# Patient Record
Sex: FEMALE | Race: BLACK OR AFRICAN AMERICAN | NOT HISPANIC OR LATINO | Employment: PART TIME
[De-identification: names, ages, dates, MRNs, and addresses within clinical notes are randomized per-mention and may not be internally consistent; named-entity substitution may affect disease eponyms.]

---

## 2018-12-03 LAB — HBA1C MFR BLD: 10.3 % (ref 4.3–5.7)

## 2018-12-05 LAB — HBA1C MFR BLD: 10.5 % (ref 4.3–5.7)

## 2019-02-26 LAB — HBA1C MFR BLD: 10.5 % (ref 4.3–5.7)

## 2019-04-03 LAB — HBA1C MFR BLD: 8.8 % (ref 4.3–5.7)

## 2019-06-26 LAB — HBA1C MFR BLD: 8 % (ref 4.3–5.7)

## 2019-07-29 ENCOUNTER — RECORDS - HEALTHEAST (OUTPATIENT)
Dept: ADMINISTRATIVE | Facility: OTHER | Age: 64
End: 2019-07-29

## 2019-07-30 ENCOUNTER — AMBULATORY - HEALTHEAST (OUTPATIENT)
Dept: VASCULAR SURGERY | Facility: CLINIC | Age: 64
End: 2019-07-30

## 2019-07-30 ENCOUNTER — RECORDS - HEALTHEAST (OUTPATIENT)
Dept: ADMINISTRATIVE | Facility: OTHER | Age: 64
End: 2019-07-30

## 2019-07-30 DIAGNOSIS — I73.9 PAD (PERIPHERAL ARTERY DISEASE) (H): ICD-10-CM

## 2019-07-30 DIAGNOSIS — I73.9 PVD (PERIPHERAL VASCULAR DISEASE) (H): ICD-10-CM

## 2019-08-01 ENCOUNTER — AMBULATORY - HEALTHEAST (OUTPATIENT)
Dept: PODIATRY | Facility: CLINIC | Age: 64
End: 2019-08-01

## 2019-08-01 DIAGNOSIS — I73.9 PAD (PERIPHERAL ARTERY DISEASE) (H): ICD-10-CM

## 2019-08-01 RX ORDER — METFORMIN HYDROCHLORIDE EXTENDED-RELEASE TABLETS 500 MG/1
500 TABLET, FILM COATED, EXTENDED RELEASE ORAL 2 TIMES DAILY
Status: SHIPPED | COMMUNITY
Start: 2019-08-01 | End: 2023-02-14

## 2019-08-01 RX ORDER — AMLODIPINE BESYLATE 5 MG/1
5 TABLET ORAL DAILY
Status: SHIPPED | COMMUNITY
Start: 2019-08-01

## 2019-08-01 RX ORDER — LISINOPRIL 40 MG/1
40 TABLET ORAL DAILY
Status: ON HOLD | COMMUNITY
Start: 2019-08-01 | End: 2023-09-14

## 2019-08-01 RX ORDER — CLOPIDOGREL BISULFATE 75 MG/1
75 TABLET ORAL DAILY
Status: SHIPPED | COMMUNITY
Start: 2019-08-01 | End: 2022-04-21

## 2019-08-01 RX ORDER — HYDROCHLOROTHIAZIDE 25 MG/1
25 TABLET ORAL DAILY
Status: ON HOLD | COMMUNITY
Start: 2019-08-01 | End: 2023-09-15

## 2019-08-07 ENCOUNTER — RECORDS - HEALTHEAST (OUTPATIENT)
Dept: HEALTH INFORMATION MANAGEMENT | Facility: CLINIC | Age: 64
End: 2019-08-07

## 2019-08-22 ENCOUNTER — RECORDS - HEALTHEAST (OUTPATIENT)
Dept: VASCULAR ULTRASOUND | Facility: CLINIC | Age: 64
End: 2019-08-22

## 2019-08-22 ENCOUNTER — COMMUNICATION - HEALTHEAST (OUTPATIENT)
Dept: TELEHEALTH | Facility: CLINIC | Age: 64
End: 2019-08-22

## 2019-08-22 DIAGNOSIS — I73.9 PERIPHERAL VASCULAR DISEASE, UNSPECIFIED (H): ICD-10-CM

## 2019-08-23 ENCOUNTER — OFFICE VISIT - HEALTHEAST (OUTPATIENT)
Dept: VASCULAR SURGERY | Facility: CLINIC | Age: 64
End: 2019-08-23

## 2019-08-23 DIAGNOSIS — I73.9 PVD (PERIPHERAL VASCULAR DISEASE) (H): ICD-10-CM

## 2019-08-23 RX ORDER — CILOSTAZOL 50 MG/1
50 TABLET ORAL DAILY
Refills: 0 | Status: SHIPPED | OUTPATIENT
Start: 2019-08-23 | End: 2019-09-05

## 2019-08-23 RX ORDER — CILOSTAZOL 50 MG/1
50 TABLET ORAL 2 TIMES DAILY
Refills: 0 | Status: SHIPPED | OUTPATIENT
Start: 2019-09-06 | End: 2019-09-19

## 2019-08-23 RX ORDER — CILOSTAZOL 100 MG/1
100 TABLET ORAL 2 TIMES DAILY
Qty: 120 TABLET | Refills: 0 | Status: SHIPPED | OUTPATIENT
Start: 2019-08-23 | End: 2021-09-10

## 2019-08-23 RX ORDER — CILOSTAZOL 50 MG/1
TABLET ORAL
Qty: 42 TABLET | Status: SHIPPED | OUTPATIENT
Start: 2019-08-23 | End: 2019-09-20

## 2020-08-07 ENCOUNTER — AMBULATORY - HEALTHEAST (OUTPATIENT)
Dept: SURGERY | Facility: AMBULATORY SURGERY CENTER | Age: 65
End: 2020-08-07

## 2020-08-07 DIAGNOSIS — Z11.59 ENCOUNTER FOR SCREENING FOR OTHER VIRAL DISEASES: ICD-10-CM

## 2020-09-08 ENCOUNTER — ANESTHESIA - HEALTHEAST (OUTPATIENT)
Dept: SURGERY | Facility: AMBULATORY SURGERY CENTER | Age: 65
End: 2020-09-08

## 2020-09-08 ENCOUNTER — AMBULATORY - HEALTHEAST (OUTPATIENT)
Dept: LAB | Facility: CLINIC | Age: 65
End: 2020-09-08

## 2020-09-08 DIAGNOSIS — Z11.59 ENCOUNTER FOR SCREENING FOR OTHER VIRAL DISEASES: ICD-10-CM

## 2020-09-08 ASSESSMENT — MIFFLIN-ST. JEOR: SCORE: 1653.12

## 2020-09-10 ENCOUNTER — COMMUNICATION - HEALTHEAST (OUTPATIENT)
Dept: SCHEDULING | Facility: CLINIC | Age: 65
End: 2020-09-10

## 2020-09-11 ENCOUNTER — SURGERY - HEALTHEAST (OUTPATIENT)
Dept: SURGERY | Facility: AMBULATORY SURGERY CENTER | Age: 65
End: 2020-09-11

## 2020-09-11 ENCOUNTER — COMMUNICATION - HEALTHEAST (OUTPATIENT)
Dept: SCHEDULING | Facility: CLINIC | Age: 65
End: 2020-09-11

## 2020-09-11 ASSESSMENT — MIFFLIN-ST. JEOR: SCORE: 1199.53

## 2020-10-08 ENCOUNTER — AMBULATORY - HEALTHEAST (OUTPATIENT)
Dept: SURGERY | Facility: AMBULATORY SURGERY CENTER | Age: 65
End: 2020-10-08

## 2020-10-08 DIAGNOSIS — F12.11 MILD CANNABIS ABUSE IN SUSTAINED REMISSION IN CONTROLLED ENVIRONMENT: ICD-10-CM

## 2020-12-10 ENCOUNTER — AMBULATORY - HEALTHEAST (OUTPATIENT)
Dept: LAB | Facility: CLINIC | Age: 65
End: 2020-12-10

## 2020-12-10 DIAGNOSIS — F12.11 MILD CANNABIS ABUSE IN SUSTAINED REMISSION IN CONTROLLED ENVIRONMENT: ICD-10-CM

## 2020-12-10 ASSESSMENT — MIFFLIN-ST. JEOR: SCORE: 1213.14

## 2020-12-11 ENCOUNTER — COMMUNICATION - HEALTHEAST (OUTPATIENT)
Dept: SCHEDULING | Facility: CLINIC | Age: 65
End: 2020-12-11

## 2020-12-11 ENCOUNTER — ANESTHESIA - HEALTHEAST (OUTPATIENT)
Dept: SURGERY | Facility: AMBULATORY SURGERY CENTER | Age: 65
End: 2020-12-11

## 2020-12-11 LAB
SARS-COV-2 PCR COMMENT: NORMAL
SARS-COV-2 RNA SPEC QL NAA+PROBE: NEGATIVE
SARS-COV-2 VIRUS SPECIMEN SOURCE: NORMAL

## 2020-12-14 ENCOUNTER — SURGERY - HEALTHEAST (OUTPATIENT)
Dept: SURGERY | Facility: AMBULATORY SURGERY CENTER | Age: 65
End: 2020-12-14

## 2020-12-14 ASSESSMENT — MIFFLIN-ST. JEOR: SCORE: 1213.14

## 2021-02-22 ENCOUNTER — TRANSFERRED RECORDS (OUTPATIENT)
Dept: HEALTH INFORMATION MANAGEMENT | Facility: CLINIC | Age: 66
End: 2021-02-22

## 2021-04-15 ENCOUNTER — TRANSFERRED RECORDS (OUTPATIENT)
Dept: HEALTH INFORMATION MANAGEMENT | Facility: CLINIC | Age: 66
End: 2021-04-15

## 2021-04-16 ENCOUNTER — TRANSFERRED RECORDS (OUTPATIENT)
Dept: HEALTH INFORMATION MANAGEMENT | Facility: CLINIC | Age: 66
End: 2021-04-16

## 2021-05-18 ENCOUNTER — AMBULATORY - HEALTHEAST (OUTPATIENT)
Dept: SURGERY | Facility: AMBULATORY SURGERY CENTER | Age: 66
End: 2021-05-18

## 2021-05-18 DIAGNOSIS — Z11.59 ENCOUNTER FOR SCREENING FOR OTHER VIRAL DISEASES: ICD-10-CM

## 2021-05-19 ENCOUNTER — RECORDS - HEALTHEAST (OUTPATIENT)
Dept: ADMINISTRATIVE | Facility: OTHER | Age: 66
End: 2021-05-19

## 2021-05-31 NOTE — PROGRESS NOTES
VASCULAR SURGERY CLINIC CONSULTATION    VASCULAR SURGEON: Russ Gerrad MD    LOCATION:  [facility]    Ashanti Aviles   Medical Record #:  [id number]  YOB: 1955  Age:  63 y.o.     Date of Service: 8/23/2019    PRIMARY CARE PROVIDER: Keren Mccracken CNP      Reason for visit: Left lower extremity claudication.    IMPRESSION: Left lower extremity claudication at one block.    RECOMMENDATION/RISKS:    1.  According to her she is not bothered by her claudication unless she has to walk a long distance.  She is still able to manage her day-to-day living.  I do not think any invasive intervention is advised for that reason.  I have, however, advised that she start taking cilostazol as that may help with the walking distance for day-to-day living.  I have also advised that if she does not notice any improvement in the walking distance with the cilostazol then she can stop it after 3 months.    2.  I have counseled her on tobacco cessation and advised her that her claudication can turn into critical limb ischemia if she does not stop smoking.  3.  From what I can understand the biggest trouble she is having is at least some part of her work which she is able to do the walking short distances but is not able to manage long distance there is at work when she has to walk.  4.  Any work excuses in that regard or disability paper work in that regard is to be handled by the primary care provider..     HPI:  Ashanti Aviles is a 63 y.o. female who was seen today in consultation for left lower extremity claudication.  He tells me that this has been going on for many years.  He also tells me that she had severe injury to the left lower extremity about 30 years ago and had to undergo her left popliteal artery reconstruction.  About 3 years ago she reportedly underwent an angioplasty of the left lower extremity arterial circulation.  This was done at an institution where the records are not  available to us in care everywhere.  She feels claudication at about 1 block.  He does not think that it affects her day-to-day living.  He is able to manage things at work except if she has to walk over a long distance.    REVIEW OF SYSTEMS:    A 12 point ROS was reviewed and is negative except for as noted in history of presenting illness.     PMH:  No past medical history on file.   No past surgical history on file.    ALLERGIES:  Ezetimibe; Penicillins; Simvastatin; and Victoza [liraglutide]    MEDS:    Current Outpatient Medications:      amLODIPine (NORVASC) 10 MG tablet, Take 10 mg by mouth daily., Disp: , Rfl:      clopidogrel (PLAVIX) 75 mg tablet, Take 75 mg by mouth daily., Disp: , Rfl:      empagliflozin 25 mg Tab, Take 25 mg by mouth daily., Disp: , Rfl:      hydroCHLOROthiazide (HYDRODIURIL) 25 MG tablet, Take 25 mg by mouth daily., Disp: , Rfl:      insulin lispro protamin/lispro (HUMALOG MIX 75-25 SUBQ), Inject 30 Units under the skin 2 (two) times a day., Disp: , Rfl:      lisinopril (PRINIVIL,ZESTRIL) 40 MG tablet, Take 40 mg by mouth daily., Disp: , Rfl:      metFORMIN (FORTAMET) 500 MG (OSM) 24 hr tablet, Take 500 mg by mouth 2 (two) times a day., Disp: , Rfl:      rosuvastatin (CRESTOR) 20 MG tablet, Take 20 mg by mouth at bedtime., Disp: , Rfl:      cilostazol (PLETAL) 100 MG tablet, Take 1 tablet (100 mg total) by mouth 2 (two) times a day., Disp: 120 tablet, Rfl: 0     cilostazol (PLETAL) 50 MG tablet, Take 1 tablet (50 mg total) by mouth daily for 14 days, THEN 1 tablet (50 mg total) 2 (two) times a day for 14 days., Disp: 42 tablet, Rfl: 0    SOCIAL HABITS:    Social History     Tobacco Use   Smoking Status Current Every Day Smoker   Smokeless Tobacco Never Used       Social History     Substance and Sexual Activity   Alcohol Use Not on file       Social History     Substance and Sexual Activity   Drug Use Not on file       FAMILY HISTORY:  No family history on file.        PE:  /62    Pulse 84   Temp 97.6  F (36.4  C)   Resp 16   Wt Readings from Last 1 Encounters:   No data found for Wt     There is no height or weight on file to calculate BMI.    EXAM:  GENERAL: This is a well-developed 63 y.o. female who appears her stated age  EYES: Grossly normal.  MOUTH: Buccal mucosa normal   CARDIAC:  NS1 S2, No Murmur  CHEST/LUNG:  Clear lung fields bilaterally   GASTROINTESINAL (ABDOMEN): Soft, non-tender, B/S present, no pulsatile mass  MUSCULOSKELETAL: Grossly normal and both lower extremities are intact.  There is a healed fasciotomy site in the lateral compartment of the left leg.  There is a surgical incision in the popliteal fossa consistent with a popliteal artery reconstruction.  HEME/LYMPH: No lymphedema  NEUROLOGIC: Focally intact, Alert and oriented x 3.   PSYCH: appropriate affect  INTEGUMENT: No open lesions or ulcers  Pulse Exam:   Carotid: No Bruit    Radial: Left +2   Right  +2    Femoral: Left +2   Right  +2    Popliteal: Left 0   Right  0    Right dorsalis pedis and posterior tibial are biphasic.  Left dorsalis pedis and posterior tibial are monophasic.  DIAGNOSTIC STUDIES:     Images:  Us Arterial Legs Bilateral Complete    Result Date: 8/23/2019  Arterial Duplex Ultrasound Lower Extremity Artery Evaluation Indication: PAD, BILATERAL LEG PAIN, MORE ON LEFT  Previous: NONE History: Smoke, Hypertension, Diabetic, Hyperlipidemia, PAD and Claudication Technique: Duplex imaging is performed utilizing gray-scale, two-dimensional images, and color-flow imaging. Doppler waveform analysis and spectral Doppler imaging is also performed. LOWER EXTREMITY ARTERIAL DUPLEX EXAM WITH WAVEFORMS Right Leg:(cm/s) Location: Velocities Waveforms EIA:   128  T CFA:   305  T PFA:   291  B SFA Proximal:   165/77/357  T SFA Mid:   74/156  T SFA Distal:   105/52 T Popliteal Artery:   92/58  T PTA:   40   M RICKY:   31  M DPA:   39  M Waveforms: T=Triphasic, M=Monophasic, B=Biphasic Stenotic Profile Ratio:  357 / 77 = 4.63 Left Leg:(cm/s) Location: Velocities Waveforms EIA:   192  M CFA:   163  M PFA:   359/66  T SFA Proximal:   314/88  B SFA Mid:   79/141  B SFA Distal:   115/45  B Popliteal Artery:   35/28/43  M PTA:   26   M RICKY:   24  M DPA:   52  M Waveforms: T=Triphasic, M=Monophasic, B=Biphasic Stenotic Profile Ratio: 359 / 163 = 2.20 Comments:   Impression: Right Lower Extremity: Biphasic waveforms at the common femoral level suggest no inflow disease.  Significantly elevated velocity in the common femoral artery suggest important stenosis there.  Some area of disease in the SFA but the waveforms remain triphasic to the level of the popliteal artery.  Waveforms become essentially monophasic at the level of the ankle suggesting infrapopliteal disease. Left Lower Extremity: Monophasic waveforms in the external iliac and common femoral suggest possible inflow disease although the waveforms revert to biphasic in the SFA.  Significantly elevated velocities in both the profunda and proximal SFA suggest significant hemodynamically significant disease at this level.  Monophasic waveforms in the popliteal level down suggest distal SFA or popliteal disease and tibial vessel disease. Reference: Category Normal 1-19% 20-49% 50-99% Occluded PSV <160 cm/sec without spectral broadening <160 cm/sec with spectral broadening Increased Increased Absent Flow Ratio N/A N/A < 2.0 >2.0 N/A Post-Stenotic Turbulence No No No Yes N/A     Us Arterial Pressures With Exercise Legs Bilateral    Result Date: 8/23/2019  Ultrasound Ankle Brachial Index with Exercise Indication: PAD, BILATERAL LEG PAIN, MORE ON LEFT. Previous: NONE History: Smoke, Hypertension, Diabetic, Hyperlipidemia, PAD and Claudication Resting TRACIE's          Right: mmHg Index     Brachial: 153  Ankle-(PT): 100 0.65 Ankle-(DP): 118 0.76           Digit: 71 0.46               Left: mmHg Index     Brachial: 155  Ankle-(PT): 71 0.46 Ankle-(DP): 62 0.40           Digit: 28 0.18  Resting ankle-brachial index of 0.76 on the right. Toe Pressures of 71 mmHg and TBI of 0.46.  Resting ankle-brachial index of 0.46 on the left. Toe Pressures of 28 mmHg and TBI of 0.18. WAVEFORMS: The right dorsalis pedis and posterior tibial arteries show blunted waveforms. The left dorsalis pedis and posterior tibial arteries show blunted waveforms. Exercise Testing: HARRIS-GUTIERRES PROTOCOL Time (min) Grade Level % Rate MPH Level of Pain (1-10) Area of Pain 1 0 2.0  0     2 0 2.0  4  LT CALF PAIN AT 1:30 MINS 3 2 2.0  7  BILAT CALF PAIN, > LEFT  4 2 2.0     STOPPED AT 3:30MINS, PAINFUL Post Exercise Pressures: Location: Rest 1 minute 3 minute 5 minute 7 minute 10 minute Right Ankle  52 86 98 99 94 Left Ankle PT 71 35 42 51 62 63 Left Brachial 155 164 159 154 153 146 Right TRACIE: 0.76 0.32 0.54 0.64 0.65 0.64 Left TRACIE: 0.46 0.21 0.26 0.33 0.41 0.43 Comments:  Impression: 1. RIGHT LOWER EXTREMITY: Abnormal ankle-brachial index of 0.76 with normal toe pressures of 71 mmHg 2. LEFT LOWER EXTREMITY: Abnormal ankle-brachial index of 0.46 with abnormal toe pressures of 28 mmHg.  Note that this is likely inadequate for wound healing. 3.  Harris Gutierres testing: Patient had to stop walking at 3:30 minutes due to bilateral calf pains significant drop in ABIs support that this is from vascular claudication       I personally reviewed the images and my interpretation is as noted above.    Total time spent 30 minutes face to face with patient with more than 50% time spent in counseling and coordination of care.    Russ Gerard MD  VASCULAR SURGERY

## 2021-05-31 NOTE — PROGRESS NOTES
CONSULT. Referred by Keren Mccracken/[facility]. PVD. Hx of popliteal surgery 30yrs ago, left angioplasty 2017, [facility] screening-abn ABIs. Pt also has left calf claudication at 1/2 block. Smoker.    Leg pain: bilateral  At rest: no  Walking: yes  How far: 60 feet

## 2021-06-11 ENCOUNTER — RECORDS - HEALTHEAST (OUTPATIENT)
Dept: ADMINISTRATIVE | Facility: OTHER | Age: 66
End: 2021-06-11

## 2021-06-11 ASSESSMENT — MIFFLIN-ST. JEOR: SCORE: 1210.86

## 2021-06-11 NOTE — TELEPHONE ENCOUNTER
Pt contacted Triage. [facility] nurse unable to get into her chart.  I advised her that I would call the patient back.  Call to patient.  Problems regarding medication. Had procedure done today, and medication was sent to [facility]. She would like the script sent into [facility] or another pharmacy.   She stated that the pharmacy is reaching out to the doctor and getting it taken care of. No need for any further assistance.  I advised her to call back if she had any other questions or concerns.  She verbalized understanding.      Additional Information    Negative: [1] Caller is not with the adult (patient) AND [2] reporting urgent symptoms    Negative: Lab result questions    Negative: Medication questions    Negative: Caller can't be reached by phone    Negative: Caller has already spoken to PCP or another triager    Negative: RN needs further essential information from caller in order to complete triage    Negative: Requesting regular office appointment    Negative: [1] Caller requesting NON-URGENT health information AND [2] PCP's office is the best resource    Negative: Health Information question, no triage required and triager able to answer question    [1] Follow-up call to recent contact AND [2] information only call, no triage required    Negative: General information question, no triage required and triager able to answer question    Negative: Question about upcoming scheduled test, no triage required and triager able to answer question    Negative: [1] Caller is not with the adult (patient) AND [2] probable NON-URGENT symptoms    Protocols used: INFORMATION ONLY CALL-A-    Cherri Bryant RN   [facility] Nurse Advisor  09/11/20 at 5:35 PM

## 2021-06-11 NOTE — ANESTHESIA PREPROCEDURE EVALUATION
Anesthesia Evaluation      Patient summary reviewed   No history of anesthetic complications     Airway   Mallampati: II  Neck ROM: full   Pulmonary - negative ROS                          Cardiovascular - normal exam  (+) hypertension, ,      Neuro/Psych - negative ROS     Endo/Other    (+) diabetes mellitus type 2,      GI/Hepatic/Renal - negative ROS           Dental - normal exam                        Anesthesia Plan  Planned anesthetic: MAC    ASA 2     Anesthetic plan and risks discussed with: patient    Post-op plan: routine recovery

## 2021-06-11 NOTE — ANESTHESIA CARE TRANSFER NOTE
Last vitals:   Vitals:    09/11/20 1434   BP: 137/63   Pulse: 85   Resp: 18   Temp: 36.2  C (97.1  F)   SpO2: 100%     Patient's level of consciousness is drowsy and coughing. But awakens easily. Room air. She denies pain and denies nausea.   Spontaneous respirations: yes  Maintains airway independently: yes  Dentition unchanged: yes  Oropharynx: oropharynx clear of all foreign objects    QCDR Measures:  ASA# 20 - Surgical Safety Checklist: WHO surgical safety checklist completed prior to induction    PQRS# 430 - Adult PONV Prevention: 4558F - Pt received => 2 anti-emetic agents (different classes) preop & intraop  ASA# 8 - Peds PONV Prevention: NA - Not pediatric patient, not GA or 2 or more risk factors NOT present  PQRS# 424 - Loan-op Temp Management: 4559F - At least one body temp DOCUMENTED => 35.5C or 95.9F within required timeframe  PQRS# 426 - PACU Transfer Protocol: - Transfer of care checklist used  ASA# 14 - Acute Post-op Pain: ASA14B - Patient did NOT experience pain >= 7 out of 10

## 2021-06-11 NOTE — ANESTHESIA POSTPROCEDURE EVALUATION
Patient: Ashanti Aviles  Procedure(s):  EXAM UNDER ANESTHESIA, HIGH RESOLUTION ANOSCOPY INTRA OP  Anesthesia type: MAC    Patient location: Phase II Recovery  Last vitals:   Vitals Value Taken Time   /60 9/11/2020  2:46 PM   Temp 36.2  C (97.1  F) 9/11/2020  2:34 PM   Pulse 74 9/11/2020  2:47 PM   Resp 18 9/11/2020  2:34 PM   SpO2 100 % 9/11/2020  2:47 PM   Vitals shown include unvalidated device data.  Post vital signs: stable  Level of consciousness: awake and responds to simple questions  Post-anesthesia pain: pain controlled  Post-anesthesia nausea and vomiting: no  Pulmonary: unassisted  Cardiovascular: stable  Hydration: adequate  Anesthetic events: no    QCDR Measures:  ASA# 11 - Loan-op Cardiac Arrest: ASA11B - Patient did NOT experience unanticipated cardiac arrest  ASA# 12 - Loan-op Mortality Rate: ASA12B - Patient did NOT die  ASA# 13 - PACU Re-Intubation Rate: ASA13B - Patient did NOT require a new airway mgmt  ASA# 10 - Composite Anes Safety: ASA10A - No serious adverse event    Additional Notes:

## 2021-06-12 ENCOUNTER — AMBULATORY - HEALTHEAST (OUTPATIENT)
Dept: FAMILY MEDICINE | Facility: CLINIC | Age: 66
End: 2021-06-12

## 2021-06-12 DIAGNOSIS — Z11.59 ENCOUNTER FOR SCREENING FOR OTHER VIRAL DISEASES: ICD-10-CM

## 2021-06-13 NOTE — ANESTHESIA PREPROCEDURE EVALUATION
Anesthesia Evaluation      History of anesthetic complications     Airway   Mallampati: II  Neck ROM: full   Pulmonary - negative ROS and normal exam                          Cardiovascular - normal exam  (+) hypertension, , PVD     Neuro/Psych - negative ROS     Endo/Other    (+) diabetes mellitus, obesity,      GI/Hepatic/Renal - negative ROS           Dental - normal exam                        Anesthesia Plan  Planned anesthetic: MAC    ASA 3   Induction: intravenous   Anesthetic plan and risks discussed with: patient    Post-op plan: routine recovery

## 2021-06-13 NOTE — ANESTHESIA CARE TRANSFER NOTE
Last vitals:   Vitals:    12/14/20 0831   BP: 143/61   Pulse: 75   Resp: 16   Temp: 36.1  C (96.9  F)   SpO2: 100%     Patient's level of consciousness is drowsy, responding to questions  Spontaneous respirations: yes  Maintains airway independently: yes  Dentition unchanged: yes  Oropharynx: oropharynx clear of all foreign objects    QCDR Measures:  ASA# 20 - Surgical Safety Checklist: WHO surgical safety checklist completed prior to induction    PQRS# 430 - Adult PONV Prevention: 4558F - Pt received => 2 anti-emetic agents (different classes) preop & intraop  ASA# 8 - Peds PONV Prevention: NA - Not pediatric patient, not GA or 2 or more risk factors NOT present  PQRS# 424 - Loan-op Temp Management: 4559F - At least one body temp DOCUMENTED => 35.5C or 95.9F within required timeframe  PQRS# 426 - PACU Transfer Protocol: - Transfer of care checklist used  ASA# 14 - Acute Post-op Pain: ASA14B - Patient did NOT experience pain >= 7 out of 10

## 2021-06-13 NOTE — ANESTHESIA POSTPROCEDURE EVALUATION
Patient: Ashanti Aviles  Procedure(s):  EXAM UNDER ANESTHESIA WITH HIGH RESOLUTION ANOSCOPY  Anesthesia type: MAC    Patient location: PACU  Last vitals:   Vitals Value Taken Time   /76 12/14/20 0945   Temp 36.1  C (96.9  F) 12/14/20 0831   Pulse 74 12/14/20 0956   Resp 16 12/14/20 0945   SpO2 98 % 12/14/20 0956   Vitals shown include unvalidated device data.  Post vital signs: stable  Level of consciousness: awake and responds to simple questions  Post-anesthesia pain: pain controlled  Post-anesthesia nausea and vomiting: no  Pulmonary: unassisted, return to baseline  Cardiovascular: stable and blood pressure at baseline  Hydration: adequate  Anesthetic events: no    QCDR Measures:  ASA# 11 - Loan-op Cardiac Arrest: ASA11B - Patient did NOT experience unanticipated cardiac arrest  ASA# 12 - Loan-op Mortality Rate: ASA12B - Patient did NOT die  ASA# 13 - PACU Re-Intubation Rate: ASA13B - Patient did NOT require a new airway mgmt  ASA# 10 - Composite Anes Safety: ASA10A - No serious adverse event    Additional Notes:

## 2021-06-14 ENCOUNTER — ANESTHESIA - HEALTHEAST (OUTPATIENT)
Dept: SURGERY | Facility: AMBULATORY SURGERY CENTER | Age: 66
End: 2021-06-14

## 2021-06-14 ENCOUNTER — COMMUNICATION - HEALTHEAST (OUTPATIENT)
Dept: SCHEDULING | Facility: CLINIC | Age: 66
End: 2021-06-14

## 2021-06-14 ENCOUNTER — RECORDS - HEALTHEAST (OUTPATIENT)
Dept: ADMINISTRATIVE | Facility: OTHER | Age: 66
End: 2021-06-14

## 2021-06-15 ENCOUNTER — TRANSFERRED RECORDS (OUTPATIENT)
Dept: HEALTH INFORMATION MANAGEMENT | Facility: CLINIC | Age: 66
End: 2021-06-15

## 2021-06-15 ENCOUNTER — RECORDS - HEALTHEAST (OUTPATIENT)
Dept: ADMINISTRATIVE | Facility: OTHER | Age: 66
End: 2021-06-15

## 2021-06-15 ENCOUNTER — SURGERY - HEALTHEAST (OUTPATIENT)
Dept: SURGERY | Facility: AMBULATORY SURGERY CENTER | Age: 66
End: 2021-06-15
Payer: COMMERCIAL

## 2021-06-15 ASSESSMENT — MIFFLIN-ST. JEOR: SCORE: 1210.86

## 2021-06-17 NOTE — PATIENT INSTRUCTIONS - HE
Patient Instructions by Warner Cloud RN at 8/23/2019  2:20 PM     Author: Warner Cloud RN Service: -- Author Type: Registered Nurse    Filed: 8/23/2019  2:40 PM Encounter Date: 8/23/2019 Status: Addendum    : Warner Cloud RN (Registered Nurse)    Related Notes: Original Note by Warner Cloud RN (Registered Nurse) filed at 8/23/2019  2:38 PM       We would like to start you on a medication called Pletal or Cilostazol.    We start you on this slowly and go up over the course of 4 weeks.    The first 2 weeks we would like you to take 50 mg once a day for 2 weeks.  Then increase to 50mg twice a day for 2 weeks.  Finally increase to 100mg twice a day thereafter.    Please notify us if you have any problems with nausea, diarrhea or increased blood sugar levels. During this dose titration we would like you to check your blood sugars twice a day if you are diabetic.   Cilostazol Oral tablet  What is this medicine?  CILOSTAZOL (flori [place] sta zol) is used to treat the symptoms of intermittent claudication. This condition causes pain in the legs during walking, and goes away with rest. By improving blood flow, this medicine helps people with this condition walk longer distances without pain.  This medicine may be used for other purposes; ask your health care provider or pharmacist if you have questions.  What should I tell my health care provider before I take this medicine?  They need to know if you have any of the following conditions:    bleeding disorder or hemophilia    history of heart failure, heart attack, or other heart disease    an unusual or allergic reaction to cilostazol, other medicines, foods, dyes, or preservatives    pregnant or trying to get pregnant    breast-feeding  How should I use this medicine?  Take this medicine by mouth with a full glass of water. Follow the directions on the prescription label. Take this medicine on an empty stomach, at least 30 minutes before or 2 hours after food. Do not take with  food. Take your doses at regular intervals. Do not take your medicine more often than directed.  Talk to your pediatrician regarding the use of this medicine in children. Special care may be needed.  Overdosage: If you think you have taken too much of this medicine contact a poison control center or emergency room at once.  NOTE: This medicine is only for you. Do not share this medicine with others.  What if I miss a dose?  If you miss a dose, take it as soon as you can. If it is almost time for your next dose, take only that dose. Do not take double or extra doses.  What may interact with this medicine?  Do not take this medicine with any of the following medications:    grapefruit juice  This medicine may also interact with the following medications:    agents that prevent or treat blood clots like enoxaparin or warfarin    aspirin    diltiazem    erythromycin or clarithromycin    omeprazole    some medications for treating depression like fluoxetine, fluvoxamine, nefazodone    some medications for treating fungal infections like ketoconazole, fluconazole, itraconazole  This list may not describe all possible interactions. Give your health care provider a list of all the medicines, herbs, non-prescription drugs, or dietary supplements you use. Also tell them if you smoke, drink alcohol, or use illegal drugs. Some items may interact with your medicine.  What should I watch for while using this medicine?  Visit your doctor or health care professional for regular checks on your progress. It may take 2 to 4 weeks for your condition to start to get better once you begin taking this medicine. In some people, it can take as long as 3 months for the condition to get better.  You may get drowsy or dizzy. Do not drive, use machinery, or do anything that needs mental alertness until you know how this drug affects you. Do not stand or sit up quickly, especially if you are an older patient. This reduces the risk of dizzy or  fainting spells. Alcohol can make you more drowsy and dizzy. Avoid alcoholic drinks.  Smoking may have effects on the circulation that may limit the benefits you receive from this medicine. You may wish to discuss how to stop smoking with your doctor or health care professional.  If you are going to have surgery, tell your doctor or health care professional that you are taking this medicine.  What side effects may I notice from receiving this medicine?  Side effects that you should report to your doctor or health care professional as soon as possible:    allergic reactions like skin rash, itching or hives, swelling of the face, lips, or tongue    chest pain    fast, slow, or irregular heartbeat    signs and symptoms of bleeding such as bloody or black, tarry stools; red or dark-brown urine; spitting up blood or brown material that looks like coffee grounds; red spots on the skin; unusual bruising or bleeding from the eye, gums, or nose    swelling in the legs or ankles  Side effects that usually do not require medical attention (report to your doctor or health care professional if they continue or are bothersome):    diarrhea    headache    nausea, or upset stomach  This list may not describe all possible side effects. Call your doctor for medical advice about side effects. You may report side effects to FDA at 6-694-FDA-9735.  Where should I keep my medicine?  Keep out of the reach of children.  Store at room temperature between 15 and 30 degrees C (59 and 86 degrees F). Throw away any unused medicine after the expiration date.  NOTE:This sheet is a summary. It may not cover all possible information. If you have questions about this medicine, talk to your doctor, pharmacist, or health care provider. Copyright  2015 Gold Standard        Patient Education     A Walking Program for Peripheral Arterial Disease (PAD)  Peripheral arterial disease (PAD) is a condition where the arteries in the legs are severely damaged.  When you have PAD, walking can be painful. So you may start to walk less. Walking less makes your leg muscles weaker. It then becomes harder and more painful to walk. A walking program can break this cycle. This sheet gives you tips on how to get started.     Walking farther without pain  Exercise strengthens leg muscles that are out of shape. It also trains these muscles to work with less oxygen. This helps your leg muscles work better even with reduced blood flow to your legs. When you have PAD, a walking program can be helpful. Your program can:    Let you walk longer and farther without claudication. This is an ache in your legs during exercise that goes away with rest.    Let you do more and be more active    Add to your overall health and well-being    Help you control your blood sugar and blood pressure    Help you become healthier with no risk and at little or no cost  Getting started  Your local hospital, vascular center, or cardiac rehab center may have a special walking program for people with PAD. If so, this is your best option. But if you cant find a program, or its not covered by insurance, you can still walk on your own. Follow these steps at each session:    Step 1. Start at a pace that lets you walk for 5 to 10 minutes before you start to feel claudication. This feeling is unpleasant, but it doesnt hurt you. Keep going until the pain makes you feel that you need to stop.    Step 2. Stop and rest for 3 to 5 minutes, just long enough for the pain to go away. You can rest standing or sitting. (Some people like to bring along a cane or a lightweight folding chair.)    Step 3. Again walk at a pace that lets you walk for 5 to 10 minutes more before you feel pain. This may be slower than your starting pace in step 1. Then rest again.    Step 4. Repeat this process until youve walked for 45 minutes. This should be about 60 to 80 minutes total, including resting time. You may not be able to do a full 45  minutes at first. Do as much as you can, and increase your time as you improve.  Making the most of your program    Walk at least 3 times a week. Take no more than 2 days off between sessions. If you stop walking, even for a week or two, you can lose the health benefits of your program.    Find a good place to walk. A treadmill or a track may be better at first. That way, you wont run the risk of going too far and finding that you cant walk back. Be sure to have a place to walk indoors in bad weather, such as a gym or a mall.    Wear shoes with sturdy, flexible soles. The heel should fit without slipping. You should have room to wiggle your toes.    Keep track of how long you walk. A pedometer will show your daily progress. It can also show how much farther you can walk as time goes by.    Ask a friend to keep you company. You may enjoy walking with someone else. Or you may want to make your walking sessions a time to relax by yourself.    Make it fun. Listen to music while you walk and rest. Walk in a favorite park. Get to know the people at the gym, or the folks that you pass on your route. While you rest, you can window-shop, read, or feed the birds. Do whatever will help you enjoy your exercise sessions.  Date Last Reviewed: 6/1/2016 2000-2017 [employer]. [address] [facility], [place] [address]. All rights reserved. This information is not intended as a substitute for professional medical care. Always follow your healthcare professional's instructions.

## 2021-06-26 NOTE — ANESTHESIA POSTPROCEDURE EVALUATION
Patient: Ashanti Aviles  Procedure(s):  EXAM UNDER ANESTHESIA WITH HIGH RESOLUTION ANOSCOPY, BIOPSY, FULGURATION  Anesthesia type: MAC    Patient location: Phase II Recovery  Last vitals:   Vitals Value Taken Time   /58 06/15/21 1600   Temp 36.1  C (97  F) 06/15/21 1546   Pulse 82 06/15/21 1611   Resp 16 06/15/21 1600   SpO2 98 % 06/15/21 1611   Vitals shown include unvalidated device data.  Post vital signs: stable  Level of consciousness: awake and responds to simple questions  Post-anesthesia pain: pain controlled  Post-anesthesia nausea and vomiting: no  Pulmonary: unassisted, return to baseline  Cardiovascular: stable and blood pressure at baseline  Hydration: adequate  Anesthetic events: no    QCDR Measures:  ASA# 11 - Loan-op Cardiac Arrest: ASA11B - Patient did NOT experience unanticipated cardiac arrest  ASA# 12 - Loan-op Mortality Rate: ASA12B - Patient did NOT die  ASA# 13 - PACU Re-Intubation Rate: NA - No ETT / LMA used for case  ASA# 10 - Composite Anes Safety: ASA10A - No serious adverse event    Additional Notes:

## 2021-06-26 NOTE — ANESTHESIA CARE TRANSFER NOTE
Last vitals:   Vitals:    06/15/21 1546   BP: 119/74   Pulse: 95   Resp: 18   Temp: 36.1  C (97  F)   SpO2: 97%     Patient's level of consciousness is drowsy  Spontaneous respirations: yes  Maintains airway independently: yes  Dentition unchanged: yes  Oropharynx: oropharynx clear of all foreign objects    QCDR Measures:  ASA# 20 - Surgical Safety Checklist: WHO surgical safety checklist completed prior to induction    PQRS# 430 - Adult PONV Prevention: 4558F - Pt received => 2 anti-emetic agents (different classes) preop & intraop  ASA# 8 - Peds PONV Prevention: NA - Not pediatric patient, not GA or 2 or more risk factors NOT present  PQRS# 424 - Loan-op Temp Management: 4559F - At least one body temp DOCUMENTED => 35.5C or 95.9F within required timeframe  PQRS# 426 - PACU Transfer Protocol: - Transfer of care checklist used  ASA# 14 - Acute Post-op Pain: ASA14B - Patient did NOT experience pain >= 7 out of 10

## 2021-06-26 NOTE — ANESTHESIA PREPROCEDURE EVALUATION
Anesthesia Evaluation      Patient summary reviewed   No history of anesthetic complications     Airway   Mallampati: II  Neck ROM: full   Pulmonary - normal exam   (+) a smoker                         Cardiovascular   Exercise tolerance: > or = 4 METS  (+) hypertension, , hypercholesterolemia, PVD    (-) angina  Rhythm: regular        Neuro/Psych - negative ROS     Endo/Other    (+) diabetes mellitus, arthritis,      GI/Hepatic/Renal    (+) GERD well controlled,        Other findings:     NPO 9 AM     Results for CRISTINA FERMIN (MRN [id number]) as of 6/15/2021    6/12/2021 15:35  SARS-CoV-2 PCR Result: NEGATIVE        Dental    (+) poor dentition                       Anesthesia Plan  Planned anesthetic: MAC      Light MAC  Propofol gtt  Robinul  Lidocaine  Zofran, decadron 4 mg        ASA 3     Anesthetic plan and risks discussed with: patient  Anesthesia plan special considerations: antiemetics,   Post-op plan: routine recovery

## 2021-07-06 VITALS
WEIGHT: 156 LBS | BODY MASS INDEX: 29.48 KG/M2 | HEIGHT: 62 IN | BODY MASS INDEX: 28.53 KG/M2 | HEIGHT: 62 IN | BODY MASS INDEX: 28.53 KG/M2 | WEIGHT: 156 LBS | BODY MASS INDEX: 29.48 KG/M2

## 2021-07-07 ENCOUNTER — TRANSFERRED RECORDS (OUTPATIENT)
Dept: HEALTH INFORMATION MANAGEMENT | Facility: CLINIC | Age: 66
End: 2021-07-07

## 2021-08-29 DIAGNOSIS — Z11.59 ENCOUNTER FOR SCREENING FOR OTHER VIRAL DISEASES: ICD-10-CM

## 2021-09-10 ENCOUNTER — LAB (OUTPATIENT)
Dept: LAB | Facility: CLINIC | Age: 66
End: 2021-09-10
Attending: COLON & RECTAL SURGERY
Payer: COMMERCIAL

## 2021-09-10 DIAGNOSIS — Z11.59 ENCOUNTER FOR SCREENING FOR OTHER VIRAL DISEASES: ICD-10-CM

## 2021-09-10 PROCEDURE — U0005 INFEC AGEN DETEC AMPLI PROBE: HCPCS

## 2021-09-10 PROCEDURE — U0003 INFECTIOUS AGENT DETECTION BY NUCLEIC ACID (DNA OR RNA); SEVERE ACUTE RESPIRATORY SYNDROME CORONAVIRUS 2 (SARS-COV-2) (CORONAVIRUS DISEASE [COVID-19]), AMPLIFIED PROBE TECHNIQUE, MAKING USE OF HIGH THROUGHPUT TECHNOLOGIES AS DESCRIBED BY CMS-2020-01-R: HCPCS

## 2021-09-10 RX ORDER — LINAGLIPTIN 5 MG/1
5 TABLET, FILM COATED ORAL DAILY
COMMUNITY
Start: 2020-06-08

## 2021-09-10 RX ORDER — HYDROCODONE BITARTRATE AND ACETAMINOPHEN 5; 325 MG/1; MG/1
1-2 TABLET ORAL
COMMUNITY
Start: 2021-02-22 | End: 2021-09-10

## 2021-09-10 RX ORDER — INSULIN DEGLUDEC INJECTION 100 U/ML
15 INJECTION, SOLUTION SUBCUTANEOUS 2 TIMES DAILY
COMMUNITY
End: 2024-06-03

## 2021-09-10 RX ORDER — TRAMADOL HYDROCHLORIDE 50 MG/1
50 TABLET ORAL
COMMUNITY
Start: 2020-06-15 | End: 2021-09-10

## 2021-09-10 RX ORDER — IBUPROFEN 200 MG
400-800 TABLET ORAL
COMMUNITY
Start: 2020-06-15 | End: 2021-09-10

## 2021-09-10 RX ORDER — AMOXICILLIN 250 MG
1 CAPSULE ORAL
COMMUNITY
Start: 2020-06-15 | End: 2021-09-10

## 2021-09-10 RX ORDER — ACETAMINOPHEN 325 MG/1
325-650 TABLET ORAL
COMMUNITY
Start: 2020-06-15 | End: 2021-09-10

## 2021-09-11 LAB — SARS-COV-2 RNA RESP QL NAA+PROBE: NEGATIVE

## 2021-09-13 ENCOUNTER — ANESTHESIA EVENT (OUTPATIENT)
Dept: SURGERY | Facility: AMBULATORY SURGERY CENTER | Age: 66
End: 2021-09-13
Payer: COMMERCIAL

## 2021-09-14 ENCOUNTER — HOSPITAL ENCOUNTER (OUTPATIENT)
Facility: AMBULATORY SURGERY CENTER | Age: 66
End: 2021-09-14
Attending: COLON & RECTAL SURGERY
Payer: COMMERCIAL

## 2021-09-14 ENCOUNTER — ANESTHESIA (OUTPATIENT)
Dept: SURGERY | Facility: AMBULATORY SURGERY CENTER | Age: 66
End: 2021-09-14
Payer: COMMERCIAL

## 2021-09-14 ENCOUNTER — TRANSFERRED RECORDS (OUTPATIENT)
Dept: HEALTH INFORMATION MANAGEMENT | Facility: CLINIC | Age: 66
End: 2021-09-14

## 2021-09-14 VITALS
SYSTOLIC BLOOD PRESSURE: 136 MMHG | HEART RATE: 88 BPM | OXYGEN SATURATION: 99 % | WEIGHT: 156 LBS | DIASTOLIC BLOOD PRESSURE: 72 MMHG | BODY MASS INDEX: 28.71 KG/M2 | HEIGHT: 62 IN | TEMPERATURE: 97.1 F | RESPIRATION RATE: 16 BRPM

## 2021-09-14 DIAGNOSIS — A63.0 ANAL CONDYLOMA: ICD-10-CM

## 2021-09-14 LAB
GLUCOSE SERPL-MCNC: 126 MG/DL (ref 70–99)
GLUCOSE SERPL-MCNC: 139 MG/DL (ref 70–99)

## 2021-09-14 RX ORDER — LIDOCAINE 40 MG/G
CREAM TOPICAL
Status: DISCONTINUED | OUTPATIENT
Start: 2021-09-14 | End: 2021-09-17 | Stop reason: HOSPADM

## 2021-09-14 RX ORDER — OXYCODONE HYDROCHLORIDE 5 MG/1
5 TABLET ORAL EVERY 4 HOURS PRN
Status: DISCONTINUED | OUTPATIENT
Start: 2021-09-14 | End: 2021-09-17 | Stop reason: HOSPADM

## 2021-09-14 RX ORDER — EPHEDRINE SULFATE 50 MG/ML
INJECTION, SOLUTION INTRAMUSCULAR; INTRAVENOUS; SUBCUTANEOUS PRN
Status: DISCONTINUED | OUTPATIENT
Start: 2021-09-14 | End: 2021-09-14

## 2021-09-14 RX ORDER — OXYCODONE HYDROCHLORIDE 5 MG/1
5 TABLET ORAL EVERY 6 HOURS PRN
Qty: 5 TABLET | Refills: 0 | Status: SHIPPED | OUTPATIENT
Start: 2021-09-14 | End: 2021-09-17

## 2021-09-14 RX ORDER — DEXAMETHASONE SODIUM PHOSPHATE 4 MG/ML
INJECTION, SOLUTION INTRA-ARTICULAR; INTRALESIONAL; INTRAMUSCULAR; INTRAVENOUS; SOFT TISSUE PRN
Status: DISCONTINUED | OUTPATIENT
Start: 2021-09-14 | End: 2021-09-14

## 2021-09-14 RX ORDER — ONDANSETRON 4 MG/1
4 TABLET, ORALLY DISINTEGRATING ORAL
Status: CANCELLED | OUTPATIENT
Start: 2021-09-14

## 2021-09-14 RX ORDER — SODIUM CHLORIDE, SODIUM LACTATE, POTASSIUM CHLORIDE, CALCIUM CHLORIDE 600; 310; 30; 20 MG/100ML; MG/100ML; MG/100ML; MG/100ML
INJECTION, SOLUTION INTRAVENOUS CONTINUOUS PRN
Status: DISCONTINUED | OUTPATIENT
Start: 2021-09-14 | End: 2021-09-14

## 2021-09-14 RX ORDER — FENTANYL CITRATE 50 UG/ML
50 INJECTION, SOLUTION INTRAMUSCULAR; INTRAVENOUS EVERY 5 MIN PRN
Status: CANCELLED | OUTPATIENT
Start: 2021-09-14

## 2021-09-14 RX ORDER — SODIUM CHLORIDE, SODIUM LACTATE, POTASSIUM CHLORIDE, CALCIUM CHLORIDE 600; 310; 30; 20 MG/100ML; MG/100ML; MG/100ML; MG/100ML
INJECTION, SOLUTION INTRAVENOUS CONTINUOUS
Status: DISCONTINUED | OUTPATIENT
Start: 2021-09-14 | End: 2021-09-17 | Stop reason: HOSPADM

## 2021-09-14 RX ORDER — ACETIC ACID 3 %
LIQUID (ML) MISCELLANEOUS PRN
Status: DISCONTINUED | OUTPATIENT
Start: 2021-09-14 | End: 2021-09-14 | Stop reason: HOSPADM

## 2021-09-14 RX ORDER — MEPERIDINE HYDROCHLORIDE 25 MG/ML
12.5 INJECTION INTRAMUSCULAR; INTRAVENOUS; SUBCUTANEOUS
Status: DISCONTINUED | OUTPATIENT
Start: 2021-09-14 | End: 2021-09-17 | Stop reason: HOSPADM

## 2021-09-14 RX ORDER — ONDANSETRON 4 MG/1
4 TABLET, ORALLY DISINTEGRATING ORAL EVERY 30 MIN PRN
Status: DISCONTINUED | OUTPATIENT
Start: 2021-09-14 | End: 2021-09-17 | Stop reason: HOSPADM

## 2021-09-14 RX ORDER — GLYCOPYRROLATE 0.2 MG/ML
INJECTION, SOLUTION INTRAMUSCULAR; INTRAVENOUS PRN
Status: DISCONTINUED | OUTPATIENT
Start: 2021-09-14 | End: 2021-09-14

## 2021-09-14 RX ORDER — FENTANYL CITRATE 50 UG/ML
INJECTION, SOLUTION INTRAMUSCULAR; INTRAVENOUS PRN
Status: DISCONTINUED | OUTPATIENT
Start: 2021-09-14 | End: 2021-09-14

## 2021-09-14 RX ORDER — PROPOFOL 10 MG/ML
INJECTION, EMULSION INTRAVENOUS CONTINUOUS PRN
Status: DISCONTINUED | OUTPATIENT
Start: 2021-09-14 | End: 2021-09-14

## 2021-09-14 RX ORDER — LIDOCAINE HYDROCHLORIDE 20 MG/ML
INJECTION, SOLUTION INFILTRATION; PERINEURAL PRN
Status: DISCONTINUED | OUTPATIENT
Start: 2021-09-14 | End: 2021-09-14

## 2021-09-14 RX ORDER — FENTANYL CITRATE 50 UG/ML
50 INJECTION, SOLUTION INTRAMUSCULAR; INTRAVENOUS
Status: DISCONTINUED | OUTPATIENT
Start: 2021-09-14 | End: 2021-09-17 | Stop reason: HOSPADM

## 2021-09-14 RX ORDER — ONDANSETRON 2 MG/ML
INJECTION INTRAMUSCULAR; INTRAVENOUS PRN
Status: DISCONTINUED | OUTPATIENT
Start: 2021-09-14 | End: 2021-09-14

## 2021-09-14 RX ORDER — ONDANSETRON 2 MG/ML
4 INJECTION INTRAMUSCULAR; INTRAVENOUS EVERY 30 MIN PRN
Status: DISCONTINUED | OUTPATIENT
Start: 2021-09-14 | End: 2021-09-17 | Stop reason: HOSPADM

## 2021-09-14 RX ORDER — BUPIVACAINE HYDROCHLORIDE AND EPINEPHRINE 2.5; 5 MG/ML; UG/ML
INJECTION, SOLUTION INFILTRATION; PERINEURAL PRN
Status: DISCONTINUED | OUTPATIENT
Start: 2021-09-14 | End: 2021-09-14 | Stop reason: HOSPADM

## 2021-09-14 RX ADMIN — GLYCOPYRROLATE 0.2 MG: 0.2 INJECTION, SOLUTION INTRAMUSCULAR; INTRAVENOUS at 12:09

## 2021-09-14 RX ADMIN — FENTANYL CITRATE 50 MCG: 50 INJECTION, SOLUTION INTRAMUSCULAR; INTRAVENOUS at 12:09

## 2021-09-14 RX ADMIN — PROPOFOL 100 MCG/KG/MIN: 10 INJECTION, EMULSION INTRAVENOUS at 12:09

## 2021-09-14 RX ADMIN — SODIUM CHLORIDE, SODIUM LACTATE, POTASSIUM CHLORIDE, CALCIUM CHLORIDE: 600; 310; 30; 20 INJECTION, SOLUTION INTRAVENOUS at 12:05

## 2021-09-14 RX ADMIN — DEXAMETHASONE SODIUM PHOSPHATE 4 MG: 4 INJECTION, SOLUTION INTRA-ARTICULAR; INTRALESIONAL; INTRAMUSCULAR; INTRAVENOUS; SOFT TISSUE at 12:16

## 2021-09-14 RX ADMIN — FENTANYL CITRATE 50 MCG: 50 INJECTION, SOLUTION INTRAMUSCULAR; INTRAVENOUS at 12:20

## 2021-09-14 RX ADMIN — EPHEDRINE SULFATE 5 MG: 50 INJECTION, SOLUTION INTRAMUSCULAR; INTRAVENOUS; SUBCUTANEOUS at 12:48

## 2021-09-14 RX ADMIN — SODIUM CHLORIDE, SODIUM LACTATE, POTASSIUM CHLORIDE, CALCIUM CHLORIDE: 600; 310; 30; 20 INJECTION, SOLUTION INTRAVENOUS at 11:17

## 2021-09-14 RX ADMIN — LIDOCAINE HYDROCHLORIDE 60 MG: 20 INJECTION, SOLUTION INFILTRATION; PERINEURAL at 12:09

## 2021-09-14 RX ADMIN — ONDANSETRON 4 MG: 2 INJECTION INTRAMUSCULAR; INTRAVENOUS at 12:16

## 2021-09-14 ASSESSMENT — LIFESTYLE VARIABLES: TOBACCO_USE: 1

## 2021-09-14 ASSESSMENT — MIFFLIN-ST. JEOR: SCORE: 1210.86

## 2021-09-14 NOTE — PROCEDURES
COLON AND RECTAL SURGERY OPERATIVE NOTE    DATE OF SERVICE: 9/14/21     PROCEDURE NAME:   1. Rectal exam under anesthesia  2. High resolution anoscopy with biopsy and fulguration     PRE-PROCEDURE DIAGNOSIS: Anal dysplasia     POST-PROCEDURE DIAGNOSIS: same     SURGEON: Preeti Sheehan MD     ASSISTANT: none     FINDINGS: Scarring anterior perineal body stable from before. Small area hypopigmentation/scar in anterior anal verge, area of hyperpigmentation and some scaling in left anterior anal verge and area of hyperpigmentation in multiple areas, most pronounced in right anterior perianal area. Area of AWE with coarse punctations posterior anal canal and area of AWE with mosacism in left posterior anal canal     ESTIMATED BLOOD LOSS: 1mL     ANESTHESIA: mac with local     SPECIMEN:   1. Left anterior anal verge  2. Anterior anal verge  3. Right anterior perianal   4. Posterior anal canal  5. Left posterior anal canal    INDICATIONS FOR PROCEDURE:  Ashanti Aviles is a 66 year old female with a history of anal dsyplasia.  The risks and benefits of surgery were discussed with the patient including bleeding, infection, damage to the sphincter complex with incontinence, need for further procedures. The patient verbalized understanding and consented to proceed.        DESCRIPTION OF PROCEDURE:  The patient was taken to the operating room and placed under mac anesthesia. They were then placed in the lithotomy position. The surgical area was then prepped and draped in the usual sterile fashion. A timeout was performed per protocol. We then started with a perianal block with 0.25% marcaine with epi.  A digital rectal exam was performed which revealed no masses. We inspected the external perianal skin. There was scarring on the perineal body similar to previous and there was some hypopigmented/scar anterior anal verge and left anterior there was some hyperpigmentation with a scaly appearance. There were a few areas of  slightly raised hyperpigmentation as well, most pronounced in the right anterior perianal skin. None of these areas had acetowhitening. I inserted the large clear anoscope and inserted 5% acetic acid soaked gauze in the anal canal and on the perianal skin, left to dwell in place for several minutes. This was liberally re applied throughout the procedure. I then viewed the perianal skin and the anal canal using the high powered colposcope on high powered magnification focusing on the squamocolumnar junction and anal transformation zone. Several passes were made. These areas on the anal verge and perianal skin were all biopsied with tischler forceps and then selectively fulgurated with suction electrocautery. She also had an area posterior at the SCJ with acetowhitenin with coarse punctations about 4mm in size and another area in left posterior at the SCJ extending up into the anal transformation zone with AWE and mosacism about 5mm in size. Both areas were biopsied and the selectively fulgurated with suction electrocautery.    Hemostasis was achieved. We then removed the anoscope and placed some fluffs for dressing. All sponge, needle and instrument counts were correct at the end of the procedure. The patient tolerated the procedure well and was awakened from anesthesia without difficulty, and transferred to the recovery room in stable condition.    Preeti Sheehan MD

## 2021-09-14 NOTE — ANESTHESIA PREPROCEDURE EVALUATION
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]   Procedure : Procedure(s):  EXAM UNDER ANESTHESIA WITH HIGH RESOLUTION ANOSCOPY     Past Medical History:   Diagnosis Date     Diabetes mellitus (H)      Gastroesophageal reflux disease      Hypertension       Past Surgical History:   Procedure Laterality Date     ANUS SURGERY       BIOPSY CERVICAL, LOCAL EXCISION, SINGLE/MULTIPLE       COLONOSCOPY N/A 2020    Procedure: EXAM UNDER ANESTHESIA, HIGH RESOLUTION ANOSCOPY INTRA OP;  Surgeon: Preeti Sheehan MD;  Location: [facility];  Service: Gastroenterology     COLONOSCOPY N/A 2020    Procedure: EXAM UNDER ANESTHESIA WITH HIGH RESOLUTION ANOSCOPY;  Surgeon: Preeti Sheehan MD;  Location: [facility];  Service: General     COLONOSCOPY N/A 6/15/2021    Procedure: EXAM UNDER ANESTHESIA WITH HIGH RESOLUTION ANOSCOPY, BIOPSY, FULGURATION;  Surgeon: Preeti Sheehan MD;  Location: [facility];  Service: Gastroenterology      Allergies   Allergen Reactions     Ezetimibe Unknown     Thumb pain     Oxycodone Nausea and Vomiting     Penicillins Unknown     Simvastatin Unknown     Muscle pain     Victoza [Liraglutide] Unknown      Social History     Tobacco Use     Smoking status: Current Every Day Smoker     Packs/day: 0.25     Smokeless tobacco: Never Used   Substance Use Topics     Alcohol use: Yes     Comment: Alcoholic Drinks/day: 2x      Wt Readings from Last 1 Encounters:   06/15/21 70.8 kg (156 lb)        Anesthesia Evaluation   Pt has had prior anesthetic. Type: MAC.    No history of anesthetic complications       ROS/MED HX  ENT/Pulmonary:     (+) tobacco use,     Neurologic:       Cardiovascular:     (+) hypertension-----    METS/Exercise Tolerance:     Hematologic:       Musculoskeletal:       GI/Hepatic: Comment: Anal condyloma    (+) GERD,     Renal/Genitourinary:        Endo:     (+) type II DM,     Psychiatric/Substance Use:       Infectious Disease: Comment: Anal condyloma      Malignancy: Comment: Andrew grade anal dysplasia      Other:            Physical Exam    Airway        Mallampati: I   TM distance: > 3 FB   Neck ROM: full     Respiratory Devices and Support         Dental  no notable dental history         Cardiovascular   cardiovascular exam normal          Pulmonary   pulmonary exam normal                OUTSIDE LABS:  CBC: No results found for: WBC, HGB, HCT, PLT  BMP:   Lab Results   Component Value Date     (A) 06/15/2021     06/15/2021     COAGS: No results found for: PTT, INR, FIBR  POC: No results found for: BGM, HCG, HCGS  HEPATIC: No results found for: ALBUMIN, PROTTOTAL, ALT, AST, GGT, ALKPHOS, BILITOTAL, BILIDIRECT, MICHELE  OTHER: No results found for: PH, LACT, A1C, SANTINO, PHOS, MAG, LIPASE, AMYLASE, TSH, T4, T3, CRP, SED    Anesthesia Plan    ASA Status:  2   NPO Status:  NPO Appropriate    Anesthesia Type: MAC.     - Reason for MAC: straight local not clinically adequate   Induction: Propofol.           Consents    Anesthesia Plan(s) and associated risks, benefits, and realistic alternatives discussed. Questions answered and patient/representative(s) expressed understanding.     - Discussed with:  Patient         Postoperative Care    Pain management: Multi-modal analgesia, IV analgesics.   PONV prophylaxis: Ondansetron (or other 5HT-3), Dexamethasone or Solumedrol, Background Propofol Infusion     Comments:    Plan for propofol gtt w/ ketamine / fentanyl PRN for pain.  Discussed potential need to convert to GA w/ ETT vs LMA if not tolerating.  Explained that it is possible to remember certain aspects of the OR experience during MAC dependent on the depth of sedation and patient understands this.  Decadron and zofran for PONV ppx.              Rosalie Paige MD

## 2021-09-14 NOTE — DISCHARGE INSTRUCTIONS
I recommend tylenol and ibuprofen as needed for pain. I also recommend sitz baths twice a day and as needed. I recommend a stool softener for the next week. It is expected to see a small amount of bleeding. If there is a large amount of bleeding, fevers, chills or worsening pain, then please call the office at [phone]

## 2021-09-14 NOTE — ANESTHESIA CARE TRANSFER NOTE
Patient: Ashanti VELEZ Quilling    Procedure(s):  EXAM UNDER ANESTHESIA WITH HIGH RESOLUTION ANOSCOPY    Diagnosis: Anal condyloma [A63.0]  Diagnosis Additional Information: No value filed.    Anesthesia Type:   MAC     Note:    Oropharynx: oropharynx clear of all foreign objects  Level of Consciousness: awake and drowsy  Oxygen Supplementation: room air    Independent Airway: airway patency satisfactory and stable  Dentition: dentition unchanged      Patient transferred to: Phase II    Handoff Report: Identifed the Patient, Identified the Reponsible Provider, Reviewed the pertinent medical history, Discussed the surgical course, Reviewed Intra-OP anesthesia mangement and issues during anesthesia, Set expectations for post-procedure period and Allowed opportunity for questions and acknowledgement of understanding      Vitals:  Vitals Value Taken Time   /64 09/14/21 1302   Temp 97.1  F (36.2  C) 09/14/21 1302   Pulse 107 09/14/21 1302   Resp 18 09/14/21 1302   SpO2 100 % 09/14/21 1302       Electronically Signed By: DOROTA Joe CRNA  September 14, 2021  1:05 PM

## 2021-09-14 NOTE — ANESTHESIA POSTPROCEDURE EVALUATION
Patient: Ashanti Aviles    Procedure(s):  EXAM UNDER ANESTHESIA WITH HIGH RESOLUTION ANOSCOPY    Diagnosis:Anal condyloma [A63.0]  Diagnosis Additional Information: No value filed.    Anesthesia Type:  MAC    Note:  Disposition: Outpatient   Postop Pain Control: Uneventful            Sign Out: Well controlled pain   PONV: No   Neuro/Psych: Uneventful            Sign Out: Acceptable/Baseline neuro status   Airway/Respiratory: Uneventful            Sign Out: Acceptable/Baseline resp. status   CV/Hemodynamics: Uneventful            Sign Out: Acceptable CV status; No obvious hypovolemia; No obvious fluid overload   Other NRE: NONE   DID A NON-ROUTINE EVENT OCCUR? No           Last vitals:  Vitals Value Taken Time   /72 09/14/21 1345   Temp 97.1  F (36.2  C) 09/14/21 1302   Pulse 84 09/14/21 1346   Resp 16 09/14/21 1345   SpO2 97 % 09/14/21 1346   Vitals shown include unvalidated device data.    Electronically Signed By: Pauline Guillermo MD  September 14, 2021  1:49 PM

## 2021-09-14 NOTE — LETTER
[facility]  [address]  [place] [address]  Phone: [phone]  Fax: [phone]    September 14, 2021        Ashanti Aviles  [address] [place] [address]          To whom it may concern:    RE: Ashanti VELEZ Trevorwilliams Aviles will require 1 week off of work from her procedure today. She will be able to resume work on September 22nd.    Please contact me for questions or concerns.      Sincerely,      Preeti Sheehan MD

## 2021-09-16 ENCOUNTER — MEDICAL CORRESPONDENCE (OUTPATIENT)
Dept: HEALTH INFORMATION MANAGEMENT | Facility: CLINIC | Age: 66
End: 2021-09-16

## 2021-09-16 ENCOUNTER — TRANSCRIBE ORDERS (OUTPATIENT)
Dept: OTHER | Age: 66
End: 2021-09-16

## 2021-09-16 DIAGNOSIS — K62.82 DYSPLASIA OF ANUS: Primary | ICD-10-CM

## 2021-09-17 ENCOUNTER — TELEPHONE (OUTPATIENT)
Dept: SURGERY | Facility: CLINIC | Age: 66
End: 2021-09-17

## 2021-09-17 NOTE — TELEPHONE ENCOUNTER
[facility] Call Center    Phone Message    May a detailed message be left on voicemail: yes     Reason for Call: Appointment Intake    Referring Provider Name: Aguila  Diagnosis and/or Symptoms: Dysplasia of anus     Per guidelines we are to send a TE to schedule in the Microscopy clinic. Please review and follow up with pt    Action Taken: Message routed to:  [facility] ([facility] Colon Rectal Surg- Microscopy clinic    Travel Screening: Not Applicable

## 2021-09-17 NOTE — TELEPHONE ENCOUNTER
Called patient to schedule HRA with LADONNA Cline. Patient has a recent Dx of anal dysplasia from [facility] done on 9/14/2021 per pt. Patient is scheduled for microscopy clinic on 10/21/2021 at 12:00 PM. Patient states she has had this done before and knows what to expect.       Leanne Hagen, CMA

## 2021-10-15 ENCOUNTER — MYC MEDICAL ADVICE (OUTPATIENT)
Dept: SURGERY | Facility: CLINIC | Age: 66
End: 2021-10-15

## 2021-10-17 ENCOUNTER — HEALTH MAINTENANCE LETTER (OUTPATIENT)
Age: 66
End: 2021-10-17

## 2022-01-12 VITALS — WEIGHT: 156 LBS | BODY MASS INDEX: 28.71 KG/M2 | HEIGHT: 62 IN

## 2022-01-18 VITALS — HEIGHT: 61 IN | BODY MASS INDEX: 29.64 KG/M2 | WEIGHT: 157 LBS

## 2022-01-18 VITALS — HEIGHT: 61 IN | WEIGHT: 160 LBS | BODY MASS INDEX: 30.21 KG/M2

## 2022-01-20 ENCOUNTER — OFFICE VISIT (OUTPATIENT)
Dept: SURGERY | Facility: CLINIC | Age: 67
End: 2022-01-20
Payer: COMMERCIAL

## 2022-01-20 VITALS
TEMPERATURE: 98.8 F | BODY MASS INDEX: 28.78 KG/M2 | HEIGHT: 62 IN | DIASTOLIC BLOOD PRESSURE: 74 MMHG | OXYGEN SATURATION: 98 % | SYSTOLIC BLOOD PRESSURE: 117 MMHG | HEART RATE: 86 BPM | WEIGHT: 156.4 LBS

## 2022-01-20 DIAGNOSIS — K62.82 ANAL DYSPLASIA: Primary | ICD-10-CM

## 2022-01-20 PROCEDURE — 88305 TISSUE EXAM BY PATHOLOGIST: CPT | Mod: 26 | Performed by: PATHOLOGY

## 2022-01-20 PROCEDURE — 87624 HPV HI-RISK TYP POOLED RSLT: CPT | Performed by: NURSE PRACTITIONER

## 2022-01-20 PROCEDURE — 88305 TISSUE EXAM BY PATHOLOGIST: CPT | Mod: TC | Performed by: NURSE PRACTITIONER

## 2022-01-20 PROCEDURE — 46607 DIAGNOSTIC ANOSCOPY & BIOPSY: CPT | Performed by: NURSE PRACTITIONER

## 2022-01-20 PROCEDURE — 46910 DESTRUCTION ANAL LESION(S): CPT | Performed by: NURSE PRACTITIONER

## 2022-01-20 PROCEDURE — 88112 CYTOPATH CELL ENHANCE TECH: CPT | Mod: 26 | Performed by: PATHOLOGY

## 2022-01-20 PROCEDURE — 88112 CYTOPATH CELL ENHANCE TECH: CPT | Mod: TC | Performed by: NURSE PRACTITIONER

## 2022-01-20 ASSESSMENT — MIFFLIN-ST. JEOR: SCORE: 1212.68

## 2022-01-20 ASSESSMENT — PAIN SCALES - GENERAL: PAINLEVEL: NO PAIN (0)

## 2022-01-20 NOTE — PROGRESS NOTES
[facility] High Resolution Anoscopy Note    RE: Ashanti Aviles  : 1955  KT: 2022      Ashanti Aviles is a 66 year old female with a history of high grade vulvar and anal dysplasia who presents today for high resolution anoscopy. She last underwent high resolution anoscopy in the OR with Dr. Preeti Sheehan at [facility] on 21 with multiple areas of high grade dysplasia destroyed.    HPI: Ashanti has been doing well. She denies any anorectal complaints. No immune suppression. She is a current smoker.    ASSESSMENT: Written, informed consent was obtained prior to procedure.  Prior to the start of the procedure and with procedural staff participation, I verbally confirmed the patient s identity using two indicators, relevant allergies, that the procedure was appropriate and matched the consent or emergent situation, and that the correct equipment/implants were available. Immediately prior to starting the procedure I conducted the Time Out with the procedural staff and re-confirmed the patient s name, procedure, and site/side. (The Joint Commission universal protocol was followed.)  Yes    Sedation (Moderate or Deep): None    Anal cytology was obtained with Dacron swab. Digital anal rectal exam was performed with no palpable lesions. Dilute acetic acid soak was completed for 2 minutes. Lubricant was used to insert the anoscope. Performed high resolution microscopy using the colposcope. The dentate line was viewed in its entirety. Abnormal areas noted were some abnormal vasculature with striated vessels versus fine punctation and some very mild acetowhitening in the right lateral position but no bright acetowhitening or punctation. Biopsy was not obtained. Fulguration was not performed.   The perianal area was inspected after acetic acid soak. The findings noted were one distinct area of bright acetowhitening in the right anterior position with coarse  punctation.  After injection with 1% lidocaine with epinephrine, biopsy was obtained at this site using a baby Tischler forceps.  Entire lesion was fulgurated.  The patient tolerated the procedures well.    PLAN: Will follow up with patient with results of biopsy from today. If low grade dysplasia or normal, follow up in 6 months for repeat high resolution anoscopy. If high grade dysplasia, this was destroyed completely today so would recommend repeat high resolution anoscopy in [facility] in 3-4 months.       For details of past medical history, surgical history, family history, medications, allergies, and review of systems, please see details below.    Medical history:  Past Medical History:   Diagnosis Date     Diabetes mellitus (H)      Gastroesophageal reflux disease      Hypertension        Surgical history:  Past Surgical History:   Procedure Laterality Date     ANUS SURGERY       BIOPSY CERVICAL, LOCAL EXCISION, SINGLE/MULTIPLE       COLONOSCOPY N/A 9/11/2020    Procedure: EXAM UNDER ANESTHESIA, HIGH RESOLUTION ANOSCOPY INTRA OP;  Surgeon: Preeti Sheehan MD;  Location: [facility];  Service: Gastroenterology     COLONOSCOPY N/A 12/14/2020    Procedure: EXAM UNDER ANESTHESIA WITH HIGH RESOLUTION ANOSCOPY;  Surgeon: Preeti Sheehan MD;  Location: [facility];  Service: General     COLONOSCOPY N/A 6/15/2021    Procedure: EXAM UNDER ANESTHESIA WITH HIGH RESOLUTION ANOSCOPY, BIOPSY, FULGURATION;  Surgeon: Preeti Sheehan MD;  Location: [facility];  Service: Gastroenterology     EXAM UNDER ANESTHESIA ANUS N/A 9/14/2021    Procedure: EXAM UNDER ANESTHESIA WITH HIGH RESOLUTION ANOSCOPY;  Surgeon: Preeti Sheehan MD;  Location: [facility]       Family history:  No family history on file.    Medications:  Current Outpatient Medications   Medication Sig Dispense Refill     amLODIPine (NORVASC) 10 MG tablet [AMLODIPINE (NORVASC) 10 MG TABLET] Take 10 mg by mouth daily.        "clopidogrel (PLAVIX) 75 mg tablet [CLOPIDOGREL (PLAVIX) 75 MG TABLET] Take 75 mg by mouth daily.       hydroCHLOROthiazide (HYDRODIURIL) 25 MG tablet [HYDROCHLOROTHIAZIDE (HYDRODIURIL) 25 MG TABLET] Take 25 mg by mouth daily.       Insulin Degludec (TRESIBA) 100 UNIT/ML SOLN Inject 30 Units Subcutaneous       linagliptin (TRADJENTA) 5 MG TABS tablet 1 tablet       lisinopril (PRINIVIL,ZESTRIL) 40 MG tablet [LISINOPRIL (PRINIVIL,ZESTRIL) 40 MG TABLET] Take 40 mg by mouth daily.       metFORMIN (FORTAMET) 500 MG (OSM) 24 hr tablet [METFORMIN (FORTAMET) 500 MG (OSM) 24 HR TABLET] Take 500 mg by mouth 2 (two) times a day.       Allergies:  The patientis allergic to ezetimibe, oxycodone, penicillins, simvastatin, and victoza [liraglutide].    Social history:  Social History     Tobacco Use     Smoking status: Current Every Day Smoker     Packs/day: 0.25     Smokeless tobacco: Never Used   Substance Use Topics     Alcohol use: Yes     Comment: Alcoholic Drinks/day: 2x     Marital status: .    Review of Systems:  Nursing Notes:   Ninoska Canseco CMA  1/20/2022 12:55 PM  Signed  Chief Complaint   Patient presents with     New Patient     New high resolution anoscopy.       Vitals:    01/20/22 1251   BP: 117/74   BP Location: Left arm   Patient Position: Sitting   Cuff Size: Adult Regular   Pulse: 86   Temp: 98.8  F (37.1  C)   TempSrc: Oral   SpO2: 98%   Weight: 156 lb 6.4 oz   Height: 5' 2\"       Body mass index is 28.61 kg/m .       Ninoska Wood CMA         This procedure was performed under a collaborative agreement with Dr. Ad Gao MD, Chief of Colon and Rectal Surgery, [facility].    Kirstie Wilks NP-C  Colon and Rectal Surgery  [facility]      This note was created using speech recognition software and may contain unintended word substitutions.    "

## 2022-01-20 NOTE — NURSING NOTE
"Chief Complaint   Patient presents with     New Patient     New high resolution anoscopy.       Vitals:    01/20/22 1251   BP: 117/74   BP Location: Left arm   Patient Position: Sitting   Cuff Size: Adult Regular   Pulse: 86   Temp: 98.8  F (37.1  C)   TempSrc: Oral   SpO2: 98%   Weight: 156 lb 6.4 oz   Height: 5' 2\"       Body mass index is 28.61 kg/m .       Ninoska Wood CMA    "

## 2022-01-20 NOTE — LETTER
2022       RE: Ashanti Aviles  [address] [place] [address]     Dear Colleague,    Thank you for referring your patient, Ashanti Aviles, to the [facility] [place] at [facility]. Please see a copy of my visit note below.      Colon and Rectal Surgery Clinic High Resolution Anoscopy Note    RE: Ashanti Aviles  : 1955  KT: 2022      Ashanti Aviles is a 66 year old female with a history of high grade vulvar and anal dysplasia who presents today for high resolution anoscopy. She last underwent high resolution anoscopy in the OR with Dr. Preeti Sheehan at [facility] on 21 with multiple areas of high grade dysplasia destroyed.    HPI: Ashanti has been doing well. She denies any anorectal complaints. No immune suppression. She is a current smoker.    ASSESSMENT: Written, informed consent was obtained prior to procedure.  Prior to the start of the procedure and with procedural staff participation, I verbally confirmed the patient s identity using two indicators, relevant allergies, that the procedure was appropriate and matched the consent or emergent situation, and that the correct equipment/implants were available. Immediately prior to starting the procedure I conducted the Time Out with the procedural staff and re-confirmed the patient s name, procedure, and site/side. (The Joint Commission universal protocol was followed.)  Yes    Sedation (Moderate or Deep): None    Anal cytology was obtained with Dacron swab. Digital anal rectal exam was performed with no palpable lesions. Dilute acetic acid soak was completed for 2 minutes. Lubricant was used to insert the anoscope. Performed high resolution microscopy using the colposcope. The dentate line was viewed in its entirety. Abnormal areas noted were some abnormal vasculature with striated vessels versus fine punctation  and some very mild acetowhitening in the right lateral position but no bright acetowhitening or punctation. Biopsy was not obtained. Fulguration was not performed.   The perianal area was inspected after acetic acid soak. The findings noted were one distinct area of bright acetowhitening in the right anterior position with coarse punctation.  After injection with 1% lidocaine with epinephrine, biopsy was obtained at this site using a baby Tischler forceps.  Entire lesion was fulgurated.  The patient tolerated the procedures well.    PLAN: Will follow up with patient with results of biopsy from today. If low grade dysplasia or normal, follow up in 6 months for repeat high resolution anoscopy. If high grade dysplasia, this was destroyed completely today so would recommend repeat high resolution anoscopy in [facility] in 3-4 months.       For details of past medical history, surgical history, family history, medications, allergies, and review of systems, please see details below.    Medical history:  Past Medical History:   Diagnosis Date     Diabetes mellitus (H)      Gastroesophageal reflux disease      Hypertension        Surgical history:  Past Surgical History:   Procedure Laterality Date     ANUS SURGERY       BIOPSY CERVICAL, LOCAL EXCISION, SINGLE/MULTIPLE       COLONOSCOPY N/A 9/11/2020    Procedure: EXAM UNDER ANESTHESIA, HIGH RESOLUTION ANOSCOPY INTRA OP;  Surgeon: Preeti Sheehan MD;  Location: [facility];  Service: Gastroenterology     COLONOSCOPY N/A 12/14/2020    Procedure: EXAM UNDER ANESTHESIA WITH HIGH RESOLUTION ANOSCOPY;  Surgeon: Preeti Sheehan MD;  Location: [facility];  Service: General     COLONOSCOPY N/A 6/15/2021    Procedure: EXAM UNDER ANESTHESIA WITH HIGH RESOLUTION ANOSCOPY, BIOPSY, FULGURATION;  Surgeon: Preeti Sheehan MD;  Location: [facility];  Service: Gastroenterology     EXAM UNDER ANESTHESIA ANUS N/A 9/14/2021    Procedure: EXAM UNDER ANESTHESIA  "WITH HIGH RESOLUTION ANOSCOPY;  Surgeon: Preeti Sheehan MD;  Location: [facility]       Family history:  No family history on file.    Medications:  Current Outpatient Medications   Medication Sig Dispense Refill     amLODIPine (NORVASC) 10 MG tablet [AMLODIPINE (NORVASC) 10 MG TABLET] Take 10 mg by mouth daily.       clopidogrel (PLAVIX) 75 mg tablet [CLOPIDOGREL (PLAVIX) 75 MG TABLET] Take 75 mg by mouth daily.       hydroCHLOROthiazide (HYDRODIURIL) 25 MG tablet [HYDROCHLOROTHIAZIDE (HYDRODIURIL) 25 MG TABLET] Take 25 mg by mouth daily.       Insulin Degludec (TRESIBA) 100 UNIT/ML SOLN Inject 30 Units Subcutaneous       linagliptin (TRADJENTA) 5 MG TABS tablet 1 tablet       lisinopril (PRINIVIL,ZESTRIL) 40 MG tablet [LISINOPRIL (PRINIVIL,ZESTRIL) 40 MG TABLET] Take 40 mg by mouth daily.       metFORMIN (FORTAMET) 500 MG (OSM) 24 hr tablet [METFORMIN (FORTAMET) 500 MG (OSM) 24 HR TABLET] Take 500 mg by mouth 2 (two) times a day.       Allergies:  The patientis allergic to ezetimibe, oxycodone, penicillins, simvastatin, and victoza [liraglutide].    Social history:  Social History     Tobacco Use     Smoking status: Current Every Day Smoker     Packs/day: 0.25     Smokeless tobacco: Never Used   Substance Use Topics     Alcohol use: Yes     Comment: Alcoholic Drinks/day: 2x     Marital status: .    Review of Systems:  Nursing Notes:   Ninoska Canseco CMA  1/20/2022 12:55 PM  Signed  Chief Complaint   Patient presents with     New Patient     New high resolution anoscopy.       Vitals:    01/20/22 1251   BP: 117/74   BP Location: Left arm   Patient Position: Sitting   Cuff Size: Adult Regular   Pulse: 86   Temp: 98.8  F (37.1  C)   TempSrc: Oral   SpO2: 98%   Weight: 156 lb 6.4 oz   Height: 5' 2\"       Body mass index is 28.61 kg/m .       Ninoska Wood CMA         This procedure was performed under a collaborative agreement with Dr. Ad Gao MD, Chief of Colon and " Rectal Surgery, [facility].    LADONNA Cline  Colon and Rectal Surgery  [facility]      This note was created using speech recognition software and may contain unintended word substitutions.        Again, thank you for allowing me to participate in the care of your patient.      Sincerely,    DOROTA Cline CNP

## 2022-01-20 NOTE — LETTER
Date:January 26, 2022      Patient was self referred, no letter generated. Do not send.        [facility] Health Information       Action 4: Continue Other Instructions: Moisturize after bathing/ showering \\nPatient states he has a medicated shampoo he uses for his scalp and it is improving Detail Level: Zone

## 2022-01-22 LAB
PATH REPORT.COMMENTS IMP SPEC: NORMAL
PATH REPORT.FINAL DX SPEC: NORMAL
PATH REPORT.GROSS SPEC: NORMAL
PATH REPORT.RELEVANT HX SPEC: NORMAL

## 2022-01-24 LAB
PATH REPORT.COMMENTS IMP SPEC: NORMAL
PATH REPORT.COMMENTS IMP SPEC: NORMAL
PATH REPORT.FINAL DX SPEC: NORMAL
PATH REPORT.GROSS SPEC: NORMAL
PATH REPORT.MICROSCOPIC SPEC OTHER STN: NORMAL
PATH REPORT.RELEVANT HX SPEC: NORMAL
PHOTO IMAGE: NORMAL

## 2022-02-14 LAB
LAB DIRECTOR DISCLAIMER: NORMAL
LAB DIRECTOR METHODOLOGY: NORMAL
LAB DIRECTOR RESULTS: NORMAL
SPECIMEN DESCRIPTION: NORMAL

## 2022-03-14 ENCOUNTER — TELEPHONE (OUTPATIENT)
Dept: VASCULAR SURGERY | Facility: CLINIC | Age: 67
End: 2022-03-14
Payer: COMMERCIAL

## 2022-03-14 NOTE — TELEPHONE ENCOUNTER
Patient states that she had stents placed at [facility] about 5 years ago that she believes is starting to give her issue. The surgeon she previously saw is no longer available. Patient states that she is starting to have pain that radiates from her groin to her hip, and she has been having pain with walking. She has her surgery note/records from [facility] that she will send for review to [email or web address]. Please review and let schedulers know who to schedule with and studies needed.

## 2022-03-15 NOTE — TELEPHONE ENCOUNTER
Patient is calling to follow up on scheduling an apt, please review and determine whom patient should see in [place]

## 2022-03-16 DIAGNOSIS — I73.9 PAD (PERIPHERAL ARTERY DISEASE) (H): Primary | ICD-10-CM

## 2022-03-16 PROBLEM — A63.0 CONDYLOMA ACUMINATUM OF VULVA: Status: ACTIVE | Noted: 2020-06-03

## 2022-03-17 ENCOUNTER — ANCILLARY PROCEDURE (OUTPATIENT)
Dept: VASCULAR ULTRASOUND | Facility: CLINIC | Age: 67
End: 2022-03-17
Attending: SURGERY
Payer: COMMERCIAL

## 2022-03-17 ENCOUNTER — OFFICE VISIT (OUTPATIENT)
Dept: VASCULAR SURGERY | Facility: CLINIC | Age: 67
End: 2022-03-17
Attending: SURGERY
Payer: COMMERCIAL

## 2022-03-17 ENCOUNTER — TELEPHONE (OUTPATIENT)
Dept: VASCULAR SURGERY | Facility: CLINIC | Age: 67
End: 2022-03-17
Payer: COMMERCIAL

## 2022-03-17 VITALS
RESPIRATION RATE: 16 BRPM | HEART RATE: 68 BPM | SYSTOLIC BLOOD PRESSURE: 100 MMHG | DIASTOLIC BLOOD PRESSURE: 60 MMHG | TEMPERATURE: 98.1 F

## 2022-03-17 DIAGNOSIS — R09.89 BRUIT OF LEFT CAROTID ARTERY: ICD-10-CM

## 2022-03-17 DIAGNOSIS — I73.9 PAD (PERIPHERAL ARTERY DISEASE) (H): Primary | ICD-10-CM

## 2022-03-17 DIAGNOSIS — I73.9 PAD (PERIPHERAL ARTERY DISEASE) (H): ICD-10-CM

## 2022-03-17 PROCEDURE — 93922 UPR/L XTREMITY ART 2 LEVELS: CPT

## 2022-03-17 PROCEDURE — 93922 UPR/L XTREMITY ART 2 LEVELS: CPT | Mod: 26 | Performed by: SURGERY

## 2022-03-17 PROCEDURE — 93925 LOWER EXTREMITY STUDY: CPT

## 2022-03-17 PROCEDURE — 93925 LOWER EXTREMITY STUDY: CPT | Mod: 26 | Performed by: SURGERY

## 2022-03-17 PROCEDURE — 99215 OFFICE O/P EST HI 40 MIN: CPT | Performed by: SURGERY

## 2022-03-17 RX ORDER — CILOSTAZOL 50 MG/1
50 TABLET ORAL 2 TIMES DAILY
Qty: 28 TABLET | Refills: 0 | Status: SHIPPED | OUTPATIENT
Start: 2022-03-31 | End: 2022-04-21

## 2022-03-17 RX ORDER — CILOSTAZOL 100 MG/1
100 TABLET ORAL 2 TIMES DAILY
Qty: 90 TABLET | Refills: 4 | Status: SHIPPED | OUTPATIENT
Start: 2022-04-14 | End: 2023-02-14

## 2022-03-17 RX ORDER — EMPAGLIFLOZIN 10 MG/1
25 TABLET, FILM COATED ORAL DAILY
COMMUNITY
Start: 2021-12-27 | End: 2023-05-05

## 2022-03-17 RX ORDER — CILOSTAZOL 50 MG/1
50 TABLET ORAL DAILY
Qty: 14 TABLET | Refills: 0 | Status: SHIPPED | OUTPATIENT
Start: 2022-03-17 | End: 2022-04-21

## 2022-03-17 ASSESSMENT — PAIN SCALES - GENERAL: PAINLEVEL: NO PAIN (0)

## 2022-03-17 NOTE — PATIENT INSTRUCTIONS
We would like to start you on a medication called Pletal or Cilostazol.    We start you on this slowly and go up over the course of 4 weeks.    The first 2 weeks we would like you to take 50 mg once a day for 2 weeks.  Then increase to 50mg twice a day for 2 weeks.  Finally increase to 100mg twice a day thereafter.    Please notify us if you have any problems with nausea, diarrhea or increased blood sugar levels. During this dose titration we would like you to check your blood sugars twice a day if you are diabetic.   Cilostazol Oral tablet  What is this medicine?  CILOSTAZOL (flori [place] sta zol) is used to treat the symptoms of intermittent claudication. This condition causes pain in the legs during walking, and goes away with rest. By improving blood flow, this medicine helps people with this condition walk longer distances without pain.  This medicine may be used for other purposes; ask your health care provider or pharmacist if you have questions.  What should I tell my health care provider before I take this medicine?  They need to know if you have any of the following conditions:    bleeding disorder or hemophilia    history of heart failure, heart attack, or other heart disease    an unusual or allergic reaction to cilostazol, other medicines, foods, dyes, or preservatives    pregnant or trying to get pregnant    breast-feeding  How should I use this medicine?  Take this medicine by mouth with a full glass of water. Follow the directions on the prescription label. Take this medicine on an empty stomach, at least 30 minutes before or 2 hours after food. Do not take with food. Take your doses at regular intervals. Do not take your medicine more often than directed.  Talk to your pediatrician regarding the use of this medicine in children. Special care may be needed.  Overdosage: If you think you have taken too much of this medicine contact a poison control center or emergency room at once.  NOTE: This medicine is  only for you. Do not share this medicine with others.  What if I miss a dose?  If you miss a dose, take it as soon as you can. If it is almost time for your next dose, take only that dose. Do not take double or extra doses.  What may interact with this medicine?  Do not take this medicine with any of the following medications:    grapefruit juice  This medicine may also interact with the following medications:    agents that prevent or treat blood clots like enoxaparin or warfarin    aspirin    diltiazem    erythromycin or clarithromycin    omeprazole    some medications for treating depression like fluoxetine, fluvoxamine, nefazodone    some medications for treating fungal infections like ketoconazole, fluconazole, itraconazole  This list may not describe all possible interactions. Give your health care provider a list of all the medicines, herbs, non-prescription drugs, or dietary supplements you use. Also tell them if you smoke, drink alcohol, or use illegal drugs. Some items may interact with your medicine.  What should I watch for while using this medicine?  Visit your doctor or health care professional for regular checks on your progress. It may take 2 to 4 weeks for your condition to start to get better once you begin taking this medicine. In some people, it can take as long as 3 months for the condition to get better.  You may get drowsy or dizzy. Do not drive, use machinery, or do anything that needs mental alertness until you know how this drug affects you. Do not stand or sit up quickly, especially if you are an older patient. This reduces the risk of dizzy or fainting spells. Alcohol can make you more drowsy and dizzy. Avoid alcoholic drinks.  Smoking may have effects on the circulation that may limit the benefits you receive from this medicine. You may wish to discuss how to stop smoking with your doctor or health care professional.  If you are going to have surgery, tell your doctor or health care  professional that you are taking this medicine.  What side effects may I notice from receiving this medicine?  Side effects that you should report to your doctor or health care professional as soon as possible:    allergic reactions like skin rash, itching or hives, swelling of the face, lips, or tongue    chest pain    fast, slow, or irregular heartbeat    signs and symptoms of bleeding such as bloody or black, tarry stools; red or dark-brown urine; spitting up blood or brown material that looks like coffee grounds; red spots on the skin; unusual bruising or bleeding from the eye, gums, or nose    swelling in the legs or ankles  Side effects that usually do not require medical attention (report to your doctor or health care professional if they continue or are bothersome):    diarrhea    headache    nausea, or upset stomach  This list may not describe all possible side effects. Call your doctor for medical advice about side effects. You may report side effects to FDA at 9-273-FDA-7034.  Where should I keep my medicine?  Keep out of the reach of children.  Store at room temperature between 15 and 30 degrees C (59 and 86 degrees F). Throw away any unused medicine after the expiration date.  NOTE:This sheet is a summary. It may not cover all possible information. If you have questions about this medicine, talk to your doctor, pharmacist, or health care provider. Copyright  2015 [employer] Standard        Carotid Duplex    Steps for the test are:    You will be asked to lie on a stretcher. It will be okay for you to wear your street clothes. In some situations, you may be asked to change into a gown.      Ultrasound gel, usually warmed for your comfort, will be placed on either side of your neck.      Through the gel, the technician will apply to your neck a small hand-held device that emits sound waves.     When the test is completed, the technician will remove excess gel from your neck. The gel is water-soluble and will  not stain your skin or clothes.    How to Prepare:    Eat and take medications as usual.     Wear minimal or no jewelry to ease application and removal of gel.    Risks  There are typically no side effects or complications associated with a carotid duplex ultrasound examination.    What Can I Expect After the Test?  The technician will send the ultrasound images to your vascular surgeon for evaluation. Typically, a report is available in 2-3 days. If anything critical is found, it is standard practice to notify the vascular surgeon immediately.  Reference: [email or web address]        We want to hear from you!  In the next few weeks, you should receive a call or email to complete a survey about your visit at [facility]. Please help us improve your appointment experience by letting us know how we did today. We strive to make your experience good and value any ways in which we could do better.      We value your input and suggestions.    Thank you for choosing the [facility]!

## 2022-03-17 NOTE — PROGRESS NOTES
VASCULAR SURGERY OUTPATIENT CONSULT OR VISIT   VASCULAR SURGEON: Cris Atwood MD    LOCATION:  [facility]    Ashanti Aviles   Medical Record #:  [id number]  YOB: 1955  Age:  66 year old     Date of Service: 3/17/2022    PRIMARY CARE PROVIDER: No Ref-Primary, Physician      Reason for consultation: Patient with PAD and new groin pain    IMPRESSION: Left groin pain is musculoskeletal and not consistent with progressive PAD.  Patient does have stable PAD worse in the left than the right with ABIs of 0.51 on the left and 0.68 on the right.  Claudicates at about 100 feet according to her but tolerates this well.  Really try to quit smoking and down to about 5 cigarettes a day.  Strong left carotid bruit.    RECOMMENDATION: Patient with stable PAD and groin pain that is unrelated.  We will have her her resume her cilostazol and stay on it given its benefits in keeping and interventions patent.  Will not resume Plavix as there is no clear indication for this right now.  Will not start her on baby aspirin as she tells me that she was told that one of her diabetes meds affects platelet function.  We will also not start her on a statin given all her prior intolerance of statins.  Instead we will send her to Dr. Bettencourt in the next month or so to look at appropriate antiplatelet therapy and cholesterol regimen.  When she does see Dr. Bettencourt she will have a carotid duplex looking for left carotid stenosis.  If this is severe I will see her sooner but otherwise she will be seen in our [facility] in 6 months time with Harris Gutierres testing and if appropriate a repeat carotid duplex.  We will start her on cilostazol today at a gradually escalating dose.  She knows that if she does not tolerate it she will call our office and stop taking it.  Strong support ongoing smoking cessation efforts.    HPI:  Ashanti Aviles is a 66 year old female who was seen today for about 2 weeks of left  groin discomfort with movement.  This can even occur while sitting.  Really not consistent with progressive claudication at all.  Does not recall any trauma or other injury.  Does have a longstanding history of claudication in both calves worse on the left than the right this is not changed.  Underwent left leg atherectomy of the common femoral artery and profunda femoris artery with Dr. Marcello Cortez at [facility] in 2017.  She says that after this procedure, for about a year she was better with her claudication but has returned.  Patient was never able to quit smoking.  Had been on Plavix and aspirin.  Was never on a statin.  Tells me that statins, all kinds, do not agree with her given her shoulder pains.    2019 seen by Dr. Gerard at [facility] recognize that she had manageable claudication and started on cilostazol.  She does not recall being started on cilostazol and she certainly does not recall common side effects from it.    Patient has been trying to stop smoking.  Is down about 5 cigarettes a day.  Adjunctive therapies to help with this do not help her and she thinks that she needs to quit cold turkey soon.  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  Seen there by the vascular team and wanted another opinion.  At work is when she is most craving a cigarette.  Actually is quite well at home.    Patient is diabetic.  Father had diabetes and peripheral vascular disease and went on to lose a leg.  Mother had colon cancer.  No family history of strokes.  No family history of cardiac disease.    Patient has never herself had symptoms distant with a TIA or stroke.    No other new health issues.    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/29/2013        Past Surgical History:   Procedure Laterality Date     ANUS SURGERY       BIOPSY CERVICAL, LOCAL  EXCISION, SINGLE/MULTIPLE       COLONOSCOPY N/A 9/11/2020    Procedure: EXAM UNDER ANESTHESIA, HIGH RESOLUTION ANOSCOPY INTRA OP;  Surgeon: Preeti Sheehan MD;  Location: [facility];  Service: Gastroenterology     COLONOSCOPY N/A 12/14/2020    Procedure: EXAM UNDER ANESTHESIA WITH HIGH RESOLUTION ANOSCOPY;  Surgeon: Preeti Sheehan MD;  Location: [facility];  Service: General     COLONOSCOPY N/A 6/15/2021    Procedure: EXAM UNDER ANESTHESIA WITH HIGH RESOLUTION ANOSCOPY, BIOPSY, FULGURATION;  Surgeon: Preeti Sheehan MD;  Location: [facility];  Service: Gastroenterology     EXAM UNDER ANESTHESIA ANUS N/A 9/14/2021    Procedure: EXAM UNDER ANESTHESIA WITH HIGH RESOLUTION ANOSCOPY;  Surgeon: Preeti Sheehan MD;  Location: [facility]       ALLERGIES:  Ezetimibe, Oxycodone, Penicillins, Simvastatin, and Victoza [liraglutide]    MEDS:    Current Outpatient Medications:      amLODIPine (NORVASC) 10 MG tablet, [AMLODIPINE (NORVASC) 10 MG TABLET] Take 10 mg by mouth daily., Disp: , Rfl:      [START ON 4/14/2022] cilostazol (PLETAL) 100 MG tablet, Take 1 tablet (100 mg) by mouth 2 times daily, Disp: 90 tablet, Rfl: 4     cilostazol (PLETAL) 50 MG tablet, Take 1 tablet (50 mg) by mouth daily for 14 days, Disp: 14 tablet, Rfl: 0     [START ON 3/31/2022] cilostazol (PLETAL) 50 MG tablet, Take 1 tablet (50 mg) by mouth 2 times daily for 14 days, Disp: 28 tablet, Rfl: 0     hydroCHLOROthiazide (HYDRODIURIL) 25 MG tablet, [HYDROCHLOROTHIAZIDE (HYDRODIURIL) 25 MG TABLET] Take 25 mg by mouth daily., Disp: , Rfl:      Insulin Degludec (TRESIBA) 100 UNIT/ML SOLN, Inject 30 Units Subcutaneous, Disp: , Rfl:      JARDIANCE 10 MG TABS tablet, , Disp: , Rfl:      linagliptin (TRADJENTA) 5 MG TABS tablet, 1 tablet, Disp: , Rfl:      lisinopril (PRINIVIL,ZESTRIL) 40 MG tablet, [LISINOPRIL (PRINIVIL,ZESTRIL) 40 MG TABLET] Take 40 mg by mouth daily., Disp: , Rfl:      metFORMIN (FORTAMET) 500 MG  (OSM) 24 hr tablet, [METFORMIN (FORTAMET) 500 MG (OSM) 24 HR TABLET] Take 500 mg by mouth 2 (two) times a day., Disp: , Rfl:      omeprazole (PRILOSEC) 20 MG DR capsule, TAKE 1 CAPSULE BY MOUTH 30 MINUTES BEFORE MORNING MEAL ONCE A DAY, Disp: , Rfl:      clopidogrel (PLAVIX) 75 mg tablet, [CLOPIDOGREL (PLAVIX) 75 MG TABLET] Take 75 mg by mouth daily. (Patient not taking: Reported on 3/17/2022), Disp: , Rfl:     SOCIAL HABITS:    History   Smoking Status     Current Every Day Smoker     Packs/day: 0.25   Smokeless Tobacco     Never Used     Social History    Substance and Sexual Activity      Alcohol use: Yes        Comment: Alcoholic Drinks/day: 2x      History   Drug Use Unknown       FAMILY HISTORY:  No family history on file.    REVIEW OF SYSTEMS:    A 12 point ROS was reviewed and except for what is listed in the HPI above, all others are negative    PE:  /60   Pulse 68   Temp 98.1  F (36.7  C) (Oral)   Resp 16   Wt Readings from Last 1 Encounters:   01/20/22 156 lb 6.4 oz (70.9 kg)     There is no height or weight on file to calculate BMI.    EXAM:  GENERAL: This is a well-developed 66 year old female who appears her stated age  EYES: Grossly normal.  MOUTH: Buccal mucosa normal   MUSCULOSKELETAL: Grossly normal and both lower extremities are intact.  HEME/LYMPH: No lymphedema  NEUROLOGIC: Focally intact, Alert and oriented x 3.   PSYCH: appropriate affect  INTEGUMENT: No open lesions or ulcers  Pulse Exam:   Very loud left carotid bruit.      DIAGNOSTIC STUDIES:     Images:  No results found.    I personally reviewed the images and my interpretation is that her ABIs are roughly stable at 0.68 on the right and 0.51 on the left.  Toe pressures are near normal on the right at 60 mmHg.  Significantly diminished on the left at 24 mmHg.  On arterial duplex she appears to have inflow disease with monophasic waveforms at both external iliac arteries..    LABS:      No results found for: NA, BUN, CREATININE,  GLUCOSE, HGB, PLT, BNP, INR    45 minutes spent on the day of encounter doing chart review, history and exam, documentation, and further activities as noted.       Cris Atwood MD, MD  VASCULAR SURGERY

## 2022-03-17 NOTE — TELEPHONE ENCOUNTER
Estefany from [facility] called asking someone to call them back to clarify dosing on the medication orders that were sent over today.      [phone], any pharmacist can take the call.

## 2022-03-17 NOTE — PROGRESS NOTES
[facility]        Patient is here for a evaluation to discuss Peripheral artery disease (PAD). Symptoms include claudicaton: left leg pain after walking about 100 feet. Having discomfort in the left groin area where the stent was previously placed.  She has been off plavix for 2-3 months because she could not get it refilled.   Saw Dr. Gerard in 2019  She is working on quitting smoking.         The provider has been notified that the patient has concerns of see above.     Questions patient would like addressed today are: N/A.    Refills are needed: plavix    Has homecare services and agency name:  Daphne Carroll RN

## 2022-03-17 NOTE — TELEPHONE ENCOUNTER
Called Estefany and clarified prescription on Cilostazol. Explained instructions on tapering up medication.

## 2022-04-02 ENCOUNTER — HEALTH MAINTENANCE LETTER (OUTPATIENT)
Age: 67
End: 2022-04-02

## 2022-04-21 ENCOUNTER — ANCILLARY PROCEDURE (OUTPATIENT)
Dept: VASCULAR ULTRASOUND | Facility: CLINIC | Age: 67
End: 2022-04-21
Attending: SURGERY
Payer: COMMERCIAL

## 2022-04-21 ENCOUNTER — OFFICE VISIT (OUTPATIENT)
Dept: VASCULAR SURGERY | Facility: CLINIC | Age: 67
End: 2022-04-21
Attending: SURGERY
Payer: COMMERCIAL

## 2022-04-21 ENCOUNTER — LAB (OUTPATIENT)
Dept: LAB | Facility: CLINIC | Age: 67
End: 2022-04-21

## 2022-04-21 VITALS
TEMPERATURE: 97.9 F | SYSTOLIC BLOOD PRESSURE: 84 MMHG | DIASTOLIC BLOOD PRESSURE: 54 MMHG | RESPIRATION RATE: 16 BRPM | HEART RATE: 88 BPM

## 2022-04-21 DIAGNOSIS — E78.5 DYSLIPIDEMIA: ICD-10-CM

## 2022-04-21 DIAGNOSIS — I35.0 AORTIC VALVE STENOSIS, ETIOLOGY OF CARDIAC VALVE DISEASE UNSPECIFIED: ICD-10-CM

## 2022-04-21 DIAGNOSIS — I73.9 PAD (PERIPHERAL ARTERY DISEASE) (H): Primary | ICD-10-CM

## 2022-04-21 DIAGNOSIS — R09.89 BRUIT OF LEFT CAROTID ARTERY: ICD-10-CM

## 2022-04-21 LAB
CHOLEST SERPL-MCNC: 237 MG/DL
FASTING STATUS PATIENT QL REPORTED: ABNORMAL
HDLC SERPL-MCNC: 68 MG/DL
LDLC SERPL CALC-MCNC: 145 MG/DL
TRIGL SERPL-MCNC: 120 MG/DL

## 2022-04-21 PROCEDURE — 80061 LIPID PANEL: CPT

## 2022-04-21 PROCEDURE — 93880 EXTRACRANIAL BILAT STUDY: CPT | Mod: 26 | Performed by: INTERNAL MEDICINE

## 2022-04-21 PROCEDURE — 36415 COLL VENOUS BLD VENIPUNCTURE: CPT

## 2022-04-21 PROCEDURE — 99214 OFFICE O/P EST MOD 30 MIN: CPT | Performed by: INTERNAL MEDICINE

## 2022-04-21 PROCEDURE — 93880 EXTRACRANIAL BILAT STUDY: CPT

## 2022-04-21 RX ORDER — EZETIMIBE 10 MG/1
10 TABLET ORAL DAILY
Qty: 30 TABLET | Refills: 3 | Status: SHIPPED | OUTPATIENT
Start: 2022-04-21 | End: 2022-04-21 | Stop reason: SINTOL

## 2022-04-21 ASSESSMENT — PAIN SCALES - GENERAL: PAINLEVEL: NO PAIN (0)

## 2022-04-21 NOTE — PROGRESS NOTES
[facility]        Patient is here for a evaluation to discuss Left carotid bruit and appropriate antiplatelet therapy and cholesterol regimen. Has statin intolerance. Hx PAD, has follow up with Tammy GUERIN in September.    Pt is currently taking Cilostazol.  BP 84/54, patient states she feels tired but not dizzy or lightheaded.    The provider has been notified that the patient see above.     Questions patient would like addressed today are: see above.    Refills are needed: No    Has homecare services and agency name:  Daphne Craroll RN

## 2022-04-21 NOTE — PATIENT INSTRUCTIONS
To schedule echocardiogram, please call   ([facility])     [phone]         Carotid Artery Problems: Stroke    The carotid arteries are large blood vessels that carry blood to the brain. When these arteries are healthy, the brain gets all the oxygen and nutrients it needs to function well. But if the carotid arteries are damaged, this can greatly increase your risk of a stroke. A stroke is a sudden loss of brain function caused by a lack of blood flow and oxygen.    Blood clot blocking carotid artery and emboli breaking off clot. Inset shows damage to brain.      Small pieces of a blood clot called emboli break off and can enter the bloodstream and travel to the brain. Brain tissue is damaged when emboli block arteries in the brain.    How artery damage can lead to a stroke  A healthy carotid artery is smooth on the inside, like a tube. But health problems such as high blood pressure, diabetes, and smoking can damage the inside of the artery wall and make it rough. This lets fatty deposits called plaque build up on the artery wall. Blood clots called emboli may also form on the plaque. If pieces of plaque or emboli break off, they can flow in the blood and get stuck in a small blood vessel in the brain. This blocks the blood flow to a part of the brain, and causes a stroke.      Symptoms of a stroke  Below are common symptoms of a stroke.  Call 911 right away if you have any of these symptoms. Fast medical treatment for a stroke is vital. The longer you wait to get help, the more damage a stroke can do.    Sudden numbness or weakness of the face, arm, or leg, especially on one side of the body  Sudden confusion or trouble speaking or understanding other people  Sudden trouble seeing in 1 or both eyes  Sudden trouble walking, dizziness, or loss of balance or coordination  Sudden, severe headache with no known cause  Sudden new seizures    Transient ischemic attack (TIA)  A transient ischemic attack  (TIA) is a mini-stroke. It s a warning sign that a larger stroke may happen in the near future. A TIA is when an artery to the brain is blocked for a brief time. This will cause symptoms just like those that happen with a stroke. But with a TIA, they last a short period of time, from a few seconds to a few hours. Never ignore any TIA or stroke symptoms.  Call 911 right away .      F.A.S.T.  F.A.S.T. is an easy way to remember the signs of a stroke or TIA. When you see these signs, call 911 fast.    F.A.S.T. stands for:    F is for face drooping. One side of the face is drooping or numb. When the person smiles, the smile is uneven.  A is for arm weakness. One arm is weak or numb. When the person lifts both arms at the same time, one arm may drift downward.  S is for speech difficulty. You may notice slurred speech or trouble speaking. The person can't repeat a simple sentence correctly when asked.  T is for time to call 911. If someone shows any of these symptoms, even if the symptoms go away, call 911 right away. Make note of the time the symptoms first appeared.          0467-2943 [employer]. [address] [place] [address], [place] [address]. All rights reserved. This information is not intended as a substitute for professional medical care. Always follow your healthcare professional's instructions.

## 2022-04-21 NOTE — PROGRESS NOTES
[facility]  Wesly Bettencourt MD - Vascular Medicine Progress Note    LOCATION: [facility]    Ashanti Aviles  Medical Record #:  [id number]  YOB: 1955  Age:  66 year old     Date of Service: 4/21/2022     PRIMARY CARE PROVIDER: No Ref-Primary, Physician    REASON FOR VISIT:   follow up  for Doppler arterial ultrasound    IMPRESSION: Carotid Doppler arterial ultrasound showed less than 50% stenosis bilaterally with antegrade flow and triphasic waveform pattern of both subclavian and vertebral arteries  Patient has stenosis of the left external carotid artery  Velocities along the common carotid and internal carotid artery seems to be low that can be secondary to LV outflow obstruction such as in AS or low blood pressure    RECOMMENDATION:  1-continue with vascular risk factors modifications  Continue with current management  Carotid Doppler arterial ultrasound in 1 year from now    If patients imaging is normal patient should follow up 1 to 2 years    HPI: Ashanti Aviles is a 66 year old female who was seen today for follow-up on carotid Doppler arterial ultrasound results      PAST MEDICAL HISTORY  Past Medical History:   Diagnosis Date     Diabetes mellitus (H)      Gastroesophageal reflux disease      Hypertension      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/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       PAST SURGICAL HISTORY:  Past Surgical History:   Procedure Laterality Date     ANUS SURGERY       BIOPSY CERVICAL, LOCAL EXCISION, SINGLE/MULTIPLE       COLONOSCOPY N/A 9/11/2020    Procedure: EXAM UNDER ANESTHESIA, HIGH RESOLUTION ANOSCOPY INTRA OP;  Surgeon: Preeti Sheehan MD;  Location: [facility];  Service: Gastroenterology     COLONOSCOPY N/A 12/14/2020    Procedure: EXAM UNDER ANESTHESIA WITH HIGH RESOLUTION ANOSCOPY;  Surgeon: Preeti Sheehan MD;  Location: [facility];  Service: General     COLONOSCOPY N/A 6/15/2021    Procedure: EXAM  UNDER ANESTHESIA WITH HIGH RESOLUTION ANOSCOPY, BIOPSY, FULGURATION;  Surgeon: Preeti Sheehan MD;  Location: [facility];  Service: Gastroenterology     EXAM UNDER ANESTHESIA ANUS N/A 9/14/2021    Procedure: EXAM UNDER ANESTHESIA WITH HIGH RESOLUTION ANOSCOPY;  Surgeon: Preeti Sheehan MD;  Location: [facility]         CURRENT MEDICATIONS  amLODIPine (NORVASC) 10 MG tablet, [AMLODIPINE (NORVASC) 10 MG TABLET] Take 10 mg by mouth daily.  cilostazol (PLETAL) 100 MG tablet, Take 1 tablet (100 mg) by mouth 2 times daily  hydroCHLOROthiazide (HYDRODIURIL) 25 MG tablet, [HYDROCHLOROTHIAZIDE (HYDRODIURIL) 25 MG TABLET] Take 25 mg by mouth daily.  Insulin Degludec (TRESIBA) 100 UNIT/ML SOLN, Inject 30 Units Subcutaneous  JARDIANCE 10 MG TABS tablet,   linagliptin (TRADJENTA) 5 MG TABS tablet, 1 tablet  lisinopril (PRINIVIL,ZESTRIL) 40 MG tablet, [LISINOPRIL (PRINIVIL,ZESTRIL) 40 MG TABLET] Take 40 mg by mouth daily.  metFORMIN (FORTAMET) 500 MG (OSM) 24 hr tablet, [METFORMIN (FORTAMET) 500 MG (OSM) 24 HR TABLET] Take 500 mg by mouth 2 (two) times a day.  omeprazole (PRILOSEC) 20 MG DR capsule, TAKE 1 CAPSULE BY MOUTH 30 MINUTES BEFORE MORNING MEAL ONCE A DAY    No current facility-administered medications on file prior to visit.      ALLERGIES     Allergies   Allergen Reactions     Ezetimibe Unknown     Generalized body aches     Oxycodone Nausea and Vomiting     Penicillins Unknown     Simvastatin Unknown     Muscle pain     Victoza [Liraglutide] Unknown       FAMILY HISTORY  No family history on file.    SOCIAL HISTORY  Social History     Socioeconomic History     Marital status:      Spouse name: Not on file     Number of children: Not on file     Years of education: Not on file     Highest education level: Not on file   Occupational History     Not on file   Tobacco Use     Smoking status: Current Every Day Smoker     Packs/day: 0.25     Smokeless tobacco: Never Used   Substance and Sexual  Activity     Alcohol use: Yes     Comment: Alcoholic Drinks/day: 2x     Drug use: Not Currently     Sexual activity: Yes     Partners: Male   Other Topics Concern     Not on file   Social History Narrative     Not on file     Social Determinants of Health     Financial Resource Strain: Not on file   Food Insecurity: Not on file   Transportation Needs: Not on file   Physical Activity: Not on file   Stress: Not on file   Social Connections: Not on file   Intimate Partner Violence: Not on file   Housing Stability: Not on file       ROS:   Complete ROS negative other than what is stated in HPI.     EXAM:  BP (!) 84/54   Pulse 88   Temp 97.9  F (36.6  C) (Oral)   Resp 16   In general, the patient is a pleasant female in no apparent distress.    HEENT: NC/AT.  PERRLA.  EOMI.  Sclerae white, not injected.    Neck: No adenopathy.  Carotids +2/2 bilaterally without bruits.   Heart: RRR. Normal S1, S2 splits physiologically. No murmur, rub, click, or gallop.   Lungs: CTA.  No ronchi, wheezes, rales.    Abdomen: Soft, nontender, nondistended.   Extremities: No clubbing, cyanosis, or edema.  No wounds.     Labs:  LIPID RESULTS:  No results found for: CHOL, HDL, LDL, TRIG, CHOLHDLRATIO    LIVER ENZYME RESULTS:  No results found for: AST, ALT    CBC RESULTS:  No results found for: WBC, RBC, HGB, HCT, MCV, MCH, MCHC, RDW, PLT    BMP RESULTS:  Lab Results   Component Value Date     (A) 09/14/2021        A1C RESULTS:  No results found for: A1C    THYROID RESULTS:  No results found for: TSH      DIAGNOSTIC STUDIES:     Images:  US Carotid Bilateral    Result Date: 4/21/2022  BILATERAL CAROTID ULTRASOUND (Date: 04/21/22) Indication: Left carotid bruit Previous: None Symptoms: Hypertension and Current Smoker TECHNIQUE: Duplex exam performed utilizing 2D gray-scale imaging, Doppler interrogation with color-flow and spectral waveform analysis. Right Velocity  Chart (cm/sec) Location Right DISTAL CCA-PS 88 DISTAL CCA- ED 24 PROX  ICA-PS 72 PROX ICA-ED 22 ECA- ECA-ED 18 Vertebral- Antegrade 105 Subclavian A- Biphasic 101 Ratio ICA/CCA-PS 0.82 Ratio ICA/CCA-ED 0.91 Left Velocity  Chart (cm/sec) Location Left DISTAL CCA-PS 79 DISTAL CCA- ED 24 PROX ICA-PS 71 PROX ICA-ED 25 ECA- ECA- Vertebral- Antegrade 84 Subclavian A- Biphasic 111 Ratio ICA/CCA-PS 0.91 Ratio ICA/CCA-ED 1.04 FINDINGS: RIGHT: There is uniformly echogenic  atheromatous plaque.  LEFT: There is uniformly echogenic atheromatous plaque.  RIGHT: Vertebral artery and subclavian artery waveforms are antegrade with multiphasic waveform pattern. LEFT: Vertebral artery and subclavian artery waveforms are antegrade with multiphasic waveform pattern. Impression:  1. Less than 50% diameter stenosis of the right ICA relative to the proximal ICA diameter. 2. Less than 50% diameter stenosis of the left ICA relative to the proximal ICA diameter.  Hemodynamically significant stenosis of the left external carotid artery Evaluation based on velocities and NASCET criteria Category PSV (cm/s)  Plaque Imaging EDV (cm/s)  ICA/CCA Ratio Normal,  <125  None <40 <2 Mild <50% <125 < than 50% DR stenosis <40 <2 Moderate Disease 50-69% 125-230 > than 50% DR stenosis  2.0-4.0 Severe->70% but less than near occlusion >230 > than 50% DR stenosis >100 >4.0 Near Occlusion May be low or undetectable Visible, extensive Variable Variable Occlusion No Flow Visible with no detectable lumen N/A N/A Plaquetype Description Type 1 Uniformly echolucent Type 2 Predominantly echolucent >50% Type 3 Predominantly echogenic >50% Type 4 Uniformly echogenic Type 5 Unclassified due to poor visualization Ulcer Visible Ulcerative Plaque       I personally reviewed the images and my interpretation is .      Wesly Bettencourt MD        15 minutes spent on the date of the encounter doing chart review, history and exam, documentation, and further activities as noted above.

## 2022-05-19 ENCOUNTER — TELEPHONE (OUTPATIENT)
Dept: VASCULAR SURGERY | Facility: CLINIC | Age: 67
End: 2022-05-19
Payer: COMMERCIAL

## 2022-05-19 NOTE — TELEPHONE ENCOUNTER
Telephone appointment with Dr. Bettencourt cancelled today as she did not complete her echocardiogram.    Writer called [facility], they will reach out to patient to reschedule. Patient aware.

## 2022-05-28 ENCOUNTER — HEALTH MAINTENANCE LETTER (OUTPATIENT)
Age: 67
End: 2022-05-28

## 2022-06-01 ENCOUNTER — TELEPHONE (OUTPATIENT)
Dept: SURGERY | Facility: CLINIC | Age: 67
End: 2022-06-01
Payer: COMMERCIAL

## 2022-06-01 NOTE — TELEPHONE ENCOUNTER
LVM to [facility] Please Schedule This Appointment:     With: Kirstie Reich NP     Referring Provider: self     For / Appt Notes: micro     Appt Type: LUCILLEP [place]     Appt Date/Time: next available     Thank you!     Left L pod number

## 2022-06-09 ENCOUNTER — TELEPHONE (OUTPATIENT)
Dept: SURGERY | Facility: CLINIC | Age: 67
End: 2022-06-09
Payer: COMMERCIAL

## 2022-06-09 NOTE — TELEPHONE ENCOUNTER
No voicemail to [facility] Please Schedule This Appointment:     With: Kirstie Reich NP     Referring Provider: self     For / Appt Notes: micro     Appt Type: JIMBO [place]     Appt Date/Time: next available     Thank you!

## 2022-07-23 ENCOUNTER — HEALTH MAINTENANCE LETTER (OUTPATIENT)
Age: 67
End: 2022-07-23

## 2022-08-17 ENCOUNTER — TELEPHONE (OUTPATIENT)
Dept: SURGERY | Facility: CLINIC | Age: 67
End: 2022-08-17

## 2022-08-17 NOTE — TELEPHONE ENCOUNTER
Called to schedule micro appt. LVM with L pod #. Will also send MyChart.        With: Kirstie Reich NP     Referring Provider: self     For / Appt Notes: micro     Appt Type: Microscopy     Appt Date/Time: next available       Thank you!

## 2022-10-01 ENCOUNTER — HEALTH MAINTENANCE LETTER (OUTPATIENT)
Age: 67
End: 2022-10-01

## 2023-01-27 ENCOUNTER — TRANSCRIBE ORDERS (OUTPATIENT)
Dept: VASCULAR SURGERY | Facility: CLINIC | Age: 68
End: 2023-01-27
Payer: COMMERCIAL

## 2023-01-27 DIAGNOSIS — I73.9 PAD (PERIPHERAL ARTERY DISEASE) (H): Primary | ICD-10-CM

## 2023-01-27 DIAGNOSIS — I70.229 CRITICAL LOWER LIMB ISCHEMIA (H): Primary | ICD-10-CM

## 2023-02-05 ENCOUNTER — HEALTH MAINTENANCE LETTER (OUTPATIENT)
Age: 68
End: 2023-02-05

## 2023-02-10 ENCOUNTER — ANCILLARY PROCEDURE (OUTPATIENT)
Dept: VASCULAR ULTRASOUND | Facility: CLINIC | Age: 68
End: 2023-02-10
Attending: SURGERY
Payer: COMMERCIAL

## 2023-02-10 DIAGNOSIS — I73.9 PAD (PERIPHERAL ARTERY DISEASE) (H): ICD-10-CM

## 2023-02-10 PROCEDURE — 93923 UPR/LXTR ART STDY 3+ LVLS: CPT | Mod: 26 | Performed by: SURGERY

## 2023-02-10 PROCEDURE — 93925 LOWER EXTREMITY STUDY: CPT | Mod: 26 | Performed by: SURGERY

## 2023-02-10 PROCEDURE — 93923 UPR/LXTR ART STDY 3+ LVLS: CPT

## 2023-02-10 PROCEDURE — 93925 LOWER EXTREMITY STUDY: CPT

## 2023-02-14 ENCOUNTER — OFFICE VISIT (OUTPATIENT)
Dept: VASCULAR SURGERY | Facility: CLINIC | Age: 68
End: 2023-02-14
Attending: PODIATRIST
Payer: COMMERCIAL

## 2023-02-14 ENCOUNTER — HOSPITAL ENCOUNTER (OUTPATIENT)
Dept: CT IMAGING | Facility: HOSPITAL | Age: 68
Discharge: HOME | End: 2023-02-14
Attending: SURGERY
Payer: COMMERCIAL

## 2023-02-14 ENCOUNTER — HOSPITAL ENCOUNTER (INPATIENT)
Facility: HOSPITAL | Age: 68
LOS: 5 days | Discharge: HOME | DRG: 271 | End: 2023-02-19
Attending: EMERGENCY MEDICINE | Admitting: INTERNAL MEDICINE
Payer: COMMERCIAL

## 2023-02-14 VITALS — RESPIRATION RATE: 16 BRPM | HEART RATE: 70 BPM | DIASTOLIC BLOOD PRESSURE: 60 MMHG | SYSTOLIC BLOOD PRESSURE: 110 MMHG

## 2023-02-14 DIAGNOSIS — I70.229 CRITICAL LOWER LIMB ISCHEMIA (H): ICD-10-CM

## 2023-02-14 DIAGNOSIS — I73.9 PAD (PERIPHERAL ARTERY DISEASE) (H): Primary | ICD-10-CM

## 2023-02-14 DIAGNOSIS — L08.9 TOE INFECTION: ICD-10-CM

## 2023-02-14 LAB
ANION GAP SERPL CALCULATED.3IONS-SCNC: 13 MMOL/L (ref 7–15)
BUN SERPL-MCNC: 8.9 MG/DL (ref 8–23)
CALCIUM SERPL-MCNC: 10.6 MG/DL (ref 8.8–10.2)
CHLORIDE SERPL-SCNC: 98 MMOL/L (ref 98–107)
CREAT SERPL-MCNC: 0.69 MG/DL (ref 0.51–0.95)
CRP SERPL-MCNC: <3 MG/L
DEPRECATED HCO3 PLAS-SCNC: 26 MMOL/L (ref 22–29)
ERYTHROCYTE [DISTWIDTH] IN BLOOD BY AUTOMATED COUNT: 13.6 % (ref 10–15)
GFR SERPL CREATININE-BSD FRML MDRD: >90 ML/MIN/1.73M2
GLUCOSE BLDC GLUCOMTR-MCNC: 195 MG/DL (ref 70–99)
GLUCOSE BLDC GLUCOMTR-MCNC: 198 MG/DL (ref 70–99)
GLUCOSE SERPL-MCNC: 125 MG/DL (ref 70–99)
HBA1C MFR BLD: 8.5 %
HCT VFR BLD AUTO: 46.6 % (ref 35–47)
HGB BLD-MCNC: 15.9 G/DL (ref 11.7–15.7)
HOLD SPECIMEN: NORMAL
HOLD SPECIMEN: NORMAL
MCH RBC QN AUTO: 30.2 PG (ref 26.5–33)
MCHC RBC AUTO-ENTMCNC: 34.1 G/DL (ref 31.5–36.5)
MCV RBC AUTO: 89 FL (ref 78–100)
PLATELET # BLD AUTO: 313 10E3/UL (ref 150–450)
POTASSIUM SERPL-SCNC: 3.5 MMOL/L (ref 3.4–5.3)
RBC # BLD AUTO: 5.26 10E6/UL (ref 3.8–5.2)
SODIUM SERPL-SCNC: 137 MMOL/L (ref 136–145)
WBC # BLD AUTO: 12.9 10E3/UL (ref 4–11)

## 2023-02-14 PROCEDURE — 258N000003 HC RX IP 258 OP 636: Performed by: EMERGENCY MEDICINE

## 2023-02-14 PROCEDURE — 85027 COMPLETE CBC AUTOMATED: CPT | Performed by: EMERGENCY MEDICINE

## 2023-02-14 PROCEDURE — 99285 EMERGENCY DEPT VISIT HI MDM: CPT | Mod: 25

## 2023-02-14 PROCEDURE — 120N000001 HC R&B MED SURG/OB

## 2023-02-14 PROCEDURE — 250N000011 HC RX IP 250 OP 636: Performed by: INTERNAL MEDICINE

## 2023-02-14 PROCEDURE — 36415 COLL VENOUS BLD VENIPUNCTURE: CPT | Performed by: INTERNAL MEDICINE

## 2023-02-14 PROCEDURE — G0463 HOSPITAL OUTPT CLINIC VISIT: HCPCS | Mod: 25 | Performed by: SURGERY

## 2023-02-14 PROCEDURE — 36415 COLL VENOUS BLD VENIPUNCTURE: CPT | Performed by: EMERGENCY MEDICINE

## 2023-02-14 PROCEDURE — 96374 THER/PROPH/DIAG INJ IV PUSH: CPT

## 2023-02-14 PROCEDURE — 82962 GLUCOSE BLOOD TEST: CPT

## 2023-02-14 PROCEDURE — 83036 HEMOGLOBIN GLYCOSYLATED A1C: CPT | Performed by: INTERNAL MEDICINE

## 2023-02-14 PROCEDURE — 75635 CT ANGIO ABDOMINAL ARTERIES: CPT

## 2023-02-14 PROCEDURE — 250N000011 HC RX IP 250 OP 636: Performed by: EMERGENCY MEDICINE

## 2023-02-14 PROCEDURE — 80048 BASIC METABOLIC PNL TOTAL CA: CPT | Performed by: EMERGENCY MEDICINE

## 2023-02-14 PROCEDURE — 258N000003 HC RX IP 258 OP 636: Performed by: INTERNAL MEDICINE

## 2023-02-14 PROCEDURE — 250N000011 HC RX IP 250 OP 636: Performed by: SURGERY

## 2023-02-14 PROCEDURE — 250N000013 HC RX MED GY IP 250 OP 250 PS 637: Performed by: INTERNAL MEDICINE

## 2023-02-14 PROCEDURE — 99214 OFFICE O/P EST MOD 30 MIN: CPT | Performed by: SURGERY

## 2023-02-14 PROCEDURE — 86140 C-REACTIVE PROTEIN: CPT | Performed by: INTERNAL MEDICINE

## 2023-02-14 PROCEDURE — 87040 BLOOD CULTURE FOR BACTERIA: CPT | Performed by: INTERNAL MEDICINE

## 2023-02-14 PROCEDURE — U0003 INFECTIOUS AGENT DETECTION BY NUCLEIC ACID (DNA OR RNA); SEVERE ACUTE RESPIRATORY SYNDROME CORONAVIRUS 2 (SARS-COV-2) (CORONAVIRUS DISEASE [COVID-19]), AMPLIFIED PROBE TECHNIQUE, MAKING USE OF HIGH THROUGHPUT TECHNOLOGIES AS DESCRIBED BY CMS-2020-01-R: HCPCS | Performed by: INTERNAL MEDICINE

## 2023-02-14 PROCEDURE — 99223 1ST HOSP IP/OBS HIGH 75: CPT | Performed by: INTERNAL MEDICINE

## 2023-02-14 RX ORDER — MORPHINE SULFATE 4 MG/ML
4 INJECTION, SOLUTION INTRAMUSCULAR; INTRAVENOUS ONCE
Status: COMPLETED | OUTPATIENT
Start: 2023-02-14 | End: 2023-02-14

## 2023-02-14 RX ORDER — CLOPIDOGREL BISULFATE 75 MG/1
75 TABLET ORAL DAILY
COMMUNITY
Start: 2022-12-14

## 2023-02-14 RX ORDER — SODIUM CHLORIDE 9 MG/ML
INJECTION, SOLUTION INTRAVENOUS CONTINUOUS
Status: DISCONTINUED | OUTPATIENT
Start: 2023-02-14 | End: 2023-02-17 | Stop reason: ALTCHOICE

## 2023-02-14 RX ORDER — LISINOPRIL 20 MG/1
40 TABLET ORAL DAILY
Status: DISCONTINUED | OUTPATIENT
Start: 2023-02-15 | End: 2023-02-19 | Stop reason: HOSPADM

## 2023-02-14 RX ORDER — ACETAMINOPHEN 650 MG/1
650 SUPPOSITORY RECTAL EVERY 6 HOURS PRN
Status: DISCONTINUED | OUTPATIENT
Start: 2023-02-14 | End: 2023-02-19 | Stop reason: HOSPADM

## 2023-02-14 RX ORDER — DOXYCYCLINE 100 MG/1
100 CAPSULE ORAL 2 TIMES DAILY
Status: ON HOLD | COMMUNITY
Start: 2023-02-08 | End: 2023-02-19

## 2023-02-14 RX ORDER — AMLODIPINE BESYLATE 5 MG/1
10 TABLET ORAL DAILY
Status: DISCONTINUED | OUTPATIENT
Start: 2023-02-15 | End: 2023-02-19 | Stop reason: HOSPADM

## 2023-02-14 RX ORDER — AMOXICILLIN 250 MG
2 CAPSULE ORAL 2 TIMES DAILY
Status: DISCONTINUED | OUTPATIENT
Start: 2023-02-14 | End: 2023-02-19 | Stop reason: HOSPADM

## 2023-02-14 RX ORDER — ONDANSETRON 4 MG/1
4 TABLET, ORALLY DISINTEGRATING ORAL EVERY 6 HOURS PRN
Status: DISCONTINUED | OUTPATIENT
Start: 2023-02-14 | End: 2023-02-19 | Stop reason: HOSPADM

## 2023-02-14 RX ORDER — PIPERACILLIN SODIUM, TAZOBACTAM SODIUM 3; .375 G/15ML; G/15ML
3.38 INJECTION, POWDER, LYOPHILIZED, FOR SOLUTION INTRAVENOUS EVERY 8 HOURS
Status: DISCONTINUED | OUTPATIENT
Start: 2023-02-15 | End: 2023-02-17 | Stop reason: ALTCHOICE

## 2023-02-14 RX ORDER — AMOXICILLIN 250 MG
1 CAPSULE ORAL 2 TIMES DAILY
Status: DISCONTINUED | OUTPATIENT
Start: 2023-02-14 | End: 2023-02-19 | Stop reason: HOSPADM

## 2023-02-14 RX ORDER — PIPERACILLIN SODIUM, TAZOBACTAM SODIUM 3; .375 G/15ML; G/15ML
3.38 INJECTION, POWDER, LYOPHILIZED, FOR SOLUTION INTRAVENOUS ONCE
Status: COMPLETED | OUTPATIENT
Start: 2023-02-14 | End: 2023-02-14

## 2023-02-14 RX ORDER — DEXTROSE MONOHYDRATE 25 G/50ML
25-50 INJECTION, SOLUTION INTRAVENOUS
Status: DISCONTINUED | OUTPATIENT
Start: 2023-02-14 | End: 2023-02-19 | Stop reason: HOSPADM

## 2023-02-14 RX ORDER — NICOTINE POLACRILEX 4 MG
15-30 LOZENGE BUCCAL
Status: DISCONTINUED | OUTPATIENT
Start: 2023-02-14 | End: 2023-02-19 | Stop reason: HOSPADM

## 2023-02-14 RX ORDER — HYDROMORPHONE HYDROCHLORIDE 1 MG/ML
.2-.4 INJECTION, SOLUTION INTRAMUSCULAR; INTRAVENOUS; SUBCUTANEOUS EVERY 4 HOURS PRN
Status: DISCONTINUED | OUTPATIENT
Start: 2023-02-14 | End: 2023-02-16 | Stop reason: ALTCHOICE

## 2023-02-14 RX ORDER — LIDOCAINE 40 MG/G
CREAM TOPICAL
Status: DISCONTINUED | OUTPATIENT
Start: 2023-02-14 | End: 2023-02-19 | Stop reason: HOSPADM

## 2023-02-14 RX ORDER — CLOPIDOGREL BISULFATE 75 MG/1
75 TABLET ORAL DAILY
Status: DISCONTINUED | OUTPATIENT
Start: 2023-02-15 | End: 2023-02-19 | Stop reason: HOSPADM

## 2023-02-14 RX ORDER — ACETAMINOPHEN 325 MG/1
650 TABLET ORAL EVERY 6 HOURS PRN
Status: DISCONTINUED | OUTPATIENT
Start: 2023-02-14 | End: 2023-02-19 | Stop reason: HOSPADM

## 2023-02-14 RX ORDER — POTASSIUM CHLORIDE 1500 MG/1
20 TABLET, EXTENDED RELEASE ORAL ONCE
Status: COMPLETED | OUTPATIENT
Start: 2023-02-15 | End: 2023-02-14

## 2023-02-14 RX ORDER — VANCOMYCIN HYDROCHLORIDE 1 G/200ML
1000 INJECTION, SOLUTION INTRAVENOUS EVERY 12 HOURS
Status: DISCONTINUED | OUTPATIENT
Start: 2023-02-15 | End: 2023-02-16

## 2023-02-14 RX ORDER — ONDANSETRON 2 MG/ML
4 INJECTION INTRAMUSCULAR; INTRAVENOUS EVERY 6 HOURS PRN
Status: DISCONTINUED | OUTPATIENT
Start: 2023-02-14 | End: 2023-02-19 | Stop reason: HOSPADM

## 2023-02-14 RX ORDER — IOPAMIDOL 755 MG/ML
100 INJECTION, SOLUTION INTRAVASCULAR ONCE
Status: COMPLETED | OUTPATIENT
Start: 2023-02-14 | End: 2023-02-14

## 2023-02-14 RX ADMIN — VANCOMYCIN HYDROCHLORIDE 1500 MG: 5 INJECTION, POWDER, LYOPHILIZED, FOR SOLUTION INTRAVENOUS at 18:42

## 2023-02-14 RX ADMIN — MORPHINE SULFATE 4 MG: 4 INJECTION, SOLUTION INTRAMUSCULAR; INTRAVENOUS at 17:57

## 2023-02-14 RX ADMIN — SODIUM CHLORIDE: 9 INJECTION, SOLUTION INTRAVENOUS at 21:31

## 2023-02-14 RX ADMIN — IOPAMIDOL 100 ML: 755 INJECTION, SOLUTION INTRAVENOUS at 15:42

## 2023-02-14 RX ADMIN — SENNOSIDES AND DOCUSATE SODIUM 1 TABLET: 50; 8.6 TABLET ORAL at 21:28

## 2023-02-14 RX ADMIN — POTASSIUM CHLORIDE 20 MEQ: 1500 TABLET, EXTENDED RELEASE ORAL at 23:51

## 2023-02-14 RX ADMIN — HYDROMORPHONE HYDROCHLORIDE 0.4 MG: 1 INJECTION, SOLUTION INTRAMUSCULAR; INTRAVENOUS; SUBCUTANEOUS at 22:02

## 2023-02-14 RX ADMIN — PIPERACILLIN AND TAZOBACTAM 3.38 G: 3; .375 INJECTION, POWDER, LYOPHILIZED, FOR SOLUTION INTRAVENOUS at 23:44

## 2023-02-14 RX ADMIN — PIPERACILLIN AND TAZOBACTAM 3.38 G: 3; .375 INJECTION, POWDER, LYOPHILIZED, FOR SOLUTION INTRAVENOUS at 18:03

## 2023-02-14 ASSESSMENT — ACTIVITIES OF DAILY LIVING (ADL)
ADLS_ACUITY_SCORE: 35

## 2023-02-14 ASSESSMENT — PAIN SCALES - GENERAL: PAINLEVEL: EXTREME PAIN (8)

## 2023-02-14 NOTE — PROGRESS NOTES
VASCULAR SURGERY OUTPATIENT CONSULT OR VISIT    VASCULAR SURGEON: Cris Atwood MD    LOCATION:  [facility]    Ashanti Aviles  Medical Record #: [id number]   YOB: 1955   Age:  67 year old     Date of Service: February 14, 2023     PRIMARY CARE PROVIDER: No Ref-Primary, Physician       Reason for consultation: Evaluation for critical limb ischemia    IMPRESSION: Patient with right fourth toe wound associated with toe pressures of 45 and ABIs of 0.47.  Duplex suggested possible inflow disease although likely multi level.  On doxycycline.  Uncertain if she is on cilostazol or Plavix.  CTA demonstrates a shaggy aorta and iliac irregularity but no occlusion.  Appears to have severe SFA disease on the right at least.    RECOMMENDATION: Given the patient's severe pain and worsening toe we have advised her to go to the ER for admission.  Would likely need involvement of podiatry as well as right leg angio intervention to try to improve flow.          HPI:  Ashanti Aviles  is a 67 year old  female who was seen today in evaluation for a wound on her fourth toe that developed in the last 2 weeks after she had her toenail removed by a podiatrist.  Significant pain.  No systemic signs of infection.  Went to the ER and was started on doxycycline.    This is a patient well-known to us for chronic bilateral lower extremity PAD worse on the right than the left.  Had an angio dimension with common femoral atherectomy by Dr. Marcello Cortez in 2017 at [facility].  This transiently improved with claudication but it went back to baseline.  Since then seen by Dr. Gerard at [facility] who started on cilostazol which improved her symptoms.  I saw her last year and put her back on her cilostazol and stop the Plavix.  I referred her then to Dr. Bettencourt of vascular medicine.     Patient has been a lifelong smoker and has been unable to quit.  Currently taking 4 cigarettes a day.    On review of systems  she has known no fevers or chills.  No new shortness of breath.  No cough or chest pain.  No new urinary or GI complaints.  No new neurologic symptoms.    PHH:    Past Medical History:   Diagnosis Date     Diabetes mellitus (H)      Gastroesophageal reflux disease      Hypertension      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/29/2013         Past Surgical History:   Procedure Laterality Date     ANUS SURGERY       BIOPSY CERVICAL, LOCAL EXCISION, SINGLE/MULTIPLE       COLONOSCOPY N/A 9/11/2020    Procedure: EXAM UNDER ANESTHESIA, HIGH RESOLUTION ANOSCOPY INTRA OP;  Surgeon: Preeti Sheehan MD;  Location: [facility] OR;  Service: Gastroenterology     COLONOSCOPY N/A 12/14/2020    Procedure: EXAM UNDER ANESTHESIA WITH HIGH RESOLUTION ANOSCOPY;  Surgeon: Preeti Sheehan MD;  Location: [facility] OR;  Service: General     COLONOSCOPY N/A 6/15/2021    Procedure: EXAM UNDER ANESTHESIA WITH HIGH RESOLUTION ANOSCOPY, BIOPSY, FULGURATION;  Surgeon: Preeti Sheehan MD;  Location: [facility] OR;  Service: Gastroenterology     EXAM UNDER ANESTHESIA ANUS N/A 9/14/2021    Procedure: EXAM UNDER ANESTHESIA WITH HIGH RESOLUTION ANOSCOPY;  Surgeon: Preeti Sheehan MD;  Location: [facility] OR        ALLERGIES:     Allergies   Allergen Reactions     Ezetimibe Unknown     Generalized body aches     Oxycodone Nausea and Vomiting     Penicillins Unknown     Simvastatin Unknown     Muscle pain     Victoza [Liraglutide] Unknown        MEDS:    Current Outpatient Medications   Medication     amLODIPine (NORVASC) 10 MG tablet     clopidogrel (PLAVIX) 75 MG tablet     hydroCHLOROthiazide (HYDRODIURIL) 25 MG tablet     Insulin Degludec (TRESIBA) 100 UNIT/ML SOLN     JARDIANCE 10 MG TABS tablet     linagliptin (TRADJENTA) 5 MG TABS tablet     lisinopril (PRINIVIL,ZESTRIL) 40 MG tablet     metFORMIN (FORTAMET) 500 MG (OSM) 24 hr tablet     cilostazol (PLETAL) 100 MG tablet     No current  facility-administered medications for this visit.        SOCIAL HABITS:    Tobacco Use      Smoking status: Former        Packs/day: 0.25        Types: Cigarettes        Quit date: 2/12/2023      Smokeless tobacco: Never       Alcohol Use: Not on file       History   Drug Use Unknown           REVIEW OF SYSTEMS:    A 12 point ROS was reviewed and except for what is listed in the HPI above, all others are negative    PE:  /60   Pulse 70   Resp 16    0 lbs 0 oz   There is no height or weight on file to calculate BMI.     EXAM:  GENERAL: This is a well-developed.67 year old female who appears her  stated age  EYES: Grossly normal.  MOUTH: Buccal mucosa normal   CARDIAC:  Normal S1 and S2, no Murmur  CHEST/LUNG:  Clear lung fields bilaterally  GASTROINTESINAL (ABDOMEN):Not Assessed   MUSCULOSKELETAL: Grossly normal and both lower extremities are intact.  HEME/LYMPH: No lymphedema  NEUROLOGIC: Focally intact, Alert and oriented x 3.   PSYCH: appropriate affect  INTEGUMENT: Bluish discoloration of right fourth toe with a small wound on the end.  No active rubor to suggest active infection  Pulse Exam:   Nonpalpable pedal pulses bilaterally.          DIAGNOSTIC STUDIES:     Images:  US Low Ext Arterial Doppler  wo Ex    Result Date: 2/10/2023  Table formatting from the original result was not included. BILATERAL RESTING ANKLE-BRACHIAL INDICES (TRACIE'S) (Date: 02/10/23) Indication: Surveillance TRACIE's: PAD, Bilateral leg pain, Right 4th Toe Discolored/ Ulcer. Hx: Bilateral SFA  Proximal Stenosis. Previous: 3/17/2022 History: Previous Smoker, Hypertension, Diabetic, PAD, Angioplasty, Rest Pain, Claudication, Skin Color Change and Vascular Ulcers  Resting TRACIE's          Right: mmHg Index     Brachial: 140  Ankle-(PT): 66 0.47 Ankle-(DP): 61 0.44          Digit: 45 0.32               Left: mmHg Index     Brachial: 139  Ankle-(PT): 66 0.47 Ankle-(DP): 65 0.46          Digit: 41 0.29 Resting ankle-brachial index of 0.47 on  the right. Toe Pressures of 45 mmHg and TBI of 0.32 Resting ankle-brachial index of 0.47 on the left. Toe Pressures of 41 mmHg and TBI of 0.29  VPR WAVEFORMS: The right volume plethysmography waveforms are moderately abnormal at the lower thigh level, moderately abnormal at the upper calf level and moderately abnormal at the ankle. The left volume plethysmography waveforms are mildly abnormal at the lower thigh level, mildly abnormal at the upper calf level and moderately abnormal at the ankle. Comments: Patient refused Exercise on Treadmill due to leg pain and 4th toe ulcer.  Impression:  1. RIGHT LOWER EXTREMITY: TRACIE is Abnormal with an TRACIE of 0.47 indicating multilevel disease. Toe Pressures are Abnormal and impaired for wound healing with toe pressures of 45 mmHg. 2. LEFT LOWER EXTREMITY: TRACIE is Abnormal with an TRACIE of 0.47 indicating multilevel disease. Toe Pressures are Abnormal and impaired for wound healing with toe pressures of 41 mmHg. Reference: Wound classification Grade TRACIE Ankle Systolic Pressure Toe Pressures 0 > 0.80 > 100 mmHg > 60 mmHg 1 0.6 - 0.79 70 - 100 mmHg 40 - 59 mmHg 2 0.4 - 0.59 50-70 mmHg 30 - 39 mmHg 3 < 0.39 < 50 mmHg < 30 mmHg Digit Pressures DBI Disease Category > 0.70 Normal < 0.70 Abnormal > 30 mmHg Potential wound healing < 30 mmHg Impaired wound healing Ankle Brachial Pressures TRACIE Disease Category > 1.3  Likely vessel calcification with monophasic waveforms, non-diagnostic 0.95-1.30 Normal with multiphasic waveforms 0.50-0.95 Single level disease 0.30-0.50 Multilevel disease < 0.30 Critical limb ischema Volume Plethysmography Recording (VPR) at all levels Normal Sharp systolic peak, fast upstroke, prominent dicrotic notch in wave Mild Sharp systolic peak, fast upstroke, absent dicrotic notch in wave Moderate Flattened systolic peak, slowed upstroke, absent dicrotic notch inwave Severe amplitude wave with = upslope and down slope Occluded Flat Line     US Lower Extremity Arterial  Duplex Bilateral    Result Date: 2/10/2023  Table formatting from the original result was not included. Arterial Duplex Ultrasound (Date: 02/10/23) Lower Extremity Artery Evaluation Indication: Surveillance Bilateral leg Arterial: PAD, Bilateral leg pain, Right 4th Toe Discolored/ Ulcer. Hx: Bilateral SFA  Proximal Stenosis. Previous: 3/17/2022 History: Previous Smoker, Hypertension, Diabetic, PAD, Angioplasty, Rest Pain, Claudication, Skin Color Change and Vascular Ulcers Technique: Duplex imaging is performed utilizing gray-scale, two-dimensional images, and color-flow imaging. Doppler waveform analysis and spectral Doppler imaging is also performed. LOWER EXTREMITY ARTERIAL DUPLEX EXAM WITH WAVEFORMS Right Leg:(cm/s) Location: Velocities Waveforms EIA:   101  M CFA:   106  M PFA:   373 / 46 M SFA Proximal:  33/ 18  M SFA Mid:   33/ 25 /105 M SFA Distal:    45 / 40 M Popliteal Artery:   32 /38  M PTA:   19   M RICKY:   13  M DPA:   31  M Waveforms: T=Triphasic, M=Monophasic, B=Biphasic Stenotic Profile Ratio: 373 / 106 = 3.52 Left Leg:(cm/s) Location: Velocities Waveforms EIA:   188  T CFA:   198  T PFA:   265 /79 T SFA Proximal:   64 / 20 / 418  M SFA Mid:   15 / 32  M SFA Distal:   17 (Retrograde)   M Popliteal Artery:   26 / 30  M PTA:   17   M RICKY:   17  M DPA:   9  M Waveforms: T=Triphasic, M=Monophasic, B=Biphasic Stenotic Profile Ratio: 265 / 198 = 1.34 Impression: Right Lower Extremity: Monophasic flow in common femoral artery suggesting possible inflow disease.  Had a elevated velocities in the proximal and mid SFA with transition to very dampened monophasic flow distally consistent with hemodynamically significant stenosis due to atherosclerotic arterial disease. Left Lower Extremity: Triphasic flow in common femoral artery suggesting no inflow disease.  Significantly elevated velocities in the proximal SFA.  Occlusion of the mid/distal SFA with retrograde flow noted in the distal SFA via collaterals.   Significant dampened monophasic flow distally. Reference: Category Normal 1-19% 20-49% 50-99% Occluded PSV <160 cm/sec without spectral broadening <160 cm/sec with spectral broadening Increased Increased Absent Flow Ratio N/A N/A < 2.0 >2.0 N/A Post-Stenotic Turbulence No No No Yes N/A        I personally reviewed the images and my interpretation is that her duplex suggests inflow disease and ABIs show concerning toe pressures at 45 mmHg for wound healing..    LABS:      No results found for: NA, BUN, CREATININE, GLUCOSE, HGB, PLT, BNP, INR    Cris Atwood MD, MD

## 2023-02-14 NOTE — LETTER
February 19, 2023      Ashanti Aviles  [address] [place] [address]        To Whom It May Concern:    Ashanti Aviles has been recently hospitalized and will need to remain non weight bearing on the right foot for at least 2 weeks.  She should remain out of work for this time and will be reevaluated within two weeks when an updated work status will be made.       Sincerely,        NKIOLAY Diez DPM

## 2023-02-14 NOTE — ED TRIAGE NOTES
Triage Assessment     Row Name 02/14/23 1639       Triage Assessment (Adult)    Airway WDL WDL       Respiratory WDL    Respiratory WDL WDL       Skin Circulation/Temperature WDL    Skin Circulation/Temperature WDL X  impaired circulation on right 4th toe       Cardiac WDL    Cardiac WDL WDL       Peripheral/Neurovascular WDL    Peripheral Neurovascular WDL X       Cognitive/Neuro/Behavioral WDL    Cognitive/Neuro/Behavioral WDL WDL

## 2023-02-14 NOTE — ED NOTES
Expected Patient Referral to ED  3:31 PM    Referring Clinic/Provider:  [facility]    Reason for referral/Clinical facts:  Smoker with history of peripheral artery disease/diabetes.  Left fourth toe pain for a number of weeks.  Likely ischemic.    Recommendations provided:  See and admit      Discussed that if direct admit is sought and any hurdles are encountered, this ED would be happy to see the patient and evaluate.    Informed caller that recommendations provided are recommendations based only on the facts provided and that they responsible to accept or reject the advice, or to seek a formal in person consultation as needed and that this ED will see/treat patient should they arrive.      Benson Hernandez MD  [facility] EMERGENCY DEPARTMENT  [address] [place] [id number] [phone]       Benson Hernandez MD  02/14/23 1935

## 2023-02-14 NOTE — ED TRIAGE NOTES
Patient referred to ED from [facility] for evaluation of impaired circulation to right 4th toe. Patient stated a CT of her abdomen and legs was done and she was referred to ED for possible surgery. Started 2 weeks ago.     Triage Assessment       Row Name 02/14/23 1637       Triage Assessment (Adult)    Airway WDL WDL       Respiratory WDL    Respiratory WDL WDL       Skin Circulation/Temperature WDL    Skin Circulation/Temperature WDL X  impaired circulation on right 4th toe       Cardiac WDL    Cardiac WDL WDL       Peripheral/Neurovascular WDL    Peripheral Neurovascular WDL X       Cognitive/Neuro/Behavioral WDL    Cognitive/Neuro/Behavioral WDL WDL

## 2023-02-14 NOTE — LETTER
[place] [id number] [facility] [address]  Phone: [phone]  Fax: [phone]    February 19, 2023        Ashanti Aviles  [address] [place] [address]          To whom it may concern:    RE: Ashanti Aviles    Patient may return to work march 6 with the following non weight bearing on right foot.     Please contact me for questions or concerns.      Sincerely,        Vivian Cassidy MD

## 2023-02-14 NOTE — PHARMACY-VANCOMYCIN DOSING SERVICE
Pharmacy Vancomycin Initial Note  Date of Service 2023  Patient's  1955  67 year old, female    Indication: Skin and Soft Tissue Infection    Current estimated CrCl = CrCl cannot be calculated (No successful lab value found.).    Creatinine for last 3 days  No results found for requested labs within last 72 hours.    Recent Vancomycin Level(s) for last 3 days  No results found for requested labs within last 72 hours.      Vancomycin IV Administrations (past 72 hours)      No vancomycin orders with administrations in past 72 hours.                Nephrotoxins and other renal medications (From now, onward)    Start     Dose/Rate Route Frequency Ordered Stop    23 1800  piperacillin-tazobactam (ZOSYN) 3.375 g vial to attach to  mL bag         3.375 g  over 30 Minutes Intravenous ONCE 23 1744      23 1800  vancomycin (VANCOCIN) 1,500 mg in sodium chloride 0.9 % 250 mL intermittent infusion         1,500 mg  over 90 Minutes Intravenous ONCE 23 1749            Contrast Orders - past 72 hours (72h ago, onward)    None              Plan:  1. Start vancomycin  1500 mg IV x 1.    Rhianna Barnes, [facility]

## 2023-02-14 NOTE — PATIENT INSTRUCTIONS
Nba Burrell,    Thank you for entrusting your care with us today. After your visit today with MD Cris Atwood this is the plan that was discussed at your appointment.      Get CTA scan done today    Go to ED due to critical lower limb ischemia     I am including additional information on these things and our contact information if you have any questions or concerns.   Please do not hesitate to reach out to us if you felt we did not answer your questions or you are unsure of the treatment plan after your visit today. Our number is [phone].Thank you for trusting us with your care.         Again thank you for your time.

## 2023-02-14 NOTE — ED PROVIDER NOTES
EMERGENCY DEPARTMENT ENCOUnter      NAME: Ashanti Aviles  AGE: 67 year old female  YOB: 1955  MRN: [id number]  EVALUATION DATE & TIME: 2023  5:16 PM    PCP: No Ref-Primary, Physician    ED PROVIDER: Triston Bansal DO      Chief Complaint   Patient presents with     impaired toe circulation         FINAL IMPRESSION:  1. Toe infection          ED COURSE & MEDICAL DECISION MAKIN:16 PM I met with patient for initial interview and encounter.      The patient presented to the emergency department today after being referred here from vascular surgery.  She was seen in their clinic earlier today due to a nonhealing toe wound.  Given that she has failed outpatient antibiotic therapy she was referred here for continued IV antibiotics and possible surgery in the hospital.  She localizes her pain only to the right fourth toe.  I discussed her findings with the vascular surgeon.  He recommends starting her on Zosyn and vancomycin.  No other current recommendations.  Plan will be to admit the patient for further treatment.      Medical Decision Making    History:    Supplemental history from: Documented in chart, if applicable    External Record(s) reviewed: Documented in chart, if applicable.    Work Up:    Chart documentation includes differential considered and any EKGs or imaging independently interpreted by provider, where specified.    In additional to work up documented, I considered the following work up: Documented in chart, if applicable.    External consultation:    Discussion of management with another provider: Documented in chart, if applicable and Vascular Surgery    Complicating factors:    Care impacted by chronic illness: N/A    Care affected by social determinants of health: N/A    Disposition considerations: Admit.        At the conclusion of the encounter I discussed the results of all of the tests and the disposition. The questions were answered. The patient or family  acknowledged understanding and was agreeable with the care plan.         MEDICATIONS GIVEN IN THE EMERGENCY:  Medications   vancomycin (VANCOCIN) 1,500 mg in sodium chloride 0.9 % 250 mL intermittent infusion (has no administration in time range)   piperacillin-tazobactam (ZOSYN) 3.375 g vial to attach to  mL bag (3.375 g Intravenous New Bag 2/14/23 1803)   morphine (PF) injection 4 mg (4 mg Intravenous Given 2/14/23 1000)         =================================================================    HPI        Ashanti Aviles is a 67 year old female with a pertinent history of diabetes and hypertension who presents to this ED for evaluation of an impaired toe circulation    Patient reports  2 weeks of soreness and pain in her second to last toe on her right foot. She went to a pediatrist and they removed the nail and it has gotten worse since. Currently it is dark and has burning and throbbing pain that radiates. She used numbing cream and aspirin. Her blood sugars have been good. She is on doxycycline since last Monday. Patient denies any other complaints.     REVIEW OF SYSTEMS     Constitutional:  Denies fever or chills  HENT:  Denies sore throat   Respiratory:  Denies cough or shortness of breath   Cardiovascular:  Denies chest pain or palpitations  GI:  Denies abdominal pain, nausea, or vomiting  Musculoskeletal:  Positive for right second to last toe pain   Skin:  Denies rash   Neurologic:  Denies headache, focal weakness or sensory changes    All other systems reviewed and are negative      PAST MEDICAL HISTORY:  Past Medical History:   Diagnosis Date     Diabetes mellitus (H)      Gastroesophageal reflux disease      Hypertension      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/29/2013       PAST SURGICAL HISTORY:  Past Surgical History:   Procedure Laterality Date     ANUS SURGERY       BIOPSY CERVICAL, LOCAL EXCISION, SINGLE/MULTIPLE       COLONOSCOPY N/A 9/11/2020    Procedure: EXAM UNDER  ANESTHESIA, HIGH RESOLUTION ANOSCOPY INTRA OP;  Surgeon: Preeti Sheehan MD;  Location: [facility] OR;  Service: Gastroenterology     COLONOSCOPY N/A 12/14/2020    Procedure: EXAM UNDER ANESTHESIA WITH HIGH RESOLUTION ANOSCOPY;  Surgeon: Preeti Sheehan MD;  Location: [facility] OR;  Service: General     COLONOSCOPY N/A 6/15/2021    Procedure: EXAM UNDER ANESTHESIA WITH HIGH RESOLUTION ANOSCOPY, BIOPSY, FULGURATION;  Surgeon: Preeti Sheehan MD;  Location: [facility] OR;  Service: Gastroenterology     EXAM UNDER ANESTHESIA ANUS N/A 9/14/2021    Procedure: EXAM UNDER ANESTHESIA WITH HIGH RESOLUTION ANOSCOPY;  Surgeon: Preeti Sheehan MD;  Location: [facility]           CURRENT MEDICATIONS:    amLODIPine (NORVASC) 10 MG tablet  cilostazol (PLETAL) 100 MG tablet  clopidogrel (PLAVIX) 75 MG tablet  doxycycline monohydrate (MONODOX) 100 MG capsule  hydroCHLOROthiazide (HYDRODIURIL) 25 MG tablet  Insulin Degludec (TRESIBA) 100 UNIT/ML SOLN  JARDIANCE 10 MG TABS tablet  linagliptin (TRADJENTA) 5 MG TABS tablet  lisinopril (PRINIVIL,ZESTRIL) 40 MG tablet  metFORMIN (FORTAMET) 500 MG (OSM) 24 hr tablet        ALLERGIES:  Allergies   Allergen Reactions     Ezetimibe Unknown     Generalized body aches     Oxycodone Nausea and Vomiting     Penicillins Unknown     Simvastatin Unknown     Muscle pain     Victoza [Liraglutide] Unknown       FAMILY HISTORY:  No family history on file.    SOCIAL HISTORY:   Social History     Socioeconomic History     Marital status:      Spouse name: None     Number of children: None     Years of education: None     Highest education level: None   Tobacco Use     Smoking status: Former     Packs/day: 0.25     Types: Cigarettes     Quit date: 2/12/2023     Smokeless tobacco: Never   Substance and Sexual Activity     Alcohol use: Yes     Comment: Alcoholic Drinks/day: 2x     Drug use: Not Currently     Sexual activity: Yes     Partners: Male  "      VITALS:  Patient Vitals for the past 24 hrs:   BP Temp Temp src Pulse Resp SpO2 Height Weight   02/14/23 1628 137/68 98.5  F (36.9  C) Oral 96 18 97 % 1.575 m (5' 2\") 70.8 kg (156 lb)       PHYSICAL EXAM    Constitutional:  Well developed, Well nourished,  HENT:  Normocephalic, Atraumatic, Oropharynx moist, Nose normal.   Eyes:  EOMI, Conjunctiva normal, No discharge.   Respiratory:  Normal breath sounds, No respiratory distress, No wheezing, No chest tenderness.   Cardiovascular:  Normal heart rate, Normal rhythm, No murmurs  GI:  Soft, No tenderness, No guarding, No CVA tenderness.   Musculoskeletal:  No tenderness to palpation or major deformities noted.   Extremities: No lower extremity edema.  Small necrotic lesion at the distal tip of the right fourth toe.  The entirety of the toe is darkened and tender.  Neurologic:  Alert & oriented x 3, No focal deficits noted.   Psychiatric:  Affect normal, Judgment normal, Mood normal.        LAB:  All pertinent labs reviewed and interpreted.  Results for orders placed or performed during the hospital encounter of 02/14/23   CBC with platelets   Result Value Ref Range    WBC Count 12.9 (H) 4.0 - 11.0 10e3/uL    RBC Count 5.26 (H) 3.80 - 5.20 10e6/uL    Hemoglobin 15.9 (H) 11.7 - 15.7 g/dL    Hematocrit 46.6 35.0 - 47.0 %    MCV 89 78 - 100 fL    MCH 30.2 26.5 - 33.0 pg    MCHC 34.1 31.5 - 36.5 g/dL    RDW 13.6 10.0 - 15.0 %    Platelet Count 313 150 - 450 10e3/uL   Basic metabolic panel   Result Value Ref Range    Sodium 137 136 - 145 mmol/L    Potassium 3.5 3.4 - 5.3 mmol/L    Chloride 98 98 - 107 mmol/L    Carbon Dioxide (CO2) 26 22 - 29 mmol/L    Anion Gap 13 7 - 15 mmol/L    Urea Nitrogen 8.9 8.0 - 23.0 mg/dL    Creatinine 0.69 0.51 - 0.95 mg/dL    Calcium 10.6 (H) 8.8 - 10.2 mg/dL    Glucose 125 (H) 70 - 99 mg/dL    GFR Estimate >90 >60 mL/min/1.73m2             Cayla MAGANA, am serving as a scribe to document services personally performed by Dr. Bansal " based on my observation and the provider's statements to me. I, Triston Bansal, DO attest that Kulkarni Freedomadrianna is acting in a scribe capacity, has observed my performance of the services and has documented them in accordance with my direction.    Triston Bansal DO  Emergency Medicine  [facility] EMERGENCY DEPARTMENT  [address] [place] [address]  [phone]  Dept: [phone]     Triston Bansal MD  02/14/23 3738

## 2023-02-15 ENCOUNTER — APPOINTMENT (OUTPATIENT)
Dept: MRI IMAGING | Facility: HOSPITAL | Age: 68
DRG: 271 | End: 2023-02-15
Attending: INTERNAL MEDICINE
Payer: COMMERCIAL

## 2023-02-15 PROBLEM — I99.8 ISCHEMIC TOE: Status: ACTIVE | Noted: 2023-02-15

## 2023-02-15 PROBLEM — E78.5 DYSLIPIDEMIA: Status: ACTIVE | Noted: 2022-03-16

## 2023-02-15 LAB
ALBUMIN SERPL BCG-MCNC: 3.7 G/DL (ref 3.5–5.2)
ALP SERPL-CCNC: 75 U/L (ref 35–104)
ALT SERPL W P-5'-P-CCNC: 25 U/L (ref 10–35)
ANION GAP SERPL CALCULATED.3IONS-SCNC: 8 MMOL/L (ref 7–15)
AST SERPL W P-5'-P-CCNC: 20 U/L (ref 10–35)
BASOPHILS # BLD MANUAL: 0 10E3/UL (ref 0–0.2)
BASOPHILS NFR BLD MANUAL: 0 %
BILIRUB SERPL-MCNC: <0.2 MG/DL
BUN SERPL-MCNC: 13.3 MG/DL (ref 8–23)
CALCIUM SERPL-MCNC: 9.5 MG/DL (ref 8.8–10.2)
CHLORIDE SERPL-SCNC: 100 MMOL/L (ref 98–107)
CREAT SERPL-MCNC: 0.79 MG/DL (ref 0.51–0.95)
DEPRECATED HCO3 PLAS-SCNC: 27 MMOL/L (ref 22–29)
EOSINOPHIL # BLD MANUAL: 0.1 10E3/UL (ref 0–0.7)
EOSINOPHIL NFR BLD MANUAL: 1 %
ERYTHROCYTE [DISTWIDTH] IN BLOOD BY AUTOMATED COUNT: 13.9 % (ref 10–15)
GFR SERPL CREATININE-BSD FRML MDRD: 83 ML/MIN/1.73M2
GLUCOSE BLDC GLUCOMTR-MCNC: 133 MG/DL (ref 70–99)
GLUCOSE BLDC GLUCOMTR-MCNC: 190 MG/DL (ref 70–99)
GLUCOSE BLDC GLUCOMTR-MCNC: 204 MG/DL (ref 70–99)
GLUCOSE BLDC GLUCOMTR-MCNC: 204 MG/DL (ref 70–99)
GLUCOSE SERPL-MCNC: 199 MG/DL (ref 70–99)
HCT VFR BLD AUTO: 40.1 % (ref 35–47)
HGB BLD-MCNC: 13.3 G/DL (ref 11.7–15.7)
LYMPHOCYTES # BLD MANUAL: 3.3 10E3/UL (ref 0.8–5.3)
LYMPHOCYTES NFR BLD MANUAL: 28 %
MCH RBC QN AUTO: 30.2 PG (ref 26.5–33)
MCHC RBC AUTO-ENTMCNC: 33.2 G/DL (ref 31.5–36.5)
MCV RBC AUTO: 91 FL (ref 78–100)
MONOCYTES # BLD MANUAL: 0.7 10E3/UL (ref 0–1.3)
MONOCYTES NFR BLD MANUAL: 6 %
NEUTROPHILS # BLD MANUAL: 7.6 10E3/UL (ref 1.6–8.3)
NEUTROPHILS NFR BLD MANUAL: 65 %
PLAT MORPH BLD: NORMAL
PLATELET # BLD AUTO: 267 10E3/UL (ref 150–450)
POTASSIUM SERPL-SCNC: 3.9 MMOL/L (ref 3.4–5.3)
PROT SERPL-MCNC: 6.5 G/DL (ref 6.4–8.3)
RBC # BLD AUTO: 4.4 10E6/UL (ref 3.8–5.2)
RBC MORPH BLD: NORMAL
SARS-COV-2 RNA RESP QL NAA+PROBE: NEGATIVE
SODIUM SERPL-SCNC: 135 MMOL/L (ref 136–145)
WBC # BLD AUTO: 11.7 10E3/UL (ref 4–11)

## 2023-02-15 PROCEDURE — 36415 COLL VENOUS BLD VENIPUNCTURE: CPT | Performed by: INTERNAL MEDICINE

## 2023-02-15 PROCEDURE — 99223 1ST HOSP IP/OBS HIGH 75: CPT | Performed by: PODIATRIST

## 2023-02-15 PROCEDURE — 258N000003 HC RX IP 258 OP 636: Performed by: INTERNAL MEDICINE

## 2023-02-15 PROCEDURE — 82962 GLUCOSE BLOOD TEST: CPT

## 2023-02-15 PROCEDURE — 250N000012 HC RX MED GY IP 250 OP 636 PS 637: Performed by: INTERNAL MEDICINE

## 2023-02-15 PROCEDURE — 250N000013 HC RX MED GY IP 250 OP 250 PS 637: Performed by: INTERNAL MEDICINE

## 2023-02-15 PROCEDURE — 99231 SBSQ HOSP IP/OBS SF/LOW 25: CPT | Performed by: INTERNAL MEDICINE

## 2023-02-15 PROCEDURE — 250N000013 HC RX MED GY IP 250 OP 250 PS 637

## 2023-02-15 PROCEDURE — 255N000002 HC RX 255 OP 636: Performed by: INTERNAL MEDICINE

## 2023-02-15 PROCEDURE — 99221 1ST HOSP IP/OBS SF/LOW 40: CPT | Mod: GC | Performed by: SURGERY

## 2023-02-15 PROCEDURE — 85027 COMPLETE CBC AUTOMATED: CPT | Performed by: INTERNAL MEDICINE

## 2023-02-15 PROCEDURE — 80053 COMPREHEN METABOLIC PANEL: CPT | Performed by: INTERNAL MEDICINE

## 2023-02-15 PROCEDURE — A9585 GADOBUTROL INJECTION: HCPCS | Performed by: INTERNAL MEDICINE

## 2023-02-15 PROCEDURE — 85007 BL SMEAR W/DIFF WBC COUNT: CPT | Performed by: INTERNAL MEDICINE

## 2023-02-15 PROCEDURE — 120N000001 HC R&B MED SURG/OB

## 2023-02-15 PROCEDURE — 250N000011 HC RX IP 250 OP 636: Performed by: INTERNAL MEDICINE

## 2023-02-15 PROCEDURE — 73720 MRI LWR EXTREMITY W/O&W/DYE: CPT | Mod: RT

## 2023-02-15 RX ORDER — TRAZODONE HYDROCHLORIDE 50 MG/1
50 TABLET, FILM COATED ORAL AT BEDTIME
Status: DISCONTINUED | OUTPATIENT
Start: 2023-02-15 | End: 2023-02-19 | Stop reason: HOSPADM

## 2023-02-15 RX ORDER — GADOBUTROL 604.72 MG/ML
7 INJECTION INTRAVENOUS ONCE
Status: COMPLETED | OUTPATIENT
Start: 2023-02-15 | End: 2023-02-15

## 2023-02-15 RX ORDER — CALCIUM CARBONATE 500 MG/1
500 TABLET, CHEWABLE ORAL DAILY PRN
Status: DISCONTINUED | OUTPATIENT
Start: 2023-02-15 | End: 2023-02-19 | Stop reason: HOSPADM

## 2023-02-15 RX ORDER — GABAPENTIN 100 MG/1
100 CAPSULE ORAL 3 TIMES DAILY
Status: DISCONTINUED | OUTPATIENT
Start: 2023-02-15 | End: 2023-02-18

## 2023-02-15 RX ORDER — OXYCODONE HYDROCHLORIDE 5 MG/1
5-10 TABLET ORAL EVERY 4 HOURS PRN
Status: DISCONTINUED | OUTPATIENT
Start: 2023-02-15 | End: 2023-02-18

## 2023-02-15 RX ADMIN — LISINOPRIL 40 MG: 20 TABLET ORAL at 07:54

## 2023-02-15 RX ADMIN — INSULIN DEGLUDEC INJECTION 30 UNITS: 100 INJECTION, SOLUTION SUBCUTANEOUS at 07:53

## 2023-02-15 RX ADMIN — ACETAMINOPHEN 650 MG: 325 TABLET ORAL at 08:47

## 2023-02-15 RX ADMIN — PIPERACILLIN AND TAZOBACTAM 3.38 G: 3; .375 INJECTION, POWDER, LYOPHILIZED, FOR SOLUTION INTRAVENOUS at 08:15

## 2023-02-15 RX ADMIN — SODIUM CHLORIDE: 9 INJECTION, SOLUTION INTRAVENOUS at 12:31

## 2023-02-15 RX ADMIN — TRAZODONE HYDROCHLORIDE 50 MG: 50 TABLET ORAL at 21:16

## 2023-02-15 RX ADMIN — CALCIUM CARBONATE (ANTACID) CHEW TAB 500 MG 500 MG: 500 CHEW TAB at 02:38

## 2023-02-15 RX ADMIN — ACETAMINOPHEN 650 MG: 325 TABLET ORAL at 20:00

## 2023-02-15 RX ADMIN — INSULIN ASPART 2 UNITS: 100 INJECTION, SOLUTION INTRAVENOUS; SUBCUTANEOUS at 07:51

## 2023-02-15 RX ADMIN — PIPERACILLIN AND TAZOBACTAM 3.38 G: 3; .375 INJECTION, POWDER, LYOPHILIZED, FOR SOLUTION INTRAVENOUS at 15:33

## 2023-02-15 RX ADMIN — GABAPENTIN 100 MG: 100 CAPSULE ORAL at 19:59

## 2023-02-15 RX ADMIN — AMLODIPINE BESYLATE 10 MG: 5 TABLET ORAL at 08:14

## 2023-02-15 RX ADMIN — VANCOMYCIN HYDROCHLORIDE 1000 MG: 1 INJECTION, SOLUTION INTRAVENOUS at 18:22

## 2023-02-15 RX ADMIN — SENNOSIDES AND DOCUSATE SODIUM 2 TABLET: 50; 8.6 TABLET ORAL at 07:53

## 2023-02-15 RX ADMIN — CLOPIDOGREL BISULFATE 75 MG: 75 TABLET ORAL at 07:54

## 2023-02-15 RX ADMIN — SENNOSIDES AND DOCUSATE SODIUM 1 TABLET: 50; 8.6 TABLET ORAL at 20:00

## 2023-02-15 RX ADMIN — INSULIN ASPART 2 UNITS: 100 INJECTION, SOLUTION INTRAVENOUS; SUBCUTANEOUS at 17:41

## 2023-02-15 RX ADMIN — VANCOMYCIN HYDROCHLORIDE 1000 MG: 1 INJECTION, SOLUTION INTRAVENOUS at 06:58

## 2023-02-15 RX ADMIN — GABAPENTIN 100 MG: 100 CAPSULE ORAL at 15:32

## 2023-02-15 RX ADMIN — PIPERACILLIN AND TAZOBACTAM 3.38 G: 3; .375 INJECTION, POWDER, LYOPHILIZED, FOR SOLUTION INTRAVENOUS at 23:47

## 2023-02-15 RX ADMIN — GADOBUTROL 7 ML: 604.72 INJECTION INTRAVENOUS at 01:59

## 2023-02-15 ASSESSMENT — ACTIVITIES OF DAILY LIVING (ADL)
TOILETING_ISSUES: NO
ADLS_ACUITY_SCORE: 35
DOING_ERRANDS_INDEPENDENTLY_DIFFICULTY: NO
ADLS_ACUITY_SCORE: 35
ADLS_ACUITY_SCORE: 35
HEARING_DIFFICULTY_OR_DEAF: NO
ADLS_ACUITY_SCORE: 18
DIFFICULTY_COMMUNICATING: NO
ADLS_ACUITY_SCORE: 35
ADLS_ACUITY_SCORE: 18
DRESSING/BATHING_DIFFICULTY: NO
ADLS_ACUITY_SCORE: 35
ADLS_ACUITY_SCORE: 18
ADLS_ACUITY_SCORE: 18
WALKING_OR_CLIMBING_STAIRS_DIFFICULTY: NO
DIFFICULTY_EATING/SWALLOWING: NO
FALL_HISTORY_WITHIN_LAST_SIX_MONTHS: NO
WEAR_GLASSES_OR_BLIND: NO
CONCENTRATING,_REMEMBERING_OR_MAKING_DECISIONS_DIFFICULTY: NO
CHANGE_IN_FUNCTIONAL_STATUS_SINCE_ONSET_OF_CURRENT_ILLNESS/INJURY: NO

## 2023-02-15 NOTE — H&P
[facility]    History and Physical - Hospitalist Service       Date of Admission:  2/14/2023    Assessment & Plan      67 year old female with a past medical history of  smoking, diabetes and hypertension who presents to this ED for evaluation of an impaired toe circulation and foot pain, she was found to have right fourth toe cellulitis/gangrenous changes, she was admitted with    Diabetic fourth toe cellulitis/gangrenous changes  - Patient failed outpatient oral antibiotics  - Start IV Zosyn and vancomycin  - Check MRI foot to rule out osteomyelitis of fourth toe  - Podiatry consult, appreciate input  - Follow-up on culture, of note that antibiotic was given in ER before drawing culture  - Check CRP    Peripheral vascular disease  - Patient was sent to ER from vascular clinic  - CT abdomen shows Moderate/severe diffuse atherosclerotic disease infrarenal abdominal aorta.Diffuse severe atherosclerotic disease right superficial femoral artery. High-grade stenosis of the left popliteal artery with three-vessel runoff into the right foot.  - Vascular surgery consult, appreciate input  - Continue Plavix    Diabetes mellitus type 2  - Resume home medication but hold home metformin  - Cover with insulin sliding scale  - Hemoglobin A1c is elevated at 8.5  - Continue to monitor blood glucose    Hypertension  - Stable blood pressure  - Resume home lisinopril and Norvasc  - Hold hydrochlorothiazide    Hypokalemia  - Replace per protocol    Smoking dependence  - Patient quit smoking 2 days ago  - Declined nicotine patch.    Right foot pain  - Probably secondary to peripheral vascular disease  - Continue with Dilaudid IV as needed.       Diet: Moderate Consistent Carb (60 g CHO per Meal) Diet  NPO for Medical/Clinical Reasons Except for: Meds, Ice Chips    DVT Prophylaxis: Pneumatic Compression Devices  Cedeno Catheter: Not present  Lines: None     Cardiac Monitoring: None  Code Status: Full  "Code    Clinically Significant Risk Factors Present on Admission           # Hypercalcemia: Highest Ca = 10.6 mg/dL in last 2 days, will monitor as appropriate      # Drug Induced Platelet Defect: home medication list includes an antiplatelet medication   # Hypertension: home medication list includes antihypertensive(s)     # DMII: A1C = 8.5 % (Ref range: <5.7 %) within past 3 months    # Overweight: Estimated body mass index is 28.53 kg/m  as calculated from the following:    Height as of this encounter: 1.575 m (5' 2\").    Weight as of this encounter: 70.8 kg (156 lb).           Disposition Plan      Expected Discharge Date: 02/16/2023                  Daniel Nguyen MD  Hospitalist Service  [facility]  Securely message with [employer] (more info)  Text page via [facility] Paging/Directory     ______________________________________________________________________    Chief Complaint   Right foot pain with fourth toe black changes    History is obtained from the patient    History of Present Illness   Ashanti Aviles is a 67 year old female with a past medical history of  smoking, diabetes and hypertension who presents to this ED for evaluation of an impaired toe circulation and foot pain  Basically the patient reports  2 weeks of soreness and pain in her fourth toe on her right foot. She went to a pediatrist and they removed the nail and it has gotten worse since. Currently it is dark and has burning and throbbing pain that radiates. She used numbing cream and aspirin. Her blood sugars have been good.  [facility] and she is on doxycycline since last Monday. Patient denies any other chest pain or shortness of breath.  Patient was seen today by vascular surgery clinic and was sent to ER for further evaluation.      Past Medical History    Past Medical History:   Diagnosis Date     Diabetes mellitus (H)      Gastroesophageal reflux disease      Hypertension      " 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/29/2013       Past Surgical History   Past Surgical History:   Procedure Laterality Date     ANUS SURGERY       BIOPSY CERVICAL, LOCAL EXCISION, SINGLE/MULTIPLE       COLONOSCOPY N/A 9/11/2020    Procedure: EXAM UNDER ANESTHESIA, HIGH RESOLUTION ANOSCOPY INTRA OP;  Surgeon: Preeti Sheehan MD;  Location: [facility];  Service: Gastroenterology     COLONOSCOPY N/A 12/14/2020    Procedure: EXAM UNDER ANESTHESIA WITH HIGH RESOLUTION ANOSCOPY;  Surgeon: Preeti Sheehan MD;  Location: [facility];  Service: General     COLONOSCOPY N/A 6/15/2021    Procedure: EXAM UNDER ANESTHESIA WITH HIGH RESOLUTION ANOSCOPY, BIOPSY, FULGURATION;  Surgeon: Preeti Sheehan MD;  Location: [facility] OR;  Service: Gastroenterology     EXAM UNDER ANESTHESIA ANUS N/A 9/14/2021    Procedure: EXAM UNDER ANESTHESIA WITH HIGH RESOLUTION ANOSCOPY;  Surgeon: Preeti Sheehan MD;  Location: [facility]       Prior to Admission Medications   Prior to Admission Medications   Prescriptions Last Dose Informant Patient Reported? Taking?   Insulin Degludec (TRESIBA) 100 UNIT/ML SOLN 2/13/2023 at PM  Yes Yes   Sig: Inject 30 Units Subcutaneous daily   JARDIANCE 10 MG TABS tablet 2/14/2023  Yes Yes   Sig: Take 10 mg by mouth daily   amLODIPine (NORVASC) 10 MG tablet 2/14/2023  Yes Yes   Sig: [AMLODIPINE (NORVASC) 10 MG TABLET] Take 10 mg by mouth daily.   clopidogrel (PLAVIX) 75 MG tablet 2/14/2023  Yes Yes   Sig: Take 75 mg by mouth daily   doxycycline monohydrate (MONODOX) 100 MG capsule 2/14/2023  Yes Yes   Sig: Take 100 mg by mouth 2 times daily   hydroCHLOROthiazide (HYDRODIURIL) 25 MG tablet 2/14/2023  Yes Yes   Sig: [HYDROCHLOROTHIAZIDE (HYDRODIURIL) 25 MG TABLET] Take 25 mg by mouth daily.   linagliptin (TRADJENTA) 5 MG TABS tablet 2/14/2023  Yes Yes   Sig: Take 5 mg by mouth daily   lisinopril (PRINIVIL,ZESTRIL) 40 MG tablet 2/14/2023  Yes Yes   Sig: [LISINOPRIL  (PRINIVIL,ZESTRIL) 40 MG TABLET] Take 40 mg by mouth daily.   metFORMIN (GLUCOPHAGE) 500 MG tablet 2/14/2023  Yes Yes   Sig: Take 500 mg by mouth 2 times daily (with meals)      Facility-Administered Medications: None        Review of Systems    The 10 point Review of Systems is negative other than noted in the HPI or here.     Social History   I have reviewed this patient's social history and updated it with pertinent information if needed.  Social History     Tobacco Use     Smoking status: Former     Packs/day: 0.25     Types: Cigarettes     Quit date: 2/12/2023     Smokeless tobacco: Never   Substance Use Topics     Alcohol use: Yes     Comment: Alcoholic Drinks/day: 2x     Drug use: Not Currently       Family History   Positive for diabetes and hypertension.    Allergies   Allergies   Allergen Reactions     Ezetimibe Unknown     Generalized body aches     Oxycodone Nausea and Vomiting     Penicillins Unknown     Simvastatin Unknown     Muscle pain     Victoza [Liraglutide] Unknown        Physical Exam   Vital Signs: Temp: 98.5  F (36.9  C) Temp src: Oral BP: 137/68 Pulse: 96   Resp: 18 SpO2: 97 % O2 Device: None (Room air)    Weight: 156 lbs 0 oz    General Appearance: Sitting comfortable in chair in no acute respiratory distress  Respiratory: Decreased breath sounds on lung bases, no cough.  No wheeze  Cardiovascular: Normal heart sound, no murmur.  GI: Soft, no tenderness, normal bowel sounds.  Skin: Dry, warm, no rash.  Positive black gangrenous changes on right fourth toe.  Neurology: Grossly intact, no focal deficit    Medical Decision Making     75 MINUTES SPENT BY ME on the date of service doing chart review, history, exam, documentation & further activities per the note.      Data     I have personally reviewed the following data over the past 24 hrs:    12.9 (H)  \   15.9 (H)   / 313     137 98 8.9 /  198 (H)   3.5 26 0.69 \       TSH: N/A T4: N/A A1C: 8.5 (H)       Imaging results reviewed over the  past 24 hrs:   Recent Results (from the past 24 hour(s))   CTA Abdomen Pelvis Bilat Leg Runoff w Contr    Narrative    EXAM: CTA ABDOMEN PELVIS BILAT LEG RUNOFF W CONTR  LOCATION: [facility]  DATE/TIME: 2/14/2023 3:47 PM    INDICATION:  Critical lower limb ischemia (H)  COMPARISON: None.  TECHNIQUE: Helical acquisition through the abdomen, pelvis, and bilateral lower extremities was performed during the arterial phase of contrast enhancement using IV Contrast. 2D and 3D reconstructions were performed by the CT technologist. Dose reduction   techniques were used.   CONTRAST: 100ml IV Isovue 370    FINDINGS:  AORTA: Calcified plaque  proximal celiac artery with no focal stenosis. Superior mesenteric and inferior mesenteric arteries are patent. Single renal arteries. Calcified plaque proximal renal arteries with no focal stenosis. Diffuse moderate/severe   atherosclerotic disease infrarenal abdominal aorta. No evidence of abdominal aortic aneurysm. Dense calcified plaque with associated soft atheromatous plaque right common iliac artery. Moderate stenosis distal right common iliac artery. Focal stenosis   proximal right internal iliac artery. Right external iliac artery patent. Diffuse calcified plaque left common iliac artery. Focal stenosis proximal left internal iliac artery. Moderate stenosis proximal left external iliac artery.    RIGHT LEG: Dense calcified plaque posterior wall right common femoral artery. Right profunda femoral artery patent. Diffuse severe atherosclerotic disease right superficial femoral artery. High-grade stenosis mid right popliteal artery. Three-vessel   runoff into the right foot.    LEFT LEG: Dense calcified plaque posterior wall left common femoral artery. Left profundofemoral artery patent. Diffuse severe atherosclerotic disease left superficial femoral artery. Occlusion of the left popliteal artery. Reconstitution of the tibial   peroneal trunk. Two-vessel  runoff via the left peroneal and posterior tibial arteries.     LUNG BASES: Motion artifact.    ABDOMEN: The liver, gallbladder, pancreas, spleen, adrenal glands and kidneys are unremarkable. Small splenule inferior pole the spleen.    PELVIS: Unremarkable.      Impression    IMPRESSION:  1.  Moderate/severe diffuse atherosclerotic disease infrarenal abdominal aorta.  2.  Moderate stenosis distal right common iliac artery. Moderate stenosis proximal left external iliac artery.  3.  Diffuse severe atherosclerotic disease right superficial femoral artery. High-grade stenosis of the left popliteal artery with three-vessel runoff into the right foot.  4.  Diffuse severe atherosclerotic disease left superficial femoral artery. Occlusion of the left popliteal artery. Reconstitution of the tibial peroneal trunk with two-vessel runoff into the left foot via the peroneal and posterior tibial arteries.

## 2023-02-15 NOTE — PROGRESS NOTES
Interventional Radiology  2/15/2023      ASSESSMENT/PLAN:   RLE angiogram with possible intervention and with sedation - anticipating tomorrow.    NPO at midnight.    INR ordered for AM.    Rhianna Isidro PA-C  Interventional Radiology

## 2023-02-15 NOTE — CONSULTS
FOOT AND ANKLE SURGERY/PODIATRY CONSULT NOTE         ASSESSMENT:   Dry gangrene fourth toe right foot  Ischemic pain fourth toe right foot      TREATMENT:  Recommend amputation of the fourth toe right foot.  Procedure to follow scheduled angio intervention by vascular to improve blood flow to the right lower extremity.        HPI: I was asked to see Ashanti Aviles today to evaluate and treat gangrene of the fourth toe right foot.  The patient is a pleasant 67-year-old female who is being treated at her outpatient clinic for nonhealing wound of the fourth toe right foot.  Outpatient antibiotic therapy failed and the patient was referred to the ED for IV antibiotics and possible surgery.  The patient indicated she has severe pain involving the fourth toe of the right foot.  She has been started on Zosyn and vancomycin.  She has been evaluated by vascular.  Angio intervention has been planned to improve blood flow to the right lower extremity.      Past Medical History:   Diagnosis Date     Diabetes mellitus (H)      Gastroesophageal reflux disease      Hypertension      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/29/2013       Social History     Socioeconomic History     Marital status:      Spouse name: Not on file     Number of children: Not on file     Years of education: Not on file     Highest education level: Not on file   Occupational History     Not on file   Tobacco Use     Smoking status: Former     Packs/day: 0.25     Types: Cigarettes     Quit date: 2/12/2023     Smokeless tobacco: Never   Substance and Sexual Activity     Alcohol use: Yes     Comment: Alcoholic Drinks/day: 2x     Drug use: Not Currently     Sexual activity: Yes     Partners: Male   Other Topics Concern     Not on file   Social History Narrative     Not on file     Social Determinants of Health     Financial Resource Strain: Not on file   Food Insecurity: Not on file   Transportation Needs: Not on file   Physical Activity: Not  on file   Stress: Not on file   Social Connections: Not on file   Intimate Partner Violence: Not on file   Housing Stability: Not on file          Allergies   Allergen Reactions     Ezetimibe Unknown     Generalized body aches     Oxycodone Nausea and Vomiting     Penicillins Unknown     Simvastatin Unknown     Muscle pain     Victoza [Liraglutide] Unknown          Current Facility-Administered Medications:      acetaminophen (TYLENOL) tablet 650 mg, 650 mg, Oral, Q6H PRN, 650 mg at 02/15/23 0847 **OR** acetaminophen (TYLENOL) Suppository 650 mg, 650 mg, Rectal, Q6H PRN, Daniel Nguyen MD     amLODIPine (NORVASC) tablet 10 mg, 10 mg, Oral, Daily, Daniel Nguyen MD, 10 mg at 02/15/23 0814     calcium carbonate (TUMS) chewable tablet 500 mg, 500 mg, Oral, Daily PRN, Houtson Madrigal MD, 500 mg at 02/15/23 0238     clopidogrel (PLAVIX) tablet 75 mg, 75 mg, Oral, Daily, Daniel Nguyen MD, 75 mg at 02/15/23 0754     glucose gel 15-30 g, 15-30 g, Oral, Q15 Min PRN **OR** dextrose 50 % injection 25-50 mL, 25-50 mL, Intravenous, Q15 Min PRN **OR** glucagon injection 1 mg, 1 mg, Subcutaneous, Q15 Min PRN, Daniel Nguyen MD     empagliflozin (JARDIANCE) tablet 10 mg, 10 mg, Oral, Daily, Daniel Nguyen MD     HYDROmorphone (PF) (DILAUDID) injection 0.2-0.4 mg, 0.2-0.4 mg, Intravenous, Q4H PRN, Daniel Nguyen MD, 0.4 mg at 02/14/23 2202     insulin aspart (NovoLOG) injection (RAPID ACTING), 1-7 Units, Subcutaneous, TID AC, Daniel Nguyen MD, 2 Units at 02/15/23 0751     insulin degludec (TRESIBA) 100 UNIT/ML injection 30 Units, 30 Units, Subcutaneous, Daily, Daniel Nguyen MD, 30 Units at 02/15/23 0753     lidocaine (LMX4) cream, , Topical, Q1H PRN, Daniel Nguyen MD     lidocaine 1 % 0.1-1 mL, 0.1-1 mL, Other, Q1H PRN, Daniel Nguyen MD     [Held by provider] lisinopril (ZESTRIL) tablet 40 mg, 40 mg, Oral, Daily, Daniel Nguyen MD, 40 mg at 02/15/23 0754     melatonin tablet 1 mg, 1 mg, Oral, At Bedtime PRN,  Daniel Nguyen MD     ondansetron (ZOFRAN ODT) ODT tab 4 mg, 4 mg, Oral, Q6H PRN **OR** ondansetron (ZOFRAN) injection 4 mg, 4 mg, Intravenous, Q6H PRN, Daniel Nguyen MD     piperacillin-tazobactam (ZOSYN) 3.375 g vial to attach to  mL bag, 3.375 g, Intravenous, Q8H, Daniel Nguyen MD, 3.375 g at 02/15/23 0815     senna-docusate (SENOKOT-S/PERICOLACE) 8.6-50 MG per tablet 1 tablet, 1 tablet, Oral, BID, 1 tablet at 02/14/23 2128 **OR** senna-docusate (SENOKOT-S/PERICOLACE) 8.6-50 MG per tablet 2 tablet, 2 tablet, Oral, BID, Daniel Nguyen MD, 2 tablet at 02/15/23 0753     sitagliptin (JANUVIA) tablet 100 mg, 100 mg, Oral, Daily, Daniel Nguyen MD     sodium chloride (PF) 0.9% PF flush 3 mL, 3 mL, Intracatheter, Q8H, Daniel Nguyen MD     sodium chloride (PF) 0.9% PF flush 3 mL, 3 mL, Intracatheter, q1 min prn, Daniel Nguyen MD     sodium chloride 0.9% infusion, , Intravenous, Continuous, Daniel Nguyen MD, Last Rate: 75 mL/hr at 02/15/23 0622, Rate Verify at 02/15/23 0622     vancomycin (VANCOCIN) 1000 mg in dextrose 5% 200 mL PREMIX, 1,000 mg, Intravenous, Q12H, Daniel Nguyen MD, Last Rate: 200 mL/hr at 02/15/23 0658, 1,000 mg at 02/15/23 0658    Current Outpatient Medications:      amLODIPine (NORVASC) 10 MG tablet, [AMLODIPINE (NORVASC) 10 MG TABLET] Take 10 mg by mouth daily., Disp: , Rfl:      clopidogrel (PLAVIX) 75 MG tablet, Take 75 mg by mouth daily, Disp: , Rfl:      doxycycline monohydrate (MONODOX) 100 MG capsule, Take 100 mg by mouth 2 times daily, Disp: , Rfl:      hydroCHLOROthiazide (HYDRODIURIL) 25 MG tablet, [HYDROCHLOROTHIAZIDE (HYDRODIURIL) 25 MG TABLET] Take 25 mg by mouth daily., Disp: , Rfl:      Insulin Degludec (TRESIBA) 100 UNIT/ML SOLN, Inject 30 Units Subcutaneous daily, Disp: , Rfl:      JARDIANCE 10 MG TABS tablet, Take 10 mg by mouth daily, Disp: , Rfl:      linagliptin (TRADJENTA) 5 MG TABS tablet, Take 5 mg by mouth daily, Disp: , Rfl:      lisinopril  "(PRINIVIL,ZESTRIL) 40 MG tablet, [LISINOPRIL (PRINIVIL,ZESTRIL) 40 MG TABLET] Take 40 mg by mouth daily., Disp: , Rfl:      metFORMIN (GLUCOPHAGE) 500 MG tablet, Take 500 mg by mouth 2 times daily (with meals), Disp: , Rfl:      No family history on file.     Social History     Socioeconomic History     Marital status:      Spouse name: Not on file     Number of children: Not on file     Years of education: Not on file     Highest education level: Not on file   Occupational History     Not on file   Tobacco Use     Smoking status: Former     Packs/day: 0.25     Types: Cigarettes     Quit date: 2/12/2023     Smokeless tobacco: Never   Substance and Sexual Activity     Alcohol use: Yes     Comment: Alcoholic Drinks/day: 2x     Drug use: Not Currently     Sexual activity: Yes     Partners: Male   Other Topics Concern     Not on file   Social History Narrative     Not on file     Social Determinants of Health     Financial Resource Strain: Not on file   Food Insecurity: Not on file   Transportation Needs: Not on file   Physical Activity: Not on file   Stress: Not on file   Social Connections: Not on file   Intimate Partner Violence: Not on file   Housing Stability: Not on file        Review of Systems - Patient denies fever, chills, rash, wound, stiffness, limping, numbness, weakness, heart burn, blood in stool, chest pain with activity, calf pain when walking, shortness of breath with activity, chronic cough, easy bleeding/bruising, swelling of ankles, excessive thirst, fatigue, depression, anxiety.  Patient admits to painful infected fourth toe right foot.      OBJECTIVE:  Appearance: alert, well appearing, and in no distress.    /57   Pulse 82   Temp 98.2  F (36.8  C) (Oral)   Resp 18   Ht 1.575 m (5' 2\")   Wt 70.8 kg (156 lb)   SpO2 98%   BMI 28.53 kg/m       Body mass index is 28.53 kg/m .     General appearance: Patient is alert and fully cooperative with history & exam.  No sign of distress is " noted during the visit.  Psychiatric: Affect is pleasant & appropriate.  Patient appears motivated to improve health.  Respiratory: Breathing is regular & unlabored while sitting.  HEENT: Hearing is intact to spoken word.  Speech is clear.  No gross evidence of visual impairment that would impact ambulation.    Vascular: Dorsalis pedis and posterior tibial pulses are non- palpable. There is no pedal hair growth bilaterally.  CFT > 3 sec from anterior tibial surface to distal digits bilaterally. There is no appreciable edema noted.  Dermatologic: Black necrotic nonhealing wound distal aspect fourth toe right foot.  No active drainage or bleeding noted.  Turgor and texture are within normal limits. No coloration or temperature changes. No other primary or secondary lesions noted.  Neurologic: All epicritic and proprioceptive sensations are grossly intact bilaterally.  Musculoskeletal: All active and passive ankle, subtalar, midtarsal, and 1st MPJ range of motion are grossly intact without pain or crepitus, with the exception of none. Manual muscle strength is within normal limits bilaterally. All dorsiflexors, plantarflexors, invertors, evertors are intact bilaterally. Tenderness present to the fourth toe right foot on palpation. Tenderness to the fourth toe right foot with range of motion. Calf is soft/non-tender without warmth/induration    Imaging:       [unfilled]     US Low Ext Arterial Doppler  wo Ex    Result Date: 2/10/2023  Table formatting from the original result was not included. BILATERAL RESTING ANKLE-BRACHIAL INDICES (TRACIE'S) (Date: 02/10/23) Indication: Surveillance TRACIE's: PAD, Bilateral leg pain, Right 4th Toe Discolored/ Ulcer. Hx: Bilateral SFA  Proximal Stenosis. Previous: 3/17/2022 History: Previous Smoker, Hypertension, Diabetic, PAD, Angioplasty, Rest Pain, Claudication, Skin Color Change and Vascular Ulcers  Resting TRACIE's          Right: mmHg Index     Brachial: 140  Ankle-(PT): 66 0.47  Ankle-(DP): 61 0.44          Digit: 45 0.32               Left: mmHg Index     Brachial: 139  Ankle-(PT): 66 0.47 Ankle-(DP): 65 0.46          Digit: 41 0.29 Resting ankle-brachial index of 0.47 on the right. Toe Pressures of 45 mmHg and TBI of 0.32 Resting ankle-brachial index of 0.47 on the left. Toe Pressures of 41 mmHg and TBI of 0.29  VPR WAVEFORMS: The right volume plethysmography waveforms are moderately abnormal at the lower thigh level, moderately abnormal at the upper calf level and moderately abnormal at the ankle. The left volume plethysmography waveforms are mildly abnormal at the lower thigh level, mildly abnormal at the upper calf level and moderately abnormal at the ankle. Comments: Patient refused Exercise on Treadmill due to leg pain and 4th toe ulcer.  Impression:  1. RIGHT LOWER EXTREMITY: TRACIE is Abnormal with an TRACIE of 0.47 indicating multilevel disease. Toe Pressures are Abnormal and impaired for wound healing with toe pressures of 45 mmHg. 2. LEFT LOWER EXTREMITY: TRACIE is Abnormal with an TRACIE of 0.47 indicating multilevel disease. Toe Pressures are Abnormal and impaired for wound healing with toe pressures of 41 mmHg. Reference: Wound classification Grade TRACIE Ankle Systolic Pressure Toe Pressures 0 > 0.80 > 100 mmHg > 60 mmHg 1 0.6 - 0.79 70 - 100 mmHg 40 - 59 mmHg 2 0.4 - 0.59 50-70 mmHg 30 - 39 mmHg 3 < 0.39 < 50 mmHg < 30 mmHg Digit Pressures DBI Disease Category > 0.70 Normal < 0.70 Abnormal > 30 mmHg Potential wound healing < 30 mmHg Impaired wound healing Ankle Brachial Pressures TRACIE Disease Category > 1.3  Likely vessel calcification with monophasic waveforms, non-diagnostic 0.95-1.30 Normal with multiphasic waveforms 0.50-0.95 Single level disease 0.30-0.50 Multilevel disease < 0.30 Critical limb ischema Volume Plethysmography Recording (VPR) at all levels Normal Sharp systolic peak, fast upstroke, prominent dicrotic notch in wave Mild Sharp systolic peak, fast upstroke, absent dicrotic  notch in wave Moderate Flattened systolic peak, slowed upstroke, absent dicrotic notch inwave Severe amplitude wave with = upslope and down slope Occluded Flat Line     MR Foot Right w/o & w Contrast    Result Date: 2/15/2023  EXAM: MR FOOT RIGHT W/O and W CONTRAST LOCATION: [facility] DATE/TIME: 2/15/2023 2:12 AM INDICATION: Right fourth toe gangrenous changes cellulitis, rule out osteo COMPARISON: Right foot radiograph 02/08/2023 TECHNIQUE: Routine. Additional postgadolinium T1 sequences were obtained. IV CONTRAST: 7ml Gadavist FINDINGS: JOINTS AND BONES: -Increased fluid sensitive signal in the distal phalanx of the fourth toe, with normal marrow signal on T1. This most likely represents reactive edema. No marrow signal suspicious for osteomyelitis. No fracture. No joint effusion. Degenerative changes at  the first MTP joint. TENDONS: -No tendon tear, tendinopathy, or tenosynovitis. LIGAMENTS: -Lisfranc ligament: Intact. No subluxation. MUSCLES AND SOFT TISSUES: -Soft tissue edema and enhancement of the fourth toe. No fluid collections.     IMPRESSION: 1.  Reactive marrow edema in the distal phalanx of the fourth toe. No evidence for osteomyelitis. 2.  Fourth toe soft tissue edema which could represent cellulitis.    US Lower Extremity Arterial Duplex Bilateral    Result Date: 2/10/2023  Table formatting from the original result was not included. Arterial Duplex Ultrasound (Date: 02/10/23) Lower Extremity Artery Evaluation Indication: Surveillance Bilateral leg Arterial: PAD, Bilateral leg pain, Right 4th Toe Discolored/ Ulcer. Hx: Bilateral SFA  Proximal Stenosis. Previous: 3/17/2022 History: Previous Smoker, Hypertension, Diabetic, PAD, Angioplasty, Rest Pain, Claudication, Skin Color Change and Vascular Ulcers Technique: Duplex imaging is performed utilizing gray-scale, two-dimensional images, and color-flow imaging. Doppler waveform analysis and spectral Doppler imaging is also  performed. LOWER EXTREMITY ARTERIAL DUPLEX EXAM WITH WAVEFORMS Right Leg:(cm/s) Location: Velocities Waveforms EIA:   101  M CFA:   106  M PFA:   373 / 46 M SFA Proximal:  33/ 18  M SFA Mid:   33/ 25 /105 M SFA Distal:    45 / 40 M Popliteal Artery:   32 /38  M PTA:   19   M RICKY:   13  M DPA:   31  M Waveforms: T=Triphasic, M=Monophasic, B=Biphasic Stenotic Profile Ratio: 373 / 106 = 3.52 Left Leg:(cm/s) Location: Velocities Waveforms EIA:   188  T CFA:   198  T PFA:   265 /79 T SFA Proximal:   64 / 20 / 418  M SFA Mid:   15 / 32  M SFA Distal:   17 (Retrograde)   M Popliteal Artery:   26 / 30  M PTA:   17   M RICKY:   17  M DPA:   9  M Waveforms: T=Triphasic, M=Monophasic, B=Biphasic Stenotic Profile Ratio: 265 / 198 = 1.34 Impression: Right Lower Extremity: Monophasic flow in common femoral artery suggesting possible inflow disease.  Had a elevated velocities in the proximal and mid SFA with transition to very dampened monophasic flow distally consistent with hemodynamically significant stenosis due to atherosclerotic arterial disease. Left Lower Extremity: Triphasic flow in common femoral artery suggesting no inflow disease.  Significantly elevated velocities in the proximal SFA.  Occlusion of the mid/distal SFA with retrograde flow noted in the distal SFA via collaterals.  Significant dampened monophasic flow distally. Reference: Category Normal 1-19% 20-49% 50-99% Occluded PSV <160 cm/sec without spectral broadening <160 cm/sec with spectral broadening Increased Increased Absent Flow Ratio N/A N/A < 2.0 >2.0 N/A Post-Stenotic Turbulence No No No Yes N/A     CTA Abdomen Pelvis Bilat Leg Runoff w Contr    Result Date: 2/14/2023  EXAM: CTA ABDOMEN PELVIS BILAT LEG RUNOFF W CONTR LOCATION: [facility] DATE/TIME: 2/14/2023 3:47 PM INDICATION:  Critical lower limb ischemia (H) COMPARISON: None. TECHNIQUE: Helical acquisition through the abdomen, pelvis, and bilateral lower extremities was  performed during the arterial phase of contrast enhancement using IV Contrast. 2D and 3D reconstructions were performed by the CT technologist. Dose reduction  techniques were used. CONTRAST: 100ml IV Isovue 370 FINDINGS: AORTA: Calcified plaque  proximal celiac artery with no focal stenosis. Superior mesenteric and inferior mesenteric arteries are patent. Single renal arteries. Calcified plaque proximal renal arteries with no focal stenosis. Diffuse moderate/severe atherosclerotic disease infrarenal abdominal aorta. No evidence of abdominal aortic aneurysm. Dense calcified plaque with associated soft atheromatous plaque right common iliac artery. Moderate stenosis distal right common iliac artery. Focal stenosis proximal right internal iliac artery. Right external iliac artery patent. Diffuse calcified plaque left common iliac artery. Focal stenosis proximal left internal iliac artery. Moderate stenosis proximal left external iliac artery. RIGHT LEG: Dense calcified plaque posterior wall right common femoral artery. Right profunda femoral artery patent. Diffuse severe atherosclerotic disease right superficial femoral artery. High-grade stenosis mid right popliteal artery. Three-vessel runoff into the right foot. LEFT LEG: Dense calcified plaque posterior wall left common femoral artery. Left profundofemoral artery patent. Diffuse severe atherosclerotic disease left superficial femoral artery. Occlusion of the left popliteal artery. Reconstitution of the tibial peroneal trunk. Two-vessel runoff via the left peroneal and posterior tibial arteries. LUNG BASES: Motion artifact. ABDOMEN: The liver, gallbladder, pancreas, spleen, adrenal glands and kidneys are unremarkable. Small splenule inferior pole the spleen. PELVIS: Unremarkable.     IMPRESSION: 1.  Moderate/severe diffuse atherosclerotic disease infrarenal abdominal aorta. 2.  Moderate stenosis distal right common iliac artery. Moderate stenosis proximal left  external iliac artery. 3.  Diffuse severe atherosclerotic disease right superficial femoral artery. High-grade stenosis of the left popliteal artery with three-vessel runoff into the right foot. 4.  Diffuse severe atherosclerotic disease left superficial femoral artery. Occlusion of the left popliteal artery. Reconstitution of the tibial peroneal trunk with two-vessel runoff into the left foot via the peroneal and posterior tibial arteries.     US Low Ext Arterial Doppler  wo Ex    Result Date: 2/10/2023  Table formatting from the original result was not included. BILATERAL RESTING ANKLE-BRACHIAL INDICES (TRACIE'S) (Date: 02/10/23) Indication: Surveillance TRACIE's: PAD, Bilateral leg pain, Right 4th Toe Discolored/ Ulcer. Hx: Bilateral SFA  Proximal Stenosis. Previous: 3/17/2022 History: Previous Smoker, Hypertension, Diabetic, PAD, Angioplasty, Rest Pain, Claudication, Skin Color Change and Vascular Ulcers  Resting TRACIE's          Right: mmHg Index     Brachial: 140  Ankle-(PT): 66 0.47 Ankle-(DP): 61 0.44          Digit: 45 0.32               Left: mmHg Index     Brachial: 139  Ankle-(PT): 66 0.47 Ankle-(DP): 65 0.46          Digit: 41 0.29 Resting ankle-brachial index of 0.47 on the right. Toe Pressures of 45 mmHg and TBI of 0.32 Resting ankle-brachial index of 0.47 on the left. Toe Pressures of 41 mmHg and TBI of 0.29  VPR WAVEFORMS: The right volume plethysmography waveforms are moderately abnormal at the lower thigh level, moderately abnormal at the upper calf level and moderately abnormal at the ankle. The left volume plethysmography waveforms are mildly abnormal at the lower thigh level, mildly abnormal at the upper calf level and moderately abnormal at the ankle. Comments: Patient refused Exercise on Treadmill due to leg pain and 4th toe ulcer.  Impression:  1. RIGHT LOWER EXTREMITY: TRACIE is Abnormal with an TRACIE of 0.47 indicating multilevel disease. Toe Pressures are Abnormal and impaired for wound healing with  toe pressures of 45 mmHg. 2. LEFT LOWER EXTREMITY: TRACIE is Abnormal with an TRACIE of 0.47 indicating multilevel disease. Toe Pressures are Abnormal and impaired for wound healing with toe pressures of 41 mmHg. Reference: Wound classification Grade TRACIE Ankle Systolic Pressure Toe Pressures 0 > 0.80 > 100 mmHg > 60 mmHg 1 0.6 - 0.79 70 - 100 mmHg 40 - 59 mmHg 2 0.4 - 0.59 50-70 mmHg 30 - 39 mmHg 3 < 0.39 < 50 mmHg < 30 mmHg Digit Pressures DBI Disease Category > 0.70 Normal < 0.70 Abnormal > 30 mmHg Potential wound healing < 30 mmHg Impaired wound healing Ankle Brachial Pressures TRACIE Disease Category > 1.3  Likely vessel calcification with monophasic waveforms, non-diagnostic 0.95-1.30 Normal with multiphasic waveforms 0.50-0.95 Single level disease 0.30-0.50 Multilevel disease < 0.30 Critical limb ischema Volume Plethysmography Recording (VPR) at all levels Normal Sharp systolic peak, fast upstroke, prominent dicrotic notch in wave Mild Sharp systolic peak, fast upstroke, absent dicrotic notch in wave Moderate Flattened systolic peak, slowed upstroke, absent dicrotic notch inwave Severe amplitude wave with = upslope and down slope Occluded Flat Line     MR Foot Right w/o & w Contrast    Result Date: 2/15/2023  EXAM: MR FOOT RIGHT W/O and W CONTRAST LOCATION: [facility] DATE/TIME: 2/15/2023 2:12 AM INDICATION: Right fourth toe gangrenous changes cellulitis, rule out osteo COMPARISON: Right foot radiograph 02/08/2023 TECHNIQUE: Routine. Additional postgadolinium T1 sequences were obtained. IV CONTRAST: 7ml Gadavist FINDINGS: JOINTS AND BONES: -Increased fluid sensitive signal in the distal phalanx of the fourth toe, with normal marrow signal on T1. This most likely represents reactive edema. No marrow signal suspicious for osteomyelitis. No fracture. No joint effusion. Degenerative changes at  the first MTP joint. TENDONS: -No tendon tear, tendinopathy, or tenosynovitis. LIGAMENTS: -Lisfranc  ligament: Intact. No subluxation. MUSCLES AND SOFT TISSUES: -Soft tissue edema and enhancement of the fourth toe. No fluid collections.     IMPRESSION: 1.  Reactive marrow edema in the distal phalanx of the fourth toe. No evidence for osteomyelitis. 2.  Fourth toe soft tissue edema which could represent cellulitis.    US Lower Extremity Arterial Duplex Bilateral    Result Date: 2/10/2023  Table formatting from the original result was not included. Arterial Duplex Ultrasound (Date: 02/10/23) Lower Extremity Artery Evaluation Indication: Surveillance Bilateral leg Arterial: PAD, Bilateral leg pain, Right 4th Toe Discolored/ Ulcer. Hx: Bilateral SFA  Proximal Stenosis. Previous: 3/17/2022 History: Previous Smoker, Hypertension, Diabetic, PAD, Angioplasty, Rest Pain, Claudication, Skin Color Change and Vascular Ulcers Technique: Duplex imaging is performed utilizing gray-scale, two-dimensional images, and color-flow imaging. Doppler waveform analysis and spectral Doppler imaging is also performed. LOWER EXTREMITY ARTERIAL DUPLEX EXAM WITH WAVEFORMS Right Leg:(cm/s) Location: Velocities Waveforms EIA:   101  M CFA:   106  M PFA:   373 / 46 M SFA Proximal:  33/ 18  M SFA Mid:   33/ 25 /105 M SFA Distal:    45 / 40 M Popliteal Artery:   32 /38  M PTA:   19   M RICKY:   13  M DPA:   31  M Waveforms: T=Triphasic, M=Monophasic, B=Biphasic Stenotic Profile Ratio: 373 / 106 = 3.52 Left Leg:(cm/s) Location: Velocities Waveforms EIA:   188  T CFA:   198  T PFA:   265 /79 T SFA Proximal:   64 / 20 / 418  M SFA Mid:   15 / 32  M SFA Distal:   17 (Retrograde)   M Popliteal Artery:   26 / 30  M PTA:   17   M RICKY:   17  M DPA:   9  M Waveforms: T=Triphasic, M=Monophasic, B=Biphasic Stenotic Profile Ratio: 265 / 198 = 1.34 Impression: Right Lower Extremity: Monophasic flow in common femoral artery suggesting possible inflow disease.  Had a elevated velocities in the proximal and mid SFA with transition to very dampened monophasic flow  distally consistent with hemodynamically significant stenosis due to atherosclerotic arterial disease. Left Lower Extremity: Triphasic flow in common femoral artery suggesting no inflow disease.  Significantly elevated velocities in the proximal SFA.  Occlusion of the mid/distal SFA with retrograde flow noted in the distal SFA via collaterals.  Significant dampened monophasic flow distally. Reference: Category Normal 1-19% 20-49% 50-99% Occluded PSV <160 cm/sec without spectral broadening <160 cm/sec with spectral broadening Increased Increased Absent Flow Ratio N/A N/A < 2.0 >2.0 N/A Post-Stenotic Turbulence No No No Yes N/A     CTA Abdomen Pelvis Bilat Leg Runoff w Contr    Result Date: 2/14/2023  EXAM: CTA ABDOMEN PELVIS BILAT LEG RUNOFF W CONTR LOCATION: [facility] DATE/TIME: 2/14/2023 3:47 PM INDICATION:  Critical lower limb ischemia (H) COMPARISON: None. TECHNIQUE: Helical acquisition through the abdomen, pelvis, and bilateral lower extremities was performed during the arterial phase of contrast enhancement using IV Contrast. 2D and 3D reconstructions were performed by the CT technologist. Dose reduction  techniques were used. CONTRAST: 100ml IV Isovue 370 FINDINGS: AORTA: Calcified plaque  proximal celiac artery with no focal stenosis. Superior mesenteric and inferior mesenteric arteries are patent. Single renal arteries. Calcified plaque proximal renal arteries with no focal stenosis. Diffuse moderate/severe atherosclerotic disease infrarenal abdominal aorta. No evidence of abdominal aortic aneurysm. Dense calcified plaque with associated soft atheromatous plaque right common iliac artery. Moderate stenosis distal right common iliac artery. Focal stenosis proximal right internal iliac artery. Right external iliac artery patent. Diffuse calcified plaque left common iliac artery. Focal stenosis proximal left internal iliac artery. Moderate stenosis proximal left external iliac artery.  RIGHT LEG: Dense calcified plaque posterior wall right common femoral artery. Right profunda femoral artery patent. Diffuse severe atherosclerotic disease right superficial femoral artery. High-grade stenosis mid right popliteal artery. Three-vessel runoff into the right foot. LEFT LEG: Dense calcified plaque posterior wall left common femoral artery. Left profundofemoral artery patent. Diffuse severe atherosclerotic disease left superficial femoral artery. Occlusion of the left popliteal artery. Reconstitution of the tibial peroneal trunk. Two-vessel runoff via the left peroneal and posterior tibial arteries. LUNG BASES: Motion artifact. ABDOMEN: The liver, gallbladder, pancreas, spleen, adrenal glands and kidneys are unremarkable. Small splenule inferior pole the spleen. PELVIS: Unremarkable.     IMPRESSION: 1.  Moderate/severe diffuse atherosclerotic disease infrarenal abdominal aorta. 2.  Moderate stenosis distal right common iliac artery. Moderate stenosis proximal left external iliac artery. 3.  Diffuse severe atherosclerotic disease right superficial femoral artery. High-grade stenosis of the left popliteal artery with three-vessel runoff into the right foot. 4.  Diffuse severe atherosclerotic disease left superficial femoral artery. Occlusion of the left popliteal artery. Reconstitution of the tibial peroneal trunk with two-vessel runoff into the left foot via the peroneal and posterior tibial arteries.     Anurag Zayas; SILVINA  [facility]

## 2023-02-15 NOTE — ED NOTES
"[facility] ED Handoff Report    ED Chief Complaint:     ED Diagnosis:  (L08.9) Toe infection         PMH:    Past Medical History:   Diagnosis Date    Diabetes mellitus (H)     Gastroesophageal reflux disease     Hypertension     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/29/2013        Code Status:  Full Code     Falls Risk: Yes Band: Applied    Current Living Situation/Residence: lives with their son or daughter     Elimination Status: Continent: Yes     Activity Level: SBA    Patients Preferred Language:  English     Needed: No    Vital Signs:  /57   Pulse 82   Temp 98.2  F (36.8  C) (Oral)   Resp 18   Ht 1.575 m (5' 2\")   Wt 70.8 kg (156 lb)   SpO2 98%   BMI 28.53 kg/m       Cardiac Rhythm: none    Pain Score: prn tylenol given for pain control    Is the Patient Confused:  No    Last Food or Drink: pt had diet order, ordered meal.    Focused Assessment:  pt come for evaluation of R 4th toe wound. Pt alert and pleasant , communicates needs. IR angiogram scheduled for tomorrow around 10am.    Tests Performed: Done: Labs and Imaging    Treatments Provided:  IV ABX    Family Dynamics/Concerns: No    Family Updated On Visitor Policy: Yes    Plan of Care Communicated to Family: Yes    Who Was Updated about Plan of Care: daughter at bedside.    Belongings Checklist Done and Signed by Patient: Yes    Medications sent with patient: pt has insulin pen that needs to come with the pt.    Covid: asymptomatic , negative    Additional Information: none       "

## 2023-02-15 NOTE — PROGRESS NOTES
"[facility] [place]    PROGRESS NOTE - Hospitalist Service    Assessment and Plan    Principal Problem:    Toe infection  Active Problems:    Type 2 diabetes mellitus (H)    PAD (peripheral artery disease) (H)    Essential hypertension    Dyslipidemia    Ischemic toe    Ashanti Aviles is a 67 year old female with h/o smoking, diabetes and hypertension who presents to this ED for evaluation of an impaired toe circulation and foot pain, she was found to have right fourth toe cellulitis/gangrenous changes, she was admitted with     PAD.ischemic toe  toe pressures of 45, and TRACIE of 0.47.   - continue IV Zosyn and vancomycin  - podiatry and vascular seen and plan for amputation tomorrow and angiogram   - CT abdomen shows Moderate/severe diffuse atherosclerotic disease infrarenal abdominal aorta.Diffuse severe atherosclerotic disease right superficial femoral artery. High-grade stenosis of the left popliteal artery with three-vessel runoff into the right foot.  - Continue Plavix  - start gabapentin 100mg TID   - oxycodone for pain      Diabetes mellitus type 2  - Resume home medication but hold home metformin  - Cover with insulin sliding scale  - Hemoglobin A1c is elevated at 8.5  - Continue to monitor blood glucose     Hypertension  - Stable blood pressure  - Resume home lisinopril and Norvasc  - Hold hydrochlorothiazide     Hypokalemia  - Replace per protocol     Smoking dependence  - Patient quit smoking 2 days ago  - Declined nicotine patch.      Clinically Significant Risk Factors                        # Overweight: Estimated body mass index is 28.31 kg/m  as calculated from the following:    Height as of this encounter: 1.626 m (5' 4\").    Weight as of this encounter: 74.8 kg (164 lb 14.4 oz)., PRESENT ON ADMISSION         COVID-19 PCR Results    COVID-19 PCR Results 6/12/21 9/10/21 2/14/23   SARS-CoV-2 Virus Specimen Source Nasopharyngeal     SARS-CoV-2 PCR Result NEGATIVE     SARS CoV2 PCR  " Negative Negative      Comments are available for some flowsheets but are not being displayed.         COVID-19 Antibody Results, Testing for Immunity    COVID-19 Antibody Results, Testing for Immunity   No data to display.            Code Status: Full Code  VTE prophylaxis:  No meds since surgery tomorrow and ambulating   DIET: Orders Placed This Encounter      Consistent Carbohydrate Diet Moderate Consistent Carb (60 g CHO per Meal) Diet      NPO per Anesthesia Guidelines for Procedure/Surgery Except for: Meds    Drains/Lines: none  Weight bearing status: WBAT  Disposition/Barriers to discharge: pending post-op course    Expected Discharge Date:       February 17, 2023      Interval History   {Pertinent overnight events  ;none    Subjective:  Complains of pain in toe and unable to sleep at night     PHYSICAL EXAM  Temp:  [98.2  F (36.8  C)-98.5  F (36.9  C)] 98.2  F (36.8  C)  Pulse:  [82-96] 82  Resp:  [18] 18  BP: (119-137)/(57-68) 119/57  SpO2:  [97 %-98 %] 98 %  Wt Readings from Last 1 Encounters:   02/14/23 70.8 kg (156 lb)       Intake/Output Summary (Last 24 hours) at 2/15/2023 0832  Last data filed at 2/14/2023 1842  Gross per 24 hour   Intake 100 ml   Output --   Net 100 ml      Body mass index is 28.53 kg/m .    Physical Exam  Constitutional:       Appearance: Normal appearance.   HENT:      Head: Normocephalic and atraumatic.   Cardiovascular:      Rate and Rhythm: Normal rate and regular rhythm.      Pulses: Normal pulses.      Heart sounds: Normal heart sounds.   Pulmonary:      Effort: Pulmonary effort is normal.      Breath sounds: Normal breath sounds.   Abdominal:      General: Bowel sounds are normal.      Palpations: Abdomen is soft.   Musculoskeletal:      Comments: Right 4th toe black and cold, foot is tender to touch.      Neurological:      General: No focal deficit present.      Mental Status: She is alert and oriented to person, place, and time. Mental status is at baseline.        PERTINENT LABS/IMAGING:  Results for orders placed or performed during the hospital encounter of 02/14/23   MR Foot Right w/o & w Contrast    Impression    IMPRESSION:  1.  Reactive marrow edema in the distal phalanx of the fourth toe. No evidence for osteomyelitis.  2.  Fourth toe soft tissue edema which could represent cellulitis.       Imaging results reviewed over the past 24 hrs:   Recent Results (from the past 24 hour(s))   MR Foot Right w/o & w Contrast    Narrative    EXAM: MR FOOT RIGHT W/O and W CONTRAST  LOCATION: [facility]  DATE/TIME: 2/15/2023 2:12 AM    INDICATION: Right fourth toe gangrenous changes cellulitis, rule out osteo  COMPARISON: Right foot radiograph 02/08/2023  TECHNIQUE: Routine. Additional postgadolinium T1 sequences were obtained.  IV CONTRAST: 7ml Gadavist    FINDINGS:     JOINTS AND BONES:   -Increased fluid sensitive signal in the distal phalanx of the fourth toe, with normal marrow signal on T1. This most likely represents reactive edema. No marrow signal suspicious for osteomyelitis. No fracture. No joint effusion. Degenerative changes at   the first MTP joint.    TENDONS:   -No tendon tear, tendinopathy, or tenosynovitis.     LIGAMENTS:   -Lisfranc ligament: Intact. No subluxation.    MUSCLES AND SOFT TISSUES:   -Soft tissue edema and enhancement of the fourth toe. No fluid collections.      Impression    IMPRESSION:  1.  Reactive marrow edema in the distal phalanx of the fourth toe. No evidence for osteomyelitis.  2.  Fourth toe soft tissue edema which could represent cellulitis.     Recent Labs   Lab 02/15/23  1117 02/15/23  0814 02/15/23  0751 02/14/23  1908 02/14/23  1744   WBC  --  11.7*  --   --  12.9*   HGB  --  13.3  --   --  15.9*   MCV  --  91  --   --  89   PLT  --  267  --   --  313   NA  --  135*  --   --  137   POTASSIUM  --  3.9  --   --  3.5   CHLORIDE  --  100  --   --  98   CO2  --  27  --   --  26   BUN  --  13.3  --   --  8.9   CR   --  0.79  --   --  0.69   ANIONGAP  --  8  --   --  13   SANTINO  --  9.5  --   --  10.6*   * 199* 190*   < > 125*   ALBUMIN  --  3.7  --   --   --    PROTTOTAL  --  6.5  --   --   --    BILITOTAL  --  <0.2  --   --   --    ALKPHOS  --  75  --   --   --    ALT  --  25  --   --   --    AST  --  20  --   --   --     < > = values in this interval not displayed.     Recent Labs   Lab Test 02/15/23  0751 02/14/23 1908 02/14/23 1744   NA  --   --  137   POTASSIUM  --   --  3.5   CHLORIDE  --   --  98   CO2  --   --  26   *   < > 125*   BUN  --   --  8.9   CR  --   --  0.69   GFRESTIMATED  --   --  >90   SANTINO  --   --  10.6*    < > = values in this interval not displayed.     Recent Labs   Lab Test 02/14/23 1744   A1C 8.5*     Recent Labs   Lab Test 02/14/23 1744   HGB 15.9*     30 MINUTES SPENT BY ME on the date of service doing chart review, history, exam, documentation & further activities per the note.  Vivian Cassidy MD  [facility] Medicine [facility]  [phone]

## 2023-02-15 NOTE — CONSULTS
VASCULAR SURGERY NOTE    Ms. Aviles is a 66yo woman with right lower extremity chronic limb threatening ischemia with tissue loss who was admitted to the hospital from clinic yesterday. She has a right fourth toe wound, toe pressures of 45, and TRACIE of 0.47.     She reports no changes in her right lower extremity pain since yesterday. Pain medicine improves her symptoms partially and only for short periods.     Vitals reviewed.    On exam, no palpable pedal pulses. Right fourth toe wound as below.        Labs reviewed. WBC 11.7 from 12.9. Glucose 133-195. Hgb A1c 8.5%.    Ms. Aviles is a 66yo woman with right lower extremity chronic limb threatening ischemia with tissue loss  - will arrange right lower extremity angiogram at first availability, likely tomorrow 2/16  - appreciate Medicine, Podiatry  - agree with broad spectrum antibiotics    Pauly Pacheco MD  02/15/23  3:49 PM

## 2023-02-15 NOTE — H&P
Interventional Radiology - Pre-Procedure Note:  2/16/2023    Procedure Requested: RLE angiogram  Requested by: Cris Atwood MD    History and Physical Reviewed: H&P documented within 30 days (by MEAGAN Nguyen on 2/14/23).  I have personally reviewed the patient's medical history and have updated the medical record as necessary.    Brief HPI: Ashanti Aviles is a 67 year old female with hx of tobacco use, DM-II, HTN; admitted with right fourth toe cellulitis/gangrenous changes. CTA demonstrating moderate - severe diffuse atherosclerotic disease infrarenal abdominal aorta, right common iliac artery, left external iliac artery, right SFA, left popliteal. Foot/Ankle surgery recommending 4th right toe amputation pending angiogram.     IMAGING:  CTA abd/pelvis 2/14/23:  FINDINGS:  AORTA: Calcified plaque  proximal celiac artery with no focal stenosis. Superior mesenteric and inferior mesenteric arteries are patent. Single renal arteries. Calcified plaque proximal renal arteries with no focal stenosis. Diffuse moderate/severe   atherosclerotic disease infrarenal abdominal aorta. No evidence of abdominal aortic aneurysm. Dense calcified plaque with associated soft atheromatous plaque right common iliac artery. Moderate stenosis distal right common iliac artery. Focal stenosis   proximal right internal iliac artery. Right external iliac artery patent. Diffuse calcified plaque left common iliac artery. Focal stenosis proximal left internal iliac artery. Moderate stenosis proximal left external iliac artery.     RIGHT LEG: Dense calcified plaque posterior wall right common femoral artery. Right profunda femoral artery patent. Diffuse severe atherosclerotic disease right superficial femoral artery. High-grade stenosis mid right popliteal artery. Three-vessel   runoff into the right foot.     LEFT LEG: Dense calcified plaque posterior wall left common femoral artery. Left profundofemoral artery patent. Diffuse severe  "atherosclerotic disease left superficial femoral artery. Occlusion of the left popliteal artery. Reconstitution of the tibial   peroneal trunk. Two-vessel runoff via the left peroneal and posterior tibial arteries.      LUNG BASES: Motion artifact.     ABDOMEN: The liver, gallbladder, pancreas, spleen, adrenal glands and kidneys are unremarkable. Small splenule inferior pole the spleen.     PELVIS: Unremarkable.                                                                      IMPRESSION:  1.  Moderate/severe diffuse atherosclerotic disease infrarenal abdominal aorta.  2.  Moderate stenosis distal right common iliac artery. Moderate stenosis proximal left external iliac artery.  3.  Diffuse severe atherosclerotic disease right superficial femoral artery. High-grade stenosis of the left popliteal artery with three-vessel runoff into the right foot.  4.  Diffuse severe atherosclerotic disease left superficial femoral artery. Occlusion of the left popliteal artery. Reconstitution of the tibial peroneal trunk with two-vessel runoff into the left foot via the peroneal and posterior tibial arteries.    NPO: midnight.  ANTICOAGULANTS: on plavix 75mg PO daily (started 2/15)  ANTIBIOTICS: on zosyn and vanco IV.    ALLERGIES  Allergies   Allergen Reactions     Ezetimibe Unknown     Generalized body aches     Oxycodone Nausea and Vomiting     Penicillins Unknown     Simvastatin Unknown     Muscle pain     Victoza [Liraglutide] Unknown         LABS:    Hemoglobin   Date Value Ref Range Status   02/15/2023 13.3 11.7 - 15.7 g/dL Final     Platelet Count   Date Value Ref Range Status   02/15/2023 267 150 - 450 10e3/uL Final     Creatinine   Date Value Ref Range Status   02/15/2023 0.79 0.51 - 0.95 mg/dL Final     Potassium   Date Value Ref Range Status   02/15/2023 3.9 3.4 - 5.3 mmol/L Final         EXAM:  /57   Pulse 82   Temp 98.2  F (36.8  C) (Oral)   Resp 18   Ht 1.575 m (5' 2\")   Wt 70.8 kg (156 lb)   SpO2 98%  "  BMI 28.53 kg/m    General:  Stable.  In no acute distress.    Neuro:  A&O x 3. Moves all extremities equally.  Resp:  Lungs clear to auscultation bilaterally.  Cardio:  S1S2 and reg, without murmur, clicks or rubs    Pre-Sedation Assessment:  Mallampati Airway Classification:  II - Faucial pillars and soft palate may be seen, but uvula is masked by the base of the tongue  Previous reaction to anesthesia/sedation:  No  Sedation plan based on assessment: Moderate (conscious) sedation  ASA Classification: Class 3 - SEVERE SYSTEMIC DISEASE, DEFINITE FUNCTIONAL LIMITATIONS.   Code Status: Full Code intra procedure      ASSESSMENT/PLAN:   RLE angiogram with possible intervention and with sedation.    Procedure, risks/benefits, details, alternatives, and sedation reviewed with patient and patient verbalized understanding. All questions answered. OK to proceed with above radiology procedure.       Rhianna Isidro PA-C  Interventional Radiology

## 2023-02-15 NOTE — ED NOTES
Pt and family asking if they can go outside to get fresh air. Advise the pt they are not allowed to leave the premises and that ihey may miss the doctors and other members of care team while outside. Pt and family decided to walk around the unit.

## 2023-02-15 NOTE — PHARMACY-VANCOMYCIN DOSING SERVICE
"Pharmacy Vancomycin Initial Note  Date of Service 2023  Patient's  1955  67 year old, female    Indication: Bone and Joint Infection    Current estimated CrCl = Estimated Creatinine Clearance: 74.9 mL/min (based on SCr of 0.69 mg/dL).    Creatinine for last 3 days  2023:  5:44 PM Creatinine 0.69 mg/dL    Recent Vancomycin Level(s) for last 3 days  No results found for requested labs within last 72 hours.      Vancomycin IV Administrations (past 72 hours)                   vancomycin (VANCOCIN) 1,500 mg in sodium chloride 0.9 % 250 mL intermittent infusion (mg) 1,500 mg New Bag 23 1842                Nephrotoxins and other renal medications (From now, onward)    Start     Dose/Rate Route Frequency Ordered Stop    02/15/23 0800  empagliflozin (JARDIANCE) tablet 10 mg         10 mg Oral DAILY 02/14/23 2030      02/15/23 0800  lisinopril (ZESTRIL) tablet 40 mg        Note to Pharmacy: PTA Sig:[LISINOPRIL (PRINIVIL,ZESTRIL) 40 MG TABLET] Take 40 mg by mouth daily.      40 mg Oral DAILY 02/14/23 2030      02/15/23 0700  vancomycin (VANCOCIN) 1000 mg in dextrose 5% 200 mL PREMIX         1,000 mg  200 mL/hr over 1 Hours Intravenous EVERY 12 HOURS 23 2100      02/15/23 0000  piperacillin-tazobactam (ZOSYN) 3.375 g vial to attach to  mL bag        Note to Pharmacy: For [facility], [facility] and [facility]: For Zosyn-naive patients, use the \"Zosyn initial dose + extended infusion\" order panel.    3.375 g  over 240 Minutes Intravenous EVERY 8 HOURS 23            Contrast Orders - past 72 hours (72h ago, onward)    None          [employer] Prediction of Planned Initial Vancomycin Regimen  Loading dose: N/A  Regimen: 1000 mg IV every 12 hours.  Start time: 06:42 on 02/15/2023  Exposure target: AUC24 (range)400-600 mg/L.hr   AUC24,ss: 548 mg/L.hr  Probability of AUC24 > 400: 81 %  Ctrough,ss: 17.2 mg/L  Probability of Ctrough,ss > 20: 37 %  Probability of nephrotoxicity (Lodise GEORGE ): 13 %     "      Plan:  1. Start vancomycin  1000 mg IV q12h.   2. Vancomycin monitoring method: AUC  3. Vancomycin therapeutic monitoring goal: 400-600 mg*h/L  4. Pharmacy will check vancomycin levels as appropriate in 1-3 Days.    5. Serum creatinine levels will be ordered daily for the first week of therapy and at least twice weekly for subsequent weeks.      Herlinda Zarco RP

## 2023-02-15 NOTE — PHARMACY-ADMISSION MEDICATION HISTORY
Pharmacy Note - Admission Medication History    Pertinent Provider Information:      ______________________________________________________________________    Prior To Admission (PTA) med list completed and updated in EMR.       PTA Med List   Medication Sig Last Dose     amLODIPine (NORVASC) 10 MG tablet [AMLODIPINE (NORVASC) 10 MG TABLET] Take 10 mg by mouth daily. 2/14/2023     clopidogrel (PLAVIX) 75 MG tablet Take 75 mg by mouth daily 2/14/2023     doxycycline monohydrate (MONODOX) 100 MG capsule Take 100 mg by mouth 2 times daily 2/14/2023     hydroCHLOROthiazide (HYDRODIURIL) 25 MG tablet [HYDROCHLOROTHIAZIDE (HYDRODIURIL) 25 MG TABLET] Take 25 mg by mouth daily. 2/14/2023     Insulin Degludec (TRESIBA) 100 UNIT/ML SOLN Inject 30 Units Subcutaneous daily 2/13/2023 at PM     JARDIANCE 10 MG TABS tablet Take 10 mg by mouth daily 2/14/2023     linagliptin (TRADJENTA) 5 MG TABS tablet Take 5 mg by mouth daily 2/14/2023     lisinopril (PRINIVIL,ZESTRIL) 40 MG tablet [LISINOPRIL (PRINIVIL,ZESTRIL) 40 MG TABLET] Take 40 mg by mouth daily. 2/14/2023     metFORMIN (GLUCOPHAGE) 500 MG tablet Take 500 mg by mouth 2 times daily (with meals) 2/14/2023       Information source(s): Patient and [facility]  Method of interview communication: in-person    Summary of Changes to PTA Med List  New: none  Discontinued: cilostazol  Changed: none    Patient was asked about OTC/herbal products specifically.  PTA med list reflects this.    In the past week, patient estimated taking medication this percent of the time:  greater than 90%.    Medication Affordability:  Not including over the counter (OTC) medications, was there a time in the past 12 months when you did not take your medications as prescribed because of cost?: No    Allergies were reviewed, assessed, and updated with the patient.      Patient does not use any multi-dose medications prior to admission.    The information provided in this note is only as  accurate as the sources available at the time of the update(s).    Thank you for the opportunity to participate in the care of this patient.    Herlinda Zarco [facility]  2/14/2023 7:04 PM

## 2023-02-16 ENCOUNTER — APPOINTMENT (OUTPATIENT)
Dept: INTERVENTIONAL RADIOLOGY/VASCULAR | Facility: HOSPITAL | Age: 68
DRG: 271 | End: 2023-02-16
Attending: SURGERY
Payer: COMMERCIAL

## 2023-02-16 DIAGNOSIS — I73.9 PAD (PERIPHERAL ARTERY DISEASE) (H): Primary | ICD-10-CM

## 2023-02-16 DIAGNOSIS — I70.229 CRITICAL LOWER LIMB ISCHEMIA (H): ICD-10-CM

## 2023-02-16 LAB
ACT BLD: 148 SECONDS (ref 74–150)
ACT BLD: 441 SECONDS (ref 74–150)
ANION GAP SERPL CALCULATED.3IONS-SCNC: 7 MMOL/L (ref 7–15)
BUN SERPL-MCNC: 11 MG/DL (ref 8–23)
CALCIUM SERPL-MCNC: 9.1 MG/DL (ref 8.8–10.2)
CHLORIDE SERPL-SCNC: 106 MMOL/L (ref 98–107)
CREAT SERPL-MCNC: 0.73 MG/DL (ref 0.51–0.95)
DEPRECATED HCO3 PLAS-SCNC: 25 MMOL/L (ref 22–29)
ERYTHROCYTE [DISTWIDTH] IN BLOOD BY AUTOMATED COUNT: 13.8 % (ref 10–15)
GFR SERPL CREATININE-BSD FRML MDRD: >90 ML/MIN/1.73M2
GLUCOSE BLDC GLUCOMTR-MCNC: 108 MG/DL (ref 70–99)
GLUCOSE BLDC GLUCOMTR-MCNC: 157 MG/DL (ref 70–99)
GLUCOSE BLDC GLUCOMTR-MCNC: 164 MG/DL (ref 70–99)
GLUCOSE BLDC GLUCOMTR-MCNC: 219 MG/DL (ref 70–99)
GLUCOSE SERPL-MCNC: 153 MG/DL (ref 70–99)
HCT VFR BLD AUTO: 39.9 % (ref 35–47)
HGB BLD-MCNC: 13.2 G/DL (ref 11.7–15.7)
INR PPP: 0.97 (ref 0.85–1.15)
MCH RBC QN AUTO: 30.1 PG (ref 26.5–33)
MCHC RBC AUTO-ENTMCNC: 33.1 G/DL (ref 31.5–36.5)
MCV RBC AUTO: 91 FL (ref 78–100)
PLATELET # BLD AUTO: 275 10E3/UL (ref 150–450)
POTASSIUM SERPL-SCNC: 4.3 MMOL/L (ref 3.4–5.3)
RBC # BLD AUTO: 4.39 10E6/UL (ref 3.8–5.2)
SODIUM SERPL-SCNC: 138 MMOL/L (ref 136–145)
VANCOMYCIN SERPL-MCNC: 7.5 UG/ML
WBC # BLD AUTO: 8.3 10E3/UL (ref 4–11)

## 2023-02-16 PROCEDURE — 258N000003 HC RX IP 258 OP 636: Performed by: SURGERY

## 2023-02-16 PROCEDURE — 36415 COLL VENOUS BLD VENIPUNCTURE: CPT | Performed by: INTERNAL MEDICINE

## 2023-02-16 PROCEDURE — C1769 GUIDE WIRE: HCPCS

## 2023-02-16 PROCEDURE — C1725 CATH, TRANSLUMIN NON-LASER: HCPCS

## 2023-02-16 PROCEDURE — C1887 CATHETER, GUIDING: HCPCS

## 2023-02-16 PROCEDURE — 272N000566 HC SHEATH CR3

## 2023-02-16 PROCEDURE — 250N000011 HC RX IP 250 OP 636: Performed by: RADIOLOGY

## 2023-02-16 PROCEDURE — 272N000302 HC DEVICE INFLATION CR5

## 2023-02-16 PROCEDURE — 255N000002 HC RX 255 OP 636: Performed by: RADIOLOGY

## 2023-02-16 PROCEDURE — 047K3Z1 DILATION OF RIGHT FEMORAL ARTERY USING DRUG-COATED BALLOON, PERCUTANEOUS APPROACH: ICD-10-PCS | Performed by: RADIOLOGY

## 2023-02-16 PROCEDURE — 258N000003 HC RX IP 258 OP 636: Performed by: INTERNAL MEDICINE

## 2023-02-16 PROCEDURE — 37225 HC REVASC FEM-POPL ART ANGIO-ATHERECTOMY: CPT

## 2023-02-16 PROCEDURE — 250N000011 HC RX IP 250 OP 636: Performed by: INTERNAL MEDICINE

## 2023-02-16 PROCEDURE — 85610 PROTHROMBIN TIME: CPT | Performed by: PHYSICIAN ASSISTANT

## 2023-02-16 PROCEDURE — C1724 CATH, TRANS ATHEREC,ROTATION: HCPCS

## 2023-02-16 PROCEDURE — 80048 BASIC METABOLIC PNL TOTAL CA: CPT | Performed by: INTERNAL MEDICINE

## 2023-02-16 PROCEDURE — 85347 COAGULATION TIME ACTIVATED: CPT

## 2023-02-16 PROCEDURE — 272N000500 HC NEEDLE CR2

## 2023-02-16 PROCEDURE — 04CK3ZZ EXTIRPATION OF MATTER FROM RIGHT FEMORAL ARTERY, PERCUTANEOUS APPROACH: ICD-10-PCS | Performed by: RADIOLOGY

## 2023-02-16 PROCEDURE — 99232 SBSQ HOSP IP/OBS MODERATE 35: CPT | Performed by: INTERNAL MEDICINE

## 2023-02-16 PROCEDURE — C2623 CATH, TRANSLUMIN, DRUG-COAT: HCPCS

## 2023-02-16 PROCEDURE — 120N000001 HC R&B MED SURG/OB

## 2023-02-16 PROCEDURE — 85027 COMPLETE CBC AUTOMATED: CPT | Performed by: INTERNAL MEDICINE

## 2023-02-16 PROCEDURE — 80202 ASSAY OF VANCOMYCIN: CPT | Performed by: INTERNAL MEDICINE

## 2023-02-16 PROCEDURE — 272N000570 HC SHEATH CR7

## 2023-02-16 PROCEDURE — 99152 MOD SED SAME PHYS/QHP 5/>YRS: CPT

## 2023-02-16 PROCEDURE — 250N000013 HC RX MED GY IP 250 OP 250 PS 637: Performed by: INTERNAL MEDICINE

## 2023-02-16 PROCEDURE — 76937 US GUIDE VASCULAR ACCESS: CPT

## 2023-02-16 RX ORDER — HEPARIN SODIUM 1000 [USP'U]/ML
14000 INJECTION, SOLUTION INTRAVENOUS; SUBCUTANEOUS ONCE
Status: COMPLETED | OUTPATIENT
Start: 2023-02-16 | End: 2023-02-16

## 2023-02-16 RX ORDER — NALOXONE HYDROCHLORIDE 0.4 MG/ML
0.4 INJECTION, SOLUTION INTRAMUSCULAR; INTRAVENOUS; SUBCUTANEOUS
Status: DISCONTINUED | OUTPATIENT
Start: 2023-02-16 | End: 2023-02-16 | Stop reason: HOSPADM

## 2023-02-16 RX ORDER — NALOXONE HYDROCHLORIDE 0.4 MG/ML
0.2 INJECTION, SOLUTION INTRAMUSCULAR; INTRAVENOUS; SUBCUTANEOUS
Status: DISCONTINUED | OUTPATIENT
Start: 2023-02-16 | End: 2023-02-16 | Stop reason: HOSPADM

## 2023-02-16 RX ORDER — HYDRALAZINE HYDROCHLORIDE 20 MG/ML
10 INJECTION INTRAMUSCULAR; INTRAVENOUS EVERY 4 HOURS PRN
Status: DISCONTINUED | OUTPATIENT
Start: 2023-02-16 | End: 2023-02-19 | Stop reason: HOSPADM

## 2023-02-16 RX ORDER — SODIUM CHLORIDE 9 MG/ML
INJECTION, SOLUTION INTRAVENOUS CONTINUOUS
Status: DISCONTINUED | OUTPATIENT
Start: 2023-02-16 | End: 2023-02-16 | Stop reason: HOSPADM

## 2023-02-16 RX ORDER — FENTANYL CITRATE 50 UG/ML
25-50 INJECTION, SOLUTION INTRAMUSCULAR; INTRAVENOUS EVERY 5 MIN PRN
Status: DISCONTINUED | OUTPATIENT
Start: 2023-02-16 | End: 2023-02-16 | Stop reason: HOSPADM

## 2023-02-16 RX ORDER — FLUMAZENIL 0.1 MG/ML
0.2 INJECTION, SOLUTION INTRAVENOUS
Status: DISCONTINUED | OUTPATIENT
Start: 2023-02-16 | End: 2023-02-16 | Stop reason: HOSPADM

## 2023-02-16 RX ORDER — LIDOCAINE 40 MG/G
CREAM TOPICAL
Status: DISCONTINUED | OUTPATIENT
Start: 2023-02-16 | End: 2023-02-16 | Stop reason: HOSPADM

## 2023-02-16 RX ORDER — IODIXANOL 320 MG/ML
200 INJECTION, SOLUTION INTRAVASCULAR ONCE
Status: COMPLETED | OUTPATIENT
Start: 2023-02-16 | End: 2023-02-16

## 2023-02-16 RX ORDER — NICOTINE POLACRILEX 4 MG
15-30 LOZENGE BUCCAL
Status: DISCONTINUED | OUTPATIENT
Start: 2023-02-16 | End: 2023-02-17

## 2023-02-16 RX ORDER — DEXTROSE MONOHYDRATE 25 G/50ML
25-50 INJECTION, SOLUTION INTRAVENOUS
Status: DISCONTINUED | OUTPATIENT
Start: 2023-02-16 | End: 2023-02-17

## 2023-02-16 RX ORDER — CEFAZOLIN SODIUM 1 G/50ML
1250 SOLUTION INTRAVENOUS EVERY 12 HOURS
Status: DISCONTINUED | OUTPATIENT
Start: 2023-02-16 | End: 2023-02-17 | Stop reason: ALTCHOICE

## 2023-02-16 RX ORDER — PROTAMINE SULFATE 10 MG/ML
50 INJECTION, SOLUTION INTRAVENOUS ONCE
Status: COMPLETED | OUTPATIENT
Start: 2023-02-16 | End: 2023-02-16

## 2023-02-16 RX ADMIN — MIDAZOLAM HYDROCHLORIDE 1 MG: 1 INJECTION, SOLUTION INTRAMUSCULAR; INTRAVENOUS at 11:37

## 2023-02-16 RX ADMIN — CLOPIDOGREL BISULFATE 75 MG: 75 TABLET ORAL at 08:15

## 2023-02-16 RX ADMIN — MIDAZOLAM HYDROCHLORIDE 0.5 MG: 1 INJECTION, SOLUTION INTRAMUSCULAR; INTRAVENOUS at 11:33

## 2023-02-16 RX ADMIN — PIPERACILLIN AND TAZOBACTAM 3.38 G: 3; .375 INJECTION, POWDER, LYOPHILIZED, FOR SOLUTION INTRAVENOUS at 17:49

## 2023-02-16 RX ADMIN — VANCOMYCIN HYDROCHLORIDE 1000 MG: 1 INJECTION, SOLUTION INTRAVENOUS at 09:07

## 2023-02-16 RX ADMIN — AMLODIPINE BESYLATE 10 MG: 5 TABLET ORAL at 08:15

## 2023-02-16 RX ADMIN — SENNOSIDES AND DOCUSATE SODIUM 1 TABLET: 50; 8.6 TABLET ORAL at 08:16

## 2023-02-16 RX ADMIN — HYDRALAZINE HYDROCHLORIDE 10 MG: 20 INJECTION INTRAMUSCULAR; INTRAVENOUS at 16:29

## 2023-02-16 RX ADMIN — MIDAZOLAM HYDROCHLORIDE 1 MG: 1 INJECTION, SOLUTION INTRAMUSCULAR; INTRAVENOUS at 11:50

## 2023-02-16 RX ADMIN — TRAZODONE HYDROCHLORIDE 50 MG: 50 TABLET ORAL at 21:45

## 2023-02-16 RX ADMIN — SENNOSIDES AND DOCUSATE SODIUM 2 TABLET: 50; 8.6 TABLET ORAL at 20:38

## 2023-02-16 RX ADMIN — MIDAZOLAM HYDROCHLORIDE 0.5 MG: 1 INJECTION, SOLUTION INTRAMUSCULAR; INTRAVENOUS at 11:00

## 2023-02-16 RX ADMIN — FENTANYL CITRATE 50 MCG: 50 INJECTION, SOLUTION INTRAMUSCULAR; INTRAVENOUS at 11:47

## 2023-02-16 RX ADMIN — FENTANYL CITRATE 25 MCG: 50 INJECTION, SOLUTION INTRAMUSCULAR; INTRAVENOUS at 11:02

## 2023-02-16 RX ADMIN — FENTANYL CITRATE 25 MCG: 50 INJECTION, SOLUTION INTRAMUSCULAR; INTRAVENOUS at 11:08

## 2023-02-16 RX ADMIN — MIDAZOLAM HYDROCHLORIDE 0.5 MG: 1 INJECTION, SOLUTION INTRAMUSCULAR; INTRAVENOUS at 12:03

## 2023-02-16 RX ADMIN — PROTAMINE SULFATE 50 MG: 10 INJECTION, SOLUTION INTRAVENOUS at 12:27

## 2023-02-16 RX ADMIN — MIDAZOLAM HYDROCHLORIDE 0.5 MG: 1 INJECTION, SOLUTION INTRAMUSCULAR; INTRAVENOUS at 11:06

## 2023-02-16 RX ADMIN — SODIUM CHLORIDE: 9 INJECTION, SOLUTION INTRAVENOUS at 13:43

## 2023-02-16 RX ADMIN — FENTANYL CITRATE 50 MCG: 50 INJECTION, SOLUTION INTRAMUSCULAR; INTRAVENOUS at 12:18

## 2023-02-16 RX ADMIN — GABAPENTIN 100 MG: 100 CAPSULE ORAL at 20:38

## 2023-02-16 RX ADMIN — MIDAZOLAM HYDROCHLORIDE 0.5 MG: 1 INJECTION, SOLUTION INTRAMUSCULAR; INTRAVENOUS at 12:20

## 2023-02-16 RX ADMIN — FENTANYL CITRATE 50 MCG: 50 INJECTION, SOLUTION INTRAMUSCULAR; INTRAVENOUS at 11:39

## 2023-02-16 RX ADMIN — GABAPENTIN 100 MG: 100 CAPSULE ORAL at 08:15

## 2023-02-16 RX ADMIN — FENTANYL CITRATE 25 MCG: 50 INJECTION, SOLUTION INTRAMUSCULAR; INTRAVENOUS at 11:24

## 2023-02-16 RX ADMIN — FENTANYL CITRATE 25 MCG: 50 INJECTION, SOLUTION INTRAMUSCULAR; INTRAVENOUS at 12:05

## 2023-02-16 RX ADMIN — MIDAZOLAM HYDROCHLORIDE 0.5 MG: 1 INJECTION, SOLUTION INTRAMUSCULAR; INTRAVENOUS at 11:17

## 2023-02-16 RX ADMIN — SODIUM CHLORIDE: 9 INJECTION, SOLUTION INTRAVENOUS at 02:45

## 2023-02-16 RX ADMIN — FENTANYL CITRATE 25 MCG: 50 INJECTION, SOLUTION INTRAMUSCULAR; INTRAVENOUS at 11:35

## 2023-02-16 RX ADMIN — HEPARIN SODIUM 14000 UNITS: 1000 INJECTION INTRAVENOUS; SUBCUTANEOUS at 11:23

## 2023-02-16 RX ADMIN — ACETAMINOPHEN 650 MG: 325 TABLET ORAL at 17:33

## 2023-02-16 RX ADMIN — GABAPENTIN 100 MG: 100 CAPSULE ORAL at 14:38

## 2023-02-16 RX ADMIN — PIPERACILLIN AND TAZOBACTAM 3.38 G: 3; .375 INJECTION, POWDER, LYOPHILIZED, FOR SOLUTION INTRAVENOUS at 08:15

## 2023-02-16 RX ADMIN — IODIXANOL 100 ML: 320 INJECTION, SOLUTION INTRAVASCULAR at 12:44

## 2023-02-16 RX ADMIN — VANCOMYCIN HYDROCHLORIDE 1250 MG: 5 INJECTION, POWDER, LYOPHILIZED, FOR SOLUTION INTRAVENOUS at 18:46

## 2023-02-16 RX ADMIN — SODIUM CHLORIDE: 9 INJECTION, SOLUTION INTRAVENOUS at 20:39

## 2023-02-16 ASSESSMENT — ACTIVITIES OF DAILY LIVING (ADL)
ADLS_ACUITY_SCORE: 18

## 2023-02-16 NOTE — PROGRESS NOTES
"[facility]    PROGRESS NOTE - Hospitalist Service    Assessment and Plan    Principal Problem:    Toe infection  Active Problems:    Type 2 diabetes mellitus (H)    PAD (peripheral artery disease) (H)    Essential hypertension    Dyslipidemia    Ischemic toe    Ashanti Aviles is a 67 year old female with h/o smoking, diabetes and hypertension who presents to this ED for evaluation of an impaired toe circulation and foot pain, she was found to have right fourth toe cellulitis/gangrenous changes, she was admitted with     PAD/Ischemic toe  toe pressures of 45, and TRACIE of 0.47.   - continue IV Zosyn and vancomycin  - podiatry and vascular seen and plan for amputation tomorrow and angiogram   - CT abdomen shows Moderate/severe diffuse atherosclerotic disease infrarenal abdominal aorta.Diffuse severe atherosclerotic disease right superficial femoral artery. High-grade stenosis of the left popliteal artery with three-vessel runoff into the right foot.  - Continue Plavix  - gabapentin 100mg TID started yesterday and per patient very helpful, narcotics not really helpful   - oxycodone prn for pain but patient would prefer to not use narcotics if able too.      Diabetes mellitus type 2  - Resume home medication but hold home metformin  - Cover with insulin sliding scale medium, TID with meals   - Hemoglobin A1c is elevated at 8.5     Hypertension  - elevated BP post-op continue lisinopril and Norvasc  - Hold hydrochlorothiazide  - prn IV hydralazine      Hypokalemia  - Replace per protocol     Smoking dependence  - Patient quit smoking 2 days ago  - Declined nicotine patch.  - patient is adamant about quitting       Clinically Significant Risk Factors                        # Overweight: Estimated body mass index is 28.31 kg/m  as calculated from the following:    Height as of this encounter: 1.626 m (5' 4\").    Weight as of this encounter: 74.8 kg (164 lb 14.4 oz)., PRESENT ON ADMISSION     "     COVID-19 PCR Results    COVID-19 PCR Results 6/12/21 9/10/21 2/14/23   SARS-CoV-2 Virus Specimen Source Nasopharyngeal     SARS-CoV-2 PCR Result NEGATIVE     SARS CoV2 PCR  Negative Negative      Comments are available for some flowsheets but are not being displayed.         COVID-19 Antibody Results, Testing for Immunity    COVID-19 Antibody Results, Testing for Immunity   No data to display.            Code Status: Full Code  VTE prophylaxis:  No meds since surgery tomorrow and ambulating   DIET: Orders Placed This Encounter      NPO per Anesthesia Guidelines for Procedure/Surgery Except for: Meds    Drains/Lines: none  Weight bearing status: WBAT  Disposition/Barriers to discharge: pending podiatry further recs and vascular     Expected Discharge Date:       February 17, 2023      Interval History   {Pertinent overnight events  ;none    Subjective:  Her pain is significantly decreased.     PHYSICAL EXAM  Temp:  [97.6  F (36.4  C)-98.1  F (36.7  C)] 97.6  F (36.4  C)  Pulse:  [65-91] 65  Resp:  [18-20] 20  BP: (134-151)/(66-80) 134/66  SpO2:  [99 %-100 %] 100 %  Wt Readings from Last 1 Encounters:   02/15/23 66.4 kg (146 lb 6.2 oz)       Intake/Output Summary (Last 24 hours) at 2/15/2023 0832  Last data filed at 2/14/2023 1842  Gross per 24 hour   Intake 100 ml   Output --   Net 100 ml      Body mass index is 26.77 kg/m .    Physical Exam  Constitutional:       Appearance: Normal appearance.   HENT:      Head: Normocephalic and atraumatic.   Cardiovascular:      Rate and Rhythm: Normal rate and regular rhythm.      Pulses: Normal pulses.      Heart sounds: Normal heart sounds.   Pulmonary:      Effort: Pulmonary effort is normal.      Breath sounds: Normal breath sounds.   Abdominal:      General: Bowel sounds are normal.      Palpations: Abdomen is soft.   Musculoskeletal:      Comments: Right 4th toe black     Neurological:      General: No focal deficit present.      Mental Status: She is alert and oriented  to person, place, and time. Mental status is at baseline.       PERTINENT LABS/IMAGING:  Results for orders placed or performed during the hospital encounter of 02/14/23   MR Foot Right w/o & w Contrast    Impression    IMPRESSION:  1.  Reactive marrow edema in the distal phalanx of the fourth toe. No evidence for osteomyelitis.  2.  Fourth toe soft tissue edema which could represent cellulitis.       Imaging results reviewed over the past 24 hrs:   No results found for this or any previous visit (from the past 24 hour(s)).  Recent Labs   Lab 02/16/23  0807 02/16/23  0759 02/16/23  0205 02/15/23  1117 02/15/23  0814 02/14/23  1908 02/14/23  1744   WBC  --  8.3  --   --  11.7*  --  12.9*   HGB  --  13.2  --   --  13.3  --  15.9*   MCV  --  91  --   --  91  --  89   PLT  --  275  --   --  267  --  313   INR  --  0.97  --   --   --   --   --    NA  --  138  --   --  135*  --  137   POTASSIUM  --  4.3  --   --  3.9  --  3.5   CHLORIDE  --  106  --   --  100  --  98   CO2  --  25  --   --  27  --  26   BUN  --  11.0  --   --  13.3  --  8.9   CR  --  0.73  --   --  0.79  --  0.69   ANIONGAP  --  7  --   --  8  --  13   SANTINO  --  9.1  --   --  9.5  --  10.6*   * 153* 157*   < > 199*   < > 125*   ALBUMIN  --   --   --   --  3.7  --   --    PROTTOTAL  --   --   --   --  6.5  --   --    BILITOTAL  --   --   --   --  <0.2  --   --    ALKPHOS  --   --   --   --  75  --   --    ALT  --   --   --   --  25  --   --    AST  --   --   --   --  20  --   --     < > = values in this interval not displayed.       Recent Labs   Lab Test 02/14/23  1744   A1C 8.5*     Recent Labs   Lab Test 02/14/23  1744   HGB 15.9*     30 MINUTES SPENT BY ME on the date of service doing chart review, history, exam, documentation & further activities per the note.  Vivian Cassidy MD  [facility] Medicine Service  [phone]

## 2023-02-16 NOTE — PLAN OF CARE
Goal Outcome Evaluation: Plan for Lower extremity angiogram in the A.M at 1000. NPO midnight.     Problem: Pain Chronic (Persistent) (Comorbidity Management)  Goal: Acceptable Pain Control and Functional Ability  Outcome: Progressing  Intervention: Develop Pain Management Plan  Recent Flowsheet Documentation  Taken 2/15/2023 1558 by Joy Christie RN  Pain Management Interventions:    declines    emotional support   Patient c/o Rt 4th toe pain rated 5/10 upon arrival on unit. Patient received PRN Dilaudid prior to leaving the ED.     Problem: Diabetes Comorbidity  Goal: Blood Glucose Level Within Targeted Range  Outcome: Progressing   . Insulin given per order.    Problem: Hypertension Comorbidity  Goal: Blood Pressure in Desired Range  Outcome: Progressing

## 2023-02-16 NOTE — PLAN OF CARE
"  Problem: Pain Chronic (Persistent) (Comorbidity Management)  Goal: Acceptable Pain Control and Functional Ability  Outcome: Not Progressing  Intervention: Develop Pain Management Plan  Recent Flowsheet Documentation  Taken 2/15/2023 2100 by Sina Wang RN  Pain Management Interventions:   declines   emotional support  Taken 2/15/2023 2000 by Sina Wang RN  Pain Management Interventions: medication (see MAR)  Intervention: Manage Persistent Pain  Recent Flowsheet Documentation  Taken 2/16/2023 0255 by Sina Wang RN  Medication Review/Management: medications reviewed   Goal Outcome Evaluation:    Patient's vitals remain stable over night. Her pain she states is still there but doesn't want to try any opioids at this time. She had some tylenol and gabapentin before bed last night. Patient still able to ambulate independently to the bathroom. NPO since midnight for procedure.     BP (!) 144/72 (BP Location: Right arm)   Pulse 71   Temp 97.8  F (36.6  C) (Oral)   Resp 18   Ht 1.575 m (5' 2\")   Wt 66.4 kg (146 lb 6.2 oz)   SpO2 99%   BMI 26.77 kg/m      SINA WANG RN        "

## 2023-02-16 NOTE — PHARMACY-VANCOMYCIN DOSING SERVICE
Pharmacy Vancomycin Note  Date of Service 2023  Patient's  1955   67 year old, female    Indication: Bone and Joint Infection  Day of Therapy: 2  Current vancomycin regimen:  1000 mg IV q12h  Current vancomycin monitoring method: AUC  Current vancomycin therapeutic monitoring goal: 400-600 mg*h/L    InsightRX Prediction of Current Vancomycin Regimen  Loading dose: N/A  Regimen: 1000 mg IV every 12 hours.  Start time: 21:07 on 2023  Exposure target: AUC24 (range)400-600 mg/L.hr   AUC24,ss: 443 mg/L.hr  Probability of AUC24 > 400: 69 %  Ctrough,ss: 12 mg/L  Probability of Ctrough,ss > 20: 4 %  Probability of nephrotoxicity (Lodise GEORGE ): 7 %      Current estimated CrCl = Estimated Creatinine Clearance: 68.7 mL/min (based on SCr of 0.73 mg/dL).    Creatinine for last 3 days  2023:  5:44 PM Creatinine 0.69 mg/dL  2/15/2023:  8:14 AM Creatinine 0.79 mg/dL  2023:  7:59 AM Creatinine 0.73 mg/dL    Recent Vancomycin Levels (past 3 days)  2023:  7:59 AM Vancomycin 7.5 ug/mL    Vancomycin IV Administrations (past 72 hours)                   vancomycin (VANCOCIN) 1000 mg in dextrose 5% 200 mL PREMIX (mg) 1,000 mg New Bag 23 0907     1,000 mg New Bag 02/15/23 1822     1,000 mg New Bag  0658    vancomycin (VANCOCIN) 1,500 mg in sodium chloride 0.9 % 250 mL intermittent infusion (mg) 1,500 mg New Bag 23 1842                Nephrotoxins and other renal medications (From now, onward)    Start     Dose/Rate Route Frequency Ordered Stop    23 1900  vancomycin (VANCOCIN) 1,250 mg in sodium chloride 0.9 % 250 mL intermittent infusion         1,250 mg  over 90 Minutes Intravenous EVERY 12 HOURS 23 1001      02/15/23 0800  empagliflozin (JARDIANCE) tablet 10 mg         10 mg Oral DAILY 02/14/23 2030      02/15/23 0800  [Held by provider]  lisinopril (ZESTRIL) tablet 40 mg        (Held by provider since Wed 2/15/2023 at 0831 by Vivian Cassidy MD.Hold Reason: NPO)  "  Note to Pharmacy: PTA Sig:[LISINOPRIL (PRINIVIL,ZESTRIL) 40 MG TABLET] Take 40 mg by mouth daily.      40 mg Oral DAILY 02/14/23 2030      02/15/23 0000  piperacillin-tazobactam (ZOSYN) 3.375 g vial to attach to  mL bag        Note to Pharmacy: For [facility], [facility] and [facility]: For Zosyn-naive patients, use the \"Zosyn initial dose + extended infusion\" order panel.    3.375 g  over 240 Minutes Intravenous EVERY 8 HOURS 02/14/23 2052               Contrast Orders - past 72 hours (72h ago, onward)    Start     Dose/Rate Route Frequency Stop    02/15/23 0200  gadobutrol (GADAVIST) injection 7 mL         7 mL Intravenous ONCE 02/15/23 0159          Interpretation of levels and current regimen:  Vancomycin level is reflective of -600    Has serum creatinine changed greater than 50% in last 72 hours: No    Urine output:  unable to determine    Renal Function: Stable    InsightRX Prediction of Planned New Vancomycin Regimen  Loading dose: N/A  Regimen: 1250 mg IV every 12 hours.  Start time: 21:07 on 02/16/2023  Exposure target: AUC24 (range)400-600 mg/L.hr   AUC24,ss: 552 mg/L.hr  Probability of AUC24 > 400: 95 %  Ctrough,ss: 15.1 mg/L  Probability of Ctrough,ss > 20: 18 %  Probability of nephrotoxicity (Lodise GEORGE 2009): 10 %      Plan:  1. Increase Dose to 1250mg Q12H for better probability htat AUC is over 400  2. Vancomycin monitoring method: AUC  3. Vancomycin therapeutic monitoring goal: 400-600 mg*h/L  4. Pharmacy will check vancomycin levels as appropriate in 1-3 Days.  5. Serum creatinine levels will be ordered daily for the first week of therapy and at least twice weekly for subsequent weeks.    Anais Noble, PharmD    "

## 2023-02-17 ENCOUNTER — ANESTHESIA EVENT (OUTPATIENT)
Dept: SURGERY | Facility: HOSPITAL | Age: 68
DRG: 271 | End: 2023-02-17
Payer: COMMERCIAL

## 2023-02-17 ENCOUNTER — PREP FOR PROCEDURE (OUTPATIENT)
Dept: SURGERY | Facility: CLINIC | Age: 68
End: 2023-02-17
Payer: COMMERCIAL

## 2023-02-17 DIAGNOSIS — I96 DRY GANGRENE (H): Primary | ICD-10-CM

## 2023-02-17 LAB
ANION GAP SERPL CALCULATED.3IONS-SCNC: 7 MMOL/L (ref 7–15)
BUN SERPL-MCNC: 8 MG/DL (ref 8–23)
CALCIUM SERPL-MCNC: 8.4 MG/DL (ref 8.8–10.2)
CHLORIDE SERPL-SCNC: 106 MMOL/L (ref 98–107)
CREAT SERPL-MCNC: 0.64 MG/DL (ref 0.51–0.95)
DEPRECATED HCO3 PLAS-SCNC: 23 MMOL/L (ref 22–29)
ERYTHROCYTE [DISTWIDTH] IN BLOOD BY AUTOMATED COUNT: 13.7 % (ref 10–15)
GFR SERPL CREATININE-BSD FRML MDRD: >90 ML/MIN/1.73M2
GLUCOSE BLDC GLUCOMTR-MCNC: 138 MG/DL (ref 70–99)
GLUCOSE BLDC GLUCOMTR-MCNC: 159 MG/DL (ref 70–99)
GLUCOSE BLDC GLUCOMTR-MCNC: 166 MG/DL (ref 70–99)
GLUCOSE BLDC GLUCOMTR-MCNC: 181 MG/DL (ref 70–99)
GLUCOSE BLDC GLUCOMTR-MCNC: 206 MG/DL (ref 70–99)
GLUCOSE BLDC GLUCOMTR-MCNC: 217 MG/DL (ref 70–99)
GLUCOSE SERPL-MCNC: 142 MG/DL (ref 70–99)
HCT VFR BLD AUTO: 38 % (ref 35–47)
HGB BLD-MCNC: 12.5 G/DL (ref 11.7–15.7)
MCH RBC QN AUTO: 30 PG (ref 26.5–33)
MCHC RBC AUTO-ENTMCNC: 32.9 G/DL (ref 31.5–36.5)
MCV RBC AUTO: 91 FL (ref 78–100)
PLATELET # BLD AUTO: 229 10E3/UL (ref 150–450)
POTASSIUM SERPL-SCNC: 3.5 MMOL/L (ref 3.4–5.3)
RBC # BLD AUTO: 4.16 10E6/UL (ref 3.8–5.2)
SODIUM SERPL-SCNC: 136 MMOL/L (ref 136–145)
WBC # BLD AUTO: 9.7 10E3/UL (ref 4–11)

## 2023-02-17 PROCEDURE — 258N000003 HC RX IP 258 OP 636: Performed by: INTERNAL MEDICINE

## 2023-02-17 PROCEDURE — 120N000001 HC R&B MED SURG/OB

## 2023-02-17 PROCEDURE — 99233 SBSQ HOSP IP/OBS HIGH 50: CPT | Performed by: PODIATRIST

## 2023-02-17 PROCEDURE — 250N000013 HC RX MED GY IP 250 OP 250 PS 637: Performed by: INTERNAL MEDICINE

## 2023-02-17 PROCEDURE — 85027 COMPLETE CBC AUTOMATED: CPT | Performed by: INTERNAL MEDICINE

## 2023-02-17 PROCEDURE — 250N000011 HC RX IP 250 OP 636: Performed by: INTERNAL MEDICINE

## 2023-02-17 PROCEDURE — 82310 ASSAY OF CALCIUM: CPT | Performed by: INTERNAL MEDICINE

## 2023-02-17 PROCEDURE — 99232 SBSQ HOSP IP/OBS MODERATE 35: CPT | Performed by: INTERNAL MEDICINE

## 2023-02-17 PROCEDURE — 36415 COLL VENOUS BLD VENIPUNCTURE: CPT | Performed by: INTERNAL MEDICINE

## 2023-02-17 RX ORDER — POTASSIUM CHLORIDE 1500 MG/1
20 TABLET, EXTENDED RELEASE ORAL ONCE
Status: COMPLETED | OUTPATIENT
Start: 2023-02-17 | End: 2023-02-17

## 2023-02-17 RX ADMIN — POTASSIUM CHLORIDE 20 MEQ: 1500 TABLET, EXTENDED RELEASE ORAL at 13:16

## 2023-02-17 RX ADMIN — TRAZODONE HYDROCHLORIDE 50 MG: 50 TABLET ORAL at 20:36

## 2023-02-17 RX ADMIN — PIPERACILLIN AND TAZOBACTAM 3.38 G: 3; .375 INJECTION, POWDER, LYOPHILIZED, FOR SOLUTION INTRAVENOUS at 00:25

## 2023-02-17 RX ADMIN — EMPAGLIFLOZIN 10 MG: 10 TABLET, FILM COATED ORAL at 08:39

## 2023-02-17 RX ADMIN — VANCOMYCIN HYDROCHLORIDE 1250 MG: 5 INJECTION, POWDER, LYOPHILIZED, FOR SOLUTION INTRAVENOUS at 06:11

## 2023-02-17 RX ADMIN — INSULIN DEGLUDEC INJECTION 30 UNITS: 100 INJECTION, SOLUTION SUBCUTANEOUS at 08:40

## 2023-02-17 RX ADMIN — GABAPENTIN 100 MG: 100 CAPSULE ORAL at 08:39

## 2023-02-17 RX ADMIN — CLOPIDOGREL BISULFATE 75 MG: 75 TABLET ORAL at 08:39

## 2023-02-17 RX ADMIN — AMLODIPINE BESYLATE 10 MG: 5 TABLET ORAL at 08:39

## 2023-02-17 RX ADMIN — SENNOSIDES AND DOCUSATE SODIUM 1 TABLET: 50; 8.6 TABLET ORAL at 20:34

## 2023-02-17 RX ADMIN — GABAPENTIN 100 MG: 100 CAPSULE ORAL at 13:16

## 2023-02-17 RX ADMIN — SITAGLIPTIN 100 MG: 25 TABLET, FILM COATED ORAL at 08:39

## 2023-02-17 RX ADMIN — LISINOPRIL 40 MG: 20 TABLET ORAL at 08:39

## 2023-02-17 RX ADMIN — GABAPENTIN 100 MG: 100 CAPSULE ORAL at 20:34

## 2023-02-17 RX ADMIN — PIPERACILLIN AND TAZOBACTAM 3.38 G: 3; .375 INJECTION, POWDER, LYOPHILIZED, FOR SOLUTION INTRAVENOUS at 08:52

## 2023-02-17 ASSESSMENT — ACTIVITIES OF DAILY LIVING (ADL)
ADLS_ACUITY_SCORE: 18
ADLS_ACUITY_SCORE: 20
ADLS_ACUITY_SCORE: 20
ADLS_ACUITY_SCORE: 18
ADLS_ACUITY_SCORE: 20
ADLS_ACUITY_SCORE: 20
ADLS_ACUITY_SCORE: 18
ADLS_ACUITY_SCORE: 20
ADLS_ACUITY_SCORE: 18
ADLS_ACUITY_SCORE: 20

## 2023-02-17 NOTE — PROGRESS NOTES
[facility]    PROGRESS NOTE - Hospitalist Service    Assessment and Plan    Principal Problem:    Toe infection  Active Problems:    Type 2 diabetes mellitus (H)    PAD (peripheral artery disease) (H)    Essential hypertension    Dyslipidemia    Ischemic toe    Ashanti Aviles is a 67 year old female with h/o smoking, diabetes and hypertension who presents to this ED for evaluation of an impaired toe circulation and foot pain, she was found to have right fourth toe cellulitis/gangrenous changes, she was admitted with - CT abdomen shows Moderate/severe diffuse atherosclerotic disease infrarenal abdominal aorta.Diffuse severe atherosclerotic disease right superficial femoral artery. High-grade stenosis of the left popliteal artery with three-vessel runoff into the right foot.     PAD/Ischemic toe  toe pressures of 45, and TRACIE of 0.47.   - S/p angiogram Right lower extremity angiography demonstrates occlusion of the ostial superficial femoral artery with moderate to severe stenosis throughout the vessel. This was successfully treated with directional atherectomy, traditional and drug coated balloon  angioplasty. Post intervention imaging demonstrates excellent result with brisk in line flow through the revascularized superficial femoral artery with three-vessel runoff to the foot.  - vascular assistance appreciated   - podiatry seen today and plan for amputation tomorrow, NPO at midnight  - ok to stop IV abx    - Continue Plavix  - gabapentin 100mg TID started very helpful, narcotics not really helpful   - oxycodone prn for pain but patient would prefer to not use narcotics if able too.      Diabetes mellitus type 2  - holding metformin while here  - hold januvia, jardiance and tresiba tomorrow since having surgery and NPO   - Cover with insulin sliding scale medium,   - Hemoglobin A1c is elevated at 8.5     Hypertension  - elevated BP post-op continue lisinopril and Norvasc  - Hold  "hydrochlorothiazide  - prn IV hydralazine      Hypokalemia  - Replace per protocol     Smoking dependence  - Patient quit smoking 2 days ago  - Declined nicotine patch.  - patient is adamant about quitting       Clinically Significant Risk Factors                        # Overweight: Estimated body mass index is 28.31 kg/m  as calculated from the following:    Height as of this encounter: 1.626 m (5' 4\").    Weight as of this encounter: 74.8 kg (164 lb 14.4 oz)., PRESENT ON ADMISSION         COVID-19 PCR Results    COVID-19 PCR Results 6/12/21 9/10/21 2/14/23   SARS-CoV-2 Virus Specimen Source Nasopharyngeal     SARS-CoV-2 PCR Result NEGATIVE     SARS CoV2 PCR  Negative Negative      Comments are available for some flowsheets but are not being displayed.         COVID-19 Antibody Results, Testing for Immunity    COVID-19 Antibody Results, Testing for Immunity   No data to display.            Code Status: Full Code  VTE prophylaxis:  No meds since surgery tomorrow and ambulating   DIET: Orders Placed This Encounter      Moderate Consistent Carb (60 g CHO per Meal) Diet      NPO per Anesthesia Guidelines for Procedure/Surgery Except for: Meds    Drains/Lines: none  Weight bearing status: WBAT  Disposition/Barriers to discharge: pending podiatry further recs and vascular     Expected Discharge Date:       February 18, 2023      Interval History   {Pertinent overnight events  ;none    Subjective:  Minimal pain     PHYSICAL EXAM  Temp:  [97.8  F (36.6  C)-98.3  F (36.8  C)] 98.3  F (36.8  C)  Pulse:  [57-81] 73  Resp:  [10-20] 18  BP: (122-177)/(55-83) 129/65  SpO2:  [90 %-100 %] 100 %  Wt Readings from Last 1 Encounters:   02/15/23 66.4 kg (146 lb 6.2 oz)       Intake/Output Summary (Last 24 hours) at 2/15/2023 0832  Last data filed at 2/14/2023 1842  Gross per 24 hour   Intake 100 ml   Output --   Net 100 ml      Body mass index is 26.77 kg/m .    Physical Exam  Constitutional:       Appearance: Normal appearance. "   HENT:      Head: Normocephalic and atraumatic.   Cardiovascular:      Rate and Rhythm: Normal rate and regular rhythm.      Pulses: Normal pulses.      Heart sounds: Normal heart sounds.   Pulmonary:      Effort: Pulmonary effort is normal.      Breath sounds: Normal breath sounds.   Abdominal:      General: Bowel sounds are normal.      Palpations: Abdomen is soft.   Musculoskeletal:      Comments: Right 4th toe black  DP palpable and PT on right foot, warm   Skin:     General: Skin is warm.   Neurological:      General: No focal deficit present.      Mental Status: She is alert and oriented to person, place, and time. Mental status is at baseline.       PERTINENT LABS/IMAGING:  Results for orders placed or performed during the hospital encounter of 02/14/23   MR Foot Right w/o & w Contrast    Impression    IMPRESSION:  1.  Reactive marrow edema in the distal phalanx of the fourth toe. No evidence for osteomyelitis.  2.  Fourth toe soft tissue edema which could represent cellulitis.       Imaging results reviewed over the past 24 hrs:   Recent Results (from the past 24 hour(s))   IR Lower Extremity Angiogram Right    Narrative    LOCATION: [facility]  DATE: 2/16/2023    PROCEDURE: PELVIC AORTOGRAM, RIGHT LOWER EXTREMITY DIAGNOSTIC ARTERIOGRAPHY, ANGIOPLASTY/ATHERECTOMY/DRUG COATED BALLOON INFLATION OF THE RIGHT SUPERFICIAL FEMORAL ARTERY, ULTRASOUND GUIDANCE FOR VASCULAR ACCESS, MODERATE SEDATION    INTERVENTIONAL RADIOLOGIST: Elizabeth Sheehan MD    INDICATION: 67-year-old female with nonhealing right digit wound in the setting of peripheral arterial disease, plan for right lower extremity angiography with possible intervention.    INDICATION FOR DIAGNOSTIC ANGIOGRAPHY: Diagnostic angiography was required to precisely identify plaque morphology to guide management    CONSENT: The risks, benefits and alternatives of the procedure were discussed with the patient  in detail. All questions were  answered. Informed consent was given to proceed with the procedure.    MODERATE SEDATION: Versed 5 mg IV; Fentanyl 275 mcg IV. During the time out, immediately prior to the administration of medications, the patient was reassessed for adequacy to receive conscious sedation.  Under physician supervision, Versed and fentanyl   were administered for moderate sedation. Pulse oximetry, heart rate and blood pressure were continuously monitored by an independent trained observer. The physician spent 90 minutes of face-to-face sedation time with the patient.    CONTRAST: 100 cc Visipaque  ANTIBIOTICS: None.  ADDITIONAL MEDICATIONS: Heparin 14,000 units, protamine 50 mg    FLUOROSCOPIC TIME: 29.9 minutes.  RADIATION DOSE: Air Kerma: 650 mGy.    COMPLICATIONS: No immediate complications.    UNIVERSAL PROTOCOL: The operative site was marked and any prior imaging was reviewed. Required items including blood products, implants, devices and special equipment was made available. Patient identity was confirmed either verbally, with demographic   information, hospital assigned identification or other identification markers. A timeout was performed immediately prior to the procedure.    STERILE BARRIER TECHNIQUE: Maximum sterile barrier technique was used. Cutaneous antisepsis was performed at the operative site with application of 2% chlorhexidine and large sterile drape. Prior to the procedure, the  and assistant performed   hand hygiene and wore hat, mask, sterile gown, and sterile gloves during the entire procedure.    PROCEDURE:    Access was achieved into the left common femoral artery utilizing real-time ultrasound guidance and a micropuncture access kit. Imaging demonstrates a patent and compressible artery. Permanent images were stored to the patient's medical record.   Conversion was made for a 5 Icelandic vascular sheath, which was attached to a continuous heparinized saline infusion.     A flush catheter was  manipulated over a wire into the lower abdomen at the level of the inferior mesenteric artery and digital subtraction pelvic arteriography was obtained. Imaging demonstrates patent caudal abdominal aorta and bilateral iliac   vasculature.    Selective catheterization of the right common iliac and external iliac artery was performed. A right lower extremity angiogram was obtained with catheter positioned in the caudal right external iliac artery. Imaging demonstrates patent right femoral   artery with moderate stenosis. There is high-grade stenosis involving the right profunda femoral artery ostium. Focal occlusion of the right superficial femoral artery ostium. There is diffuse, moderate stenosis involving the entire right superficial   femoral artery. Patent above and below knee popliteal artery. There is a three-vessel runoff to the foot.    Systemic heparinization was performed.    Exchange is made for a 7 Bulgarian vascular sheath which was advanced to the external iliac arteries. An angled .035 crossing catheter and guidewire were used to cross the occluded superficial femoral artery. The guidewire and catheter were advanced to the   below-knee popliteal artery. Over the wire exchange is made for a spider FX distal embolic protection filter. The filter was deployed in the below knee popliteal artery. Over the wire exchange is made for a 7F [place] LX atherectomy device. Atherectomy   of the entire superficial femoral artery was performed with removal of significant volume of plaque. Over-the-wire exchange is made for a 5 mm x 20 cm angioplasty balloon. Angioplasty of the entire superficial femoral artery was performed. Over-the-wire   exchange is made for a 5 mm x 25 cm InPact drug eluting balloon. Drug coated balloon angioplasty of the entire superficial femoral artery was performed to burst pressure. The distal embolic protection filter was removed and a repeat right lower extremity   angiogram was obtained.  Imaging demonstrates widely patent superficial femoral artery. There is brisk three-vessel runoff to the foot. No significant residual stenosis is identified.    Protamine sulfate was administered and hemostasis was achieved with manual pressure.      Impression    IMPRESSION:    Right lower extremity angiography demonstrates occlusion of the ostial superficial femoral artery with moderate to severe stenosis throughout the vessel. This was successfully treated with directional atherectomy, traditional and drug coated balloon   angioplasty. Post intervention imaging demonstrates excellent result with brisk in line flow through the revascularized superficial femoral artery with three-vessel runoff to the foot.     Recent Labs   Lab 02/17/23  0817 02/17/23  0754 02/17/23  0750 02/16/23  0807 02/16/23  0759 02/15/23  1117 02/15/23  0814   WBC  --   --  9.7  --  8.3  --  11.7*   HGB  --   --  12.5  --  13.2  --  13.3   MCV  --   --  91  --  91  --  91   PLT  --   --  229  --  275  --  267   INR  --   --   --   --  0.97  --   --    NA  --   --  136  --  138  --  135*   POTASSIUM  --   --  3.5  --  4.3  --  3.9   CHLORIDE  --   --  106  --  106  --  100   CO2  --   --  23  --  25  --  27   BUN  --   --  8.0  --  11.0  --  13.3   CR  --   --  0.64  --  0.73  --  0.79   ANIONGAP  --   --  7  --  7  --  8   SANTINO  --   --  8.4*  --  9.1  --  9.5   * 138* 142*   < > 153*   < > 199*   ALBUMIN  --   --   --   --   --   --  3.7   PROTTOTAL  --   --   --   --   --   --  6.5   BILITOTAL  --   --   --   --   --   --  <0.2   ALKPHOS  --   --   --   --   --   --  75   ALT  --   --   --   --   --   --  25   AST  --   --   --   --   --   --  20    < > = values in this interval not displayed.       Recent Labs   Lab Test 02/14/23  1744   A1C 8.5*     Recent Labs   Lab Test 02/14/23  1744   HGB 15.9*     25 MINUTES SPENT BY ME on the date of service doing chart review, history, exam, documentation & further activities per the  note.  Vivian Cassdiy MD  [facility] Medicine Service  [phone]

## 2023-02-17 NOTE — PLAN OF CARE
Goal Outcome Evaluation:  Slight bleeding to left groin from puncture site. Distal pulses and CMS intact; doppler used for pedal pulses. BP elevated. PRN Hydralazine given and effective. IV Vanco and Zosyn running. C/o bilateral groin camps, more to right groin area. PRN Tylenol given and effective. SBA to BR. Voiding appropriately. Tolerating regular diet.     Problem: Pain Chronic (Persistent) (Comorbidity Management)  Goal: Acceptable Pain Control and Functional Ability  Outcome: Progressing  Intervention: Develop Pain Management Plan  Recent Flowsheet Documentation  Taken 2/16/2023 1732 by Joy Christie, RN  Pain Management Interventions: medication (see MAR)  Taken 2/16/2023 1615 by Joy Christie, RN  Pain Management Interventions: emotional support  Taken 2/16/2023 1445 by Joy Christie, RN  Pain Management Interventions: medication (see MAR)  Taken 2/16/2023 1406 by Joy Christie, RN  Pain Management Interventions: emotional support  Taken 2/16/2023 0815 by Joy Christie, RN  Pain Management Interventions: medication (see MAR)    Problem: Hypertension Comorbidity  Goal: Blood Pressure in Desired Range  Outcome: Progressing    Problem: Diabetes Comorbidity  Goal: Blood Glucose Level Within Targeted Range  Outcome: Progressing

## 2023-02-17 NOTE — PLAN OF CARE
Problem: Diabetes Comorbidity  Goal: Blood Glucose Level Within Targeted Range  Outcome: Progressing     Problem: Pain Chronic (Persistent) (Comorbidity Management)  Goal: Acceptable Pain Control and Functional Ability  Outcome: Progressing  Intervention: Manage Persistent Pain  Recent Flowsheet Documentation  Taken 2/17/2023 1100 by Daron Wyman RN  Medication Review/Management: medications reviewed     Problem: Hypertension Comorbidity  Goal: Blood Pressure in Desired Range  Outcome: Adequate for Care Transition  Intervention: Maintain Blood Pressure Management  Recent Flowsheet Documentation  Taken 2/17/2023 1100 by Daron Wyman RN  Medication Review/Management: medications reviewed     Problem: Infection  Goal: Absence of Infection Signs and Symptoms  Outcome: Adequate for Care Transition   Goal Outcome Evaluation:       Patient is A/O x4. VSS. BG in mid 100's this shift. One unit of sliding scale insulin aspart given at lunchtime. Patient aware that she will be NPO at midnight for toe amputation in the morning. She is accepting of the plan of care. She makes no requests for pain medications this shift, although she did report some tenderness to her right foot related to toe necrosis. IV Abx discontinued by medical provider due to absence of infection.

## 2023-02-17 NOTE — DISCHARGE INSTRUCTIONS
Angiogram Discharge Instructions:  You had an angiogram procedure.  An angiogram is a procedure that uses x-rays to take pictures of your blood vessels. A long, flexible tube or catheter is inserted through the blood stream (through the procedure site) to help deliver contrast (dye) into the arteries so they can be visible on the x-ray. Angiograms are used to evaluate possible blockages in the arterial system. Please follow the below instructions after your angiogram, including monitoring of your procedure site.    Care instructions after angiogram procedure:  -  If you received sedation for your procedure, do not drive or operate heavy machinery for the rest of the day.  -  Do not lift objects greater than 10 pounds for 2 days following angiogram procedure.  -  Avoid excessive exercise and straining for 2 days.   -  Avoid tub baths, pools, hot tubs and Jacuzzis for 3 days or until procedure site is well healed.   -  You may shower beginning tomorrow. Do not scrub procedure site until well healed; pat dry.  -  Return to your normal activities as you tolerate after the 2 day restriction.  -  You can expect to return to work 1-2 days after your procedure - depending on the nature of your profession.  -  It is normal to have some tenderness and minimal swelling at procedure puncture site. A small area of discoloration may be present. Tenderness typically subsides in 1-2 days. A small knot may also be present at puncture site for 6-8 weeks. This can be a normal part of the healing process.     Follow up:  - Follow up with your vascular surgeon or the ordering provider.    Please seek medical evaluation for:  - If you develop fevers (greater than 101 F (38.3C)).  - If you develop increasing pain, redness, purulent drainage, tenderness, or swelling at procedure site.   - If you experience any bleeding from procedure/puncture site: lie down, firmly apply pressure to puncture site and call 911.  - Seek emergent evaluation  if you experience any new leg/arm pain, discoloration or numbness.

## 2023-02-17 NOTE — PROGRESS NOTES
FOOT AND ANKLE SURGERY/PODIATRY Progress Note        ASSESSMENT/PLAN:   Dry gangrene fourth toe right foot  Ischemic pain fourth toe right foot    The patient is S/P successful angio intervention on the right lower extremity.  The patient will be scheduled for amputation of the fourth toe right foot.  The procedure to be done under local anesthesia with IV sedation.  The procedure will be scheduled for 2/18/2023 by Dr. Dionte Diez.    HPI: Ashanti Aviles was seen again today resting comfortably..  She has no complaints.  She is anxious to have the amputation of the fourth toe right foot performed.    Past Medical History:   Diagnosis Date     Diabetes mellitus (H)      Gastroesophageal reflux disease      Hypertension      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/29/2013        Past Surgical History:   Procedure Laterality Date     ANUS SURGERY       BIOPSY CERVICAL, LOCAL EXCISION, SINGLE/MULTIPLE       COLONOSCOPY N/A 9/11/2020    Procedure: EXAM UNDER ANESTHESIA, HIGH RESOLUTION ANOSCOPY INTRA OP;  Surgeon: Preeti Sheehan MD;  Location: [facility];  Service: Gastroenterology     COLONOSCOPY N/A 12/14/2020    Procedure: EXAM UNDER ANESTHESIA WITH HIGH RESOLUTION ANOSCOPY;  Surgeon: Preeti Sheehan MD;  Location: [facility];  Service: General     COLONOSCOPY N/A 6/15/2021    Procedure: EXAM UNDER ANESTHESIA WITH HIGH RESOLUTION ANOSCOPY, BIOPSY, FULGURATION;  Surgeon: Preeti Sheehan MD;  Location: [facility];  Service: Gastroenterology     EXAM UNDER ANESTHESIA ANUS N/A 9/14/2021    Procedure: EXAM UNDER ANESTHESIA WITH HIGH RESOLUTION ANOSCOPY;  Surgeon: Preeti Sheehan MD;  Location: [facility]     IR LOWER EXTREMITY ANGIOGRAM RIGHT  2/16/2023       Allergies   Allergen Reactions     Ezetimibe Unknown     Generalized body aches     Oxycodone Nausea and Vomiting     Penicillins Unknown     Simvastatin Unknown     Muscle pain     Victoza [Liraglutide] Unknown          Current Facility-Administered Medications:      acetaminophen (TYLENOL) tablet 650 mg, 650 mg, Oral, Q6H PRN, 650 mg at 02/16/23 1733 **OR** acetaminophen (TYLENOL) Suppository 650 mg, 650 mg, Rectal, Q6H PRN, Daniel Nguyen MD     amLODIPine (NORVASC) tablet 10 mg, 10 mg, Oral, Daily, Daniel Nguyen MD, 10 mg at 02/17/23 0839     calcium carbonate (TUMS) chewable tablet 500 mg, 500 mg, Oral, Daily PRN, Houston Madrigal MD, 500 mg at 02/15/23 0238     clopidogrel (PLAVIX) tablet 75 mg, 75 mg, Oral, Daily, Daniel Nguyen MD, 75 mg at 02/17/23 0839     glucose gel 15-30 g, 15-30 g, Oral, Q15 Min PRN **OR** dextrose 50 % injection 25-50 mL, 25-50 mL, Intravenous, Q15 Min PRN **OR** glucagon injection 1 mg, 1 mg, Subcutaneous, Q15 Min PRN, Elizabeth Sheehan MD     glucose gel 15-30 g, 15-30 g, Oral, Q15 Min PRN **OR** dextrose 50 % injection 25-50 mL, 25-50 mL, Intravenous, Q15 Min PRN **OR** glucagon injection 1 mg, 1 mg, Subcutaneous, Q15 Min PRN, Daniel Nguyen MD     empagliflozin (JARDIANCE) tablet 10 mg, 10 mg, Oral, Daily, Daniel Nguyen MD, 10 mg at 02/17/23 0839     gabapentin (NEURONTIN) capsule 100 mg, 100 mg, Oral, TID, Vivian Cassidy MD, 100 mg at 02/17/23 0839     Hold: Metformin and metformin containing medications on day of the procedure and for 48 hours after IV contrast given- Patients with acute kidney injury or severe chronic kidney disease (stage IV or stage V; i.e., eGFR less than 30), , Does not apply, HOLD, Elizabeth Sheehan MD     hydrALAZINE (APRESOLINE) injection 10 mg, 10 mg, Intravenous, Q4H PRN, Vivian Cassidy MD, 10 mg at 02/16/23 1629     insulin aspart (NovoLOG) injection (RAPID ACTING), 1-7 Units, Subcutaneous, TID w/meals, Vivian Cassidy MD     insulin degludec (TRESIBA) 100 UNIT/ML injection 30 Units, 30 Units, Subcutaneous, Daily, PatrickDaniel rodas MD, 30 Units at 02/17/23 0840     lidocaine (LMX4) cream, , Topical, Q1H PRN, Daniel Nguyen MD      lidocaine 1 % 0.1-1 mL, 0.1-1 mL, Other, Q1H PRN, Daniel Nguyen MD     lisinopril (ZESTRIL) tablet 40 mg, 40 mg, Oral, Daily, Daniel Nguyen MD, 40 mg at 02/17/23 0839     melatonin tablet 1 mg, 1 mg, Oral, At Bedtime PRN, Daniel Nguyen MD     ondansetron (ZOFRAN ODT) ODT tab 4 mg, 4 mg, Oral, Q6H PRN **OR** ondansetron (ZOFRAN) injection 4 mg, 4 mg, Intravenous, Q6H PRN, Daniel Nguyen MD     oxyCODONE (ROXICODONE) tablet 5-10 mg, 5-10 mg, Oral, Q4H PRN, Vivian Cassidy MD     senna-docusate (SENOKOT-S/PERICOLACE) 8.6-50 MG per tablet 1 tablet, 1 tablet, Oral, BID, 1 tablet at 02/16/23 0816 **OR** senna-docusate (SENOKOT-S/PERICOLACE) 8.6-50 MG per tablet 2 tablet, 2 tablet, Oral, BID, Daniel Nguyen MD, 2 tablet at 02/16/23 2038     sitagliptin (JANUVIA) tablet 100 mg, 100 mg, Oral, Daily, Daniel Nguyen MD, 100 mg at 02/17/23 0839     sodium chloride (PF) 0.9% PF flush 3 mL, 3 mL, Intracatheter, Q8H, Daniel Nguyen MD, 3 mL at 02/15/23 2001     sodium chloride (PF) 0.9% PF flush 3 mL, 3 mL, Intracatheter, q1 min prn, Daniel Nguyen MD     traZODone (DESYREL) half-tab 25 mg, 25 mg, Oral, At Bedtime **OR** traZODone (DESYREL) tablet 50 mg, 50 mg, Oral, At Bedtime, Vivian Cassidy MD, 50 mg at 02/16/23 2145    Facility-Administered Medications Ordered in Other Encounters:      fentaNYL (PF) (SUBLIMAZE) injection 25-50 mcg, 25-50 mcg, Intravenous, Q5 Min PRN, Pauly Pacheco MD     flumazenil (ROMAZICON) injection 0.2 mg, 0.2 mg, Intravenous, q1 min prn, Pauly Pacheco MD     heparin (PRESSURE BAG) 2 Units/mL in 0.9% NaCl (500 mL), 1 Bag, TABLE SOLN, Continuous PRN, Pauly Pacheco MD     lidocaine 1 % 1-30 mL, 1-30 mL, Intradermal, Once PRN, Pauly Pacheco MD     midazolam (VERSED) injection 0.5-2 mg, 0.5-2 mg, Intravenous, Q4 Min PRN, Pauly Pacheco MD     naloxone (NARCAN) injection 0.2 mg, 0.2 mg, Intravenous, Q2 Min PRN **OR** naloxone (NARCAN) injection 0.4 mg, 0.4 mg, Intravenous, Q2  "Min PRN **OR** naloxone (NARCAN) injection 0.2 mg, 0.2 mg, Intramuscular, Q2 Min PRN **OR** naloxone (NARCAN) injection 0.4 mg, 0.4 mg, Intramuscular, Q2 Min PRN, Pauly Pacheco MD    No family history on file.    Social History     Socioeconomic History     Marital status:      Spouse name: Not on file     Number of children: Not on file     Years of education: Not on file     Highest education level: Not on file   Occupational History     Not on file   Tobacco Use     Smoking status: Former     Packs/day: 0.25     Types: Cigarettes     Quit date: 2023     Years since quittin.0     Smokeless tobacco: Never   Substance and Sexual Activity     Alcohol use: Yes     Comment: Alcoholic Drinks/day: 2x     Drug use: Not Currently     Sexual activity: Yes     Partners: Male   Other Topics Concern     Not on file   Social History Narrative     Not on file     Social Determinants of Health     Financial Resource Strain: Not on file   Food Insecurity: Not on file   Transportation Needs: Not on file   Physical Activity: Not on file   Stress: Not on file   Social Connections: Not on file   Intimate Partner Violence: Not on file   Housing Stability: Not on file        /65 (BP Location: Right arm)   Pulse 73   Temp 98.3  F (36.8  C) (Oral)   Resp 18   Ht 1.575 m (5' 2\")   Wt 66.4 kg (146 lb 6.2 oz)   SpO2 100%   BMI 26.77 kg/m      BMI= Body mass index is 26.77 kg/m .    OBJECTIVE:  General appearance: Patient is alert and fully cooperative with history & exam.  No sign of distress is noted during the visit.      Imaging:         US Low Ext Arterial Doppler  wo Ex    Result Date: 2/10/2023  Table formatting from the original result was not included. BILATERAL RESTING ANKLE-BRACHIAL INDICES (TRACIE'S) (Date: 02/10/23) Indication: Surveillance TRACIE's: PAD, Bilateral leg pain, Right 4th Toe Discolored/ Ulcer. Hx: Bilateral SFA  Proximal Stenosis. Previous: 3/17/2022 History: Previous Smoker, Hypertension, " Diabetic, PAD, Angioplasty, Rest Pain, Claudication, Skin Color Change and Vascular Ulcers  Resting TRACIE's          Right: mmHg Index     Brachial: 140  Ankle-(PT): 66 0.47 Ankle-(DP): 61 0.44          Digit: 45 0.32               Left: mmHg Index     Brachial: 139  Ankle-(PT): 66 0.47 Ankle-(DP): 65 0.46          Digit: 41 0.29 Resting ankle-brachial index of 0.47 on the right. Toe Pressures of 45 mmHg and TBI of 0.32 Resting ankle-brachial index of 0.47 on the left. Toe Pressures of 41 mmHg and TBI of 0.29  VPR WAVEFORMS: The right volume plethysmography waveforms are moderately abnormal at the lower thigh level, moderately abnormal at the upper calf level and moderately abnormal at the ankle. The left volume plethysmography waveforms are mildly abnormal at the lower thigh level, mildly abnormal at the upper calf level and moderately abnormal at the ankle. Comments: Patient refused Exercise on Treadmill due to leg pain and 4th toe ulcer.  Impression:  1. RIGHT LOWER EXTREMITY: TRACIE is Abnormal with an TRACIE of 0.47 indicating multilevel disease. Toe Pressures are Abnormal and impaired for wound healing with toe pressures of 45 mmHg. 2. LEFT LOWER EXTREMITY: TRACIE is Abnormal with an TRACIE of 0.47 indicating multilevel disease. Toe Pressures are Abnormal and impaired for wound healing with toe pressures of 41 mmHg. Reference: Wound classification Grade TRACIE Ankle Systolic Pressure Toe Pressures 0 > 0.80 > 100 mmHg > 60 mmHg 1 0.6 - 0.79 70 - 100 mmHg 40 - 59 mmHg 2 0.4 - 0.59 50-70 mmHg 30 - 39 mmHg 3 < 0.39 < 50 mmHg < 30 mmHg Digit Pressures DBI Disease Category > 0.70 Normal < 0.70 Abnormal > 30 mmHg Potential wound healing < 30 mmHg Impaired wound healing Ankle Brachial Pressures TRACIE Disease Category > 1.3  Likely vessel calcification with monophasic waveforms, non-diagnostic 0.95-1.30 Normal with multiphasic waveforms 0.50-0.95 Single level disease 0.30-0.50 Multilevel disease < 0.30 Critical limb ischema Volume  Plethysmography Recording (VPR) at all levels Normal Sharp systolic peak, fast upstroke, prominent dicrotic notch in wave Mild Sharp systolic peak, fast upstroke, absent dicrotic notch in wave Moderate Flattened systolic peak, slowed upstroke, absent dicrotic notch inwave Severe amplitude wave with = upslope and down slope Occluded Flat Line     MR Foot Right w/o & w Contrast    Result Date: 2/15/2023  EXAM: MR FOOT RIGHT W/O and W CONTRAST LOCATION: [facility] DATE/TIME: 2/15/2023 2:12 AM INDICATION: Right fourth toe gangrenous changes cellulitis, rule out osteo COMPARISON: Right foot radiograph 02/08/2023 TECHNIQUE: Routine. Additional postgadolinium T1 sequences were obtained. IV CONTRAST: 7ml Gadavist FINDINGS: JOINTS AND BONES: -Increased fluid sensitive signal in the distal phalanx of the fourth toe, with normal marrow signal on T1. This most likely represents reactive edema. No marrow signal suspicious for osteomyelitis. No fracture. No joint effusion. Degenerative changes at  the first MTP joint. TENDONS: -No tendon tear, tendinopathy, or tenosynovitis. LIGAMENTS: -Lisfranc ligament: Intact. No subluxation. MUSCLES AND SOFT TISSUES: -Soft tissue edema and enhancement of the fourth toe. No fluid collections.     IMPRESSION: 1.  Reactive marrow edema in the distal phalanx of the fourth toe. No evidence for osteomyelitis. 2.  Fourth toe soft tissue edema which could represent cellulitis.    IR Lower Extremity Angiogram Right    Result Date: 2/16/2023  LOCATION: [facility] DATE: 2/16/2023 PROCEDURE: PELVIC AORTOGRAM, RIGHT LOWER EXTREMITY DIAGNOSTIC ARTERIOGRAPHY, ANGIOPLASTY/ATHERECTOMY/DRUG COATED BALLOON INFLATION OF THE RIGHT SUPERFICIAL FEMORAL ARTERY, ULTRASOUND GUIDANCE FOR VASCULAR ACCESS, MODERATE SEDATION INTERVENTIONAL RADIOLOGIST: Elizabeth Sheehan MD INDICATION: 67-year-old female with nonhealing right digit wound in the setting of peripheral arterial  disease, plan for right lower extremity angiography with possible intervention. INDICATION FOR DIAGNOSTIC ANGIOGRAPHY: Diagnostic angiography was required to precisely identify plaque morphology to guide management CONSENT: The risks, benefits and alternatives of the procedure were discussed with the patient  in detail. All questions were answered. Informed consent was given to proceed with the procedure. MODERATE SEDATION: Versed 5 mg IV; Fentanyl 275 mcg IV. During the time out, immediately prior to the administration of medications, the patient was reassessed for adequacy to receive conscious sedation.  Under physician supervision, Versed and fentanyl were administered for moderate sedation. Pulse oximetry, heart rate and blood pressure were continuously monitored by an independent trained observer. The physician spent 90 minutes of face-to-face sedation time with the patient. CONTRAST: 100 cc Visipaque ANTIBIOTICS: None. ADDITIONAL MEDICATIONS: Heparin 14,000 units, protamine 50 mg FLUOROSCOPIC TIME: 29.9 minutes. RADIATION DOSE: Air Kerma: 650 mGy. COMPLICATIONS: No immediate complications. UNIVERSAL PROTOCOL: The operative site was marked and any prior imaging was reviewed. Required items including blood products, implants, devices and special equipment was made available. Patient identity was confirmed either verbally, with demographic information, hospital assigned identification or other identification markers. A timeout was performed immediately prior to the procedure. STERILE BARRIER TECHNIQUE: Maximum sterile barrier technique was used. Cutaneous antisepsis was performed at the operative site with application of 2% chlorhexidine and large sterile drape. Prior to the procedure, the  and assistant performed hand hygiene and wore hat, mask, sterile gown, and sterile gloves during the entire procedure. PROCEDURE:  Access was achieved into the left common femoral artery utilizing real-time ultrasound  guidance and a micropuncture access kit. Imaging demonstrates a patent and compressible artery. Permanent images were stored to the patient's medical record. Conversion was made for a 5 Nepali vascular sheath, which was attached to a continuous heparinized saline infusion. A flush catheter was manipulated over a wire into the lower abdomen at the level of the inferior mesenteric artery and digital subtraction pelvic arteriography was obtained. Imaging demonstrates patent caudal abdominal aorta and bilateral iliac vasculature. Selective catheterization of the right common iliac and external iliac artery was performed. A right lower extremity angiogram was obtained with catheter positioned in the caudal right external iliac artery. Imaging demonstrates patent right femoral artery with moderate stenosis. There is high-grade stenosis involving the right profunda femoral artery ostium. Focal occlusion of the right superficial femoral artery ostium. There is diffuse, moderate stenosis involving the entire right superficial femoral artery. Patent above and below knee popliteal artery. There is a three-vessel runoff to the foot. Systemic heparinization was performed. Exchange is made for a 7 Nepali vascular sheath which was advanced to the external iliac arteries. An angled .035 crossing catheter and guidewire were used to cross the occluded superficial femoral artery. The guidewire and catheter were advanced to the below-knee popliteal artery. Over the wire exchange is made for a spider FX distal embolic protection filter. The filter was deployed in the below knee popliteal artery. Over the wire exchange is made for a 7F Aircom LX atherectomy device. Atherectomy of the entire superficial femoral artery was performed with removal of significant volume of plaque. Over-the-wire exchange is made for a 5 mm x 20 cm angioplasty balloon. Angioplasty of the entire superficial femoral artery was performed. Over-the-wire exchange  is made for a 5 mm x 25 cm InPact drug eluting balloon. Drug coated balloon angioplasty of the entire superficial femoral artery was performed to burst pressure. The distal embolic protection filter was removed and a repeat right lower extremity  angiogram was obtained. Imaging demonstrates widely patent superficial femoral artery. There is brisk three-vessel runoff to the foot. No significant residual stenosis is identified. Protamine sulfate was administered and hemostasis was achieved with manual pressure.     IMPRESSION:  Right lower extremity angiography demonstrates occlusion of the ostial superficial femoral artery with moderate to severe stenosis throughout the vessel. This was successfully treated with directional atherectomy, traditional and drug coated balloon angioplasty. Post intervention imaging demonstrates excellent result with brisk in line flow through the revascularized superficial femoral artery with three-vessel runoff to the foot.     Lower Extremity Arterial Duplex Bilateral    Result Date: 2/10/2023  Table formatting from the original result was not included. Arterial Duplex Ultrasound (Date: 02/10/23) Lower Extremity Artery Evaluation Indication: Surveillance Bilateral leg Arterial: PAD, Bilateral leg pain, Right 4th Toe Discolored/ Ulcer. Hx: Bilateral SFA  Proximal Stenosis. Previous: 3/17/2022 History: Previous Smoker, Hypertension, Diabetic, PAD, Angioplasty, Rest Pain, Claudication, Skin Color Change and Vascular Ulcers Technique: Duplex imaging is performed utilizing gray-scale, two-dimensional images, and color-flow imaging. Doppler waveform analysis and spectral Doppler imaging is also performed. LOWER EXTREMITY ARTERIAL DUPLEX EXAM WITH WAVEFORMS Right Leg:(cm/s) Location: Velocities Waveforms EIA:   101  M CFA:   106  M PFA:   373 / 46 M SFA Proximal:  33/ 18  M SFA Mid:   33/ 25 /105 M SFA Distal:    45 / 40 M Popliteal Artery:   32 /38  M PTA:   19   M RICKY:   13  M DPA:   31  M  Waveforms: T=Triphasic, M=Monophasic, B=Biphasic Stenotic Profile Ratio: 373 / 106 = 3.52 Left Leg:(cm/s) Location: Velocities Waveforms EIA:   188  T CFA:   198  T PFA:   265 /79 T SFA Proximal:   64 / 20 / 418  M SFA Mid:   15 / 32  M SFA Distal:   17 (Retrograde)   M Popliteal Artery:   26 / 30  M PTA:   17   M RICKY:   17  M DPA:   9  M Waveforms: T=Triphasic, M=Monophasic, B=Biphasic Stenotic Profile Ratio: 265 / 198 = 1.34 Impression: Right Lower Extremity: Monophasic flow in common femoral artery suggesting possible inflow disease.  Had a elevated velocities in the proximal and mid SFA with transition to very dampened monophasic flow distally consistent with hemodynamically significant stenosis due to atherosclerotic arterial disease. Left Lower Extremity: Triphasic flow in common femoral artery suggesting no inflow disease.  Significantly elevated velocities in the proximal SFA.  Occlusion of the mid/distal SFA with retrograde flow noted in the distal SFA via collaterals.  Significant dampened monophasic flow distally. Reference: Category Normal 1-19% 20-49% 50-99% Occluded PSV <160 cm/sec without spectral broadening <160 cm/sec with spectral broadening Increased Increased Absent Flow Ratio N/A N/A < 2.0 >2.0 N/A Post-Stenotic Turbulence No No No Yes N/A     CTA Abdomen Pelvis Bilat Leg Runoff w Contr    Result Date: 2/14/2023  EXAM: CTA ABDOMEN PELVIS BILAT LEG RUNOFF W CONTR LOCATION: [facility] DATE/TIME: 2/14/2023 3:47 PM INDICATION:  Critical lower limb ischemia (H) COMPARISON: None. TECHNIQUE: Helical acquisition through the abdomen, pelvis, and bilateral lower extremities was performed during the arterial phase of contrast enhancement using IV Contrast. 2D and 3D reconstructions were performed by the CT technologist. Dose reduction  techniques were used. CONTRAST: 100ml IV Isovue 370 FINDINGS: AORTA: Calcified plaque  proximal celiac artery with no focal stenosis. Superior  mesenteric and inferior mesenteric arteries are patent. Single renal arteries. Calcified plaque proximal renal arteries with no focal stenosis. Diffuse moderate/severe atherosclerotic disease infrarenal abdominal aorta. No evidence of abdominal aortic aneurysm. Dense calcified plaque with associated soft atheromatous plaque right common iliac artery. Moderate stenosis distal right common iliac artery. Focal stenosis proximal right internal iliac artery. Right external iliac artery patent. Diffuse calcified plaque left common iliac artery. Focal stenosis proximal left internal iliac artery. Moderate stenosis proximal left external iliac artery. RIGHT LEG: Dense calcified plaque posterior wall right common femoral artery. Right profunda femoral artery patent. Diffuse severe atherosclerotic disease right superficial femoral artery. High-grade stenosis mid right popliteal artery. Three-vessel runoff into the right foot. LEFT LEG: Dense calcified plaque posterior wall left common femoral artery. Left profundofemoral artery patent. Diffuse severe atherosclerotic disease left superficial femoral artery. Occlusion of the left popliteal artery. Reconstitution of the tibial peroneal trunk. Two-vessel runoff via the left peroneal and posterior tibial arteries. LUNG BASES: Motion artifact. ABDOMEN: The liver, gallbladder, pancreas, spleen, adrenal glands and kidneys are unremarkable. Small splenule inferior pole the spleen. PELVIS: Unremarkable.     IMPRESSION: 1.  Moderate/severe diffuse atherosclerotic disease infrarenal abdominal aorta. 2.  Moderate stenosis distal right common iliac artery. Moderate stenosis proximal left external iliac artery. 3.  Diffuse severe atherosclerotic disease right superficial femoral artery. High-grade stenosis of the left popliteal artery with three-vessel runoff into the right foot. 4.  Diffuse severe atherosclerotic disease left superficial femoral artery. Occlusion of the left popliteal  artery. Reconstitution of the tibial peroneal trunk with two-vessel runoff into the left foot via the peroneal and posterior tibial arteries.             Anurag Zayas; SILVINA  [facility] Foot & Ankle Surgery/Podiatry

## 2023-02-17 NOTE — PLAN OF CARE
Goal Outcome Evaluation:    Alert and oriented. Admitted with toe infection. Schedule zosyn and Vanco.  Gabapentin scheduled TID, tylenol and oxycodone available as needed. Patient had a angiogram done yesterday- left femoral site CDI and soft, distal pulses intact with doppler. Continent of bowel and bladder. Stand by assist.  Potassium protocol, recheck this morning, lab value was 4.3.  Denies pain. Tolerated clear liquid diet, advanced to full liquid diet.

## 2023-02-18 ENCOUNTER — ANESTHESIA (OUTPATIENT)
Dept: SURGERY | Facility: HOSPITAL | Age: 68
DRG: 271 | End: 2023-02-18
Payer: COMMERCIAL

## 2023-02-18 LAB
ANION GAP SERPL CALCULATED.3IONS-SCNC: 10 MMOL/L (ref 7–15)
BUN SERPL-MCNC: 10.5 MG/DL (ref 8–23)
CALCIUM SERPL-MCNC: 9.2 MG/DL (ref 8.8–10.2)
CHLORIDE SERPL-SCNC: 104 MMOL/L (ref 98–107)
CREAT SERPL-MCNC: 0.68 MG/DL (ref 0.51–0.95)
DEPRECATED HCO3 PLAS-SCNC: 25 MMOL/L (ref 22–29)
ERYTHROCYTE [DISTWIDTH] IN BLOOD BY AUTOMATED COUNT: 13.8 % (ref 10–15)
GFR SERPL CREATININE-BSD FRML MDRD: >90 ML/MIN/1.73M2
GLUCOSE BLDC GLUCOMTR-MCNC: 127 MG/DL (ref 70–99)
GLUCOSE BLDC GLUCOMTR-MCNC: 136 MG/DL (ref 70–99)
GLUCOSE BLDC GLUCOMTR-MCNC: 136 MG/DL (ref 70–99)
GLUCOSE BLDC GLUCOMTR-MCNC: 170 MG/DL (ref 70–99)
GLUCOSE SERPL-MCNC: 127 MG/DL (ref 70–99)
GRAM STAIN RESULT: NORMAL
GRAM STAIN RESULT: NORMAL
HCT VFR BLD AUTO: 39.9 % (ref 35–47)
HGB BLD-MCNC: 13.1 G/DL (ref 11.7–15.7)
MCH RBC QN AUTO: 29.8 PG (ref 26.5–33)
MCHC RBC AUTO-ENTMCNC: 32.8 G/DL (ref 31.5–36.5)
MCV RBC AUTO: 91 FL (ref 78–100)
PLATELET # BLD AUTO: 259 10E3/UL (ref 150–450)
POTASSIUM SERPL-SCNC: 3.6 MMOL/L (ref 3.4–5.3)
RBC # BLD AUTO: 4.39 10E6/UL (ref 3.8–5.2)
SODIUM SERPL-SCNC: 139 MMOL/L (ref 136–145)
WBC # BLD AUTO: 10.2 10E3/UL (ref 4–11)

## 2023-02-18 PROCEDURE — 250N000013 HC RX MED GY IP 250 OP 250 PS 637: Performed by: PODIATRIST

## 2023-02-18 PROCEDURE — 28820 AMPUTATION OF TOE: CPT | Mod: T8 | Performed by: PODIATRIST

## 2023-02-18 PROCEDURE — 250N000013 HC RX MED GY IP 250 OP 250 PS 637: Performed by: INTERNAL MEDICINE

## 2023-02-18 PROCEDURE — 0Y6V0Z0 DETACHMENT AT RIGHT 4TH TOE, COMPLETE, OPEN APPROACH: ICD-10-PCS | Performed by: PODIATRIST

## 2023-02-18 PROCEDURE — 250N000011 HC RX IP 250 OP 636: Performed by: PODIATRIST

## 2023-02-18 PROCEDURE — 88305 TISSUE EXAM BY PATHOLOGIST: CPT | Mod: TC | Performed by: PODIATRIST

## 2023-02-18 PROCEDURE — 87075 CULTR BACTERIA EXCEPT BLOOD: CPT | Performed by: PODIATRIST

## 2023-02-18 PROCEDURE — 250N000009 HC RX 250: Performed by: NURSE ANESTHETIST, CERTIFIED REGISTERED

## 2023-02-18 PROCEDURE — 999N000141 HC STATISTIC PRE-PROCEDURE NURSING ASSESSMENT: Performed by: PODIATRIST

## 2023-02-18 PROCEDURE — 258N000003 HC RX IP 258 OP 636: Performed by: ANESTHESIOLOGY

## 2023-02-18 PROCEDURE — 36415 COLL VENOUS BLD VENIPUNCTURE: CPT | Performed by: INTERNAL MEDICINE

## 2023-02-18 PROCEDURE — 88311 DECALCIFY TISSUE: CPT | Mod: 26 | Performed by: PATHOLOGY

## 2023-02-18 PROCEDURE — 88311 DECALCIFY TISSUE: CPT | Mod: TC | Performed by: PODIATRIST

## 2023-02-18 PROCEDURE — 360N000075 HC SURGERY LEVEL 2, PER MIN: Performed by: PODIATRIST

## 2023-02-18 PROCEDURE — 87070 CULTURE OTHR SPECIMN AEROBIC: CPT | Performed by: PODIATRIST

## 2023-02-18 PROCEDURE — 82310 ASSAY OF CALCIUM: CPT | Performed by: INTERNAL MEDICINE

## 2023-02-18 PROCEDURE — 120N000001 HC R&B MED SURG/OB

## 2023-02-18 PROCEDURE — 99232 SBSQ HOSP IP/OBS MODERATE 35: CPT | Performed by: INTERNAL MEDICINE

## 2023-02-18 PROCEDURE — 88305 TISSUE EXAM BY PATHOLOGIST: CPT | Mod: 26 | Performed by: PATHOLOGY

## 2023-02-18 PROCEDURE — 272N000001 HC OR GENERAL SUPPLY STERILE: Performed by: PODIATRIST

## 2023-02-18 PROCEDURE — 250N000011 HC RX IP 250 OP 636: Performed by: NURSE ANESTHETIST, CERTIFIED REGISTERED

## 2023-02-18 PROCEDURE — 85027 COMPLETE CBC AUTOMATED: CPT | Performed by: INTERNAL MEDICINE

## 2023-02-18 PROCEDURE — 370N000017 HC ANESTHESIA TECHNICAL FEE, PER MIN: Performed by: PODIATRIST

## 2023-02-18 PROCEDURE — 250N000009 HC RX 250: Performed by: PODIATRIST

## 2023-02-18 PROCEDURE — 87205 SMEAR GRAM STAIN: CPT | Performed by: PODIATRIST

## 2023-02-18 RX ORDER — POTASSIUM CHLORIDE 1500 MG/1
20 TABLET, EXTENDED RELEASE ORAL ONCE
Status: COMPLETED | OUTPATIENT
Start: 2023-02-18 | End: 2023-02-18

## 2023-02-18 RX ORDER — LIDOCAINE HYDROCHLORIDE 10 MG/ML
INJECTION, SOLUTION EPIDURAL; INFILTRATION; INTRACAUDAL; PERINEURAL PRN
Status: DISCONTINUED | OUTPATIENT
Start: 2023-02-18 | End: 2023-02-19 | Stop reason: HOSPADM

## 2023-02-18 RX ORDER — GABAPENTIN 100 MG/1
200 CAPSULE ORAL 3 TIMES DAILY
Status: DISCONTINUED | OUTPATIENT
Start: 2023-02-18 | End: 2023-02-19 | Stop reason: HOSPADM

## 2023-02-18 RX ORDER — HYDROCHLOROTHIAZIDE 25 MG/1
25 TABLET ORAL DAILY
Status: DISCONTINUED | OUTPATIENT
Start: 2023-02-18 | End: 2023-02-19 | Stop reason: HOSPADM

## 2023-02-18 RX ORDER — NALOXONE HYDROCHLORIDE 0.4 MG/ML
0.2 INJECTION, SOLUTION INTRAMUSCULAR; INTRAVENOUS; SUBCUTANEOUS
Status: DISCONTINUED | OUTPATIENT
Start: 2023-02-18 | End: 2023-02-19 | Stop reason: HOSPADM

## 2023-02-18 RX ORDER — NALOXONE HYDROCHLORIDE 0.4 MG/ML
0.4 INJECTION, SOLUTION INTRAMUSCULAR; INTRAVENOUS; SUBCUTANEOUS
Status: DISCONTINUED | OUTPATIENT
Start: 2023-02-18 | End: 2023-02-19 | Stop reason: HOSPADM

## 2023-02-18 RX ORDER — OXYCODONE HYDROCHLORIDE 5 MG/1
5-10 TABLET ORAL EVERY 4 HOURS PRN
Status: DISCONTINUED | OUTPATIENT
Start: 2023-02-18 | End: 2023-02-19 | Stop reason: HOSPADM

## 2023-02-18 RX ORDER — SODIUM CHLORIDE, SODIUM LACTATE, POTASSIUM CHLORIDE, CALCIUM CHLORIDE 600; 310; 30; 20 MG/100ML; MG/100ML; MG/100ML; MG/100ML
INJECTION, SOLUTION INTRAVENOUS CONTINUOUS
Status: DISCONTINUED | OUTPATIENT
Start: 2023-02-18 | End: 2023-02-18 | Stop reason: HOSPADM

## 2023-02-18 RX ORDER — LIDOCAINE HYDROCHLORIDE 10 MG/ML
INJECTION, SOLUTION INFILTRATION; PERINEURAL PRN
Status: DISCONTINUED | OUTPATIENT
Start: 2023-02-18 | End: 2023-02-18

## 2023-02-18 RX ORDER — MAGNESIUM HYDROXIDE 1200 MG/15ML
LIQUID ORAL PRN
Status: DISCONTINUED | OUTPATIENT
Start: 2023-02-18 | End: 2023-02-19 | Stop reason: HOSPADM

## 2023-02-18 RX ORDER — ONDANSETRON 2 MG/ML
INJECTION INTRAMUSCULAR; INTRAVENOUS PRN
Status: DISCONTINUED | OUTPATIENT
Start: 2023-02-18 | End: 2023-02-18

## 2023-02-18 RX ORDER — PROPOFOL 10 MG/ML
INJECTION, EMULSION INTRAVENOUS PRN
Status: DISCONTINUED | OUTPATIENT
Start: 2023-02-18 | End: 2023-02-18

## 2023-02-18 RX ORDER — OXYCODONE HYDROCHLORIDE 5 MG/1
5 TABLET ORAL EVERY 4 HOURS PRN
Status: DISCONTINUED | OUTPATIENT
Start: 2023-02-18 | End: 2023-02-18

## 2023-02-18 RX ORDER — PROPOFOL 10 MG/ML
INJECTION, EMULSION INTRAVENOUS CONTINUOUS PRN
Status: DISCONTINUED | OUTPATIENT
Start: 2023-02-18 | End: 2023-02-18

## 2023-02-18 RX ORDER — BUPIVACAINE HYDROCHLORIDE 5 MG/ML
INJECTION, SOLUTION PERINEURAL PRN
Status: DISCONTINUED | OUTPATIENT
Start: 2023-02-18 | End: 2023-02-19 | Stop reason: HOSPADM

## 2023-02-18 RX ORDER — LIDOCAINE 40 MG/G
CREAM TOPICAL
Status: DISCONTINUED | OUTPATIENT
Start: 2023-02-18 | End: 2023-02-18 | Stop reason: HOSPADM

## 2023-02-18 RX ADMIN — PROPOFOL 150 MCG/KG/MIN: 10 INJECTION, EMULSION INTRAVENOUS at 08:05

## 2023-02-18 RX ADMIN — GABAPENTIN 200 MG: 100 CAPSULE ORAL at 20:48

## 2023-02-18 RX ADMIN — AMLODIPINE BESYLATE 10 MG: 5 TABLET ORAL at 10:11

## 2023-02-18 RX ADMIN — INSULIN DEGLUDEC INJECTION 30 UNITS: 100 INJECTION, SOLUTION SUBCUTANEOUS at 10:23

## 2023-02-18 RX ADMIN — LIDOCAINE HYDROCHLORIDE 2 ML: 10 INJECTION, SOLUTION INFILTRATION; PERINEURAL at 08:02

## 2023-02-18 RX ADMIN — GABAPENTIN 100 MG: 100 CAPSULE ORAL at 10:10

## 2023-02-18 RX ADMIN — GABAPENTIN 200 MG: 100 CAPSULE ORAL at 16:02

## 2023-02-18 RX ADMIN — OXYCODONE HYDROCHLORIDE 5 MG: 5 TABLET ORAL at 20:57

## 2023-02-18 RX ADMIN — OXYCODONE HYDROCHLORIDE 5 MG: 5 TABLET ORAL at 11:56

## 2023-02-18 RX ADMIN — OXYCODONE HYDROCHLORIDE 10 MG: 5 TABLET ORAL at 16:02

## 2023-02-18 RX ADMIN — CLOPIDOGREL BISULFATE 75 MG: 75 TABLET ORAL at 10:11

## 2023-02-18 RX ADMIN — ACETAMINOPHEN 650 MG: 325 TABLET ORAL at 20:57

## 2023-02-18 RX ADMIN — POTASSIUM CHLORIDE 20 MEQ: 1500 TABLET, EXTENDED RELEASE ORAL at 14:29

## 2023-02-18 RX ADMIN — PROPOFOL 50 MG: 10 INJECTION, EMULSION INTRAVENOUS at 08:02

## 2023-02-18 RX ADMIN — ONDANSETRON 4 MG: 2 INJECTION INTRAMUSCULAR; INTRAVENOUS at 11:55

## 2023-02-18 RX ADMIN — LISINOPRIL 40 MG: 20 TABLET ORAL at 10:10

## 2023-02-18 RX ADMIN — TRAZODONE HYDROCHLORIDE 50 MG: 50 TABLET ORAL at 20:48

## 2023-02-18 RX ADMIN — HYDROCHLOROTHIAZIDE 25 MG: 25 TABLET ORAL at 11:06

## 2023-02-18 RX ADMIN — GABAPENTIN 100 MG: 100 CAPSULE ORAL at 14:29

## 2023-02-18 RX ADMIN — PROPOFOL 50 MG: 10 INJECTION, EMULSION INTRAVENOUS at 08:04

## 2023-02-18 RX ADMIN — ONDANSETRON 4 MG: 2 INJECTION INTRAMUSCULAR; INTRAVENOUS at 08:10

## 2023-02-18 RX ADMIN — SENNOSIDES AND DOCUSATE SODIUM 1 TABLET: 50; 8.6 TABLET ORAL at 20:47

## 2023-02-18 RX ADMIN — EMPAGLIFLOZIN 10 MG: 10 TABLET, FILM COATED ORAL at 11:06

## 2023-02-18 RX ADMIN — SODIUM CHLORIDE, POTASSIUM CHLORIDE, SODIUM LACTATE AND CALCIUM CHLORIDE: 600; 310; 30; 20 INJECTION, SOLUTION INTRAVENOUS at 07:44

## 2023-02-18 RX ADMIN — ACETAMINOPHEN 650 MG: 325 TABLET ORAL at 14:29

## 2023-02-18 RX ADMIN — SITAGLIPTIN 100 MG: 25 TABLET, FILM COATED ORAL at 10:21

## 2023-02-18 ASSESSMENT — ACTIVITIES OF DAILY LIVING (ADL)
ADLS_ACUITY_SCORE: 20

## 2023-02-18 ASSESSMENT — LIFESTYLE VARIABLES: TOBACCO_USE: 1

## 2023-02-18 NOTE — PLAN OF CARE
Patient tolerating diet. Denies pain. Right 4th toe black, trace amount swelling right foot.   and 217. Left sheath site intact with gauze and transparent dressing. Right toe amputation scheduled for tomorrow 8am. NPO at midnight.    Jenifer Cabrera RN

## 2023-02-18 NOTE — OP NOTE
PREOPERATIVE DIAGNOSIS:   1.  Gangrene, fourth toe, right foot.     POSTOPERATIVE DIAGNOSIS:   1. Gangrene, fourth toe, right foot    PROCEDURE:   1. Amputation, fourth toe, right foot.     PATHOLOGY:   1.  Fourth toe, right foot.     TISSUE CULTURES: Aerobic/Anaerobic/Gram stain  1.  Soft tissue, fourth toe right     ANESTHESIA: Local MAC     ESTIMATED BLOOD LOSS: 10mL     INDICATIONS FOR PROCEDURE: Ashanti Aviles is a 67 year old-year-old female with an ischemic fourth toe of the right foot. The patient was consented for amputation of the fourth toe, right foot.     PROCEDURE IN DETAIL: Under mild sedation, the patient in the operating room and placed on the operating table in the supine position.  After adequate sedation, approximately 10cc of a 50/50 mixture of 1%  lidocaine plain and 0.5% marcaine plain was injected around the base of the fourth metatarsophalangeal joint of the right foot. The foot was then scrubbed, prepped and draped in the usual aseptic manner.      The procedure began with a full thickness racquet shaped incision over the dorsal aspect of the fourth metatarsophalangeal joint and around the base of the fourth toe on the right foot.  The fourth toe was disarticulated from the head of the fourth metatarsal.  Cultures were taken of the base of the fourth toe for aerobic/anaerobic/Gram stain.  The fourth toe was sent off to pathology for further evaluation.  There was noted health perfusion of the tissues around the fourth metatarsal head.  The cartilage was removed from the head of the fourth metatarsal.      The wound was irrigated with copious amounts of normal sterile saline.  All active bleeding was cauterized and liggated as necessary.       The skin was reapproximated with 4-0 nylon suture in an interrupted technique.   The incision was dressed with a Bacitracin/xeroform dressing, 4x4, cast padding and ace wrap.  The patient tolerated the procedure well and was transferred from the  operative table to the transport cart and taken from the OR to the PACU with vital signs stable and vascular status intact to the right lower extremity.  The patient will be transferred back to the floor for continued postoperative care and monitoring.  The patient will be reevaluated tomorrow morning and should be able to be discharged back to home.  The patient will follow up in vascular clinic.    ADRIÁN MCKEON DPM, FACFAS

## 2023-02-18 NOTE — BRIEF OP NOTE
[facility] Brief Operative Note    Pre-operative diagnosis: Dry gangrene (H) [I96]   Post-operative diagnosis Same as above   Procedure: Procedure(s):  Amputation fourth toe right foot   Surgeon(s): Surgeon(s) and Role:     * Benjamin Diez DPM - Primary   Estimated blood loss: 10 mL    Specimens: Fourth toe, right foot   Findings: Gangrenous right fourth digits with health perfusion at the level of the fourth metatarsophalangeal joint on the right.

## 2023-02-18 NOTE — ANESTHESIA CARE TRANSFER NOTE
Patient: Ashanti Aviles    Procedure: Procedure(s):  Amputation fourth toe right foot       Diagnosis: Dry gangrene (H) [I96]  Diagnosis Additional Information: No value filed.    Anesthesia Type:   MAC     Note:    Oropharynx: oropharynx clear of all foreign objects  Level of Consciousness: awake  Oxygen Supplementation: room air    Independent Airway: airway patency satisfactory and stable  Dentition: dentition unchanged  Vital Signs Stable: post-procedure vital signs reviewed and stable  Report to RN Given: handoff report given  Patient transferred to: Medical/Surgical Unit  Comments: Pt stable, awake, and conversational. Report given to floor RN Jose Angel. All questions answered. Transportedvia bed per RN. Will continue to monitor closelyl per primary team.   Handoff Report: Identifed the Patient, Identified the Reponsible Provider, Reviewed the pertinent medical history, Discussed the surgical course, Reviewed Intra-OP anesthesia mangement and issues during anesthesia, Set expectations for post-procedure period and Allowed opportunity for questions and acknowledgement of understanding      Vitals: see anesthesia flowsheet for latest see of vitals prior to transport.  Vitals Value Taken Time   BP     Temp     Pulse     Resp     SpO2         Electronically Signed By: DOROTA Cantu CRNA  February 18, 2023  8:33 AM

## 2023-02-18 NOTE — ANESTHESIA PREPROCEDURE EVALUATION
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]   Procedure : Procedure(s):  EXAM UNDER ANESTHESIA WITH HIGH RESOLUTION ANOSCOPY     Past Medical History:   Diagnosis Date     Diabetes mellitus (H)      Gastroesophageal reflux disease      Hypertension      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/29/2013      Past Surgical History:   Procedure Laterality Date     ANUS SURGERY       BIOPSY CERVICAL, LOCAL EXCISION, SINGLE/MULTIPLE       COLONOSCOPY N/A 2020    Procedure: EXAM UNDER ANESTHESIA, HIGH RESOLUTION ANOSCOPY INTRA OP;  Surgeon: Preeti Sheehan MD;  Location: [facility] OR;  Service: Gastroenterology     COLONOSCOPY N/A 2020    Procedure: EXAM UNDER ANESTHESIA WITH HIGH RESOLUTION ANOSCOPY;  Surgeon: Preeti Sheehan MD;  Location: [facility] OR;  Service: General     COLONOSCOPY N/A 6/15/2021    Procedure: EXAM UNDER ANESTHESIA WITH HIGH RESOLUTION ANOSCOPY, BIOPSY, FULGURATION;  Surgeon: Preeti Sheehan MD;  Location: [facility] OR;  Service: Gastroenterology     EXAM UNDER ANESTHESIA ANUS N/A 2021    Procedure: EXAM UNDER ANESTHESIA WITH HIGH RESOLUTION ANOSCOPY;  Surgeon: Preeti Sheehan MD;  Location: [facility] OR     IR LOWER EXTREMITY ANGIOGRAM RIGHT  2023      Allergies   Allergen Reactions     Ezetimibe Unknown     Generalized body aches     Oxycodone Nausea and Vomiting     Penicillins Unknown     Simvastatin Unknown     Muscle pain     Victoza [Liraglutide] Unknown      Social History     Tobacco Use     Smoking status: Former     Packs/day: 0.25     Types: Cigarettes     Quit date: 2023     Years since quittin.0     Smokeless tobacco: Never   Substance Use Topics     Alcohol use: Yes     Comment: Alcoholic Drinks/day: 2x      Wt Readings from Last 1 Encounters:   02/15/23 66.4 kg (146 lb 6.2 oz)        Anesthesia  Evaluation   Pt has had prior anesthetic. Type: MAC.    No history of anesthetic complications       ROS/MED HX  ENT/Pulmonary:     (+) tobacco use,     Neurologic:       Cardiovascular:     (+) Dyslipidemia hypertension-----    METS/Exercise Tolerance:     Hematologic:  - neg hematologic  ROS     Musculoskeletal:  - neg musculoskeletal ROS     GI/Hepatic: Comment: Anal condyloma    (+) GERD,     Renal/Genitourinary:     (+) renal disease,     Endo:     (+) type II DM,     Psychiatric/Substance Use:  - neg psychiatric ROS     Infectious Disease: Comment: Anal condyloma - neg infectious disease ROS     Malignancy: Comment: Andrew grade anal dysplasia - neg malignancy ROS     Other:  - neg other ROS          Physical Exam    Airway        Mallampati: II   TM distance: > 3 FB   Neck ROM: full     Respiratory Devices and Support         Dental  no notable dental history     (+) Minor Abnormalities - some fillings, tiny chips      Cardiovascular   cardiovascular exam normal          Pulmonary   pulmonary exam normal                OUTSIDE LABS:  CBC:   Lab Results   Component Value Date    WBC 9.7 02/17/2023    WBC 8.3 02/16/2023    HGB 12.5 02/17/2023    HGB 13.2 02/16/2023    HCT 38.0 02/17/2023    HCT 39.9 02/16/2023     02/17/2023     02/16/2023     BMP:   Lab Results   Component Value Date     02/17/2023     02/16/2023    POTASSIUM 3.5 02/17/2023    POTASSIUM 4.3 02/16/2023    CHLORIDE 106 02/17/2023    CHLORIDE 106 02/16/2023    CO2 23 02/17/2023    CO2 25 02/16/2023    BUN 8.0 02/17/2023    BUN 11.0 02/16/2023    CR 0.64 02/17/2023    CR 0.73 02/16/2023     (H) 02/17/2023     (H) 02/17/2023     COAGS:   Lab Results   Component Value Date    INR 0.97 02/16/2023     POC: No results found for: BGM, HCG, HCGS  HEPATIC:   Lab Results   Component Value Date    ALBUMIN 3.7 02/15/2023    PROTTOTAL 6.5 02/15/2023    ALT 25 02/15/2023    AST 20 02/15/2023    ALKPHOS 75 02/15/2023     BILITOTAL <0.2 02/15/2023     OTHER:   Lab Results   Component Value Date    A1C 8.5 (H) 02/14/2023    SANTINO 8.4 (L) 02/17/2023       Anesthesia Plan    ASA Status:  3   NPO Status:  NPO Appropriate    Anesthesia Type: MAC.     - Reason for MAC: straight local not clinically adequate      Maintenance: TIVA.        Consents    Anesthesia Plan(s) and associated risks, benefits, and realistic alternatives discussed. Questions answered and patient/representative(s) expressed understanding.    - Discussed:     - Discussed with:  Patient         Postoperative Care    Pain management: Multi-modal analgesia, IV analgesics, Oral pain medications.   PONV prophylaxis: Ondansetron (or other 5HT-3), Dexamethasone or Solumedrol, Background Propofol Infusion     Comments:    Other Comments: Decadron, Zofran.  Diprivan infusion.  PE, Ephedrine PRN.  Versed, Fentanyl.                Rolf Recinos MD

## 2023-02-18 NOTE — PROGRESS NOTES
[facility]    PROGRESS NOTE - Hospitalist Service    Assessment and Plan    Active Problems:    Type 2 diabetes mellitus (H)    Tobacco use disorder    PAD (peripheral artery disease) (H)    Essential hypertension    Dyslipidemia    Ischemic toe    Ashanti Aviles is a 67 year old female with h/o smoking, diabetes and hypertension who presents to this ED for evaluation of an impaired toe circulation and foot pain, she was found to have right fourth toe cellulitis/gangrenous changes, she was admitted with - CT abdomen shows Moderate/severe diffuse atherosclerotic disease infrarenal abdominal aorta.Diffuse severe atherosclerotic disease right superficial femoral artery. High-grade stenosis of the left popliteal artery with three-vessel runoff into the right foot.     PAD/Ischemic toe  toe pressures of 45, and TRACIE of 0.47.   - 2/16/2023 S/p angiogram Right lower extremity angiography demonstrates occlusion of the ostial superficial femoral artery with moderate to severe stenosis throughout the vessel. This was successfully treated with directional atherectomy, traditional and drug coated balloon  angioplasty. Post intervention imaging demonstrates excellent result with brisk in line flow through the revascularized superficial femoral artery with three-vessel runoff to the foot.  - vascular assistance appreciated   - 2/18/2023 s/p amputation 4th toe.   - ok to stop IV abx per podiatry   - Continue Plavix  - gabapentin 100mg TID started very helpful, narcotics not really helpful   - oxycodone prn for pain but patient would prefer to not use narcotics if able too.   - will check FLP in am      Diabetes mellitus type 2  - holding metformin while here  - resume januvia, jardiance and tresiba   - Cover with insulin sliding scale medium  - Hemoglobin A1c is elevated at 8.5     Hypertension  - continue lisinopril and Norvasc  - resume  hydrochlorothiazide  - prn IV hydralazine      Hypokalemia  -  "Replace per protocol     Smoking dependence  - Patient quit smoking 2 days ago  - Declined nicotine patch.  - patient is adamant about quitting       Clinically Significant Risk Factors                        # Overweight: Estimated body mass index is 28.31 kg/m  as calculated from the following:    Height as of this encounter: 1.626 m (5' 4\").    Weight as of this encounter: 74.8 kg (164 lb 14.4 oz)., PRESENT ON ADMISSION         COVID-19 PCR Results    COVID-19 PCR Results 6/12/21 9/10/21 2/14/23   SARS-CoV-2 Virus Specimen Source Nasopharyngeal     SARS-CoV-2 PCR Result NEGATIVE     SARS CoV2 PCR  Negative Negative      Comments are available for some flowsheets but are not being displayed.         COVID-19 Antibody Results, Testing for Immunity    COVID-19 Antibody Results, Testing for Immunity   No data to display.            Code Status: Full Code  VTE prophylaxis: VTE meds per podiatry   DIET: Orders Placed This Encounter      Regular Diet Adult    Drains/Lines: none  Weight bearing status: NWB on foot only heal weight bearing   Disposition/Barriers to discharge: anticipate discharge tomorrow     Expected Discharge Date:       February 19, 2023      Interval History   {Pertinent overnight events  ;none    Subjective:  Pain controlled and eager to go home    PHYSICAL EXAM  Temp:  [97.8  F (36.6  C)-98.6  F (37  C)] 98.6  F (37  C)  Pulse:  [67-76] 67  Resp:  [18-20] 18  BP: (107-152)/(56-72) 147/70  SpO2:  [98 %-100 %] 99 %  Wt Readings from Last 1 Encounters:   02/15/23 66.4 kg (146 lb 6.2 oz)       Intake/Output Summary (Last 24 hours) at 2/15/2023 0832  Last data filed at 2/14/2023 1842  Gross per 24 hour   Intake 100 ml   Output --   Net 100 ml      Body mass index is 26.77 kg/m .    Physical Exam  Constitutional:       Appearance: Normal appearance.   HENT:      Head: Normocephalic and atraumatic.   Cardiovascular:      Rate and Rhythm: Normal rate and regular rhythm.      Pulses: Normal pulses.      " Heart sounds: Normal heart sounds.   Pulmonary:      Effort: Pulmonary effort is normal.      Breath sounds: Normal breath sounds.   Abdominal:      General: Bowel sounds are normal.      Palpations: Abdomen is soft.   Musculoskeletal:      Comments: Right foot wrapped   Skin:     General: Skin is warm.   Neurological:      General: No focal deficit present.      Mental Status: She is alert and oriented to person, place, and time. Mental status is at baseline.       PERTINENT LABS/IMAGING:  Results for orders placed or performed during the hospital encounter of 02/14/23   MR Foot Right w/o & w Contrast    Impression    IMPRESSION:  1.  Reactive marrow edema in the distal phalanx of the fourth toe. No evidence for osteomyelitis.  2.  Fourth toe soft tissue edema which could represent cellulitis.       Imaging results reviewed over the past 24 hrs:   No results found for this or any previous visit (from the past 24 hour(s)).  Recent Labs   Lab 02/18/23  0723 02/18/23  0714 02/17/23 2022 02/17/23  0754 02/17/23  0750 02/16/23  0807 02/16/23  0759 02/15/23  1117 02/15/23  0814   WBC 10.2  --   --   --  9.7  --  8.3  --  11.7*   HGB 13.1  --   --   --  12.5  --  13.2  --  13.3   MCV 91  --   --   --  91  --  91  --  91     --   --   --  229  --  275  --  267   INR  --   --   --   --   --   --  0.97  --   --      --   --   --  136  --  138  --  135*   POTASSIUM 3.6  --   --   --  3.5  --  4.3  --  3.9   CHLORIDE 104  --   --   --  106  --  106  --  100   CO2 25  --   --   --  23  --  25  --  27   BUN 10.5  --   --   --  8.0  --  11.0  --  13.3   CR 0.68  --   --   --  0.64  --  0.73  --  0.79   ANIONGAP 10  --   --   --  7  --  7  --  8   SANTINO 9.2  --   --   --  8.4*  --  9.1  --  9.5   * 127* 217*   < > 142*   < > 153*   < > 199*   ALBUMIN  --   --   --   --   --   --   --   --  3.7   PROTTOTAL  --   --   --   --   --   --   --   --  6.5   BILITOTAL  --   --   --   --   --   --   --   --  <0.2    ALKPHOS  --   --   --   --   --   --   --   --  75   ALT  --   --   --   --   --   --   --   --  25   AST  --   --   --   --   --   --   --   --  20    < > = values in this interval not displayed.       Recent Labs   Lab Test 02/14/23 1744   A1C 8.5*     Recent Labs   Lab Test 02/14/23 1744   HGB 15.9*     25 MINUTES SPENT BY ME on the date of service doing chart review, history, exam, documentation & further activities per the note.  Vivian Cassidy MD  [facility] Medicine Service  [phone]

## 2023-02-18 NOTE — PROGRESS NOTES
The patient was seen at bedside in no apparent distress.  The patient relates a good appetite with no nausea or vomiting.  The patient denies any fever or chills.  The patient relates the pain is under control.  The patient relates being able to go the the bathroom.   The patient underwent a successful right lower extremity angioplasty to restore adequate circulation to the right foot.    The patient's vitals are stable and is affebrile      Labs:  Glucose: 127   Component Ref Range & Units 1 d ago 2 d ago 3 d ago 4 d ago    WBC Count 4.0 - 11.0 10e3/uL 9.7  8.3  11.7 High   12.9 High      RBC Count 3.80 - 5.20 10e6/uL 4.16  4.39  4.40  5.26 High      Hemoglobin 11.7 - 15.7 g/dL 12.5  13.2  13.3  15.9 High      Hematocrit 35.0 - 47.0 % 38.0  39.9  40.1  46.6     MCV 78 - 100 fL 91  91  91  89     MCH 26.5 - 33.0 pg 30.0  30.1  30.2  30.2     MCHC 31.5 - 36.5 g/dL 32.9  33.1  33.2  34.1     RDW 10.0 - 15.0 % 13.7  13.8  13.9  13.6     Platelet Count 150 - 450 10e3/uL 229  275  267  313        Exam:  Neurovascular status remains intact with positive epicritic sensation and capillary filling to the digits (wich exception of the fourth toe, right foot) on the right.  Motor is intact to the right.       The fourth toe appears cyanotic and ischemic.  No ascending cellulitis noted extending into the forefoot on the right.    Assessment: Ischemic fourth toe right foot.    Plan:   The nature of the patient s condition was discussed at length.  I discussed with patient details of procedure(s), possible risks and complications, alternative treatment options and post-operative course detailed below.   Likely surgical procedures include fourth toe amputation on the right foot.  The patient was educated today about risks associated with surgery.  Risks of surgery include, but are not limited to: infection, painful scar, nerve injury, numbness, stiffness,  need for repeat surgery, recurrence of condition, ongoing pain, delayed  wound healing, blood clot in the legs or lungs, amputation or other unforeseen side effects from undergoing surgery.       The patient was instructed to not smoke or use nicotine patches before and after the surgery as this could result in poor outcomes due to slower healing potentially.  Risks of DVT/PE were discussed in relation to anticipated level of immobilization, inactivity, injury, surgery, medications and personal risk factors.  Perioperative education was provided regarding signs and symptoms of a blood clot.  The patient was encouraged to be vigilant regarding symptoms and pursue risk reduction measures.    Lower extremity range of motion exercises are encouraged.   Postoperative pain regimens were discussed in great detail the patient.  The risks and benefits of non-narcotic and narcotic pain medications, as well as synergistic agents was also discussed.  The patient will be prescribed Oxycodone for more severe breakthrough pain not relieved by scheduled Tylenol.  The patient was also encouraged to rest, elevate and ice postoperatively to help with swelling and pain control.  The patient was in agreement with this plan.  The patient understands that they would need to remain non weightbearing with use of crutches post-operatively.The recovery process was discussed including impact to work, walking, shoes and daily activities.  We discussed that the patient should anticipate up to 12 months for maximum recovery after surgery.  There is moderate risk involved.        After detailed discussed patient wishes to continue with the proposed surgical intervention.  The procedure will be performed under monitored anesthesia care with a local block for anesthesia.   Consents were reviewed and signed.    I appreciate the assistance in the care of this patient from the Hospitalist Service.      NIKOLAY Diez DPM, FACFAS

## 2023-02-18 NOTE — ANESTHESIA POSTPROCEDURE EVALUATION
Patient: Ashanti Aviles    Procedure: Procedure(s):  Amputation fourth toe right foot       Anesthesia Type:  MAC    Note:  Disposition: Inpatient   Postop Pain Control: Uneventful            Sign Out: Well controlled pain   PONV: No   Neuro/Psych: Uneventful            Sign Out: Acceptable/Baseline neuro status   Airway/Respiratory: Uneventful            Sign Out: Acceptable/Baseline resp. status   CV/Hemodynamics: Uneventful            Sign Out: Acceptable CV status; No obvious hypovolemia; No obvious fluid overload   Other NRE: NONE   DID A NON-ROUTINE EVENT OCCUR? No           Last vitals:  Vitals:    02/17/23 1609 02/17/23 2346 02/18/23 0710   BP: (!) 152/72 122/60 107/59   Pulse: 73 68 76   Resp: 20 18 18   Temp: 36.8  C (98.2  F) 36.6  C (97.8  F) 36.7  C (98  F)   SpO2: 100% 98% 100%       Electronically Signed By: Rolf Recinos MD  February 18, 2023  9:21 AM

## 2023-02-18 NOTE — PLAN OF CARE
Problem: Pain Chronic (Persistent) (Comorbidity Management)  Goal: Acceptable Pain Control and Functional Ability  Intervention: Manage Persistent Pain   medications reviewed   Problem: Lower Extremity Amputation  Goal: Optimal Adjustment to Amputation  Outcome: Progressing   Patient is alert and oriented. Vitally stable. Patient had amputation of 4th toe on her right foot. Patient on Heel weight bearing on right foot. Patient reports right foot pain- gave oxycodone and tylenol. PT evaluation is pending.

## 2023-02-18 NOTE — PLAN OF CARE
Problem: Diabetes Comorbidity  Goal: Blood Glucose Level Within Targeted Range  Outcome: Progressing     Problem: Pain Chronic (Persistent) (Comorbidity Management)  Goal: Acceptable Pain Control and Functional Ability  Outcome: Progressing   Goal Outcome Evaluation:  Pt asleep between cares. VSS on RA. Denied pain. NPO for toe amputation today. Pt left the unit for procedure at shift change. Report given.

## 2023-02-19 ENCOUNTER — APPOINTMENT (OUTPATIENT)
Dept: PHYSICAL THERAPY | Facility: HOSPITAL | Age: 68
DRG: 271 | End: 2023-02-19
Attending: INTERNAL MEDICINE
Payer: COMMERCIAL

## 2023-02-19 VITALS
TEMPERATURE: 98.1 F | OXYGEN SATURATION: 97 % | DIASTOLIC BLOOD PRESSURE: 55 MMHG | RESPIRATION RATE: 16 BRPM | HEIGHT: 62 IN | HEART RATE: 67 BPM | WEIGHT: 146.39 LBS | BODY MASS INDEX: 26.94 KG/M2 | SYSTOLIC BLOOD PRESSURE: 108 MMHG

## 2023-02-19 LAB
GLUCOSE BLDC GLUCOMTR-MCNC: 112 MG/DL (ref 70–99)
GLUCOSE BLDC GLUCOMTR-MCNC: 161 MG/DL (ref 70–99)
HOLD SPECIMEN: NORMAL
POTASSIUM SERPL-SCNC: 4 MMOL/L (ref 3.4–5.3)

## 2023-02-19 PROCEDURE — 36415 COLL VENOUS BLD VENIPUNCTURE: CPT | Performed by: INTERNAL MEDICINE

## 2023-02-19 PROCEDURE — 99239 HOSP IP/OBS DSCHRG MGMT >30: CPT | Performed by: INTERNAL MEDICINE

## 2023-02-19 PROCEDURE — 250N000013 HC RX MED GY IP 250 OP 250 PS 637: Performed by: PODIATRIST

## 2023-02-19 PROCEDURE — 84132 ASSAY OF SERUM POTASSIUM: CPT | Performed by: INTERNAL MEDICINE

## 2023-02-19 PROCEDURE — 97161 PT EVAL LOW COMPLEX 20 MIN: CPT | Mod: GP

## 2023-02-19 PROCEDURE — 97116 GAIT TRAINING THERAPY: CPT | Mod: GP

## 2023-02-19 PROCEDURE — 250N000013 HC RX MED GY IP 250 OP 250 PS 637: Performed by: INTERNAL MEDICINE

## 2023-02-19 RX ORDER — TRAZODONE HYDROCHLORIDE 50 MG/1
25 TABLET, FILM COATED ORAL AT BEDTIME
Qty: 4 TABLET | Refills: 0 | Status: ON HOLD | OUTPATIENT
Start: 2023-02-19 | End: 2023-05-17

## 2023-02-19 RX ORDER — OXYCODONE HYDROCHLORIDE 5 MG/1
5 TABLET ORAL EVERY 4 HOURS PRN
Qty: 20 TABLET | Refills: 0 | Status: SHIPPED | OUTPATIENT
Start: 2023-02-19 | End: 2023-05-05

## 2023-02-19 RX ORDER — ACETAMINOPHEN 325 MG/1
650 TABLET ORAL EVERY 4 HOURS PRN
Qty: 30 TABLET | Refills: 0 | Status: ON HOLD | OUTPATIENT
Start: 2023-02-19 | End: 2023-05-17

## 2023-02-19 RX ORDER — GABAPENTIN 100 MG/1
200 CAPSULE ORAL 3 TIMES DAILY
Qty: 84 CAPSULE | Refills: 0 | Status: ON HOLD | OUTPATIENT
Start: 2023-02-19 | End: 2023-05-17

## 2023-02-19 RX ADMIN — LISINOPRIL 40 MG: 20 TABLET ORAL at 08:23

## 2023-02-19 RX ADMIN — OXYCODONE HYDROCHLORIDE 10 MG: 5 TABLET ORAL at 10:14

## 2023-02-19 RX ADMIN — ACETAMINOPHEN 650 MG: 325 TABLET ORAL at 10:15

## 2023-02-19 RX ADMIN — HYDROCHLOROTHIAZIDE 25 MG: 25 TABLET ORAL at 08:24

## 2023-02-19 RX ADMIN — AMLODIPINE BESYLATE 10 MG: 5 TABLET ORAL at 08:22

## 2023-02-19 RX ADMIN — GABAPENTIN 200 MG: 100 CAPSULE ORAL at 08:24

## 2023-02-19 RX ADMIN — ACETAMINOPHEN 650 MG: 325 TABLET ORAL at 04:32

## 2023-02-19 RX ADMIN — OXYCODONE HYDROCHLORIDE 5 MG: 5 TABLET ORAL at 04:32

## 2023-02-19 RX ADMIN — INSULIN DEGLUDEC INJECTION 30 UNITS: 100 INJECTION, SOLUTION SUBCUTANEOUS at 08:20

## 2023-02-19 RX ADMIN — CLOPIDOGREL BISULFATE 75 MG: 75 TABLET ORAL at 08:22

## 2023-02-19 RX ADMIN — EMPAGLIFLOZIN 10 MG: 10 TABLET, FILM COATED ORAL at 08:24

## 2023-02-19 RX ADMIN — SENNOSIDES AND DOCUSATE SODIUM 1 TABLET: 50; 8.6 TABLET ORAL at 08:24

## 2023-02-19 RX ADMIN — SITAGLIPTIN 100 MG: 25 TABLET, FILM COATED ORAL at 08:22

## 2023-02-19 ASSESSMENT — ACTIVITIES OF DAILY LIVING (ADL)
ADLS_ACUITY_SCORE: 20

## 2023-02-19 NOTE — PROGRESS NOTES
02/19/23 1300   Appointment Info   Signing Clinician's Name / Credentials (PT) Missy Hinkle,PT   Rehab Comments (PT) pt's wtb status now NWB RLE, return to pt's room to trial crutches and FWW and educate on RLE NWB   Gait/Stairs (Locomotion)   Distance in Feet (Gait) 4', 10'   Physical Therapy Goals   PT Predicted Duration/Target Date for Goal Attainment 02/19/23   PT Goals Transfers;Gait;Stairs   PT: Transfers Supervision/stand-by assist;Within precautions;Sit to/from stand;Assistive device   PT: Gait Rolling walker;10 feet  (CGA)   PT: Stairs Moderate assist;4 stairs  (1 rail and NWB RLE  and Ax1)   Gait Training   Gait Training Minutes (18120) 14   Treatment Detail/Skilled Intervention sit<>stand with crutches CGA, short gait w/ crutches pt unable to maintain NWB RLE and minAx1, unsteady, sit<>stand with FWW SBA, gait with FWW 10 able to maintain NWB cues for hand placement SBA/CGA   [place] Level (Gait Training) other (see comments)  (CGA/minAx1 with crutches, SBA/CGA with FWW)   Physical Assistance Level (Gait Training)   (minAx2 with)   Weight Bearing (Gait Training) nonweight-bearing  (RLE)   Assistive Device (Gait Training) rolling walker;axillary crutches   Pattern Analysis (Gait Training) 3-point gait   PT Discharge Planning   PT Plan returned small based quad cane and issued FWW   PT Discharge Recommendation (DC Rec) home with assist   PT Rationale for DC Rec pt able to maintain NWB RLE with FWW   PT Brief overview of current status SBA/CGA with FWW   Total Session Time   Timed Code Treatment Minutes 14   Total Session Time (sum of timed and untimed services) 14

## 2023-02-19 NOTE — PLAN OF CARE
Physical Therapy Discharge Summary    Reason for therapy discharge:    Discharged to home.    Progress towards therapy goal(s). See goals on Care Plan in [facility] electronic health record for goal details.  Goals partially met.  Barriers to achieving goals:   discharge from facility.    Therapy recommendation(s):    Pt met trasnfer and gait goal, verbal instruction stairs NWB RLE , pt to have family help with 4 stairs into house

## 2023-02-19 NOTE — PLAN OF CARE
Patient tolerating diet. Pain increased tonight, MD pageliyah and oxycodone and gabapentin orders increased, prn oxycodone given x2 tonight with good relief. Heel WB to RLE, Independent in room. Left sheath site CDI with gauze and transparent dressing.    Jenifer Cabrera RN

## 2023-02-19 NOTE — PROGRESS NOTES
02/19/23 1000   Appointment Info   Signing Clinician's Name / Credentials (PT) Missy Hinkle, PT       Present no   Living Environment   People in Home spouse;child(karla), adult   Current Living Arrangements house  (could stay on 1` level)   Home Accessibility stairs to enter home;stairs within home   Number of Stairs, Main Entrance 4   Stair Railings, Main Entrance railings on both sides of stairs   Self-Care   Usual Activity Tolerance good   Equipment Currently Used at Home none   Fall history within last six months no   Activity/Exercise/Self-Care Comment I ADLs, drives,  doing most household tasks   General Information   Onset of Illness/Injury or Date of Surgery 02/14/23   Referring Physician Dr. Vivian Cassidy   Patient/Family Therapy Goals Statement (PT) return home   Pertinent History of Current Problem (include personal factors and/or comorbidities that impact the POC) admit: PAD, R 4th toe cellulitis 2/16 angio RLE, 2/18 R 4th toe amputation   Existing Precautions/Restrictions weight bearing   Weight-Bearing Status - RLE other (see comments)  (R heel wtb only)   General Observations pt sitting EOB, agreeable to PT   Cognition   Affect/Mental Status (Cognition) WFL   Pain Assessment   Patient Currently in Pain Yes, see Vital Sign flowsheet  (6/10 R foot, RN gave pain meds)   Range of Motion (ROM)   ROM Comment LLE WFL, RLE WFL0 did not test foot ROM   Strength (Manual Muscle Testing)   Strength Comments BLE WFL for transfers   Bed Mobility   Comment, (Bed Mobility) pt I with lifting BLE in/out of bed   Transfers   Maintains Weight-bearing Status (Transfers) able to maintain   Transfer Safety Concerns Noted other (see comments)  (unsteady with out AD,)   Impairments Contributing to Impaired Transfers impaired balance   Comment, (Transfers) sit<>stand no AD CGA. sit<>stand with SEcane SBA, sit<>stand with small based quad cane mod I   Gait/Stairs (Locomotion)   [place]  Level (Gait) contact guard   Assistive Device (Gait) other (see comments)  (none)   Distance in Feet 4'   Distance in Feet (Gait) 5'x4, 10'   Pattern (Gait) step-to   Deviations/Abnormal Patterns (Gait) antalgic;weight shifting decreased   Maintains Weight-bearing Status (Gait) able to maintain   Negotiation (Stairs) stairs independence;handrail location;number of steps;ascending technique;descending technique;maintains weight-bearing status;stairs assistive device   [place] Level (Stairs) supervision   Assistive Device (Stairs) cane, quad   Handrail Location (Stairs) right side (ascending);left side (descending)   Number of Steps (Stairs) 4   Ascending Technique (Stairs) step-to-step   Descending Technique (Stairs) step-to-step   Maintains Weight-bearing Status (Stairs) able to maintain;verbal cues to maintain   Comment, (Gait/Stairs) unsteady reaching for furniture   Balance   Balance Comments unsteady without AD   Clinical Impression   Criteria for Skilled Therapeutic Intervention Other (see comments)  (eval and treat only)   PT Diagnosis (PT) decreased balance for mobilty   Influenced by the following impairments pain, R heel wtb only   Functional limitations due to impairments trasnfers, gait and stairs   PT Total Evaluation Time   PT Eval, Low Complexity Minutes (51015) 13   Physical Therapy Goals   PT Frequency Daily   PT Predicted Duration/Target Date for Goal Attainment 02/19/23   PT Goals Transfers;Gait;Stairs   PT: Transfers Modified independent;Sit to/from stand;Bed to/from chair;Assistive device   PT: Gait Supervision/stand-by assist;25 feet;Quad cane   PT: Stairs Minimal assist;4 stairs  (1 rail and small based quad cane)   Interventions   Interventions Quick Adds Gait Training   Gait Training   Gait Training Minutes (90497) 23   Symptoms Noted During/After Treatment (Gait Training) other (see comments)  (pt reports no increased pain with PT activity)   Treatment Detail/Skilled Intervention  sit<>stand x4 with quadcane mod I-cues for hand placement,   [place] Level (Gait Training) other (see comments)  (mod I)   Physical Assistance Level (Gait Training) set-up required   Weight Bearing (Gait Training) other (see comments)  (R heel wtb only)   Assistive Device (Gait Training) quad cane   Pattern Analysis (Gait Training) 3-point gait   Gait Analysis Deviations decreased weight-shifting ability   Impairments (Gait Analysis/Training) pain   Stair Railings other (see comments)  (1 railing)   Physical Assist/Nonphysical Assist (Stairs) supervision   Level of [place] (Stairs) stand-by assist   Assistive Device (Stairs) quad cane  (additional stairs up/down 4)   PT Discharge Planning   PT Plan pt issued small based quadcane for home   PT Discharge Recommendation (DC Rec) home with assist   PT Rationale for DC Rec pt demostrate safe transfers, ambulation and stairs using quad cane, pt has a good understanding on R heel wtbing   PT Brief overview of current status pt mod I /sba with quad cane for mobility   Total Session Time   Timed Code Treatment Minutes 23   Total Session Time (sum of timed and untimed services) 36

## 2023-02-19 NOTE — PLAN OF CARE
Patient is discharge to home.  Patient received discharge instructions including RX script for oxycodone. All her questions answered to her satisfaction.Patient has all her belongings. Patient is being wheeled out an her daughter will give her a ride home.

## 2023-02-19 NOTE — DISCHARGE SUMMARY
[facility]  Hospitalist Discharge Summary      Date of Admission:  2/14/2023  Date of Discharge:  2/19/2023  Discharging Provider: Vivian Cassidy MD  Discharge Service: Hospitalist Service    Discharge Diagnoses   Active Problems:    Type 2 diabetes mellitus (H)    Tobacco use disorder    PAD (peripheral artery disease) (H)    Essential hypertension    Dyslipidemia    Ischemic toe        Follow-ups Needed After Discharge   Follow-up Appointments     Follow-up and recommended labs and tests       Follow up with primary care provider, Physician No Ref-Primary, within   3-5days, to evaluate medication change and for hospital follow- up. The   following labs/tests are recommended: CBC and BMP    Podiatry to arrange outpatient follow-up  Vascular Surgery to arrange outpatient follow up .             Unresulted Labs Ordered in the Past 30 Days of this Admission     Date and Time Order Name Status Description    2/18/2023  8:35 AM Wound Aerobic Bacterial Culture Routine Preliminary     2/18/2023  8:35 AM Anaerobic Bacterial Culture Routine In process     2/14/2023  9:07 PM Blood Culture Peripheral Blood Preliminary     2/14/2023  9:07 PM Blood Culture Peripheral Blood Preliminary       These results will be followed up by Podiatry    Discharge Disposition   Discharged to home  Condition at discharge: Stable      Hospital Course   Ashanti Aviles is a 67 year old female with h/o smoking, diabetes and hypertension who presents to this ED for evaluation of an impaired toe circulation and foot pain, she was found to have right fourth toe cellulitis/gangrenous changes, she was admitted with - CT abdomen shows Moderate/severe diffuse atherosclerotic disease infrarenal abdominal aorta.Diffuse severe atherosclerotic disease right superficial femoral artery. High-grade stenosis of the left popliteal artery with three-vessel runoff into the right foot.     PAD/Ischemic toe  toe pressures of 45, and TRACIE  of 0.47.   - 2/16/2023 S/p angiogram Right lower extremity angiography demonstrates occlusion of the ostial superficial femoral artery with moderate to severe stenosis throughout the vessel. This was successfully treated with directional atherectomy, traditional and drug coated balloon  angioplasty. Post intervention imaging demonstrates excellent result with brisk in line flow through the revascularized superficial femoral artery with three-vessel runoff to the foot.  - vascular assistance appreciated   - 2/18/2023 s/p amputation 4th toe.   - ok to stop IV abx per podiatry   - Continue Plavix  - gabapentin 200mg TID   - oxycodone prn  - tylenol .   -flp pending      Diabetes mellitus type 2  - resume home metformin   - resume januvia, jardiance and tresiba   - Hemoglobin A1c is elevated at 8.5  - outpatient follow up with PCP      Hypertension  - continue lisinopril and Norvasc  - resume  hydrochlorothiazide       Hypokalemia  - Replaced per protocol     Smoking dependence  - Patient quit smoking 2 days ago  - Declined nicotine patch.  - patient is adamant about quitting     Consultations This Hospital Stay   PHARMACY TO DOSE VANCO  PODIATRY IP CONSULT  VASCULAR SURGERY IP CONSULT  PHARMACY TO DOSE VANCO  PHYSICAL THERAPY ADULT IP CONSULT    Code Status   Full Code    Time Spent on this Encounter   I, Vivian Cassidy MD, personally saw the patient today and spent greater than 30 minutes discharging this patient.       Vivian Cassidy MD  [address] [address]  Phone: [phone]  Fax: [phone]  ______________________________________________________________________    Physical Exam   Vital Signs: Temp: 98.1  F (36.7  C) Temp src: Oral BP: 108/55 Pulse: 67   Resp: 16 SpO2: 97 % O2 Device: None (Room air)    Weight: 146 lbs 6.17 oz  Physical Exam  Constitutional:       Appearance: Normal appearance.   Cardiovascular:      Rate and Rhythm: Normal  rate and regular rhythm.      Pulses: Normal pulses.      Heart sounds: Normal heart sounds.   Pulmonary:      Effort: Pulmonary effort is normal.      Breath sounds: Normal breath sounds.   Abdominal:      General: Bowel sounds are normal.      Palpations: Abdomen is soft.   Musculoskeletal:      Comments: Right foot bandaged, remaining toes warm   Skin:     General: Skin is warm and dry.      Comments: PT 2+   Neurological:      General: No focal deficit present.      Mental Status: She is alert and oriented to person, place, and time. Mental status is at baseline.              Primary Care Physician   Physician No Ref-Primary    Discharge Orders      Reason for your hospital stay    Active Problems:    Type 2 diabetes mellitus (H)    Tobacco use disorder    PAD (peripheral artery disease) (H)    Essential hypertension    Dyslipidemia    Ischemic toe     Follow-up and recommended labs and tests     Follow up with primary care provider, Physician No Ref-Primary, within 3-5days, to evaluate medication change and for hospital follow- up. The following labs/tests are recommended: CBC and BMP    Podiatry to arrange outpatient follow-up  Vascular Surgery to arrange outpatient follow up .     Activity    Your activity upon discharge: activity as tolerated and no driving until cleared by podiatry  Non-weightbearing on right foot and can use Heal weightbearing only for transfers  until otherwise by Podiatry     Crutches Order    I, the undersigned, certify that the above prescribed supplies are medically necessary for this patient and is both reasonable and necessary in reference to accepted standards of medical and necessary in reference to accepted standards of medical practice in the treatment of this patient's condition and is not prescribed as a convenience.      Diet    Follow this diet upon discharge: Orders Placed This Encounter      Combination Diet Moderate Consistent Carb (60 g CHO per Meal) Diet        Significant Results and Procedures   Most Recent 3 CBC's:  Recent Labs   Lab Test 02/18/23  0723 02/17/23  0750 02/16/23  0759   WBC 10.2 9.7 8.3   HGB 13.1 12.5 13.2   MCV 91 91 91    229 275     Most Recent 3 BMP's:  Recent Labs   Lab Test 02/19/23  0818 02/19/23  0809 02/18/23  2055 02/18/23  1616 02/18/23  1221 02/18/23  0723 02/17/23  0754 02/17/23  0750 02/16/23  0807 02/16/23  0759   NA  --   --   --   --   --  139  --  136  --  138   POTASSIUM  --  4.0  --   --   --  3.6  --  3.5  --  4.3   CHLORIDE  --   --   --   --   --  104  --  106  --  106   CO2  --   --   --   --   --  25  --  23  --  25   BUN  --   --   --   --   --  10.5  --  8.0  --  11.0   CR  --   --   --   --   --  0.68  --  0.64  --  0.73   ANIONGAP  --   --   --   --   --  10  --  7  --  7   SANTINO  --   --   --   --   --  9.2  --  8.4*  --  9.1   *  --  170* 136*   < > 127*   < > 142*   < > 153*    < > = values in this interval not displayed.   ,   Results for orders placed or performed during the hospital encounter of 02/14/23   MR Foot Right w/o & w Contrast    Narrative    EXAM: MR FOOT RIGHT W/O and W CONTRAST  LOCATION: [facility]  DATE/TIME: 2/15/2023 2:12 AM    INDICATION: Right fourth toe gangrenous changes cellulitis, rule out osteo  COMPARISON: Right foot radiograph 02/08/2023  TECHNIQUE: Routine. Additional postgadolinium T1 sequences were obtained.  IV CONTRAST: 7ml Gadavist    FINDINGS:     JOINTS AND BONES:   -Increased fluid sensitive signal in the distal phalanx of the fourth toe, with normal marrow signal on T1. This most likely represents reactive edema. No marrow signal suspicious for osteomyelitis. No fracture. No joint effusion. Degenerative changes at   the first MTP joint.    TENDONS:   -No tendon tear, tendinopathy, or tenosynovitis.     LIGAMENTS:   -Lisfranc ligament: Intact. No subluxation.    MUSCLES AND SOFT TISSUES:   -Soft tissue edema and enhancement of the fourth toe.  No fluid collections.      Impression    IMPRESSION:  1.  Reactive marrow edema in the distal phalanx of the fourth toe. No evidence for osteomyelitis.  2.  Fourth toe soft tissue edema which could represent cellulitis.   IR Lower Extremity Angiogram Right    Narrative    LOCATION: [facility]  DATE: 2/16/2023    PROCEDURE: PELVIC AORTOGRAM, RIGHT LOWER EXTREMITY DIAGNOSTIC ARTERIOGRAPHY, ANGIOPLASTY/ATHERECTOMY/DRUG COATED BALLOON INFLATION OF THE RIGHT SUPERFICIAL FEMORAL ARTERY, ULTRASOUND GUIDANCE FOR VASCULAR ACCESS, MODERATE SEDATION    INTERVENTIONAL RADIOLOGIST: Elizabeth Sheehan MD    INDICATION: 67-year-old female with nonhealing right digit wound in the setting of peripheral arterial disease, plan for right lower extremity angiography with possible intervention.    INDICATION FOR DIAGNOSTIC ANGIOGRAPHY: Diagnostic angiography was required to precisely identify plaque morphology to guide management    CONSENT: The risks, benefits and alternatives of the procedure were discussed with the patient  in detail. All questions were answered. Informed consent was given to proceed with the procedure.    MODERATE SEDATION: Versed 5 mg IV; Fentanyl 275 mcg IV. During the time out, immediately prior to the administration of medications, the patient was reassessed for adequacy to receive conscious sedation.  Under physician supervision, Versed and fentanyl   were administered for moderate sedation. Pulse oximetry, heart rate and blood pressure were continuously monitored by an independent trained observer. The physician spent 90 minutes of face-to-face sedation time with the patient.    CONTRAST: 100 cc Visipaque  ANTIBIOTICS: None.  ADDITIONAL MEDICATIONS: Heparin 14,000 units, protamine 50 mg    FLUOROSCOPIC TIME: 29.9 minutes.  RADIATION DOSE: Air Kerma: 650 mGy.    COMPLICATIONS: No immediate complications.    UNIVERSAL PROTOCOL: The operative site was marked and any prior imaging was reviewed.  Required items including blood products, implants, devices and special equipment was made available. Patient identity was confirmed either verbally, with demographic   information, hospital assigned identification or other identification markers. A timeout was performed immediately prior to the procedure.    STERILE BARRIER TECHNIQUE: Maximum sterile barrier technique was used. Cutaneous antisepsis was performed at the operative site with application of 2% chlorhexidine and large sterile drape. Prior to the procedure, the  and assistant performed   hand hygiene and wore hat, mask, sterile gown, and sterile gloves during the entire procedure.    PROCEDURE:    Access was achieved into the left common femoral artery utilizing real-time ultrasound guidance and a micropuncture access kit. Imaging demonstrates a patent and compressible artery. Permanent images were stored to the patient's medical record.   Conversion was made for a 5 [place] vascular sheath, which was attached to a continuous heparinized saline infusion.     A flush catheter was manipulated over a wire into the lower abdomen at the level of the inferior mesenteric artery and digital subtraction pelvic arteriography was obtained. Imaging demonstrates patent caudal abdominal aorta and bilateral iliac   vasculature.    Selective catheterization of the right common iliac and external iliac artery was performed. A right lower extremity angiogram was obtained with catheter positioned in the caudal right external iliac artery. Imaging demonstrates patent right femoral   artery with moderate stenosis. There is high-grade stenosis involving the right profunda femoral artery ostium. Focal occlusion of the right superficial femoral artery ostium. There is diffuse, moderate stenosis involving the entire right superficial   femoral artery. Patent above and below knee popliteal artery. There is a three-vessel runoff to the foot.    Systemic heparinization was  performed.    Exchange is made for a 7 [place] vascular sheath which was advanced to the external iliac arteries. An angled .035 crossing catheter and guidewire were used to cross the occluded superficial femoral artery. The guidewire and catheter were advanced to the   below-knee popliteal artery. Over the wire exchange is made for a spider FX distal embolic protection filter. The filter was deployed in the below knee popliteal artery. Over the wire exchange is made for a 7F [employer] LX atherectomy device. Atherectomy   of the entire superficial femoral artery was performed with removal of significant volume of plaque. Over-the-wire exchange is made for a 5 mm x 20 cm angioplasty balloon. Angioplasty of the entire superficial femoral artery was performed. Over-the-wire   exchange is made for a 5 mm x 25 cm InPact drug eluting balloon. Drug coated balloon angioplasty of the entire superficial femoral artery was performed to burst pressure. The distal embolic protection filter was removed and a repeat right lower extremity   angiogram was obtained. Imaging demonstrates widely patent superficial femoral artery. There is brisk three-vessel runoff to the foot. No significant residual stenosis is identified.    Protamine sulfate was administered and hemostasis was achieved with manual pressure.      Impression    IMPRESSION:    Right lower extremity angiography demonstrates occlusion of the ostial superficial femoral artery with moderate to severe stenosis throughout the vessel. This was successfully treated with directional atherectomy, traditional and drug coated balloon   angioplasty. Post intervention imaging demonstrates excellent result with brisk in line flow through the revascularized superficial femoral artery with three-vessel runoff to the foot.       Discharge Medications   Current Discharge Medication List      START taking these medications    Details   acetaminophen (TYLENOL) 325 MG tablet Take 2 tablets  (650 mg) by mouth every 4 hours as needed for mild pain (and adjunct with moderate or severe pain or per patient request)  Qty: 30 tablet, Refills: 0    Associated Diagnoses: PAD (peripheral artery disease) (H)      gabapentin (NEURONTIN) 100 MG capsule Take 2 capsules (200 mg) by mouth 3 times daily for 14 days  Qty: 84 capsule, Refills: 0    Associated Diagnoses: PAD (peripheral artery disease) (H)      oxyCODONE (ROXICODONE) 5 MG tablet Take 1 tablet (5 mg) by mouth every 4 hours as needed for severe pain (7-10)  Qty: 20 tablet, Refills: 0    Associated Diagnoses: PAD (peripheral artery disease) (H)      traZODone (DESYREL) 50 MG tablet Take 0.5 tablets (25 mg) by mouth At Bedtime for 7 days  Qty: 4 tablet, Refills: 0    Associated Diagnoses: PAD (peripheral artery disease) (H)         CONTINUE these medications which have NOT CHANGED    Details   amLODIPine (NORVASC) 10 MG tablet [AMLODIPINE (NORVASC) 10 MG TABLET] Take 10 mg by mouth daily.      clopidogrel (PLAVIX) 75 MG tablet Take 75 mg by mouth daily      hydroCHLOROthiazide (HYDRODIURIL) 25 MG tablet [HYDROCHLOROTHIAZIDE (HYDRODIURIL) 25 MG TABLET] Take 25 mg by mouth daily.      Insulin Degludec (TRESIBA) 100 UNIT/ML SOLN Inject 30 Units Subcutaneous daily      JARDIANCE 10 MG TABS tablet Take 10 mg by mouth daily      linagliptin (TRADJENTA) 5 MG TABS tablet Take 5 mg by mouth daily      lisinopril (PRINIVIL,ZESTRIL) 40 MG tablet [LISINOPRIL (PRINIVIL,ZESTRIL) 40 MG TABLET] Take 40 mg by mouth daily.      metFORMIN (GLUCOPHAGE) 500 MG tablet Take 500 mg by mouth 2 times daily (with meals)         STOP taking these medications       doxycycline monohydrate (MONODOX) 100 MG capsule Comments:   Reason for Stopping:             Allergies   Allergies   Allergen Reactions     Ezetimibe Unknown     Generalized body aches     Oxycodone Nausea and Vomiting     Penicillins Unknown     Simvastatin Unknown     Muscle pain     Victoza [Liraglutide] Unknown

## 2023-02-19 NOTE — PLAN OF CARE
Problem: Surgery Nonspecified  Goal: Absence of Bleeding  Outcome: Progressing     Problem: Surgery Nonspecified  Goal: Absence of Infection Signs and Symptoms  Outcome: Progressing     Problem: Surgery Nonspecified  Goal: Optimal Pain Control and Function  Outcome: Progressing   Goal Outcome Evaluation:    VSS on RA. R foot pain managed with prn oxycodone and tylenol. Heel weight bearing on R leg.

## 2023-02-19 NOTE — PLAN OF CARE
PT Discharge Summary    Reason for therapy discharge:    Discharged to home.    Progress towards therapy goal(s). See goals on Care Plan in [facility] electronic health record for goal details.  Goals met    Therapy recommendation(s):    No further therapy is recommended.

## 2023-02-19 NOTE — PROGRESS NOTES
POD# 1 Amputation, fourth toe, right foot    The patient was seen at bedside in no apparent distress.  The patient relates a good appetite with no nausea or vomiting.  The patient denies any fever or chills.  The patient relates the pain is under control.  The patient relates being able to go the the bathroom.     The patient's vitals are stable and is affebrile    Operative Cultures:     Gram stain: no organisms seen  Aerobic: No growth  Anaerobic: No growth     Exam:  Neurovascular status remains intact with positive epicritic sensation and capillary filling to the digits on the right.  Motor is intact to the right.       The dressing appears clean, dry and intact with no strikethrough noted.    Assessment: Ischemic fourth toe, right foot status post amputation of the fourth toe, right foot.    Plan:     1. Wound: Keep dressing clean, dry and intact  2. Disposition: Doing well.  The patient may weightbear on the right heel only for transfer and balance.  Otherwise, recommend to remain non weight bearing until incision heals.  The patient may be discharged to home and follow up with podiatry clinic/vascular clinic next week.    I appreciate the assistance in the care of this patient from the Hospitalist Service.      NIKOLAY Diez DPM, FACFAS

## 2023-02-20 ENCOUNTER — PATIENT OUTREACH (OUTPATIENT)
Dept: CARE COORDINATION | Facility: CLINIC | Age: 68
End: 2023-02-20
Payer: COMMERCIAL

## 2023-02-20 LAB
BACTERIA BLD CULT: NO GROWTH
BACTERIA BLD CULT: NO GROWTH
BACTERIA WND CULT: NO GROWTH

## 2023-02-20 NOTE — PROGRESS NOTES
Clinic Care Coordination Contact  [facility]: Post-Discharge Note  SITUATION                                                      Admission:    Admission Date: 02/14/23   Reason for Admission: PAD/Ischemic toe  Discharge:   Discharge Date: 02/19/23  Discharge Diagnosis: Type 2 diabetes mellitus (H)    Tobacco use disorder    PAD (peripheral artery disease) (H)    Essential hypertension    Dyslipidemia    Ischemic toe    BACKGROUND                                                      Per hospital discharge summary and inpatient provider notes:    Hospital Course     Ashanti Aviles is a 67 year old female with h/o smoking, diabetes and hypertension who presents to this ED for evaluation of an impaired toe circulation and foot pain, she was found to have right fourth toe cellulitis/gangrenous changes, she was admitted with - CT abdomen shows Moderate/severe diffuse atherosclerotic disease infrarenal abdominal aorta.Diffuse severe atherosclerotic disease right superficial femoral artery. High-grade stenosis of the left popliteal artery with three-vessel runoff into the right foot.     PAD/Ischemic toe  toe pressures of 45, and TRACIE of 0.47.   - 2/16/2023 S/p angiogram Right lower extremity angiography demonstrates occlusion of the ostial superficial femoral artery with moderate to severe stenosis throughout the vessel. This was successfully treated with directional atherectomy, traditional and drug coated balloon  angioplasty. Post intervention imaging demonstrates excellent result with brisk in line flow through the revascularized superficial femoral artery with three-vessel runoff to the foot.  - vascular assistance appreciated   - 2/18/2023 s/p amputation 4th toe.   - ok to stop IV abx per podiatry   - Continue Plavix  - gabapentin 200mg TID   - oxycodone prn  - tylenol .   -flp pending      Diabetes mellitus type 2  - resume home metformin   - resume januvia, jardiance and tresiba   - Hemoglobin A1c is  "elevated at 8.5  - outpatient follow up with PCP      Hypertension  - continue lisinopril and Norvasc  - resume  hydrochlorothiazide        Hypokalemia  - Replaced per protocol     Smoking dependence  - Patient quit smoking 2 days ago  - Declined nicotine patch.  - patient is adamant about quitting     ASSESSMENT           Discharge Assessment  How are you doing now that you are home?: \"I'm doing okay; pain comes and goes, it's somewhat tolerable with the pain medications.\"  How are your symptoms? (Red Flag symptoms escalate to triage hotline per guidelines): Improved  Do you feel your condition is stable enough to be safe at home until your provider visit?: Yes  Does the patient have their discharge instructions? : Yes  Does the patient have questions regarding their discharge instructions? : No  Were you started on any new medications or were there changes to any of your previous medications? : Yes  Does the patient have all of their medications?: Yes  Do you have questions regarding any of your medications? : No  Do you have all of your needed medical supplies or equipment (DME)?  (i.e. oxygen tank, CPAP, cane, etc.): Yes  Discharge follow-up appointment scheduled within 14 calendar days? : Yes  Discharge Follow Up Appointment Date: 02/28/23  Discharge Follow Up Appointment Scheduled with?: Specialty Care Provider (Vascular)         Post-op (Clinicians Only)  Did the patient have surgery or a procedure: Yes  Incision: closed  Drainage: No  Bleeding: none  Fever: No  Chills: No  Redness: No  Warmth: No  Swelling: No  Incision site pain: No  Eating & Drinking: eating and drinking without complaints/concerns  PO Intake: regular diet  Additional Symptoms:  (Denies)  Bowel Function: normal (Passing gas.  Taking stool softeners)  Date of last BM: 02/18/23  Urinary Status: voiding without complaint/concerns      PLAN                                                      Outpatient Plan:      Follow up with primary care " provider, Physician No Ref-Primary, within 3-5days, to evaluate medication change and for hospital follow- up. The following labs/tests are recommended: CBC and BMP     Podiatry to arrange outpatient follow-up  Vascular Surgery to arrange outpatient follow up .    Future Appointments   Date Time Provider Department Center   2/27/2023  9:30 AM RUSTW  US 3 [facility]   2/27/2023 10:30 AM MPLW VC US 3 [facility]   2/28/2023  9:20 AM Elizabeth Sheehan MD [facility]   5/5/2023 10:00 AM Kirstie Reich APRN CNP [facility]         For any urgent concerns, please contact our 24 hour nurse triage line: [phone] ([phone])         Keren Saldana RN

## 2023-02-23 LAB
PATH REPORT.COMMENTS IMP SPEC: NORMAL
PATH REPORT.COMMENTS IMP SPEC: NORMAL
PATH REPORT.FINAL DX SPEC: NORMAL
PATH REPORT.GROSS SPEC: NORMAL
PATH REPORT.RELEVANT HX SPEC: NORMAL
PHOTO IMAGE: NORMAL

## 2023-02-25 LAB — BACTERIA WND CULT: NORMAL

## 2023-02-27 ENCOUNTER — ANCILLARY PROCEDURE (OUTPATIENT)
Dept: VASCULAR ULTRASOUND | Facility: CLINIC | Age: 68
End: 2023-02-27
Attending: RADIOLOGY
Payer: COMMERCIAL

## 2023-02-27 DIAGNOSIS — I70.229 CRITICAL LOWER LIMB ISCHEMIA (H): ICD-10-CM

## 2023-02-27 DIAGNOSIS — I73.9 PAD (PERIPHERAL ARTERY DISEASE) (H): ICD-10-CM

## 2023-02-27 PROCEDURE — 93926 LOWER EXTREMITY STUDY: CPT | Mod: RT

## 2023-02-27 PROCEDURE — 93923 UPR/LXTR ART STDY 3+ LVLS: CPT

## 2023-02-27 PROCEDURE — 93926 LOWER EXTREMITY STUDY: CPT | Mod: 26 | Performed by: SURGERY

## 2023-02-27 PROCEDURE — 93923 UPR/LXTR ART STDY 3+ LVLS: CPT | Mod: 26 | Performed by: SURGERY

## 2023-02-28 ENCOUNTER — OFFICE VISIT (OUTPATIENT)
Dept: VASCULAR SURGERY | Facility: CLINIC | Age: 68
End: 2023-02-28
Attending: RADIOLOGY
Payer: COMMERCIAL

## 2023-02-28 VITALS
RESPIRATION RATE: 16 BRPM | DIASTOLIC BLOOD PRESSURE: 66 MMHG | TEMPERATURE: 98 F | SYSTOLIC BLOOD PRESSURE: 124 MMHG | HEART RATE: 68 BPM

## 2023-02-28 DIAGNOSIS — I73.9 PAD (PERIPHERAL ARTERY DISEASE) (H): Primary | ICD-10-CM

## 2023-02-28 DIAGNOSIS — I99.8 ISCHEMIC TOE: ICD-10-CM

## 2023-02-28 RX ORDER — PRAVASTATIN SODIUM 20 MG
20 TABLET ORAL DAILY
Qty: 90 TABLET | Refills: 3 | Status: SHIPPED | OUTPATIENT
Start: 2023-02-28 | End: 2023-05-05

## 2023-02-28 ASSESSMENT — PAIN SCALES - GENERAL: PAINLEVEL: MODERATE PAIN (4)

## 2023-02-28 NOTE — PROGRESS NOTES
VASCULAR AND INTERVENTIONAL OUTPATIENT CONSULT OR VISIT  PHYSICIAN: Elizabeth Sheehan MD  ESTABLISHED PATIENT    LOCATION: [facility]    Ashanti Aviles   Medical Record #:  [id number]  YOB: 1955  Age:  67 year old     Date of Service: 2/28/2023    PRIMARY CARE PROVIDER: No Ref-Primary, Physician    Reason for visit: Follow-up right lower extremity angiogram, status post right fourth digit amputation    IMPRESSION: 67-year-old female with recent history of a right fourth digit wound in the setting of peripheral arterial disease, reflecting critical limb ischemia.  The patient underwent preoperative noninvasive imaging which demonstrated an ankle-brachial index of 0.47 on the right with a digit pressure of 45 mmHg.  The patient subsequently went a right lower extremity angiogram on 2/16/2023 with successful atherectomy and drug-coated balloon treatment of disease in her right superficial femoral artery.  Postoperative imaging performed yesterday demonstrates excellent results with an ankle-brachial index of 0.98 on the right and a digit pressure of 96 mmHg.  Overall, excellent short-term response to revascularization.    RECOMMENDATION:    1.  Guideline recommended medical therapy for peripheral arterial disease  -Continue antiplatelet therapy with Plavix 75 mg once daily  -The patient reports a history of myalgias after previously taking simvastatin.  We will attempt treatment with pravastatin 20 mg once daily given low incidence of muscle myalgias among statins.  -Encourage smoking cessation.  The outcome of any surgical or endovascular intervention is severely limited with continued smoking.  This was reviewed in great detail with both the patient and her daughter.  The patient has been previously prescribed Wellbutrin.  I have instructed the patient on appropriate dosing of her Wellbutrin.  -The patient has been smoking for approximately 40 years and certainly qualifies for lung cancer  screening CT as per USPSTF guidelines.  A low-dose lung screening CT was ordered.  -No evidence of significant carotid artery disease on carotid ultrasound dated 4/21/2022  -No evidence of abdominal aortic aneurysm on CT angiogram abdomen and pelvis dated 2/14/2023    We have arranged for the patient to follow-up with Dr. Ordaz regarding her right fourth digit amputation site.  Overall, the site appears intact and the sutures remain in place.  The patient reports slow but gradual healing of her left great toe nailbed.  She was instructed to contact the clinic if this worsens given underlying left lower extremity peripheral arterial disease.  Otherwise, we will plan for a clinic follow-up in mid April    HPI:  Ashanti Aviles is a 67 year old female who was seen today in follow-up after undergoing revascularization of her right lower extremity while admitted to [facility].  The patient's recent past medical history significant for a right fourth digit wound in the setting of underlying peripheral arterial disease.  Noninvasive imaging performed during her admission demonstrated marginal right digit pressure as well as significant disease involving her common femoral artery.  She underwent successful revascularization of her right superficial femoral artery utilizing atherectomy and drug-coated balloon angioplasty followed by amputation of her fourth digit.  The patient reports her perioperative course was unremarkable.  She denies any significant pain or discharge at the amputation site.  She does report slow healing of her left great toe nailbed.  Unfortunately, she continues to smoke approximate 1 to 2 cigarettes a day.    Lung Cancer Screening Shared Decision Making Visit     Ashanti Aviles is eligible for lung cancer screening on the basis of:   has not not experienced symptoms suggestive of lung cancer.   born on 1955, 67 year old years old.   smoked 1 packs of cigarettes for 40 years for a  total of 40 pack-years   has not quit smoking 0 years ago/is currently smoking.      I have discussed with patient the risks and benefits of screening for lung cancer with low-dose CT.     The risks include:   radiation exposure    false positives     over-diagnosis    The benefit of early detection of lung cancer is contingent upon adherence to annual screening or more frequent follow up if indicated.     Furthermore, reaping the benefits of screening requires Ashanti J Quilling to be willing and able to undergo diagnostic procedures, if indicated.     We did discuss that the only way to prevent lung cancer is to not smoke. Smoking cessation assistance was offered.      PHH:    Past Medical History:   Diagnosis Date     Diabetes mellitus (H)      Gastroesophageal reflux disease      Hypertension      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/29/2013        Past Surgical History:   Procedure Laterality Date     AMPUTATE TOE(S) Right 2/18/2023    Procedure: Amputation fourth toe right foot;  Surgeon: Benjamin Diez DPM;  [place] [facility] [place]     ANUS SURGERY       BIOPSY CERVICAL, LOCAL EXCISION, SINGLE/MULTIPLE       COLONOSCOPY N/A 9/11/2020    Procedure: EXAM UNDER ANESTHESIA, HIGH RESOLUTION ANOSCOPY INTRA OP;  Surgeon: Preeti Sheehan MD;  Location: [facility] [place];  Service: Gastroenterology     COLONOSCOPY N/A 12/14/2020    Procedure: EXAM UNDER ANESTHESIA WITH HIGH RESOLUTION ANOSCOPY;  Surgeon: Preeti Sheehan MD;  Location: [facility] [place] [facility] OR;  Service: General     COLONOSCOPY N/A 6/15/2021    Procedure: EXAM UNDER ANESTHESIA WITH HIGH RESOLUTION ANOSCOPY, BIOPSY, FULGURATION;  Surgeon: Preeti Sheehan MD;  Location: [facility] [place] [facility] OR;  Service: Gastroenterology     EXAM UNDER ANESTHESIA ANUS N/A 9/14/2021    Procedure: EXAM UNDER ANESTHESIA WITH HIGH RESOLUTION ANOSCOPY;  Surgeon: Preeti Sheehan MD;  Location: [facility] [place]     IR LOWER EXTREMITY ANGIOGRAM RIGHT   2/16/2023       ALLERGIES:  Ezetimibe, Oxycodone, Penicillins, Simvastatin, and Victoza [liraglutide]    MEDS:    Current Outpatient Medications:      acetaminophen (TYLENOL) 325 MG tablet, Take 2 tablets (650 mg) by mouth every 4 hours as needed for mild pain (and adjunct with moderate or severe pain or per patient request), Disp: 30 tablet, Rfl: 0     amLODIPine (NORVASC) 10 MG tablet, [AMLODIPINE (NORVASC) 10 MG TABLET] Take 10 mg by mouth daily., Disp: , Rfl:      clopidogrel (PLAVIX) 75 MG tablet, Take 75 mg by mouth daily, Disp: , Rfl:      gabapentin (NEURONTIN) 100 MG capsule, Take 2 capsules (200 mg) by mouth 3 times daily for 14 days, Disp: 84 capsule, Rfl: 0     hydroCHLOROthiazide (HYDRODIURIL) 25 MG tablet, [HYDROCHLOROTHIAZIDE (HYDRODIURIL) 25 MG TABLET] Take 25 mg by mouth daily., Disp: , Rfl:      Insulin Degludec (TRESIBA) 100 UNIT/ML SOLN, Inject 30 Units Subcutaneous daily, Disp: , Rfl:      JARDIANCE 10 MG TABS tablet, Take 10 mg by mouth daily, Disp: , Rfl:      linagliptin (TRADJENTA) 5 MG TABS tablet, Take 5 mg by mouth daily, Disp: , Rfl:      lisinopril (PRINIVIL,ZESTRIL) 40 MG tablet, [LISINOPRIL (PRINIVIL,ZESTRIL) 40 MG TABLET] Take 40 mg by mouth daily., Disp: , Rfl:      metFORMIN (GLUCOPHAGE) 500 MG tablet, Take 500 mg by mouth 2 times daily (with meals), Disp: , Rfl:      oxyCODONE (ROXICODONE) 5 MG tablet, Take 1 tablet (5 mg) by mouth every 4 hours as needed for severe pain (7-10), Disp: 20 tablet, Rfl: 0     pravastatin (PRAVACHOL) 20 MG tablet, Take 1 tablet (20 mg) by mouth daily, Disp: 90 tablet, Rfl: 3     traZODone (DESYREL) 50 MG tablet, Take 0.5 tablets (25 mg) by mouth At Bedtime for 7 days, Disp: 4 tablet, Rfl: 0    SOCIAL HABITS:    History   Smoking Status     Former     Packs/day: 0.25     Types: Cigarettes     Quit date: 2/12/2023   Smokeless Tobacco     Never     Social History    Substance and Sexual Activity      Alcohol use: Yes        Comment: Alcoholic Drinks/day:  2x      History   Drug Use Unknown       FAMILY HISTORY:  No family history on file.    ADVANCE CARE DIRECTIVES:    Advance care directives reviewed in the chart and no changes made.     PE:  /66   Pulse 68   Temp 98  F (36.7  C)   Resp 16   Wt Readings from Last 1 Encounters:   02/15/23 66.4 kg (146 lb 6.2 oz)     There is no height or weight on file to calculate BMI.    EXAM:  GENERAL: This is a well-developed 67 year old female who appears her stated age  EYES: Grossly normal.  MOUTH: Buccal mucosa normal   MUSCULOSKELETAL: Grossly normal and both lower extremities are intact.  HEME/LYMPH: No lymphedema  NEUROLOGIC: Focally intact, Alert and oriented x 3.   PSYCH: appropriate affect  INTEGUMENT: Right fourth digit amputation site appears intact with no discharge/sutures remain in place.  Small defect involving the medial surface of the left great toe nailbed with no discharge or erythema.    DIAGNOSTIC STUDIES:     Images:  US Low Ext Arterial Doppler  wo Ex    Result Date: 2/10/2023  Table formatting from the original result was not included. BILATERAL RESTING ANKLE-BRACHIAL INDICES (TRACIE'S) (Date: 02/10/23) Indication: Surveillance TRACIE's: PAD, Bilateral leg pain, Right 4th Toe Discolored/ Ulcer. Hx: Bilateral SFA  Proximal Stenosis. Previous: 3/17/2022 History: Previous Smoker, Hypertension, Diabetic, PAD, Angioplasty, Rest Pain, Claudication, Skin Color Change and Vascular Ulcers  Resting TRACIE's          Right: mmHg Index     Brachial: 140  Ankle-(PT): 66 0.47 Ankle-(DP): 61 0.44          Digit: 45 0.32               Left: mmHg Index     Brachial: 139  Ankle-(PT): 66 0.47 Ankle-(DP): 65 0.46          Digit: 41 0.29 Resting ankle-brachial index of 0.47 on the right. Toe Pressures of 45 mmHg and TBI of 0.32 Resting ankle-brachial index of 0.47 on the left. Toe Pressures of 41 mmHg and TBI of 0.29  VPR WAVEFORMS: The right volume plethysmography waveforms are moderately abnormal at the lower thigh level,  moderately abnormal at the upper calf level and moderately abnormal at the ankle. The left volume plethysmography waveforms are mildly abnormal at the lower thigh level, mildly abnormal at the upper calf level and moderately abnormal at the ankle. Comments: Patient refused Exercise on Treadmill due to leg pain and 4th toe ulcer.  Impression:  1. RIGHT LOWER EXTREMITY: TRACIE is Abnormal with an TRACIE of 0.47 indicating multilevel disease. Toe Pressures are Abnormal and impaired for wound healing with toe pressures of 45 mmHg. 2. LEFT LOWER EXTREMITY: TRACIE is Abnormal with an TRACIE of 0.47 indicating multilevel disease. Toe Pressures are Abnormal and impaired for wound healing with toe pressures of 41 mmHg. Reference: Wound classification Grade TRACIE Ankle Systolic Pressure Toe Pressures 0 > 0.80 > 100 mmHg > 60 mmHg 1 0.6 - 0.79 70 - 100 mmHg 40 - 59 mmHg 2 0.4 - 0.59 50-70 mmHg 30 - 39 mmHg 3 < 0.39 < 50 mmHg < 30 mmHg Digit Pressures DBI Disease Category > 0.70 Normal < 0.70 Abnormal > 30 mmHg Potential wound healing < 30 mmHg Impaired wound healing Ankle Brachial Pressures TRACIE Disease Category > 1.3  Likely vessel calcification with monophasic waveforms, non-diagnostic 0.95-1.30 Normal with multiphasic waveforms 0.50-0.95 Single level disease 0.30-0.50 Multilevel disease < 0.30 Critical limb ischema Volume Plethysmography Recording (VPR) at all levels Normal Sharp systolic peak, fast upstroke, prominent dicrotic notch in wave Mild Sharp systolic peak, fast upstroke, absent dicrotic notch in wave Moderate Flattened systolic peak, slowed upstroke, absent dicrotic notch inwave Severe amplitude wave with = upslope and down slope Occluded Flat Line     MR Foot Right w/o & w Contrast    Result Date: 2/15/2023  EXAM: MR FOOT RIGHT W/O and W CONTRAST LOCATION: [facility] DATE/TIME: 2/15/2023 2:12 AM INDICATION: Right fourth toe gangrenous changes cellulitis, rule out osteo COMPARISON: Right foot radiograph  02/08/2023 TECHNIQUE: Routine. Additional postgadolinium T1 sequences were obtained. IV CONTRAST: 7ml Gadavist FINDINGS: JOINTS AND BONES: -Increased fluid sensitive signal in the distal phalanx of the fourth toe, with normal marrow signal on T1. This most likely represents reactive edema. No marrow signal suspicious for osteomyelitis. No fracture. No joint effusion. Degenerative changes at  the first MTP joint. TENDONS: -No tendon tear, tendinopathy, or tenosynovitis. LIGAMENTS: -Lisfranc ligament: Intact. No subluxation. MUSCLES AND SOFT TISSUES: -Soft tissue edema and enhancement of the fourth toe. No fluid collections.     IMPRESSION: 1.  Reactive marrow edema in the distal phalanx of the fourth toe. No evidence for osteomyelitis. 2.  Fourth toe soft tissue edema which could represent cellulitis.    IR Lower Extremity Angiogram Right    Result Date: 2/16/2023  LOCATION: [facility] DATE: 2/16/2023 PROCEDURE: PELVIC AORTOGRAM, RIGHT LOWER EXTREMITY DIAGNOSTIC ARTERIOGRAPHY, ANGIOPLASTY/ATHERECTOMY/DRUG COATED BALLOON INFLATION OF THE RIGHT SUPERFICIAL FEMORAL ARTERY, ULTRASOUND GUIDANCE FOR VASCULAR ACCESS, MODERATE SEDATION INTERVENTIONAL RADIOLOGIST: Elizabeth Sheehan MD INDICATION: 67-year-old female with nonhealing right digit wound in the setting of peripheral arterial disease, plan for right lower extremity angiography with possible intervention. INDICATION FOR DIAGNOSTIC ANGIOGRAPHY: Diagnostic angiography was required to precisely identify plaque morphology to guide management CONSENT: The risks, benefits and alternatives of the procedure were discussed with the patient  in detail. All questions were answered. Informed consent was given to proceed with the procedure. MODERATE SEDATION: Versed 5 mg IV; Fentanyl 275 mcg IV. During the time out, immediately prior to the administration of medications, the patient was reassessed for adequacy to receive conscious sedation.  Under physician  supervision, Versed and fentanyl were administered for moderate sedation. Pulse oximetry, heart rate and blood pressure were continuously monitored by an independent trained observer. The physician spent 90 minutes of face-to-face sedation time with the patient. CONTRAST: 100 cc Visipaque ANTIBIOTICS: None. ADDITIONAL MEDICATIONS: Heparin 14,000 units, protamine 50 mg FLUOROSCOPIC TIME: 29.9 minutes. RADIATION DOSE: Air Kerma: 650 mGy. COMPLICATIONS: No immediate complications. UNIVERSAL PROTOCOL: The operative site was marked and any prior imaging was reviewed. Required items including blood products, implants, devices and special equipment was made available. Patient identity was confirmed either verbally, with demographic information, hospital assigned identification or other identification markers. A timeout was performed immediately prior to the procedure. STERILE BARRIER TECHNIQUE: Maximum sterile barrier technique was used. Cutaneous antisepsis was performed at the operative site with application of 2% chlorhexidine and large sterile drape. Prior to the procedure, the  and assistant performed hand hygiene and wore hat, mask, sterile gown, and sterile gloves during the entire procedure. PROCEDURE:  Access was achieved into the left common femoral artery utilizing real-time ultrasound guidance and a micropuncture access kit. Imaging demonstrates a patent and compressible artery. Permanent images were stored to the patient's medical record. Conversion was made for a 5 Italian vascular sheath, which was attached to a continuous heparinized saline infusion. A flush catheter was manipulated over a wire into the lower abdomen at the level of the inferior mesenteric artery and digital subtraction pelvic arteriography was obtained. Imaging demonstrates patent caudal abdominal aorta and bilateral iliac vasculature. Selective catheterization of the right common iliac and external iliac artery was performed. A  right lower extremity angiogram was obtained with catheter positioned in the caudal right external iliac artery. Imaging demonstrates patent right femoral artery with moderate stenosis. There is high-grade stenosis involving the right profunda femoral artery ostium. Focal occlusion of the right superficial femoral artery ostium. There is diffuse, moderate stenosis involving the entire right superficial femoral artery. Patent above and below knee popliteal artery. There is a three-vessel runoff to the foot. Systemic heparinization was performed. Exchange is made for a 7 Mongolian vascular sheath which was advanced to the external iliac arteries. An angled .035 crossing catheter and guidewire were used to cross the occluded superficial femoral artery. The guidewire and catheter were advanced to the below-knee popliteal artery. Over the wire exchange is made for a spider FX distal embolic protection filter. The filter was deployed in the below knee popliteal artery. Over the wire exchange is made for a 7F [employer] LX atherectomy device. Atherectomy of the entire superficial femoral artery was performed with removal of significant volume of plaque. Over-the-wire exchange is made for a 5 mm x 20 cm angioplasty balloon. Angioplasty of the entire superficial femoral artery was performed. Over-the-wire exchange is made for a 5 mm x 25 cm InPact drug eluting balloon. Drug coated balloon angioplasty of the entire superficial femoral artery was performed to burst pressure. The distal embolic protection filter was removed and a repeat right lower extremity  angiogram was obtained. Imaging demonstrates widely patent superficial femoral artery. There is brisk three-vessel runoff to the foot. No significant residual stenosis is identified. Protamine sulfate was administered and hemostasis was achieved with manual pressure.     IMPRESSION:  Right lower extremity angiography demonstrates occlusion of the ostial superficial femoral  artery with moderate to severe stenosis throughout the vessel. This was successfully treated with directional atherectomy, traditional and drug coated balloon angioplasty. Post intervention imaging demonstrates excellent result with brisk in line flow through the revascularized superficial femoral artery with three-vessel runoff to the foot.    US Lower Extremity Arterial Duplex Bilateral    Result Date: 2/10/2023  Table formatting from the original result was not included. Arterial Duplex Ultrasound (Date: 02/10/23) Lower Extremity Artery Evaluation Indication: Surveillance Bilateral leg Arterial: PAD, Bilateral leg pain, Right 4th Toe Discolored/ Ulcer. Hx: Bilateral SFA  Proximal Stenosis. Previous: 3/17/2022 History: Previous Smoker, Hypertension, Diabetic, PAD, Angioplasty, Rest Pain, Claudication, Skin Color Change and Vascular Ulcers Technique: Duplex imaging is performed utilizing gray-scale, two-dimensional images, and color-flow imaging. Doppler waveform analysis and spectral Doppler imaging is also performed. LOWER EXTREMITY ARTERIAL DUPLEX EXAM WITH WAVEFORMS Right Leg:(cm/s) Location: Velocities Waveforms EIA:   101  M CFA:   106  M PFA:   373 / 46 M SFA Proximal:  33/ 18  M SFA Mid:   33/ 25 /105 M SFA Distal:    45 / 40 M Popliteal Artery:   32 /38  M PTA:   19   M RICKY:   13  M DPA:   31  M Waveforms: T=Triphasic, M=Monophasic, B=Biphasic Stenotic Profile Ratio: 373 / 106 = 3.52 Left Leg:(cm/s) Location: Velocities Waveforms EIA:   188  T CFA:   198  T PFA:   265 /79 T SFA Proximal:   64 / 20 / 418  M SFA Mid:   15 / 32  M SFA Distal:   17 (Retrograde)   M Popliteal Artery:   26 / 30  M PTA:   17   M RICKY:   17  M DPA:   9  M Waveforms: T=Triphasic, M=Monophasic, B=Biphasic Stenotic Profile Ratio: 265 / 198 = 1.34 Impression: Right Lower Extremity: Monophasic flow in common femoral artery suggesting possible inflow disease.  Had a elevated velocities in the proximal and mid SFA with transition to very  dampened monophasic flow distally consistent with hemodynamically significant stenosis due to atherosclerotic arterial disease. Left Lower Extremity: Triphasic flow in common femoral artery suggesting no inflow disease.  Significantly elevated velocities in the proximal SFA.  Occlusion of the mid/distal SFA with retrograde flow noted in the distal SFA via collaterals.  Significant dampened monophasic flow distally. Reference: Category Normal 1-19% 20-49% 50-99% Occluded PSV <160 cm/sec without spectral broadening <160 cm/sec with spectral broadening Increased Increased Absent Flow Ratio N/A N/A < 2.0 >2.0 N/A Post-Stenotic Turbulence No No No Yes N/A     CTA Abdomen Pelvis Bilat Leg Runoff w Contr    Result Date: 2/14/2023  EXAM: CTA ABDOMEN PELVIS BILAT LEG RUNOFF W CONTR LOCATION: [facility] DATE/TIME: 2/14/2023 3:47 PM INDICATION:  Critical lower limb ischemia (H) COMPARISON: None. TECHNIQUE: Helical acquisition through the abdomen, pelvis, and bilateral lower extremities was performed during the arterial phase of contrast enhancement using IV Contrast. 2D and 3D reconstructions were performed by the CT technologist. Dose reduction  techniques were used. CONTRAST: 100ml IV Isovue 370 FINDINGS: AORTA: Calcified plaque  proximal celiac artery with no focal stenosis. Superior mesenteric and inferior mesenteric arteries are patent. Single renal arteries. Calcified plaque proximal renal arteries with no focal stenosis. Diffuse moderate/severe atherosclerotic disease infrarenal abdominal aorta. No evidence of abdominal aortic aneurysm. Dense calcified plaque with associated soft atheromatous plaque right common iliac artery. Moderate stenosis distal right common iliac artery. Focal stenosis proximal right internal iliac artery. Right external iliac artery patent. Diffuse calcified plaque left common iliac artery. Focal stenosis proximal left internal iliac artery. Moderate stenosis proximal  left external iliac artery. RIGHT LEG: Dense calcified plaque posterior wall right common femoral artery. Right profunda femoral artery patent. Diffuse severe atherosclerotic disease right superficial femoral artery. High-grade stenosis mid right popliteal artery. Three-vessel runoff into the right foot. LEFT LEG: Dense calcified plaque posterior wall left common femoral artery. Left profundofemoral artery patent. Diffuse severe atherosclerotic disease left superficial femoral artery. Occlusion of the left popliteal artery. Reconstitution of the tibial peroneal trunk. Two-vessel runoff via the left peroneal and posterior tibial arteries. LUNG BASES: Motion artifact. ABDOMEN: The liver, gallbladder, pancreas, spleen, adrenal glands and kidneys are unremarkable. Small splenule inferior pole the spleen. PELVIS: Unremarkable.     IMPRESSION: 1.  Moderate/severe diffuse atherosclerotic disease infrarenal abdominal aorta. 2.  Moderate stenosis distal right common iliac artery. Moderate stenosis proximal left external iliac artery. 3.  Diffuse severe atherosclerotic disease right superficial femoral artery. High-grade stenosis of the left popliteal artery with three-vessel runoff into the right foot. 4.  Diffuse severe atherosclerotic disease left superficial femoral artery. Occlusion of the left popliteal artery. Reconstitution of the tibial peroneal trunk with two-vessel runoff into the left foot via the peroneal and posterior tibial arteries.      LABS:      Sodium   Date Value Ref Range Status   02/18/2023 139 136 - 145 mmol/L Final   02/17/2023 136 136 - 145 mmol/L Final   02/16/2023 138 136 - 145 mmol/L Final     Urea Nitrogen   Date Value Ref Range Status   02/18/2023 10.5 8.0 - 23.0 mg/dL Final   02/17/2023 8.0 8.0 - 23.0 mg/dL Final   02/16/2023 11.0 8.0 - 23.0 mg/dL Final     Hemoglobin   Date Value Ref Range Status   02/18/2023 13.1 11.7 - 15.7 g/dL Final   02/17/2023 12.5 11.7 - 15.7 g/dL Final   02/16/2023  13.2 11.7 - 15.7 g/dL Final     Platelet Count   Date Value Ref Range Status   02/18/2023 259 150 - 450 10e3/uL Final   02/17/2023 229 150 - 450 10e3/uL Final   02/16/2023 275 150 - 450 10e3/uL Final     INR   Date Value Ref Range Status   02/16/2023 0.97 0.85 - 1.15 Final       This was a in person visit in which 45 minutes of  total time was spent (either in face-to-face or non-face-to-face time). A total of 5 minutes of smoking cessation counseling was provided for the patient including risk, benefits and alternative of pharmacological therapy ([address]37).    Elizabeth Sheehan MD, [facility]  VASCULAR AND INTERVENTIONAL PHYSICIAN  VASCULAR MEDICINE  INTERNAL MEDICINE  PAGER: [phone]  CALL SERVICE: [phone]

## 2023-02-28 NOTE — PATIENT INSTRUCTIONS
Nba Burrell,    Thank you for entrusting your care with us today. After your visit today with MD Elizabeth Sheehan this is the plan that was discussed at your appointment.    Follow up with Dr. Ordaz to evaluate surgical site and suture removal.     Remain non-weightbearing on your right foot with surgical shoe on when out of bed.  Gauze dressing applied today, leave in place until your follow up with Dr. Ordaz.  Keep surgical site dry.    Follow up with Dr. Sheehan as scheduled with ultrasound prior.    Restart your wellbutrin.    Start taking pravastatin.    Get lung cancer screening.  Someone will call you to schedule.     I am including additional information on these things and our contact information if you have any questions or concerns.   Please do not hesitate to reach out to us if you felt we did not answer your questions or you are unsure of the treatment plan after your visit today. Our number is [phone].Thank you for trusting us with your care.         Again thank you for your time.       Ankle-Brachial Index (TRACIE) or Physiologic Test    Description  An ankle-brachial index test is relatively pain free. Blood pressure cuffs of various sizes are placed on your thigh, calf, foot and toes.  Similar to having your blood pressure checked with an arm cuff, as the technician inflates the cuffs, they progressively tighten and are then quickly released.  You may feel some discomfort, but generally for less than 60 seconds for each measurement. You will be asked to remove your socks and shoes and possibly your pants or shorts. Gowns will be provided. It usually takes about 30-60 minutes.   Depending on the initial readings and patient symptoms, you may be asked to perform a light walk on a treadmill.  The technician will apply ultrasound gel, usually warmed for your comfort, to your ankles and wrists. Through the gel, the technician will use a small hand-held device that emits sound waves.  Risks  There are  typically no side effects or complications associated with a physiologic study.  How to Prepare  Eat and take medications as usual.  There is no preparation required for an ankle-brachial index (TRACIE) or physiologic exam.  What Can I Expect After the Test?  The technician will send the ultrasound images to your vascular surgeon for evaluation. Typically, a report is available in 2-3 days. If anything critical is found, it is standard practice to notify the vascular surgeon immediately.  Reference: [email or web address].org/patient-resources/vascular-tests

## 2023-02-28 NOTE — PROGRESS NOTES
[facility]        Patient is here for a 2 week follow up  to discuss Peripheral artery disease (PAD). S/p angio 2/16/23.  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      Pt is currently taking Plavix.    /66   Pulse 68   Temp 98  F (36.7  C)   Resp 16     The provider has been notified that the patient has no concerns.     Questions patient would like addressed today are: N/A.    Refills are needed: No    Has homecare services and agency name:  Daphne Shipley RN

## 2023-03-03 ENCOUNTER — TELEPHONE (OUTPATIENT)
Dept: VASCULAR SURGERY | Facility: CLINIC | Age: 68
End: 2023-03-03
Payer: COMMERCIAL

## 2023-03-03 NOTE — TELEPHONE ENCOUNTER
Caller: Patient    Provider: MD Elizabeth Sheehan    Detailed reason for call: Patient states that she gave Dr. Sheehan [facility] paperwork on 2/28 and [facility] has yet to receive anything.     Best phone number to contact: [phone]    Best time to contact: Any    Ok to leave a detailed message: Yes    Ok to speak to authorized person if needed: No      (Noted to patient if reason is related to wound or incision, to please send a photo via email or [employer].)

## 2023-03-03 NOTE — TELEPHONE ENCOUNTER
Paperwork was completed by Dr. Ordaz, faxed to [facility] today by this writer. Updated patient and provided with some of the details from the form. She will  a copy today from [place], this was placed at the desk. Our copy to be scanned to chart.

## 2023-03-10 NOTE — TELEPHONE ENCOUNTER
Faxed [facility] paperwork to [employer]. At [phone].    REF #: [id number]

## 2023-03-16 ENCOUNTER — OFFICE VISIT (OUTPATIENT)
Dept: VASCULAR SURGERY | Facility: CLINIC | Age: 68
End: 2023-03-16
Attending: PODIATRIST
Payer: COMMERCIAL

## 2023-03-16 VITALS
OXYGEN SATURATION: 100 % | RESPIRATION RATE: 18 BRPM | DIASTOLIC BLOOD PRESSURE: 72 MMHG | SYSTOLIC BLOOD PRESSURE: 126 MMHG | HEART RATE: 100 BPM

## 2023-03-16 DIAGNOSIS — L97.521 ULCER OF LEFT FOOT, LIMITED TO BREAKDOWN OF SKIN (H): Primary | ICD-10-CM

## 2023-03-16 DIAGNOSIS — E11.21 TYPE 2 DIABETES MELLITUS WITH DIABETIC NEPHROPATHY, UNSPECIFIED WHETHER LONG TERM INSULIN USE (H): ICD-10-CM

## 2023-03-16 DIAGNOSIS — M25.371 ANKLE INSTABILITY, RIGHT: ICD-10-CM

## 2023-03-16 PROCEDURE — G0463 HOSPITAL OUTPT CLINIC VISIT: HCPCS | Performed by: PODIATRIST

## 2023-03-16 PROCEDURE — 99204 OFFICE O/P NEW MOD 45 MIN: CPT | Performed by: PODIATRIST

## 2023-03-16 ASSESSMENT — PAIN SCALES - GENERAL: PAINLEVEL: MODERATE PAIN (5)

## 2023-03-16 NOTE — PATIENT INSTRUCTIONS
Important lnstructions    STOP UPSTAIRS TO  A CAM BOOT AND ROLLING KNEE WALKER    WEIGHT BEARING STATUS: You are to remain NON WEIGHT BEARING on your right foot. NON WEIGHT BEARING MEANS NO PRESSURE ON YOUR FOOT OR HEEL AT ANY TIME FOR ANY REASON!    2. OFFLOADING DEVICE: Must use a A ROLLING KNEE WALKER at all times! (do not use affected foot to push wheelchair)    3. STABILIZATION DEVICE: Use a CAM BOOT . You will need to WEAR THIS ANYTIME YOU ARE UP AND OUT OF BED, IT IS OKAY TO REMOVE WHEN YOU ARE SLEEPING..     4. ELEVATE: Elevating your leg means laying with your head on a pillow and your foot ABOVE YOUR WAIST.     5. DO NOT MOVE YOUR FOOT.  There is a risk of worsening the wound or incision. To give yourself a higher chance of healing, please DO NOT swing foot back and forth and wiggle foot/toes especially when inside a stabilization device.        Dressing Change lnstructions          RIGHT FOOT:  LEAVE DRESSINGS INTACT UNTIL YOUR NEXT APPOINTMENT    Left Big Toe:   Apply dry gauze to toe and change every other day or as needed.     It IS NOT ok to get your wound wet in the bath or shower    SEEK MEDICAL CARE IF:  You have an increase in swelling, pain, or redness around the wound.  You have an increase in the amount of pus coming from the wound.  There is a bad smell coming from the wound.  The wound appears to be worsening/enlarging  You have a fever greater than 101.5 F      It is ok to continue current wound care treatment/products for the next 2-3 days until new wound care supplies are ordered and arrive. If longer than this please contact our office at [phone].        We want to hear from you!   In the next few weeks, you should receive a call or email to complete a survey about your visit at [facility]. Please help us improve your appointment experience by letting us know how we did today. We strive to make your experience good and value any ways in which we could do  better.      We value your input and suggestions.    Thank you for choosing the [facility]!

## 2023-03-16 NOTE — PROGRESS NOTES
FOOT AND ANKLE SURGERY/PODIATRY CONSULT NOTE        ASSESSMENT:   S/p Amputation 4th digit right foot  Ulceration left hallux  DM2  PAD        TREATMENT:  -I discussed with the patient the surgical site on the right foot is stable, however I do recommend nonweightbearing on the right foot at all times.  Discussed risks of continued weightbearing including dehiscence of the surgical site and need for additional surgical intervention.    -I discussed the principles of wound healing today including the importance of limited walking on the involved limb, good vascular perfusion, good glycemic control and the absence of infection.     -Referred for a knee walker to remain nonweightbearing on the right foot.  Cam boot also dispensed today to be used when mobile.    -Also reviewed that the ulceration along the dorsal left hallux from previous ingrown nail avulsion procedure likely due to poor perfusion to the left great toe.  Patient did undergo angiogram with Dr. Sheehan on 2/14 on the right lower extremity.  Most recent ABIs from February 27 indicate decreased toe pressures on the left foot.  Specifically, left hallux TBI of 28.  Due to the decreased pressure at this location I am unable to provide sharp debridement today.  I have messaged Dr. Sheehan to determine if he would consider additional angiogram to the left lower extremity.    -Referred for MRI left foot to evaluate for osteomyelitis in the left hallux.I will contact the patient with the MRI report when available and we will be guided by the results.     -Hemoglobin A1c from February 14 of this year was 8.5.  Discussed importance of good glycemic control.  Referred to endocrinology.    -Discussed smoking sensation.    -She will follow-up with me in 2 weeks    Rene Ordaz DPM  [facility]      HPI: Ashanti Aviles was seen today status post amputation fourth digit right foot with Dr. Diez on February 18.  Patient reports she has  been walking in [facility] at home.  She also reports having a ingrown nail removed approximately 3 months ago on her left great toe and has been dealing with an open sore at this location.  Patient states she quit smoking 1 week ago but prior to that smoked 1 to 2 packs a day for over 20 years.  Past medical history significant for poorly maintained type 2 diabetes.    Past Medical History:   Diagnosis Date     Diabetes mellitus (H)      Gastroesophageal reflux disease      Hypertension      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/29/2013       Past Surgical History:   Procedure Laterality Date     AMPUTATE TOE(S) Right 2/18/2023    Procedure: Amputation fourth toe right foot;  Surgeon: Benjamin Diez DPM;  Location: [facility] OR     ANUS SURGERY       BIOPSY CERVICAL, LOCAL EXCISION, SINGLE/MULTIPLE       COLONOSCOPY N/A 9/11/2020    Procedure: EXAM UNDER ANESTHESIA, HIGH RESOLUTION ANOSCOPY INTRA OP;  Surgeon: Preeti Sheehan MD;  Location: [facility];  Service: Gastroenterology     COLONOSCOPY N/A 12/14/2020    Procedure: EXAM UNDER ANESTHESIA WITH HIGH RESOLUTION ANOSCOPY;  Surgeon: Preeti Sheehan MD;  Location: [facility];  Service: General     COLONOSCOPY N/A 6/15/2021    Procedure: EXAM UNDER ANESTHESIA WITH HIGH RESOLUTION ANOSCOPY, BIOPSY, FULGURATION;  Surgeon: Preeti Sheehan MD;  Location: [facility];  Service: Gastroenterology     EXAM UNDER ANESTHESIA ANUS N/A 9/14/2021    Procedure: EXAM UNDER ANESTHESIA WITH HIGH RESOLUTION ANOSCOPY;  Surgeon: Preeti Sheehan MD;  Location: [facility]     IR LOWER EXTREMITY ANGIOGRAM RIGHT  2/16/2023        Allergies   Allergen Reactions     Ezetimibe Unknown     Generalized body aches     Oxycodone Nausea and Vomiting     Penicillins Unknown     Simvastatin Unknown     Muscle pain     Victoza [Liraglutide] Unknown         Current Outpatient Medications:      acetaminophen (TYLENOL) 325 MG tablet, Take 2  tablets (650 mg) by mouth every 4 hours as needed for mild pain (and adjunct with moderate or severe pain or per patient request), Disp: 30 tablet, Rfl: 0     amLODIPine (NORVASC) 10 MG tablet, [AMLODIPINE (NORVASC) 10 MG TABLET] Take 10 mg by mouth daily., Disp: , Rfl:      clopidogrel (PLAVIX) 75 MG tablet, Take 75 mg by mouth daily, Disp: , Rfl:      hydroCHLOROthiazide (HYDRODIURIL) 25 MG tablet, [HYDROCHLOROTHIAZIDE (HYDRODIURIL) 25 MG TABLET] Take 25 mg by mouth daily., Disp: , Rfl:      Insulin Degludec (TRESIBA) 100 UNIT/ML SOLN, Inject 30 Units Subcutaneous daily, Disp: , Rfl:      JARDIANCE 10 MG TABS tablet, Take 10 mg by mouth daily, Disp: , Rfl:      linagliptin (TRADJENTA) 5 MG TABS tablet, Take 5 mg by mouth daily, Disp: , Rfl:      lisinopril (PRINIVIL,ZESTRIL) 40 MG tablet, [LISINOPRIL (PRINIVIL,ZESTRIL) 40 MG TABLET] Take 40 mg by mouth daily., Disp: , Rfl:      metFORMIN (GLUCOPHAGE) 500 MG tablet, Take 500 mg by mouth 2 times daily (with meals), Disp: , Rfl:      oxyCODONE (ROXICODONE) 5 MG tablet, Take 1 tablet (5 mg) by mouth every 4 hours as needed for severe pain (7-10), Disp: 20 tablet, Rfl: 0     pravastatin (PRAVACHOL) 20 MG tablet, Take 1 tablet (20 mg) by mouth daily, Disp: 90 tablet, Rfl: 3     gabapentin (NEURONTIN) 100 MG capsule, Take 2 capsules (200 mg) by mouth 3 times daily for 14 days, Disp: 84 capsule, Rfl: 0     traZODone (DESYREL) 50 MG tablet, Take 0.5 tablets (25 mg) by mouth At Bedtime for 7 days, Disp: 4 tablet, Rfl: 0    Social History     Social History Narrative     Not on file       No family history on file.    Review of Systems - 10 point Review of Systems is negative except for ulceration left hallux which is noted in HPI.      OBJECTIVE:  Appearance: alert, well appearing, and in no distress.    /72   Pulse 100   Resp 18   SpO2 100%     BMI= There is no height or weight on file to calculate BMI.    General appearance: Patient is alert and fully cooperative  with history & exam.  No sign of distress is noted during the visit.     Psychiatric: Affect is pleasant & appropriate.  Patient appears motivated to improve health.     Respiratory: Breathing is regular & unlabored while sitting.     HEENT: Hearing is intact to spoken word.  Speech is clear.  No gross evidence of visual impairment that would impact ambulation.    Vascular: Dorsalis pedis palpableRight.  Dermatologic:   VASC Wound Left Hallux (Active)   Post Size Length 0.6 03/16/23 1500   Post Size Width 0.5 03/16/23 1500   Post Size Depth 0.3 03/16/23 1500   Post Total Sq cm 0.3 03/16/23 1500       Incision/Surgical Site 09/14/21 Rectum (Active)       Incision/Surgical Site 02/18/23 Right Toe (Comment  which one) (Active)   Incision Assessment UTV 02/19/23 0900   Closure TEJ 02/19/23 0900   Incision Drainage Amount None 02/19/23 0900   Dressing Intervention Clean, dry, intact 02/19/23 0900   Sutures intact and amputated fourth digit right foot with skin edges well approximated, superficial gapping at proximal incision.  Ulceration dorsal medial left hallux at the nailbed.  No erythema bilateral feet.  Neurologic: Diminished to light touch Bilateral.  Musculoskeletal: Contracted digits noted Bilateral.    @[facility](10,16,17)@    Imaging:     US Low Ext Arterial Dop Seg Pres w/o Exercise    Result Date: 3/2/2023  Table formatting from the original result was not included. BILATERAL RESTING ANKLE-BRACHIAL INDICES (TRACIE'S) (Date: 02/27/23)  Indication: Surveillance TRACIE's: PAD. 2/18/2023 Right 4th Toe Amputation.       Hx: 2/16/2023 Right Superficial Femoral Artery Proximal Balloon Angioplasty/ Atherectomy.             Bilateral Superficial Femoral Artery Proximal Stenosis. Previous: 2/10/2023 History: Previous Smoker, Hypertension, Diabetic, PAD, Angioplasty, Vascular Surgery and Surgical Follow-up  Resting TRACIE's         Right:  mmHg  Index    Brachial: 126   Ankle-(PT): 110 0.87 Ankle-(DP): 123 0.98     1st Digit: 96  0.76          2nd Digit: 105 0.83    3rd Digit: 98 0.78    4th Digit: AMP N/A    5th Digit:  (Too Small)  N/A              Left: mmHg Index    Brachial: 124  Ankle (PT): 62 0.49 Ankle (DP): 66 0.52     1st Digit: 28 0.22    2nd Digit: 47 0.37    3rd Digit: 61 0.48    4th Digit: 26 0.21    5th Digit: (Too Small)  N/A Resting ankle-brachial index of 0.98 on the right. Toe Pressures of 96 mmHg and TBI of 0.76 Resting ankle-brachial index of 0.52 on the left. Toe Pressures of 28 mmHg and TBI of 0.22  Comments: Missed VPR's. 5th Digits too small for cuffs.  Impression:  1. RIGHT LOWER EXTREMITY: TRACIE is Normal with multiphasic waveforms with an TRACIE of 0.98. Toe Pressures are Normal and adequate for wound healing with toe pressures of  mmHg. 2. LEFT LOWER EXTREMITY: TRACIE is Abnormal with an TRACIE of 0.52 indicating multilevel disease. Toe Pressures are Abnormal and impaired for wound healing with toe pressures of 26-61 mmHg. Reference: Wound classification Grade TRACIE Ankle Systolic Pressure Toe Pressures 0 > 0.80 > 100 mmHg > 60 mmHg 1 0.6 - 0.79 70 - 100 mmHg 40 - 59 mmHg 2 0.4 - 0.59 50-70 mmHg 30 - 39 mmHg 3 < 0.39 < 50 mmHg < 30 mmHg Digit Pressures DBI Disease Category > 0.70 Normal < 0.70 Abnormal > 30 mmHg Potential wound healing < 30 mmHg Impaired wound healing Ankle Brachial Pressures TRACIE Disease Category > 1.3  Likely vessel calcification with monophasic waveforms, non-diagnostic 0.95-1.30 Normal with multiphasic waveforms 0.50-0.95 Single level disease 0.30-0.50 Multilevel disease < 0.30 Critical limb ischema Volume Plethysmography Recording (VPR) at all levels Normal Sharp systolic peak, fast upstroke, prominent dicrotic notch in wave Mild Sharp systolic peak, fast upstroke, absent dicrotic notch in wave Moderate Flattened systolic peak, slowed upstroke, absent dicrotic notch inwave Severe amplitude wave with = upslope and down slope Occluded Flat Line     MR Foot Right w/o & w Contrast    Result Date:  2/15/2023  EXAM: MR FOOT RIGHT W/O and W CONTRAST LOCATION: [facility] DATE/TIME: 2/15/2023 2:12 AM INDICATION: Right fourth toe gangrenous changes cellulitis, rule out osteo COMPARISON: Right foot radiograph 02/08/2023 TECHNIQUE: Routine. Additional postgadolinium T1 sequences were obtained. IV CONTRAST: 7ml Gadavist FINDINGS: JOINTS AND BONES: -Increased fluid sensitive signal in the distal phalanx of the fourth toe, with normal marrow signal on T1. This most likely represents reactive edema. No marrow signal suspicious for osteomyelitis. No fracture. No joint effusion. Degenerative changes at  the first MTP joint. TENDONS: -No tendon tear, tendinopathy, or tenosynovitis. LIGAMENTS: -Lisfranc ligament: Intact. No subluxation. MUSCLES AND SOFT TISSUES: -Soft tissue edema and enhancement of the fourth toe. No fluid collections.     IMPRESSION: 1.  Reactive marrow edema in the distal phalanx of the fourth toe. No evidence for osteomyelitis. 2.  Fourth toe soft tissue edema which could represent cellulitis.    IR Lower Extremity Angiogram Right    Result Date: 2/16/2023  LOCATION: [facility] DATE: 2/16/2023 PROCEDURE: PELVIC AORTOGRAM, RIGHT LOWER EXTREMITY DIAGNOSTIC ARTERIOGRAPHY, ANGIOPLASTY/ATHERECTOMY/DRUG COATED BALLOON INFLATION OF THE RIGHT SUPERFICIAL FEMORAL ARTERY, ULTRASOUND GUIDANCE FOR VASCULAR ACCESS, MODERATE SEDATION INTERVENTIONAL RADIOLOGIST: Elizabeth Sheehan MD INDICATION: 67-year-old female with nonhealing right digit wound in the setting of peripheral arterial disease, plan for right lower extremity angiography with possible intervention. INDICATION FOR DIAGNOSTIC ANGIOGRAPHY: Diagnostic angiography was required to precisely identify plaque morphology to guide management CONSENT: The risks, benefits and alternatives of the procedure were discussed with the patient  in detail. All questions were answered. Informed consent was given to proceed with  the procedure. MODERATE SEDATION: Versed 5 mg IV; Fentanyl 275 mcg IV. During the time out, immediately prior to the administration of medications, the patient was reassessed for adequacy to receive conscious sedation.  Under physician supervision, Versed and fentanyl were administered for moderate sedation. Pulse oximetry, heart rate and blood pressure were continuously monitored by an independent trained observer. The physician spent 90 minutes of face-to-face sedation time with the patient. CONTRAST: 100 cc Visipaque ANTIBIOTICS: None. ADDITIONAL MEDICATIONS: Heparin 14,000 units, protamine 50 mg FLUOROSCOPIC TIME: 29.9 minutes. RADIATION DOSE: Air Kerma: 650 mGy. COMPLICATIONS: No immediate complications. UNIVERSAL PROTOCOL: The operative site was marked and any prior imaging was reviewed. Required items including blood products, implants, devices and special equipment was made available. Patient identity was confirmed either verbally, with demographic information, hospital assigned identification or other identification markers. A timeout was performed immediately prior to the procedure. STERILE BARRIER TECHNIQUE: Maximum sterile barrier technique was used. Cutaneous antisepsis was performed at the operative site with application of 2% chlorhexidine and large sterile drape. Prior to the procedure, the  and assistant performed hand hygiene and wore hat, mask, sterile gown, and sterile gloves during the entire procedure. PROCEDURE:  Access was achieved into the left common femoral artery utilizing real-time ultrasound guidance and a micropuncture access kit. Imaging demonstrates a patent and compressible artery. Permanent images were stored to the patient's medical record. Conversion was made for a 5 Croatian vascular sheath, which was attached to a continuous heparinized saline infusion. A flush catheter was manipulated over a wire into the lower abdomen at the level of the inferior mesenteric artery and  digital subtraction pelvic arteriography was obtained. Imaging demonstrates patent caudal abdominal aorta and bilateral iliac vasculature. Selective catheterization of the right common iliac and external iliac artery was performed. A right lower extremity angiogram was obtained with catheter positioned in the caudal right external iliac artery. Imaging demonstrates patent right femoral artery with moderate stenosis. There is high-grade stenosis involving the right profunda femoral artery ostium. Focal occlusion of the right superficial femoral artery ostium. There is diffuse, moderate stenosis involving the entire right superficial femoral artery. Patent above and below knee popliteal artery. There is a three-vessel runoff to the foot. Systemic heparinization was performed. Exchange is made for a 7 Romansh vascular sheath which was advanced to the external iliac arteries. An angled .035 crossing catheter and guidewire were used to cross the occluded superficial femoral artery. The guidewire and catheter were advanced to the below-knee popliteal artery. Over the wire exchange is made for a spider FX distal embolic protection filter. The filter was deployed in the below knee popliteal artery. Over the wire exchange is made for a 7F ActiveCloud LX atherectomy device. Atherectomy of the entire superficial femoral artery was performed with removal of significant volume of plaque. Over-the-wire exchange is made for a 5 mm x 20 cm angioplasty balloon. Angioplasty of the entire superficial femoral artery was performed. Over-the-wire exchange is made for a 5 mm x 25 cm InPact drug eluting balloon. Drug coated balloon angioplasty of the entire superficial femoral artery was performed to burst pressure. The distal embolic protection filter was removed and a repeat right lower extremity  angiogram was obtained. Imaging demonstrates widely patent superficial femoral artery. There is brisk three-vessel runoff to the foot. No  significant residual stenosis is identified. Protamine sulfate was administered and hemostasis was achieved with manual pressure.     IMPRESSION:  Right lower extremity angiography demonstrates occlusion of the ostial superficial femoral artery with moderate to severe stenosis throughout the vessel. This was successfully treated with directional atherectomy, traditional and drug coated balloon angioplasty. Post intervention imaging demonstrates excellent result with brisk in line flow through the revascularized superficial femoral artery with three-vessel runoff to the foot.    CTA Abdomen Pelvis Bilat Leg Runoff w Contr    Result Date: 2/14/2023  EXAM: CTA ABDOMEN PELVIS BILAT LEG RUNOFF W CONTR LOCATION: [facility] DATE/TIME: 2/14/2023 3:47 PM INDICATION:  Critical lower limb ischemia (H) COMPARISON: None. TECHNIQUE: Helical acquisition through the abdomen, pelvis, and bilateral lower extremities was performed during the arterial phase of contrast enhancement using IV Contrast. 2D and 3D reconstructions were performed by the CT technologist. Dose reduction  techniques were used. CONTRAST: 100ml IV Isovue 370 FINDINGS: AORTA: Calcified plaque  proximal celiac artery with no focal stenosis. Superior mesenteric and inferior mesenteric arteries are patent. Single renal arteries. Calcified plaque proximal renal arteries with no focal stenosis. Diffuse moderate/severe atherosclerotic disease infrarenal abdominal aorta. No evidence of abdominal aortic aneurysm. Dense calcified plaque with associated soft atheromatous plaque right common iliac artery. Moderate stenosis distal right common iliac artery. Focal stenosis proximal right internal iliac artery. Right external iliac artery patent. Diffuse calcified plaque left common iliac artery. Focal stenosis proximal left internal iliac artery. Moderate stenosis proximal left external iliac artery. RIGHT LEG: Dense calcified plaque posterior wall  right common femoral artery. Right profunda femoral artery patent. Diffuse severe atherosclerotic disease right superficial femoral artery. High-grade stenosis mid right popliteal artery. Three-vessel runoff into the right foot. LEFT LEG: Dense calcified plaque posterior wall left common femoral artery. Left profundofemoral artery patent. Diffuse severe atherosclerotic disease left superficial femoral artery. Occlusion of the left popliteal artery. Reconstitution of the tibial peroneal trunk. Two-vessel runoff via the left peroneal and posterior tibial arteries. LUNG BASES: Motion artifact. ABDOMEN: The liver, gallbladder, pancreas, spleen, adrenal glands and kidneys are unremarkable. Small splenule inferior pole the spleen. PELVIS: Unremarkable.     IMPRESSION: 1.  Moderate/severe diffuse atherosclerotic disease infrarenal abdominal aorta. 2.  Moderate stenosis distal right common iliac artery. Moderate stenosis proximal left external iliac artery. 3.  Diffuse severe atherosclerotic disease right superficial femoral artery. High-grade stenosis of the left popliteal artery with three-vessel runoff into the right foot. 4.  Diffuse severe atherosclerotic disease left superficial femoral artery. Occlusion of the left popliteal artery. Reconstitution of the tibial peroneal trunk with two-vessel runoff into the left foot via the peroneal and posterior tibial arteries.    US Lower Extremity Arterial Duplex Right    Result Date: 3/2/2023  Table formatting from the original result was not included. Arterial Duplex Ultrasound (Date: 02/27/23) Lower Extremity Artery Evaluation Indication: Surveillance Right Leg Arterial: PAD. 2/18/2023 Right 4th Toe Amputation.       Hx: 2/16/2023 Right Superficial Femoral Artery Proximal Balloon Angioplasty/ Atherectomy.             Bilateral Superficial Femoral Artery Proximal Stenosis. Previous: 2/10/2023 History: Previous Smoker, Hypertension, Diabetic, PAD, Angioplasty, Vascular Surgery  and Surgical Follow-up  Technique: Duplex imaging is performed utilizing gray-scale, two-dimensional images, and color-flow imaging. Doppler waveform analysis and spectral Doppler imaging is also performed. LOWER EXTREMITY ARTERIAL DUPLEX EXAM WITH WAVEFORMS Right Leg:(cm/s) Location: Velocities Waveforms EIA:   182  B CFA:   164  B PFA:   345 /  66 T SFA Proximal:   170 / 82 / 111 B SFA Mid:   77  B SFA Distal:   63  T Popliteal Artery: (P/M/D)    189 / 113 /  88 T PTA:   69   M RICKY:   49  M DPA:   80  M Waveforms: T=Triphasic, M=Monophasic, B=Biphasic Stenotic Profile Ratio: 345 / 164 = 2.97 Left Leg:(cm/s) Location: Velocities Waveforms PTA:   16   M RICKY:  (Retrograde flow)    34  M DPA:   11  M Waveforms: T=Triphasic, M=Monophasic, B=Biphasic Comments:   Impression: Right Lower Extremity: Biphasic external iliac and common femoral waveform suggest no inflow disease.  There appears to be severe stenosis in the proximal profunda femoris artery based on velocities.  The SFA appears to be patent.  No stenoses identified in the vessel has near normal waveforms suggesting significant improvement from recent angio intervention.  While waveforms are technically monophasic at the ankle they have a very sharp upstroke and morphology suggesting inline flow to the foot. Left Lower Extremity: Not formally evaluated but blunted monophasic waveforms suggest advanced peripheral arterial occlusive disease of the limb. Reference: Category Normal 1-19% 20-49% 50-99% Occluded PSV <160 cm/sec without spectral broadening <160 cm/sec with spectral broadening Increased Increased Absent Flow Ratio N/A N/A < 2.0 >2.0 N/A Post-Stenotic Turbulence No No No Yes N/A

## 2023-03-17 ENCOUNTER — TELEPHONE (OUTPATIENT)
Dept: VASCULAR SURGERY | Facility: CLINIC | Age: 68
End: 2023-03-17
Payer: COMMERCIAL

## 2023-03-17 ENCOUNTER — HOSPITAL ENCOUNTER (OUTPATIENT)
Dept: MRI IMAGING | Facility: HOSPITAL | Age: 68
Discharge: HOME | End: 2023-03-17
Attending: PODIATRIST | Admitting: PODIATRIST
Payer: COMMERCIAL

## 2023-03-17 DIAGNOSIS — L97.521 ULCER OF LEFT FOOT, LIMITED TO BREAKDOWN OF SKIN (H): ICD-10-CM

## 2023-03-17 PROCEDURE — 255N000002 HC RX 255 OP 636: Performed by: PODIATRIST

## 2023-03-17 PROCEDURE — A9585 GADOBUTROL INJECTION: HCPCS | Performed by: PODIATRIST

## 2023-03-17 PROCEDURE — 73720 MRI LWR EXTREMITY W/O&W/DYE: CPT | Mod: LT

## 2023-03-17 RX ORDER — GADOBUTROL 604.72 MG/ML
0.1 INJECTION INTRAVENOUS ONCE
Status: COMPLETED | OUTPATIENT
Start: 2023-03-17 | End: 2023-03-17

## 2023-03-17 RX ADMIN — GADOBUTROL 7 ML: 604.72 INJECTION INTRAVENOUS at 10:20

## 2023-03-17 NOTE — TELEPHONE ENCOUNTER
I reviewed the patient's left foot MRI with her today.:1.  There is some reactive T2 marrow signal abnormality within the distal aspect of the great toe but no T1 marrow signal change and no significant enhancement. Findings are not suggestive of osteomyelitis.  2.  No additional areas worrisome for osteomyelitis are identified and there is no evidence for septic arthropathy or abscess.  3.  Degenerative change first MTP joint with mild hallux valgus.  4.  No evidence for fracture.  5.  No significant tendinous or ligamentous pathology.  6.  Exam otherwise negative.      Based on the above we we will continue with local wound care.  Awaiting input from Dr. Sheehan to determine if additional intervention is recommended.    She will follow-up with me in 2 weeks.

## 2023-03-20 ENCOUNTER — TELEPHONE (OUTPATIENT)
Dept: VASCULAR SURGERY | Facility: CLINIC | Age: 68
End: 2023-03-20
Payer: COMMERCIAL

## 2023-03-20 DIAGNOSIS — I73.9 PAD (PERIPHERAL ARTERY DISEASE) (H): Primary | ICD-10-CM

## 2023-03-20 NOTE — TELEPHONE ENCOUNTER
Per Dr. Sheehan pt needs LLE angiogram. Writer discussed with patient and she agreed with the plan. Offered follow-up visit tomorrow to discuss, she was OK with scheduling. Angiogram scheduled 3/23/23 with Hector in IR for 8am at [place]. Instructions given to pt verbally and sent via my chart. She had no further questions.

## 2023-03-20 NOTE — TELEPHONE ENCOUNTER
Ashanti Aviles,    Your visit to [facility] for your procedure is coming soon and we look forward to seeing you! This friendly reminder and pre-procedure checklist will help to ensure your procedure goes smoothly and meets your expectations. At [facility], our goal is to provide you with a great patient experience and to deliver genuine, professional care to every patient.     Please complete all the steps in advance of your visit. If you have any questions about the items listed below, please give our office a call. We can be reached at [phone] or visit our website at [email or web address] for more information.     Procedure: Left  Leg  Angiogram     Procedure Date :  3/23/23    Procedure Time :  0800    Arrival Time: 0700    4 weeks appt with Dr. Sheehan and repeat ultrasound: 4/18    Admission Type: Outpatient    Surgeon: Dr. Sheehan    Procedure Location: [facility]:  [address] [place] [address] (phone: [phone], Fax: [phone])      If you take blood thinners: SEE SPECIFIC INSTRUCTIONS BELOW    PLEASE DO NOT STOP YOUR ASPIRIN OR PLAVIX UNLESS SPECIFICALLY DIRECTED BY THE VASCULAR SURGEON TO STOP!  - In most cases Vascular providers want you to continue these. This is different from most NON vascular surgeries and may not be well known by your Primary Care Provider      Prepare for the peripheral angiography as follows:    Do not eat 8 hours before your procedure. You may have clear liquids up to 1 hour before surgery.    Tell your healthcare provider about all medicines you take and any allergies you may have.    Arrange for a family member or friend to drive you home.    If you are on Metformin, please HOLD for 48 hours after the procedure.    Please be sure to address your diabetic medications with your Primary Care Physician prior to your angiogram.    Peripheral Angiography    Peripheral  angiography is an outpatient procedure that makes a  map  of the vessels (arteries) in your lower body, legs, and arms, using X-ray and dye.This map can show where blood flow may be blocked.    An angiogram is commonly performed under sedation with the use of local anesthesia.      The procedure usually starts with a needle put into the femoral (groin) artery. From one treatment site, areas all over the body can be treated.  After access is established, catheters (thin tubes) and wires are threaded through the arterial system to a specific area of interest or throughout the entire body.  As a contrast agent (iodine dye) is injected, X-ray images are taken to let your provider view the flow of the dye and identify blockages. The surgeon can then choose the best mode of therapy for you - whether during or following the angiogram. This decision depends on your symptoms and the severity and characteristics of the blockages.  Two common therapies that can be provided during the angiogram are balloon angioplasty and stent placement.                     Angioplasty can be used to open arterial blockages. Guided by X-ray, your provider navigates through the blockage with a wire and introduces a special device equipped with an inflatable balloon. After positioning the balloon device across the blocked portion of the artery, the provider inflates the balloon to expand the artery and compress the blockage. The balloon is then deflated and removed while keeping the wire in place across the area that has been treated. Next, contrast dye is injected to assess the result. Treatment is considered a success if blood flow is improved and less than 30% of the blockage remains. If the vessel is still considerably narrowed, placing a stent may be the next step.      Stents are used to prop open an artery at the site of a narrowing. Stents are generally placed after balloon angioplasty when there is residual narrowing or insufficient  blood flow in a treated vessel. Stents are considered a permanent implant and cannot be used if you have a metal allergy. Stents that are used in the leg are constructed of a nickel-titanium alloy (Nitinol), a memory-shaped metal. This alloy has a predetermined size and shape at body temperature and expands to this size and shape after being introduced through a catheter. These stents resist kinking and are flexible so that damage from activities that involve your legs is minimized.    Your procedure may require other techniques to address the problem or plaque.     If surgery is felt to be a better option, your vascular provider will obtain any additional X-ray images needed to plan a surgical bypass of the blocked vessel/s and will then conclude the angiogram.      During the procedure  Here is what to expect:  You may get medicine through an IV (intravenous) line to relax you. You re given an injection to numb the insertion site. Then, a tiny skin cut (incision) is made near an artery in your groin.    Your provider inserts a thin tube (catheter) through the incision. He or she then threads the catheter into an artery while looking at a video monitor.    Contrast  dye  is injected into the catheter to confirm position. You may feel warmth or pressure in your legs and back. You lie still as X-rays are taken. The catheter is then taken out.  After the procedure  You ll be taken to a recovery area. A healthcare provider will apply pressure to the site for about 10 minutes. Your healthcare provider will tell you how long to lie down and keep the insertion site still. Your healthcare provider will discuss the results with you soon after the procedure.      Angiogram Procedure Discharge Instructions:     1. If you received sedation for your procedure: Do not drive or operate heavy machinery for the rest of the day.  2. Avoid strenuous activity for 72 hours (3 days):                        - Do not lift greater than 10  pounds.                        - Excessive exercise                        - Straining                        - Return to your normal activities as you tolerate after the 3 days restriction  3. Avoid tub baths, Jacuzzis, hot tubs and pools for 72 hours (3 days) or until puncture site is will healed.  4. You may shower beginning tomorrow. Do not scrub puncture site(s) until well healed, pat dry.  5. You can expect to return to work 1-2 days after your procedure - depending on the nature of your profession.  6. It is normal to have some tenderness and minimal swelling at puncture site. A small area of discoloration may be present. Tenderness typically subsides in 24-48 hours. A small knot may also be present at puncture site for 6-8 weeks, this can be a normal part of the healing process.       After the angiogram If you:      1. Experience any bleeding or active swelling from puncture site: Lie down, firmly apply pressure to puncture site and CALL 9-1-1  2. Fever greater than 101 degrees Fahrenheit.  3. Redness, swelling, warmth to touch, or purulent (yellow/green/foul smelling) drainage from the puncture site.  4. Increasing pain, tenderness or swelling at puncture site OR of arm/leg near puncture site.  5. Feeling weak or faint.  6. Change in color, temperature, or sensation of arm/leg where puncture was made.  7. You can t feel your thrill (pulse at your dialysis fistula site) or it feels weak (If you had fistulogram done).     Call us with any other questions or concerns after your procedure: [phone]      All invasive procedures can have complications. While the risk of an angiogram is low it is not zero. The most common complications are related to the arterial access site.       Risks/ Complications     Bruising is Common  You will likely have bruising (ecchymosis) where the artery was entered.      Pain and Bleeding  Less commonly, patients experience pain and bleeding that may include blood collecting  "under the skin (hematoma).      Blockage and Leakage   In rare cases, the access artery can become blocked. Infrequently, patients experience persistent leakage of blood where the artery was entered, which can result in the formation of a pseudoaneurysm--a blood-filled sac--that may require further treatment.    Other complications related to an angiogram include:   Allergic reaction to the iodine contrast dye, which can lead to the development of kidney failure.  Very rarely during balloon angioplasty and/or stent placement, part of the arterial blockage can break off (embolism) and travel to more distant arteries. This can worsen blood flow.    Pre-Procedure checklist:    [x] A Pre-op physical within 30 days of the procedure is required. You will need to set up an appointment with your primary care provider. DR. MCNEILL WILL LISTEN TO YOU THE AM OF THE PROCEDURE  [] Contact your insurance regarding coverage    If you would like a Good Heather Estimate for your upcoming procedure at [facility], contact Cost of Care Estimates     Advocates are available Monday through Friday 8am - 5pm   [phone]    You may also submit a request online at [email or web address]  - Complete the secure online form found under \"Services and Procedure Pricing\"     If your procedure is at [facility], please contact the numbers below for Cost of Care Estimates.   - Facility Charge: [phone]    Anesthesiology charge:  [phone]   [] DO NOT BRING FMLA WITH TO SURGERY.  These should be sent to the provider's office by fax to [phone].     [] Day of Surgery    Medications - Take as indicated with sips of water.     Wear comfortable loose-fitting clothes. Wear your glasses-Not contacts. Do not wear jewelry and remove body piercings. Surgery may be cancelled if they are not removed.     You may have 1 family member wait in the lobby during your surgery. Visitor restrictions are subject to " change. Please verify with the surgery nurse when they call.     If same day surgery-Have a someone come with you to surgery that can help you understand the surgeon's instructions, drive you home and stay with you overnight the first night.    [] You will receive a call from a surgery nurse 1-3 days prior to surgery. They will go over more details with you.    Notify our office right away, if you have any changes in your health status, or if you develop a cold, flu, diarrhea, infection, fever or sore throat before your scheduled surgery date. We can be reached at  [phone] Monday-Friday 8 am-4:30 pm if you have any questions.   Thank you for choosing [facility]

## 2023-03-23 ENCOUNTER — TELEPHONE (OUTPATIENT)
Dept: VASCULAR SURGERY | Facility: CLINIC | Age: 68
End: 2023-03-23

## 2023-03-23 ENCOUNTER — HOSPITAL ENCOUNTER (OUTPATIENT)
Dept: INTERVENTIONAL RADIOLOGY/VASCULAR | Facility: HOSPITAL | Age: 68
Discharge: HOME | End: 2023-03-23
Attending: RADIOLOGY
Payer: COMMERCIAL

## 2023-03-23 VITALS
SYSTOLIC BLOOD PRESSURE: 110 MMHG | TEMPERATURE: 97.6 F | HEART RATE: 84 BPM | RESPIRATION RATE: 16 BRPM | OXYGEN SATURATION: 100 % | DIASTOLIC BLOOD PRESSURE: 67 MMHG

## 2023-03-23 DIAGNOSIS — Z79.4 TYPE 2 DIABETES MELLITUS WITH DIABETIC PERIPHERAL ANGIOPATHY AND GANGRENE, WITH LONG-TERM CURRENT USE OF INSULIN (H): ICD-10-CM

## 2023-03-23 DIAGNOSIS — I73.9 PAD (PERIPHERAL ARTERY DISEASE) (H): ICD-10-CM

## 2023-03-23 DIAGNOSIS — E78.5 HYPERLIPIDEMIA WITH TARGET LDL LESS THAN 70: ICD-10-CM

## 2023-03-23 DIAGNOSIS — I10 ESSENTIAL HYPERTENSION: ICD-10-CM

## 2023-03-23 DIAGNOSIS — E11.52 TYPE 2 DIABETES MELLITUS WITH DIABETIC PERIPHERAL ANGIOPATHY AND GANGRENE, WITH LONG-TERM CURRENT USE OF INSULIN (H): ICD-10-CM

## 2023-03-23 DIAGNOSIS — I73.9 PAD (PERIPHERAL ARTERY DISEASE) (H): Primary | ICD-10-CM

## 2023-03-23 DIAGNOSIS — I70.229 CRITICAL LOWER LIMB ISCHEMIA (H): ICD-10-CM

## 2023-03-23 LAB
ACT BLD: 148 SECONDS (ref 74–150)
ACT BLD: 250 SECONDS (ref 74–150)
CREAT SERPL-MCNC: 0.88 MG/DL (ref 0.51–0.95)
GFR SERPL CREATININE-BSD FRML MDRD: 73 ML/MIN/1.73M2
HGB BLD-MCNC: 13.9 G/DL (ref 11.7–15.7)
INR PPP: 0.83 (ref 0.85–1.15)
PLATELET # BLD AUTO: 310 10E3/UL (ref 150–450)

## 2023-03-23 PROCEDURE — 75774 ARTERY X-RAY EACH VESSEL: CPT

## 2023-03-23 PROCEDURE — 85018 HEMOGLOBIN: CPT

## 2023-03-23 PROCEDURE — 250N000011 HC RX IP 250 OP 636

## 2023-03-23 PROCEDURE — 75710 ARTERY X-RAYS ARM/LEG: CPT | Mod: LT

## 2023-03-23 PROCEDURE — 85610 PROTHROMBIN TIME: CPT

## 2023-03-23 PROCEDURE — 85049 AUTOMATED PLATELET COUNT: CPT

## 2023-03-23 PROCEDURE — 99152 MOD SED SAME PHYS/QHP 5/>YRS: CPT

## 2023-03-23 PROCEDURE — C1887 CATHETER, GUIDING: HCPCS

## 2023-03-23 PROCEDURE — 36415 COLL VENOUS BLD VENIPUNCTURE: CPT

## 2023-03-23 PROCEDURE — 85347 COAGULATION TIME ACTIVATED: CPT

## 2023-03-23 PROCEDURE — C1769 GUIDE WIRE: HCPCS

## 2023-03-23 PROCEDURE — 272N000500 HC NEEDLE CR2

## 2023-03-23 PROCEDURE — 255N000002 HC RX 255 OP 636: Performed by: RADIOLOGY

## 2023-03-23 PROCEDURE — 250N000013 HC RX MED GY IP 250 OP 250 PS 637: Performed by: RADIOLOGY

## 2023-03-23 PROCEDURE — 272N000566 HC SHEATH CR3

## 2023-03-23 PROCEDURE — 250N000009 HC RX 250

## 2023-03-23 PROCEDURE — 250N000011 HC RX IP 250 OP 636: Performed by: RADIOLOGY

## 2023-03-23 PROCEDURE — 36246 INS CATH ABD/L-EXT ART 2ND: CPT

## 2023-03-23 PROCEDURE — 272N000569 HC SHEATH CR6

## 2023-03-23 PROCEDURE — 82565 ASSAY OF CREATININE: CPT

## 2023-03-23 RX ORDER — HEPARIN SODIUM 1000 [USP'U]/ML
5000 INJECTION, SOLUTION INTRAVENOUS; SUBCUTANEOUS ONCE
Status: COMPLETED | OUTPATIENT
Start: 2023-03-23 | End: 2023-03-23

## 2023-03-23 RX ORDER — NALOXONE HYDROCHLORIDE 0.4 MG/ML
0.2 INJECTION, SOLUTION INTRAMUSCULAR; INTRAVENOUS; SUBCUTANEOUS
Status: DISCONTINUED | OUTPATIENT
Start: 2023-03-23 | End: 2023-03-24 | Stop reason: HOSPADM

## 2023-03-23 RX ORDER — IODIXANOL 320 MG/ML
100 INJECTION, SOLUTION INTRAVASCULAR ONCE
Status: COMPLETED | OUTPATIENT
Start: 2023-03-23 | End: 2023-03-23

## 2023-03-23 RX ORDER — DEXTROSE MONOHYDRATE 25 G/50ML
25-50 INJECTION, SOLUTION INTRAVENOUS
Status: DISCONTINUED | OUTPATIENT
Start: 2023-03-23 | End: 2023-03-24 | Stop reason: HOSPADM

## 2023-03-23 RX ORDER — ACETAMINOPHEN 325 MG/1
650 TABLET ORAL EVERY 4 HOURS PRN
Status: DISCONTINUED | OUTPATIENT
Start: 2023-03-23 | End: 2023-03-24 | Stop reason: HOSPADM

## 2023-03-23 RX ORDER — NICOTINE POLACRILEX 4 MG
15-30 LOZENGE BUCCAL
Status: DISCONTINUED | OUTPATIENT
Start: 2023-03-23 | End: 2023-03-24 | Stop reason: HOSPADM

## 2023-03-23 RX ORDER — SODIUM CHLORIDE 9 MG/ML
INJECTION, SOLUTION INTRAVENOUS CONTINUOUS
Status: DISCONTINUED | OUTPATIENT
Start: 2023-03-23 | End: 2023-03-24 | Stop reason: HOSPADM

## 2023-03-23 RX ORDER — PROTAMINE SULFATE 10 MG/ML
40 INJECTION, SOLUTION INTRAVENOUS ONCE
Status: COMPLETED | OUTPATIENT
Start: 2023-03-23 | End: 2023-03-23

## 2023-03-23 RX ORDER — NALOXONE HYDROCHLORIDE 0.4 MG/ML
0.4 INJECTION, SOLUTION INTRAMUSCULAR; INTRAVENOUS; SUBCUTANEOUS
Status: DISCONTINUED | OUTPATIENT
Start: 2023-03-23 | End: 2023-03-24 | Stop reason: HOSPADM

## 2023-03-23 RX ORDER — FLUMAZENIL 0.1 MG/ML
0.2 INJECTION, SOLUTION INTRAVENOUS
Status: DISCONTINUED | OUTPATIENT
Start: 2023-03-23 | End: 2023-03-24 | Stop reason: HOSPADM

## 2023-03-23 RX ORDER — HEPARIN SODIUM 200 [USP'U]/100ML
1 INJECTION, SOLUTION INTRAVENOUS CONTINUOUS PRN
Status: DISCONTINUED | OUTPATIENT
Start: 2023-03-23 | End: 2023-03-24 | Stop reason: HOSPADM

## 2023-03-23 RX ORDER — FENTANYL CITRATE 50 UG/ML
25-50 INJECTION, SOLUTION INTRAMUSCULAR; INTRAVENOUS EVERY 5 MIN PRN
Status: DISCONTINUED | OUTPATIENT
Start: 2023-03-23 | End: 2023-03-24 | Stop reason: HOSPADM

## 2023-03-23 RX ORDER — LIDOCAINE 40 MG/G
CREAM TOPICAL
Status: DISCONTINUED | OUTPATIENT
Start: 2023-03-23 | End: 2023-03-24 | Stop reason: HOSPADM

## 2023-03-23 RX ADMIN — HEPARIN SODIUM 5000 UNITS: 1000 INJECTION INTRAVENOUS; SUBCUTANEOUS at 08:36

## 2023-03-23 RX ADMIN — FENTANYL CITRATE 25 MCG: 50 INJECTION, SOLUTION INTRAMUSCULAR; INTRAVENOUS at 09:06

## 2023-03-23 RX ADMIN — FENTANYL CITRATE 50 MCG: 50 INJECTION, SOLUTION INTRAMUSCULAR; INTRAVENOUS at 08:18

## 2023-03-23 RX ADMIN — MIDAZOLAM HYDROCHLORIDE 0.5 MG: 1 INJECTION, SOLUTION INTRAMUSCULAR; INTRAVENOUS at 08:39

## 2023-03-23 RX ADMIN — ACETAMINOPHEN 650 MG: 325 TABLET ORAL at 09:43

## 2023-03-23 RX ADMIN — FENTANYL CITRATE 25 MCG: 50 INJECTION, SOLUTION INTRAMUSCULAR; INTRAVENOUS at 08:31

## 2023-03-23 RX ADMIN — IODIXANOL 60 ML: 320 INJECTION, SOLUTION INTRAVASCULAR at 09:19

## 2023-03-23 RX ADMIN — MIDAZOLAM HYDROCHLORIDE 0.5 MG: 1 INJECTION, SOLUTION INTRAMUSCULAR; INTRAVENOUS at 08:55

## 2023-03-23 RX ADMIN — MIDAZOLAM HYDROCHLORIDE 1 MG: 1 INJECTION, SOLUTION INTRAMUSCULAR; INTRAVENOUS at 08:16

## 2023-03-23 RX ADMIN — MIDAZOLAM HYDROCHLORIDE 0.5 MG: 1 INJECTION, SOLUTION INTRAMUSCULAR; INTRAVENOUS at 08:22

## 2023-03-23 RX ADMIN — LIDOCAINE HYDROCHLORIDE 15 ML: 10 INJECTION, SOLUTION INFILTRATION; PERINEURAL at 08:32

## 2023-03-23 RX ADMIN — PROTAMINE SULFATE 40 MG: 10 INJECTION, SOLUTION INTRAVENOUS at 09:13

## 2023-03-23 RX ADMIN — FENTANYL CITRATE 25 MCG: 50 INJECTION, SOLUTION INTRAMUSCULAR; INTRAVENOUS at 08:45

## 2023-03-23 NOTE — Clinical Note
Can you set up stress test and to see Tarbunou please? Ok to double book Tarbunou if we can get stress test in.

## 2023-03-23 NOTE — PROGRESS NOTES
Interventional Radiology - Pre-Procedure Note:  Outpatient - [facility]  03/23/2023     Procedure Requested: LEFT lower extremity angiogram  Requested by: Elizabeth Sheehan MD    History and Physical Reviewed: H&P documented within 30 days (by Rene Ordaz DPM on 03/16/2023). I have personally reviewed the patient's medical history and have updated the medical record as necessary.    Brief HPI: Ashanti Aviles is a 67 year old female with a PMH of DM II, HTN, GERD, diabetic nephropathy, and PAD who presents for a LEFT lower extremity angiogram. Patient has a history of a right fourth digit wound in the setting of PAD, and underwent RLE angiogram with arthrectomy and angioplasty on 02/16/2023 with Dr. Sheehan. Patient also follows with podiatry, and saw Dr. Ordaz in clinic on 03/16/2023, who noted ulceration along her dorsal left hallux from a previous ingrown nail avulsion procedure. Thinks this is also likely d/t poor perfusion of her left great toe. She did have bilateral ABIs done 02/27, demonstrated LLE multilevel disease, see imaging below. MRI from 03/17 also demonstrated no evidence of osteomyelitis. Requesting LEFT lower extremity angiogram.    IMAGING:  US Low Ext Arterial Dop Seg Pres w/o Exercise 02/27/2023  BILATERAL RESTING ANKLE-BRACHIAL INDICES (TRACIE'S) (Date: 02/27/23)        Indication: Surveillance TRACIE's: PAD. 2/18/2023 Right 4th Toe Amputation.        Hx: 2/16/2023 Right Superficial Femoral Artery Proximal Balloon Angioplasty/ Atherectomy.              Bilateral Superficial Femoral Artery Proximal Stenosis.      Previous: 2/10/2023     History: Previous Smoker, Hypertension, Diabetic, PAD, Angioplasty, Vascular Surgery and Surgical Follow-up     Resting TRACIE's             Right:  mmHg  Index      Brachial: 126     Ankle-(PT): 110 0.87   Ankle-(DP): 123 0.98       1st Digit: 96 0.76            2nd Digit: 105 0.83      3rd Digit: 98 0.78      4th Digit: AMP N/A       5th Digit:  (Too Small)  N/A                     Left: mmHg Index      Brachial: 124     Ankle (PT): 62 0.49   Ankle (DP): 66 0.52       1st Digit: 28 0.22      2nd Digit: 47 0.37      3rd Digit: 61 0.48      4th Digit: 26 0.21      5th Digit: (Too Small)  N/A      Resting ankle-brachial index of 0.98 on the right. Toe Pressures of 96 mmHg and TBI of 0.76     Resting ankle-brachial index of 0.52 on the left. Toe Pressures of 28 mmHg and TBI of 0.22     Comments: Missed VPR's. 5th Digits too small for cuffs.      Impression:       1. RIGHT LOWER EXTREMITY: TRACIE is Normal with multiphasic waveforms with an TRACIE of 0.98. Toe Pressures are Normal and adequate for wound healing with toe pressures of  mmHg.     2. LEFT LOWER EXTREMITY: TRACIE is Abnormal with an TRACIE of 0.52 indicating multilevel disease. Toe Pressures are Abnormal and impaired for wound healing with toe pressures of 26-61 mmHg.    NPO: Midnight  ANTICOAGULANTS: Plavix 75 mg daily, does not need to hold for procedure  ANTIBIOTICS: Not needed    ALLERGIES  Allergies   Allergen Reactions     Ezetimibe Unknown     Generalized body aches     Oxycodone Nausea and Vomiting     Penicillins Unknown     Simvastatin Unknown     Muscle pain     Victoza [Liraglutide] Unknown         LABS:  INR   Date Value Ref Range Status   03/23/2023 0.83 (L) 0.85 - 1.15 Final      Hemoglobin   Date Value Ref Range Status   03/23/2023 13.9 11.7 - 15.7 g/dL Final     Platelet Count   Date Value Ref Range Status   03/23/2023 310 150 - 450 10e3/uL Final     Creatinine   Date Value Ref Range Status   03/23/2023 0.88 0.51 - 0.95 mg/dL Final     Potassium   Date Value Ref Range Status   02/19/2023 4.0 3.4 - 5.3 mmol/L Final         EXAM:  /71 (BP Location: Right arm)   Pulse 90   Temp 98  F (36.7  C) (Oral)   Resp 16   SpO2 99%   General:  Stable.  In no acute distress.    Neuro:  A&O x 3. Moves all extremities equally.  Resp:  Lungs clear to auscultation  bilaterally.  Cardio:  S1S2 and reg, without murmur, clicks or rubs  Vascular:  RLE DP 2+, PT 1+, LLE DP/PT dopplerable  Skin:  Without excoriations, ecchymosis, erythema, lesions or open sores.    Pre-Sedation Assessment:  Mallampati Airway Classification:  I - Faucial pillars, soft palate, and uvula are visible  Previous reaction to anesthesia/sedation:  No  Sedation plan based on assessment: Moderate (conscious) sedation  ASA Classification: Class 3 - SEVERE SYSTEMIC DISEASE, DEFINITE FUNCTIONAL LIMITATIONS.   Code Status: FULL CODE      ASSESSMENT/PLAN:   LEFT lower extremity angiogram with possible intervention with sedation.    Procedural education reviewed with patient in detail including, but not limited to risks, benefits and alternatives with understanding verbalized by patient.    Total time spent on the date of the encounter: 30 minutes.      DOROTA FIELDS CNP  Interventional Radiology

## 2023-03-23 NOTE — PROGRESS NOTES
Dr. Geller spoke with Dr. Sheehan today. Patient will be needing a leg bypass and vein mapping will be done. Patient will need a stress test and to see Dr. Geller as soon as possible.

## 2023-03-23 NOTE — TELEPHONE ENCOUNTER
Warner Wright, RN  [facility]; [place]  Can you set up stress test and to see Tarbunou please? Ok to double book Tarbunou if we can get stress test in.

## 2023-03-23 NOTE — PRE-PROCEDURE
GENERAL PRE-PROCEDURE:   Procedure:  LLE angiogram  Date/Time:  3/23/2023 7:51 AM    Written consent obtained?: Yes    Risks and benefits: Risks, benefits and alternatives were discussed    Consent given by:  Patient  Patient states understanding of procedure being performed: Yes    Patient's understanding of procedure matches consent: Yes    Procedure consent matches procedure scheduled: Yes    Expected level of sedation:  Moderate  Appropriately NPO:  Yes  ASA Class:  3  Mallampati  :  Grade 1- soft palate, uvula, tonsillar pillars, and posterior pharyngeal wall visible  Lungs:  Lungs clear with good breath sounds bilaterally  Heart:  Normal heart sounds and rate  History & Physical reviewed:  History and physical reviewed and no updates needed  Statement of review:  I have reviewed the lab findings, diagnostic data, medications, and the plan for sedation

## 2023-03-23 NOTE — DISCHARGE INSTRUCTIONS
Angiogram Discharge Instructions:  You had an angiogram procedure.  An angiogram is a procedure that uses x-rays to take pictures of your blood vessels. A long, flexible tube or catheter is inserted through the blood stream (through the procedure site) to help deliver contrast (dye) into the arteries so they can be visible on the x-ray. Angiograms are used to evaluate possible blockages in the arterial system. Please follow the below instructions after your angiogram, including monitoring of your procedure site.    Care instructions after angiogram procedure:  -  If you received sedation for your procedure, do not drive or operate heavy machinery for the rest of the day.  -  Do not lift objects greater than 10 pounds for 2 days following angiogram procedure.  -  Avoid excessive exercise and straining for 2 days.   -  Avoid tub baths, pools, hot tubs and Jacuzzis for 3 days or until procedure site is well healed.   -  You may shower beginning tomorrow. Do not scrub procedure site until well healed; pat dry.  -  Return to your normal activities as you tolerate after the 2 day restriction.  -  You can expect to return to work 1-2 days after your procedure - depending on the nature of your profession.  -  It is normal to have some tenderness and minimal swelling at procedure puncture site. A small area of discoloration may be present. Tenderness typically subsides in 1-2 days. A small knot may also be present at puncture site for 6-8 weeks. This can be a normal part of the healing process.     Follow up:  - Follow up with your vascular surgeon or the ordering provider. [facility] may contact you to help arrange for additional follow up.    Please seek medical evaluation for:  - If you develop fevers (greater than 101 F (38.3C)).  - If you develop increasing pain, redness, purulent drainage, tenderness, or swelling at procedure site.   - If you experience any bleeding from procedure/puncture site: lie down, firmly  apply pressure to puncture site and call 911.  - Seek emergent evaluation if you experience any new leg/arm pain, discoloration or numbness.    Call [facility] at [phone]*** with questions/concerns or if you have any of the above symptoms.

## 2023-03-23 NOTE — IR NOTE
Pt transferred to chair with assist x 1 at 1545. VSS. R groin hematoma stable, no new bleeding with transfer.  Pt seated in chair eating meal tray x 30 minutes. VSS. Pt denies any c/o. R groin site c/d/i, no new bleeding, previous hematoma unchanged and stable.  Pt ambulatory to BR with SBA.  No weakness or dizziness. R groin site remains unchanged after ambulation and BR use. Pt verbalizes understand of aftercare instructions. Given phone number to schedule Ultrasound vein mapping.   waiting.

## 2023-03-23 NOTE — TELEPHONE ENCOUNTER
LMTCB to schedule stress test ([phone]) vein mapping, and follow up with Dr. Duyen MCINTYRE. OK to double book with Duyen. [phone]

## 2023-03-23 NOTE — IP AVS SNAPSHOT
[facility] Interventional Radiology  [address] [place] [address]  Phone: [phone]  Fax: [phone]                              After Visit Summary   3/23/2023    Ashanti Aviles   MRN: [id number]           After Visit Summary Signature Page    I have received my discharge instructions, and my questions have been answered. I have discussed any challenges I see with this plan with the nurse or doctor.    ..........................................................................................................................................  Patient/Patient Representative Signature      ..........................................................................................................................................  Patient Representative Print Name and Relationship to Patient    ..................................................               ................................................  Date                                   Time    ..........................................................................................................................................  Reviewed by Signature/Title    ...................................................              ..............................................  Date                                               Time    22EPIC Rev 08/18

## 2023-03-27 ENCOUNTER — HOSPITAL ENCOUNTER (OUTPATIENT)
Dept: ULTRASOUND IMAGING | Facility: HOSPITAL | Age: 68
Discharge: HOME | End: 2023-03-27
Attending: RADIOLOGY | Admitting: RADIOLOGY
Payer: COMMERCIAL

## 2023-03-27 PROCEDURE — 93970 EXTREMITY STUDY: CPT

## 2023-03-27 NOTE — TELEPHONE ENCOUNTER
Patient is scheduled for ultrasound on 3/27, stress test on 4/7.  [facility] to schedule follow up with Dr. Geller as well.  I made notes on the appointment with Dr. Ordaz this week to schedule with ET as well. [phone]

## 2023-03-31 ENCOUNTER — OFFICE VISIT (OUTPATIENT)
Dept: VASCULAR SURGERY | Facility: CLINIC | Age: 68
End: 2023-03-31
Payer: COMMERCIAL

## 2023-03-31 VITALS — HEIGHT: 62 IN | HEART RATE: 75 BPM | BODY MASS INDEX: 26.87 KG/M2 | WEIGHT: 146 LBS | OXYGEN SATURATION: 99 %

## 2023-03-31 DIAGNOSIS — I99.8 ISCHEMIC TOE: ICD-10-CM

## 2023-03-31 DIAGNOSIS — I73.9 PAD (PERIPHERAL ARTERY DISEASE) (H): Primary | ICD-10-CM

## 2023-03-31 PROCEDURE — G0463 HOSPITAL OUTPT CLINIC VISIT: HCPCS | Performed by: PODIATRIST

## 2023-03-31 PROCEDURE — 99213 OFFICE O/P EST LOW 20 MIN: CPT | Performed by: PODIATRIST

## 2023-03-31 ASSESSMENT — PAIN SCALES - GENERAL: PAINLEVEL: MILD PAIN (3)

## 2023-03-31 NOTE — LETTER
March 31, 2023      RE: Ashanti Aviles  [address] [place] [address]       To whom it may concern:    Ashanti Aviles was seen in our clinic today. Restriction includes no weight bearing on right foot. She should not return back to work and will be re-evaluated on 4/21/2023.       Sincerely,      Rene Ordaz DPM

## 2023-03-31 NOTE — PROGRESS NOTES
FOOT AND ANKLE SURGERY/PODIATRY CONSULT NOTE        ASSESSMENT:   S/p Amputation 4th digit right foot  Ulceration left hallux  DM2  PAD        TREATMENT:  -I discussed with the patient the surgical site on the right foot is stable, however there is a small area of superficial gapping along the proximal incision.  Steri-Strips applied today.  I recommend she continue to monitor any open lesions.  Discussed that possible development of a ulceration may develop along that area of eschar distal incision.    -I do recommend nonweightbearing on the right foot at all times.  Discussed risks of continued weightbearing including dehiscence of the surgical site and need for additional surgical intervention.    -Left hallux ulceration has a stable eschar.  We will continue to monitor.    -Patient scheduled with vascular surgery for vein mapping for likely bypass procedure with Dr. Geller.    -Discussed smoking sensation.    -She will follow-up with me in 3 weeks    Rene Ordaz DPM  [facility]      HPI: Ashanti Aviles was seen today status post amputation fourth digit right foot and left hallux ulceration.  Patient has remained mostly nonweightbearing on her right foot.    Past Medical History:   Diagnosis Date     Diabetes mellitus (H)      Gastroesophageal reflux disease      Hypertension      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/29/2013       Past Surgical History:   Procedure Laterality Date     AMPUTATE TOE(S) Right 2/18/2023    Procedure: Amputation fourth toe right foot;  Surgeon: Benjamin Diez DPM;  Location: [facility]     ANUS SURGERY       BIOPSY CERVICAL, LOCAL EXCISION, SINGLE/MULTIPLE       COLONOSCOPY N/A 9/11/2020    Procedure: EXAM UNDER ANESTHESIA, HIGH RESOLUTION ANOSCOPY INTRA OP;  Surgeon: Preeti Sheehan MD;  Location: [facility];  Service: Gastroenterology     COLONOSCOPY N/A 12/14/2020    Procedure: EXAM UNDER ANESTHESIA WITH HIGH  RESOLUTION ANOSCOPY;  Surgeon: Preeti Sheehan MD;  Location: [facility] OR;  Service: General     COLONOSCOPY N/A 6/15/2021    Procedure: EXAM UNDER ANESTHESIA WITH HIGH RESOLUTION ANOSCOPY, BIOPSY, FULGURATION;  Surgeon: Preeti Sheehan MD;  Location: [facility] OR;  Service: Gastroenterology     EXAM UNDER ANESTHESIA ANUS N/A 9/14/2021    Procedure: EXAM UNDER ANESTHESIA WITH HIGH RESOLUTION ANOSCOPY;  Surgeon: Preeti Sheehan MD;  Location: [facility] OR     IR LOWER EXTREMITY ANGIOGRAM LEFT  3/23/2023     IR LOWER EXTREMITY ANGIOGRAM RIGHT  2/16/2023        Allergies   Allergen Reactions     Ezetimibe Unknown     Generalized body aches     Oxycodone Nausea and Vomiting     Penicillins Unknown     Simvastatin Unknown     Muscle pain     Victoza [Liraglutide] Unknown         Current Outpatient Medications:      acetaminophen (TYLENOL) 325 MG tablet, Take 2 tablets (650 mg) by mouth every 4 hours as needed for mild pain (and adjunct with moderate or severe pain or per patient request), Disp: 30 tablet, Rfl: 0     amLODIPine (NORVASC) 10 MG tablet, [AMLODIPINE (NORVASC) 10 MG TABLET] Take 10 mg by mouth daily., Disp: , Rfl:      clopidogrel (PLAVIX) 75 MG tablet, Take 75 mg by mouth daily, Disp: , Rfl:      hydroCHLOROthiazide (HYDRODIURIL) 25 MG tablet, [HYDROCHLOROTHIAZIDE (HYDRODIURIL) 25 MG TABLET] Take 25 mg by mouth daily., Disp: , Rfl:      Insulin Degludec (TRESIBA) 100 UNIT/ML SOLN, Inject 30 Units Subcutaneous daily, Disp: , Rfl:      JARDIANCE 10 MG TABS tablet, Take 10 mg by mouth daily, Disp: , Rfl:      linagliptin (TRADJENTA) 5 MG TABS tablet, Take 5 mg by mouth daily, Disp: , Rfl:      lisinopril (PRINIVIL,ZESTRIL) 40 MG tablet, [LISINOPRIL (PRINIVIL,ZESTRIL) 40 MG TABLET] Take 40 mg by mouth daily., Disp: , Rfl:      metFORMIN (GLUCOPHAGE) 500 MG tablet, Take 500 mg by mouth 2 times daily (with meals), Disp: , Rfl:      oxyCODONE (ROXICODONE) 5 MG tablet, Take 1 tablet (5  "mg) by mouth every 4 hours as needed for severe pain (7-10), Disp: 20 tablet, Rfl: 0     pravastatin (PRAVACHOL) 20 MG tablet, Take 1 tablet (20 mg) by mouth daily, Disp: 90 tablet, Rfl: 3     gabapentin (NEURONTIN) 100 MG capsule, Take 2 capsules (200 mg) by mouth 3 times daily for 14 days, Disp: 84 capsule, Rfl: 0     traZODone (DESYREL) 50 MG tablet, Take 0.5 tablets (25 mg) by mouth At Bedtime for 7 days, Disp: 4 tablet, Rfl: 0    Social History     Social History Narrative     Not on file       No family history on file.    Review of Systems - 10 point Review of Systems is negative except for ulceration left hallux which is noted in HPI.      OBJECTIVE:  Appearance: alert, well appearing, and in no distress.    Pulse 75   Ht 5' 2\" (1.575 m)   Wt 146 lb (66.2 kg)   SpO2 99%   BMI 26.70 kg/m      BMI= Body mass index is 26.7 kg/m .    General appearance: Patient is alert and fully cooperative with history & exam.  No sign of distress is noted during the visit.     Psychiatric: Affect is pleasant & appropriate.  Patient appears motivated to improve health.     Respiratory: Breathing is regular & unlabored while sitting.     HEENT: Hearing is intact to spoken word.  Speech is clear.  No gross evidence of visual impairment that would impact ambulation.    Vascular: Dorsalis pedis palpableRight.  Dermatologic:   VASC Wound Left Hallux (Active)   Post Size Length 0.6 03/16/23 1500   Post Size Width 0.5 03/16/23 1500   Post Size Depth 0.3 03/16/23 1500   Post Total Sq cm 0.3 03/16/23 1500       Incision/Surgical Site 09/14/21 Rectum (Active)       Incision/Surgical Site 02/18/23 Right Toe (Comment  which one) (Active)   Incision Assessment UTV 02/19/23 0900   Closure TEJ 02/19/23 0900   Incision Drainage Amount None 02/19/23 0900   Dressing Intervention Clean, dry, intact 02/19/23 0900   Stable eschar along distal amputated fourth digit right foot incision with small area of superficial gapping at proximal " incision.  No erythema bilateral feet.  Small stable eschar dorsal medial left hallux.  Neurologic: Diminished to light touch Bilateral.  Musculoskeletal: Contracted digits noted Bilateral.      Imaging:     US Low Ext Arterial Dop Seg Pres w/o Exercise    Result Date: 3/2/2023  Table formatting from the original result was not included. BILATERAL RESTING ANKLE-BRACHIAL INDICES (TRACIE'S) (Date: 02/27/23)  Indication: Surveillance TRACIE's: PAD. 2/18/2023 Right 4th Toe Amputation.       Hx: 2/16/2023 Right Superficial Femoral Artery Proximal Balloon Angioplasty/ Atherectomy.             Bilateral Superficial Femoral Artery Proximal Stenosis. Previous: 2/10/2023 History: Previous Smoker, Hypertension, Diabetic, PAD, Angioplasty, Vascular Surgery and Surgical Follow-up  Resting TRACIE's         Right:  mmHg  Index    Brachial: 126   Ankle-(PT): 110 0.87 Ankle-(DP): 123 0.98     1st Digit: 96 0.76          2nd Digit: 105 0.83    3rd Digit: 98 0.78    4th Digit: AMP N/A    5th Digit:  (Too Small)  N/A              Left: mmHg Index    Brachial: 124  Ankle (PT): 62 0.49 Ankle (DP): 66 0.52     1st Digit: 28 0.22    2nd Digit: 47 0.37    3rd Digit: 61 0.48    4th Digit: 26 0.21    5th Digit: (Too Small)  N/A Resting ankle-brachial index of 0.98 on the right. Toe Pressures of 96 mmHg and TBI of 0.76 Resting ankle-brachial index of 0.52 on the left. Toe Pressures of 28 mmHg and TBI of 0.22  Comments: Missed VPR's. 5th Digits too small for cuffs.  Impression:  1. RIGHT LOWER EXTREMITY: TRACIE is Normal with multiphasic waveforms with an TRACIE of 0.98. Toe Pressures are Normal and adequate for wound healing with toe pressures of  mmHg. 2. LEFT LOWER EXTREMITY: TRACIE is Abnormal with an TRACIE of 0.52 indicating multilevel disease. Toe Pressures are Abnormal and impaired for wound healing with toe pressures of 26-61 mmHg. Reference: Wound classification Grade TRACIE Ankle Systolic Pressure Toe Pressures 0 > 0.80 > 100 mmHg > 60 mmHg 1 0.6 -  0.79 70 - 100 mmHg 40 - 59 mmHg 2 0.4 - 0.59 50-70 mmHg 30 - 39 mmHg 3 < 0.39 < 50 mmHg < 30 mmHg Digit Pressures DBI Disease Category > 0.70 Normal < 0.70 Abnormal > 30 mmHg Potential wound healing < 30 mmHg Impaired wound healing Ankle Brachial Pressures TRACIE Disease Category > 1.3  Likely vessel calcification with monophasic waveforms, non-diagnostic 0.95-1.30 Normal with multiphasic waveforms 0.50-0.95 Single level disease 0.30-0.50 Multilevel disease < 0.30 Critical limb ischema Volume Plethysmography Recording (VPR) at all levels Normal Sharp systolic peak, fast upstroke, prominent dicrotic notch in wave Mild Sharp systolic peak, fast upstroke, absent dicrotic notch in wave Moderate Flattened systolic peak, slowed upstroke, absent dicrotic notch inwave Severe amplitude wave with = upslope and down slope Occluded Flat Line     MR Foot Right w/o & w Contrast    Result Date: 2/15/2023  EXAM: MR FOOT RIGHT W/O and W CONTRAST LOCATION: [facility] DATE/TIME: 2/15/2023 2:12 AM INDICATION: Right fourth toe gangrenous changes cellulitis, rule out osteo COMPARISON: Right foot radiograph 02/08/2023 TECHNIQUE: Routine. Additional postgadolinium T1 sequences were obtained. IV CONTRAST: 7ml Gadavist FINDINGS: JOINTS AND BONES: -Increased fluid sensitive signal in the distal phalanx of the fourth toe, with normal marrow signal on T1. This most likely represents reactive edema. No marrow signal suspicious for osteomyelitis. No fracture. No joint effusion. Degenerative changes at  the first MTP joint. TENDONS: -No tendon tear, tendinopathy, or tenosynovitis. LIGAMENTS: -Lisfranc ligament: Intact. No subluxation. MUSCLES AND SOFT TISSUES: -Soft tissue edema and enhancement of the fourth toe. No fluid collections.     IMPRESSION: 1.  Reactive marrow edema in the distal phalanx of the fourth toe. No evidence for osteomyelitis. 2.  Fourth toe soft tissue edema which could represent cellulitis.    IR Lower  Extremity Angiogram Right    Result Date: 2/16/2023  LOCATION: [facility] DATE: 2/16/2023 PROCEDURE: PELVIC AORTOGRAM, RIGHT LOWER EXTREMITY DIAGNOSTIC ARTERIOGRAPHY, ANGIOPLASTY/ATHERECTOMY/DRUG COATED BALLOON INFLATION OF THE RIGHT SUPERFICIAL FEMORAL ARTERY, ULTRASOUND GUIDANCE FOR VASCULAR ACCESS, MODERATE SEDATION INTERVENTIONAL RADIOLOGIST: Elizabeth Sheehan MD INDICATION: 67-year-old female with nonhealing right digit wound in the setting of peripheral arterial disease, plan for right lower extremity angiography with possible intervention. INDICATION FOR DIAGNOSTIC ANGIOGRAPHY: Diagnostic angiography was required to precisely identify plaque morphology to guide management CONSENT: The risks, benefits and alternatives of the procedure were discussed with the patient  in detail. All questions were answered. Informed consent was given to proceed with the procedure. MODERATE SEDATION: Versed 5 mg IV; Fentanyl 275 mcg IV. During the time out, immediately prior to the administration of medications, the patient was reassessed for adequacy to receive conscious sedation.  Under physician supervision, Versed and fentanyl were administered for moderate sedation. Pulse oximetry, heart rate and blood pressure were continuously monitored by an independent trained observer. The physician spent 90 minutes of face-to-face sedation time with the patient. CONTRAST: 100 cc Visipaque ANTIBIOTICS: None. ADDITIONAL MEDICATIONS: Heparin 14,000 units, protamine 50 mg FLUOROSCOPIC TIME: 29.9 minutes. RADIATION DOSE: Air Kerma: 650 mGy. COMPLICATIONS: No immediate complications. UNIVERSAL PROTOCOL: The operative site was marked and any prior imaging was reviewed. Required items including blood products, implants, devices and special equipment was made available. Patient identity was confirmed either verbally, with demographic information, hospital assigned identification or other identification markers. A timeout  was performed immediately prior to the procedure. STERILE BARRIER TECHNIQUE: Maximum sterile barrier technique was used. Cutaneous antisepsis was performed at the operative site with application of 2% chlorhexidine and large sterile drape. Prior to the procedure, the  and assistant performed hand hygiene and wore hat, mask, sterile gown, and sterile gloves during the entire procedure. PROCEDURE:  Access was achieved into the left common femoral artery utilizing real-time ultrasound guidance and a micropuncture access kit. Imaging demonstrates a patent and compressible artery. Permanent images were stored to the patient's medical record. Conversion was made for a 5 Lithuanian vascular sheath, which was attached to a continuous heparinized saline infusion. A flush catheter was manipulated over a wire into the lower abdomen at the level of the inferior mesenteric artery and digital subtraction pelvic arteriography was obtained. Imaging demonstrates patent caudal abdominal aorta and bilateral iliac vasculature. Selective catheterization of the right common iliac and external iliac artery was performed. A right lower extremity angiogram was obtained with catheter positioned in the caudal right external iliac artery. Imaging demonstrates patent right femoral artery with moderate stenosis. There is high-grade stenosis involving the right profunda femoral artery ostium. Focal occlusion of the right superficial femoral artery ostium. There is diffuse, moderate stenosis involving the entire right superficial femoral artery. Patent above and below knee popliteal artery. There is a three-vessel runoff to the foot. Systemic heparinization was performed. Exchange is made for a 7 Lithuanian vascular sheath which was advanced to the external iliac arteries. An angled .035 crossing catheter and guidewire were used to cross the occluded superficial femoral artery. The guidewire and catheter were advanced to the below-knee popliteal  artery. Over the wire exchange is made for a spider FX distal embolic protection filter. The filter was deployed in the below knee popliteal artery. Over the wire exchange is made for a 7F [place] LX atherectomy device. Atherectomy of the entire superficial femoral artery was performed with removal of significant volume of plaque. Over-the-wire exchange is made for a 5 mm x 20 cm angioplasty balloon. Angioplasty of the entire superficial femoral artery was performed. Over-the-wire exchange is made for a 5 mm x 25 cm InPact drug eluting balloon. Drug coated balloon angioplasty of the entire superficial femoral artery was performed to burst pressure. The distal embolic protection filter was removed and a repeat right lower extremity  angiogram was obtained. Imaging demonstrates widely patent superficial femoral artery. There is brisk three-vessel runoff to the foot. No significant residual stenosis is identified. Protamine sulfate was administered and hemostasis was achieved with manual pressure.     IMPRESSION:  Right lower extremity angiography demonstrates occlusion of the ostial superficial femoral artery with moderate to severe stenosis throughout the vessel. This was successfully treated with directional atherectomy, traditional and drug coated balloon angioplasty. Post intervention imaging demonstrates excellent result with brisk in line flow through the revascularized superficial femoral artery with three-vessel runoff to the foot.    CTA Abdomen Pelvis Bilat Leg Runoff w Contr    Result Date: 2/14/2023  EXAM: CTA ABDOMEN PELVIS BILAT LEG RUNOFF W CONTR LOCATION: [facility] DATE/TIME: 2/14/2023 3:47 PM INDICATION:  Critical lower limb ischemia (H) COMPARISON: None. TECHNIQUE: Helical acquisition through the abdomen, pelvis, and bilateral lower extremities was performed during the arterial phase of contrast enhancement using IV Contrast. 2D and 3D reconstructions were performed by the  CT technologist. Dose reduction  techniques were used. CONTRAST: 100ml IV Isovue 370 FINDINGS: AORTA: Calcified plaque  proximal celiac artery with no focal stenosis. Superior mesenteric and inferior mesenteric arteries are patent. Single renal arteries. Calcified plaque proximal renal arteries with no focal stenosis. Diffuse moderate/severe atherosclerotic disease infrarenal abdominal aorta. No evidence of abdominal aortic aneurysm. Dense calcified plaque with associated soft atheromatous plaque right common iliac artery. Moderate stenosis distal right common iliac artery. Focal stenosis proximal right internal iliac artery. Right external iliac artery patent. Diffuse calcified plaque left common iliac artery. Focal stenosis proximal left internal iliac artery. Moderate stenosis proximal left external iliac artery. RIGHT LEG: Dense calcified plaque posterior wall right common femoral artery. Right profunda femoral artery patent. Diffuse severe atherosclerotic disease right superficial femoral artery. High-grade stenosis mid right popliteal artery. Three-vessel runoff into the right foot. LEFT LEG: Dense calcified plaque posterior wall left common femoral artery. Left profundofemoral artery patent. Diffuse severe atherosclerotic disease left superficial femoral artery. Occlusion of the left popliteal artery. Reconstitution of the tibial peroneal trunk. Two-vessel runoff via the left peroneal and posterior tibial arteries. LUNG BASES: Motion artifact. ABDOMEN: The liver, gallbladder, pancreas, spleen, adrenal glands and kidneys are unremarkable. Small splenule inferior pole the spleen. PELVIS: Unremarkable.     IMPRESSION: 1.  Moderate/severe diffuse atherosclerotic disease infrarenal abdominal aorta. 2.  Moderate stenosis distal right common iliac artery. Moderate stenosis proximal left external iliac artery. 3.  Diffuse severe atherosclerotic disease right superficial femoral artery. High-grade stenosis of the left  popliteal artery with three-vessel runoff into the right foot. 4.  Diffuse severe atherosclerotic disease left superficial femoral artery. Occlusion of the left popliteal artery. Reconstitution of the tibial peroneal trunk with two-vessel runoff into the left foot via the peroneal and posterior tibial arteries.    US Lower Extremity Arterial Duplex Right    Result Date: 3/2/2023  Table formatting from the original result was not included. Arterial Duplex Ultrasound (Date: 02/27/23) Lower Extremity Artery Evaluation Indication: Surveillance Right Leg Arterial: PAD. 2/18/2023 Right 4th Toe Amputation.       Hx: 2/16/2023 Right Superficial Femoral Artery Proximal Balloon Angioplasty/ Atherectomy.             Bilateral Superficial Femoral Artery Proximal Stenosis. Previous: 2/10/2023 History: Previous Smoker, Hypertension, Diabetic, PAD, Angioplasty, Vascular Surgery and Surgical Follow-up  Technique: Duplex imaging is performed utilizing gray-scale, two-dimensional images, and color-flow imaging. Doppler waveform analysis and spectral Doppler imaging is also performed. LOWER EXTREMITY ARTERIAL DUPLEX EXAM WITH WAVEFORMS Right Leg:(cm/s) Location: Velocities Waveforms EIA:   182  B CFA:   164  B PFA:   345 /  66 T SFA Proximal:   170 / 82 / 111 B SFA Mid:   77  B SFA Distal:   63  T Popliteal Artery: (P/M/D)    189 / 113 /  88 T PTA:   69   M RICKY:   49  M DPA:   80  M Waveforms: T=Triphasic, M=Monophasic, B=Biphasic Stenotic Profile Ratio: 345 / 164 = 2.97 Left Leg:(cm/s) Location: Velocities Waveforms PTA:   16   M RICKY:  (Retrograde flow)    34  M DPA:   11  M Waveforms: T=Triphasic, M=Monophasic, B=Biphasic Comments:   Impression: Right Lower Extremity: Biphasic external iliac and common femoral waveform suggest no inflow disease.  There appears to be severe stenosis in the proximal profunda femoris artery based on velocities.  The SFA appears to be patent.  No stenoses identified in the vessel has near normal  waveforms suggesting significant improvement from recent angio intervention.  While waveforms are technically monophasic at the ankle they have a very sharp upstroke and morphology suggesting inline flow to the foot. Left Lower Extremity: Not formally evaluated but blunted monophasic waveforms suggest advanced peripheral arterial occlusive disease of the limb. Reference: Category Normal 1-19% 20-49% 50-99% Occluded PSV <160 cm/sec without spectral broadening <160 cm/sec with spectral broadening Increased Increased Absent Flow Ratio N/A N/A < 2.0 >2.0 N/A Post-Stenotic Turbulence No No No Yes N/A

## 2023-03-31 NOTE — PATIENT INSTRUCTIONS
NO WEIGHT BEARING ON RIGHT FOOT STILL.     NO DRESSING NEEDED.     Keep steri strips on until it falls off.

## 2023-04-07 ENCOUNTER — HOSPITAL ENCOUNTER (OUTPATIENT)
Dept: NUCLEAR MEDICINE | Facility: HOSPITAL | Age: 68
Discharge: HOME | End: 2023-04-07
Attending: SURGERY
Payer: COMMERCIAL

## 2023-04-07 ENCOUNTER — HOSPITAL ENCOUNTER (OUTPATIENT)
Dept: CARDIOLOGY | Facility: HOSPITAL | Age: 68
Discharge: HOME | End: 2023-04-07
Attending: SURGERY
Payer: COMMERCIAL

## 2023-04-07 DIAGNOSIS — I73.9 PAD (PERIPHERAL ARTERY DISEASE) (H): Primary | ICD-10-CM

## 2023-04-07 DIAGNOSIS — E78.5 HYPERLIPIDEMIA WITH TARGET LDL LESS THAN 70: ICD-10-CM

## 2023-04-07 DIAGNOSIS — I70.229 CRITICAL LOWER LIMB ISCHEMIA (H): ICD-10-CM

## 2023-04-07 DIAGNOSIS — Z79.4 TYPE 2 DIABETES MELLITUS WITH DIABETIC PERIPHERAL ANGIOPATHY AND GANGRENE, WITH LONG-TERM CURRENT USE OF INSULIN (H): ICD-10-CM

## 2023-04-07 DIAGNOSIS — E11.52 TYPE 2 DIABETES MELLITUS WITH DIABETIC PERIPHERAL ANGIOPATHY AND GANGRENE, WITH LONG-TERM CURRENT USE OF INSULIN (H): ICD-10-CM

## 2023-04-07 DIAGNOSIS — I10 ESSENTIAL HYPERTENSION: ICD-10-CM

## 2023-04-07 DIAGNOSIS — I73.9 PAD (PERIPHERAL ARTERY DISEASE) (H): ICD-10-CM

## 2023-04-07 LAB
CV STRESS CURRENT BP HE: NORMAL
CV STRESS CURRENT HR HE: 103
CV STRESS CURRENT HR HE: 103
CV STRESS CURRENT HR HE: 111
CV STRESS CURRENT HR HE: 115
CV STRESS CURRENT HR HE: 74
CV STRESS CURRENT HR HE: 81
CV STRESS CURRENT HR HE: 82
CV STRESS CURRENT HR HE: 87
CV STRESS CURRENT HR HE: 87
CV STRESS CURRENT HR HE: 96
CV STRESS CURRENT HR HE: 97
CV STRESS CURRENT HR HE: 97
CV STRESS CURRENT HR HE: 98
CV STRESS CURRENT HR HE: 98
CV STRESS CURRENT HR HE: 99
CV STRESS CURRENT HR HE: 99
CV STRESS DEVIATION TIME HE: NORMAL
CV STRESS ECHO PERCENT HR HE: NORMAL
CV STRESS EXERCISE STAGE HE: NORMAL
CV STRESS FINAL RESTING BP HE: NORMAL
CV STRESS FINAL RESTING HR HE: 97
CV STRESS MAX HR HE: 116
CV STRESS MAX TREADMILL GRADE HE: 0
CV STRESS MAX TREADMILL SPEED HE: 0
CV STRESS PEAK DIA BP HE: NORMAL
CV STRESS PEAK SYS BP HE: NORMAL
CV STRESS PHASE HE: NORMAL
CV STRESS PROTOCOL HE: NORMAL
CV STRESS RESTING PT POSITION HE: NORMAL
CV STRESS ST DEVIATION AMOUNT HE: NORMAL
CV STRESS ST DEVIATION ELEVATION HE: NORMAL
CV STRESS ST EVELATION AMOUNT HE: NORMAL
CV STRESS TEST TYPE HE: NORMAL
CV STRESS TOTAL STAGE TIME MIN 1 HE: NORMAL
NUC STRESS EJECTION FRACTION: 76 %
RATE PRESSURE PRODUCT: NORMAL
STRESS ECHO BASELINE DIASTOLIC HE: 66
STRESS ECHO BASELINE HR: 75
STRESS ECHO BASELINE SYSTOLIC BP: 114
STRESS ECHO CALCULATED PERCENT HR: 75 %
STRESS ECHO LAST STRESS DIASTOLIC BP: 66
STRESS ECHO LAST STRESS HR: 103
STRESS ECHO LAST STRESS SYSTOLIC BP: 130
STRESS ECHO TARGET HR: 155

## 2023-04-07 PROCEDURE — 93018 CV STRESS TEST I&R ONLY: CPT | Performed by: INTERNAL MEDICINE

## 2023-04-07 PROCEDURE — A9500 TC99M SESTAMIBI: HCPCS | Performed by: SURGERY

## 2023-04-07 PROCEDURE — 343N000001 HC RX 343: Performed by: SURGERY

## 2023-04-07 PROCEDURE — 250N000011 HC RX IP 250 OP 636: Performed by: SURGERY

## 2023-04-07 PROCEDURE — 78452 HT MUSCLE IMAGE SPECT MULT: CPT

## 2023-04-07 PROCEDURE — 78452 HT MUSCLE IMAGE SPECT MULT: CPT | Mod: 26 | Performed by: INTERNAL MEDICINE

## 2023-04-07 PROCEDURE — 93017 CV STRESS TEST TRACING ONLY: CPT

## 2023-04-07 PROCEDURE — 93016 CV STRESS TEST SUPVJ ONLY: CPT | Performed by: INTERNAL MEDICINE

## 2023-04-07 RX ORDER — REGADENOSON 0.08 MG/ML
0.4 INJECTION, SOLUTION INTRAVENOUS ONCE
Status: COMPLETED | OUTPATIENT
Start: 2023-04-07 | End: 2023-04-07

## 2023-04-07 RX ORDER — AMINOPHYLLINE 25 MG/ML
50 INJECTION, SOLUTION INTRAVENOUS
Status: DISCONTINUED | OUTPATIENT
Start: 2023-04-07 | End: 2023-04-07 | Stop reason: HOSPADM

## 2023-04-07 RX ADMIN — REGADENOSON 0.4 MG: 0.08 INJECTION, SOLUTION INTRAVENOUS at 08:37

## 2023-04-07 RX ADMIN — Medication 8 MILLICURIE: at 07:19

## 2023-04-07 RX ADMIN — Medication 31.3 MILLICURIE: at 08:40

## 2023-04-07 NOTE — PROGRESS NOTES
Patient did lower extremity vein mapping. Per Dr. Geller, pt will need to do upper arm vein mapping also. Need more veins to look at for potential bypass.

## 2023-04-07 NOTE — PROGRESS NOTES
[facility] to schedule vein mapping ultrasound before 4/17/23.  [phone]

## 2023-04-13 ENCOUNTER — ANCILLARY PROCEDURE (OUTPATIENT)
Dept: VASCULAR ULTRASOUND | Facility: CLINIC | Age: 68
End: 2023-04-13
Attending: SURGERY
Payer: COMMERCIAL

## 2023-04-13 DIAGNOSIS — I70.229 CRITICAL LOWER LIMB ISCHEMIA (H): ICD-10-CM

## 2023-04-13 DIAGNOSIS — I73.9 PAD (PERIPHERAL ARTERY DISEASE) (H): ICD-10-CM

## 2023-04-13 PROCEDURE — 93970 EXTREMITY STUDY: CPT

## 2023-04-13 PROCEDURE — 93970 EXTREMITY STUDY: CPT | Mod: 26 | Performed by: SURGERY

## 2023-04-17 ENCOUNTER — OFFICE VISIT (OUTPATIENT)
Dept: VASCULAR SURGERY | Facility: CLINIC | Age: 68
End: 2023-04-17
Attending: SURGERY
Payer: COMMERCIAL

## 2023-04-17 ENCOUNTER — DOCUMENTATION ONLY (OUTPATIENT)
Dept: VASCULAR SURGERY | Facility: CLINIC | Age: 68
End: 2023-04-17
Payer: COMMERCIAL

## 2023-04-17 VITALS
DIASTOLIC BLOOD PRESSURE: 66 MMHG | HEART RATE: 104 BPM | SYSTOLIC BLOOD PRESSURE: 108 MMHG | TEMPERATURE: 98.4 F | RESPIRATION RATE: 16 BRPM

## 2023-04-17 DIAGNOSIS — I73.9 PAD (PERIPHERAL ARTERY DISEASE) (H): Primary | ICD-10-CM

## 2023-04-17 PROCEDURE — G0463 HOSPITAL OUTPT CLINIC VISIT: HCPCS | Performed by: SURGERY

## 2023-04-17 PROCEDURE — 99214 OFFICE O/P EST MOD 30 MIN: CPT | Performed by: SURGERY

## 2023-04-17 RX ORDER — CLINDAMYCIN PHOSPHATE 900 MG/50ML
900 INJECTION, SOLUTION INTRAVENOUS SEE ADMIN INSTRUCTIONS
Status: CANCELLED | OUTPATIENT
Start: 2023-05-17

## 2023-04-17 RX ORDER — CLINDAMYCIN PHOSPHATE 900 MG/50ML
900 INJECTION, SOLUTION INTRAVENOUS
Status: CANCELLED | OUTPATIENT
Start: 2023-05-17

## 2023-04-17 ASSESSMENT — PAIN SCALES - GENERAL: PAINLEVEL: NO PAIN (0)

## 2023-04-17 NOTE — PROGRESS NOTES
VASCULAR SURGERY PROGRESS NOTE   VASCULAR SURGEON: Dyan Geller MD, RPVI     LOCATION:  [facility]     Ashanti Aviles   Medical Record #:  [id number]  YOB: 1955  Age:  67 year old     Date of Service: 4/17/2023    PRIMARY CARE PROVIDER: No Ref-Primary, Physician      Reason for visit: Follow-up    IMPRESSION: 67-year-old female with chronic limb threatening ischemia involving left lower extremity.  Patient's main symptom is nonhealing wounds involving first toe.  Patient also has significant lifestyle in claudication involving left lower extremity.  Patient underwent angiogram recently showing significant disease involving common femoral artery, occlusion of SFA with reconstitution at the level of TP trunk.  CT scan did show significant disease involving the left common iliac artery.  ABIs are consistent with moderate to severe PAD with toe pressures not significant enough for wound healing potential.    RECOMMENDATION/RISKS: Would recommend left iliofemoral endarterectomy with retrograde iliac intervention and femoral to below-knee popliteal artery/TP trunk bypass.  Vein mapping was performed and did show small veins, however, we will perform an ultrasound at bedside today of the surgery.    HPI:  Ashanti Aviles is a 67 year old female who was seen today for follow-up.  Patient is complaining of severe pain in the left lower extremity while walking.  Denies any pain at rest.  Admits to nonhealing wound involving first toe.    REVIEW OF SYSTEMS:    A 12 point ROS was reviewed and is negative except for left leg pain.    PHH:    Past Medical History:   Diagnosis Date     Diabetes mellitus (H)      Gastroesophageal reflux disease      Hypertension      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/29/2013          Past Surgical History:   Procedure Laterality Date     AMPUTATE TOE(S) Right 2/18/2023    Procedure: Amputation fourth toe right foot;  Surgeon: Benjamin Diez  SILVINA Saldana;  Location: [facility] OR     ANUS SURGERY       BIOPSY CERVICAL, LOCAL EXCISION, SINGLE/MULTIPLE       COLONOSCOPY N/A 9/11/2020    Procedure: EXAM UNDER ANESTHESIA, HIGH RESOLUTION ANOSCOPY INTRA OP;  Surgeon: Preeti Sheehan MD;  Location: [facility] OR;  Service: Gastroenterology     COLONOSCOPY N/A 12/14/2020    Procedure: EXAM UNDER ANESTHESIA WITH HIGH RESOLUTION ANOSCOPY;  Surgeon: Preeti Sheehan MD;  Location: [facility] OR;  Service: General     COLONOSCOPY N/A 6/15/2021    Procedure: EXAM UNDER ANESTHESIA WITH HIGH RESOLUTION ANOSCOPY, BIOPSY, FULGURATION;  Surgeon: Preeti Sheehan MD;  Location: [facility] OR;  Service: Gastroenterology     EXAM UNDER ANESTHESIA ANUS N/A 9/14/2021    Procedure: EXAM UNDER ANESTHESIA WITH HIGH RESOLUTION ANOSCOPY;  Surgeon: Preeti Sheehan MD;  Location: [facility] OR     IR LOWER EXTREMITY ANGIOGRAM LEFT  3/23/2023     IR LOWER EXTREMITY ANGIOGRAM RIGHT  2/16/2023       ALLERGIES:  Ezetimibe, Oxycodone, Penicillins, Simvastatin, and Victoza [liraglutide]    MEDS:    Current Outpatient Medications:      acetaminophen (TYLENOL) 325 MG tablet, Take 2 tablets (650 mg) by mouth every 4 hours as needed for mild pain (and adjunct with moderate or severe pain or per patient request), Disp: 30 tablet, Rfl: 0     amLODIPine (NORVASC) 10 MG tablet, [AMLODIPINE (NORVASC) 10 MG TABLET] Take 10 mg by mouth daily., Disp: , Rfl:      clopidogrel (PLAVIX) 75 MG tablet, Take 75 mg by mouth daily, Disp: , Rfl:      hydroCHLOROthiazide (HYDRODIURIL) 25 MG tablet, [HYDROCHLOROTHIAZIDE (HYDRODIURIL) 25 MG TABLET] Take 25 mg by mouth daily., Disp: , Rfl:      Insulin Degludec (TRESIBA) 100 UNIT/ML SOLN, Inject 30 Units Subcutaneous daily, Disp: , Rfl:      JARDIANCE 10 MG TABS tablet, Take 10 mg by mouth daily, Disp: , Rfl:      linagliptin (TRADJENTA) 5 MG TABS tablet, Take 5 mg by mouth daily, Disp: , Rfl:      lisinopril  (PRINIVIL,ZESTRIL) 40 MG tablet, [LISINOPRIL (PRINIVIL,ZESTRIL) 40 MG TABLET] Take 40 mg by mouth daily., Disp: , Rfl:      metFORMIN (GLUCOPHAGE) 500 MG tablet, Take 500 mg by mouth 2 times daily (with meals), Disp: , Rfl:      oxyCODONE (ROXICODONE) 5 MG tablet, Take 1 tablet (5 mg) by mouth every 4 hours as needed for severe pain (7-10), Disp: 20 tablet, Rfl: 0     pravastatin (PRAVACHOL) 20 MG tablet, Take 1 tablet (20 mg) by mouth daily, Disp: 90 tablet, Rfl: 3     gabapentin (NEURONTIN) 100 MG capsule, Take 2 capsules (200 mg) by mouth 3 times daily for 14 days, Disp: 84 capsule, Rfl: 0     traZODone (DESYREL) 50 MG tablet, Take 0.5 tablets (25 mg) by mouth At Bedtime for 7 days, Disp: 4 tablet, Rfl: 0    SOCIAL HABITS:    History   Smoking Status     Former     Packs/day: 0.25     Types: Cigarettes     Quit date: 2/12/2023   Smokeless Tobacco     Never     Social History    Substance and Sexual Activity      Alcohol use: Yes        Comment: Alcoholic Drinks/day: 2x      History   Drug Use Unknown       FAMILY HISTORY:  No family history on file.    PE:  /66   Pulse 104   Temp 98.4  F (36.9  C)   Resp 16   Wt Readings from Last 1 Encounters:   03/31/23 66.2 kg (146 lb)     There is no height or weight on file to calculate BMI.    EXAM:  GENERAL: This is a well-developed 67 year old female who appears her stated age  EYES: Grossly normal.  MOUTH: Buccal mucosa normal   CARDIAC: Normal   CHEST/LUNG: Clear to auscultation bilaterally  GASTROINTESINAL soft nontender nondistended  MUSCULOSKELETAL: Grossly normal and both lower extremities are intact.  HEME/LYMPH: No lymphedema  NEUROLOGIC: Focally intact, Alert and oriented x 3.   PSYCH: appropriate affect  INTEGUMENT: No open lesions or ulcers            DIAGNOSTIC STUDIES:     Images:  US Upper Extremity Venous Mapping Bilateral    Result Date: 4/13/2023  Table formatting from the original result was not included. BILATERAL Vein Mapping Ultrasound Upper  Extremity (Date: 04/13/23) Indication: Mapping Bilateral Upper Arm Veins for Lower Extremity Bypass Graft. Technique: Ultrasound of the Superficial Veins with Compression Maneuvers. Duplex Imaging is performed on the arteries utilizing gray-scale, Two-dimensional images, color-flow imaging, Doppler waveform analysis, and Spectral doppler imaging done. Location Shoulder (mm) Prox Upper Arm (mm) Mid Upper Arm (mm) Distal Upper Arm (mm) Ante- cubital (mm) Prox Forearm (mm) Mid Forearm (mm) Distal Forearm (mm) Right Basilic V.  3.1 3.2 4.2 2.0 2.3 1.6 1.5 Right Cephalic V. 2.7 3.2 2.4 2.6 2.8 2.6 2.5 3.4 Left Basilic V.  3.2 2.4 2.1 4.2 0.86 1.2 1.0 Left Cephalic V. 2.0 2.8 1.9 2.5 2.9 2.0 1.7 1.6  Location Right (cm/sec)  Left (cm/sec) Subclavian Artery 67 91 Brachial Artery 136 116 Ulnar Artery 76 42 Radial Artery 78 70 Impression: Right arm: Adequate caliber basilic vein in the right upper arm for creation of fistula.  Borderline caliber cephalic vein in both the forearm and upper arm.  Repeat evaluation at time of surgery may be beneficial Left arm: Borderline caliber basilic vein in the upper arm for fistula creation.  In adequate caliber cephalic vein of both the forearm and upper arm for fistula creation.    NM MPI with Lexiscan    Result Date: 4/7/2023     1.Nondiagnostic regadenoson ECG for ischemia given baseline ST-T wave changes.    2.The nuclear stress test is negative for inducible myocardial ischemia or infarction.    3.The left ventricular ejection fraction at stress is 76%.    There is no prior study for comparison.     US Lower Extremity Venous Mapping Bilateral    Result Date: 3/27/2023  EXAM: US LOWER EXTREMITY VENOUS MAPPING BILATERAL LOCATION: [facility] DATE/TIME: 3/27/2023 12:53 PM INDICATION: Preoperative evaluation prior to lower extremity bypass, decreased lower extremity pulses, lower extremity pain. COMPARISON: None. TECHNIQUE: Ultrasound examination of the lower  extremity veins was performed, including gray-scale and compression imaging. LOWER  EXTREMITY FINDINGS:   VENOUS DIAMETERS: RIGHT GREAT SAPHENOUS VEIN - Upper Thigh: 2.5 mm Mid Thigh: 1.7 mm Lower Thigh: 2.0 mm Knee: 1.4 mm Upper Calf: .8 mm Mid Calf: .9 mm Lower Calf: .6 mm Ankle: .5 mm LEFT GREAT SAPHENOUS VEIN - Upper Thigh: 3.2 mm Mid Thigh: 1.1 mm Lower Thigh: 1.5 mm Knee: 1.8 mm Upper Calf: 1.1 mm Mid Calf: .9 mm Lower Calf: 1.0 mm Ankle: .8 mm RIGHT SMALL SAPHENOUS VEIN - Upper Calf: 1.9 mm Mid Calf: 1.3 mm Lower Calf: 1.1 mm Ankle: 1.1 mm LEFT SMALL SAPHENOUS VEIN - Upper Calf: 1.6 mm Mid Calf: 1.6 mm Lower Calf: 1.4 mm Ankle: - (NV)     IMPRESSION: Patent bilateral greater and small saphenous veins with measurements as noted above.     IR Lower Extremity Angiogram Left    Result Date: 3/23/2023  LOCATION: [facility] DATE: 3/23/2023 PROCEDURE: LEFT LOWER EXTREMITY DIAGNOSTIC ARTERIOGRAPHY, SELECTIVE CATHETERIZATION AND ANGIOGRAPHY OF THE LEFT SUPERFICIAL FEMORAL ARTERY, ULTRASOUND GUIDANCE FOR VASCULAR ACCESS, MODERATE SEDATION INTERVENTIONAL RADIOLOGIST: Elizabeth Sheehan MD INDICATION: 67-year-old female with nonhealing left great toe nail wound requiring debridement. Plan for left lower extremity angiography for further evaluation. INDICATION FOR DIAGNOSTIC ANGIOGRAPHY: Diagnostic angiography was required to precisely identify plaque morphology to aid in treatment and management CONSENT: The risks, benefits and alternatives of the procedure were discussed with the patient  in detail. All questions were answered. Informed consent was given to proceed with the procedure. MODERATE SEDATION: Versed 2.5 mg IV; Fentanyl 125 mcg IV. During the time out, immediately prior to the administration of medications, the patient was reassessed for adequacy to receive conscious sedation.  Under physician supervision, Versed and fentanyl were administered for moderate sedation. Pulse oximetry, heart  rate and blood pressure were continuously monitored by an independent trained observer. The physician spent 45 minutes of face-to-face sedation time with the patient. CONTRAST: 60 mL Visipaque ADDITIONAL MEDICATIONS: Heparin 5000 units, protamine 40 mg FLUOROSCOPIC TIME: 15.0 minutes. RADIATION DOSE: Air Kerma: 520 mGy. COMPLICATIONS: No immediate complications. UNIVERSAL PROTOCOL: The operative site was marked and any prior imaging was reviewed. Required items including blood products, implants, devices and special equipment was made available. Patient identity was confirmed either verbally, with demographic information, hospital assigned identification or other identification markers. A timeout was performed immediately prior to the procedure. STERILE BARRIER TECHNIQUE: Maximum sterile barrier technique was used. Cutaneous antisepsis was performed at the operative site with application of 2% chlorhexidine and large sterile drape. Prior to the procedure, the  and assistant performed hand hygiene and wore hat, mask, sterile gown, and sterile gloves during the entire procedure. PROCEDURE:  Access was achieved into the right common femoral artery utilizing real-time ultrasound guidance and a micropuncture access kit. Imaging demonstrates a patent and compressible artery. Permanent images were stored to the patient's medical record. Conversion was made for a 5 Mauritian vascular sheath, which was attached to a continuous heparinized saline infusion. A flush catheter was manipulated over a wire into the lower abdomen and oblique digital subtraction pelvic arteriography was performed. Imaging demonstrates patent bilateral common iliac, internal iliac and external carotid arteries. There is mild to moderate stenosis involving the left common iliac and proximal external iliac arteries. The catheter was repositioned over a wire into the left external iliac artery. From this location, digital subtraction left lower  extremity arteriography was obtained. Imaging demonstrates patent left common femoral and profunda femoral arteries. 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 There is some reconstituted flow within the P1/P2 segments of the popliteal artery. Occluded popliteal artery below the knee joint. The tibial peroneal trunk is patent. Occluded proximal anterior tibial artery. The peroneal and posterior tibial arteries are patent to the foot. The peroneal artery supplies the dorsalis pedis artery. Over the wire exchange is made for a 4 French crossing catheter and selective catheterization of the left superficial femoral artery stump was performed. There is inability to cross the proximal vessel secondary to occlusive plaque. At this point, the procedure was considered complete. The sheath was removed and manual pressure applied until hemostasis.     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 Some reconstituted flow in the P1/P2 segment of the popliteal artery. Occluded popliteal artery at and below the knee joint. Patent tibioperoneal trunk. Two-vessel runoff to the foot via the posterior tibial and peroneal arteries. PLAN: Given extensive disease (TASC-D) in the setting of a wound and inadequate digit pressure, we will refer to Dr. Geller for surgical bypass. The patient may require stenting of the left common/external iliac arteries to optimize bypass inflow which  can be performed during surgery as needed.        LABS:      Sodium   Date Value Ref Range Status   02/18/2023 139 136 - 145 mmol/L Final   02/17/2023 136 136 - 145 mmol/L Final   02/16/2023 138 136 - 145 mmol/L Final     Urea Nitrogen   Date Value Ref Range Status   02/18/2023 10.5 8.0 - 23.0 mg/dL Final   02/17/2023 8.0 8.0 - 23.0 mg/dL Final   02/16/2023 11.0 8.0 - 23.0 mg/dL Final     Hemoglobin   Date Value Ref Range Status   03/23/2023 13.9 11.7 -  15.7 g/dL Final   02/18/2023 13.1 11.7 - 15.7 g/dL Final   02/17/2023 12.5 11.7 - 15.7 g/dL Final     Platelet Count   Date Value Ref Range Status   03/23/2023 310 150 - 450 10e3/uL Final   02/18/2023 259 150 - 450 10e3/uL Final   02/17/2023 229 150 - 450 10e3/uL Final     INR   Date Value Ref Range Status   03/23/2023 0.83 (L) 0.85 - 1.15 Final   02/16/2023 0.97 0.85 - 1.15 Final       30 minutes spent on the day of encounter doing chart review, history and exam, documentation, and further activities as noted.         Dyan Geller MD, [facility]  VASCULAR SURGERY

## 2023-04-17 NOTE — PROGRESS NOTES
Surgery Scheduled    Pt will need to schedule preop exam - writer will send instructions to pt via YPlanhart and follow up via telephone call.    Surgery/Procedure: CREATION, BYPASS, ARTERIAL, FEMORAL TO POPLITEAL, USING GRAFT possible vein retrograde iliac intervention    Special Equipment: c-arm, endo table, valveulotome    Location: [facility]:  [address] [place] [address] (phone: [phone], Fax: [phone])    Date: 5/17/23    Time: 7:20AM    Admission Type: Inpatient    Surgeon: Dr. Geller    OR Confirmed & :  Yes with Rosibel on 4/17/2023    Entered on provider calendar:  Yes    Post Op:   6/5/2023 8:00 AM (Arrive by 7:45 AM) [facility] NURSE [facility]   6/26/2023 8:30 AM (Arrive by 8:15 AM) [facility] [address] [facility]   6/26/2023 9:00 AM (Arrive by 8:45 AM) [facility] [address]   6/26/2023 11:00 AM (Arrive by 10:45 AM) Dyan Geller MD [facility]     Wound Vac Needed: No    Home Care Needed: No     Blood Thinners Addressed: N/A    Stress Test Clearance: NO

## 2023-04-17 NOTE — PATIENT INSTRUCTIONS
Ashanti    Your visit to [facility] for your surgery is coming soon and we look forward to seeing you! This friendly reminder and pre-procedure checklist will help to ensure your surgery goes smoothly and meets your expectations. At [facility], our goal is to provide you with a great patient experience and to deliver genuine, professional care to every patient.     Please complete all the steps in advance of your visit. If you have any questions about the items listed below, please give our office a call. We can be reached at [phone] or visit our website at [email or web address]  for more information.     Procedure: Peripheral Bypass Surgery - Left Leg  BYPASS, ARTERIAL, FEMORAL TO POPLITEAL, USING GRAFT possible vein retrograde iliac intervention,     Procedure Date :  TBD    Arrival Time:  Surgery nurse will call you 1-2 days before surgery to tell you time of arrival     Procedure Location: [facility]:  [address] [place] [address] (phone: [phone], Fax: [phone])    Surgeon: MD Dyan Geller    Admission Type: Inpatient    COVID-19 Test: is no longer required. If you are experiencing COVID symptoms or have tested positive for COVID-19 within 14 days of procedure date, reach out to the care team to reschedule please. See below.     Post Operative Appointment with Dr Yovani Geller: TBD      If you take blood thinners:   PLEASE DO NOT STOP YOUR ASPIRIN OR PLAVIX UNLESS SPECIFICALLY DIRECTED BY THE VASCULAR SURGEON TO STOP!  In most cases Vascular surgeons want you to continue these. This is different from most NON vascular surgeries and may not be well known by your Primary Care Provider    Plavix: PLEASE DO NOT STOP THIS MEDICATION PRIOR TO SURGERY/ PROCEDURE.     Notify our office right away, if you have any changes in your health status, or if you develop a cold, flu, diarrhea, infection, fever  or sore throat before your scheduled surgery date.   We can be reached at [phone] Monday-Friday 8 am-4:30 pm if you have any questions.       Complete the following checklist before your surgery    []  You will need a Pre-op Physical within 30 days before surgery with your primary care provider. This exam is required by the anesthesiologist to ensure a safe surgical experience.    Failure  to obtain your pre-op physical will lead to cancellation of your surgery  Call them right away to schedule this. Please ensure your Preoperative Physical is faxed to the Hospital if done outside of [facility] system.     [] Preoperative Medication Instructions  Contact your prescribing provider and/or vascular surgeon for instructions before discontinuing any medications especially anticoagulants. (Examples: Coumadin, Plavix, Xarelto, Eliquis, Pradaxa, Effient, Brilinta)   Hold Ibuprofen, Herbal Supplements and Vitamin E 7 days before  Stop Cialis, Levitra and Viagra 2-3 days before surgery    [] Fasting Requirements  Nothing to eat for 8 hours before surgery unless instructed differently by the surgery nurse.   You may have clear liquids up to two hours before your arrival time (coffee, tea, water, or Gatorade. No cream or milk)  If your primary care provider has instructed you to continue oral medications, you may take them on the morning of surgery with a small sip of water.    No alcohol or smoking after 12:00am the day of surgery    [] COVID-19 test is no longer required for the hospital.  If your surgery is at [facility], you will still need to get a home rapid COVID test 1-2 days before surgery. Take a photo of result on your phone and show to staff. If your test is positive or you fail to get tested, your surgery will be postponed.   If you have COVID symptoms or was tested positive within 14 days of surgery, please contact clinic to reschedule your surgery.     [] Contact your insurance regarding  coverage  If you would like a Good [place] Estimate for your upcoming procedure at [facility] Location, contact Cost of Care Estimates   Advocates are available Monday through Friday 8am - 5pm   [phone]  You may also submit a request online through your [employer] account.   If your procedure is at [facility] please contact the numbers below for Cost of Care Estimates.   - Facility Charge: [phone]    Anesthesiology charge:  [phone]      [] DO NOT BRING FMLA WITH TO SURGERY.  These should be sent to the provider's office by fax to [phone].     [] Day of Surgery  Medications - Take as indicated with sips of water.   Wear comfortable loose fitting clothes. Wear your glasses-Not contacts. Do not wear jewelry and remove body piercing's. Surgery may be cancelled if they are not removed.   You may have 1 family member wait in the lobby during your surgery. Visitor restrictions are subject to change. Please verify with the surgery nurse when they call.   If same day surgery-Have a someone come with you to surgery that can help you understand the surgeon's instructions, drive you home and stay with you overnight the first night.    [] You will receive a call from a surgery nurse 1-3 days prior to surgery. They will go over more details with you.     [] If the hospital is at max capacity and does not have available inpatient beds, your procedure may need to be postponed. We will contact you if this happens.          Peripheral Artery Bypass Surgery  Surgery to bypass a blocked leg artery can ease your symptoms. The bypass is done with a special tube (graft) that directs blood around the blockage.  Attaching the graft  Peripheral bypass grafts carry blood from the femoral artery in your thigh to an artery further down your leg. During the surgery, a graft is stitched into the artery above and below your blockage. This creates a new passage for blood flow. The blocked section of your artery  is not removed. After the graft is in place, your doctor closes the cuts (incisions) in your skin with stitches or staples.  Types of grafts  A vein from your own legs or arms can be used as a blood vessel graft. Blood vessel grafts often come from your own leg. Vein grafts work better in long leg blockages that start from your groin and extend below your knee.  Manmade (synthetic) grafts are materials that your body easily accepts. These grafts work best on arteries at or above the knee.   Types of peripheral bypasses  The type of bypass depends on where your artery is blocked.    Risks and complications  Bleeding or blood clots  Urgent need for surgery again if graft is blocked by clot or debris  Graft blockage  Heart attack or stroke  Breathing problems  Infection  Need for second bypass or surgery to remove dead tissue (amputation)  Nerve damage and numbness  Complications from anesthesia   Date Last Reviewed: 6/1/2016 2000-2017 [employer]. [address] [place] [address]. All rights reserved. This information is not intended as a substitute for professional medical care. Always follow your healthcare professional's instructions.

## 2023-04-17 NOTE — PROGRESS NOTES
"[facility]        Patient is here for a  follow up  to discuss Peripheral artery disease (PAD). Symptoms include hx of non-healing wound left 4th toe that required amputation.  Anticipating right leg bypass, stress test and vein mapping completed.  S/p left leg bypass \"a few weeks ago\" with no issues.    Pt is currently taking Statin and Plavix.    /66   Pulse 104   Temp 98.4  F (36.9  C)   Resp 16     The provider has been notified that the patient has no concerns.     Questions patient would like addressed today are: discuss bypass.    Refills are needed: No    Has homecare services and agency name:  Daphne Shipley RN      "

## 2023-04-19 NOTE — PROGRESS NOTES
Writer confirmd surgery instructions and schedule with pt via telephone.  Pt will schedule her preop exam. Pt had questions regarding recovery time - forwarding message to nursing to address.

## 2023-04-21 ENCOUNTER — OFFICE VISIT (OUTPATIENT)
Dept: VASCULAR SURGERY | Facility: CLINIC | Age: 68
End: 2023-04-21
Attending: PODIATRIST
Payer: COMMERCIAL

## 2023-04-21 VITALS
HEIGHT: 62 IN | WEIGHT: 146 LBS | RESPIRATION RATE: 16 BRPM | BODY MASS INDEX: 26.87 KG/M2 | HEART RATE: 89 BPM | SYSTOLIC BLOOD PRESSURE: 120 MMHG | DIASTOLIC BLOOD PRESSURE: 80 MMHG | OXYGEN SATURATION: 98 %

## 2023-04-21 DIAGNOSIS — I73.9 PAD (PERIPHERAL ARTERY DISEASE) (H): ICD-10-CM

## 2023-04-21 DIAGNOSIS — L97.521 ULCER OF LEFT FOOT, LIMITED TO BREAKDOWN OF SKIN (H): Primary | ICD-10-CM

## 2023-04-21 PROCEDURE — 99212 OFFICE O/P EST SF 10 MIN: CPT | Performed by: PODIATRIST

## 2023-04-21 PROCEDURE — G0463 HOSPITAL OUTPT CLINIC VISIT: HCPCS | Performed by: PODIATRIST

## 2023-04-21 ASSESSMENT — PAIN SCALES - GENERAL: PAINLEVEL: NO PAIN (0)

## 2023-04-21 NOTE — PATIENT INSTRUCTIONS
Cleanse your left great toe with normal saline, paint with iodine, and cover with gauze. Change daily.

## 2023-04-21 NOTE — LETTER
[facility]  [address] [place] [address]  Phone: [phone]  Fax: [phone]      April 21, 2023    Ashanti Aviles  [address] [place] [address]    To whom it may concern:      Ashanti Aviles may return to work, without restrictions on Monday, April 24, 2023      Sincerely,    Rene Ordaz DPM

## 2023-04-21 NOTE — PROGRESS NOTES
FOOT AND ANKLE SURGERY/PODIATRY Progress Note      ASSESSMENT:   S/p Amputation 4th digit right foot  Ulceration left hallux  DM2  PAD        TREATMENT:  -I discussed with the patient that the surgical site on the amputated fourth digit right foot is progressing well.  Incision appears to be well coapted.  Okay to begin full weightbearing at this time.    -There is a stable eschar along the dorsal medial border of the left hallux, no signs of infection.  The patient does have some tenderness at this location I discussed with her to closely monitor for any signs of infection which are not present today.    -Iodine applied along the left hallux today which she will reapply daily.    -She is scheduled for bypass surgery to the left lower extremity with Dr. Geller next month.    -She will follow-up in 3 weeks    Rene Ordaz DPM  Formerly [facility]      HPI: Ashanti Aviles was seen again today status post amputation fourth digit right foot and follow-up ulceration left hallux.  Patient reports that she denies right foot pain and would like to return to work early next week.    Past Medical History:   Diagnosis Date     Diabetes mellitus (H)      Gastroesophageal reflux disease      Hypertension      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/29/2013       Past Surgical History:   Procedure Laterality Date     AMPUTATE TOE(S) Right 2/18/2023    Procedure: Amputation fourth toe right foot;  Surgeon: Benjamin Diez DPM;  Location: [facility]     ANUS SURGERY       BIOPSY CERVICAL, LOCAL EXCISION, SINGLE/MULTIPLE       COLONOSCOPY N/A 9/11/2020    Procedure: EXAM UNDER ANESTHESIA, HIGH RESOLUTION ANOSCOPY INTRA OP;  Surgeon: Preeti Sheehan MD;  Location: [facility];  Service: Gastroenterology     COLONOSCOPY N/A 12/14/2020    Procedure: EXAM UNDER ANESTHESIA WITH HIGH RESOLUTION ANOSCOPY;  Surgeon: Preeti Sheehan MD;  Location: [facility];  Service: General      COLONOSCOPY N/A 6/15/2021    Procedure: EXAM UNDER ANESTHESIA WITH HIGH RESOLUTION ANOSCOPY, BIOPSY, FULGURATION;  Surgeon: Preeti Sheehan MD;  Location: [facility];  Service: Gastroenterology     EXAM UNDER ANESTHESIA ANUS N/A 9/14/2021    Procedure: EXAM UNDER ANESTHESIA WITH HIGH RESOLUTION ANOSCOPY;  Surgeon: Preeti Sheehan MD;  Location: [facility]     IR LOWER EXTREMITY ANGIOGRAM LEFT  3/23/2023     IR LOWER EXTREMITY ANGIOGRAM RIGHT  2/16/2023       Allergies   Allergen Reactions     Ezetimibe Unknown     Generalized body aches     Oxycodone Nausea and Vomiting     Penicillins Unknown     Simvastatin Unknown     Muscle pain     Victoza [Liraglutide] Unknown         Current Outpatient Medications:      acetaminophen (TYLENOL) 325 MG tablet, Take 2 tablets (650 mg) by mouth every 4 hours as needed for mild pain (and adjunct with moderate or severe pain or per patient request), Disp: 30 tablet, Rfl: 0     amLODIPine (NORVASC) 10 MG tablet, [AMLODIPINE (NORVASC) 10 MG TABLET] Take 10 mg by mouth daily., Disp: , Rfl:      aspirin (ASA) 81 MG EC tablet, Take 1 tablet (81 mg) by mouth daily, Disp: 90 tablet, Rfl: 3     clopidogrel (PLAVIX) 75 MG tablet, Take 75 mg by mouth daily, Disp: , Rfl:      gabapentin (NEURONTIN) 100 MG capsule, Take 2 capsules (200 mg) by mouth 3 times daily for 14 days, Disp: 84 capsule, Rfl: 0     hydroCHLOROthiazide (HYDRODIURIL) 25 MG tablet, [HYDROCHLOROTHIAZIDE (HYDRODIURIL) 25 MG TABLET] Take 25 mg by mouth daily., Disp: , Rfl:      Insulin Degludec (TRESIBA) 100 UNIT/ML SOLN, Inject 30 Units Subcutaneous daily, Disp: , Rfl:      JARDIANCE 10 MG TABS tablet, Take 10 mg by mouth daily, Disp: , Rfl:      linagliptin (TRADJENTA) 5 MG TABS tablet, Take 5 mg by mouth daily, Disp: , Rfl:      lisinopril (PRINIVIL,ZESTRIL) 40 MG tablet, [LISINOPRIL (PRINIVIL,ZESTRIL) 40 MG TABLET] Take 40 mg by mouth daily., Disp: , Rfl:      metFORMIN (GLUCOPHAGE) 500 MG tablet,  "Take 500 mg by mouth 2 times daily (with meals), Disp: , Rfl:      oxyCODONE (ROXICODONE) 5 MG tablet, Take 1 tablet (5 mg) by mouth every 4 hours as needed for severe pain (7-10), Disp: 20 tablet, Rfl: 0     pravastatin (PRAVACHOL) 20 MG tablet, Take 1 tablet (20 mg) by mouth daily, Disp: 90 tablet, Rfl: 3     traZODone (DESYREL) 50 MG tablet, Take 0.5 tablets (25 mg) by mouth At Bedtime for 7 days, Disp: 4 tablet, Rfl: 0    Review of Systems - 10 point Review of Systems is negative except for left hallux ulcer which is noted in HPI.      OBJECTIVE:  /80   Pulse 89   Resp 16   Ht 5' 2\" (1.575 m)   Wt 146 lb (66.2 kg)   SpO2 98%   BMI 26.70 kg/m    General appearance: Patient is alert and fully cooperative with history & exam.  No sign of distress is noted during the visit.    Vascular: Dorsalis pedis non-palpableBilateral.  Dermatologic:    VASC Wound Left Hallux (Active)   Pre Size Length 0 04/21/23 0800   Pre Size Width 0 04/21/23 0800   Pre Size Depth 0 04/21/23 0800   Pre Total Sq cm 0 04/21/23 0800   Post Size Length 0.6 03/16/23 1500   Post Size Width 0.5 03/16/23 1500   Post Size Depth 0.3 03/16/23 1500   Post Total Sq cm 0.3 03/16/23 1500       Incision/Surgical Site 09/14/21 Rectum (Active)       Incision/Surgical Site 02/18/23 Right Toe (Comment  which one) (Active)   Stable eschar medial border left hallux, no active drainage erythema noted.  Neurologic: Diminished to light touch Bilateral.  Musculoskeletal: Tenderness medial border left hallux.    Imaging:     US Upper Extremity Venous Mapping Bilateral    Result Date: 4/13/2023  Table formatting from the original result was not included. BILATERAL Vein Mapping Ultrasound Upper Extremity (Date: 04/13/23) Indication: Mapping Bilateral Upper Arm Veins for Lower Extremity Bypass Graft. Technique: Ultrasound of the Superficial Veins with Compression Maneuvers. Duplex Imaging is performed on the arteries utilizing gray-scale, Two-dimensional " images, color-flow imaging, Doppler waveform analysis, and Spectral doppler imaging done. Location Shoulder (mm) Prox Upper Arm (mm) Mid Upper Arm (mm) Distal Upper Arm (mm) Ante- cubital (mm) Prox Forearm (mm) Mid Forearm (mm) Distal Forearm (mm) Right Basilic V.  3.1 3.2 4.2 2.0 2.3 1.6 1.5 Right Cephalic V. 2.7 3.2 2.4 2.6 2.8 2.6 2.5 3.4 Left Basilic V.  3.2 2.4 2.1 4.2 0.86 1.2 1.0 Left Cephalic V. 2.0 2.8 1.9 2.5 2.9 2.0 1.7 1.6  Location Right (cm/sec)  Left (cm/sec) Subclavian Artery 67 91 Brachial Artery 136 116 Ulnar Artery 76 42 Radial Artery 78 70 Impression: Right arm: Adequate caliber basilic vein in the right upper arm for creation of fistula.  Borderline caliber cephalic vein in both the forearm and upper arm.  Repeat evaluation at time of surgery may be beneficial Left arm: Borderline caliber basilic vein in the upper arm for fistula creation.  In adequate caliber cephalic vein of both the forearm and upper arm for fistula creation.    NM MPI with Lexiscan    Result Date: 4/7/2023     1.Nondiagnostic regadenoson ECG for ischemia given baseline ST-T wave changes.    2.The nuclear stress test is negative for inducible myocardial ischemia or infarction.    3.The left ventricular ejection fraction at stress is 76%.    There is no prior study for comparison.     US Lower Extremity Venous Mapping Bilateral    Result Date: 3/27/2023  EXAM: US LOWER EXTREMITY VENOUS MAPPING BILATERAL LOCATION: [facility] DATE/TIME: 3/27/2023 12:53 PM INDICATION: Preoperative evaluation prior to lower extremity bypass, decreased lower extremity pulses, lower extremity pain. COMPARISON: None. TECHNIQUE: Ultrasound examination of the lower extremity veins was performed, including gray-scale and compression imaging. LOWER  EXTREMITY FINDINGS:   VENOUS DIAMETERS: RIGHT GREAT SAPHENOUS VEIN - Upper Thigh: 2.5 mm Mid Thigh: 1.7 mm Lower Thigh: 2.0 mm Knee: 1.4 mm Upper Calf: .8 mm Mid Calf: .9 mm Lower  Calf: .6 mm Ankle: .5 mm LEFT GREAT SAPHENOUS VEIN - Upper Thigh: 3.2 mm Mid Thigh: 1.1 mm Lower Thigh: 1.5 mm Knee: 1.8 mm Upper Calf: 1.1 mm Mid Calf: .9 mm Lower Calf: 1.0 mm Ankle: .8 mm RIGHT SMALL SAPHENOUS VEIN - Upper Calf: 1.9 mm Mid Calf: 1.3 mm Lower Calf: 1.1 mm Ankle: 1.1 mm LEFT SMALL SAPHENOUS VEIN - Upper Calf: 1.6 mm Mid Calf: 1.6 mm Lower Calf: 1.4 mm Ankle: - (NV)     IMPRESSION: Patent bilateral greater and small saphenous veins with measurements as noted above.     IR Lower Extremity Angiogram Left    Result Date: 3/23/2023  LOCATION: [facility] DATE: 3/23/2023 PROCEDURE: LEFT LOWER EXTREMITY DIAGNOSTIC ARTERIOGRAPHY, SELECTIVE CATHETERIZATION AND ANGIOGRAPHY OF THE LEFT SUPERFICIAL FEMORAL ARTERY, ULTRASOUND GUIDANCE FOR VASCULAR ACCESS, MODERATE SEDATION INTERVENTIONAL RADIOLOGIST: Elizabeth Sheehan MD INDICATION: 67-year-old female with nonhealing left great toe nail wound requiring debridement. Plan for left lower extremity angiography for further evaluation. INDICATION FOR DIAGNOSTIC ANGIOGRAPHY: Diagnostic angiography was required to precisely identify plaque morphology to aid in treatment and management CONSENT: The risks, benefits and alternatives of the procedure were discussed with the patient  in detail. All questions were answered. Informed consent was given to proceed with the procedure. MODERATE SEDATION: Versed 2.5 mg IV; Fentanyl 125 mcg IV. During the time out, immediately prior to the administration of medications, the patient was reassessed for adequacy to receive conscious sedation.  Under physician supervision, Versed and fentanyl were administered for moderate sedation. Pulse oximetry, heart rate and blood pressure were continuously monitored by an independent trained observer. The physician spent 45 minutes of face-to-face sedation time with the patient. CONTRAST: 60 mL Visipaque ADDITIONAL MEDICATIONS: Heparin 5000 units, protamine 40 mg FLUOROSCOPIC  TIME: 15.0 minutes. RADIATION DOSE: Air Kerma: 520 mGy. COMPLICATIONS: No immediate complications. UNIVERSAL PROTOCOL: The operative site was marked and any prior imaging was reviewed. Required items including blood products, implants, devices and special equipment was made available. Patient identity was confirmed either verbally, with demographic information, hospital assigned identification or other identification markers. A timeout was performed immediately prior to the procedure. STERILE BARRIER TECHNIQUE: Maximum sterile barrier technique was used. Cutaneous antisepsis was performed at the operative site with application of 2% chlorhexidine and large sterile drape. Prior to the procedure, the  and assistant performed hand hygiene and wore hat, mask, sterile gown, and sterile gloves during the entire procedure. PROCEDURE:  Access was achieved into the right common femoral artery utilizing real-time ultrasound guidance and a micropuncture access kit. Imaging demonstrates a patent and compressible artery. Permanent images were stored to the patient's medical record. Conversion was made for a 5 Portuguese vascular sheath, which was attached to a continuous heparinized saline infusion. A flush catheter was manipulated over a wire into the lower abdomen and oblique digital subtraction pelvic arteriography was performed. Imaging demonstrates patent bilateral common iliac, internal iliac and external carotid arteries. There is mild to moderate stenosis involving the left common iliac and proximal external iliac arteries. The catheter was repositioned over a wire into the left external iliac artery. From this location, digital subtraction left lower extremity arteriography was obtained. Imaging demonstrates patent left common femoral and profunda femoral arteries. 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 There is some reconstituted flow within the P1/P2 segments of the popliteal artery.  Occluded popliteal artery below the knee joint. The tibial peroneal trunk is patent. Occluded proximal anterior tibial artery. The peroneal and posterior tibial arteries are patent to the foot. The peroneal artery supplies the dorsalis pedis artery. Over the wire exchange is made for a 4 French crossing catheter and selective catheterization of the left superficial femoral artery stump was performed. There is inability to cross the proximal vessel secondary to occlusive plaque. At this point, the procedure was considered complete. The sheath was removed and manual pressure applied until hemostasis.     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 Some reconstituted flow in the P1/P2 segment of the popliteal artery. Occluded popliteal artery at and below the knee joint. Patent tibioperoneal trunk. Two-vessel runoff to the foot via the posterior tibial and peroneal arteries. PLAN: Given extensive disease (TASC-D) in the setting of a wound and inadequate digit pressure, we will refer to Dr. Geller for surgical bypass. The patient may require stenting of the left common/external iliac arteries to optimize bypass inflow which  can be performed during surgery as needed.         Picture:

## 2023-05-05 ENCOUNTER — VIRTUAL VISIT (OUTPATIENT)
Dept: ENDOCRINOLOGY | Facility: CLINIC | Age: 68
End: 2023-05-05
Attending: PODIATRIST
Payer: COMMERCIAL

## 2023-05-05 DIAGNOSIS — E11.21 TYPE 2 DIABETES MELLITUS WITH DIABETIC NEPHROPATHY, UNSPECIFIED WHETHER LONG TERM INSULIN USE (H): ICD-10-CM

## 2023-05-05 PROCEDURE — 99204 OFFICE O/P NEW MOD 45 MIN: CPT | Mod: VID | Performed by: NURSE PRACTITIONER

## 2023-05-05 NOTE — Clinical Note
5/5/2023         RE: Ashanti Aviles  [address] [place] [address]        Dear Colleague,    Thank you for referring your patient, Ashanti Aviles, to the [facility]. Please see a copy of my visit note below.        [facility] ENDOCRINOLOGY    Diabetes Note 5/5/2023    Ashanti Aviles, 1955, [id number]          Reason for visit      1. Type 2 diabetes mellitus with diabetic nephropathy, unspecified whether long term insulin use (H)        HPI     Ashanti Aviles is a very pleasant 67 year old old female who presents for follow up.  SUMMARY:          Blood glucose data:      Past Medical History     Patient Active Problem List   Diagnosis     Type 2 diabetes mellitus (H)     Tobacco use disorder     PAD (peripheral artery disease) (H)     Microalbuminuric diabetic nephropathy (H)     Hyperlipidemia with target LDL less than 70     Grade 3 vulvar intraepithelial neoplasia     GERD (gastroesophageal reflux disease)     Essential hypertension     Dyslipidemia     AIN III (anal intraepithelial neoplasia III)     Bilateral incipient cataracts     Condyloma acuminatum of vulva     Ischemic toe        Family History       family history is not on file.    Social History      reports that she quit smoking about 2 months ago. Her smoking use included cigarettes. She smoked an average of .25 packs per day. She has never used smokeless tobacco. She reports current alcohol use. She reports that she does not currently use drugs.      Review of Systems     Patient has no polyuria or polydipsia, no chest pain, dyspnea or TIA's, no numbness, tingling or pain in extremities  Remainder negative except as noted in HPI.      Vital Signs     There were no vitals taken for this visit.  Wt Readings from Last 3 Encounters:   04/21/23 66.2 kg (146 lb)   03/31/23 66.2 kg (146 lb)   02/15/23 66.4 kg (146 lb 6.2 oz)       Physical Exam     Constitutional:  Well developed, Well  nourished  HENT:  Normocephalic,   Neck: normal in appearance  Eyes:  PERRL, Conjunctiva pink  Respiratory:  No respiratory distress  Skin: No acanthosis nigricans, lipoatrophy or lipodystrophy  Neurologic:  Alert & oriented x 3, nonfocal  Psychiatric:  Affect, Mood, Insight appropriate          Assessment     1. Type 2 diabetes mellitus with diabetic nephropathy, unspecified whether long term insulin use (H)        Plan       Neha Hicks NP   Endocrinology  5/5/2023  10:57 AM        Lab Results     Hemoglobin A1c_EXT   Date Value Ref Range Status   06/26/2019 8.0 (A) 4.3 - 5.7 % Final   04/03/2019 8.8 (A) 4.3 - 5.7 % Final   02/26/2019 10.5 (A) 4.3 - 5.7 % Final   12/05/2018 10.5 (A) 4.3 - 5.7 % Final   12/03/2018 10.3 (A) 4.3 - 5.7 % Final       Cholesterol   Date Value Ref Range Status   04/21/2022 237 (H) <=199 mg/dL Final     Direct Measure HDL   Date Value Ref Range Status   04/21/2022 68 >=50 mg/dL Final     Comment:     HDL Cholesterol Reference Range:     0-2 years:   No reference ranges established for patients under 2 years old  at [facility] for lipid analytes.    2-8 years:  Greater than 45 mg/dL     18 years and older:   Female: Greater than or equal to 50 mg/dL   Male:   Greater than or equal to 40 mg/dL     Triglycerides   Date Value Ref Range Status   04/21/2022 120 <=149 mg/dL Final       [unfilled]      Current Medications     Outpatient Medications Prior to Visit   Medication Sig Dispense Refill     acetaminophen (TYLENOL) 325 MG tablet Take 2 tablets (650 mg) by mouth every 4 hours as needed for mild pain (and adjunct with moderate or severe pain or per patient request) 30 tablet 0     amLODIPine (NORVASC) 10 MG tablet [AMLODIPINE (NORVASC) 10 MG TABLET] Take 10 mg by mouth daily.       clopidogrel (PLAVIX) 75 MG tablet Take 75 mg by mouth daily       hydroCHLOROthiazide (HYDRODIURIL) 25 MG tablet [HYDROCHLOROTHIAZIDE (HYDRODIURIL) 25 MG TABLET] Take 25 mg by mouth daily.        "Insulin Degludec (TRESIBA) 100 UNIT/ML SOLN Inject 30 Units Subcutaneous daily       JARDIANCE 10 MG TABS tablet Take 10 mg by mouth daily       linagliptin (TRADJENTA) 5 MG TABS tablet Take 5 mg by mouth daily       lisinopril (PRINIVIL,ZESTRIL) 40 MG tablet [LISINOPRIL (PRINIVIL,ZESTRIL) 40 MG TABLET] Take 40 mg by mouth daily.       metFORMIN (GLUCOPHAGE) 500 MG tablet Take 500 mg by mouth 2 times daily (with meals)       gabapentin (NEURONTIN) 100 MG capsule Take 2 capsules (200 mg) by mouth 3 times daily for 14 days 84 capsule 0     pravastatin (PRAVACHOL) 20 MG tablet Take 1 tablet (20 mg) by mouth daily (Patient not taking: Reported on 5/5/2023) 90 tablet 3     traZODone (DESYREL) 50 MG tablet Take 0.5 tablets (25 mg) by mouth At Bedtime for 7 days 4 tablet 0     aspirin (ASA) 81 MG EC tablet Take 1 tablet (81 mg) by mouth daily (Patient not taking: Reported on 5/5/2023) 90 tablet 3     oxyCODONE (ROXICODONE) 5 MG tablet Take 1 tablet (5 mg) by mouth every 4 hours as needed for severe pain (7-10) (Patient not taking: Reported on 5/5/2023) 20 tablet 0     No facility-administered medications prior to visit.           Virtual Visit Details    Type of service:  Video Visit     Originating Location (pt. Location): {video visit patient location:027662::\"Home\"}  {PROVIDER LOCATION On-site should be selected for visits conducted from your clinic location or adjoining [facility] hospital, academic office, or other nearby [facility] building. Off-site should be selected for all other provider locations, including home:[id number]}  Distant Location (provider location):  {virtual location provider:503868}  Platform used for Video Visit: {Virtual Visit Platforms:867593::\"[employer]\"}            BG from Leif     5/4   81 10:25 pm  91 6:30 pm    5/3  152 6 am     5/2  158 9 am    5/1  193 9 pm   81 6:12 am     4/30  93 9:14 pm  250 2:59 pm    4/29  147 9 am  178 11 pm   253 10 pm   141 1:40 pm   133 12:30 pm   249 12:04 am    4/28  178 3:40 " "pm  92 8:08 pm     4/27   205 8:51 pm   176 6:24 am    4/26  246 12:52 pm   200 10:38 am   160 7:05 am   124 6:05 am           [facility] ENDOCRINOLOGY    Diabetes Note 5/5/2023    Ashanti Aviles, 1955, [id number]          Reason for visit      1. Type 2 diabetes mellitus with diabetic nephropathy, unspecified whether long term insulin use (H)        HPI     Ashanti Aviles is a very pleasant 67 year old old female who presents for follow up.  SUMMARY:    Ashanti is seen today in referral from Dr Ordaz for DM 2. Dr Ordaz has been working with Ashanti, who unfortunately has fairly significant PAD, that left her with an ischemic L foot, that developed an ulcer that ultimately required amputation of some toes. She has a hx of Smoking and Hypertension, which were also contributory. Dx per CT revealed \"Moderate/severe diffuse atherosclerotic disease infrarenal abdominal aorta.Diffuse severe atherosclerotic disease right superficial femoral artery. High-grade stenosis of the left popliteal artery with three-vessel runoff into the right foot.\" She will be undergoing the creation of an arterial bypass from Femoral to Popliteal next week.     She reports a 25 yr hx of DM 2. There is a family hx in her Father and an Aunt. Pt supplies BG today and feels that her BG have been \"running better\" than her last A1c of 8.5 would indicate. She is currently taking Metformin, 500 mg BID, Tradjenta, 5 mg daily, Jardiance, 25 mg daily, an Tresiba 30 units daily. Projected A1c would be 8 or lower, judging by her BG.     She reports that since her amputation, she has been exercising more (she is not currently working) and trying to eat better. She doesn't eat veggies as a rule, and is supplementing with Gummy Vitamins for the nutrients.       Blood glucose data:    BG from Leif     5/4   81 10:25 pm  91 6:30 pm    5/3  152 6 am     5/2  158 9 am    5/1  193 9 pm   81 6:12 am     4/30  93 9:14 pm  250 2:59 pm    4/29  147 9 " am  178 11 pm   253 10 pm   141 1:40 pm   133 12:30 pm   249 12:04 am    4/28  178 3:40 pm  92 8:08 pm     4/27   205 8:51 pm   176 6:24 am    4/26  246 12:52 pm   200 10:38 am   160 7:05 am   124 6:05 am       Past Medical History     Patient Active Problem List   Diagnosis     Type 2 diabetes mellitus (H)     Tobacco use disorder     PAD (peripheral artery disease) (H)     Microalbuminuric diabetic nephropathy (H)     Hyperlipidemia with target LDL less than 70     Grade 3 vulvar intraepithelial neoplasia     GERD (gastroesophageal reflux disease)     Essential hypertension     Dyslipidemia     AIN III (anal intraepithelial neoplasia III)     Bilateral incipient cataracts     Condyloma acuminatum of vulva     Ischemic toe        Family History       family history is not on file.    Social History      reports that she quit smoking about 2 months ago. Her smoking use included cigarettes. She smoked an average of .25 packs per day. She has never used smokeless tobacco. She reports current alcohol use. She reports that she does not currently use drugs.      Review of Systems     Patient has no polyuria or polydipsia, no chest pain, dyspnea or TIA's, no numbness, tingling or pain in extremities  Remainder negative except as noted in HPI.      Vital Signs     There were no vitals taken for this visit.  Wt Readings from Last 3 Encounters:   04/21/23 66.2 kg (146 lb)   03/31/23 66.2 kg (146 lb)   02/15/23 66.4 kg (146 lb 6.2 oz)       Physical Exam     Constitutional:  Well developed, Well nourished  HENT:  Normocephalic,   Neck: normal in appearance  Eyes:  PERRL, Conjunctiva pink  Respiratory:  No respiratory distress  Skin: No acanthosis nigricans, lipoatrophy or lipodystrophy  Neurologic:  Alert & oriented x 3, nonfocal  Psychiatric:  Affect, Mood, Insight appropriate          Assessment     1. Type 2 diabetes mellitus with diabetic nephropathy, unspecified whether long term insulin use (H)        Plan     Pt is  motivated because of her upcoming surgery, and a desire not to have to lose any more body parts, to keep her BG in a better place. She has been on the higher dose of Jardiance for only 2 weeks, and it is likely helping more than the previous dose. Pt does not recall being on a GLP1, but has been on DPP4 for at least 5 years. This is something to consider.  We have decided to re-convene after her surgery and once she has returned to work, as her movement and diet will have changed by then.  F/u with me in September.         Neha Hicks NP  [facility] Endocrinology  5/5/2023  10:57 AM        Lab Results     Hemoglobin A1c_EXT   Date Value Ref Range Status   06/26/2019 8.0 (A) 4.3 - 5.7 % Final   04/03/2019 8.8 (A) 4.3 - 5.7 % Final   02/26/2019 10.5 (A) 4.3 - 5.7 % Final   12/05/2018 10.5 (A) 4.3 - 5.7 % Final   12/03/2018 10.3 (A) 4.3 - 5.7 % Final       Cholesterol   Date Value Ref Range Status   04/21/2022 237 (H) <=199 mg/dL Final     Direct Measure HDL   Date Value Ref Range Status   04/21/2022 68 >=50 mg/dL Final     Comment:     HDL Cholesterol Reference Range:     0-2 years:   No reference ranges established for patients under 2 years old  at [facility] for lipid analytes.    2-8 years:  Greater than 45 mg/dL     18 years and older:   Female: Greater than or equal to 50 mg/dL   Male:   Greater than or equal to 40 mg/dL     Triglycerides   Date Value Ref Range Status   04/21/2022 120 <=149 mg/dL Final             Current Medications     Outpatient Medications Prior to Visit   Medication Sig Dispense Refill     acetaminophen (TYLENOL) 325 MG tablet Take 2 tablets (650 mg) by mouth every 4 hours as needed for mild pain (and adjunct with moderate or severe pain or per patient request) 30 tablet 0     amLODIPine (NORVASC) 10 MG tablet [AMLODIPINE (NORVASC) 10 MG TABLET] Take 10 mg by mouth daily.       clopidogrel (PLAVIX) 75 MG tablet Take 75 mg by mouth daily       hydroCHLOROthiazide (HYDRODIURIL) 25 MG  tablet [HYDROCHLOROTHIAZIDE (HYDRODIURIL) 25 MG TABLET] Take 25 mg by mouth daily.       Insulin Degludec (TRESIBA) 100 UNIT/ML SOLN Inject 30 Units Subcutaneous daily       JARDIANCE 10 MG TABS tablet Take 10 mg by mouth daily       linagliptin (TRADJENTA) 5 MG TABS tablet Take 5 mg by mouth daily       lisinopril (PRINIVIL,ZESTRIL) 40 MG tablet [LISINOPRIL (PRINIVIL,ZESTRIL) 40 MG TABLET] Take 40 mg by mouth daily.       metFORMIN (GLUCOPHAGE) 500 MG tablet Take 500 mg by mouth 2 times daily (with meals)       gabapentin (NEURONTIN) 100 MG capsule Take 2 capsules (200 mg) by mouth 3 times daily for 14 days 84 capsule 0     pravastatin (PRAVACHOL) 20 MG tablet Take 1 tablet (20 mg) by mouth daily (Patient not taking: Reported on 5/5/2023) 90 tablet 3     traZODone (DESYREL) 50 MG tablet Take 0.5 tablets (25 mg) by mouth At Bedtime for 7 days 4 tablet 0     aspirin (ASA) 81 MG EC tablet Take 1 tablet (81 mg) by mouth daily (Patient not taking: Reported on 5/5/2023) 90 tablet 3     oxyCODONE (ROXICODONE) 5 MG tablet Take 1 tablet (5 mg) by mouth every 4 hours as needed for severe pain (7-10) (Patient not taking: Reported on 5/5/2023) 20 tablet 0     No facility-administered medications prior to visit.           Virtual Visit Details    Type of service:  Video Visit     Originating Location (pt. Location): Home  {PROVIDER LOCATION On-site should be selected for visits conducted from your clinic location or adjoining [facility] hospital, academic office, or other nearby [facility] building. Off-site should be selected for all other provider locations, including home:[id number]}  Distant Location (provider location):  On-site  Platform used for Video Visit: Delfino                  Again, thank you for allowing me to participate in the care of your patient.        Sincerely,        Neha Hicks NP

## 2023-05-05 NOTE — PROGRESS NOTES
"    [facility] ENDOCRINOLOGY    Diabetes Note 5/5/2023    Ashanti Aviles, 1955, [id number]          Reason for visit      1. Type 2 diabetes mellitus with diabetic nephropathy, unspecified whether long term insulin use (H)        HPI     Ashanti Aviles is a very pleasant 67 year old old female who presents for follow up.  SUMMARY:    Ashanti is seen today in referral from Dr Ordaz for DM 2. Dr Ordaz has been working with Ashanti, who unfortunately has fairly significant PAD, that left her with an ischemic L foot, that developed an ulcer that ultimately required amputation of some toes. She has a hx of Smoking and Hypertension, which were also contributory. Dx per CT revealed \"Moderate/severe diffuse atherosclerotic disease infrarenal abdominal aorta.Diffuse severe atherosclerotic disease right superficial femoral artery. High-grade stenosis of the left popliteal artery with three-vessel runoff into the right foot.\" She will be undergoing the creation of an arterial bypass from Femoral to Popliteal next week.     She reports a 25 yr hx of DM 2. There is a family hx in her Father and an Aunt. Pt supplies BG today and feels that her BG have been \"running better\" than her last A1c of 8.5 would indicate. She is currently taking Metformin, 500 mg BID, Tradjenta, 5 mg daily, Jardiance, 25 mg daily, an Tresiba 30 units daily. Projected A1c would be 8 or lower, judging by her BG.     She reports that since her amputation, she has been exercising more (she is not currently working) and trying to eat better. She doesn't eat veggies as a rule, and is supplementing with Gummy Vitamins for the nutrients.       Blood glucose data:    BG from Leif     5/4   81 10:25 pm  91 6:30 pm    5/3  152 6 am     5/2  158 9 am    5/1  193 9 pm   81 6:12 am     4/30  93 9:14 pm  250 2:59 pm    4/29  147 9 am  178 11 pm   253 10 pm   141 1:40 pm   133 12:30 pm   249 12:04 am    4/28  178 3:40 pm  92 8:08 pm     4/27   205 8:51 " pm   176 6:24 am    4/26  246 12:52 pm   200 10:38 am   160 7:05 am   124 6:05 am       Past Medical History     Patient Active Problem List   Diagnosis     Type 2 diabetes mellitus (H)     Tobacco use disorder     PAD (peripheral artery disease) (H)     Microalbuminuric diabetic nephropathy (H)     Hyperlipidemia with target LDL less than 70     Grade 3 vulvar intraepithelial neoplasia     GERD (gastroesophageal reflux disease)     Essential hypertension     Dyslipidemia     AIN III (anal intraepithelial neoplasia III)     Bilateral incipient cataracts     Condyloma acuminatum of vulva     Ischemic toe        Family History       family history is not on file.    Social History      reports that she quit smoking about 2 months ago. Her smoking use included cigarettes. She smoked an average of .25 packs per day. She has never used smokeless tobacco. She reports current alcohol use. She reports that she does not currently use drugs.      Review of Systems     Patient has no polyuria or polydipsia, no chest pain, dyspnea or TIA's, no numbness, tingling or pain in extremities  Remainder negative except as noted in HPI.      Vital Signs     There were no vitals taken for this visit.  Wt Readings from Last 3 Encounters:   04/21/23 66.2 kg (146 lb)   03/31/23 66.2 kg (146 lb)   02/15/23 66.4 kg (146 lb 6.2 oz)       Physical Exam     Constitutional:  Well developed, Well nourished  HENT:  Normocephalic,   Neck: normal in appearance  Eyes:  PERRL, Conjunctiva pink  Respiratory:  No respiratory distress  Skin: No acanthosis nigricans, lipoatrophy or lipodystrophy  Neurologic:  Alert & oriented x 3, nonfocal  Psychiatric:  Affect, Mood, Insight appropriate          Assessment     1. Type 2 diabetes mellitus with diabetic nephropathy, unspecified whether long term insulin use (H)        Plan     Pt is motivated because of her upcoming surgery, and a desire not to have to lose any more body parts, to keep her BG in a better  place. She has been on the higher dose of Jardiance for only 2 weeks, and it is likely helping more than the previous dose. Pt does not recall being on a GLP1, but has been on DPP4 for at least 5 years. This is something to consider.  We have decided to re-convene after her surgery and once she has returned to work, as her movement and diet will have changed by then.  F/u with me in September.     40 minutes spent in chart and lab review, hx, and face to face.     Neha Hicks NP   Endocrinology  5/5/2023  10:57 AM        Lab Results     Hemoglobin A1c_EXT   Date Value Ref Range Status   06/26/2019 8.0 (A) 4.3 - 5.7 % Final   04/03/2019 8.8 (A) 4.3 - 5.7 % Final   02/26/2019 10.5 (A) 4.3 - 5.7 % Final   12/05/2018 10.5 (A) 4.3 - 5.7 % Final   12/03/2018 10.3 (A) 4.3 - 5.7 % Final       Cholesterol   Date Value Ref Range Status   04/21/2022 237 (H) <=199 mg/dL Final     Direct Measure HDL   Date Value Ref Range Status   04/21/2022 68 >=50 mg/dL Final     Comment:     HDL Cholesterol Reference Range:     0-2 years:   No reference ranges established for patients under 2 years old  at [facility] for lipid analytes.    2-8 years:  Greater than 45 mg/dL     18 years and older:   Female: Greater than or equal to 50 mg/dL   Male:   Greater than or equal to 40 mg/dL     Triglycerides   Date Value Ref Range Status   04/21/2022 120 <=149 mg/dL Final             Current Medications     Outpatient Medications Prior to Visit   Medication Sig Dispense Refill     acetaminophen (TYLENOL) 325 MG tablet Take 2 tablets (650 mg) by mouth every 4 hours as needed for mild pain (and adjunct with moderate or severe pain or per patient request) 30 tablet 0     amLODIPine (NORVASC) 10 MG tablet [AMLODIPINE (NORVASC) 10 MG TABLET] Take 10 mg by mouth daily.       clopidogrel (PLAVIX) 75 MG tablet Take 75 mg by mouth daily       hydroCHLOROthiazide (HYDRODIURIL) 25 MG tablet [HYDROCHLOROTHIAZIDE (HYDRODIURIL) 25 MG TABLET] Take  25 mg by mouth daily.       Insulin Degludec (TRESIBA) 100 UNIT/ML SOLN Inject 30 Units Subcutaneous daily       JARDIANCE 10 MG TABS tablet Take 10 mg by mouth daily       linagliptin (TRADJENTA) 5 MG TABS tablet Take 5 mg by mouth daily       lisinopril (PRINIVIL,ZESTRIL) 40 MG tablet [LISINOPRIL (PRINIVIL,ZESTRIL) 40 MG TABLET] Take 40 mg by mouth daily.       metFORMIN (GLUCOPHAGE) 500 MG tablet Take 500 mg by mouth 2 times daily (with meals)       gabapentin (NEURONTIN) 100 MG capsule Take 2 capsules (200 mg) by mouth 3 times daily for 14 days 84 capsule 0     pravastatin (PRAVACHOL) 20 MG tablet Take 1 tablet (20 mg) by mouth daily (Patient not taking: Reported on 5/5/2023) 90 tablet 3     traZODone (DESYREL) 50 MG tablet Take 0.5 tablets (25 mg) by mouth At Bedtime for 7 days 4 tablet 0     aspirin (ASA) 81 MG EC tablet Take 1 tablet (81 mg) by mouth daily (Patient not taking: Reported on 5/5/2023) 90 tablet 3     oxyCODONE (ROXICODONE) 5 MG tablet Take 1 tablet (5 mg) by mouth every 4 hours as needed for severe pain (7-10) (Patient not taking: Reported on 5/5/2023) 20 tablet 0     No facility-administered medications prior to visit.           Virtual Visit Details    Type of service:  Video Visit     Originating Location (pt. Location): Home    Distant Location (provider location):  On-site  Platform used for Video Visit: Delfino

## 2023-05-05 NOTE — LETTER
"    5/5/2023         RE: Ashanti Aviles  [address] [place] [address]        Dear Colleague,    Thank you for referring your patient, Ashanti Aviles, to the [facility]. Please see a copy of my visit note below.        [facility] ENDOCRINOLOGY    Diabetes Note 5/5/2023    Ashanti Aviles, 1955, [id number]          Reason for visit      1. Type 2 diabetes mellitus with diabetic nephropathy, unspecified whether long term insulin use (H)        HPI     Ashanti Aviles is a very pleasant 67 year old old female who presents for follow up.  SUMMARY:    Ashanti is seen today in referral from Dr Ordaz for DM 2. Dr Ordaz has been working with Ashanti, who unfortunately has fairly significant PAD, that left her with an ischemic L foot, that developed an ulcer that ultimately required amputation of some toes. She has a hx of Smoking and Hypertension, which were also contributory. Dx per CT revealed \"Moderate/severe diffuse atherosclerotic disease infrarenal abdominal aorta.Diffuse severe atherosclerotic disease right superficial femoral artery. High-grade stenosis of the left popliteal artery with three-vessel runoff into the right foot.\" She will be undergoing the creation of an arterial bypass from Femoral to Popliteal next week.     She reports a 25 yr hx of DM 2. There is a family hx in her Father and an Aunt. Pt supplies BG today and feels that her BG have been \"running better\" than her last A1c of 8.5 would indicate. She is currently taking Metformin, 500 mg BID, Tradjenta, 5 mg daily, Jardiance, 25 mg daily, an Tresiba 30 units daily. Projected A1c would be 8 or lower, judging by her BG.     She reports that since her amputation, she has been exercising more (she is not currently working) and trying to eat better. She doesn't eat veggies as a rule, and is supplementing with Gummy Vitamins for the nutrients.       Blood glucose data:    BG from Leif     5/4 "   81 10:25 pm  91 6:30 pm    5/3  152 6 am     5/2  158 9 am    5/1  193 9 pm   81 6:12 am     4/30  93 9:14 pm  250 2:59 pm    4/29  147 9 am  178 11 pm   253 10 pm   141 1:40 pm   133 12:30 pm   249 12:04 am    4/28  178 3:40 pm  92 8:08 pm     4/27   205 8:51 pm   176 6:24 am    4/26  246 12:52 pm   200 10:38 am   160 7:05 am   124 6:05 am       Past Medical History     Patient Active Problem List   Diagnosis     Type 2 diabetes mellitus (H)     Tobacco use disorder     PAD (peripheral artery disease) (H)     Microalbuminuric diabetic nephropathy (H)     Hyperlipidemia with target LDL less than 70     Grade 3 vulvar intraepithelial neoplasia     GERD (gastroesophageal reflux disease)     Essential hypertension     Dyslipidemia     AIN III (anal intraepithelial neoplasia III)     Bilateral incipient cataracts     Condyloma acuminatum of vulva     Ischemic toe        Family History       family history is not on file.    Social History      reports that she quit smoking about 2 months ago. Her smoking use included cigarettes. She smoked an average of .25 packs per day. She has never used smokeless tobacco. She reports current alcohol use. She reports that she does not currently use drugs.      Review of Systems     Patient has no polyuria or polydipsia, no chest pain, dyspnea or TIA's, no numbness, tingling or pain in extremities  Remainder negative except as noted in HPI.      Vital Signs     There were no vitals taken for this visit.  Wt Readings from Last 3 Encounters:   04/21/23 66.2 kg (146 lb)   03/31/23 66.2 kg (146 lb)   02/15/23 66.4 kg (146 lb 6.2 oz)       Physical Exam     Constitutional:  Well developed, Well nourished  HENT:  Normocephalic,   Neck: normal in appearance  Eyes:  PERRL, Conjunctiva pink  Respiratory:  No respiratory distress  Skin: No acanthosis nigricans, lipoatrophy or lipodystrophy  Neurologic:  Alert & oriented x 3, nonfocal  Psychiatric:  Affect, Mood, Insight  appropriate          Assessment     1. Type 2 diabetes mellitus with diabetic nephropathy, unspecified whether long term insulin use (H)        Plan     Pt is motivated because of her upcoming surgery, and a desire not to have to lose any more body parts, to keep her BG in a better place. She has been on the higher dose of Jardiance for only 2 weeks, and it is likely helping more than the previous dose. Pt does not recall being on a GLP1, but has been on DPP4 for at least 5 years. This is something to consider.  We have decided to re-convene after her surgery and once she has returned to work, as her movement and diet will have changed by then.  F/u with me in September.     40 minutes spent in chart and lab review, hx, and face to face.     Neha Hicks NP   Endocrinology  5/5/2023  10:57 AM        Lab Results     Hemoglobin A1c_EXT   Date Value Ref Range Status   06/26/2019 8.0 (A) 4.3 - 5.7 % Final   04/03/2019 8.8 (A) 4.3 - 5.7 % Final   02/26/2019 10.5 (A) 4.3 - 5.7 % Final   12/05/2018 10.5 (A) 4.3 - 5.7 % Final   12/03/2018 10.3 (A) 4.3 - 5.7 % Final       Cholesterol   Date Value Ref Range Status   04/21/2022 237 (H) <=199 mg/dL Final     Direct Measure HDL   Date Value Ref Range Status   04/21/2022 68 >=50 mg/dL Final     Comment:     HDL Cholesterol Reference Range:     0-2 years:   No reference ranges established for patients under 2 years old  [facility] analytes.    2-8 years:  Greater than 45 mg/dL     18 years and older:   Female: Greater than or equal to 50 mg/dL   Male:   Greater than or equal to 40 mg/dL     Triglycerides   Date Value Ref Range Status   04/21/2022 120 <=149 mg/dL Final             Current Medications     Outpatient Medications Prior to Visit   Medication Sig Dispense Refill     acetaminophen (TYLENOL) 325 MG tablet Take 2 tablets (650 mg) by mouth every 4 hours as needed for mild pain (and adjunct with moderate or severe pain or per patient request) 30  tablet 0     amLODIPine (NORVASC) 10 MG tablet [AMLODIPINE (NORVASC) 10 MG TABLET] Take 10 mg by mouth daily.       clopidogrel (PLAVIX) 75 MG tablet Take 75 mg by mouth daily       hydroCHLOROthiazide (HYDRODIURIL) 25 MG tablet [HYDROCHLOROTHIAZIDE (HYDRODIURIL) 25 MG TABLET] Take 25 mg by mouth daily.       Insulin Degludec (TRESIBA) 100 UNIT/ML SOLN Inject 30 Units Subcutaneous daily       JARDIANCE 10 MG TABS tablet Take 10 mg by mouth daily       linagliptin (TRADJENTA) 5 MG TABS tablet Take 5 mg by mouth daily       lisinopril (PRINIVIL,ZESTRIL) 40 MG tablet [LISINOPRIL (PRINIVIL,ZESTRIL) 40 MG TABLET] Take 40 mg by mouth daily.       metFORMIN (GLUCOPHAGE) 500 MG tablet Take 500 mg by mouth 2 times daily (with meals)       gabapentin (NEURONTIN) 100 MG capsule Take 2 capsules (200 mg) by mouth 3 times daily for 14 days 84 capsule 0     pravastatin (PRAVACHOL) 20 MG tablet Take 1 tablet (20 mg) by mouth daily (Patient not taking: Reported on 5/5/2023) 90 tablet 3     traZODone (DESYREL) 50 MG tablet Take 0.5 tablets (25 mg) by mouth At Bedtime for 7 days 4 tablet 0     aspirin (ASA) 81 MG EC tablet Take 1 tablet (81 mg) by mouth daily (Patient not taking: Reported on 5/5/2023) 90 tablet 3     oxyCODONE (ROXICODONE) 5 MG tablet Take 1 tablet (5 mg) by mouth every 4 hours as needed for severe pain (7-10) (Patient not taking: Reported on 5/5/2023) 20 tablet 0     No facility-administered medications prior to visit.           Virtual Visit Details    Type of service:  Video Visit     Originating Location (pt. Location): Home    Distant Location (provider location):  On-site  Platform used for Video Visit: AmWell                  Again, thank you for allowing me to participate in the care of your patient.        Sincerely,        Neha Hicks NP

## 2023-05-10 ENCOUNTER — TRANSFERRED RECORDS (OUTPATIENT)
Dept: HEALTH INFORMATION MANAGEMENT | Facility: CLINIC | Age: 68
End: 2023-05-10
Payer: COMMERCIAL

## 2023-05-10 ENCOUNTER — TRANSFERRED RECORDS (OUTPATIENT)
Dept: MULTI SPECIALTY CLINIC | Facility: CLINIC | Age: 68
End: 2023-05-10

## 2023-05-10 LAB — HBA1C MFR BLD: 8.2 % (ref 4.3–5.7)

## 2023-05-12 ENCOUNTER — OFFICE VISIT (OUTPATIENT)
Dept: VASCULAR SURGERY | Facility: CLINIC | Age: 68
End: 2023-05-12
Attending: PODIATRIST
Payer: COMMERCIAL

## 2023-05-12 VITALS
OXYGEN SATURATION: 100 % | WEIGHT: 146 LBS | HEIGHT: 62 IN | DIASTOLIC BLOOD PRESSURE: 84 MMHG | HEART RATE: 69 BPM | SYSTOLIC BLOOD PRESSURE: 138 MMHG | BODY MASS INDEX: 26.87 KG/M2

## 2023-05-12 DIAGNOSIS — L97.521 ULCER OF LEFT FOOT, LIMITED TO BREAKDOWN OF SKIN (H): Primary | ICD-10-CM

## 2023-05-12 PROCEDURE — 99212 OFFICE O/P EST SF 10 MIN: CPT | Performed by: PODIATRIST

## 2023-05-12 PROCEDURE — G0463 HOSPITAL OUTPT CLINIC VISIT: HCPCS | Performed by: PODIATRIST

## 2023-05-12 ASSESSMENT — PAIN SCALES - GENERAL: PAINLEVEL: NO PAIN (0)

## 2023-05-12 NOTE — PROGRESS NOTES
FOOT AND ANKLE SURGERY/PODIATRY Progress Note      ASSESSMENT:   S/p Amputation 4th digit right foot  Ulceration left hallux  DM2  PAD        TREATMENT:  -Discussed with the patient that the eschar on the left hallux was no longer visible today.  I there are no open lesions along the bilateral feet.    -Recommend she continue to monitor for any skin irritation.    -She is scheduled for bypass surgery to the left lower extremity with Dr. Geller next month.    -All questions invited and answered.  Patient is discharged from my care at this time but encouraged questions or concerns.    Rene Ordaz DPM  [facility]      HPI: Ashanti Aviles was seen again today status post amputation fourth digit right foot and follow-up ulceration left hallux.  Patient reports that she denies right foot pain and would like to return to work early next week.    Past Medical History:   Diagnosis Date     Diabetes mellitus (H)      Gastroesophageal reflux disease      Hypertension      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/29/2013       Past Surgical History:   Procedure Laterality Date     AMPUTATE TOE(S) Right 2/18/2023    Procedure: Amputation fourth toe right foot;  Surgeon: Benjamin Diez DPM;  Location: [facility]     ANUS SURGERY       BIOPSY CERVICAL, LOCAL EXCISION, SINGLE/MULTIPLE       COLONOSCOPY N/A 9/11/2020    Procedure: EXAM UNDER ANESTHESIA, HIGH RESOLUTION ANOSCOPY INTRA OP;  Surgeon: Preeti Sheehan MD;  Location: [facility];  Service: Gastroenterology     COLONOSCOPY N/A 12/14/2020    Procedure: EXAM UNDER ANESTHESIA WITH HIGH RESOLUTION ANOSCOPY;  Surgeon: Preeti hSeehan MD;  Location: [facility];  Service: General     COLONOSCOPY N/A 6/15/2021    Procedure: EXAM UNDER ANESTHESIA WITH HIGH RESOLUTION ANOSCOPY, BIOPSY, FULGURATION;  Surgeon: Preeti Sheehan MD;  Location: [facility];  Service: Gastroenterology     EXAM UNDER  ANESTHESIA ANUS N/A 9/14/2021    Procedure: EXAM UNDER ANESTHESIA WITH HIGH RESOLUTION ANOSCOPY;  Surgeon: Preeti Sheehan MD;  Location: [facility]     IR LOWER EXTREMITY ANGIOGRAM LEFT  3/23/2023     IR LOWER EXTREMITY ANGIOGRAM RIGHT  2/16/2023       Allergies   Allergen Reactions     Ezetimibe Unknown     Generalized body aches     Oxycodone Nausea and Vomiting     Penicillins Unknown     Simvastatin Unknown     Muscle pain     Victoza [Liraglutide] Unknown         Current Outpatient Medications:      acetaminophen (TYLENOL) 325 MG tablet, Take 2 tablets (650 mg) by mouth every 4 hours as needed for mild pain (and adjunct with moderate or severe pain or per patient request), Disp: 30 tablet, Rfl: 0     amLODIPine (NORVASC) 10 MG tablet, [AMLODIPINE (NORVASC) 10 MG TABLET] Take 10 mg by mouth daily., Disp: , Rfl:      clopidogrel (PLAVIX) 75 MG tablet, Take 75 mg by mouth daily, Disp: , Rfl:      empagliflozin (JARDIANCE) 25 MG TABS tablet, Take 1 tablet (25 mg) by mouth daily, Disp: 90 tablet, Rfl: 1     hydroCHLOROthiazide (HYDRODIURIL) 25 MG tablet, [HYDROCHLOROTHIAZIDE (HYDRODIURIL) 25 MG TABLET] Take 25 mg by mouth daily., Disp: , Rfl:      Insulin Degludec (TRESIBA) 100 UNIT/ML SOLN, Inject 30 Units Subcutaneous daily, Disp: , Rfl:      linagliptin (TRADJENTA) 5 MG TABS tablet, Take 5 mg by mouth daily, Disp: , Rfl:      lisinopril (PRINIVIL,ZESTRIL) 40 MG tablet, [LISINOPRIL (PRINIVIL,ZESTRIL) 40 MG TABLET] Take 40 mg by mouth daily., Disp: , Rfl:      metFORMIN (GLUCOPHAGE) 500 MG tablet, Take 500 mg by mouth 2 times daily (with meals), Disp: , Rfl:      gabapentin (NEURONTIN) 100 MG capsule, Take 2 capsules (200 mg) by mouth 3 times daily for 14 days, Disp: 84 capsule, Rfl: 0     traZODone (DESYREL) 50 MG tablet, Take 0.5 tablets (25 mg) by mouth At Bedtime for 7 days, Disp: 4 tablet, Rfl: 0    Review of Systems - 10 point Review of Systems is negative except for left hallux ulcer which is noted  "in HPI.      OBJECTIVE:  /84   Pulse 69   Ht 5' 2\" (1.575 m)   Wt 146 lb (66.2 kg)   SpO2 100%   BMI 26.70 kg/m    General appearance: Patient is alert and fully cooperative with history & exam.  No sign of distress is noted during the visit.    Vascular: Dorsalis pedis non-palpableBilateral.  Dermatologic:    VASC Wound Left Hallux (Active)   Pre Size Length 0 04/21/23 0800   Pre Size Width 0 04/21/23 0800   Pre Size Depth 0 04/21/23 0800   Pre Total Sq cm 0 04/21/23 0800   Post Size Length 0.6 03/16/23 1500   Post Size Width 0.5 03/16/23 1500   Post Size Depth 0.3 03/16/23 1500   Post Total Sq cm 0.3 03/16/23 1500       Incision/Surgical Site 09/14/21 Rectum (Active)       Incision/Surgical Site 02/18/23 Right Toe (Comment  which one) (Active)     Neurologic: Diminished to light touch Bilateral.  Musculoskeletal: Tenderness medial border left hallux.    Imaging:     US Upper Extremity Venous Mapping Bilateral    Result Date: 4/13/2023  Table formatting from the original result was not included. BILATERAL Vein Mapping Ultrasound Upper Extremity (Date: 04/13/23) Indication: Mapping Bilateral Upper Arm Veins for Lower Extremity Bypass Graft. Technique: Ultrasound of the Superficial Veins with Compression Maneuvers. Duplex Imaging is performed on the arteries utilizing gray-scale, Two-dimensional images, color-flow imaging, Doppler waveform analysis, and Spectral doppler imaging done. Location Shoulder (mm) Prox Upper Arm (mm) Mid Upper Arm (mm) Distal Upper Arm (mm) Ante- cubital (mm) Prox Forearm (mm) Mid Forearm (mm) Distal Forearm (mm) Right Basilic V.  3.1 3.2 4.2 2.0 2.3 1.6 1.5 Right Cephalic V. 2.7 3.2 2.4 2.6 2.8 2.6 2.5 3.4 Left Basilic V.  3.2 2.4 2.1 4.2 0.86 1.2 1.0 Left Cephalic V. 2.0 2.8 1.9 2.5 2.9 2.0 1.7 1.6  Location Right (cm/sec)  Left (cm/sec) Subclavian Artery 67 91 Brachial Artery 136 116 Ulnar Artery 76 42 Radial Artery 78 70 Impression: Right arm: Adequate caliber basilic vein in " the right upper arm for creation of fistula.  Borderline caliber cephalic vein in both the forearm and upper arm.  Repeat evaluation at time of surgery may be beneficial Left arm: Borderline caliber basilic vein in the upper arm for fistula creation.  In adequate caliber cephalic vein of both the forearm and upper arm for fistula creation.    NM MPI with Lexiscan    Result Date: 4/7/2023     1.Nondiagnostic regadenoson ECG for ischemia given baseline ST-T wave changes.    2.The nuclear stress test is negative for inducible myocardial ischemia or infarction.    3.The left ventricular ejection fraction at stress is 76%.    There is no prior study for comparison.     US Lower Extremity Venous Mapping Bilateral    Result Date: 3/27/2023  EXAM: US LOWER EXTREMITY VENOUS MAPPING BILATERAL LOCATION: [facility] DATE/TIME: 3/27/2023 12:53 PM INDICATION: Preoperative evaluation prior to lower extremity bypass, decreased lower extremity pulses, lower extremity pain. COMPARISON: None. TECHNIQUE: Ultrasound examination of the lower extremity veins was performed, including gray-scale and compression imaging. LOWER  EXTREMITY FINDINGS:   VENOUS DIAMETERS: RIGHT GREAT SAPHENOUS VEIN - Upper Thigh: 2.5 mm Mid Thigh: 1.7 mm Lower Thigh: 2.0 mm Knee: 1.4 mm Upper Calf: .8 mm Mid Calf: .9 mm Lower Calf: .6 mm Ankle: .5 mm LEFT GREAT SAPHENOUS VEIN - Upper Thigh: 3.2 mm Mid Thigh: 1.1 mm Lower Thigh: 1.5 mm Knee: 1.8 mm Upper Calf: 1.1 mm Mid Calf: .9 mm Lower Calf: 1.0 mm Ankle: .8 mm RIGHT SMALL SAPHENOUS VEIN - Upper Calf: 1.9 mm Mid Calf: 1.3 mm Lower Calf: 1.1 mm Ankle: 1.1 mm LEFT SMALL SAPHENOUS VEIN - Upper Calf: 1.6 mm Mid Calf: 1.6 mm Lower Calf: 1.4 mm Ankle: - (NV)     IMPRESSION: Patent bilateral greater and small saphenous veins with measurements as noted above.     IR Lower Extremity Angiogram Left    Result Date: 3/23/2023  LOCATION: [facility] DATE: 3/23/2023 PROCEDURE:  LEFT LOWER EXTREMITY DIAGNOSTIC ARTERIOGRAPHY, SELECTIVE CATHETERIZATION AND ANGIOGRAPHY OF THE LEFT SUPERFICIAL FEMORAL ARTERY, ULTRASOUND GUIDANCE FOR VASCULAR ACCESS, MODERATE SEDATION INTERVENTIONAL RADIOLOGIST: Elizabeth Sheehan MD INDICATION: 67-year-old female with nonhealing left great toe nail wound requiring debridement. Plan for left lower extremity angiography for further evaluation. INDICATION FOR DIAGNOSTIC ANGIOGRAPHY: Diagnostic angiography was required to precisely identify plaque morphology to aid in treatment and management CONSENT: The risks, benefits and alternatives of the procedure were discussed with the patient  in detail. All questions were answered. Informed consent was given to proceed with the procedure. MODERATE SEDATION: Versed 2.5 mg IV; Fentanyl 125 mcg IV. During the time out, immediately prior to the administration of medications, the patient was reassessed for adequacy to receive conscious sedation.  Under physician supervision, Versed and fentanyl were administered for moderate sedation. Pulse oximetry, heart rate and blood pressure were continuously monitored by an independent trained observer. The physician spent 45 minutes of face-to-face sedation time with the patient. CONTRAST: 60 mL Visipaque ADDITIONAL MEDICATIONS: Heparin 5000 units, protamine 40 mg FLUOROSCOPIC TIME: 15.0 minutes. RADIATION DOSE: Air Kerma: 520 mGy. COMPLICATIONS: No immediate complications. UNIVERSAL PROTOCOL: The operative site was marked and any prior imaging was reviewed. Required items including blood products, implants, devices and special equipment was made available. Patient identity was confirmed either verbally, with demographic information, hospital assigned identification or other identification markers. A timeout was performed immediately prior to the procedure. STERILE BARRIER TECHNIQUE: Maximum sterile barrier technique was used. Cutaneous antisepsis was performed at the operative site with  application of 2% chlorhexidine and large sterile drape. Prior to the procedure, the  and assistant performed hand hygiene and wore hat, mask, sterile gown, and sterile gloves during the entire procedure. PROCEDURE:  Access was achieved into the right common femoral artery utilizing real-time ultrasound guidance and a micropuncture access kit. Imaging demonstrates a patent and compressible artery. Permanent images were stored to the patient's medical record. Conversion was made for a 5 Icelandic vascular sheath, which was attached to a continuous heparinized saline infusion. A flush catheter was manipulated over a wire into the lower abdomen and oblique digital subtraction pelvic arteriography was performed. Imaging demonstrates patent bilateral common iliac, internal iliac and external carotid arteries. There is mild to moderate stenosis involving the left common iliac and proximal external iliac arteries. The catheter was repositioned over a wire into the left external iliac artery. From this location, digital subtraction left lower extremity arteriography was obtained. Imaging demonstrates patent left common femoral and profunda femoral arteries. 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 There is some reconstituted flow within the P1/P2 segments of the popliteal artery. Occluded popliteal artery below the knee joint. The tibial peroneal trunk is patent. Occluded proximal anterior tibial artery. The peroneal and posterior tibial arteries are patent to the foot. The peroneal artery supplies the dorsalis pedis artery. Over the wire exchange is made for a 4 Icelandic crossing catheter and selective catheterization of the left superficial femoral artery stump was performed. There is inability to cross the proximal vessel secondary to occlusive plaque. At this point, the procedure was considered complete. The sheath was removed and manual pressure applied until hemostasis.     IMPRESSION:  Left  lower extremity diagnostic angiogram demonstrates chronic total occlusion of the superficial femoral artery. Some reconstituted flow in the P1/P2 segment of the popliteal artery. Occluded popliteal artery at and below the knee joint. Patent tibioperoneal trunk. Two-vessel runoff to the foot via the posterior tibial and peroneal arteries. PLAN: Given extensive disease (TASC-D) in the setting of a wound and inadequate digit pressure, we will refer to Dr. Geller for surgical bypass. The patient may require stenting of the left common/external iliac arteries to optimize bypass inflow which  can be performed during surgery as needed.

## 2023-05-14 ENCOUNTER — HEALTH MAINTENANCE LETTER (OUTPATIENT)
Age: 68
End: 2023-05-14

## 2023-05-16 ENCOUNTER — ANESTHESIA EVENT (OUTPATIENT)
Dept: SURGERY | Facility: HOSPITAL | Age: 68
DRG: 254 | End: 2023-05-16
Payer: COMMERCIAL

## 2023-05-16 RX ORDER — METFORMIN HCL 500 MG
1000 TABLET, EXTENDED RELEASE 24 HR ORAL DAILY
COMMUNITY

## 2023-05-16 RX ORDER — EZETIMIBE 10 MG/1
10 TABLET ORAL DAILY
Status: ON HOLD | COMMUNITY
End: 2023-05-17

## 2023-05-17 ENCOUNTER — ANESTHESIA (OUTPATIENT)
Dept: SURGERY | Facility: HOSPITAL | Age: 68
DRG: 254 | End: 2023-05-17
Payer: COMMERCIAL

## 2023-05-17 ENCOUNTER — APPOINTMENT (OUTPATIENT)
Dept: RADIOLOGY | Facility: HOSPITAL | Age: 68
DRG: 254 | End: 2023-05-17
Attending: SURGERY
Payer: COMMERCIAL

## 2023-05-17 ENCOUNTER — HOSPITAL ENCOUNTER (INPATIENT)
Facility: HOSPITAL | Age: 68
LOS: 2 days | Discharge: HOME | DRG: 254 | End: 2023-05-19
Attending: SURGERY | Admitting: SURGERY
Payer: COMMERCIAL

## 2023-05-17 DIAGNOSIS — I70.222 CRITICAL LIMB ISCHEMIA OF LEFT LOWER EXTREMITY (H): Primary | ICD-10-CM

## 2023-05-17 LAB
ABO/RH(D): NORMAL
ANTIBODY SCREEN: NEGATIVE
BASOPHILS # BLD AUTO: 0.1 10E3/UL (ref 0–0.2)
BASOPHILS NFR BLD AUTO: 1 %
CREAT SERPL-MCNC: 0.82 MG/DL (ref 0.51–0.95)
EOSINOPHIL # BLD AUTO: 0.2 10E3/UL (ref 0–0.7)
EOSINOPHIL NFR BLD AUTO: 2 %
ERYTHROCYTE [DISTWIDTH] IN BLOOD BY AUTOMATED COUNT: 13.6 % (ref 10–15)
GFR SERPL CREATININE-BSD FRML MDRD: 79 ML/MIN/1.73M2
GLUCOSE BLDC GLUCOMTR-MCNC: 177 MG/DL (ref 70–99)
GLUCOSE BLDC GLUCOMTR-MCNC: 178 MG/DL (ref 70–99)
GLUCOSE BLDC GLUCOMTR-MCNC: 260 MG/DL (ref 70–99)
GLUCOSE BLDC GLUCOMTR-MCNC: 289 MG/DL (ref 70–99)
GLUCOSE BLDC GLUCOMTR-MCNC: 298 MG/DL (ref 70–99)
GLUCOSE BLDC GLUCOMTR-MCNC: 383 MG/DL (ref 70–99)
GLUCOSE SERPL-MCNC: 254 MG/DL (ref 70–99)
HCT VFR BLD AUTO: 40.8 % (ref 35–47)
HGB BLD-MCNC: 13.4 G/DL (ref 11.7–15.7)
IMM GRANULOCYTES # BLD: 0 10E3/UL
IMM GRANULOCYTES NFR BLD: 0 %
LYMPHOCYTES # BLD AUTO: 4 10E3/UL (ref 0.8–5.3)
LYMPHOCYTES NFR BLD AUTO: 37 %
MCH RBC QN AUTO: 29.7 PG (ref 26.5–33)
MCHC RBC AUTO-ENTMCNC: 32.8 G/DL (ref 31.5–36.5)
MCV RBC AUTO: 91 FL (ref 78–100)
MONOCYTES # BLD AUTO: 1.1 10E3/UL (ref 0–1.3)
MONOCYTES NFR BLD AUTO: 10 %
NEUTROPHILS # BLD AUTO: 5.4 10E3/UL (ref 1.6–8.3)
NEUTROPHILS NFR BLD AUTO: 50 %
NRBC # BLD AUTO: 0 10E3/UL
NRBC BLD AUTO-RTO: 0 /100
PLATELET # BLD AUTO: 305 10E3/UL (ref 150–450)
POTASSIUM SERPL-SCNC: 4 MMOL/L (ref 3.4–5.3)
RBC # BLD AUTO: 4.51 10E6/UL (ref 3.8–5.2)
SPECIMEN EXPIRATION DATE: NORMAL
WBC # BLD AUTO: 10.8 10E3/UL (ref 4–11)

## 2023-05-17 PROCEDURE — 250N000011 HC RX IP 250 OP 636: Performed by: STUDENT IN AN ORGANIZED HEALTH CARE EDUCATION/TRAINING PROGRAM

## 2023-05-17 PROCEDURE — C1887 CATHETER, GUIDING: HCPCS | Performed by: SURGERY

## 2023-05-17 PROCEDURE — 84132 ASSAY OF SERUM POTASSIUM: CPT | Performed by: SURGERY

## 2023-05-17 PROCEDURE — 258N000003 HC RX IP 258 OP 636

## 2023-05-17 PROCEDURE — 258N000003 HC RX IP 258 OP 636: Performed by: STUDENT IN AN ORGANIZED HEALTH CARE EDUCATION/TRAINING PROGRAM

## 2023-05-17 PROCEDURE — 710N000010 HC RECOVERY PHASE 1, LEVEL 2, PER MIN: Performed by: SURGERY

## 2023-05-17 PROCEDURE — 36415 COLL VENOUS BLD VENIPUNCTURE: CPT | Performed by: SURGERY

## 2023-05-17 PROCEDURE — 370N000017 HC ANESTHESIA TECHNICAL FEE, PER MIN: Performed by: SURGERY

## 2023-05-17 PROCEDURE — 04CL0ZZ EXTIRPATION OF MATTER FROM LEFT FEMORAL ARTERY, OPEN APPROACH: ICD-10-PCS | Performed by: SURGERY

## 2023-05-17 PROCEDURE — C1874 STENT, COATED/COV W/DEL SYS: HCPCS | Performed by: SURGERY

## 2023-05-17 PROCEDURE — 04HD3DZ INSERTION OF INTRALUMINAL DEVICE INTO LEFT COMMON ILIAC ARTERY, PERCUTANEOUS APPROACH: ICD-10-PCS | Performed by: SURGERY

## 2023-05-17 PROCEDURE — 250N000009 HC RX 250

## 2023-05-17 PROCEDURE — 250N000009 HC RX 250: Performed by: NURSE ANESTHETIST, CERTIFIED REGISTERED

## 2023-05-17 PROCEDURE — 360N000084 HC SURGERY LEVEL 4 W/ FLUORO, PER MIN: Performed by: SURGERY

## 2023-05-17 PROCEDURE — 999N000180 XR SURGERY CARM FLUORO LESS THAN 5 MIN

## 2023-05-17 PROCEDURE — 250N000011 HC RX IP 250 OP 636: Performed by: ANESTHESIOLOGY

## 2023-05-17 PROCEDURE — 99222 1ST HOSP IP/OBS MODERATE 55: CPT | Mod: AI | Performed by: INTERNAL MEDICINE

## 2023-05-17 PROCEDURE — B4101ZZ FLUOROSCOPY OF ABDOMINAL AORTA USING LOW OSMOLAR CONTRAST: ICD-10-PCS | Performed by: SURGERY

## 2023-05-17 PROCEDURE — 37221 PR REVASC ILIAC ART, ANGIO/STENT, INIT VESSEL: CPT | Mod: LT | Performed by: SURGERY

## 2023-05-17 PROCEDURE — 255N000002 HC RX 255 OP 636: Performed by: SURGERY

## 2023-05-17 PROCEDURE — 999N000141 HC STATISTIC PRE-PROCEDURE NURSING ASSESSMENT: Performed by: SURGERY

## 2023-05-17 PROCEDURE — C1760 CLOSURE DEV, VASC: HCPCS | Performed by: SURGERY

## 2023-05-17 PROCEDURE — 120N000001 HC R&B MED SURG/OB

## 2023-05-17 PROCEDURE — 250N000025 HC SEVOFLURANE, PER MIN: Performed by: SURGERY

## 2023-05-17 PROCEDURE — 88304 TISSUE EXAM BY PATHOLOGIST: CPT | Mod: TC | Performed by: SURGERY

## 2023-05-17 PROCEDURE — 82947 ASSAY GLUCOSE BLOOD QUANT: CPT | Performed by: SURGERY

## 2023-05-17 PROCEDURE — 86850 RBC ANTIBODY SCREEN: CPT | Performed by: SURGERY

## 2023-05-17 PROCEDURE — 35372 RECHANNELING OF ARTERY: CPT | Mod: LT | Performed by: SURGERY

## 2023-05-17 PROCEDURE — 88304 TISSUE EXAM BY PATHOLOGIST: CPT | Mod: 26 | Performed by: PATHOLOGY

## 2023-05-17 PROCEDURE — 250N000011 HC RX IP 250 OP 636: Performed by: SURGERY

## 2023-05-17 PROCEDURE — 04UL0KZ SUPPLEMENT LEFT FEMORAL ARTERY WITH NONAUTOLOGOUS TISSUE SUBSTITUTE, OPEN APPROACH: ICD-10-PCS | Performed by: SURGERY

## 2023-05-17 PROCEDURE — 82565 ASSAY OF CREATININE: CPT | Performed by: SURGERY

## 2023-05-17 PROCEDURE — 258N000003 HC RX IP 258 OP 636: Performed by: ANESTHESIOLOGY

## 2023-05-17 PROCEDURE — 88311 DECALCIFY TISSUE: CPT | Mod: 26 | Performed by: PATHOLOGY

## 2023-05-17 PROCEDURE — 250N000012 HC RX MED GY IP 250 OP 636 PS 637: Performed by: INTERNAL MEDICINE

## 2023-05-17 PROCEDURE — 250N000011 HC RX IP 250 OP 636

## 2023-05-17 PROCEDURE — B41G1ZZ FLUOROSCOPY OF LEFT LOWER EXTREMITY ARTERIES USING LOW OSMOLAR CONTRAST: ICD-10-PCS | Performed by: SURGERY

## 2023-05-17 PROCEDURE — 272N000004 HC RX 272: Performed by: SURGERY

## 2023-05-17 PROCEDURE — C1763 CONN TISS, NON-HUMAN: HCPCS | Performed by: SURGERY

## 2023-05-17 PROCEDURE — 250N000013 HC RX MED GY IP 250 OP 250 PS 637: Performed by: STUDENT IN AN ORGANIZED HEALTH CARE EDUCATION/TRAINING PROGRAM

## 2023-05-17 PROCEDURE — 250N000009 HC RX 250: Performed by: SURGERY

## 2023-05-17 PROCEDURE — 272N000001 HC OR GENERAL SUPPLY STERILE: Performed by: SURGERY

## 2023-05-17 PROCEDURE — 250N000013 HC RX MED GY IP 250 OP 250 PS 637: Performed by: ANESTHESIOLOGY

## 2023-05-17 PROCEDURE — 86901 BLOOD TYPING SEROLOGIC RH(D): CPT | Performed by: SURGERY

## 2023-05-17 PROCEDURE — 04WY3DZ REVISION OF INTRALUMINAL DEVICE IN LOWER ARTERY, PERCUTANEOUS APPROACH: ICD-10-PCS | Performed by: SURGERY

## 2023-05-17 PROCEDURE — 250N000013 HC RX MED GY IP 250 OP 250 PS 637: Performed by: INTERNAL MEDICINE

## 2023-05-17 PROCEDURE — 85025 COMPLETE CBC W/AUTO DIFF WBC: CPT | Performed by: SURGERY

## 2023-05-17 PROCEDURE — 250N000011 HC RX IP 250 OP 636: Performed by: NURSE ANESTHETIST, CERTIFIED REGISTERED

## 2023-05-17 DEVICE — VIABAHN BX BALLOON EXP ENDO 7MMX19MM 7FR 135CMCATH HEPARIN
Type: IMPLANTABLE DEVICE | Site: GROIN | Status: FUNCTIONAL
Brand: GORE VIABAHN VBX BALLOON EXPANDABLE ENDO

## 2023-05-17 DEVICE — IMPLANTABLE DEVICE: Type: IMPLANTABLE DEVICE | Site: GROIN | Status: FUNCTIONAL

## 2023-05-17 DEVICE — IMP PATCH PERICARDIUM PHOTOFIX BOVINE 0.8X8CM PFP0.8X8: Type: IMPLANTABLE DEVICE | Site: GROIN | Status: FUNCTIONAL

## 2023-05-17 RX ORDER — MEPERIDINE HYDROCHLORIDE 25 MG/ML
12.5 INJECTION INTRAMUSCULAR; INTRAVENOUS; SUBCUTANEOUS EVERY 5 MIN PRN
Status: DISCONTINUED | OUTPATIENT
Start: 2023-05-17 | End: 2023-05-17 | Stop reason: HOSPADM

## 2023-05-17 RX ORDER — OXYCODONE HYDROCHLORIDE 5 MG/1
5 TABLET ORAL EVERY 4 HOURS PRN
Status: DISCONTINUED | OUTPATIENT
Start: 2023-05-17 | End: 2023-05-19 | Stop reason: HOSPADM

## 2023-05-17 RX ORDER — CLOPIDOGREL BISULFATE 75 MG/1
75 TABLET ORAL DAILY
Status: DISCONTINUED | OUTPATIENT
Start: 2023-05-18 | End: 2023-05-19 | Stop reason: HOSPADM

## 2023-05-17 RX ORDER — PROPOFOL 10 MG/ML
INJECTION, EMULSION INTRAVENOUS CONTINUOUS PRN
Status: DISCONTINUED | OUTPATIENT
Start: 2023-05-17 | End: 2023-05-17

## 2023-05-17 RX ORDER — ONDANSETRON 4 MG/1
4 TABLET, ORALLY DISINTEGRATING ORAL EVERY 30 MIN PRN
Status: DISCONTINUED | OUTPATIENT
Start: 2023-05-17 | End: 2023-05-17 | Stop reason: HOSPADM

## 2023-05-17 RX ORDER — ONDANSETRON 2 MG/ML
INJECTION INTRAMUSCULAR; INTRAVENOUS PRN
Status: DISCONTINUED | OUTPATIENT
Start: 2023-05-17 | End: 2023-05-17

## 2023-05-17 RX ORDER — DEXTROSE MONOHYDRATE 25 G/50ML
25-50 INJECTION, SOLUTION INTRAVENOUS
Status: DISCONTINUED | OUTPATIENT
Start: 2023-05-17 | End: 2023-05-19 | Stop reason: HOSPADM

## 2023-05-17 RX ORDER — ACETAMINOPHEN 325 MG/1
975 TABLET ORAL EVERY 8 HOURS
Status: DISCONTINUED | OUTPATIENT
Start: 2023-05-17 | End: 2023-05-19 | Stop reason: HOSPADM

## 2023-05-17 RX ORDER — PANTOPRAZOLE SODIUM 40 MG/1
40 TABLET, DELAYED RELEASE ORAL DAILY PRN
Status: DISCONTINUED | OUTPATIENT
Start: 2023-05-17 | End: 2023-05-19 | Stop reason: HOSPADM

## 2023-05-17 RX ORDER — NICOTINE POLACRILEX 4 MG
15-30 LOZENGE BUCCAL
Status: DISCONTINUED | OUTPATIENT
Start: 2023-05-17 | End: 2023-05-19 | Stop reason: HOSPADM

## 2023-05-17 RX ORDER — PROTAMINE SULFATE 10 MG/ML
INJECTION, SOLUTION INTRAVENOUS PRN
Status: DISCONTINUED | OUTPATIENT
Start: 2023-05-17 | End: 2023-05-17

## 2023-05-17 RX ORDER — ASPIRIN 81 MG/1
81 TABLET ORAL DAILY
Status: DISCONTINUED | OUTPATIENT
Start: 2023-05-17 | End: 2023-05-19 | Stop reason: HOSPADM

## 2023-05-17 RX ORDER — ACETAMINOPHEN 325 MG/1
650 TABLET ORAL EVERY 4 HOURS PRN
Status: DISCONTINUED | OUTPATIENT
Start: 2023-05-20 | End: 2023-05-19 | Stop reason: HOSPADM

## 2023-05-17 RX ORDER — HYDROMORPHONE HCL IN WATER/PF 6 MG/30 ML
0.4 PATIENT CONTROLLED ANALGESIA SYRINGE INTRAVENOUS
Status: DISCONTINUED | OUTPATIENT
Start: 2023-05-17 | End: 2023-05-19 | Stop reason: HOSPADM

## 2023-05-17 RX ORDER — MAGNESIUM HYDROXIDE 1200 MG/15ML
LIQUID ORAL PRN
Status: DISCONTINUED | OUTPATIENT
Start: 2023-05-17 | End: 2023-05-17 | Stop reason: HOSPADM

## 2023-05-17 RX ORDER — AMOXICILLIN 250 MG
1 CAPSULE ORAL 2 TIMES DAILY
Status: DISCONTINUED | OUTPATIENT
Start: 2023-05-17 | End: 2023-05-19 | Stop reason: HOSPADM

## 2023-05-17 RX ORDER — ACETAMINOPHEN 325 MG/1
975 TABLET ORAL ONCE
Status: COMPLETED | OUTPATIENT
Start: 2023-05-17 | End: 2023-05-17

## 2023-05-17 RX ORDER — AMLODIPINE BESYLATE 5 MG/1
10 TABLET ORAL DAILY
Status: DISCONTINUED | OUTPATIENT
Start: 2023-05-17 | End: 2023-05-19 | Stop reason: HOSPADM

## 2023-05-17 RX ORDER — HYDROMORPHONE HCL IN WATER/PF 6 MG/30 ML
0.2 PATIENT CONTROLLED ANALGESIA SYRINGE INTRAVENOUS
Status: DISCONTINUED | OUTPATIENT
Start: 2023-05-17 | End: 2023-05-19 | Stop reason: HOSPADM

## 2023-05-17 RX ORDER — FENTANYL CITRATE 50 UG/ML
INJECTION, SOLUTION INTRAMUSCULAR; INTRAVENOUS PRN
Status: DISCONTINUED | OUTPATIENT
Start: 2023-05-17 | End: 2023-05-17

## 2023-05-17 RX ORDER — LABETALOL HYDROCHLORIDE 5 MG/ML
INJECTION, SOLUTION INTRAVENOUS PRN
Status: DISCONTINUED | OUTPATIENT
Start: 2023-05-17 | End: 2023-05-17

## 2023-05-17 RX ORDER — FENTANYL CITRATE 50 UG/ML
25 INJECTION, SOLUTION INTRAMUSCULAR; INTRAVENOUS EVERY 5 MIN PRN
Status: DISCONTINUED | OUTPATIENT
Start: 2023-05-17 | End: 2023-05-17 | Stop reason: HOSPADM

## 2023-05-17 RX ORDER — LISINOPRIL 20 MG/1
40 TABLET ORAL DAILY
Status: DISCONTINUED | OUTPATIENT
Start: 2023-05-18 | End: 2023-05-19 | Stop reason: HOSPADM

## 2023-05-17 RX ORDER — KETAMINE HYDROCHLORIDE 10 MG/ML
INJECTION INTRAMUSCULAR; INTRAVENOUS PRN
Status: DISCONTINUED | OUTPATIENT
Start: 2023-05-17 | End: 2023-05-17

## 2023-05-17 RX ORDER — LIDOCAINE HYDROCHLORIDE 10 MG/ML
INJECTION, SOLUTION INFILTRATION; PERINEURAL PRN
Status: DISCONTINUED | OUTPATIENT
Start: 2023-05-17 | End: 2023-05-17

## 2023-05-17 RX ORDER — EZETIMIBE 10 MG/1
10 TABLET ORAL DAILY
COMMUNITY

## 2023-05-17 RX ORDER — SODIUM CHLORIDE, SODIUM LACTATE, POTASSIUM CHLORIDE, CALCIUM CHLORIDE 600; 310; 30; 20 MG/100ML; MG/100ML; MG/100ML; MG/100ML
INJECTION, SOLUTION INTRAVENOUS CONTINUOUS
Status: DISCONTINUED | OUTPATIENT
Start: 2023-05-17 | End: 2023-05-17 | Stop reason: HOSPADM

## 2023-05-17 RX ORDER — NALOXONE HYDROCHLORIDE 0.4 MG/ML
0.2 INJECTION, SOLUTION INTRAMUSCULAR; INTRAVENOUS; SUBCUTANEOUS
Status: DISCONTINUED | OUTPATIENT
Start: 2023-05-17 | End: 2023-05-19 | Stop reason: HOSPADM

## 2023-05-17 RX ORDER — SODIUM CHLORIDE, SODIUM LACTATE, POTASSIUM CHLORIDE, CALCIUM CHLORIDE 600; 310; 30; 20 MG/100ML; MG/100ML; MG/100ML; MG/100ML
INJECTION, SOLUTION INTRAVENOUS CONTINUOUS
Status: DISCONTINUED | OUTPATIENT
Start: 2023-05-17 | End: 2023-05-18

## 2023-05-17 RX ORDER — CLINDAMYCIN PHOSPHATE 900 MG/50ML
900 INJECTION, SOLUTION INTRAVENOUS SEE ADMIN INSTRUCTIONS
Status: DISCONTINUED | OUTPATIENT
Start: 2023-05-17 | End: 2023-05-19 | Stop reason: HOSPADM

## 2023-05-17 RX ORDER — BISACODYL 10 MG
10 SUPPOSITORY, RECTAL RECTAL DAILY PRN
Status: DISCONTINUED | OUTPATIENT
Start: 2023-05-17 | End: 2023-05-19 | Stop reason: HOSPADM

## 2023-05-17 RX ORDER — ONDANSETRON 2 MG/ML
4 INJECTION INTRAMUSCULAR; INTRAVENOUS EVERY 6 HOURS PRN
Status: DISCONTINUED | OUTPATIENT
Start: 2023-05-17 | End: 2023-05-19 | Stop reason: HOSPADM

## 2023-05-17 RX ORDER — OXYCODONE HYDROCHLORIDE 5 MG/1
10 TABLET ORAL EVERY 4 HOURS PRN
Status: DISCONTINUED | OUTPATIENT
Start: 2023-05-17 | End: 2023-05-19 | Stop reason: HOSPADM

## 2023-05-17 RX ORDER — HYDROCHLOROTHIAZIDE 25 MG/1
25 TABLET ORAL DAILY
Status: DISCONTINUED | OUTPATIENT
Start: 2023-05-18 | End: 2023-05-19 | Stop reason: HOSPADM

## 2023-05-17 RX ORDER — CLINDAMYCIN PHOSPHATE 900 MG/50ML
900 INJECTION, SOLUTION INTRAVENOUS
Status: COMPLETED | OUTPATIENT
Start: 2023-05-17 | End: 2023-05-17

## 2023-05-17 RX ORDER — NALOXONE HYDROCHLORIDE 0.4 MG/ML
0.4 INJECTION, SOLUTION INTRAMUSCULAR; INTRAVENOUS; SUBCUTANEOUS
Status: DISCONTINUED | OUTPATIENT
Start: 2023-05-17 | End: 2023-05-19 | Stop reason: HOSPADM

## 2023-05-17 RX ORDER — HEPARIN SODIUM 1000 [USP'U]/ML
INJECTION, SOLUTION INTRAVENOUS; SUBCUTANEOUS PRN
Status: DISCONTINUED | OUTPATIENT
Start: 2023-05-17 | End: 2023-05-17

## 2023-05-17 RX ORDER — PROPOFOL 10 MG/ML
INJECTION, EMULSION INTRAVENOUS PRN
Status: DISCONTINUED | OUTPATIENT
Start: 2023-05-17 | End: 2023-05-17

## 2023-05-17 RX ORDER — HYDROMORPHONE HYDROCHLORIDE 1 MG/ML
0.2 INJECTION, SOLUTION INTRAMUSCULAR; INTRAVENOUS; SUBCUTANEOUS EVERY 5 MIN PRN
Status: DISCONTINUED | OUTPATIENT
Start: 2023-05-17 | End: 2023-05-17 | Stop reason: HOSPADM

## 2023-05-17 RX ORDER — ONDANSETRON 2 MG/ML
4 INJECTION INTRAMUSCULAR; INTRAVENOUS EVERY 30 MIN PRN
Status: DISCONTINUED | OUTPATIENT
Start: 2023-05-17 | End: 2023-05-17 | Stop reason: HOSPADM

## 2023-05-17 RX ORDER — DEXAMETHASONE SODIUM PHOSPHATE 10 MG/ML
INJECTION, SOLUTION INTRAMUSCULAR; INTRAVENOUS PRN
Status: DISCONTINUED | OUTPATIENT
Start: 2023-05-17 | End: 2023-05-17

## 2023-05-17 RX ORDER — FENTANYL CITRATE 50 UG/ML
50 INJECTION, SOLUTION INTRAMUSCULAR; INTRAVENOUS EVERY 5 MIN PRN
Status: DISCONTINUED | OUTPATIENT
Start: 2023-05-17 | End: 2023-05-17 | Stop reason: HOSPADM

## 2023-05-17 RX ORDER — POLYETHYLENE GLYCOL 3350 17 G/17G
17 POWDER, FOR SOLUTION ORAL DAILY
Status: DISCONTINUED | OUTPATIENT
Start: 2023-05-18 | End: 2023-05-19 | Stop reason: HOSPADM

## 2023-05-17 RX ORDER — ENOXAPARIN SODIUM 100 MG/ML
40 INJECTION SUBCUTANEOUS EVERY 24 HOURS
Status: DISCONTINUED | OUTPATIENT
Start: 2023-05-18 | End: 2023-05-19 | Stop reason: HOSPADM

## 2023-05-17 RX ORDER — SODIUM CHLORIDE 9 MG/ML
INJECTION, SOLUTION INTRAVENOUS CONTINUOUS PRN
Status: DISCONTINUED | OUTPATIENT
Start: 2023-05-17 | End: 2023-05-17

## 2023-05-17 RX ORDER — LIDOCAINE 40 MG/G
CREAM TOPICAL
Status: DISCONTINUED | OUTPATIENT
Start: 2023-05-17 | End: 2023-05-18

## 2023-05-17 RX ORDER — SODIUM CHLORIDE 9 MG/ML
INJECTION, SOLUTION INTRAVENOUS CONTINUOUS
Status: DISCONTINUED | OUTPATIENT
Start: 2023-05-17 | End: 2023-05-18

## 2023-05-17 RX ORDER — LIDOCAINE 40 MG/G
CREAM TOPICAL
Status: DISCONTINUED | OUTPATIENT
Start: 2023-05-17 | End: 2023-05-19 | Stop reason: HOSPADM

## 2023-05-17 RX ORDER — ONDANSETRON 4 MG/1
4 TABLET, ORALLY DISINTEGRATING ORAL EVERY 6 HOURS PRN
Status: DISCONTINUED | OUTPATIENT
Start: 2023-05-17 | End: 2023-05-19 | Stop reason: HOSPADM

## 2023-05-17 RX ORDER — HYDROMORPHONE HYDROCHLORIDE 1 MG/ML
0.4 INJECTION, SOLUTION INTRAMUSCULAR; INTRAVENOUS; SUBCUTANEOUS EVERY 5 MIN PRN
Status: DISCONTINUED | OUTPATIENT
Start: 2023-05-17 | End: 2023-05-17 | Stop reason: HOSPADM

## 2023-05-17 RX ORDER — EZETIMIBE 10 MG/1
10 TABLET ORAL DAILY
Status: DISCONTINUED | OUTPATIENT
Start: 2023-05-17 | End: 2023-05-19 | Stop reason: HOSPADM

## 2023-05-17 RX ADMIN — MIDAZOLAM 1 MG: 1 INJECTION INTRAMUSCULAR; INTRAVENOUS at 07:44

## 2023-05-17 RX ADMIN — ACETAMINOPHEN 975 MG: 325 TABLET ORAL at 06:42

## 2023-05-17 RX ADMIN — ACETAMINOPHEN 975 MG: 325 TABLET ORAL at 13:27

## 2023-05-17 RX ADMIN — MIDAZOLAM 1 MG: 1 INJECTION INTRAMUSCULAR; INTRAVENOUS at 07:46

## 2023-05-17 RX ADMIN — DEXAMETHASONE SODIUM PHOSPHATE 4 MG: 10 INJECTION, SOLUTION INTRAMUSCULAR; INTRAVENOUS at 07:49

## 2023-05-17 RX ADMIN — AMLODIPINE BESYLATE 10 MG: 5 TABLET ORAL at 16:43

## 2023-05-17 RX ADMIN — LIDOCAINE HYDROCHLORIDE 5 ML: 10 INJECTION, SOLUTION INFILTRATION; PERINEURAL at 07:48

## 2023-05-17 RX ADMIN — LABETALOL HYDROCHLORIDE 10 MG: 5 INJECTION, SOLUTION INTRAVENOUS at 11:28

## 2023-05-17 RX ADMIN — PROPOFOL 30 MCG/KG/MIN: 10 INJECTION, EMULSION INTRAVENOUS at 08:14

## 2023-05-17 RX ADMIN — FENTANYL CITRATE 50 MCG: 50 INJECTION, SOLUTION INTRAMUSCULAR; INTRAVENOUS at 12:16

## 2023-05-17 RX ADMIN — SENNOSIDES AND DOCUSATE SODIUM 1 TABLET: 50; 8.6 TABLET ORAL at 21:37

## 2023-05-17 RX ADMIN — PHENYLEPHRINE HYDROCHLORIDE 0.3 MCG/KG/MIN: 10 INJECTION INTRAVENOUS at 07:57

## 2023-05-17 RX ADMIN — PHENYLEPHRINE HYDROCHLORIDE 100 MCG: 10 INJECTION INTRAVENOUS at 08:06

## 2023-05-17 RX ADMIN — OXYCODONE HYDROCHLORIDE 5 MG: 5 TABLET ORAL at 21:37

## 2023-05-17 RX ADMIN — HYDROMORPHONE HYDROCHLORIDE 0.5 MG: 1 INJECTION, SOLUTION INTRAMUSCULAR; INTRAVENOUS; SUBCUTANEOUS at 09:22

## 2023-05-17 RX ADMIN — EZETIMIBE 10 MG: 10 TABLET ORAL at 16:43

## 2023-05-17 RX ADMIN — ROCURONIUM BROMIDE 50 MG: 50 INJECTION, SOLUTION INTRAVENOUS at 07:50

## 2023-05-17 RX ADMIN — HEPARIN SODIUM 2000 UNITS: 1000 INJECTION INTRAVENOUS; SUBCUTANEOUS at 09:54

## 2023-05-17 RX ADMIN — KETAMINE HYDROCHLORIDE 50 MG: 10 INJECTION, SOLUTION INTRAMUSCULAR; INTRAVENOUS at 07:48

## 2023-05-17 RX ADMIN — OXYCODONE HYDROCHLORIDE 5 MG: 5 TABLET ORAL at 13:28

## 2023-05-17 RX ADMIN — HYDROMORPHONE HYDROCHLORIDE 0.5 MG: 1 INJECTION, SOLUTION INTRAMUSCULAR; INTRAVENOUS; SUBCUTANEOUS at 08:40

## 2023-05-17 RX ADMIN — PHENYLEPHRINE HYDROCHLORIDE 100 MCG: 10 INJECTION INTRAVENOUS at 10:53

## 2023-05-17 RX ADMIN — HEPARIN SODIUM 7000 UNITS: 1000 INJECTION INTRAVENOUS; SUBCUTANEOUS at 09:08

## 2023-05-17 RX ADMIN — ACETAMINOPHEN 975 MG: 325 TABLET ORAL at 21:37

## 2023-05-17 RX ADMIN — PHENYLEPHRINE HYDROCHLORIDE 100 MCG: 10 INJECTION INTRAVENOUS at 08:45

## 2023-05-17 RX ADMIN — PROPOFOL 120 MG: 10 INJECTION, EMULSION INTRAVENOUS at 07:49

## 2023-05-17 RX ADMIN — CLINDAMYCIN PHOSPHATE 900 MG: 900 INJECTION, SOLUTION INTRAVENOUS at 07:47

## 2023-05-17 RX ADMIN — FENTANYL CITRATE 100 MCG: 50 INJECTION, SOLUTION INTRAMUSCULAR; INTRAVENOUS at 07:48

## 2023-05-17 RX ADMIN — INSULIN ASPART 4 UNITS: 100 INJECTION, SOLUTION INTRAVENOUS; SUBCUTANEOUS at 17:30

## 2023-05-17 RX ADMIN — SODIUM CHLORIDE, POTASSIUM CHLORIDE, SODIUM LACTATE AND CALCIUM CHLORIDE: 600; 310; 30; 20 INJECTION, SOLUTION INTRAVENOUS at 06:57

## 2023-05-17 RX ADMIN — FENTANYL CITRATE 50 MCG: 50 INJECTION, SOLUTION INTRAMUSCULAR; INTRAVENOUS at 11:59

## 2023-05-17 RX ADMIN — SUGAMMADEX 200 MG: 100 INJECTION, SOLUTION INTRAVENOUS at 11:22

## 2023-05-17 RX ADMIN — Medication 81 MG: at 13:28

## 2023-05-17 RX ADMIN — SODIUM CHLORIDE: 0.9 INJECTION, SOLUTION INTRAVENOUS at 08:05

## 2023-05-17 RX ADMIN — FENTANYL CITRATE 50 MCG: 50 INJECTION, SOLUTION INTRAMUSCULAR; INTRAVENOUS at 11:16

## 2023-05-17 RX ADMIN — PHENYLEPHRINE HYDROCHLORIDE 100 MCG: 10 INJECTION INTRAVENOUS at 10:55

## 2023-05-17 RX ADMIN — SODIUM CHLORIDE, POTASSIUM CHLORIDE, SODIUM LACTATE AND CALCIUM CHLORIDE: 600; 310; 30; 20 INJECTION, SOLUTION INTRAVENOUS at 10:11

## 2023-05-17 RX ADMIN — ONDANSETRON 4 MG: 2 INJECTION INTRAMUSCULAR; INTRAVENOUS at 10:51

## 2023-05-17 RX ADMIN — PROTAMINE SULFATE 30 MG: 10 INJECTION, SOLUTION INTRAVENOUS at 10:50

## 2023-05-17 RX ADMIN — SODIUM CHLORIDE: 9 INJECTION, SOLUTION INTRAVENOUS at 13:33

## 2023-05-17 ASSESSMENT — ACTIVITIES OF DAILY LIVING (ADL)
ADLS_ACUITY_SCORE: 20

## 2023-05-17 ASSESSMENT — LIFESTYLE VARIABLES: TOBACCO_USE: 1

## 2023-05-17 NOTE — ANESTHESIA PROCEDURE NOTES
Airway       Patient location during procedure: OR       Procedure Start/Stop Times: 5/17/2023 7:55 AM  Staff -        Anesthesiologist:  Matilde Wyman MD       CRNA: Jenna Vargas APRN CRNA       Other Anesthesia Staff: Tammy Berrios       Performed By: CRNA  Consent for Airway        Urgency: elective  Indications and Patient Condition       Indications for airway management: kathi-procedural       Induction type:intravenous       Mask difficulty assessment: 1 - vent by mask    Final Airway Details       Final airway type: endotracheal airway       Successful airway: ETT - single  Endotracheal Airway Details        ETT size (mm): 7.0       Cuffed: yes       Cuff volume (mL): 8       Successful intubation technique: direct laryngoscopy       DL Blade Type: Luevano 2       Grade View of Cords: 1       Adjucts: stylet       Position: Right       Measured from: gums/teeth       Secured at (cm): 21       Bite block used: None    Post intubation assessment        Placement verified by: capnometry, equal breath sounds and chest rise        Number of attempts at approach: 1       Secured with: silk tape       Ease of procedure: easy       Dentition: Intact and Unchanged       Dental guard used and removed. Dental Guard Type: Proguard Red.    Medication(s) Administered   Medication Administration Time: 5/17/2023 7:55 AM

## 2023-05-17 NOTE — PHARMACY-ADMISSION MEDICATION HISTORY
Pharmacist Admission Medication History    Admission medication history is complete. The information provided in this note is only as accurate as the sources available at the time of the update.    Medication reconciliation/reorder completed by provider prior to medication history? No    Information Source(s): Patient, Clinic records and CareEverywhere/SureScripts via in-person    Pertinent Information: none    Changes made to PTA medication list:    Added: None    Deleted: gabapentin, trazodone    Changed: None    Medication Affordability:  Not including over the counter (OTC) medications, was there a time in the past 3 months when you did not take your medications as prescribed because of cost?: No    Allergies reviewed with patient and updates made in EHR: yes    Medication History Completed By: Noble Zepeda [facility] 5/17/2023 6:39 AM    Prior to Admission medications    Medication Sig Last Dose Taking? Auth Provider Long Term End Date   amLODIPine (NORVASC) 10 MG tablet [AMLODIPINE (NORVASC) 10 MG TABLET] Take 10 mg by mouth daily. 5/15/2023 Yes Provider, Historical Yes    clopidogrel (PLAVIX) 75 MG tablet Take 75 mg by mouth daily 5/17/2023 Yes Reported, Patient No    empagliflozin (JARDIANCE) 25 MG TABS tablet Take 1 tablet (25 mg) by mouth daily 5/15/2023 Yes Neha Hicks NP No    ezetimibe (ZETIA) 10 MG tablet Take 10 mg by mouth daily Past Week Yes Unknown, Entered By History No    hydroCHLOROthiazide (HYDRODIURIL) 25 MG tablet [HYDROCHLOROTHIAZIDE (HYDRODIURIL) 25 MG TABLET] Take 25 mg by mouth daily. Past Week Yes Provider, Historical Yes    Insulin Degludec (TRESIBA) 100 UNIT/ML SOLN Inject 30 Units Subcutaneous daily 5/17/2023 25 units Yes Reported, Patient     linagliptin (TRADJENTA) 5 MG TABS tablet Take 5 mg by mouth daily Past Week Yes Reported, Patient Yes    lisinopril (PRINIVIL,ZESTRIL) 40 MG tablet [LISINOPRIL (PRINIVIL,ZESTRIL) 40 MG TABLET] Take 40 mg by mouth daily. 5/15/2023 Yes  Provider, Historical Yes    metFORMIN (GLUCOPHAGE XR) 500 MG 24 hr tablet Take 1,000 mg by mouth daily Past Week Yes Unknown, Entered By History No    omeprazole (PRILOSEC) 20 MG DR capsule Take 20 mg by mouth daily as needed 5/17/2023 Yes Unknown, Entered By History

## 2023-05-17 NOTE — ANESTHESIA PREPROCEDURE EVALUATION
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]   Procedure : Procedure(s):  EXAM UNDER ANESTHESIA WITH HIGH RESOLUTION ANOSCOPY     Past Medical History:   Diagnosis Date     Diabetes mellitus (H)      Gastroesophageal reflux disease      HLD (hyperlipidemia)      Hypertension      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/29/2013     PVD (peripheral vascular disease) (H)       Past Surgical History:   Procedure Laterality Date     AMPUTATE TOE(S) Right 2023    Procedure: Amputation fourth toe right foot;  Surgeon: Benjamin Diez DPM;  Location: [facility] [place]     ANUS SURGERY       BIOPSY CERVICAL, LOCAL EXCISION, SINGLE/MULTIPLE       COLONOSCOPY N/A 2020    Procedure: EXAM UNDER ANESTHESIA, HIGH RESOLUTION ANOSCOPY INTRA OP;  Surgeon: Preeti Sehehan MD;  Location: [facility];  Service: Gastroenterology     COLONOSCOPY N/A 2020    Procedure: EXAM UNDER ANESTHESIA WITH HIGH RESOLUTION ANOSCOPY;  Surgeon: Preeti Sheehan MD;  Location: [facility];  Service: General     COLONOSCOPY N/A 6/15/2021    Procedure: EXAM UNDER ANESTHESIA WITH HIGH RESOLUTION ANOSCOPY, BIOPSY, FULGURATION;  Surgeon: Preeti Sheehan MD;  Location: [facility];  Service: Gastroenterology     EXAM UNDER ANESTHESIA ANUS N/A 2021    Procedure: EXAM UNDER ANESTHESIA WITH HIGH RESOLUTION ANOSCOPY;  Surgeon: Preeti Sheehan MD;  Location: [facility]     IR LOWER EXTREMITY ANGIOGRAM LEFT  3/23/2023     IR LOWER EXTREMITY ANGIOGRAM RIGHT  2023      Allergies   Allergen Reactions     Ezetimibe Unknown     Generalized body aches.   Tolerating now (restarted 2023)     Oxycodone Nausea and Vomiting     Penicillins Unknown     Childhood rxn     Simvastatin Unknown     Muscle pain     Victoza [Liraglutide] Other (See Comments)     Binds bowels      Social History      Tobacco Use     Smoking status: Former     Packs/day: 0.25     Types: Cigarettes     Quit date: 2023     Years since quittin.2     Smokeless tobacco: Never   Vaping Use     Vaping status: Not on file   Substance Use Topics     Alcohol use: Not Currently     Comment: Alcoholic Drinks/day: 2x      Wt Readings from Last 1 Encounters:   23 67.1 kg (148 lb)        Anesthesia Evaluation   Pt has had prior anesthetic. Type: MAC.    No history of anesthetic complications       ROS/MED HX  ENT/Pulmonary:     (+) tobacco use,     Neurologic:       Cardiovascular:     (+) Dyslipidemia hypertension-----    METS/Exercise Tolerance:     Hematologic:  - neg hematologic  ROS     Musculoskeletal:  - neg musculoskeletal ROS     GI/Hepatic: Comment: Anal condyloma    (+) GERD,     Renal/Genitourinary:     (+) renal disease,     Endo:     (+) type II DM,     Psychiatric/Substance Use:  - neg psychiatric ROS     Infectious Disease: Comment: Anal condyloma - neg infectious disease ROS     Malignancy: Comment: Andrew grade anal dysplasia - neg malignancy ROS     Other:  - neg other ROS          Physical Exam    Airway        Mallampati: II   TM distance: > 3 FB   Neck ROM: full     Respiratory Devices and Support         Dental  no notable dental history     (+) Minor Abnormalities - some fillings, tiny chips      Cardiovascular   cardiovascular exam normal          Pulmonary   pulmonary exam normal                OUTSIDE LABS:  CBC:   Lab Results   Component Value Date    WBC 10.2 2023    WBC 9.7 2023    HGB 13.9 2023    HGB 13.1 2023    HCT 39.9 2023    HCT 38.0 2023     2023     2023     BMP:   Lab Results   Component Value Date     2023     2023    POTASSIUM 4.0 2023    POTASSIUM 3.6 2023    CHLORIDE 104 2023    CHLORIDE 106 2023    CO2 25 2023    CO2 23 2023    BUN 10.5 2023    BUN 8.0  02/17/2023    CR 0.88 03/23/2023    CR 0.68 02/18/2023     (H) 05/17/2023     (H) 02/19/2023     COAGS:   Lab Results   Component Value Date    INR 0.83 (L) 03/23/2023     POC: No results found for: BGM, HCG, HCGS  HEPATIC:   Lab Results   Component Value Date    ALBUMIN 3.7 02/15/2023    PROTTOTAL 6.5 02/15/2023    ALT 25 02/15/2023    AST 20 02/15/2023    ALKPHOS 75 02/15/2023    BILITOTAL <0.2 02/15/2023     OTHER:   Lab Results   Component Value Date    A1C 8.5 (H) 02/14/2023    SANTINO 9.2 02/18/2023       Anesthesia Plan    ASA Status:  3   NPO Status:  NPO Appropriate    Anesthesia Type: General.     - Airway: ETT   Induction: Intravenous.   Maintenance: TIVA.   Techniques and Equipment:     - Lines/Monitors: Arterial Line, 2nd IV     Consents    Anesthesia Plan(s) and associated risks, benefits, and realistic alternatives discussed. Questions answered and patient/representative(s) expressed understanding.     - Discussed: Risks, Benefits and Alternatives for BOTH SEDATION and the PROCEDURE were discussed     - Discussed with:  Patient         Postoperative Care    Pain management: Multi-modal analgesia, IV analgesics, Oral pain medications.   PONV prophylaxis: Ondansetron (or other 5HT-3), Dexamethasone or Solumedrol, Background Propofol Infusion     Comments:                    Matilde Wyman MD

## 2023-05-17 NOTE — ANESTHESIA PROCEDURE NOTES
Arterial Line Procedure Note    Pre-Procedure   Staff -        Anesthesiologist:  Matilde Wyman MD       Performed By: anesthesiologist       Pre-Anesthestic Checklist: patient identified, IV checked, risks and benefits discussed, informed consent, monitors and equipment checked, pre-op evaluation and at physician/surgeon's request  Timeout:       Correct Patient: Yes        Correct Procedure: Yes        Correct Site: Yes        Correct Position: Yes   Line Placement:   This line was placed Post Induction  Procedure   Procedure: arterial line       Laterality: left       Insertion Site: radial.  Sterile Prep        Standard elements of sterile barrier followed       Skin prep: Chloraprep  Insertion/Injection        Technique: ultrasound guided        1. Ultrasound was used to evaluate the access site.       2. Artery evaluated via ultrasound for patency/adequacy.       3. Using real-time ultrasound the needle/catheter was observed entering the artery/vein.       4. Permanent image was captured and entered into the patient's record.       5. The visualized structures were anatomically normal.       6. There were no apparent abnormal pathologic findings.       Catheter Type/Size: 20 G, 12 cm  Narrative         Secured by: suture       Biopatch and Tegaderm dressing used.       Complications: None apparent,        Arterial waveform: Yes        IBP within 10% of NIBP: Yes

## 2023-05-17 NOTE — ANESTHESIA POSTPROCEDURE EVALUATION
Patient: Ashanti Aviles    Procedure: Procedure(s):  LEFT FEMORAL ENDARTERECTOMY WITH RETROGRADE ILIAC STENT       Anesthesia Type:  General    Note:  Disposition: Outpatient   Postop Pain Control: Uneventful            Sign Out: Well controlled pain   PONV: No   Neuro/Psych: Uneventful            Sign Out: Acceptable/Baseline neuro status   Airway/Respiratory: Uneventful            Sign Out: Acceptable/Baseline resp. status   CV/Hemodynamics: Uneventful            Sign Out: Acceptable CV status; No obvious hypovolemia; No obvious fluid overload   Other NRE:    DID A NON-ROUTINE EVENT OCCUR?            Last vitals:  Vitals Value Taken Time   /61 05/17/23 1230   Temp 36.8  C (98.3  F) 05/17/23 1132   Pulse 77 05/17/23 1240   Resp 13 05/17/23 1240   SpO2 99 % 05/17/23 1240   Vitals shown include unvalidated device data.    Electronically Signed By: Matilde Wyman MD  May 17, 2023  12:42 PM

## 2023-05-17 NOTE — OR NURSING
Blood sugar 289 upon arrival. Took Tresiba 25 units at 0500 today. Dr. Carranza's notified, no new orders.

## 2023-05-17 NOTE — PLAN OF CARE
Problem: Plan of Care - These are the overarching goals to be used throughout the patient stay.    Goal: Optimal Comfort and Wellbeing  Intervention: Monitor Pain and Promote Comfort  Recent Flowsheet Documentation  Taken 5/17/2023 1328 by Tej Rankin RN  Pain Management Interventions:   medication (see MAR)   cold applied     Problem: Lower Extremity Revascularization  Goal: Effective Tissue Perfusion  Outcome: Progressing     Problem: Hypertension Comorbidity  Goal: Blood Pressure in Desired Range  Intervention: Maintain Blood Pressure Management  Recent Flowsheet Documentation  Taken 5/17/2023 1300 by Tej Rankin RN  Medication Review/Management: medications reviewed     Patient arrived to unit around 1300. Settled into room, orientated to unit and room. BP slightly elevated. Incision site is clean, dry and intact. No bruising noted, site is soft with no drainage. Able wean to room air, with saturation in the upper 90's. Left pedal pulse is absent (vascular surgeon aware), left post tibial audible with doppler, right pedal audible with doppler and post tibial audible with doppler. Cedeno is patent and draining, per order to remain in until post op day 1. Reports a 5/10 incisional pain, administered PRN oxycodone with desired effect.     Tej Rankin, ADRIAN

## 2023-05-17 NOTE — ANESTHESIA CARE TRANSFER NOTE
Patient: Ashanti Aviles    Procedure: Procedure(s):  LEFT FEMORAL ENDARTERECTOMY WITH RETROGRADE ILIAC STENT       Diagnosis: PAD (peripheral artery disease) (H) [I73.9]  Diagnosis Additional Information: No value filed.    Anesthesia Type:   General     Note:    Oropharynx: oropharynx clear of all foreign objects  Level of Consciousness: awake  Oxygen Supplementation: face mask  Level of Supplemental Oxygen (L/min / FiO2): 8  Independent Airway: airway patency satisfactory and stable  Dentition: dentition unchanged  Vital Signs Stable: post-procedure vital signs reviewed and stable  Report to RN Given: handoff report given  Patient transferred to: PACU    Handoff Report: Identifed the Patient, Identified the Reponsible Provider, Reviewed the pertinent medical history, Discussed the surgical course, Reviewed Intra-OP anesthesia mangement and issues during anesthesia, Set expectations for post-procedure period and Allowed opportunity for questions and acknowledgement of understanding      Vitals:  Vitals Value Taken Time   /55    Temp 98.6f    Pulse 82    Resp 16    SpO2 100        Electronically Signed By: DOROTA Justice CRNA  May 17, 2023  11:32 AM

## 2023-05-17 NOTE — BRIEF OP NOTE
[facility]    Brief Operative Note    Pre-operative diagnosis: PAD (peripheral artery disease) (H) [I73.9]  Post-operative diagnosis Same as pre-operative diagnosis    Procedure: Procedure(s):  LEFT FEMORAL ENDARTERECTOMY WITH RETROGRADE ILIAC STENT  Surgeon: Surgeon(s) and Role:     * Dyan Geller MD - Primary     * Tammy Ramirez PA-C - Assisting     * Saúl Molina MD - Resident - Assisting  Anesthesia: * No anesthesia type entered *   Estimated Blood Loss: 300 mL from 5/17/2023  7:43 AM to 5/17/2023 11:29 AM      Drains: None  Specimens:   ID Type Source Tests Collected by Time Destination   1 : Left femoral artery plaque Tissue Artery, Femoral, Left SURGICAL PATHOLOGY EXAM Dyan Geller MD 5/17/2023  9:35 AM      Findings:  Left iliac stenosis treated with 7 mm x 19 mm VBX stent, post dilated to 8 mm, and distal extension in the exernal iliac artery wit 8 mm VisiPro. Palpable right CFA and profunda pulse. Faint monophasic left PT doppler signal at end of case.    Complications: None.  Implants:   Implant Name Type Inv. Item Serial No.  Lot No. LRB No. Used Action   IMP PATCH PERICARDIUM PHOTOFIX BOVINE 0.8X8CM [id number] - [id number] Bone/Tissue/Biologic IMP PATCH PERICARDIUM PHOTOFIX BOVINE 0.8X8CM [id number] [facility] [id number] Left 1 Implanted   STENT VIABAHNBX [id number] Stent STENT VIABAHNBX [id number] [employer]  Left 1 Implanted   VISI-PRO BALLOON EXPANDABLE PERIPHERAL STENT SYSTEM    EV3 [id number] Left 1 Implanted

## 2023-05-17 NOTE — INTERVAL H&P NOTE
"I have reviewed the surgical (or preoperative) H&P that is linked to this encounter, and examined the patient. There are no significant changes    Clinical Conditions Present on Arrival:  Clinically Significant Risk Factors Present on Admission                 # Drug Induced Platelet Defect: home medication list includes an antiplatelet medication  # DMII: A1C = N/A within past 6 months  # Overweight: Estimated body mass index is 27.07 kg/m  as calculated from the following:    Height as of 5/12/23: 1.575 m (5' 2\").    Weight as of this encounter: 67.1 kg (148 lb).     Palpable femoral pulse on right, weak palpable distal EIA/CFA on left. Monophasic right DP and PT. Faint monophasic left PT, absent left DP doppler signal.  "

## 2023-05-17 NOTE — CONSULTS
[facility]      No Ref-Primary, Physician, None  REASON FOR CONSULT:  Hypertension, DM    REQUESTING PHYSICIAN:  Dr Geller    ASSESSMENT AND PLAN:  Patient is a 67 year old female presenting with:    PAD:  -- S/P left femoral endarterectomy with retrograde iliac stent placement today with Dr Geller  -- Post op care per vascular team  -- Cont aspirin and plavix  -- Patient reports she quit smoking completely about a wk ago    DM-II  -- On Tresiba 30 units daily, PO Jardiance, Trajenta and Metformin.   -- She took 25 units of Tresiba today at around 5 am. Resume Tresiba from tomorrow. Resume Jardiance and Trajenta. Hold metformin  -- Added sliding scale    Hypertension:  -- Cont home meds with hold parameter     HPI:  Patient is a 67 year old female with history significant for hypertension, DM-II, dyslipidemia, smoking, PAD who was admitted under Dr. Geller's service for elective surgery. [facility] [place] post [place] medical management. Pre-op H&P dated 5/10/23 reviewed. Operative note and kathi-operative anesthetic report reviewed.    I am seeing the patient in room 432 after the surgery. Patient's daughter stayed in the room during my entire visit with patient's permission. Patient reports she is doing fairly well. States she used to have claudication/cramps in her left calf with walking. Has not tried walking after coming back from the surgery. Reports that her blood sugar was reasonable at home with latest A1c of 7.9.     PMH:  Patient Active Problem List   Diagnosis     Type 2 diabetes mellitus (H)     Tobacco use disorder     PAD (peripheral artery disease) (H)     Microalbuminuric diabetic nephropathy (H)     Hyperlipidemia with target LDL less than 70     Grade 3 vulvar intraepithelial neoplasia     GERD (gastroesophageal reflux disease)     Essential hypertension     Dyslipidemia     AIN III (anal intraepithelial neoplasia III)     Bilateral incipient cataracts     Condyloma acuminatum of  "vulva     Ischemic toe     Critical limb ischemia of left lower extremity (H)       ALLERGIES:  Allergies   Allergen Reactions     Ezetimibe Unknown     Generalized body aches.   Tolerating now (restarted 2023)     Oxycodone Nausea and Vomiting     Penicillins Unknown     Childhood rxn     Simvastatin Unknown     Muscle pain     Victoza [Liraglutide] Other (See Comments)     Binds bowels       MEDICATIONS:  Reviewed.    SOCIAL HISTORY:  Reviewed  Social History     Socioeconomic History     Marital status:      Spouse name: Not on file     Number of children: Not on file     Years of education: Not on file     Highest education level: Not on file   Occupational History     Not on file   Tobacco Use     Smoking status: Former     Packs/day: 0.25     Types: Cigarettes     Quit date: 2023     Years since quittin.2     Smokeless tobacco: Never   Vaping Use     Vaping status: Not on file   Substance and Sexual Activity     Alcohol use: Not Currently     Comment: Alcoholic Drinks/day: 2x     Drug use: Not Currently     Sexual activity: Yes     Partners: Male   Other Topics Concern     Not on file   Social History Narrative     Not on file     Social Determinants of Health     Financial Resource Strain: Not on file   Food Insecurity: Not on file   Transportation Needs: Not on file   Physical Activity: Not on file   Stress: Not on file   Social Connections: Not on file   Intimate Partner Violence: Not on file   Housing Stability: Not on file       FAMILY HISTORY:  History reviewed. No pertinent family history.    ROS:  12 point review of systems reviewed and is negative except for what has already been reported in HPI.      PHYSICAL EXAM:  /60 (BP Location: Right arm)   Pulse 69   Temp 97.7  F (36.5  C) (Oral)   Resp 18   Ht 1.575 m (5' 2\")   Wt 69.7 kg (153 lb 10.6 oz)   SpO2 98%   BMI 28.10 kg/m    I/O last 3 completed shifts:  In: 1650 [I.V.:1650]  Out: 775 [Urine:475; Blood:300]  No " intake/output data recorded.  GENRL: Alert and oriented X 3. Not in acute distress  HEENT: NC/AT      Pupils- round and reactive to light bilaterally      Neck- supple, no JVP elevation, no lymphadenopathy or thyromegaly      Sclera- anicteric      Mucous membrane- moist and pink  CHEST: Clear to auscultation bilaterally  HEART: S1S2 regular. No murmurs, rubs or gallops  ABDMN: Soft. Non-tender, non-distended. No organomegaly. No guarding or rigidity. Bowel sounds-   EXTRM: No pedal oedema.   NEURO: Cranial nerves II-XII grossly intact. No focal neurological deficit. No involuntary movements      DIAGNOSTIC DATA:    No results found for: CKTOTAL, CKMB, TROPONINI  Lab Results   Component Value Date    WBC 10.8 05/17/2023    HGB 13.4 05/17/2023    HCT 40.8 05/17/2023    MCV 91 05/17/2023     05/17/2023     Lab Results   Component Value Date    CHOL 237 04/21/2022    TRIG 120 04/21/2022    HDL 68 04/21/2022     All lab studies reviewed personally  Radiology report reviewed.   Tele strips from PACU personally reviewed; and it revealed       Thank you Dr. Geller for allowing us to participate in Ashanti Aviles's care. [facility] will follow her along with you.

## 2023-05-18 ENCOUNTER — APPOINTMENT (OUTPATIENT)
Dept: OCCUPATIONAL THERAPY | Facility: HOSPITAL | Age: 68
DRG: 254 | End: 2023-05-18
Attending: STUDENT IN AN ORGANIZED HEALTH CARE EDUCATION/TRAINING PROGRAM
Payer: COMMERCIAL

## 2023-05-18 LAB
ANION GAP SERPL CALCULATED.3IONS-SCNC: 9 MMOL/L (ref 7–15)
BUN SERPL-MCNC: 14.9 MG/DL (ref 8–23)
CALCIUM SERPL-MCNC: 8.8 MG/DL (ref 8.8–10.2)
CHLORIDE SERPL-SCNC: 105 MMOL/L (ref 98–107)
CREAT SERPL-MCNC: 0.72 MG/DL (ref 0.51–0.95)
DEPRECATED HCO3 PLAS-SCNC: 24 MMOL/L (ref 22–29)
ERYTHROCYTE [DISTWIDTH] IN BLOOD BY AUTOMATED COUNT: 13.8 % (ref 10–15)
GFR SERPL CREATININE-BSD FRML MDRD: >90 ML/MIN/1.73M2
GLUCOSE BLDC GLUCOMTR-MCNC: 175 MG/DL (ref 70–99)
GLUCOSE BLDC GLUCOMTR-MCNC: 216 MG/DL (ref 70–99)
GLUCOSE BLDC GLUCOMTR-MCNC: 223 MG/DL (ref 70–99)
GLUCOSE BLDC GLUCOMTR-MCNC: 239 MG/DL (ref 70–99)
GLUCOSE BLDC GLUCOMTR-MCNC: 248 MG/DL (ref 70–99)
GLUCOSE BLDC GLUCOMTR-MCNC: 276 MG/DL (ref 70–99)
GLUCOSE SERPL-MCNC: 182 MG/DL (ref 70–99)
HCT VFR BLD AUTO: 34.1 % (ref 35–47)
HGB BLD-MCNC: 10.8 G/DL (ref 11.7–15.7)
MCH RBC QN AUTO: 29.1 PG (ref 26.5–33)
MCHC RBC AUTO-ENTMCNC: 31.7 G/DL (ref 31.5–36.5)
MCV RBC AUTO: 92 FL (ref 78–100)
PATH REPORT.COMMENTS IMP SPEC: NORMAL
PATH REPORT.COMMENTS IMP SPEC: NORMAL
PATH REPORT.FINAL DX SPEC: NORMAL
PATH REPORT.GROSS SPEC: NORMAL
PATH REPORT.MICROSCOPIC SPEC OTHER STN: NORMAL
PATH REPORT.RELEVANT HX SPEC: NORMAL
PHOTO IMAGE: NORMAL
PLATELET # BLD AUTO: 245 10E3/UL (ref 150–450)
POTASSIUM SERPL-SCNC: 4.3 MMOL/L (ref 3.4–5.3)
RBC # BLD AUTO: 3.71 10E6/UL (ref 3.8–5.2)
SODIUM SERPL-SCNC: 138 MMOL/L (ref 136–145)
WBC # BLD AUTO: 17.6 10E3/UL (ref 4–11)

## 2023-05-18 PROCEDURE — 82310 ASSAY OF CALCIUM: CPT | Performed by: STUDENT IN AN ORGANIZED HEALTH CARE EDUCATION/TRAINING PROGRAM

## 2023-05-18 PROCEDURE — 99207 PR CDG-CUT & PASTE-POTENTIAL IMPACT ON LEVEL: CPT | Performed by: INTERNAL MEDICINE

## 2023-05-18 PROCEDURE — 250N000013 HC RX MED GY IP 250 OP 250 PS 637: Performed by: SURGERY

## 2023-05-18 PROCEDURE — 99232 SBSQ HOSP IP/OBS MODERATE 35: CPT | Performed by: INTERNAL MEDICINE

## 2023-05-18 PROCEDURE — 120N000001 HC R&B MED SURG/OB

## 2023-05-18 PROCEDURE — 250N000013 HC RX MED GY IP 250 OP 250 PS 637: Performed by: INTERNAL MEDICINE

## 2023-05-18 PROCEDURE — 85018 HEMOGLOBIN: CPT | Performed by: STUDENT IN AN ORGANIZED HEALTH CARE EDUCATION/TRAINING PROGRAM

## 2023-05-18 PROCEDURE — 250N000011 HC RX IP 250 OP 636: Performed by: STUDENT IN AN ORGANIZED HEALTH CARE EDUCATION/TRAINING PROGRAM

## 2023-05-18 PROCEDURE — 258N000003 HC RX IP 258 OP 636: Performed by: STUDENT IN AN ORGANIZED HEALTH CARE EDUCATION/TRAINING PROGRAM

## 2023-05-18 PROCEDURE — 97165 OT EVAL LOW COMPLEX 30 MIN: CPT | Mod: GO

## 2023-05-18 PROCEDURE — 250N000013 HC RX MED GY IP 250 OP 250 PS 637: Performed by: STUDENT IN AN ORGANIZED HEALTH CARE EDUCATION/TRAINING PROGRAM

## 2023-05-18 PROCEDURE — 36415 COLL VENOUS BLD VENIPUNCTURE: CPT | Performed by: STUDENT IN AN ORGANIZED HEALTH CARE EDUCATION/TRAINING PROGRAM

## 2023-05-18 RX ORDER — FLUCONAZOLE 150 MG/1
150 TABLET ORAL ONCE
Status: COMPLETED | OUTPATIENT
Start: 2023-05-18 | End: 2023-05-18

## 2023-05-18 RX ORDER — ACETAMINOPHEN 500 MG
1000 TABLET ORAL EVERY 6 HOURS
COMMUNITY
Start: 2023-05-18 | End: 2023-05-25

## 2023-05-18 RX ORDER — ASPIRIN 81 MG/1
81 TABLET ORAL DAILY
Status: ON HOLD | COMMUNITY
Start: 2023-05-18 | End: 2023-09-08

## 2023-05-18 RX ORDER — OXYCODONE HYDROCHLORIDE 5 MG/1
5 TABLET ORAL EVERY 4 HOURS PRN
Qty: 15 TABLET | Refills: 0 | Status: ON HOLD | OUTPATIENT
Start: 2023-05-18 | End: 2023-09-08

## 2023-05-18 RX ADMIN — LISINOPRIL 40 MG: 20 TABLET ORAL at 08:52

## 2023-05-18 RX ADMIN — ACETAMINOPHEN 975 MG: 325 TABLET ORAL at 14:00

## 2023-05-18 RX ADMIN — SITAGLIPTIN 100 MG: 25 TABLET, FILM COATED ORAL at 08:53

## 2023-05-18 RX ADMIN — SENNOSIDES AND DOCUSATE SODIUM 1 TABLET: 50; 8.6 TABLET ORAL at 22:14

## 2023-05-18 RX ADMIN — EMPAGLIFLOZIN 25 MG: 25 TABLET, FILM COATED ORAL at 08:52

## 2023-05-18 RX ADMIN — INSULIN DEGLUDEC INJECTION 30 UNITS: 100 INJECTION, SOLUTION SUBCUTANEOUS at 12:43

## 2023-05-18 RX ADMIN — ACETAMINOPHEN 975 MG: 325 TABLET ORAL at 06:21

## 2023-05-18 RX ADMIN — ACETAMINOPHEN 975 MG: 325 TABLET ORAL at 22:09

## 2023-05-18 RX ADMIN — SODIUM CHLORIDE: 9 INJECTION, SOLUTION INTRAVENOUS at 02:43

## 2023-05-18 RX ADMIN — FLUCONAZOLE 150 MG: 150 TABLET ORAL at 10:19

## 2023-05-18 RX ADMIN — EZETIMIBE 10 MG: 10 TABLET ORAL at 08:52

## 2023-05-18 RX ADMIN — OXYCODONE HYDROCHLORIDE 5 MG: 5 TABLET ORAL at 19:47

## 2023-05-18 RX ADMIN — SENNOSIDES AND DOCUSATE SODIUM 1 TABLET: 50; 8.6 TABLET ORAL at 08:52

## 2023-05-18 RX ADMIN — HYDROMORPHONE HYDROCHLORIDE 0.4 MG: 0.2 INJECTION, SOLUTION INTRAMUSCULAR; INTRAVENOUS; SUBCUTANEOUS at 21:16

## 2023-05-18 RX ADMIN — Medication 81 MG: at 08:53

## 2023-05-18 RX ADMIN — CLOPIDOGREL BISULFATE 75 MG: 75 TABLET ORAL at 08:52

## 2023-05-18 RX ADMIN — POLYETHYLENE GLYCOL 3350 17 G: 17 POWDER, FOR SOLUTION ORAL at 08:51

## 2023-05-18 RX ADMIN — AMLODIPINE BESYLATE 10 MG: 5 TABLET ORAL at 08:53

## 2023-05-18 RX ADMIN — ENOXAPARIN SODIUM 40 MG: 40 INJECTION SUBCUTANEOUS at 06:22

## 2023-05-18 RX ADMIN — PANTOPRAZOLE SODIUM 40 MG: 40 TABLET, DELAYED RELEASE ORAL at 02:48

## 2023-05-18 RX ADMIN — HYDROCHLOROTHIAZIDE 25 MG: 25 TABLET ORAL at 08:53

## 2023-05-18 ASSESSMENT — ACTIVITIES OF DAILY LIVING (ADL)
ADLS_ACUITY_SCORE: 20
IADL_COMMENTS: INDEPENDENT
ADLS_ACUITY_SCORE: 20
PREVIOUS_RESPONSIBILITIES: MEAL PREP;HOUSEKEEPING;SHOPPING;MEDICATION MANAGEMENT;FINANCES;DRIVING
ADLS_ACUITY_SCORE: 20
ADLS_ACUITY_SCORE: 20

## 2023-05-18 NOTE — PROGRESS NOTES
05/18/23 0930   Appointment Info   Signing Clinician's Name / Credentials (OT) Liberty Berman OTR/L   Living Environment   People in Home child(karla), adult;spouse   Current Living Arrangements house   Home Accessibility stairs to enter home;stairs within home   Number of Stairs, Main Entrance 4   Stair Railings, Main Entrance railings safe and in good condition   Number of Stairs, Within Home, Primary greater than 10 stairs   Stair Railings, Within Home, Primary railings safe and in good condition   Transportation Anticipated family or friend will provide;car, drives self   Living Environment Comments pt lives on main level with her .  There is a basement but she doesn't go down there.  patients daughter and her  live on the upstairs level   Self-Care   Usual Activity Tolerance excellent   Current Activity Tolerance good   Activity/Exercise/Self-Care Comment independent at baseline.   Instrumental Activities of Daily Living (IADL)   Previous Responsibilities meal prep;housekeeping;shopping;medication management;finances;driving   IADL Comments independent   General Information   Onset of Illness/Injury or Date of Surgery 05/17/23   Referring Physician Saúl Molina MD   Patient/Family Therapy Goal Statement (OT) pt would like to return home with her family at [place]   Additional Occupational Profile Info/Pertinent History of Current Problem Patient is a 67 year old female presenting with:     PAD:  -- S/P left femoral endarterectomy with retrograde iliac stent placement today with Dr Geller  -- Post op care per vascular team  -- Cont aspirin and plavix  -- Patient reports she quit smoking completely about a wk ago   Existing Precautions/Restrictions no known precautions/restrictions   General Observations and Info moves well without restriction   Cognitive Status Examination   Follows Commands WNL   Posture   Posture Comments WNL   Strength Comprehensive (MMT)   Comment, General Manual  Muscle Testing (MMT) Assessment WNL   Bed Mobility   Bed Mobility No deficits identified   Transfers   Transfers No deficits identified   Balance   Balance Comments WNL   Lower Body Dressing Assessment/Training   [place] Level (Lower Body Dressing) independent   Comment, (Lower Body Dressing) socks   Toileting   [place] Level (Toileting) independent   Clinical Impression   Criteria for Skilled Therapeutic Interventions Met (OT) Evaluation only   OT Diagnosis reduced funtional moblity   Influenced by the following impairments ililac stent   Clinical Decision Making Complexity (OT) low complexity   Clinical Impression Comments moves very well.  reports having some discomfort due to surgery but is functioning independently   OT Total Evaluation Time   OT Eval, Low Complexity Minutes (19154) 10   OT Discharge Planning   OT Plan dc OT.  no further needs   OT Discharge Recommendation (DC Rec) home with assist   OT Rationale for DC Rec family can provide any assistance needed at dc.  no home care needs   OT Brief overview of current status independent with moblity   Total Session Time   Total Session Time (sum of timed and untimed services) 10

## 2023-05-18 NOTE — PLAN OF CARE
Physical Therapy: Orders received. Chart reviewed and discussed with care team.? Physical Therapy not indicated due to pt demonstrates independence with functional mobility per OT.? Defer discharge recommendations to care team.? Will complete orders.     Matilde Mccracken, PT, DPT  5/18/2023

## 2023-05-18 NOTE — PLAN OF CARE
"  Problem: Plan of Care - These are the overarching goals to be used throughout the patient stay.    Goal: Plan of Care Review  Description: The Plan of Care Review/Shift note should be completed every shift.  The Outcome Evaluation is a brief statement about your assessment that the patient is improving, declining, or no change.  This information will be displayed automatically on your shift note.  Outcome: Progressing  Flowsheets (Taken 5/18/2023 1328)  Plan of Care Reviewed With: patient  Goal: Patient-Specific Goal (Individualized)  Description: You can add care plan individualizations to a care plan. Examples of Individualization might be:  \"Parent requests to be called daily at 9am for status\", \"I have a hard time hearing out of my right ear\", or \"Do not touch me to wake me up as it startles me\".  Outcome: Progressing  Goal: Absence of Hospital-Acquired Illness or Injury  Outcome: Progressing  Intervention: Identify and Manage Fall Risk  Recent Flowsheet Documentation  Taken 5/18/2023 0900 by Rainer Reveles RN  Safety Promotion/Fall Prevention: activity supervised  Intervention: Prevent Skin Injury  Recent Flowsheet Documentation  Taken 5/18/2023 0900 by Rainer Reveles RN  Body Position: position changed independently  Goal: Readiness for Transition of Care  Outcome: Progressing     Problem: Pain Acute  Goal: Optimal Pain Control and Function  Outcome: Progressing  Intervention: Prevent or Manage Pain  Recent Flowsheet Documentation  Taken 5/18/2023 0900 by Rainer Reveles RN  Medication Review/Management: medications reviewed     Problem: Lower Extremity Revascularization  Goal: Absence of Bleeding  Outcome: Progressing  Goal: Effective Bowel Elimination  Outcome: Progressing  Goal: Fluid and Electrolyte Balance  Outcome: Progressing  Goal: Absence of Infection Signs and Symptoms  Outcome: Progressing  Goal: Anesthesia/Sedation Recovery  Outcome: Progressing  Intervention: Optimize Anesthesia Recovery  Recent " Flowsheet Documentation  Taken 5/18/2023 0900 by Rainer Reveles RN  Safety Promotion/Fall Prevention: activity supervised  Goal: Optimal Pain Control and Function  Outcome: Progressing  Goal: Nausea and Vomiting Relief  Outcome: Progressing  Goal: Effective Urinary Elimination  Outcome: Progressing  Goal: Effective Oxygenation and Ventilation  Outcome: Progressing  Intervention: Optimize Oxygenation and Ventilation  Recent Flowsheet Documentation  Taken 5/18/2023 0900 by Rainer Reveles RN  Head of Bed (HOB) Positioning: HOB at 20-30 degrees  Goal: Effective Tissue Perfusion  Outcome: Progressing  Intervention: Optimize Tissue Perfusion  Recent Flowsheet Documentation  Taken 5/18/2023 0900 by Rainer Reveles RN  Body Position: position changed independently     Problem: Diabetes Comorbidity  Goal: Blood Glucose Level Within Targeted Range  Outcome: Progressing     Problem: Hypertension Comorbidity  Goal: Blood Pressure in Desired Range  Outcome: Progressing  Intervention: Maintain Blood Pressure Management  Recent Flowsheet Documentation  Taken 5/18/2023 0900 by Rainer Reveles RN  Medication Review/Management: medications reviewed   Goal Outcome Evaluation:      Plan of Care Reviewed With: patient

## 2023-05-18 NOTE — PLAN OF CARE
Problem: Pain Acute  Goal: Optimal Pain Control and Function  Outcome: Progressing  Intervention: Prevent or Manage Pain  Recent Flowsheet Documentation  Taken 5/18/2023 0419 by Maggie Smiley RN  Medication Review/Management: medications reviewed  Taken 5/18/2023 0019 by Maggie Smiley, RN  Medication Review/Management: medications reviewed     Problem: Lower Extremity Revascularization  Goal: Absence of Bleeding  Outcome: Progressing   Goal Outcome Evaluation:  Pt had a good shift.  Recovering well.  Surgical site is clean dry and intact.  No evidence of bleeding.  Site is soft but tender.  Pulses present using doppler.  Pt pain managed this shift with scheduled tylenol.  Pao removed this am.

## 2023-05-18 NOTE — CONSULTS
Care Management Follow Up    Length of Stay (days): 1    Expected Discharge Date: 05/19/2023     Concerns to be Addressed:    Discharge planning   Patient plan of care discussed at interdisciplinary rounds: Yes    Anticipated Discharge Disposition:  Home with no needs pending therapy recommendations     Anticipated Discharge Services:  TBD  Anticipated Discharge DME:  n/a    Patient/family educated on Medicare website which has current facility and service quality ratings:yes    Education Provided on the Discharge Plan:  yes  Patient/Family in Agreement with the Plan:  yes    Referrals Placed by CM/SW:  n/a  Private pay costs discussed: Not applicable    Additional Information:  SW met with pt to introduce role of CM, complete  initial assessment, and to discuss needs at time of d/c. Pt comes from home; lives with spouse and adult child, and noted to be independent at baseline. Pt feels that there will be no needs from CM at discharge; care management will follow for therapy recommendations.    CM to continue to follow through hospitalization.  9:44 AM    Therapy recommending home with assist; pt agreeable. Anticipate discharge home with no need.s  1:28 PM    KEVIN Fonseca  5/18/2023

## 2023-05-18 NOTE — SIGNIFICANT EVENT
Paged Dr. Archer regarding pt was eating food brought in from home prior to having blood sugar checked.  Blood sugar was checked and came back at 260.  Per Dr. Archer, ok to still give sliding scale insulin for blood sugar of 260.  Had to page Dr. Archer again since pt's Novolog pen was not sent from pharmacy.  Per policy, blood sugar should be rechecked and insulin given with in 30 mins.  Since Novolog pen was not available during that 30 min window, pt's blood sugar was rechecked.  Dr. Archer was aware that pt had already eaten when second blood sugar was checked.  Per Dr. Archer, give sliding scale insulin for blood sugar of 298.

## 2023-05-18 NOTE — PROGRESS NOTES
Daily Progress Note        CODE STATUS:  Full Code    05/18/23  Assessment/Plan:  Patient is a 67 year old female with history significant for hypertension, DM-II, dyslipidemia, smoking, PAD who was admitted under Dr. Geller's service for elective surgery    PAD:  -- S/P left femoral endarterectomy with retrograde iliac stent placement on 5/17/23 with Dr Geller  -- Post op care per vascular team  -- Cont aspirin and plavix  -- Patient reports she quit smoking completely about a wk ago     DM-II  -- On Tresiba 30 units daily, PO Jardiance, Trajenta and Metformin at home  -- Resumed home meds except for metformin  -- Added sliding scale     Hypertension:  -- Cont home meds with hold parameter    Possible vaginal candidiasis  -- Patient reports vaginal itching and symptoms just like in the past she had vaginal candidiasis  -- Fluconazole 150mg x 1    New leucocytosis  -- Likely reactive  -- Monitor    Disposition; Per vascular team       LOS: 1 day     Subjective:  Interval History: Patient seen and examined. Notes, labs, imaging reports personally reviewed. Doing fairly well. Patient reports symptoms of vaginal candidiasis. PO fluconazole helped in the past per patient.    Review of Systems:   As mentioned in subjective.    Patient Active Problem List   Diagnosis     Type 2 diabetes mellitus (H)     Tobacco use disorder     PAD (peripheral artery disease) (H)     Microalbuminuric diabetic nephropathy (H)     Hyperlipidemia with target LDL less than 70     Grade 3 vulvar intraepithelial neoplasia     GERD (gastroesophageal reflux disease)     Essential hypertension     Dyslipidemia     AIN III (anal intraepithelial neoplasia III)     Bilateral incipient cataracts     Condyloma acuminatum of vulva     Ischemic toe     Critical limb ischemia of left lower extremity (H)       Scheduled Meds:    acetaminophen  975 mg Oral Q8H     amLODIPine  10 mg Oral Daily     aspirin  81 mg Oral Daily     clindamycin  900 mg  Intravenous See Admin Instructions     clopidogrel  75 mg Oral Daily     empagliflozin  25 mg Oral Daily     enoxaparin ANTICOAGULANT  40 mg Subcutaneous Q24H     ezetimibe  10 mg Oral Daily     hydrochlorothiazide  25 mg Oral Daily     insulin aspart  1-7 Units Subcutaneous TID AC     insulin aspart  1-5 Units Subcutaneous At Bedtime     insulin degludec  30 Units Subcutaneous Daily     lisinopril  40 mg Oral Daily     polyethylene glycol  17 g Oral Daily     senna-docusate  1 tablet Oral BID     sitagliptin  100 mg Oral Daily     sodium chloride (PF)  3 mL Intracatheter Q8H     sodium chloride (PF)  3 mL Intracatheter Q8H     Continuous Infusions:  PRN Meds:.[START ON 5/20/2023] acetaminophen, bisacodyl, glucose **OR** dextrose **OR** glucagon, HYDROmorphone **OR** HYDROmorphone, lidocaine 4%, lidocaine (buffered or not buffered), magnesium hydroxide, naloxone **OR** naloxone **OR** naloxone **OR** naloxone, ondansetron **OR** ondansetron, oxyCODONE **OR** oxyCODONE, pantoprazole, sodium chloride (PF)    Objective:  Vital signs in last 24 hours:  Temp:  [97.7  F (36.5  C)-98  F (36.7  C)] 97.9  F (36.6  C)  Pulse:  [69-94] 78  Resp:  [17-18] 18  BP: (107-154)/(58-72) 131/63  SpO2:  [96 %-99 %] 99 %        Intake/Output Summary (Last 24 hours) at 5/18/2023 1524  Last data filed at 5/18/2023 0630  Gross per 24 hour   Intake 1497 ml   Output 3350 ml   Net -1853 ml       Physical Exam:    General: Not in obvious distress.  HEENT: NC, AT   Chest: Clear to auscultation bilaterally  Heart: S1S2 normal, regular. No M/R/G  Abdomen: Soft. NT, ND. Bowel sounds- active.  Extremities: No legs swelling  Neuro: Alert and awake, grossly non-focal      Lab Results:(I have personally reviewed the results)    Recent Results (from the past 24 hour(s))   Glucose by meter    Collection Time: 05/17/23  4:35 PM   Result Value Ref Range    GLUCOSE BY METER POCT 260 (H) 70 - 99 mg/dL   Glucose by meter    Collection Time: 05/17/23  5:29 PM    Result Value Ref Range    GLUCOSE BY METER POCT 298 (H) 70 - 99 mg/dL   Glucose by meter    Collection Time: 05/17/23  9:15 PM   Result Value Ref Range    GLUCOSE BY METER POCT 383 (H) 70 - 99 mg/dL   Glucose by meter    Collection Time: 05/18/23  2:11 AM   Result Value Ref Range    GLUCOSE BY METER POCT 248 (H) 70 - 99 mg/dL   Basic metabolic panel    Collection Time: 05/18/23  5:44 AM   Result Value Ref Range    Sodium 138 136 - 145 mmol/L    Potassium 4.3 3.4 - 5.3 mmol/L    Chloride 105 98 - 107 mmol/L    Carbon Dioxide (CO2) 24 22 - 29 mmol/L    Anion Gap 9 7 - 15 mmol/L    Urea Nitrogen 14.9 8.0 - 23.0 mg/dL    Creatinine 0.72 0.51 - 0.95 mg/dL    Calcium 8.8 8.8 - 10.2 mg/dL    Glucose 182 (H) 70 - 99 mg/dL    GFR Estimate >90 >60 mL/min/1.73m2   CBC with platelets    Collection Time: 05/18/23  5:44 AM   Result Value Ref Range    WBC Count 17.6 (H) 4.0 - 11.0 10e3/uL    RBC Count 3.71 (L) 3.80 - 5.20 10e6/uL    Hemoglobin 10.8 (L) 11.7 - 15.7 g/dL    Hematocrit 34.1 (L) 35.0 - 47.0 %    MCV 92 78 - 100 fL    MCH 29.1 26.5 - 33.0 pg    MCHC 31.7 31.5 - 36.5 g/dL    RDW 13.8 10.0 - 15.0 %    Platelet Count 245 150 - 450 10e3/uL   Glucose by meter    Collection Time: 05/18/23  7:48 AM   Result Value Ref Range    GLUCOSE BY METER POCT 175 (H) 70 - 99 mg/dL   Glucose by meter    Collection Time: 05/18/23 12:09 PM   Result Value Ref Range    GLUCOSE BY METER POCT 216 (H) 70 - 99 mg/dL       All laboratory and imaging data in the past 24 hours reviewed  Serum Glucose range:   Recent Labs   Lab 05/18/23  1209 05/18/23  0748 05/18/23  0544 05/18/23  0211   * 175* 182* 248*     ABG: No lab results found in last 7 days.  CBC:   Recent Labs   Lab 05/18/23  0544 05/17/23  0635   WBC 17.6* 10.8   HGB 10.8* 13.4   HCT 34.1* 40.8   MCV 92 91    305   NEUTROPHIL  --  50   LYMPH  --  37   MONOCYTE  --  10   EOSINOPHIL  --  2     Chemistry:   Recent Labs   Lab 05/18/23  0544 05/17/23  0635     --   "  POTASSIUM 4.3 4.0   CHLORIDE 105  --    CO2 24  --    BUN 14.9  --    CR 0.72 0.82   GFRESTIMATED >90 79   SANTINO 8.8  --      Coags:  No results for input(s): INR, PROTIME, PTT in the last 168 hours.    Invalid input(s): APTT  Cardiac Markers:  No results for input(s): CKTOTAL, TROPONINI in the last 168 hours.       XR Surgery HARRIET Fluoro L/T 5 Min    Result Date: 5/17/2023  This exam was marked as non-reportable because it will not be read by a radiologist or a [place] non-radiologist provider.     POC US Guidance Needle Placement    Result Date: 5/17/2023  Ultrasound was performed as guidance to an anesthesia procedure.  Click \"PACS images\" hyperlink below to view any stored images.  For specific procedure details, view procedure note authored by anesthesia.      Latest radiology report personally reviewed.    Note created using dragon voice recognition software so sounds alike errors may have escaped editing.      05/18/2023   Dino Archer MD  Hospitalist, [facility]  Pager: [phone]                "

## 2023-05-18 NOTE — PLAN OF CARE
"  Problem: Plan of Care - These are the overarching goals to be used throughout the patient stay.    Goal: Plan of Care Review  Description: The Plan of Care Review/Shift note should be completed every shift.  The Outcome Evaluation is a brief statement about your assessment that the patient is improving, declining, or no change.  This information will be displayed automatically on your shift note.  Outcome: Progressing  Goal: Patient-Specific Goal (Individualized)  Description: You can add care plan individualizations to a care plan. Examples of Individualization might be:  \"Parent requests to be called daily at 9am for status\", \"I have a hard time hearing out of my right ear\", or \"Do not touch me to wake me up as it startles me\".  Outcome: Progressing  Goal: Absence of Hospital-Acquired Illness or Injury  Outcome: Progressing  Intervention: Identify and Manage Fall Risk  Recent Flowsheet Documentation  Taken 5/17/2023 1600 by Shara Valdes RN  Safety Promotion/Fall Prevention: activity supervised  Intervention: Prevent Skin Injury  Recent Flowsheet Documentation  Taken 5/17/2023 1600 by Shara Valdes RN  Body Position: position changed independently  Goal: Optimal Comfort and Wellbeing  Outcome: Progressing  Goal: Readiness for Transition of Care  Outcome: Progressing     Problem: Pain Acute  Goal: Optimal Pain Control and Function  Outcome: Progressing  Intervention: Prevent or Manage Pain  Recent Flowsheet Documentation  Taken 5/17/2023 1600 by Shara Valdes RN  Medication Review/Management: medications reviewed     Problem: Lower Extremity Revascularization  Goal: Absence of Bleeding  Outcome: Progressing  Goal: Effective Bowel Elimination  Outcome: Progressing  Goal: Fluid and Electrolyte Balance  Outcome: Progressing  Goal: Absence of Infection Signs and Symptoms  Outcome: Progressing  Goal: Anesthesia/Sedation Recovery  Outcome: Progressing  Intervention: Optimize Anesthesia Recovery  Recent " "Flowsheet Documentation  Taken 5/17/2023 1600 by Shara Valdes RN  Safety Promotion/Fall Prevention: activity supervised  Goal: Optimal Pain Control and Function  Outcome: Progressing  Goal: Nausea and Vomiting Relief  Outcome: Progressing  Goal: Effective Urinary Elimination  Outcome: Progressing  Goal: Effective Oxygenation and Ventilation  Outcome: Progressing  Intervention: Optimize Oxygenation and Ventilation  Recent Flowsheet Documentation  Taken 5/17/2023 1600 by Shara Valdes RN  Head of Bed (HOB) Positioning: HOB at 20-30 degrees  Goal: Effective Tissue Perfusion  Outcome: Progressing  Intervention: Optimize Tissue Perfusion  Recent Flowsheet Documentation  Taken 5/17/2023 1600 by Shara Valdes RN  Body Position: position changed independently     Problem: Diabetes Comorbidity  Goal: Blood Glucose Level Within Targeted Range  Outcome: Progressing     Problem: Hypertension Comorbidity  Goal: Blood Pressure in Desired Range  Outcome: Progressing  Intervention: Maintain Blood Pressure Management  Recent Flowsheet Documentation  Taken 5/17/2023 1600 by Shara Valdes RN  Medication Review/Management: medications reviewed   Goal Outcome Evaluation:  Pt is alert and oriented, uses call light appropriately.  Pt was tolerating clear liquid diet, so diet was advanced.  Pt eats, drinks, and takes pills without incident.  Pt's incision site is clean, dry, and intact, so s/s of bleeding or bruising at site.  Pt was given one dose of Oxycodone this evening for pain.  Pt's blood sugars have been elevated, pt states that Novolog doesn't \"work on\" her.  Pt usually takes Jardiance and Januvia which help keep her blood sugars under control.  Instructed pt that those medications will be started tomorrow.  Pt also states that she feels like she might be getting a yeast infection.  Sticky note was left for provider to address.  Pt has pulses with doppler on Right Dorsalis Pedis and Posterior Tibial, also Left " Posterior Tibial.  Pt has absent pulse on Left Dorsalis Pedis which vascular is aware of.

## 2023-05-18 NOTE — PROGRESS NOTES
"Vascular surgery progress note    Ashanti is in good spirits.  Pain is well controlled.  She is ambulating independently.  Tolerating regular diet.    /63 (BP Location: Right arm)   Pulse 78   Temp 97.9  F (36.6  C) (Oral)   Resp 18   Ht 1.575 m (5' 2\")   Wt 69.7 kg (153 lb 10.6 oz)   SpO2 99%   BMI 28.10 kg/m      Resting in bed, comfortable  Nonlabored breathing on room air  Left groin incision is clean dry intact, soft, no hematoma, Tegaderm dressing intact  Monophasic left DP and PT Doppler signals, biphasic right PT and monophasic right DP Doppler signals  Both feet are warm, normal color    WBC 17.6 (10.8 pre-op), hgb 10.8, plts 245  BMP unremarkable    A/P: 67 y.o. female with lifestyle limiting claudication now POD#1 s/p left femoral endarterectomy and iliac stents. Recovering well. Suspect leukocytosis is reactive.  Continue aspirin, plavix. Multimodal pain control. Regular diet. Cedeno removed. DVT chemoprophylaxis.  Anticipate discharge home tomorrow.    Saúl Molina MD      "

## 2023-05-19 VITALS
DIASTOLIC BLOOD PRESSURE: 56 MMHG | WEIGHT: 153.66 LBS | RESPIRATION RATE: 18 BRPM | TEMPERATURE: 99.9 F | SYSTOLIC BLOOD PRESSURE: 99 MMHG | OXYGEN SATURATION: 100 % | BODY MASS INDEX: 28.28 KG/M2 | HEIGHT: 62 IN | HEART RATE: 86 BPM

## 2023-05-19 LAB
ERYTHROCYTE [DISTWIDTH] IN BLOOD BY AUTOMATED COUNT: 14.1 % (ref 10–15)
GLUCOSE BLDC GLUCOMTR-MCNC: 107 MG/DL (ref 70–99)
GLUCOSE BLDC GLUCOMTR-MCNC: 119 MG/DL (ref 70–99)
HCT VFR BLD AUTO: 34.2 % (ref 35–47)
HGB BLD-MCNC: 11.1 G/DL (ref 11.7–15.7)
MCH RBC QN AUTO: 29.4 PG (ref 26.5–33)
MCHC RBC AUTO-ENTMCNC: 32.5 G/DL (ref 31.5–36.5)
MCV RBC AUTO: 91 FL (ref 78–100)
PLATELET # BLD AUTO: 248 10E3/UL (ref 150–450)
RBC # BLD AUTO: 3.77 10E6/UL (ref 3.8–5.2)
WBC # BLD AUTO: 14 10E3/UL (ref 4–11)

## 2023-05-19 PROCEDURE — 85027 COMPLETE CBC AUTOMATED: CPT | Performed by: STUDENT IN AN ORGANIZED HEALTH CARE EDUCATION/TRAINING PROGRAM

## 2023-05-19 PROCEDURE — 250N000011 HC RX IP 250 OP 636: Performed by: STUDENT IN AN ORGANIZED HEALTH CARE EDUCATION/TRAINING PROGRAM

## 2023-05-19 PROCEDURE — 250N000013 HC RX MED GY IP 250 OP 250 PS 637: Performed by: INTERNAL MEDICINE

## 2023-05-19 PROCEDURE — 99207 PR CDG-CUT & PASTE-POTENTIAL IMPACT ON LEVEL: CPT | Performed by: INTERNAL MEDICINE

## 2023-05-19 PROCEDURE — 36415 COLL VENOUS BLD VENIPUNCTURE: CPT | Performed by: STUDENT IN AN ORGANIZED HEALTH CARE EDUCATION/TRAINING PROGRAM

## 2023-05-19 PROCEDURE — 250N000013 HC RX MED GY IP 250 OP 250 PS 637: Performed by: STUDENT IN AN ORGANIZED HEALTH CARE EDUCATION/TRAINING PROGRAM

## 2023-05-19 PROCEDURE — 250N000013 HC RX MED GY IP 250 OP 250 PS 637: Performed by: SURGERY

## 2023-05-19 PROCEDURE — 99231 SBSQ HOSP IP/OBS SF/LOW 25: CPT | Performed by: INTERNAL MEDICINE

## 2023-05-19 RX ADMIN — EMPAGLIFLOZIN 25 MG: 25 TABLET, FILM COATED ORAL at 08:03

## 2023-05-19 RX ADMIN — LISINOPRIL 40 MG: 20 TABLET ORAL at 08:04

## 2023-05-19 RX ADMIN — CLOPIDOGREL BISULFATE 75 MG: 75 TABLET ORAL at 08:04

## 2023-05-19 RX ADMIN — ACETAMINOPHEN 975 MG: 325 TABLET ORAL at 06:09

## 2023-05-19 RX ADMIN — AMLODIPINE BESYLATE 10 MG: 5 TABLET ORAL at 08:04

## 2023-05-19 RX ADMIN — EZETIMIBE 10 MG: 10 TABLET ORAL at 08:04

## 2023-05-19 RX ADMIN — Medication 81 MG: at 08:04

## 2023-05-19 RX ADMIN — SITAGLIPTIN 100 MG: 25 TABLET, FILM COATED ORAL at 08:03

## 2023-05-19 RX ADMIN — HYDROCHLOROTHIAZIDE 25 MG: 25 TABLET ORAL at 08:04

## 2023-05-19 RX ADMIN — ENOXAPARIN SODIUM 40 MG: 40 INJECTION SUBCUTANEOUS at 06:10

## 2023-05-19 RX ADMIN — INSULIN DEGLUDEC INJECTION 30 UNITS: 100 INJECTION, SOLUTION SUBCUTANEOUS at 08:05

## 2023-05-19 ASSESSMENT — ACTIVITIES OF DAILY LIVING (ADL)
ADLS_ACUITY_SCORE: 20

## 2023-05-19 NOTE — PROGRESS NOTES
Care Management Discharge Note    Discharge Date: 05/19/2023       Discharge Disposition: Home    Discharge Services: None    Discharge DME: None    Discharge Transportation: family or friend will provide, car, drives self    Private pay costs discussed: Not applicable    Patient/family educated on Medicare website which has current facility and service quality ratings: no    Education Provided on the Discharge Plan:  yes  Persons Notified of Discharge Plans: yes  Patient/Family in Agreement with the Plan: yes    Handoff Referral Completed: No    Additional Information:  No needs anticipated from CM at discharge per pt; independent at baseline. Pt to follow up with PCP post discharge if needs arise. Family to transport home.  8:47 AM    KEVIN Fonseca  5/19/2023

## 2023-05-19 NOTE — PROGRESS NOTES
Vascular surgery progress note    No issues overnight.  Pain controlled.  Continues to tolerate diet.  Ambulating independently    Vitals reviewed  On exam resting company in bed, nonlabored breathing on room air  Left groin incision clean dry intact  Left foot is warm with monophasic DP and PT Doppler signals    Leukocytosis down to 14 from 17 yesterday, no signs of an infection, expect this is all reactive  Hemoglobin stable    Assessment/plan: 67-year-old female postop day 2 status post left femoral endarterectomy and iliac stents.  Recovering well.  Plan for discharge home today.  Continue aspirin and Plavix.  She will follow-up in vascular surgery clinic as scheduled    Saúl Molina MD

## 2023-05-19 NOTE — PLAN OF CARE
Problem: Pain Acute  Goal: Optimal Pain Control and Function  Outcome: Progressing  Intervention: Prevent or Manage Pain  Recent Flowsheet Documentation  Taken 5/19/2023 0100 by Maggie Smiley RN  Medication Review/Management: medications reviewed   Goal Outcome Evaluation:  Pt had a restful night.  Pain managed this shift with scheduled tylenol.  Rates pain 3/10 and mostly manifest as a headache.  Pt up independently in room.  Left groin soft though slightly swollen.

## 2023-05-19 NOTE — PLAN OF CARE
"  Problem: Plan of Care - These are the overarching goals to be used throughout the patient stay.    Goal: Plan of Care Review  Description: The Plan of Care Review/Shift note should be completed every shift.  The Outcome Evaluation is a brief statement about your assessment that the patient is improving, declining, or no change.  This information will be displayed automatically on your shift note.  Outcome: Met  Flowsheets (Taken 5/19/2023 1059)  Plan of Care Reviewed With: patient  Goal: Patient-Specific Goal (Individualized)  Description: You can add care plan individualizations to a care plan. Examples of Individualization might be:  \"Parent requests to be called daily at 9am for status\", \"I have a hard time hearing out of my right ear\", or \"Do not touch me to wake me up as it startles me\".  Outcome: Met  Goal: Absence of Hospital-Acquired Illness or Injury  Outcome: Met  Intervention: Identify and Manage Fall Risk  Recent Flowsheet Documentation  Taken 5/19/2023 0900 by Rainer Reveles RN  Safety Promotion/Fall Prevention: activity supervised  Intervention: Prevent Skin Injury  Recent Flowsheet Documentation  Taken 5/19/2023 0900 by Rainer Reveles RN  Body Position: position changed independently  Goal: Readiness for Transition of Care  Outcome: Met     Problem: Pain Acute  Goal: Optimal Pain Control and Function  Outcome: Met     Problem: Lower Extremity Revascularization  Goal: Absence of Bleeding  Outcome: Met  Goal: Effective Bowel Elimination  Outcome: Met  Goal: Fluid and Electrolyte Balance  Outcome: Met  Goal: Absence of Infection Signs and Symptoms  Outcome: Met  Goal: Anesthesia/Sedation Recovery  Outcome: Met  Intervention: Optimize Anesthesia Recovery  Recent Flowsheet Documentation  Taken 5/19/2023 0900 by Rainer Reveles RN  Safety Promotion/Fall Prevention: activity supervised  Goal: Optimal Pain Control and Function  Outcome: Met  Goal: Nausea and Vomiting Relief  Outcome: Met  Goal: Effective " Urinary Elimination  Outcome: Met  Goal: Effective Oxygenation and Ventilation  Outcome: Met  Intervention: Optimize Oxygenation and Ventilation  Recent Flowsheet Documentation  Taken 5/19/2023 0900 by Rainer Reveles RN  Head of Bed (HOB) Positioning: HOB at 20-30 degrees  Goal: Effective Tissue Perfusion  Outcome: Met  Intervention: Optimize Tissue Perfusion  Recent Flowsheet Documentation  Taken 5/19/2023 0900 by Rainer Reveles RN  Body Position: position changed independently     Problem: Diabetes Comorbidity  Goal: Blood Glucose Level Within Targeted Range  Outcome: Met     Problem: Hypertension Comorbidity  Goal: Blood Pressure in Desired Range  Outcome: Met   Goal Outcome Evaluation:      Plan of Care Reviewed With: patient

## 2023-05-19 NOTE — PLAN OF CARE
Problem: Lower Extremity Revascularization  Goal: Absence of Bleeding  Outcome: Progressing     Problem: Pain Acute  Goal: Optimal Pain Control and Function  Intervention: Prevent or Manage Pain  Recent Flowsheet Documentation  Taken 5/18/2023 9744 by RONALDO LEIJA  Medication Review/Management: medications reviewed   Goal Outcome Evaluation:       Pt a&ox4; able to make needs known; ambulatory; VSS; no drainage at incision site, liquid bandage/adhesive CDI; able to ambulate to toilet, continent of urine and bowels; pain well managed

## 2023-05-19 NOTE — PROGRESS NOTES
Daily Progress Note        CODE STATUS:  Prior    05/18/23  Assessment/Plan:  Patient is a 67 year old female with history significant for hypertension, DM-II, dyslipidemia, smoking, PAD who was admitted under Dr. Geller's service for elective surgery    PAD:  -- S/P left femoral endarterectomy with retrograde iliac stent placement on 5/17/23 with Dr Geller  -- Post op care per vascular team  -- Cont aspirin and plavix  -- Patient reports she quit smoking completely about a wk ago     DM-II  -- On Tresiba 30 units daily, PO Jardiance, Trajenta and Metformin at home  -- Resumed home meds except for metformin while here. Metformin resumed upon discharge  -- Covered with sliding scale     Hypertension:  -- Cont home meds with hold parameter    Possible vaginal candidiasis  -- Patient reports vaginal itching and symptoms just like in the past she had vaginal candidiasis  -- Fluconazole 150mg x 1    New leucocytosis  -- Likely reactive  -- Monitor    Disposition; Per vascular team       LOS: 1 day     Subjective:  Interval History: Patient was excited to go home. No new issues overnight.     Review of Systems:   As mentioned in subjective.    Patient Active Problem List   Diagnosis     Type 2 diabetes mellitus (H)     Tobacco use disorder     PAD (peripheral artery disease) (H)     Microalbuminuric diabetic nephropathy (H)     Hyperlipidemia with target LDL less than 70     Grade 3 vulvar intraepithelial neoplasia     GERD (gastroesophageal reflux disease)     Essential hypertension     Dyslipidemia     AIN III (anal intraepithelial neoplasia III)     Bilateral incipient cataracts     Condyloma acuminatum of vulva     Ischemic toe     Critical limb ischemia of left lower extremity (H)       Scheduled Meds:    Continuous Infusions:  PRN Meds:.    Objective:  Vital signs in last 24 hours:  Temp:  [98.2  F (36.8  C)-99.9  F (37.7  C)] 99.9  F (37.7  C)  Pulse:  [85-86] 86  Resp:  [18] 18  BP: ()/(56-63)  99/56  SpO2:  [95 %-100 %] 100 %        Intake/Output Summary (Last 24 hours) at 5/18/2023 1524  Last data filed at 5/18/2023 0630  Gross per 24 hour   Intake 1497 ml   Output 3350 ml   Net -1853 ml       Physical Exam:    General: Not in obvious distress.  HEENT: NC, AT   Chest: Clear to auscultation bilaterally  Heart: S1S2 normal, regular. No M/R/G  Abdomen: Soft. NT, ND. Bowel sounds- active.  Extremities: No legs swelling  Neuro: Alert and awake, grossly non-focal      Lab Results:(I have personally reviewed the results)    Recent Results (from the past 24 hour(s))   Glucose by meter    Collection Time: 05/18/23  4:48 PM   Result Value Ref Range    GLUCOSE BY METER POCT 276 (H) 70 - 99 mg/dL   Glucose by meter    Collection Time: 05/18/23  7:19 PM   Result Value Ref Range    GLUCOSE BY METER POCT 223 (H) 70 - 99 mg/dL   Glucose by meter    Collection Time: 05/18/23  8:59 PM   Result Value Ref Range    GLUCOSE BY METER POCT 239 (H) 70 - 99 mg/dL   Glucose by meter    Collection Time: 05/19/23  1:24 AM   Result Value Ref Range    GLUCOSE BY METER POCT 119 (H) 70 - 99 mg/dL   CBC with platelets    Collection Time: 05/19/23  5:42 AM   Result Value Ref Range    WBC Count 14.0 (H) 4.0 - 11.0 10e3/uL    RBC Count 3.77 (L) 3.80 - 5.20 10e6/uL    Hemoglobin 11.1 (L) 11.7 - 15.7 g/dL    Hematocrit 34.2 (L) 35.0 - 47.0 %    MCV 91 78 - 100 fL    MCH 29.4 26.5 - 33.0 pg    MCHC 32.5 31.5 - 36.5 g/dL    RDW 14.1 10.0 - 15.0 %    Platelet Count 248 150 - 450 10e3/uL   Glucose by meter    Collection Time: 05/19/23  7:27 AM   Result Value Ref Range    GLUCOSE BY METER POCT 107 (H) 70 - 99 mg/dL       All laboratory and imaging data in the past 24 hours reviewed  Serum Glucose range:   Recent Labs   Lab 05/19/23  0727 05/19/23  0124 05/18/23 2059 05/18/23  1919   * 119* 239* 223*     ABG: No lab results found in last 7 days.  CBC:   Recent Labs   Lab 05/19/23  0542 05/18/23  0544 05/17/23  0635   WBC 14.0* 17.6* 10.8  "  HGB 11.1* 10.8* 13.4   HCT 34.2* 34.1* 40.8   MCV 91 92 91    245 305   NEUTROPHIL  --   --  50   LYMPH  --   --  37   MONOCYTE  --   --  10   EOSINOPHIL  --   --  2     Chemistry:   Recent Labs   Lab 05/18/23  0544 05/17/23  0635     --    POTASSIUM 4.3 4.0   CHLORIDE 105  --    CO2 24  --    BUN 14.9  --    CR 0.72 0.82   GFRESTIMATED >90 79   SANTINO 8.8  --      Coags:  No results for input(s): INR, PROTIME, PTT in the last 168 hours.    Invalid input(s): APTT  Cardiac Markers:  No results for input(s): CKTOTAL, TROPONINI in the last 168 hours.       XR Surgery HARRIET Fluoro L/T 5 Min    Result Date: 5/17/2023  This exam was marked as non-reportable because it will not be read by a radiologist or a [place] non-radiologist provider.     POC US Guidance Needle Placement    Result Date: 5/17/2023  Ultrasound was performed as guidance to an anesthesia procedure.  Click \"PACS images\" hyperlink below to view any stored images.  For specific procedure details, view procedure note authored by anesthesia.      Latest radiology report personally reviewed.    Note created using dragon voice recognition software so sounds alike errors may have escaped editing.      05/19/2023   Dino Archer MD  Hospitalist, [facility]  Pager: [phone]                "

## 2023-05-22 ENCOUNTER — PATIENT OUTREACH (OUTPATIENT)
Dept: CARE COORDINATION | Facility: CLINIC | Age: 68
End: 2023-05-22
Payer: COMMERCIAL

## 2023-05-22 NOTE — PROGRESS NOTES
[facility] Care Resource Center Contact  [facility]/Voicemail     Clinical Data: Transitional Care Management Outreach     Outreach attempted x 2.  Left message on patient's voicemail, providing [facility]'s 24/7 scheduling and nurse triage phone number [phone] ([phone]) for questions/concerns and/or to schedule an appt with an [facility] provider, if they do not have a PCP.      Plan:  [facility] will do no further outreaches at this time.       Keren Saldana RN  Connected Care Resource Center, [facility]    *Connected Care Resource Team does NOT follow patient ongoing. Referrals are identified based on internal discharge reports and the outreach is to ensure patient has an understanding of their discharge instructions.

## 2023-05-24 ENCOUNTER — TELEPHONE (OUTPATIENT)
Dept: VASCULAR SURGERY | Facility: CLINIC | Age: 68
End: 2023-05-24
Payer: COMMERCIAL

## 2023-05-24 NOTE — TELEPHONE ENCOUNTER
Caller: Ashanti    Provider: MD Dyan Geller    Detailed reason for call: Ashanti is calling following up on the [place] paperwork she brought the day of surgery with Dr. Geller. She states she called HR and as of today they said they don't have it.    Best phone number to contact: [phone]    Best time to contact: any    Ok to leave a detailed message: Yes    Ok to speak to authorized person if needed: No      (Noted to patient if reason is related to wound or incision, to please send a photo via email or [employer].)

## 2023-05-24 NOTE — TELEPHONE ENCOUNTER
Updated Ashanti we will ask Tammy Ramirez to sign the forms tomorrow. Patient is requesting 6-8 weeks off of work

## 2023-05-25 NOTE — TELEPHONE ENCOUNTER
Forms signed by Dr. Saúl Molina and faxed to [employer]. Updated patient. She did not complete the patient portion.

## 2023-05-31 NOTE — OP NOTE
VASCULAR SURGERY OPERATIVE REPORT        LOCATION:    [facility]    Ashanti Aviles   Medical Record #:  [id number]  YOB: 1955  Age:  67 year old     Date of Service: May 17, 2023    PRIMARY CARE PROVIDER: No Ref-Primary, Physician    Preoperative diagnosis    Peripheral arterial disease  Left lower extremity lifestyle in claudication  Healed left toe wound    Postoperative diagnosis    Same      Surgeon: Dyan Geller MD, RPVI     Assistant: Saúl Molina MD, vascular surgery resident                    Tammy Ramirez PA-C                         Name of the procedure    Exposure of the left common femoral artery  left iliofemoral endarterectomy with bovine patch angioplasty  Placement of the catheter in the aorta  Aortogram  Placement of the balloon expandable noncovered stent 8 mm x 29 mm VISI pro stent nto left common iliac artery  Interpretation of radiologic findings  left iliofemoral angiogram    Anesthesia:    General    Indication for procedure:    67-year-old female with known history of peripheral chill disease.  Initially, patient was seen in vascular surgery clinic for concern of left lower extremity chronic limb threatening ischemia with nonhealing wounds involving the left first toe.  Initially we decided to go ahead with left iliofemoral endarterectomy with possible distal bypass operation.  Unfortunately, patient does not have a good venous conduit.  Fortunately, the left toe wound has healed so we decided to proceed with a staged approach.  Preoperatively CT scan did show significant left iliofemoral disease requiring iliofemoral endarterectomy with possible retrograde intervention.  We decided to do that instead.  Risk and benefits of this operation were explained, patient agreed to proceed.    Description of procedure:    Operative details:    Patient was brought to the operating room and transferred to the operating room table in stable condition.  Patient  was induced with general endotracheal anesthesia.  Cedeno catheter was then placed without any incident.  Patient was then positioned in the supine position with both arms out.  Patient was then prepped and draped from the level of the umbilicus down to the level of the left knee in the usual sterile fashion.    After completing the appropriate timeout procedure, an oblique incision was made just inferior to the left groin crease.  The incision was carried down through the subcutaneous tissue and Meli's fascia.  The inguinal ligament was then identified superiorly and mobilized medially and laterally.  The femoral sheath was then opened longitudinally to expose the common femoral artery.  Proximal control of the common femoral artery was then obtained using a vessel loop.  Attention was then turned to obtain distal control.  The superficial femoral artery was noted to be significantly calcified and it was encircled with a vessel loop.  The profunda femoral artery was similarly noted to be calcified.  Dissection was carried down to the secondary branches of the profundofemoral artery which were both encircled with a vessel loop.  Small side branches were then clipped and divided.  Patient was then systemically heparinized with 6000units of heparin with plans to redose every 45 minutes as needed.  After approximately 3 minutes of circulation time, the superficial femoral artery was clamped using an angled DeBakey clamp and the profundofemoral arteries or controlled with a [place] C-clamp.  The proximal common femoral artery was clamped using an angled DeBakey clamp.  Eleven blade was then used to make a small nick in the soft spot in the common femoral artery.  This was extended proximally using Guerrero scissors and distally onto the profundofemoral artery.  Extensive atherosclerotic calcific disease was noted.  This was freed from the arterial wall using a plaque elevator.  Attention was first turned towards removing  as much plaque proximally as possible.  After completing endarterectomy of the proximal common femoral artery, attention was then turned to removing the plaque distally.  Endarterectomy was performed of the superficial femoral artery and carried down onto the profundofemoral artery to an appropriate distal endpoint.  There was noted to be appropriate backbleeding from the superficial femoral and profundofemoral arteries.  There was also noted to be as expected weak pulsatile inflow from the proximal common femoral artery.  A combination of peanut, heparinized saline, and Mills forceps were used to remove any loose intimal flaps.  After ensuring no further intimal flaps and a satisfactory endarterectomy, a 1 cm x 10 cm bovine pericardial patch was trimmed to size and used to close the arteriotomy using a running 5-0 Prolene suture on an RB 2 needle.  Standard flushing maneuvers were performed prior to completion of the suture line.  The profunda femoral artery clamps were first removed followed by the common femoral artery clamp, and the superficial femoral artery with the last to be removed.  Thrombin-soaked Gelfoam was used initially to obtain some hemostasis along the suture line.  There was noted to be some bleeding from various points of the suture line which were controlled with a figure-of-eight 6-0 Prolene suture.  There was also noted to be a small hole on the medial side of the superficial femoral artery which was closed with a interrupted 6-0 Prolene suture.  After appropriate hemostasis, attention was then turned to retrograde iliac intervention.  A 5-0 Prolene U stitch was placed in the middle of the bovine pericardial patch and secured with a rubber-shod snap.  A 19-gauge needle was then inserted through this U stitch and a 0.035 inch glide advantage wire was then advanced into the aorta under fluoroscopic guidance.  There was noted to be no resistance in advancing the glide advantage wire.  Over this  wire, a 8 Solomon Islander sheath was then placed into the common femoral artery.  An Omni Flush catheter was then advanced into the aorta.  Using an CHRISTINE  obliquity and magnification, and iliac angiogram was then performed.  There was noted to be significant stenosis of the distal left common iliac artery with extension to proximal external iliac artery.  Over the Omni Flush catheter, a 035 wire was then advanced into the aorta.  The Omni Flush catheter was then removed.  We proceeded with the placement of the left common iliac artery VBX stent first.  Then a Visi-Pro stent was placed distally with good overlap.  Using 8 mm balloon were able to perform a post stent balloon angioplasty of the VBX stent.  Repeat angiogram did show excellent patency of the left iliofemoral region with excellent flow into the profunda femoris.  The sheath was removed and 5-0 Prolene suture was tied down with good hemostasis.  A total of 50 mg of protamine were administered.  Thrombin-soaked Gelfoam was placed along the suture line of the patch and pressure was held.  There were noted to be a few areas along the suture line with persistent bleeding requiring figure-of-eight 6-0 Prolene sutures.  After ensuring appropriate hemostasis from the suture line, Surgi-Andrade hemostatic agent was placed along the suture line.  The femoral sheath was then closed with a running 2-0 Vicryl suture.  The Meli's fascia was closed with a running 2-0 Vicryl suture.  The subcutaneous tissue and deep dermal layers were closed with a running 3-0 Vicryl suture.  The skin was closed with a running 4-0 Monocryl subcuticular suture.  The incision was dressed with Dermabond and a Tegaderm.  All needle, instrument, and sponge counts were correct at the end of the case.    Patient was extubated without incident.  Patient tolerated the operation well without any complication.  Patient was then transferred to the postanesthesia care unit in stable condition.    The assistance  of Tammy Ramirez PA-C, was required in this case due to extreme complexity of this operation.  Tammy Ramirez PA-C assisted by performing and providing most of the steps of this operation including tissue dissection, vascular anastomosis, and wound closure. Tammy's assistance was also necessary because  a fellow/resident was not fully qualified to assist with all steps of the procedure.      Estimated blood [id number] cc    Specimens: Femoral plaque    Complications: None              Dyan Geller MD,  VI  VASCULAR AND ENDOVASCULAR SURGERY   [facility] Vascular Surgery  [phone]  Fax: [phone]

## 2023-06-04 ENCOUNTER — HEALTH MAINTENANCE LETTER (OUTPATIENT)
Age: 68
End: 2023-06-04

## 2023-06-05 ENCOUNTER — ALLIED HEALTH/NURSE VISIT (OUTPATIENT)
Dept: VASCULAR SURGERY | Facility: CLINIC | Age: 68
End: 2023-06-05
Payer: COMMERCIAL

## 2023-06-05 VITALS
RESPIRATION RATE: 12 BRPM | DIASTOLIC BLOOD PRESSURE: 68 MMHG | TEMPERATURE: 97.7 F | HEART RATE: 72 BPM | SYSTOLIC BLOOD PRESSURE: 112 MMHG

## 2023-06-05 DIAGNOSIS — T81.329A: Primary | ICD-10-CM

## 2023-06-05 DIAGNOSIS — I73.9 PAD (PERIPHERAL ARTERY DISEASE) (H): ICD-10-CM

## 2023-06-05 PROCEDURE — 97602 WOUND(S) CARE NON-SELECTIVE: CPT

## 2023-06-05 ASSESSMENT — PAIN SCALES - GENERAL: PAINLEVEL: MILD PAIN (3)

## 2023-06-05 NOTE — DISCHARGE SUMMARY
[facility]  VASCULAR SURGERY  DISCHARGE SUMMARY    Ashanti Aviles MRN# [id number]   YOB: 1955 Age: 67 year old     Date of Admission:  5/17/2023  Date of Discharge::  5/19/2023 11:13 AM  Admitting Physician:  Dyan Geller MD  Discharge Physician:  Dyan Geller MD  Primary Care Physician:        No Ref-Primary, Physician          Admission Diagnoses:   PAD (peripheral artery disease) (H) [I73.9]  Left lower extremity lifestyle-limiting claudication            Discharge Diagnosis:   Same as above         Procedures:   Left iliofemoral endarterectomy with bovine patch angioplasty with retrograde left common iliac artery stenting          Consultations:   PHYSICAL THERAPY ADULT IP CONSULT  OCCUPATIONAL THERAPY ADULT IP CONSULT  CARE MANAGEMENT / SOCIAL WORK IP CONSULT          Brief History of Illness:   67-year-old female with known history of peripheral artery disease.  Initially, patient was seen in vascular surgery clinic for concern of left lower extremity chronic limb threatening ischemia with nonhealing wounds involving the left first toe.  Initially we decided to go ahead with left iliofemoral endarterectomy with possible distal bypass operation.  Unfortunately, patient does not have a good venous conduit.  Fortunately, the left toe wound has healed so we decided to proceed with a staged approach.  Preoperatively CT scan did show significant left iliofemoral disease requiring iliofemoral endarterectomy with possible retrograde intervention.  We decided to do that instead.  Risk and benefits of this operation were explained, patient agreed to proceed.           Hospital Course:   The patient underwent the above state procedure on 5/17/23, which was she tolerated well without immediate complications. She was transferred to the PACU and then floor for routine postoperative care. The remainder of her postoperative course was unremarkable. On the day of discharge,  "she was tolerating regular diet, her pain was well controlled with oral pain medications, she was voiding spontaneously, and ambulating independently. Patient with follow up in vascular surgery clinic.           Imaging Studies:     Results for orders placed or performed during the hospital encounter of 05/17/23   POC US Guidance Needle Placement    Narrative    Ultrasound was performed as guidance to an anesthesia procedure.  Click   \"PACS images\" hyperlink below to view any stored images.  For specific   procedure details, view procedure note authored by anesthesia.   XR Surgery HARRIET Fluoro L/T 5 Min    Narrative    This exam was marked as non-reportable because it will not be read by a   radiologist or a [place] non-radiologist provider.                    Medications Prior to Admission:     No medications prior to admission.              Discharge Medications:     Discharge Medication List as of 5/19/2023 10:27 AM      START taking these medications    Details   acetaminophen (TYLENOL) 500 MG tablet Take 2 tablets (1,000 mg) by mouth every 6 hours for 7 days, OTC      aspirin 81 MG EC tablet Take 1 tablet (81 mg) by mouth daily, OTC      oxyCODONE (ROXICODONE) 5 MG tablet Take 1 tablet (5 mg) by mouth every 4 hours as needed for moderate pain, Disp-15 tablet, R-0, E-Prescribe         CONTINUE these medications which have NOT CHANGED    Details   amLODIPine (NORVASC) 10 MG tablet [AMLODIPINE (NORVASC) 10 MG TABLET] Take 10 mg by mouth daily., Historical      clopidogrel (PLAVIX) 75 MG tablet Take 75 mg by mouth daily, Historical      empagliflozin (JARDIANCE) 25 MG TABS tablet Take 1 tablet (25 mg) by mouth daily, Disp-90 tablet, R-1, No Print Out      ezetimibe (ZETIA) 10 MG tablet Take 10 mg by mouth daily, Historical      hydroCHLOROthiazide (HYDRODIURIL) 25 MG tablet [HYDROCHLOROTHIAZIDE (HYDRODIURIL) 25 MG TABLET] Take 25 mg by mouth daily., Historical      Insulin Degludec (TRESIBA) 100 UNIT/ML SOLN Inject " 30 Units Subcutaneous daily, Historical      linagliptin (TRADJENTA) 5 MG TABS tablet Take 5 mg by mouth daily, Historical      lisinopril (PRINIVIL,ZESTRIL) 40 MG tablet [LISINOPRIL (PRINIVIL,ZESTRIL) 40 MG TABLET] Take 40 mg by mouth daily., Historical      metFORMIN (GLUCOPHAGE XR) 500 MG 24 hr tablet Take 1,000 mg by mouth daily, Historical      omeprazole (PRILOSEC) 20 MG DR capsule Take 20 mg by mouth daily as needed, Historical                       Day of Discharge Physical Exam:    On exam resting company in bed, nonlabored breathing on room air  Left groin incision clean dry intact  Left foot is warm with monophasic DP and PT Doppler signals           Discharge Instructions and Follow-Up:     Discharge Procedure Orders   Reason for your hospital stay   Order Comments: Femoral artery endarterectomy and iliac artery stent to improve blood flow to your left leg.     Follow-up and recommended labs and tests    Order Comments: Follow-up in vascular surgery clinic as scheduled on June 5, 2023 for wound check.     Activity   Order Comments: Your activity upon discharge: activity as tolerated     Order Specific Question Answer Comments   Is discharge order? Yes      Wound care and dressings   Order Comments: Instructions to care for your wound at home:  Your wound is closed with absorbable suture, skin glue, and a clear plastic dressing (Tegaderm). Leave Tegaderm dressing in place for two weeks, then you may remove.  Okay to shower and pat dry. Do not soak your incision for two weeks (no tub baths, hot tubs, lakes, or swimming pools).     Diet   Order Comments: Follow this diet upon discharge: Orders Placed This Encounter      Advance Diet as Tolerated: Regular Diet Adult; Regular Diet Adult     Order Specific Question Answer Comments   Is discharge order? Yes              Discharge Disposition:     Discharged to home      Condition at discharge: Stable    Patient was discussed with staff, Dr. Geller, on the day  of discharge.    Saúl Molina MD

## 2023-06-05 NOTE — PROGRESS NOTES
[facility]  -  Nurse Visit        Date of Service:  June 5, 2023     Requesting Provider: MD Dyan Geller/ Christina GUERIN to sign FMLA paper work patient gave to me.    Diagnosis:     ICD-10-CM    1. Dehiscence of internal surgical incision  T81.32XA Wound care      2. PAD (peripheral artery disease) (H)  I73.9           Chief Complaint: Ashanti is being seen today at [facility] for her 5/17/23 left Fem pop bypass incision check . Reports pain of 3.  Denies any fevers, chills, or generalized ill feeling.     Dressing on Arrival: no dressings,  Upon removal of dressings slight Serosanguinous drainage is noted.    New Wounds noted: Yes: left groin incision.    Vital Signs: /68   Pulse 72   Temp 97.7  F (36.5  C)   Resp 12     Assessment:      General:  Patient presents to clinic in no apparent distress.   Psychiatric:  Alert and oriented x3.   Lower extremity:  edema is not present.    Integumentary:  Skin is WNL    Circumferential volume measures:       View : No data to display.                Wound info:  VASC Wound Left Hallux (Active)   Pre Size Length 0 04/21/23 0800   Pre Size Width 0 04/21/23 0800   Pre Size Depth 0 04/21/23 0800   Pre Total Sq cm 0 04/21/23 0800   Post Size Length 0 05/12/23 0900   Post Size Width 0 05/12/23 0900   Post Size Depth 0 05/12/23 0900   Post Total Sq cm 0 05/12/23 0900       VASC Wound surgical (Active)   Pre Size Length 0.2 06/05/23 0800   Pre Size Width 1 06/05/23 0800   Pre Size Depth 0.2 06/05/23 0800   Pre Total Sq cm 0.2 06/05/23 0800   Product Used Alginate 06/05/23 0800       Incision/Surgical Site 09/14/21 Rectum (Active)       Incision/Surgical Site 02/18/23 Right Toe (Comment  which one) (Active)       Incision/Surgical Site 05/17/23 Left Groin (Active)   Incision Assessment WDL 05/19/23 0900   Closure Liquid bandage 05/19/23 0900   Incision Drainage Amount None 05/19/23 0900   Incision Care Ice  applied 05/17/23 1300   Dressing Intervention Clean, dry, intact 05/19/23 0900       Undermining is not present.    The periwoundskin is WNL      Plan:         1. Patient will return in 6/26/23 US and Dr f/u weeks for post op follow up .            2. As listed below, treatment provided irrigation, mechanical cleansing, and dressings to promote autolytic debridement.             Cleansed with: Normal saline    Protected skin with: 3M Cavilon    Dressings Applied to wound: Silver alginate and Foam border    Compression Applied to the right leg: None  Compression Applied to the left leg: None          Offloading used: None    Trial Products: No    Provider notified regarding concerns: No    Treatment Changes: Yes: start dressing changes EOD call if any questions or concerns    Tolerated Dressing Change:  Yes    Taught Regarding: follow up appointment(s), wound cares, wound care supplies, compliance, signs of infection and Continue to work on not smoking and PAD s/s if return call ASAP.    Educational Barriers: No barriers      Justice Sanchez RN

## 2023-06-05 NOTE — PATIENT INSTRUCTIONS
Wound care supplies were not ordered or needed today.        Wound Care Instructions    EVERY OTHER DAY and as needed, Cleanse your left groin wound(s) with Normal saline.    Pat Dry with non-sterile gauze    Apply Lotion to the intact skin surrounding your wound and other dry skin locations. Some good lotions include: Remedy Skin Repair Cream, Sarna, Vanicream or Cetaphil    Primary Dressing: Apply aquacel  into/onto the wounds    Secondary dressing: Cover with gauze or foam adhesive    Secure  avoid adhesive directly on the skin      It is not ok to get your wound wet in the bath or shower      If for some reason you are not able to get your dressing(s) changed as outlined above (due to illness, lack of supplies, lack of help) please do the following: remove old, soiled dressings; wash the wounds with saline; pat dry; apply ABD pad or other absorbant pad and secure with rolled gauze; avoid tape directly on your skin; Call the clinic as soon as possible to let us know what the current issues are in receiving wound care [phone].      SEEK MEDICAL CARE IF:  You have an increase in swelling, pain, or redness around the wound.  You have an increase in the amount of pus coming from the wound.  There is a bad smell coming from the wound.  The wound appears to be worsening/enlarging  You have a fever greater than 101.5 F      It is ok to continue current wound care treatment/products for the next 2-3 days until new wound care supplies are ordered and arrive. If longer than this please contact our office at [phone].    If you have a 2 layer or 4 layer compression wrap on these are safe to have on for ONLY 7 days. If for some reason you are not able to get the wrap(s) changed (due to illness; lack of supplies, lack of help, lack of transportation) please do the following: unwrap the old 2 or 4 layer compression wrap; avoid using scissors as you could cut your skin and cause wounds; use tubular compression when  available. Call to reschedule your home care or clinic visit appointment as soon as possible.    Please NOTE: if you are 15 minutes late to your clinic appointment you will have to be rescheduled. Please call our clinic as soon as possible if you know you will not be able to get to your appointment at [phone].    If you fail to show up to 3 scheduled clinic appointments you will be dismissed from our clinic.              We want to hear from you!  In the next few weeks, you should receive a call or email to complete a survey about your visit at [facility]. Please help us improve your appointment experience by letting us know how we did today. We strive to make your experience good and value any ways in which we could do better.      We value your input and suggestions.    Thank you for choosing the [facility]!

## 2023-06-26 ENCOUNTER — ANCILLARY PROCEDURE (OUTPATIENT)
Dept: VASCULAR ULTRASOUND | Facility: CLINIC | Age: 68
End: 2023-06-26
Attending: SURGERY
Payer: COMMERCIAL

## 2023-06-26 ENCOUNTER — OFFICE VISIT (OUTPATIENT)
Dept: VASCULAR SURGERY | Facility: CLINIC | Age: 68
End: 2023-06-26
Attending: SURGERY
Payer: COMMERCIAL

## 2023-06-26 VITALS
SYSTOLIC BLOOD PRESSURE: 120 MMHG | HEART RATE: 68 BPM | OXYGEN SATURATION: 99 % | BODY MASS INDEX: 28.71 KG/M2 | DIASTOLIC BLOOD PRESSURE: 77 MMHG | HEIGHT: 62 IN | WEIGHT: 156 LBS

## 2023-06-26 DIAGNOSIS — I73.9 PAD (PERIPHERAL ARTERY DISEASE) (H): ICD-10-CM

## 2023-06-26 DIAGNOSIS — I73.9 PAD (PERIPHERAL ARTERY DISEASE) (H): Primary | ICD-10-CM

## 2023-06-26 PROCEDURE — 93926 LOWER EXTREMITY STUDY: CPT | Mod: 26 | Performed by: SURGERY

## 2023-06-26 PROCEDURE — 93926 LOWER EXTREMITY STUDY: CPT | Mod: LT

## 2023-06-26 PROCEDURE — 93923 UPR/LXTR ART STDY 3+ LVLS: CPT | Mod: 26 | Performed by: SURGERY

## 2023-06-26 PROCEDURE — 99024 POSTOP FOLLOW-UP VISIT: CPT | Performed by: SURGERY

## 2023-06-26 PROCEDURE — 93923 UPR/LXTR ART STDY 3+ LVLS: CPT

## 2023-06-26 PROCEDURE — G0463 HOSPITAL OUTPT CLINIC VISIT: HCPCS | Mod: 25 | Performed by: SURGERY

## 2023-06-26 RX ORDER — AMLODIPINE BESYLATE AND ATORVASTATIN CALCIUM 10; 10 MG/1; MG/1
10 TABLET, FILM COATED ORAL
COMMUNITY
End: 2023-09-08

## 2023-06-26 NOTE — PROGRESS NOTES
"[facility]    US BEFORE - 6 wk f/u s/p fem/pop bypass/left groin incision opened 6/5/23 aquacel/foam change EOD/ FMLA off until 6/27/23    Patient is here for a  follow up  Pt is currently taking Plavix. ON ZETIA not taking ASA    Patient is a diabetic. Part of her nail came off her left great toe last night. There is drainage.          /77   Pulse 68   Ht 5' 2\" (1.575 m)   Wt 156 lb (70.8 kg)   SpO2 99%   BMI 28.53 kg/m   dr. Ordaz sees for left great toe- HAS QUESTIONS    The provider has been notified that the patient WANTD GABAPENTINE REFILL    Refills are needed: N/A    Has homecare services and agency name:  Daphne Mcdermott    "

## 2023-06-26 NOTE — PROGRESS NOTES
VASCULAR SURGERY PROGRESS NOTE   VASCULAR SURGEON: Dyan Geller MD, RPVI     LOCATION:  [facility]     Ashanti Aviles   Medical Record #:  [id number]  YOB: 1955  Age:  67 year old     Date of Service: 6/26/2023    PRIMARY CARE PROVIDER: No Ref-Primary, Physician      Reason for visit: Follow-up    IMPRESSION: 67-year-old female status post left iliofemoral endarterectomy with retrograde iliac intervention.  ABIs are relatively stable with slightly improved toe pressures.  She still has nonhealing wounds involving first toe stable.    RECOMMENDATION/RISKS: I would like to continue watching her for now.  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  Potentially she may need a bypass operation, but unfortunately, she does not have good conduit.  We will follow-up in few months to see the progress    HPI:  Ashanti Aviles is a 67 year old female who was seen today for follow-up.  Patient is doing well, denies any complaints    REVIEW OF SYSTEMS:    A 12 point ROS was reviewed and is negative      PHH:    Past Medical History:   Diagnosis Date     Diabetes mellitus (H)      Gastroesophageal reflux disease      HLD (hyperlipidemia)      Hypertension      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/29/2013     PVD (peripheral vascular disease) (H)           Past Surgical History:   Procedure Laterality Date     AMPUTATE TOE(S) Right 2/18/2023    Procedure: Amputation fourth toe right foot;  Surgeon: Benjamin Diez DPM;  Location: [facility] [place], [place]     ANUS SURGERY       BIOPSY CERVICAL, LOCAL EXCISION, SINGLE/MULTIPLE       COLONOSCOPY N/A 9/11/2020    Procedure: EXAM UNDER ANESTHESIA, HIGH RESOLUTION ANOSCOPY INTRA OP;  Surgeon: Preeti Sheehan MD;  Location: [facility];  Service: Gastroenterology     COLONOSCOPY N/A 12/14/2020    Procedure: EXAM UNDER ANESTHESIA WITH HIGH RESOLUTION ANOSCOPY;  Surgeon: Preeti Sheehan MD;  Location: [facility]  OR;  Service: General     COLONOSCOPY N/A 6/15/2021    Procedure: EXAM UNDER ANESTHESIA WITH HIGH RESOLUTION ANOSCOPY, BIOPSY, FULGURATION;  Surgeon: Preeti Sheehan MD;  Location: [facility];  Service: Gastroenterology     ENDARTERECTOMY FEMORAL Left 5/17/2023    Procedure: LEFT FEMORAL ENDARTERECTOMY WITH RETROGRADE ILIAC STENT;  Surgeon: Dyan Geller MD;  Location: [facility]     EXAM UNDER ANESTHESIA ANUS N/A 9/14/2021    Procedure: EXAM UNDER ANESTHESIA WITH HIGH RESOLUTION ANOSCOPY;  Surgeon: Preeti Sheehan MD;  Location: [facility]     IR LOWER EXTREMITY ANGIOGRAM LEFT  3/23/2023     IR LOWER EXTREMITY ANGIOGRAM RIGHT  2/16/2023       ALLERGIES:  Ezetimibe, Oxycodone, Penicillins, Simvastatin, and Victoza [liraglutide]    MEDS:    Current Outpatient Medications:      amLODIPine (NORVASC) 10 MG tablet, [AMLODIPINE (NORVASC) 10 MG TABLET] Take 10 mg by mouth daily., Disp: , Rfl:      clopidogrel (PLAVIX) 75 MG tablet, Take 75 mg by mouth daily, Disp: , Rfl:      empagliflozin (JARDIANCE) 25 MG TABS tablet, Take 1 tablet (25 mg) by mouth daily, Disp: 90 tablet, Rfl: 1     ezetimibe (ZETIA) 10 MG tablet, Take 10 mg by mouth daily, Disp: , Rfl:      hydroCHLOROthiazide (HYDRODIURIL) 25 MG tablet, [HYDROCHLOROTHIAZIDE (HYDRODIURIL) 25 MG TABLET] Take 25 mg by mouth daily., Disp: , Rfl:      Insulin Degludec (TRESIBA) 100 UNIT/ML SOLN, Inject 30 Units Subcutaneous daily, Disp: , Rfl:      lisinopril (PRINIVIL,ZESTRIL) 40 MG tablet, [LISINOPRIL (PRINIVIL,ZESTRIL) 40 MG TABLET] Take 40 mg by mouth daily., Disp: , Rfl:      metFORMIN (GLUCOPHAGE XR) 500 MG 24 hr tablet, Take 1,000 mg by mouth daily, Disp: , Rfl:      omeprazole (PRILOSEC) 20 MG DR capsule, Take 20 mg by mouth daily as needed, Disp: , Rfl:      amLODIPine-atorvastatin (CADUET) 10-10 MG tablet, Take 10 mg by mouth, Disp: , Rfl:      aspirin 81 MG EC tablet, Take 1 tablet (81 mg) by mouth daily (Patient not  "taking: Reported on 6/26/2023), Disp: , Rfl:      linagliptin (TRADJENTA) 5 MG TABS tablet, Take 5 mg by mouth daily (Patient not taking: Reported on 6/26/2023), Disp: , Rfl:      oxyCODONE (ROXICODONE) 5 MG tablet, Take 1 tablet (5 mg) by mouth every 4 hours as needed for moderate pain (Patient not taking: Reported on 6/5/2023), Disp: 15 tablet, Rfl: 0    SOCIAL HABITS:    History   Smoking Status     Former     Packs/day: 0.25     Types: Cigarettes     Quit date: 2/12/2023   Smokeless Tobacco     Never     Social History    Substance and Sexual Activity      Alcohol use: Not Currently        Comment: Alcoholic Drinks/day: 2x      History   Drug Use Unknown       FAMILY HISTORY:  No family history on file.    PE:  /77   Pulse 68   Ht 5' 2\" (1.575 m)   Wt 156 lb (70.8 kg)   SpO2 99%   BMI 28.53 kg/m    Wt Readings from Last 1 Encounters:   06/26/23 156 lb (70.8 kg)     Body mass index is 28.53 kg/m .    EXAM:  GENERAL: This is a well-developed 67 year old female who appears her stated age  EYES: Grossly normal.  MOUTH: Buccal mucosa normal   CARDIAC: Normal   CHEST/LUNG: Clear to auscultation bilaterally  GASTROINTESINAL soft nontender nondistended  MUSCULOSKELETAL: Grossly normal and both lower extremities are intact.  HEME/LYMPH: No lymphedema  NEUROLOGIC: Focally intact, Alert and oriented x 3.   PSYCH: appropriate affect  INTEGUMENT: No open lesions or ulcers  Pulse Exam:           DIAGNOSTIC STUDIES:     Images:  No results found.      LABS:      Sodium   Date Value Ref Range Status   05/18/2023 138 136 - 145 mmol/L Final   02/18/2023 139 136 - 145 mmol/L Final   02/17/2023 136 136 - 145 mmol/L Final     Urea Nitrogen   Date Value Ref Range Status   05/18/2023 14.9 8.0 - 23.0 mg/dL Final   02/18/2023 10.5 8.0 - 23.0 mg/dL Final   02/17/2023 8.0 8.0 - 23.0 mg/dL Final     Hemoglobin   Date Value Ref Range Status   05/19/2023 11.1 (L) 11.7 - 15.7 g/dL Final   05/18/2023 10.8 (L) 11.7 - 15.7 g/dL Final "   05/17/2023 13.4 11.7 - 15.7 g/dL Final     Platelet Count   Date Value Ref Range Status   05/19/2023 248 150 - 450 10e3/uL Final   05/18/2023 245 150 - 450 10e3/uL Final   05/17/2023 305 150 - 450 10e3/uL Final     INR   Date Value Ref Range Status   03/23/2023 0.83 (L) 0.85 - 1.15 Final   02/16/2023 0.97 0.85 - 1.15 Final       30 minutes spent on the day of encounter doing chart review, history and exam, documentation, and further activities as noted.          Dyan Geller MD, [facility]  VASCULAR SURGERY

## 2023-06-26 NOTE — PATIENT INSTRUCTIONS
Nba Burrell,    Thank you for entrusting your care with us today. After your visit today with MD Dyan Geller this is the plan that was discussed at your appointment.    Follow up with Dr. Ordaz for toe    Follow up with Dr. Geller in 1 month    Ok to return to work on 7/10/23 without restrictions    I am including additional information on these things and our contact information if you have any questions or concerns.   Please do not hesitate to reach out to us if you felt we did not answer your questions or you are unsure of the treatment plan after your visit today. Our number is [phone].Thank you for trusting us with your care.         Again thank you for your time.

## 2023-06-29 ENCOUNTER — OFFICE VISIT (OUTPATIENT)
Dept: VASCULAR SURGERY | Facility: CLINIC | Age: 68
End: 2023-06-29
Attending: PODIATRIST
Payer: COMMERCIAL

## 2023-06-29 VITALS
WEIGHT: 156 LBS | DIASTOLIC BLOOD PRESSURE: 82 MMHG | OXYGEN SATURATION: 100 % | BODY MASS INDEX: 28.71 KG/M2 | HEART RATE: 73 BPM | HEIGHT: 62 IN | SYSTOLIC BLOOD PRESSURE: 127 MMHG

## 2023-06-29 DIAGNOSIS — L03.032 PARONYCHIA OF TOE, LEFT: Primary | ICD-10-CM

## 2023-06-29 PROCEDURE — 99213 OFFICE O/P EST LOW 20 MIN: CPT | Performed by: PODIATRIST

## 2023-06-29 PROCEDURE — G0463 HOSPITAL OUTPT CLINIC VISIT: HCPCS | Performed by: PODIATRIST

## 2023-06-29 RX ORDER — GABAPENTIN 300 MG/1
300 CAPSULE ORAL 2 TIMES DAILY
Qty: 28 CAPSULE | Refills: 0 | Status: ON HOLD | OUTPATIENT
Start: 2023-06-29 | End: 2023-09-08

## 2023-06-29 RX ORDER — SULFAMETHOXAZOLE/TRIMETHOPRIM 800-160 MG
1 TABLET ORAL 2 TIMES DAILY
Qty: 20 TABLET | Refills: 0 | Status: SHIPPED | OUTPATIENT
Start: 2023-06-29 | End: 2023-09-08

## 2023-06-29 NOTE — PROGRESS NOTES
FOOT AND ANKLE SURGERY/PODIATRY Progress Note      ASSESSMENT: Paronychia left hallux        TREATMENT:  -I discussed with the patient that there is pain to palpation along the medial border of the left hallux without obvious open lesions.  No active drainage on exam today.    -Out of precaution I will start her on Bactrim and have asked her to avoid applying any ointments or Band-Aids to this area.    -She will follow-up in 7 to 10 days    Rene Ordaz DPM  Formerly [facility]      HPI: Ashanti Aviles was seen again today for mild pain and drainage on the left hallux.  Patient reports that she does have kind of a yellowish type drainage from the medial border on the left hallux with discomfort.  Denies trauma.  She did undergo nail avulsion with a different podiatrist several weeks ago and states that this area has always been somewhat tender to the touch.    Past Medical History:   Diagnosis Date     Diabetes mellitus (H)      Gastroesophageal reflux disease      HLD (hyperlipidemia)      Hypertension      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/29/2013     PVD (peripheral vascular disease) (H)        Past Surgical History:   Procedure Laterality Date     AMPUTATE TOE(S) Right 2/18/2023    Procedure: Amputation fourth toe right foot;  Surgeon: Benjamin Diez DPM;  Location: [facility] OR     ANUS SURGERY       BIOPSY CERVICAL, LOCAL EXCISION, SINGLE/MULTIPLE       COLONOSCOPY N/A 9/11/2020    Procedure: EXAM UNDER ANESTHESIA, HIGH RESOLUTION ANOSCOPY INTRA OP;  Surgeon: Preeti Sheehan MD;  Location: [facility];  Service: Gastroenterology     COLONOSCOPY N/A 12/14/2020    Procedure: EXAM UNDER ANESTHESIA WITH HIGH RESOLUTION ANOSCOPY;  Surgeon: Preeti Sheehan MD;  Location: [facility];  Service: General     COLONOSCOPY N/A 6/15/2021    Procedure: EXAM UNDER ANESTHESIA WITH HIGH RESOLUTION ANOSCOPY, BIOPSY, FULGURATION;  Surgeon: Preeti Sheehan  MD;  Location: [facility];  Service: Gastroenterology     ENDARTERECTOMY FEMORAL Left 5/17/2023    Procedure: LEFT FEMORAL ENDARTERECTOMY WITH RETROGRADE ILIAC STENT;  Surgeon: Dyan Geller MD;  Location: [facility] OR     EXAM UNDER ANESTHESIA ANUS N/A 9/14/2021    Procedure: EXAM UNDER ANESTHESIA WITH HIGH RESOLUTION ANOSCOPY;  Surgeon: Preeti Sheehan MD;  Location: [facility]     IR LOWER EXTREMITY ANGIOGRAM LEFT  3/23/2023     IR LOWER EXTREMITY ANGIOGRAM RIGHT  2/16/2023       Allergies   Allergen Reactions     Ezetimibe Unknown     Generalized body aches.   Tolerating now (restarted April 2023)     Oxycodone Nausea and Vomiting     Penicillins Unknown     Childhood rxn     Simvastatin Unknown     Muscle pain     Victoza [Liraglutide] Other (See Comments)     Binds bowels         Current Outpatient Medications:      amLODIPine (NORVASC) 10 MG tablet, [AMLODIPINE (NORVASC) 10 MG TABLET] Take 10 mg by mouth daily., Disp: , Rfl:      amLODIPine-atorvastatin (CADUET) 10-10 MG tablet, Take 10 mg by mouth, Disp: , Rfl:      aspirin 81 MG EC tablet, Take 1 tablet (81 mg) by mouth daily, Disp: , Rfl:      clopidogrel (PLAVIX) 75 MG tablet, Take 75 mg by mouth daily, Disp: , Rfl:      empagliflozin (JARDIANCE) 25 MG TABS tablet, Take 1 tablet (25 mg) by mouth daily, Disp: 90 tablet, Rfl: 1     ezetimibe (ZETIA) 10 MG tablet, Take 10 mg by mouth daily, Disp: , Rfl:      hydroCHLOROthiazide (HYDRODIURIL) 25 MG tablet, [HYDROCHLOROTHIAZIDE (HYDRODIURIL) 25 MG TABLET] Take 25 mg by mouth daily., Disp: , Rfl:      Insulin Degludec (TRESIBA) 100 UNIT/ML SOLN, Inject 30 Units Subcutaneous daily, Disp: , Rfl:      linagliptin (TRADJENTA) 5 MG TABS tablet, Take 5 mg by mouth daily, Disp: , Rfl:      lisinopril (PRINIVIL,ZESTRIL) 40 MG tablet, [LISINOPRIL (PRINIVIL,ZESTRIL) 40 MG TABLET] Take 40 mg by mouth daily., Disp: , Rfl:      metFORMIN (GLUCOPHAGE XR) 500 MG 24 hr tablet, Take 1,000 mg by  "mouth daily, Disp: , Rfl:      omeprazole (PRILOSEC) 20 MG DR capsule, Take 20 mg by mouth daily as needed, Disp: , Rfl:      oxyCODONE (ROXICODONE) 5 MG tablet, Take 1 tablet (5 mg) by mouth every 4 hours as needed for moderate pain, Disp: 15 tablet, Rfl: 0     sulfamethoxazole-trimethoprim (BACTRIM DS) 800-160 MG tablet, Take 1 tablet by mouth 2 times daily, Disp: 20 tablet, Rfl: 0    Review of Systems - 10 point Review of Systems is negative except for paronychia left hallux which is noted in HPI.      OBJECTIVE:  /82   Pulse 73   Ht 5' 2\" (1.575 m)   Wt 156 lb (70.8 kg)   SpO2 100%   BMI 28.53 kg/m    General appearance: Patient is alert and fully cooperative with history & exam.  No sign of distress is noted during the visit.    Vascular: Dorsalis pedis non-palpableLeft.  Dermatologic:    VASC Wound Left Hallux (Active)   Pre Size Length 0 04/21/23 0800   Pre Size Width 0 04/21/23 0800   Pre Size Depth 0 04/21/23 0800   Pre Total Sq cm 0 04/21/23 0800   Post Size Length 0 05/12/23 0900   Post Size Width 0 05/12/23 0900   Post Size Depth 0 05/12/23 0900   Post Total Sq cm 0 05/12/23 0900       VASC Wound surgical (Active)   Pre Size Length 1 06/29/23 1200   Pre Size Width 0.5 06/29/23 1200   Pre Size Depth 0.2 06/29/23 1200   Pre Total Sq cm 0.5 06/29/23 1200   Product Used Alginate 06/05/23 0800       Incision/Surgical Site 09/14/21 Rectum (Active)       Incision/Surgical Site 02/18/23 Right Toe (Comment  which one) (Active)       Incision/Surgical Site 05/17/23 Left Groin (Active)   Medial border left hallux appears to be intact with maceration and mild edema.  Neurologic: Intact to light touch Left.  Musculoskeletal: Pain to palpation medial border left hallux.    Imaging:     US Low Ext Arterial Dop Seg Pres w/o Exercise    Result Date: 6/27/2023  Table formatting from the original result was not included. BILATERAL RESTING ANKLE-BRACHIAL INDICES (TRACIE'S) (Date: 06/26/23) Indication: Surveillance " TRACIE's: PAD. S/P IR Left MIKE Stents / Iliofemoral Endarterectomy done 5/17/2023.       Hx: 3/23/23 IR Left Leg Angiogram ( Occluded SFA/ Pop. A./ RICKY),             2/16/23 IR Right SFA Proximal Atherectomy/ Balloon Angiogram.             2/18/23 Right 4th Toe Amputation             Left Big Toe Nail Removed/ Painful Previous: 2/27/2023 History: Previous Smoker, Hypertension, Diabetic, PAD, Angioplasty, Vascular Surgery, Surgical Follow-up, and Vascular Ulcers  Resting TRACIE's          Right: mmHg Index     Brachial: 144  Ankle-(PT): 129 0.90 Ankle-(DP): 143 0.99          Digit: 90 0.63               Left: mmHg Index     Brachial: 132  Ankle-(PT): 80 0.56 Ankle-(DP): 77 0.53          Digit: 52 0.36 Resting ankle-brachial index of 0.99 on the right. Toe Pressures of 90 mmHg and TBI of 0.63 Resting ankle-brachial index of 0.56 on the left. Toe Pressures of 52 mmHg and TBI of 0.36  VPR WAVEFORMS: The right volume plethysmography waveforms are mildly abnormal at the lower thigh level, mildly abnormal at the upper calf level and mildly abnormal at the ankle. The left volume plethysmography waveforms are moderately abnormal at the lower thigh level, moderately abnormal at the upper calf level and moderately abnormal at the ankle.  Impression:  1. RIGHT LOWER EXTREMITY: TRACIE is Normal with multiphasic waveforms with an TRACEI of 0.90. Toe Pressures are Normal and adequate for wound healing with toe pressures of 90 mmHg. 2. LEFT LOWER EXTREMITY: TRACIE is Abnormal with an TRACIE of 0.56 indicating single level disease. Toe Pressures are Mildly abnormal but adequate for wound healing with toe pressures of 52 mmHg. Reference: Wound classification Grade TRACIE Ankle Systolic Pressure Toe Pressures 0 > 0.80 > 100 mmHg > 60 mmHg 1 0.6 - 0.79 70 - 100 mmHg 40 - 59 mmHg 2 0.4 - 0.59 50-70 mmHg 30 - 39 mmHg 3 < 0.39 < 50 mmHg < 30 mmHg Digit Pressures DBI Disease Category > 0.70 Normal < 0.70 Abnormal > 30 mmHg Potential wound healing < 30 mmHg  Impaired wound healing Ankle Brachial Pressures TRACIE Disease Category > 1.3  Likely vessel calcification with monophasic waveforms, non-diagnostic 0.95-1.30 Normal with multiphasic waveforms 0.50-0.95 Single level disease 0.30-0.50 Multilevel disease < 0.30 Critical limb ischema Volume Plethysmography Recording (VPR) at all levels Normal Sharp systolic peak, fast upstroke, prominent dicrotic notch in wave Mild Sharp systolic peak, fast upstroke, absent dicrotic notch in wave Moderate Flattened systolic peak, slowed upstroke, absent dicrotic notch inwave Severe amplitude wave with = upslope and down slope Occluded Flat Line      US Lower Extremity Arterial Duplex Left    Result Date: 6/27/2023  Table formatting from the original result was not included. Arterial Duplex Ultrasound (Date: 06/26/23) Lower Extremity Artery Evaluation IIndication: Surveillance Left Leg Arterial:  PAD. S/P IR Left MIKE Stents / Iliofemoral Endarterectomy done 5/17/2023.       Hx: 3/23/23 IR Left Leg Angiogram ( Occluded SFA/ Pop. A./ RICKY),             2/16/23 IR Right SFA Proximal Atherectomy/ Balloon Angiogram.             2/18/23 Right 4th Toe Amputation             Left Big Toe Nail Removed/ Painful Previous: 2/27/2023 History: Previous Smoker, Hypertension, Diabetic, PAD, Angioplasty, Vascular Surgery, Surgical Follow-up, and Vascular Ulcers Technique: Duplex imaging is performed utilizing gray-scale, two-dimensional images, and color-flow imaging. Doppler waveform analysis and spectral Doppler imaging is also performed. LOWER EXTREMITY ARTERIAL DUPLEX EXAM WITH WAVEFORMS Right Leg:(cm/s) Location: [facility] Waveforms EIA:   137  B CFA: 132 / 213   B PFA:   142  B SFA Proximal:   204  B PTA:   42   B RICKY:   37  B DPA:   50  B Waveforms: T=Triphasic, M=Monophasic, B=Biphasic Left Leg:(cm/s) Location: [facility] Waveforms EIA:   177  T CFA:   93  T PFA:   136  T SFA Proximal:   Occluded   N/A SFA Mid:   Occluded   N/A SFA Distal:    Occluded / 39 (Recan.)    N/A Popliteal Artery:   21 (Retrograde flow)  / 80  M PTA:   29   M RICKY:   Occluded   N/A DPA:   19  M Waveforms: T=Triphasic, M=Monophasic, B=Biphasic  Impression: Right Lower Extremity: Biphasic flow in common femoral artery suggesting no inflow disease.  Overall there is no obvious hemodynamically significant stenosis noted in the right lower extremity Left Lower Extremity: Triphasic flow in common femoral artery suggesting no inflow disease.  Chronic occlusion of left SFA is noted with retrograde flow in the popliteal artery consistent with more proximal occlusion.  Very blunted monophasic flow noted within posterior tibial artery.  Chronic occlusion of anterior tibial artery. Reference: Category Normal 1-19% 20-49% 50-99% Occluded PSV <160 cm/sec without spectral broadening <160 cm/sec with spectral broadening Increased Increased Absent Flow Ratio N/A N/A < 2.0 >2.0 N/A Post-Stenotic Turbulence No No No Yes N/A           Picture:

## 2023-07-21 ENCOUNTER — OFFICE VISIT (OUTPATIENT)
Dept: VASCULAR SURGERY | Facility: CLINIC | Age: 68
End: 2023-07-21
Attending: PODIATRIST
Payer: COMMERCIAL

## 2023-07-21 VITALS
WEIGHT: 156 LBS | OXYGEN SATURATION: 98 % | HEIGHT: 62 IN | RESPIRATION RATE: 16 BRPM | SYSTOLIC BLOOD PRESSURE: 126 MMHG | HEART RATE: 79 BPM | DIASTOLIC BLOOD PRESSURE: 76 MMHG | BODY MASS INDEX: 28.71 KG/M2

## 2023-07-21 DIAGNOSIS — M20.41 HAMMERTOE, BILATERAL: ICD-10-CM

## 2023-07-21 DIAGNOSIS — M20.42 HAMMERTOE, BILATERAL: ICD-10-CM

## 2023-07-21 DIAGNOSIS — L97.521 ULCER OF LEFT FOOT, LIMITED TO BREAKDOWN OF SKIN (H): Primary | ICD-10-CM

## 2023-07-21 DIAGNOSIS — I73.9 PAD (PERIPHERAL ARTERY DISEASE) (H): ICD-10-CM

## 2023-07-21 DIAGNOSIS — L57.0 KERATOMA: ICD-10-CM

## 2023-07-21 PROCEDURE — G0463 HOSPITAL OUTPT CLINIC VISIT: HCPCS | Performed by: PODIATRIST

## 2023-07-21 PROCEDURE — 11056 PARNG/CUTG B9 HYPRKR LES 2-4: CPT | Performed by: PODIATRIST

## 2023-07-21 PROCEDURE — 99213 OFFICE O/P EST LOW 20 MIN: CPT | Mod: 25 | Performed by: PODIATRIST

## 2023-07-21 RX ORDER — FLASH GLUCOSE SENSOR
KIT MISCELLANEOUS
COMMUNITY
Start: 2023-06-19

## 2023-07-21 ASSESSMENT — PAIN SCALES - GENERAL: PAINLEVEL: NO PAIN (0)

## 2023-07-21 NOTE — PATIENT INSTRUCTIONS
Paint the eschar on the left hallux with iodine daily.   No not cover with any dressings/bandaids.

## 2023-07-21 NOTE — PROGRESS NOTES
FOOT AND ANKLE SURGERY/PODIATRY Progress Note      ASSESSMENT:   Ulceration left hallux  Keratoma  PAD      TREATMENT:  -I discussed the patient that there is a stable eschar along the proximal medial border of the left hallux, no signs of infection noted on exam today.    -We discussed that after the eschar falls away there is a chance that an ulceration may develop.    -Iodine applied today.  I recommend she reapply daily.    -I trimmed callus tissue x4 with a #15 blade plantar third MPJ right foot.  I recommend she reduce callus buildup with a callus removing device 2 to 3 days a week.    -She will follow-up in 4 to 5 weeks    Rene Ordaz DPM  Formerly [facility]      HPI: Ashanti Aviles was seen again today for concerns related to tenderness along the left great toe.  Patient reports that she did notice drainage along the medial aspect of the left hallux 5 days ago but has not noticed any drainage over the last few days.  She admits to some tenderness with walking.  She also complains of painful callus tissue on her plantar right forefoot.    Past Medical History:   Diagnosis Date     Diabetes mellitus (H)      Gastroesophageal reflux disease      HLD (hyperlipidemia)      Hypertension      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/29/2013     PVD (peripheral vascular disease) (H)        Past Surgical History:   Procedure Laterality Date     AMPUTATE TOE(S) Right 2/18/2023    Procedure: Amputation fourth toe right foot;  Surgeon: Benjamin Diez DPM;  Location: [facility]     ANUS SURGERY       BIOPSY CERVICAL, LOCAL EXCISION, SINGLE/MULTIPLE       COLONOSCOPY N/A 9/11/2020    Procedure: EXAM UNDER ANESTHESIA, HIGH RESOLUTION ANOSCOPY INTRA OP;  Surgeon: Preeti Sheehan MD;  Location: [facility];  Service: Gastroenterology     COLONOSCOPY N/A 12/14/2020    Procedure: EXAM UNDER ANESTHESIA WITH HIGH RESOLUTION ANOSCOPY;  Surgeon: Preeti Sheehan MD;   Location: [facility] OR;  Service: General     COLONOSCOPY N/A 6/15/2021    Procedure: EXAM UNDER ANESTHESIA WITH HIGH RESOLUTION ANOSCOPY, BIOPSY, FULGURATION;  Surgeon: Preeti Sheehan MD;  Location: [facility];  Service: Gastroenterology     ENDARTERECTOMY FEMORAL Left 5/17/2023    Procedure: LEFT FEMORAL ENDARTERECTOMY WITH RETROGRADE ILIAC STENT;  Surgeon: Dyan Geller MD;  Location: [facility]     EXAM UNDER ANESTHESIA ANUS N/A 9/14/2021    Procedure: EXAM UNDER ANESTHESIA WITH HIGH RESOLUTION ANOSCOPY;  Surgeon: Preeti Sheehan MD;  Location: [facility]     IR LOWER EXTREMITY ANGIOGRAM LEFT  3/23/2023     IR LOWER EXTREMITY ANGIOGRAM RIGHT  2/16/2023       Allergies   Allergen Reactions     Ezetimibe Unknown     Generalized body aches.   Tolerating now (restarted April 2023)     Oxycodone Nausea and Vomiting     Penicillins Unknown     Childhood rxn     Simvastatin Unknown     Muscle pain     Victoza [Liraglutide] Other (See Comments)     Binds bowels         Current Outpatient Medications:      amLODIPine (NORVASC) 10 MG tablet, [AMLODIPINE (NORVASC) 10 MG TABLET] Take 10 mg by mouth daily., Disp: , Rfl:      amLODIPine-atorvastatin (CADUET) 10-10 MG tablet, Take 10 mg by mouth, Disp: , Rfl:      aspirin 81 MG EC tablet, Take 1 tablet (81 mg) by mouth daily, Disp: , Rfl:      clopidogrel (PLAVIX) 75 MG tablet, Take 75 mg by mouth daily, Disp: , Rfl:      Continuous Blood Gluc Sensor (FREESTYLE PRINCESS 14 DAY SENSOR) MISC, , Disp: , Rfl:      empagliflozin (JARDIANCE) 25 MG TABS tablet, Take 1 tablet (25 mg) by mouth daily, Disp: 90 tablet, Rfl: 1     ezetimibe (ZETIA) 10 MG tablet, Take 10 mg by mouth daily, Disp: , Rfl:      gabapentin (NEURONTIN) 300 MG capsule, Take 1 capsule (300 mg) by mouth 2 times daily, Disp: 28 capsule, Rfl: 0     hydroCHLOROthiazide (HYDRODIURIL) 25 MG tablet, [HYDROCHLOROTHIAZIDE (HYDRODIURIL) 25 MG TABLET] Take 25 mg by mouth daily.,  "Disp: , Rfl:      Insulin Degludec (TRESIBA) 100 UNIT/ML SOLN, Inject 30 Units Subcutaneous daily, Disp: , Rfl:      linagliptin (TRADJENTA) 5 MG TABS tablet, Take 5 mg by mouth daily, Disp: , Rfl:      lisinopril (PRINIVIL,ZESTRIL) 40 MG tablet, [LISINOPRIL (PRINIVIL,ZESTRIL) 40 MG TABLET] Take 40 mg by mouth daily., Disp: , Rfl:      metFORMIN (GLUCOPHAGE XR) 500 MG 24 hr tablet, Take 1,000 mg by mouth daily, Disp: , Rfl:      omeprazole (PRILOSEC) 20 MG DR capsule, Take 20 mg by mouth daily as needed, Disp: , Rfl:      oxyCODONE (ROXICODONE) 5 MG tablet, Take 1 tablet (5 mg) by mouth every 4 hours as needed for moderate pain, Disp: 15 tablet, Rfl: 0     sulfamethoxazole-trimethoprim (BACTRIM DS) 800-160 MG tablet, Take 1 tablet by mouth 2 times daily (Patient not taking: Reported on 7/21/2023), Disp: 20 tablet, Rfl: 0    Review of Systems - 10 point Review of Systems is negative except for left hallux ulcer which is noted in HPI.      OBJECTIVE:  /76   Pulse 79   Resp 16   Ht 5' 2\" (1.575 m)   Wt 156 lb (70.8 kg)   SpO2 98%   BMI 28.53 kg/m    General appearance: Patient is alert and fully cooperative with history & exam.  No sign of distress is noted during the visit.    Vascular: Dorsalis pedis non-palpableBilateral.  Dermatologic:    VASC Wound Left Hallux (Active)   Pre Size Length 0 04/21/23 0800   Pre Size Width 0 04/21/23 0800   Pre Size Depth 0 04/21/23 0800   Pre Total Sq cm 0 04/21/23 0800   Post Size Length 0 05/12/23 0900   Post Size Width 0 05/12/23 0900   Post Size Depth 0 05/12/23 0900   Post Total Sq cm 0 05/12/23 0900   Description scab 07/21/23 0811       VASC Wound surgical (Active)   Pre Size Length 1 06/29/23 1200   Pre Size Width 0.5 06/29/23 1200   Pre Size Depth 0.2 06/29/23 1200   Pre Total Sq cm 0.5 06/29/23 1200   Product Used Alginate 06/05/23 0800       VASC Wound right plantar foot (Active)       Incision/Surgical Site 09/14/21 Rectum (Active)       Incision/Surgical Site " 02/18/23 Right Toe (Comment  which one) (Active)       Incision/Surgical Site 05/17/23 Left Groin (Active)   Stable eschar along the medial proximal border of the left hallux.  No erythema bilateral feet.  Hyperkeratotic tissue x4 plantar third MPJ right foot.  Neurologic: Diminished to light touch Bilateral.  Musculoskeletal: Contracted digits noted Bilateral.    Imaging:     US Low Ext Arterial Dop Seg Pres w/o Exercise    Result Date: 6/27/2023  Table formatting from the original result was not included. BILATERAL RESTING ANKLE-BRACHIAL INDICES (TRACIE'S) (Date: 06/26/23) Indication: Surveillance TRACIE's: PAD. S/P IR Left MIKE Stents / Iliofemoral Endarterectomy done 5/17/2023.       Hx: 3/23/23 IR Left Leg Angiogram ( Occluded SFA/ Pop. A./ RICKY),             2/16/23 IR Right SFA Proximal Atherectomy/ Balloon Angiogram.             2/18/23 Right 4th Toe Amputation             Left Big Toe Nail Removed/ Painful Previous: 2/27/2023 History: Previous Smoker, Hypertension, Diabetic, PAD, Angioplasty, Vascular Surgery, Surgical Follow-up, and Vascular Ulcers  Resting TRACIE's          Right: mmHg Index     Brachial: 144  Ankle-(PT): 129 0.90 Ankle-(DP): 143 0.99          Digit: 90 0.63               Left: mmHg Index     Brachial: 132  Ankle-(PT): 80 0.56 Ankle-(DP): 77 0.53          Digit: 52 0.36 Resting ankle-brachial index of 0.99 on the right. Toe Pressures of 90 mmHg and TBI of 0.63 Resting ankle-brachial index of 0.56 on the left. Toe Pressures of 52 mmHg and TBI of 0.36  VPR WAVEFORMS: The right volume plethysmography waveforms are mildly abnormal at the lower thigh level, mildly abnormal at the upper calf level and mildly abnormal at the ankle. The left volume plethysmography waveforms are moderately abnormal at the lower thigh level, moderately abnormal at the upper calf level and moderately abnormal at the ankle.  Impression:  1. RIGHT LOWER EXTREMITY: TRACIE is Normal with multiphasic waveforms with an TRACIE of 0.90. Toe  Pressures are Normal and adequate for wound healing with toe pressures of 90 mmHg. 2. LEFT LOWER EXTREMITY: TRACIE is Abnormal with an TRACIE of 0.56 indicating single level disease. Toe Pressures are Mildly abnormal but adequate for wound healing with toe pressures of 52 mmHg. Reference: Wound classification Grade TRACIE Ankle Systolic Pressure Toe Pressures 0 > 0.80 > 100 mmHg > 60 mmHg 1 0.6 - 0.79 70 - 100 mmHg 40 - 59 mmHg 2 0.4 - 0.59 50-70 mmHg 30 - 39 mmHg 3 < 0.39 < 50 mmHg < 30 mmHg Digit Pressures DBI Disease Category > 0.70 Normal < 0.70 Abnormal > 30 mmHg Potential wound healing < 30 mmHg Impaired wound healing Ankle Brachial Pressures TRACIE Disease Category > 1.3  Likely vessel calcification with monophasic waveforms, non-diagnostic 0.95-1.30 Normal with multiphasic waveforms 0.50-0.95 Single level disease 0.30-0.50 Multilevel disease < 0.30 Critical limb ischema Volume Plethysmography Recording (VPR) at all levels Normal Sharp systolic peak, fast upstroke, prominent dicrotic notch in wave Mild Sharp systolic peak, fast upstroke, absent dicrotic notch in wave Moderate Flattened systolic peak, slowed upstroke, absent dicrotic notch inwave Severe amplitude wave with = upslope and down slope Occluded Flat Line      US Lower Extremity Arterial Duplex Left    Result Date: 6/27/2023  Table formatting from the original result was not included. Arterial Duplex Ultrasound (Date: 06/26/23) Lower Extremity Artery Evaluation IIndication: Surveillance Left Leg Arterial:  PAD. S/P IR Left MIKE Stents / Iliofemoral Endarterectomy done 5/17/2023.       Hx: 3/23/23 IR Left Leg Angiogram ( Occluded SFA/ Pop. A./ RICKY),             2/16/23 IR Right SFA Proximal Atherectomy/ Balloon Angiogram.             2/18/23 Right 4th Toe Amputation             Left Big Toe Nail Removed/ Painful Previous: 2/27/2023 History: Previous Smoker, Hypertension, Diabetic, PAD, Angioplasty, Vascular Surgery, Surgical Follow-up, and Vascular Ulcers  Technique: Duplex imaging is performed utilizing gray-scale, two-dimensional images, and color-flow imaging. Doppler waveform analysis and spectral Doppler imaging is also performed. LOWER EXTREMITY ARTERIAL DUPLEX EXAM WITH WAVEFORMS Right Leg:(cm/s) Location: [facility] Waveforms EIA:   137  B CFA: 132 / 213   B PFA:   142  B SFA Proximal:   204  B PTA:   42   B RICKY:   37  B DPA:   50  B Waveforms: T=Triphasic, M=Monophasic, B=Biphasic Left Leg:(cm/s) Location: [facility] Waveforms EIA:   177  T CFA:   93  T PFA:   136  T SFA Proximal:   Occluded   N/A SFA Mid:   Occluded   N/A SFA Distal:   Occluded / 39 (Recan.)    N/A Popliteal Artery:   21 (Retrograde flow)  / 80  M PTA:   29   M RICKY:   Occluded   N/A DPA:   19  M Waveforms: T=Triphasic, M=Monophasic, B=Biphasic  Impression: Right Lower Extremity: Biphasic flow in common femoral artery suggesting no inflow disease.  Overall there is no obvious hemodynamically significant stenosis noted in the right lower extremity Left Lower Extremity: Triphasic flow in common femoral artery suggesting no inflow disease.  Chronic occlusion of left SFA is noted with retrograde flow in the popliteal artery consistent with more proximal occlusion.  Very blunted monophasic flow noted within posterior tibial artery.  Chronic occlusion of anterior tibial artery. Reference: Category Normal 1-19% 20-49% 50-99% Occluded PSV <160 cm/sec without spectral broadening <160 cm/sec with spectral broadening Increased Increased Absent Flow Ratio N/A N/A < 2.0 >2.0 N/A Post-Stenotic Turbulence No No No Yes N/A           Picture:

## 2023-07-24 ENCOUNTER — ANCILLARY PROCEDURE (OUTPATIENT)
Dept: VASCULAR ULTRASOUND | Facility: CLINIC | Age: 68
End: 2023-07-24
Attending: SURGERY
Payer: COMMERCIAL

## 2023-07-24 DIAGNOSIS — I73.9 PAD (PERIPHERAL ARTERY DISEASE) (H): ICD-10-CM

## 2023-07-24 PROCEDURE — 93923 UPR/LXTR ART STDY 3+ LVLS: CPT

## 2023-07-24 PROCEDURE — 93923 UPR/LXTR ART STDY 3+ LVLS: CPT | Mod: 26 | Performed by: SURGERY

## 2023-07-31 ENCOUNTER — TELEPHONE (OUTPATIENT)
Dept: VASCULAR SURGERY | Facility: CLINIC | Age: 68
End: 2023-07-31
Payer: COMMERCIAL

## 2023-07-31 ENCOUNTER — VIRTUAL VISIT (OUTPATIENT)
Dept: VASCULAR SURGERY | Facility: CLINIC | Age: 68
End: 2023-07-31
Attending: SURGERY
Payer: COMMERCIAL

## 2023-07-31 VITALS — WEIGHT: 154 LBS | BODY MASS INDEX: 28.34 KG/M2 | HEIGHT: 62 IN

## 2023-07-31 DIAGNOSIS — I73.9 PAD (PERIPHERAL ARTERY DISEASE) (H): Primary | ICD-10-CM

## 2023-07-31 PROCEDURE — 99024 POSTOP FOLLOW-UP VISIT: CPT | Mod: VID | Performed by: SURGERY

## 2023-07-31 ASSESSMENT — PAIN SCALES - GENERAL: PAINLEVEL: NO PAIN (0)

## 2023-07-31 NOTE — NURSING NOTE
Patient confirms medications and allergies are accurate via patients echeck in completion, and or denies any changes since last reviewed/verified.     Cinda Kc, Virtual Facilitator  Is the patient currently in the state of [place]? YES    Visit mode:VIDEO    If the visit is dropped, the patient can be reconnected by: VIDEO VISIT: Text to cell phone: [phone]    Will anyone else be joining the visit? NO      How would you like to obtain your AVS? MyChart    Are changes needed to the allergy or medication list? NO    Reason for visit: RECHECK

## 2023-07-31 NOTE — PATIENT INSTRUCTIONS
Nba Burrell,    Thank you for entrusting your care with us today. After your visit today with MD Dyan Geller this is the plan that was discussed at your appointment.    Plan for Left leg angiogram followed by left leg femoral to tibial bypass. We will contact you to schedule.       I am including additional information on these things and our contact information if you have any questions or concerns.   Please do not hesitate to reach out to us if you felt we did not answer your questions or you are unsure of the treatment plan after your visit today. Our number is [phone].Thank you for trusting us with your care.         Again thank you for your time.

## 2023-07-31 NOTE — TELEPHONE ENCOUNTER
Patient had virtual visit with Dr Geller today, needs instructions and scheduling for LLE angiogram followed 1 week later by LLE bypass. Not emergent    Followed by Dr. Ordaz for stable eschar on left hallux.    On aspirin, plavix and metformin.

## 2023-07-31 NOTE — PROGRESS NOTES
"[facility]        Patient is here for a virtual visit to discuss Peripheral artery disease (PAD). Symptoms include rest pain in left lower extremity.    Pt is currently taking Aspirin and Statin.    Ht 1.575 m (5' 2\")   Wt 69.9 kg (154 lb)   BMI 28.17 kg/m      The provider has been notified that the patient has no concerns.     Questions patient would like addressed today are: N/A.    Refills are needed: No    Has homecare services and agency name:  No     Virtual Visit Details    Type of service:  Video Visit     Originating Location (pt. Location): Home    Distant Location (provider location):  On-site  Platform used for Video Visit: DCWafers  Length of Video Call: 15 minutes    67-year-old female with chronic limb threatening limb ischemia involving left lower extremity.  Patient underwent left iliofemoral endarterectomy with retrograde iliac intervention.  The wound almost healed, however, patient is still complaining of significant pain at rest.  Unfortunately, there is no good conduit option for this patient.  We will proceed with left extremity angiogram with best effort to resolve the issue endovascularly        "

## 2023-08-02 ENCOUNTER — DOCUMENTATION ONLY (OUTPATIENT)
Dept: VASCULAR SURGERY | Facility: CLINIC | Age: 68
End: 2023-08-02
Payer: COMMERCIAL

## 2023-08-02 NOTE — PROGRESS NOTES
Surgery Scheduled    Reviewed surgery schedule and instructions with pt.  Pt will schedule preop exam.  Writer will follow up with pt to schedule follow ups when orders are placed.  Pt had no further questions.    Surgery/Procedure: CREATION, BYPASS, ARTERIAL, FEMORAL TO TIBIAL     Special Equipment: c-arm, endo table, valveulotome    Location: [facility]:  [address] [place] [address] (phone: [phone], Fax: [phone])    Date: 9/8/23    Time: 9:00 AM    Admission Type: Inpatient    Surgeon: Dr. Geller    OR Confirmed & :  Yes with Rosibel on 8/2/2023    Entered on provider calendar:  Yes    Post Op: TBD - orders needed    Wound Vac Needed: No    Home Care Needed: No    Reps Contacted: n/a    Blood Thinners Addressed: N/A - pt on plavix & metformin    Stress Test Clearance: NO

## 2023-08-02 NOTE — PROGRESS NOTES
Procedure: Left  Leg  Angiogram     Procedure Date :  8/31/23    Procedure Time :  8:00 AM    Arrival Time: 7:00 AM    Admission Type: Outpatient    Surgeon:     Procedure Location: [facility]:  [address] [place] [address] (Phone: [phone], Fax: [phone])    Consent Completed:No, Scanned: No    Scheduled with: Mo    Blood Thinners Address: pt on plavix okay to stay on    2 week Post Procedure Appointment with   and also ultrasound: TBD - pt has bypass 1 week following angiogram    Reviewed angiogram instructions/schedule with pt.  Pt had no questions or concerns.

## 2023-08-02 NOTE — TELEPHONE ENCOUNTER
Pt has been scheduled for angiogram (8/31/23) and bypass (9/8/23).  See surgery scheduling documentation note.

## 2023-08-03 NOTE — TELEPHONE ENCOUNTER
Writer scheduled pt's follow up appointments and send instructions to pt via MyC message.  LMTCB on pt's phone to confirm details.

## 2023-08-07 NOTE — PROGRESS NOTES
Confirmed angiogram and surgery dates with pt.  Pt would like angiogram moved to [facility] if possible due to transportation issues.  Pt understands that might not be possible.  Writer will notify pt if angiogram will be able to be moved due to cancellations/etc.  Pt was agreeable and had no further questions.  Pt will be scheduling preop exam.

## 2023-08-08 ENCOUNTER — TELEPHONE (OUTPATIENT)
Dept: ENDOCRINOLOGY | Facility: CLINIC | Age: 68
End: 2023-08-08
Payer: COMMERCIAL

## 2023-08-09 DIAGNOSIS — E11.52 TYPE 2 DIABETES MELLITUS WITH DIABETIC PERIPHERAL ANGIOPATHY AND GANGRENE, WITH LONG-TERM CURRENT USE OF INSULIN (H): Primary | ICD-10-CM

## 2023-08-09 DIAGNOSIS — Z79.4 TYPE 2 DIABETES MELLITUS WITH DIABETIC PERIPHERAL ANGIOPATHY AND GANGRENE, WITH LONG-TERM CURRENT USE OF INSULIN (H): Primary | ICD-10-CM

## 2023-08-12 ENCOUNTER — HEALTH MAINTENANCE LETTER (OUTPATIENT)
Age: 68
End: 2023-08-12

## 2023-08-25 ENCOUNTER — OFFICE VISIT (OUTPATIENT)
Dept: VASCULAR SURGERY | Facility: CLINIC | Age: 68
End: 2023-08-25
Attending: PODIATRIST
Payer: COMMERCIAL

## 2023-08-25 VITALS
SYSTOLIC BLOOD PRESSURE: 116 MMHG | TEMPERATURE: 97.7 F | RESPIRATION RATE: 12 BRPM | OXYGEN SATURATION: 99 % | DIASTOLIC BLOOD PRESSURE: 80 MMHG | HEART RATE: 76 BPM

## 2023-08-25 DIAGNOSIS — L97.523 ULCER OF GREAT TOE, LEFT, WITH NECROSIS OF MUSCLE (H): Primary | ICD-10-CM

## 2023-08-25 PROCEDURE — G0463 HOSPITAL OUTPT CLINIC VISIT: HCPCS | Performed by: PODIATRIST

## 2023-08-25 PROCEDURE — 99213 OFFICE O/P EST LOW 20 MIN: CPT | Performed by: PODIATRIST

## 2023-08-25 ASSESSMENT — PAIN SCALES - GENERAL: PAINLEVEL: MODERATE PAIN (5)

## 2023-08-25 NOTE — PROGRESS NOTES
FOOT AND ANKLE SURGERY/PODIATRY Progress Note      ASSESSMENT:   Ulceration left hallux  PAD      TREATMENT:  -I discussed with the patient that the ulceration along the left hallux is increased depth which is new since her previous visit.    -I would like to evaluate for underlying osteomyelitis and referred her for an MRI report of the left foot.      -Iodine dressing applied today.  She will reapply daily.    -Patient scheduled for bypass procedure with Dr. Geller next month.    -She will follow-up in 3 weeks. I will contact the patient with the MRI report when available and we will be guided by the results.     Rene Ordaz DPM  [facility]      HPI: Ashanti Aviles was seen again today for an ulceration of the left hallux.  Patient's been applying iodine as directed and states that she started taking doxycycline recently out of concern for infection.    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/2013    PVD (peripheral vascular disease) (H)        Past Surgical History:   Procedure Laterality Date    AMPUTATE TOE(S) Right 2/18/2023    Procedure: Amputation fourth toe right foot;  Surgeon: Benjamin Diez DPM;  Location: [facility] OR    ANUS SURGERY      BIOPSY CERVICAL, LOCAL EXCISION, SINGLE/MULTIPLE      COLONOSCOPY N/A 9/11/2020    Procedure: EXAM UNDER ANESTHESIA, HIGH RESOLUTION ANOSCOPY INTRA OP;  Surgeon: Preeti Sheehan MD;  Location: [facility];  Service: Gastroenterology    COLONOSCOPY N/A 12/14/2020    Procedure: EXAM UNDER ANESTHESIA WITH HIGH RESOLUTION ANOSCOPY;  Surgeon: Preeti Sheehan MD;  Location: [facility];  Service: General    COLONOSCOPY N/A 6/15/2021    Procedure: EXAM UNDER ANESTHESIA WITH HIGH RESOLUTION ANOSCOPY, BIOPSY, FULGURATION;  Surgeon: Preeti Sheehan MD;   Location: [facility];  Service: Gastroenterology    ENDARTERECTOMY FEMORAL Left 5/17/2023    Procedure: LEFT FEMORAL ENDARTERECTOMY WITH RETROGRADE ILIAC STENT;  Surgeon: Dyan Geller MD;  Location: [facility]    EXAM UNDER ANESTHESIA ANUS N/A 9/14/2021    Procedure: EXAM UNDER ANESTHESIA WITH HIGH RESOLUTION ANOSCOPY;  Surgeon: Preeti Sheehan MD;  Location: [facility]    IR LOWER EXTREMITY ANGIOGRAM LEFT  3/23/2023    IR LOWER EXTREMITY ANGIOGRAM RIGHT  2/16/2023       Allergies   Allergen Reactions    Ezetimibe Unknown     Generalized body aches.   Tolerating now (restarted April 2023)    Oxycodone Nausea and Vomiting    Penicillins Unknown     Childhood rxn    Simvastatin Unknown     Muscle pain    Victoza [Liraglutide] Other (See Comments)     Binds bowels         Current Outpatient Medications:     amLODIPine (NORVASC) 10 MG tablet, [AMLODIPINE (NORVASC) 10 MG TABLET] Take 10 mg by mouth daily., Disp: , Rfl:     amLODIPine-atorvastatin (CADUET) 10-10 MG tablet, Take 10 mg by mouth, Disp: , Rfl:     aspirin 81 MG EC tablet, Take 1 tablet (81 mg) by mouth daily, Disp: , Rfl:     clopidogrel (PLAVIX) 75 MG tablet, Take 75 mg by mouth daily, Disp: , Rfl:     Continuous Blood Gluc Sensor (FREESTYLE PRINCESS 14 DAY SENSOR) MISC, , Disp: , Rfl:     empagliflozin (JARDIANCE) 25 MG TABS tablet, Take 1 tablet (25 mg) by mouth daily, Disp: 90 tablet, Rfl: 1    ezetimibe (ZETIA) 10 MG tablet, Take 10 mg by mouth daily, Disp: , Rfl:     gabapentin (NEURONTIN) 300 MG capsule, Take 1 capsule (300 mg) by mouth 2 times daily, Disp: 28 capsule, Rfl: 0    hydroCHLOROthiazide (HYDRODIURIL) 25 MG tablet, [HYDROCHLOROTHIAZIDE (HYDRODIURIL) 25 MG TABLET] Take 25 mg by mouth daily., Disp: , Rfl:     Insulin Degludec (TRESIBA) 100 UNIT/ML SOLN, Inject 30 Units Subcutaneous daily, Disp: , Rfl:     linagliptin (TRADJENTA) 5 MG TABS tablet, Take 5 mg by mouth daily, Disp: , Rfl:     lisinopril  (PRINIVIL,ZESTRIL) 40 MG tablet, [LISINOPRIL (PRINIVIL,ZESTRIL) 40 MG TABLET] Take 40 mg by mouth daily., Disp: , Rfl:     metFORMIN (GLUCOPHAGE XR) 500 MG 24 hr tablet, Take 1,000 mg by mouth daily, Disp: , Rfl:     omeprazole (PRILOSEC) 20 MG DR capsule, Take 20 mg by mouth daily as needed, Disp: , Rfl:     oxyCODONE (ROXICODONE) 5 MG tablet, Take 1 tablet (5 mg) by mouth every 4 hours as needed for moderate pain, Disp: 15 tablet, Rfl: 0    sulfamethoxazole-trimethoprim (BACTRIM DS) 800-160 MG tablet, Take 1 tablet by mouth 2 times daily (Patient not taking: Reported on 7/21/2023), Disp: 20 tablet, Rfl: 0    Review of Systems - 10 point Review of Systems is negative except for left hallux ulcer which is noted in HPI.      OBJECTIVE:  /80   Pulse 76   Temp 97.7  F (36.5  C) (Oral)   Resp 12   SpO2 99%   General appearance: Patient is alert and fully cooperative with history & exam.  No sign of distress is noted during the visit.    Vascular: Dorsalis pedis non-palpableLeft.  Dermatologic:    VASC Wound Left Hallux (Active)   Pre Size Length 0.6 08/25/23 0800   Pre Size Width 0.4 08/25/23 0800   Pre Size Depth 0.5 08/25/23 0800   Pre Total Sq cm 0.24 08/25/23 0800   Post Size Length 0 05/12/23 0900   Post Size Width 0 05/12/23 0900   Post Size Depth 0 05/12/23 0900   Post Total Sq cm 0 05/12/23 0900   Description scab 07/21/23 0811       VASC Wound surgical (Active)   Pre Size Length 1 06/29/23 1200   Pre Size Width 0.5 06/29/23 1200   Pre Size Depth 0.2 06/29/23 1200   Pre Total Sq cm 0.5 06/29/23 1200   Product Used Alginate 06/05/23 0800       VASC Wound right plantar foot (Active)       Incision/Surgical Site 09/14/21 Rectum (Active)       Incision/Surgical Site 02/18/23 Right Toe (Comment  which one) (Active)       Incision/Surgical Site 05/17/23 Left Groin (Active)   Ulceration along medial border of the left hallux nail bed has increased depth, no erythema or purulent drainage noted.  Neurologic:  Intact to light touch Left.  Musculoskeletal: Contracted digits noted Left.      Picture:

## 2023-09-01 ENCOUNTER — HOSPITAL ENCOUNTER (OUTPATIENT)
Dept: MRI IMAGING | Facility: HOSPITAL | Age: 68
Discharge: HOME | End: 2023-09-01
Attending: PODIATRIST | Admitting: PODIATRIST
Payer: COMMERCIAL

## 2023-09-01 DIAGNOSIS — L97.523 ULCER OF GREAT TOE, LEFT, WITH NECROSIS OF MUSCLE (H): ICD-10-CM

## 2023-09-01 PROCEDURE — A9585 GADOBUTROL INJECTION: HCPCS | Performed by: PODIATRIST

## 2023-09-01 PROCEDURE — 255N000002 HC RX 255 OP 636: Performed by: PODIATRIST

## 2023-09-01 PROCEDURE — 73720 MRI LWR EXTREMITY W/O&W/DYE: CPT | Mod: LT

## 2023-09-01 RX ORDER — GADOBUTROL 604.72 MG/ML
0.1 INJECTION INTRAVENOUS ONCE
Status: COMPLETED | OUTPATIENT
Start: 2023-09-01 | End: 2023-09-01

## 2023-09-01 RX ADMIN — GADOBUTROL 6.99 ML: 604.72 INJECTION INTRAVENOUS at 21:09

## 2023-09-05 ENCOUNTER — LAB (OUTPATIENT)
Dept: LAB | Facility: CLINIC | Age: 68
End: 2023-09-05
Payer: COMMERCIAL

## 2023-09-05 DIAGNOSIS — E11.52 TYPE 2 DIABETES MELLITUS WITH DIABETIC PERIPHERAL ANGIOPATHY AND GANGRENE, WITH LONG-TERM CURRENT USE OF INSULIN (H): ICD-10-CM

## 2023-09-05 DIAGNOSIS — Z79.4 TYPE 2 DIABETES MELLITUS WITH DIABETIC PERIPHERAL ANGIOPATHY AND GANGRENE, WITH LONG-TERM CURRENT USE OF INSULIN (H): ICD-10-CM

## 2023-09-05 LAB
ANION GAP SERPL CALCULATED.3IONS-SCNC: 12 MMOL/L (ref 7–15)
BUN SERPL-MCNC: 15.3 MG/DL (ref 8–23)
CALCIUM SERPL-MCNC: 9.6 MG/DL (ref 8.8–10.2)
CHLORIDE SERPL-SCNC: 100 MMOL/L (ref 98–107)
CREAT SERPL-MCNC: 0.78 MG/DL (ref 0.51–0.95)
DEPRECATED HCO3 PLAS-SCNC: 24 MMOL/L (ref 22–29)
GFR SERPL CREATININE-BSD FRML MDRD: 84 ML/MIN/1.73M2
GLUCOSE SERPL-MCNC: 137 MG/DL (ref 70–99)
HBA1C MFR BLD: 8.3 % (ref 0–5.6)
POTASSIUM SERPL-SCNC: 3.7 MMOL/L (ref 3.4–5.3)
SODIUM SERPL-SCNC: 136 MMOL/L (ref 136–145)

## 2023-09-05 PROCEDURE — 80048 BASIC METABOLIC PNL TOTAL CA: CPT

## 2023-09-05 PROCEDURE — 36415 COLL VENOUS BLD VENIPUNCTURE: CPT

## 2023-09-05 PROCEDURE — 83036 HEMOGLOBIN GLYCOSYLATED A1C: CPT

## 2023-09-07 ENCOUNTER — ANESTHESIA EVENT (OUTPATIENT)
Dept: SURGERY | Facility: HOSPITAL | Age: 68
DRG: 253 | End: 2023-09-07
Payer: COMMERCIAL

## 2023-09-08 ENCOUNTER — HOSPITAL ENCOUNTER (INPATIENT)
Facility: HOSPITAL | Age: 68
LOS: 7 days | Discharge: NURSING, REHAB OR LONG-TERM CARE | DRG: 253 | End: 2023-09-15
Attending: SURGERY | Admitting: SURGERY
Payer: COMMERCIAL

## 2023-09-08 ENCOUNTER — ANESTHESIA (OUTPATIENT)
Dept: SURGERY | Facility: HOSPITAL | Age: 68
DRG: 253 | End: 2023-09-08
Payer: COMMERCIAL

## 2023-09-08 DIAGNOSIS — R10.2 PELVIC PAIN IN FEMALE: Primary | ICD-10-CM

## 2023-09-08 DIAGNOSIS — Z79.4 TYPE 2 DIABETES MELLITUS WITH DIABETIC PERIPHERAL ANGIOPATHY AND GANGRENE, WITH LONG-TERM CURRENT USE OF INSULIN (H): ICD-10-CM

## 2023-09-08 DIAGNOSIS — I73.9 PAD (PERIPHERAL ARTERY DISEASE) (H): ICD-10-CM

## 2023-09-08 DIAGNOSIS — E11.52 TYPE 2 DIABETES MELLITUS WITH DIABETIC PERIPHERAL ANGIOPATHY AND GANGRENE, WITH LONG-TERM CURRENT USE OF INSULIN (H): ICD-10-CM

## 2023-09-08 LAB
ABO/RH(D): NORMAL
ANTIBODY SCREEN: NEGATIVE
APTT PPP: 28 SECONDS (ref 22–38)
ATRIAL RATE - MUSE: 116 BPM
BASOPHILS # BLD AUTO: 0.1 10E3/UL (ref 0–0.2)
BASOPHILS NFR BLD AUTO: 1 %
BLD PROD TYP BPU: NORMAL
BLD PROD TYP BPU: NORMAL
BLOOD COMPONENT TYPE: NORMAL
BLOOD COMPONENT TYPE: NORMAL
CODING SYSTEM: NORMAL
CODING SYSTEM: NORMAL
CREAT SERPL-MCNC: 0.82 MG/DL (ref 0.51–0.95)
CROSSMATCH: NORMAL
CROSSMATCH: NORMAL
DIASTOLIC BLOOD PRESSURE - MUSE: NORMAL MMHG
EGFRCR SERPLBLD CKD-EPI 2021: 78 ML/MIN/1.73M2
EOSINOPHIL # BLD AUTO: 0.2 10E3/UL (ref 0–0.7)
EOSINOPHIL NFR BLD AUTO: 2 %
ERYTHROCYTE [DISTWIDTH] IN BLOOD BY AUTOMATED COUNT: 15 % (ref 10–15)
GLUCOSE BLDC GLUCOMTR-MCNC: 112 MG/DL (ref 70–99)
GLUCOSE BLDC GLUCOMTR-MCNC: 120 MG/DL (ref 70–99)
GLUCOSE BLDC GLUCOMTR-MCNC: 124 MG/DL (ref 70–99)
GLUCOSE BLDC GLUCOMTR-MCNC: 159 MG/DL (ref 70–99)
GLUCOSE BLDC GLUCOMTR-MCNC: 182 MG/DL (ref 70–99)
GLUCOSE BLDC GLUCOMTR-MCNC: 184 MG/DL (ref 70–99)
GLUCOSE SERPL-MCNC: 192 MG/DL (ref 70–99)
HCT VFR BLD AUTO: 41.7 % (ref 35–47)
HGB BLD-MCNC: 13.7 G/DL (ref 11.7–15.7)
HGB BLD-MCNC: 6.5 G/DL (ref 11.7–15.7)
HGB BLD-MCNC: 7.4 G/DL (ref 11.7–15.7)
IMM GRANULOCYTES # BLD: 0.1 10E3/UL
IMM GRANULOCYTES NFR BLD: 1 %
INR PPP: 0.9 (ref 0.85–1.15)
INTERPRETATION ECG - MUSE: NORMAL
ISSUE DATE AND TIME: NORMAL
ISSUE DATE AND TIME: NORMAL
LYMPHOCYTES # BLD AUTO: 4.4 10E3/UL (ref 0.8–5.3)
LYMPHOCYTES NFR BLD AUTO: 40 %
MCH RBC QN AUTO: 29.5 PG (ref 26.5–33)
MCHC RBC AUTO-ENTMCNC: 32.9 G/DL (ref 31.5–36.5)
MCV RBC AUTO: 90 FL (ref 78–100)
MONOCYTES # BLD AUTO: 1 10E3/UL (ref 0–1.3)
MONOCYTES NFR BLD AUTO: 9 %
NEUTROPHILS # BLD AUTO: 5.4 10E3/UL (ref 1.6–8.3)
NEUTROPHILS NFR BLD AUTO: 47 %
NRBC # BLD AUTO: 0 10E3/UL
NRBC BLD AUTO-RTO: 0 /100
P AXIS - MUSE: 56 DEGREES
PLATELET # BLD AUTO: 288 10E3/UL (ref 150–450)
POTASSIUM SERPL-SCNC: 4.3 MMOL/L (ref 3.4–5.3)
PR INTERVAL - MUSE: 126 MS
QRS DURATION - MUSE: 74 MS
QT - MUSE: 332 MS
QTC - MUSE: 461 MS
R AXIS - MUSE: 12 DEGREES
RBC # BLD AUTO: 4.64 10E6/UL (ref 3.8–5.2)
SPECIMEN EXPIRATION DATE: NORMAL
SYSTOLIC BLOOD PRESSURE - MUSE: NORMAL MMHG
T AXIS - MUSE: 58 DEGREES
UNIT ABO/RH: NORMAL
UNIT ABO/RH: NORMAL
UNIT NUMBER: NORMAL
UNIT NUMBER: NORMAL
UNIT STATUS: NORMAL
UNIT STATUS: NORMAL
UNIT TYPE ISBT: 6200
UNIT TYPE ISBT: 6200
VENTRICULAR RATE- MUSE: 116 BPM
WBC # BLD AUTO: 11 10E3/UL (ref 4–11)

## 2023-09-08 PROCEDURE — C1763 CONN TISS, NON-HUMAN: HCPCS | Performed by: SURGERY

## 2023-09-08 PROCEDURE — 85610 PROTHROMBIN TIME: CPT | Performed by: SURGERY

## 2023-09-08 PROCEDURE — 250N000011 HC RX IP 250 OP 636: Performed by: SURGERY

## 2023-09-08 PROCEDURE — 250N000005 HC OR RX SURGIFLO HEMOSTATIC MATRIX 10ML 199102S OPNP: Performed by: SURGERY

## 2023-09-08 PROCEDURE — C1760 CLOSURE DEV, VASC: HCPCS | Performed by: SURGERY

## 2023-09-08 PROCEDURE — 250N000013 HC RX MED GY IP 250 OP 250 PS 637: Performed by: SURGERY

## 2023-09-08 PROCEDURE — 85018 HEMOGLOBIN: CPT | Performed by: INTERNAL MEDICINE

## 2023-09-08 PROCEDURE — 36415 COLL VENOUS BLD VENIPUNCTURE: CPT | Performed by: SURGERY

## 2023-09-08 PROCEDURE — 710N000010 HC RECOVERY PHASE 1, LEVEL 2, PER MIN: Performed by: SURGERY

## 2023-09-08 PROCEDURE — 250N000009 HC RX 250: Performed by: SURGERY

## 2023-09-08 PROCEDURE — 272N000001 HC OR GENERAL SUPPLY STERILE: Performed by: SURGERY

## 2023-09-08 PROCEDURE — 86901 BLOOD TYPING SEROLOGIC RH(D): CPT | Performed by: SURGERY

## 2023-09-08 PROCEDURE — 93010 ELECTROCARDIOGRAM REPORT: CPT | Performed by: GENERAL ACUTE CARE HOSPITAL

## 2023-09-08 PROCEDURE — 85025 COMPLETE CBC W/AUTO DIFF WBC: CPT | Performed by: SURGERY

## 2023-09-08 PROCEDURE — 36415 COLL VENOUS BLD VENIPUNCTURE: CPT

## 2023-09-08 PROCEDURE — 93005 ELECTROCARDIOGRAM TRACING: CPT

## 2023-09-08 PROCEDURE — 250N000009 HC RX 250: Performed by: NURSE ANESTHETIST, CERTIFIED REGISTERED

## 2023-09-08 PROCEDURE — 999N000141 HC STATISTIC PRE-PROCEDURE NURSING ASSESSMENT: Performed by: SURGERY

## 2023-09-08 PROCEDURE — 370N000017 HC ANESTHESIA TECHNICAL FEE, PER MIN: Performed by: SURGERY

## 2023-09-08 PROCEDURE — 85018 HEMOGLOBIN: CPT

## 2023-09-08 PROCEDURE — 82947 ASSAY GLUCOSE BLOOD QUANT: CPT | Performed by: SURGERY

## 2023-09-08 PROCEDURE — P9045 ALBUMIN (HUMAN), 5%, 250 ML: HCPCS | Mod: JZ | Performed by: NURSE ANESTHETIST, CERTIFIED REGISTERED

## 2023-09-08 PROCEDURE — 85730 THROMBOPLASTIN TIME PARTIAL: CPT | Performed by: SURGERY

## 2023-09-08 PROCEDURE — 041L0KL BYPASS LEFT FEMORAL ARTERY TO POPLITEAL ARTERY WITH NONAUTOLOGOUS TISSUE SUBSTITUTE, OPEN APPROACH: ICD-10-PCS | Performed by: SURGERY

## 2023-09-08 PROCEDURE — 84132 ASSAY OF SERUM POTASSIUM: CPT | Performed by: SURGERY

## 2023-09-08 PROCEDURE — 82565 ASSAY OF CREATININE: CPT | Performed by: SURGERY

## 2023-09-08 PROCEDURE — 250N000011 HC RX IP 250 OP 636: Performed by: NURSE ANESTHETIST, CERTIFIED REGISTERED

## 2023-09-08 PROCEDURE — 99232 SBSQ HOSP IP/OBS MODERATE 35: CPT | Performed by: INTERNAL MEDICINE

## 2023-09-08 PROCEDURE — 258N000003 HC RX IP 258 OP 636: Performed by: ANESTHESIOLOGY

## 2023-09-08 PROCEDURE — 200N000001 HC R&B ICU

## 2023-09-08 PROCEDURE — 86850 RBC ANTIBODY SCREEN: CPT | Performed by: SURGERY

## 2023-09-08 PROCEDURE — 36415 COLL VENOUS BLD VENIPUNCTURE: CPT | Performed by: INTERNAL MEDICINE

## 2023-09-08 PROCEDURE — 86923 COMPATIBILITY TEST ELECTRIC: CPT

## 2023-09-08 PROCEDURE — 360N000084 HC SURGERY LEVEL 4 W/ FLUORO, PER MIN: Performed by: SURGERY

## 2023-09-08 PROCEDURE — 272N000004 HC RX 272: Performed by: SURGERY

## 2023-09-08 PROCEDURE — 258N000003 HC RX IP 258 OP 636: Performed by: INTERNAL MEDICINE

## 2023-09-08 PROCEDURE — 258N000003 HC RX IP 258 OP 636: Performed by: SURGERY

## 2023-09-08 PROCEDURE — 250N000011 HC RX IP 250 OP 636: Performed by: ANESTHESIOLOGY

## 2023-09-08 PROCEDURE — 250N000025 HC SEVOFLURANE, PER MIN: Performed by: SURGERY

## 2023-09-08 PROCEDURE — 35666 BPG FEM-ANT TIB PST TIB/PRNL: CPT | Mod: LT | Performed by: SURGERY

## 2023-09-08 PROCEDURE — 250N000013 HC RX MED GY IP 250 OP 250 PS 637: Performed by: ANESTHESIOLOGY

## 2023-09-08 PROCEDURE — 258N000003 HC RX IP 258 OP 636: Performed by: NURSE ANESTHETIST, CERTIFIED REGISTERED

## 2023-09-08 DEVICE — ARTEGRAFT VASCULAR GRAFT  5MM X 40 CM
Type: IMPLANTABLE DEVICE | Site: LEG | Status: FUNCTIONAL
Brand: ARTEGRAFT VASCULAR GRAFT

## 2023-09-08 DEVICE — 4MM X 40 CM
Type: IMPLANTABLE DEVICE | Site: LEG | Status: FUNCTIONAL
Brand: ARTEGRAFT VASCULAR GRAFT

## 2023-09-08 RX ORDER — NALOXONE HYDROCHLORIDE 0.4 MG/ML
0.4 INJECTION, SOLUTION INTRAMUSCULAR; INTRAVENOUS; SUBCUTANEOUS
Status: DISCONTINUED | OUTPATIENT
Start: 2023-09-08 | End: 2023-09-15 | Stop reason: HOSPADM

## 2023-09-08 RX ORDER — ONDANSETRON 2 MG/ML
4 INJECTION INTRAMUSCULAR; INTRAVENOUS EVERY 30 MIN PRN
Status: DISCONTINUED | OUTPATIENT
Start: 2023-09-08 | End: 2023-09-08 | Stop reason: HOSPADM

## 2023-09-08 RX ORDER — LIDOCAINE 40 MG/G
CREAM TOPICAL
Status: DISCONTINUED | OUTPATIENT
Start: 2023-09-08 | End: 2023-09-08 | Stop reason: HOSPADM

## 2023-09-08 RX ORDER — LIDOCAINE HYDROCHLORIDE 10 MG/ML
INJECTION, SOLUTION INFILTRATION; PERINEURAL PRN
Status: DISCONTINUED | OUTPATIENT
Start: 2023-09-08 | End: 2023-09-08

## 2023-09-08 RX ORDER — PANTOPRAZOLE SODIUM 40 MG/1
40 TABLET, DELAYED RELEASE ORAL
Status: DISCONTINUED | OUTPATIENT
Start: 2023-09-08 | End: 2023-09-15 | Stop reason: HOSPADM

## 2023-09-08 RX ORDER — CEFAZOLIN SODIUM/WATER 2 G/20 ML
2 SYRINGE (ML) INTRAVENOUS SEE ADMIN INSTRUCTIONS
Status: DISCONTINUED | OUTPATIENT
Start: 2023-09-08 | End: 2023-09-08 | Stop reason: HOSPADM

## 2023-09-08 RX ORDER — NALOXONE HYDROCHLORIDE 1 MG/ML
0.2 INJECTION INTRAMUSCULAR; INTRAVENOUS; SUBCUTANEOUS
Status: DISCONTINUED | OUTPATIENT
Start: 2023-09-08 | End: 2023-09-08

## 2023-09-08 RX ORDER — NALOXONE HYDROCHLORIDE 1 MG/ML
0.4 INJECTION INTRAMUSCULAR; INTRAVENOUS; SUBCUTANEOUS
Status: DISCONTINUED | OUTPATIENT
Start: 2023-09-08 | End: 2023-09-08

## 2023-09-08 RX ORDER — SODIUM CHLORIDE, SODIUM LACTATE, POTASSIUM CHLORIDE, CALCIUM CHLORIDE 600; 310; 30; 20 MG/100ML; MG/100ML; MG/100ML; MG/100ML
INJECTION, SOLUTION INTRAVENOUS CONTINUOUS
Status: DISCONTINUED | OUTPATIENT
Start: 2023-09-08 | End: 2023-09-08 | Stop reason: HOSPADM

## 2023-09-08 RX ORDER — PAPAVERINE HYDROCHLORIDE 30 MG/ML
INJECTION INTRAMUSCULAR; INTRAVENOUS
Status: DISCONTINUED
Start: 2023-09-08 | End: 2023-09-08 | Stop reason: HOSPADM

## 2023-09-08 RX ORDER — NALOXONE HYDROCHLORIDE 0.4 MG/ML
0.2 INJECTION, SOLUTION INTRAMUSCULAR; INTRAVENOUS; SUBCUTANEOUS
Status: DISCONTINUED | OUTPATIENT
Start: 2023-09-08 | End: 2023-09-15 | Stop reason: HOSPADM

## 2023-09-08 RX ORDER — HYDROMORPHONE HCL IN WATER/PF 6 MG/30 ML
0.2 PATIENT CONTROLLED ANALGESIA SYRINGE INTRAVENOUS
Status: DISCONTINUED | OUTPATIENT
Start: 2023-09-08 | End: 2023-09-15 | Stop reason: HOSPADM

## 2023-09-08 RX ORDER — CLOPIDOGREL BISULFATE 75 MG/1
75 TABLET ORAL DAILY
Status: DISCONTINUED | OUTPATIENT
Start: 2023-09-09 | End: 2023-09-15 | Stop reason: HOSPADM

## 2023-09-08 RX ORDER — DEXTROSE MONOHYDRATE 25 G/50ML
25-50 INJECTION, SOLUTION INTRAVENOUS
Status: DISCONTINUED | OUTPATIENT
Start: 2023-09-08 | End: 2023-09-15 | Stop reason: HOSPADM

## 2023-09-08 RX ORDER — SODIUM CHLORIDE 9 MG/ML
INJECTION, SOLUTION INTRAVENOUS CONTINUOUS
Status: DISCONTINUED | OUTPATIENT
Start: 2023-09-08 | End: 2023-09-08 | Stop reason: HOSPADM

## 2023-09-08 RX ORDER — HYDROCHLOROTHIAZIDE 25 MG/1
25 TABLET ORAL DAILY
Status: DISCONTINUED | OUTPATIENT
Start: 2023-09-09 | End: 2023-09-15 | Stop reason: HOSPADM

## 2023-09-08 RX ORDER — FENTANYL CITRATE 50 UG/ML
50 INJECTION, SOLUTION INTRAMUSCULAR; INTRAVENOUS EVERY 5 MIN PRN
Status: DISCONTINUED | OUTPATIENT
Start: 2023-09-08 | End: 2023-09-08 | Stop reason: HOSPADM

## 2023-09-08 RX ORDER — ONDANSETRON 2 MG/ML
4 INJECTION INTRAMUSCULAR; INTRAVENOUS EVERY 30 MIN PRN
Status: DISCONTINUED | OUTPATIENT
Start: 2023-09-08 | End: 2023-09-08

## 2023-09-08 RX ORDER — SODIUM CHLORIDE 9 MG/ML
INJECTION, SOLUTION INTRAVENOUS CONTINUOUS
Status: DISCONTINUED | OUTPATIENT
Start: 2023-09-08 | End: 2023-09-09

## 2023-09-08 RX ORDER — AMLODIPINE BESYLATE 5 MG/1
5 TABLET ORAL DAILY
Status: DISCONTINUED | OUTPATIENT
Start: 2023-09-09 | End: 2023-09-15 | Stop reason: HOSPADM

## 2023-09-08 RX ORDER — ONDANSETRON 4 MG/1
4 TABLET, ORALLY DISINTEGRATING ORAL EVERY 30 MIN PRN
Status: DISCONTINUED | OUTPATIENT
Start: 2023-09-08 | End: 2023-09-08

## 2023-09-08 RX ORDER — PROPOFOL 10 MG/ML
INJECTION, EMULSION INTRAVENOUS PRN
Status: DISCONTINUED | OUTPATIENT
Start: 2023-09-08 | End: 2023-09-08

## 2023-09-08 RX ORDER — OXYCODONE HYDROCHLORIDE 5 MG/1
10 TABLET ORAL
Status: DISCONTINUED | OUTPATIENT
Start: 2023-09-08 | End: 2023-09-08

## 2023-09-08 RX ORDER — PROPOFOL 10 MG/ML
INJECTION, EMULSION INTRAVENOUS CONTINUOUS PRN
Status: DISCONTINUED | OUTPATIENT
Start: 2023-09-08 | End: 2023-09-08

## 2023-09-08 RX ORDER — NICOTINE POLACRILEX 4 MG
15-30 LOZENGE BUCCAL
Status: DISCONTINUED | OUTPATIENT
Start: 2023-09-08 | End: 2023-09-15 | Stop reason: HOSPADM

## 2023-09-08 RX ORDER — CEFAZOLIN SODIUM 1 G/3ML
1 INJECTION, POWDER, FOR SOLUTION INTRAMUSCULAR; INTRAVENOUS EVERY 8 HOURS
Status: COMPLETED | OUTPATIENT
Start: 2023-09-08 | End: 2023-09-09

## 2023-09-08 RX ORDER — MAGNESIUM HYDROXIDE 1200 MG/15ML
LIQUID ORAL PRN
Status: DISCONTINUED | OUTPATIENT
Start: 2023-09-08 | End: 2023-09-08 | Stop reason: HOSPADM

## 2023-09-08 RX ORDER — EZETIMIBE 10 MG/1
10 TABLET ORAL DAILY
Status: DISCONTINUED | OUTPATIENT
Start: 2023-09-09 | End: 2023-09-15 | Stop reason: HOSPADM

## 2023-09-08 RX ORDER — ACETAMINOPHEN 325 MG/1
975 TABLET ORAL ONCE
Status: COMPLETED | OUTPATIENT
Start: 2023-09-08 | End: 2023-09-08

## 2023-09-08 RX ORDER — NICOTINE POLACRILEX 4 MG
15-30 LOZENGE BUCCAL
Status: DISCONTINUED | OUTPATIENT
Start: 2023-09-08 | End: 2023-09-08

## 2023-09-08 RX ORDER — FENTANYL CITRATE 50 UG/ML
INJECTION, SOLUTION INTRAMUSCULAR; INTRAVENOUS PRN
Status: DISCONTINUED | OUTPATIENT
Start: 2023-09-08 | End: 2023-09-08

## 2023-09-08 RX ORDER — OXYCODONE HYDROCHLORIDE 5 MG/1
5 TABLET ORAL
Status: DISCONTINUED | OUTPATIENT
Start: 2023-09-08 | End: 2023-09-08

## 2023-09-08 RX ORDER — HYDROMORPHONE HCL IN WATER/PF 6 MG/30 ML
0.4 PATIENT CONTROLLED ANALGESIA SYRINGE INTRAVENOUS
Status: DISCONTINUED | OUTPATIENT
Start: 2023-09-08 | End: 2023-09-15 | Stop reason: HOSPADM

## 2023-09-08 RX ORDER — LISINOPRIL 20 MG/1
40 TABLET ORAL DAILY
Status: DISCONTINUED | OUTPATIENT
Start: 2023-09-09 | End: 2023-09-14

## 2023-09-08 RX ORDER — ACETAMINOPHEN 325 MG/1
975 TABLET ORAL EVERY 8 HOURS
Status: COMPLETED | OUTPATIENT
Start: 2023-09-08 | End: 2023-09-11

## 2023-09-08 RX ORDER — SODIUM CHLORIDE, SODIUM LACTATE, POTASSIUM CHLORIDE, CALCIUM CHLORIDE 600; 310; 30; 20 MG/100ML; MG/100ML; MG/100ML; MG/100ML
INJECTION, SOLUTION INTRAVENOUS CONTINUOUS PRN
Status: DISCONTINUED | OUTPATIENT
Start: 2023-09-08 | End: 2023-09-08

## 2023-09-08 RX ORDER — CLINDAMYCIN PHOSPHATE 900 MG/50ML
900 INJECTION, SOLUTION INTRAVENOUS
Status: DISCONTINUED | OUTPATIENT
Start: 2023-09-08 | End: 2023-09-08

## 2023-09-08 RX ORDER — METFORMIN HCL 500 MG
1000 TABLET, EXTENDED RELEASE 24 HR ORAL DAILY
Status: DISCONTINUED | OUTPATIENT
Start: 2023-09-09 | End: 2023-09-11

## 2023-09-08 RX ORDER — HEPARIN SODIUM 5000 [USP'U]/.5ML
5000 INJECTION, SOLUTION INTRAVENOUS; SUBCUTANEOUS EVERY 8 HOURS
Status: DISCONTINUED | OUTPATIENT
Start: 2023-09-09 | End: 2023-09-11

## 2023-09-08 RX ORDER — ONDANSETRON 4 MG/1
4 TABLET, ORALLY DISINTEGRATING ORAL EVERY 6 HOURS PRN
Status: DISCONTINUED | OUTPATIENT
Start: 2023-09-08 | End: 2023-09-15 | Stop reason: HOSPADM

## 2023-09-08 RX ORDER — POLYETHYLENE GLYCOL 3350 17 G/17G
17 POWDER, FOR SOLUTION ORAL DAILY
Status: DISCONTINUED | OUTPATIENT
Start: 2023-09-09 | End: 2023-09-15 | Stop reason: HOSPADM

## 2023-09-08 RX ORDER — ONDANSETRON 2 MG/ML
4 INJECTION INTRAMUSCULAR; INTRAVENOUS EVERY 6 HOURS PRN
Status: DISCONTINUED | OUTPATIENT
Start: 2023-09-08 | End: 2023-09-15 | Stop reason: HOSPADM

## 2023-09-08 RX ORDER — ONDANSETRON 4 MG/1
4 TABLET, ORALLY DISINTEGRATING ORAL EVERY 30 MIN PRN
Status: DISCONTINUED | OUTPATIENT
Start: 2023-09-08 | End: 2023-09-08 | Stop reason: HOSPADM

## 2023-09-08 RX ORDER — AMOXICILLIN 250 MG
1 CAPSULE ORAL 2 TIMES DAILY
Status: DISCONTINUED | OUTPATIENT
Start: 2023-09-08 | End: 2023-09-15 | Stop reason: HOSPADM

## 2023-09-08 RX ORDER — PROTAMINE SULFATE 10 MG/ML
INJECTION, SOLUTION INTRAVENOUS PRN
Status: DISCONTINUED | OUTPATIENT
Start: 2023-09-08 | End: 2023-09-08

## 2023-09-08 RX ORDER — CLINDAMYCIN PHOSPHATE 900 MG/50ML
900 INJECTION, SOLUTION INTRAVENOUS SEE ADMIN INSTRUCTIONS
Status: DISCONTINUED | OUTPATIENT
Start: 2023-09-08 | End: 2023-09-08

## 2023-09-08 RX ORDER — CEFAZOLIN SODIUM/WATER 2 G/20 ML
2 SYRINGE (ML) INTRAVENOUS
Status: COMPLETED | OUTPATIENT
Start: 2023-09-08 | End: 2023-09-08

## 2023-09-08 RX ORDER — BISACODYL 10 MG
10 SUPPOSITORY, RECTAL RECTAL DAILY PRN
Status: DISCONTINUED | OUTPATIENT
Start: 2023-09-08 | End: 2023-09-09

## 2023-09-08 RX ORDER — DEXTROSE MONOHYDRATE 25 G/50ML
25-50 INJECTION, SOLUTION INTRAVENOUS
Status: DISCONTINUED | OUTPATIENT
Start: 2023-09-08 | End: 2023-09-08

## 2023-09-08 RX ORDER — OXYCODONE HYDROCHLORIDE 5 MG/1
10 TABLET ORAL EVERY 4 HOURS PRN
Status: DISCONTINUED | OUTPATIENT
Start: 2023-09-08 | End: 2023-09-09

## 2023-09-08 RX ORDER — CALCIUM CARBONATE 500 MG/1
500 TABLET, CHEWABLE ORAL DAILY PRN
Status: DISCONTINUED | OUTPATIENT
Start: 2023-09-08 | End: 2023-09-15 | Stop reason: HOSPADM

## 2023-09-08 RX ORDER — ONDANSETRON 2 MG/ML
INJECTION INTRAMUSCULAR; INTRAVENOUS PRN
Status: DISCONTINUED | OUTPATIENT
Start: 2023-09-08 | End: 2023-09-08

## 2023-09-08 RX ORDER — ACETAMINOPHEN 325 MG/1
650 TABLET ORAL EVERY 4 HOURS PRN
Status: DISCONTINUED | OUTPATIENT
Start: 2023-09-11 | End: 2023-09-15 | Stop reason: HOSPADM

## 2023-09-08 RX ORDER — FENTANYL CITRATE 50 UG/ML
25 INJECTION, SOLUTION INTRAMUSCULAR; INTRAVENOUS EVERY 5 MIN PRN
Status: DISCONTINUED | OUTPATIENT
Start: 2023-09-08 | End: 2023-09-08 | Stop reason: HOSPADM

## 2023-09-08 RX ORDER — LIDOCAINE 40 MG/G
CREAM TOPICAL
Status: DISCONTINUED | OUTPATIENT
Start: 2023-09-08 | End: 2023-09-09

## 2023-09-08 RX ORDER — FENTANYL CITRATE 50 UG/ML
25-100 INJECTION, SOLUTION INTRAMUSCULAR; INTRAVENOUS
Status: DISCONTINUED | OUTPATIENT
Start: 2023-09-08 | End: 2023-09-08 | Stop reason: HOSPADM

## 2023-09-08 RX ORDER — PROCHLORPERAZINE MALEATE 5 MG
5 TABLET ORAL EVERY 6 HOURS PRN
Status: DISCONTINUED | OUTPATIENT
Start: 2023-09-08 | End: 2023-09-09

## 2023-09-08 RX ORDER — OXYCODONE HYDROCHLORIDE 5 MG/1
5 TABLET ORAL EVERY 4 HOURS PRN
Status: DISCONTINUED | OUTPATIENT
Start: 2023-09-08 | End: 2023-09-09

## 2023-09-08 RX ORDER — HEPARIN SODIUM 1000 [USP'U]/ML
INJECTION, SOLUTION INTRAVENOUS; SUBCUTANEOUS PRN
Status: DISCONTINUED | OUTPATIENT
Start: 2023-09-08 | End: 2023-09-08

## 2023-09-08 RX ADMIN — PHENYLEPHRINE HYDROCHLORIDE 50 MCG: 10 INJECTION INTRAVENOUS at 12:39

## 2023-09-08 RX ADMIN — FENTANYL CITRATE 50 MCG: 50 INJECTION, SOLUTION INTRAMUSCULAR; INTRAVENOUS at 16:03

## 2023-09-08 RX ADMIN — ALBUMIN HUMAN: 0.05 INJECTION, SOLUTION INTRAVENOUS at 14:52

## 2023-09-08 RX ADMIN — SODIUM CHLORIDE, POTASSIUM CHLORIDE, SODIUM LACTATE AND CALCIUM CHLORIDE: 600; 310; 30; 20 INJECTION, SOLUTION INTRAVENOUS at 14:52

## 2023-09-08 RX ADMIN — PHENYLEPHRINE HYDROCHLORIDE 100 MCG: 10 INJECTION INTRAVENOUS at 14:06

## 2023-09-08 RX ADMIN — PHENYLEPHRINE HYDROCHLORIDE 100 MCG: 10 INJECTION INTRAVENOUS at 14:03

## 2023-09-08 RX ADMIN — PHENYLEPHRINE HYDROCHLORIDE 100 MCG: 10 INJECTION INTRAVENOUS at 14:00

## 2023-09-08 RX ADMIN — ALBUMIN HUMAN: 0.05 INJECTION, SOLUTION INTRAVENOUS at 11:44

## 2023-09-08 RX ADMIN — SENNOSIDES AND DOCUSATE SODIUM 1 TABLET: 50; 8.6 TABLET ORAL at 21:41

## 2023-09-08 RX ADMIN — ACETAMINOPHEN 975 MG: 325 TABLET ORAL at 08:39

## 2023-09-08 RX ADMIN — PROPOFOL 50 MG: 10 INJECTION, EMULSION INTRAVENOUS at 09:52

## 2023-09-08 RX ADMIN — SODIUM CHLORIDE 500 ML: 9 INJECTION, SOLUTION INTRAVENOUS at 22:21

## 2023-09-08 RX ADMIN — HYDROMORPHONE HYDROCHLORIDE 0.4 MG: 1 INJECTION, SOLUTION INTRAMUSCULAR; INTRAVENOUS; SUBCUTANEOUS at 17:12

## 2023-09-08 RX ADMIN — HYDROMORPHONE HYDROCHLORIDE 0.4 MG: 1 INJECTION, SOLUTION INTRAMUSCULAR; INTRAVENOUS; SUBCUTANEOUS at 17:56

## 2023-09-08 RX ADMIN — LIDOCAINE HYDROCHLORIDE 50 MG: 10 INJECTION, SOLUTION INFILTRATION; PERINEURAL at 09:49

## 2023-09-08 RX ADMIN — MIDAZOLAM 2 MG: 1 INJECTION INTRAMUSCULAR; INTRAVENOUS at 09:35

## 2023-09-08 RX ADMIN — Medication 2 G: at 13:37

## 2023-09-08 RX ADMIN — ROCURONIUM BROMIDE 10 MG: 50 INJECTION, SOLUTION INTRAVENOUS at 14:56

## 2023-09-08 RX ADMIN — HYDROMORPHONE HYDROCHLORIDE 0.5 MG: 1 INJECTION, SOLUTION INTRAMUSCULAR; INTRAVENOUS; SUBCUTANEOUS at 13:28

## 2023-09-08 RX ADMIN — ACETAMINOPHEN 975 MG: 325 TABLET ORAL at 20:41

## 2023-09-08 RX ADMIN — PHENYLEPHRINE HYDROCHLORIDE 50 MCG: 10 INJECTION INTRAVENOUS at 10:21

## 2023-09-08 RX ADMIN — HEPARIN SODIUM 3000 UNITS: 1000 INJECTION INTRAVENOUS; SUBCUTANEOUS at 14:00

## 2023-09-08 RX ADMIN — FENTANYL CITRATE 50 MCG: 50 INJECTION, SOLUTION INTRAMUSCULAR; INTRAVENOUS at 16:33

## 2023-09-08 RX ADMIN — PHENYLEPHRINE HYDROCHLORIDE 50 MCG: 10 INJECTION INTRAVENOUS at 10:47

## 2023-09-08 RX ADMIN — HEPARIN SODIUM 2000 UNITS: 1000 INJECTION INTRAVENOUS; SUBCUTANEOUS at 13:15

## 2023-09-08 RX ADMIN — PROPOFOL 50 MG: 10 INJECTION, EMULSION INTRAVENOUS at 15:32

## 2023-09-08 RX ADMIN — PHENYLEPHRINE HYDROCHLORIDE 100 MCG: 10 INJECTION INTRAVENOUS at 14:47

## 2023-09-08 RX ADMIN — FENTANYL CITRATE 50 MCG: 50 INJECTION, SOLUTION INTRAMUSCULAR; INTRAVENOUS at 11:17

## 2023-09-08 RX ADMIN — SODIUM CHLORIDE, POTASSIUM CHLORIDE, SODIUM LACTATE AND CALCIUM CHLORIDE: 600; 310; 30; 20 INJECTION, SOLUTION INTRAVENOUS at 08:59

## 2023-09-08 RX ADMIN — ALBUMIN HUMAN: 0.05 INJECTION, SOLUTION INTRAVENOUS at 14:34

## 2023-09-08 RX ADMIN — PHENYLEPHRINE HYDROCHLORIDE 50 MCG: 10 INJECTION INTRAVENOUS at 10:22

## 2023-09-08 RX ADMIN — PHENYLEPHRINE HYDROCHLORIDE 50 MCG: 10 INJECTION INTRAVENOUS at 11:35

## 2023-09-08 RX ADMIN — HYDROMORPHONE HYDROCHLORIDE 0.4 MG: 1 INJECTION, SOLUTION INTRAMUSCULAR; INTRAVENOUS; SUBCUTANEOUS at 16:56

## 2023-09-08 RX ADMIN — FENTANYL CITRATE 25 MCG: 50 INJECTION, SOLUTION INTRAMUSCULAR; INTRAVENOUS at 16:09

## 2023-09-08 RX ADMIN — FENTANYL CITRATE 50 MCG: 50 INJECTION, SOLUTION INTRAMUSCULAR; INTRAVENOUS at 12:16

## 2023-09-08 RX ADMIN — PANTOPRAZOLE SODIUM 40 MG: 40 TABLET, DELAYED RELEASE ORAL at 21:41

## 2023-09-08 RX ADMIN — Medication 2 G: at 09:35

## 2023-09-08 RX ADMIN — FENTANYL CITRATE 25 MCG: 50 INJECTION, SOLUTION INTRAMUSCULAR; INTRAVENOUS at 15:53

## 2023-09-08 RX ADMIN — HYDROMORPHONE HYDROCHLORIDE 0.2 MG: 1 INJECTION, SOLUTION INTRAMUSCULAR; INTRAVENOUS; SUBCUTANEOUS at 17:36

## 2023-09-08 RX ADMIN — PROTAMINE SULFATE 50 MG: 10 INJECTION, SOLUTION INTRAVENOUS at 14:41

## 2023-09-08 RX ADMIN — PHENYLEPHRINE HYDROCHLORIDE 100 MCG: 10 INJECTION INTRAVENOUS at 13:32

## 2023-09-08 RX ADMIN — PROPOFOL 25 MCG/KG/MIN: 10 INJECTION, EMULSION INTRAVENOUS at 10:12

## 2023-09-08 RX ADMIN — PROPOFOL 50 MG: 10 INJECTION, EMULSION INTRAVENOUS at 12:21

## 2023-09-08 RX ADMIN — HEPARIN SODIUM 7000 UNITS: 1000 INJECTION INTRAVENOUS; SUBCUTANEOUS at 12:50

## 2023-09-08 RX ADMIN — PHENYLEPHRINE HYDROCHLORIDE 50 MCG: 10 INJECTION INTRAVENOUS at 10:23

## 2023-09-08 RX ADMIN — PHENYLEPHRINE HYDROCHLORIDE 100 MCG: 10 INJECTION INTRAVENOUS at 13:46

## 2023-09-08 RX ADMIN — PHENYLEPHRINE HYDROCHLORIDE 50 MCG: 10 INJECTION INTRAVENOUS at 10:45

## 2023-09-08 RX ADMIN — SODIUM CHLORIDE, POTASSIUM CHLORIDE, SODIUM LACTATE AND CALCIUM CHLORIDE: 600; 310; 30; 20 INJECTION, SOLUTION INTRAVENOUS at 09:40

## 2023-09-08 RX ADMIN — PHENYLEPHRINE HYDROCHLORIDE 50 MCG: 10 INJECTION INTRAVENOUS at 10:10

## 2023-09-08 RX ADMIN — PHENYLEPHRINE HYDROCHLORIDE 50 MCG: 10 INJECTION INTRAVENOUS at 11:44

## 2023-09-08 RX ADMIN — PHENYLEPHRINE HYDROCHLORIDE 50 MCG: 10 INJECTION INTRAVENOUS at 11:55

## 2023-09-08 RX ADMIN — ROCURONIUM BROMIDE 50 MG: 50 INJECTION, SOLUTION INTRAVENOUS at 09:51

## 2023-09-08 RX ADMIN — FENTANYL CITRATE 50 MCG: 50 INJECTION, SOLUTION INTRAMUSCULAR; INTRAVENOUS at 16:21

## 2023-09-08 RX ADMIN — PHENYLEPHRINE HYDROCHLORIDE 50 MCG: 10 INJECTION INTRAVENOUS at 11:37

## 2023-09-08 RX ADMIN — ONDANSETRON 4 MG: 2 INJECTION INTRAMUSCULAR; INTRAVENOUS at 15:32

## 2023-09-08 RX ADMIN — PROPOFOL 100 MG: 10 INJECTION, EMULSION INTRAVENOUS at 14:56

## 2023-09-08 RX ADMIN — PHENYLEPHRINE HYDROCHLORIDE 100 MCG: 10 INJECTION INTRAVENOUS at 09:49

## 2023-09-08 RX ADMIN — SODIUM CHLORIDE, POTASSIUM CHLORIDE, SODIUM LACTATE AND CALCIUM CHLORIDE: 600; 310; 30; 20 INJECTION, SOLUTION INTRAVENOUS at 10:43

## 2023-09-08 RX ADMIN — FENTANYL CITRATE 100 MCG: 50 INJECTION, SOLUTION INTRAMUSCULAR; INTRAVENOUS at 09:49

## 2023-09-08 RX ADMIN — PHENYLEPHRINE HYDROCHLORIDE 100 MCG: 10 INJECTION INTRAVENOUS at 13:42

## 2023-09-08 RX ADMIN — PHENYLEPHRINE HYDROCHLORIDE 50 MCG: 10 INJECTION INTRAVENOUS at 10:16

## 2023-09-08 RX ADMIN — PROPOFOL 150 MG: 10 INJECTION, EMULSION INTRAVENOUS at 09:49

## 2023-09-08 RX ADMIN — PHENYLEPHRINE HYDROCHLORIDE 100 MCG: 10 INJECTION INTRAVENOUS at 14:45

## 2023-09-08 RX ADMIN — SUGAMMADEX 150 MG: 100 INJECTION, SOLUTION INTRAVENOUS at 15:32

## 2023-09-08 RX ADMIN — PHENYLEPHRINE HYDROCHLORIDE 100 MCG: 10 INJECTION INTRAVENOUS at 14:15

## 2023-09-08 RX ADMIN — PHENYLEPHRINE HYDROCHLORIDE 50 MCG: 10 INJECTION INTRAVENOUS at 11:56

## 2023-09-08 RX ADMIN — PHENYLEPHRINE HYDROCHLORIDE 100 MCG: 10 INJECTION INTRAVENOUS at 13:54

## 2023-09-08 RX ADMIN — PHENYLEPHRINE HYDROCHLORIDE 0.2 MCG/KG/MIN: 10 INJECTION INTRAVENOUS at 10:20

## 2023-09-08 RX ADMIN — PHENYLEPHRINE HYDROCHLORIDE 50 MCG: 10 INJECTION INTRAVENOUS at 10:12

## 2023-09-08 RX ADMIN — ALBUMIN HUMAN: 0.05 INJECTION, SOLUTION INTRAVENOUS at 11:05

## 2023-09-08 RX ADMIN — PHENYLEPHRINE HYDROCHLORIDE 150 MCG: 10 INJECTION INTRAVENOUS at 14:22

## 2023-09-08 RX ADMIN — CEFAZOLIN 1 G: 1 INJECTION, POWDER, FOR SOLUTION INTRAMUSCULAR; INTRAVENOUS at 21:30

## 2023-09-08 ASSESSMENT — LIFESTYLE VARIABLES: TOBACCO_USE: 1

## 2023-09-08 ASSESSMENT — ACTIVITIES OF DAILY LIVING (ADL)
ADLS_ACUITY_SCORE: 35

## 2023-09-08 NOTE — ANESTHESIA PREPROCEDURE EVALUATION
Anesthesia Pre-Procedure Evaluation    Patient: Ashanti Aviles   MRN: [id number] : 1955        Procedure : Procedure(s):  CREATION, BYPASS, ARTERIAL, FEMORAL TO TIBIAL          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/2013    PVD (peripheral vascular disease) (H)       Past Surgical History:   Procedure Laterality Date    AMPUTATE TOE(S) Right 2023    Procedure: Amputation fourth toe right foot;  Surgeon: Benjamin Diez DPM;  Location: [facility] OR    ANUS SURGERY      BIOPSY CERVICAL, LOCAL EXCISION, SINGLE/MULTIPLE      COLONOSCOPY N/A 2020    Procedure: EXAM UNDER ANESTHESIA, HIGH RESOLUTION ANOSCOPY INTRA OP;  Surgeon: Preeti Sheehan MD;  Location: [facility];  Service: Gastroenterology    COLONOSCOPY N/A 2020    Procedure: EXAM UNDER ANESTHESIA WITH HIGH RESOLUTION ANOSCOPY;  Surgeon: Preeti Sheehan MD;  Location: [facility];  Service: General    COLONOSCOPY N/A 6/15/2021    Procedure: EXAM UNDER ANESTHESIA WITH HIGH RESOLUTION ANOSCOPY, BIOPSY, FULGURATION;  Surgeon: Preeti Sheehan MD;  Location: [facility];  Service: Gastroenterology    ENDARTERECTOMY FEMORAL Left 2023    Procedure: LEFT FEMORAL ENDARTERECTOMY WITH RETROGRADE ILIAC STENT;  Surgeon: Dyan Geller MD;  Location: [facility]    EXAM UNDER ANESTHESIA ANUS N/A 2021    Procedure: EXAM UNDER ANESTHESIA WITH HIGH RESOLUTION ANOSCOPY;  Surgeon: Preeti Sheehan MD;  Location: [facility]    IR LOWER EXTREMITY ANGIOGRAM LEFT  3/23/2023    IR LOWER EXTREMITY ANGIOGRAM RIGHT  2023      Allergies   Allergen Reactions    Oxycodone Nausea and Vomiting    Penicillins Unknown     Childhood rxn    Simvastatin Unknown     Muscle pain    Victoza [Liraglutide] Other (See Comments)     Binds  bowels      Social History     Tobacco Use    Smoking status: Former     Packs/day: 0.25     Types: Cigarettes     Quit date: 2023     Years since quittin.5    Smokeless tobacco: Never   Substance Use Topics    Alcohol use: Not Currently     Comment: Alcoholic Drinks/day: 2x      Wt Readings from Last 1 Encounters:   23 68 kg (149 lb 14.4 oz)        Anesthesia Evaluation   Pt has had prior anesthetic.     No history of anesthetic complications       ROS/MED HX  ENT/Pulmonary:     (+)                tobacco use,                       Neurologic:  - neg neurologic ROS     Cardiovascular:     (+) Dyslipidemia hypertension- -   -  - -                                      METS/Exercise Tolerance:     Hematologic:  - neg hematologic  ROS     Musculoskeletal:       GI/Hepatic:     (+) GERD,                   Renal/Genitourinary:     (+) renal disease,             Endo:     (+)  type II DM,                    Psychiatric/Substance Use:       Infectious Disease:       Malignancy:       Other:            Physical Exam    Airway        Mallampati: II   TM distance: > 3 FB    Mouth opening: > 3 cm    Respiratory Devices and Support         Dental       (+) Modest Abnormalities - crowns, retainers, 1 or 2 missing teeth      Cardiovascular          Rhythm and rate: regular and normal     Pulmonary           breath sounds clear to auscultation           OUTSIDE LABS:  CBC:   Lab Results   Component Value Date    WBC 11.0 2023    WBC 14.0 (H) 2023    HGB 13.7 2023    HGB 11.1 (L) 2023    HCT 41.7 2023    HCT 34.2 (L) 2023     2023     2023     BMP:   Lab Results   Component Value Date     2023     2023    POTASSIUM 3.7 2023    POTASSIUM 4.3 2023    CHLORIDE 100 2023    CHLORIDE 105 2023    CO2 24 2023    CO2 24 2023    BUN 15.3 2023    BUN 14.9 2023    CR 0.78 2023    CR 0.72  05/18/2023     (H) 09/08/2023     (H) 09/05/2023     COAGS:   Lab Results   Component Value Date    PTT 28 09/08/2023    INR 0.90 09/08/2023     POC: No results found for: BGM, HCG, HCGS  HEPATIC:   Lab Results   Component Value Date    ALBUMIN 3.7 02/15/2023    PROTTOTAL 6.5 02/15/2023    ALT 25 02/15/2023    AST 20 02/15/2023    ALKPHOS 75 02/15/2023    BILITOTAL <0.2 02/15/2023     OTHER:   Lab Results   Component Value Date    A1C 8.3 (H) 09/05/2023    SANTINO 9.6 09/05/2023       Anesthesia Plan    ASA Status:  3    NPO Status:  NPO Appropriate    Anesthesia Type: General.     - Airway: ETT   Induction: Intravenous.      Techniques and Equipment:     - Lines/Monitors: Arterial Line, 2nd IV     Consents    Anesthesia Plan(s) and associated risks, benefits, and realistic alternatives discussed. Questions answered and patient/representative(s) expressed understanding.     - Discussed: Risks, Benefits and Alternatives for the PROCEDURE were discussed     - Discussed with:  Patient            Postoperative Care    Pain management: IV analgesics, Oral pain medications.   PONV prophylaxis: Ondansetron (or other 5HT-3), Dexamethasone or Solumedrol     Comments:                Houston Mccracken MD

## 2023-09-08 NOTE — ANESTHESIA PROCEDURE NOTES
Arterial Line Procedure Note    Pre-Procedure   Staff -        Anesthesiologist:  Houston Mccracken MD       Performed By: anesthesiologist       Location: OR       Pre-Anesthestic Checklist: patient identified, IV checked, risks and benefits discussed, informed consent, monitors and equipment checked and pre-op evaluation  Timeout:       Correct Patient: Yes        Correct Procedure: Yes        Correct Site: Yes        Correct Position: Yes   Line Placement:   This line was placed Post Induction starting at 9/8/2023 9:53 AM and ending at 9/8/2023 9:58 AM  Procedure   Procedure: arterial line and new line       Laterality: left       Insertion Site: radial.  Sterile Prep        Standard elements of sterile barrier followed       Skin prep: Chloraprep  Insertion/Injection        Technique: Seldinger Technique        Catheter Type/Size: 20 G, 12 cm  Narrative         Secured by: suture       Tegaderm dressing used.       Complications: None apparent,        Arterial waveform: Yes        IBP within 10% of NIBP: Yes

## 2023-09-08 NOTE — INTERVAL H&P NOTE
I have reviewed the surgical (or preoperative) H&P that is linked to this encounter, and examined the patient. There are no significant changes    Clinical Conditions Present on Arrival:  Clinically Significant Risk Factors Present on Admission                 # Drug Induced Platelet Defect: home medication list includes an antiplatelet medication  # DMII: A1C = 8.3 % (Ref range: 0.0 - 5.6 %) within past 6 months

## 2023-09-08 NOTE — OP NOTE
VASCULAR SURGERY OPERATIVE REPORT        LOCATION:     [facility]   Medical Record #:  [id number]  YOB: 1955  Age:  68 year old     Date of Service: September 8, 2023    PRIMARY CARE PROVIDER: No Ref-Primary, Physician    Preoperative diagnosis    Chronic limb threatening ischemia, left lower extremity    Postoperative diagnosis    Same      Surgeon: Dyan Geller MD, RPVI   Assistant: Belkys Aldrich DO, vascular surgery fellow                    Tammy Ramirez PA-C                         Name of the procedure    Left femoral to tibioperoneal trunk bypass using spliced 4mm and 5 mm Artegraft graft  Redo exposure of the left common femoral artery    Anesthesia:    General    Indication for procedure:    68-year-old female with a history of left lower extremity limb threatening ischemia.  Patient has a history of left iliofemoral endarterectomy with retrograde iliac intervention.  Unfortunately, she has not improved.  Preoperative ABIs have remained stable but toe pressures have started to decline.  We schedule patient for femoral to TP trunk bypass based on most recent angiogram.    Description of procedure:    The patient was placed supine on the operating table. The arms were placed at 80 . Normal bony prominences were padded. The anesthesia team placed appropriate lines and general anesthesia was induced. A Cedeno catheter was placed under sterile technique. The patient s lower abdomen and both lower extremities were circumferentially prepped and draped in the usual sterile fashion. Preoperative antibiotics were administered prior to skin incision.  A 10- to 12-cm vertical skin incision was performed on the medial aspect of the leg, 2 cm posteromedial and parallel to the tibia. The incision was located directly over the saphenous vein.The skin incision was deepened through the subcutaneous tissue, the saphenous vein was carefully reflected posteriorly.  It was also  found to be very small.  The deep muscular fascia was incised, exposing the medial head of gastrocnemius muscle which is reclined posteriorly. The soleus muscle is then exposed and incised along its attachment down to its posterior deep fascia. This fascia was incised, exposing the posterior tibial and flexor muscles. Gentle dissection between these two muscles was performed and a self-retaining retractor was placed deeper in the wound, exposing the posterior tibial artery and veins. The artery is palpated to assess the feasibility of the distal anastomosis. A 2-cm segment of the tibioperoneal artery was sharply dissected. Crossing venae comitantes were ligated and divided.  Then, a vertical curvilinear skin incision overlying the left common femoral artery was made extending down the upper medial thigh along the preoperatively mapped greater saphenous vein. The incision was deepened through the subcutaneous tissues with electrocautery.  Of note, the dissection was very difficult due to significant amount of scar tissue formation from previous operation.  The encountered lymphatics were ligated and divided. The common femoral artery was then exposed and sharply dissected circumferentially. The dissection was extended proximally to the inguinal ligament and distally to include the superficial femoral and profunda femoris arteries. The common femoral, superficial femoral, and profunda femoris arteries were encircled with silastic vessel loops. Minor branches of the common femoral artery were identified and spared. The greater saphenous vein was then identified. A tunnel was then created using a Pedro tunneler.  The tunnel was subsartorial along the medial aspect of the thigh and then anatomic at the knee level passing between both heads of the gastrocnemius muscle. Below the knee the tunnel was posterior to the soleus muscle.  On the back table with spliced 5 mm and 4 mm Artegraft using 6-0 Prolene suture in  end-to-end fashion  The patient was given 100 UI/Kg of heparin intravenously. The common femoral artery, profunda, and superficial femoral artery were clamped. A longitudinal arteriotomy in the common femoral artery was performed and extended with Pott s scissors for 1 cm. The left was then reversed and its distal end was incised along its posterior wall/ creating a T junction shape. The proximal anastomosis was constructed between the spatulated graft and the femoral arteriotomy with a running 5-0 prolene suture. Prior to completing the suture line, back-bleeding, forward-flushing, and irrigation of the anastomosis with heparinized solution was performed. The anastomosis was then completed and checked for hemostasis, which was adequate. The flow through the graft was checked and was pulsatile. The end of the graft was ligated with a 2-0 silk tie. The graft was rechecked for hemostasis. The graft was marked with methylene blue and then passed distended through the tunnel, avoiding any twists.  Proximal and distal control of the tibioperoneal trunk was then performed Vesseloops. A 1-cm arteriotomy was then created in the anterior wall of the tibioperoneal trunk.  The graft was transected at the appropriate length and was incised along its posterior aspect, spatulating the vein. The distal anastomosis to the posterior tibial artery was constructed with a running 7-0 prolene suture. Prior to completing the suture line,back-bleeding, forward-flushing, and irrigation of the anastomosis with heparinized solution was performed. The anastomosis was then completed and checked for hemostasis, which was adequate.  The suture lines and wounds were then rechecked for hemostasis. There was good Doppler signal in the foot at the posterior tibial artery level and a good augmentation of the signal with compressing and releasing the vein graft. The wounds were all irrigated with antibiotic solution .  All wounds were closed in a  similar fashion using 2-0 Vicryl, 3-0 Vicryl, and 4 Monocryl.   The assistance of Tammy Ramirez PA-C, was required in this case due to extreme complexity of this operation.  Tammy Ramirez PA-C assisted by performing and providing most of the steps of this operation including tissue dissection, vascular anastomosis, and wound closure. Tammy's assistance was also necessary because  a fellow/resident was not fully qualified to assist with all steps of the procedure.     Estimated blood loss: 500 cc    Specimens: None    Complications: None      Dyan Geller MD, [facility]  VASCULAR SURGERY

## 2023-09-08 NOTE — PHARMACY-ADMISSION MEDICATION HISTORY
Pharmacist Admission Medication History    Admission medication history is complete. The information provided in this note is only as accurate as the sources available at the time of the update.    Medication reconciliation/reorder completed by provider prior to medication history? No    Information Source(s): Patient, Clinic records, and CareEverywhere/SureScripts via in-person    Pertinent Information: Patient states she is no longer taking aspirin     Allergies reviewed with patient and updates made in EHR: yes    Medication History Completed By: MAI JOHNSON [facility] 9/8/2023 8:41 AM    Prior to Admission medications    Medication Sig Last Dose Taking? Auth Provider Long Term End Date   amLODIPine (NORVASC) 10 MG tablet Take 5 mg by mouth daily 9/6/2023 at am Yes Provider, Historical Yes    clopidogrel (PLAVIX) 75 MG tablet Take 75 mg by mouth daily 9/7/2023 at am Yes Reported, Patient No    empagliflozin (JARDIANCE) 25 MG TABS tablet Take 1 tablet (25 mg) by mouth daily 9/7/2023 at am Yes Neha Hicks, NP No    ezetimibe (ZETIA) 10 MG tablet Take 10 mg by mouth daily 9/7/2023 at am Yes Unknown, Entered By History No    hydroCHLOROthiazide (HYDRODIURIL) 25 MG tablet [HYDROCHLOROTHIAZIDE (HYDRODIURIL) 25 MG TABLET] Take 25 mg by mouth daily. 9/7/2023 at am Yes Provider, Historical Yes    Insulin Degludec (TRESIBA) 100 UNIT/ML SOLN Inject 30 Units Subcutaneous daily 9/8/2023 at took 10 units today Yes Reported, Patient     linagliptin (TRADJENTA) 5 MG TABS tablet Take 5 mg by mouth daily 9/7/2023 at am Yes Reported, Patient Yes    lisinopril (PRINIVIL,ZESTRIL) 40 MG tablet [LISINOPRIL (PRINIVIL,ZESTRIL) 40 MG TABLET] Take 40 mg by mouth daily. 9/7/2023 at am Yes Provider, Historical Yes    metFORMIN (GLUCOPHAGE XR) 500 MG 24 hr tablet Take 1,000 mg by mouth daily 9/6/2023 at am Yes Unknown, Entered By History No    omeprazole (PRILOSEC) 20 MG DR capsule Take 20 mg by mouth daily as needed Past Week at prn Yes  Unknown, Entered By History     Continuous Blood Gluc Sensor (FREESTYLE PRINCESS 14 DAY SENSOR) MISC    Reported, Patient

## 2023-09-08 NOTE — BRIEF OP NOTE
[facility]    Brief Operative Note    Pre-operative diagnosis: PAD (peripheral artery disease) (H) [I73.9]  Post-operative diagnosis Same as pre-operative diagnosis    Procedure: Procedure(s):  CREATION, BYPASS, ARTERIAL, FEMORAL TO TIBIAL  Surgeon: Surgeon(s) and Role:     * Dyan Geller MD - Primary     * Tammy Ramirez PA-C - Assisting     * Belkys Harmon DO - Fellow - Assisting  Anesthesia: General   Estimated Blood Loss: 500 ml    Drains: None  Specimens: * No specimens in log *  Findings:   Common femoral to TP trunk bypass with artegraft. Strong PT signal at conclusion of case.  .  Complications: None.  Implants:   Implant Name Type Inv. Item Serial No.  Lot No. LRB No. Used Action   GRAFT ARTERY BOVINE CAROTID ARTEGRAFT [id number]  - [id number] Bone/Tissue/Biologic GRAFT ARTERY BOVINE CAROTID ARTEGRAFT [id number]  [id number] [employer] [id number] Left 1 Implanted   GRAFT ARTERY BOVINE CAROTID ARTEGRAFT [id number]  -  Bone/Tissue/Biologic GRAFT ARTERY BOVINE CAROTID ARTEGRAFT [id number]  0000 [employer] [id number] Left 1 Implanted

## 2023-09-08 NOTE — ANESTHESIA CARE TRANSFER NOTE
Patient: Ashanti Aviles    Procedure: Procedure(s):  CREATION, BYPASS, ARTERIAL, FEMORAL TO TIBIAL       Diagnosis: PAD (peripheral artery disease) (H) [I73.9]  Diagnosis Additional Information: No value filed.    Anesthesia Type:   General     Note:    Oropharynx: oropharynx clear of all foreign objects and spontaneously breathing  Level of Consciousness: awake  Oxygen Supplementation: face mask  Level of Supplemental Oxygen (L/min / FiO2): 5  Independent Airway: airway patency satisfactory and stable  Dentition: dentition unchanged  Vital Signs Stable: post-procedure vital signs reviewed and stable  Report to RN Given: handoff report given  Patient transferred to: PACU    Handoff Report: Identifed the Patient, Identified the Reponsible Provider, Reviewed the pertinent medical history, Discussed the surgical course, Reviewed Intra-OP anesthesia mangement and issues during anesthesia, Set expectations for post-procedure period and Allowed opportunity for questions and acknowledgement of understanding  Vitals:  Vitals Value Taken Time   /63 09/08/23 1547   Temp 36.3  C (97.4  F) 09/08/23 1547   Pulse 100 09/08/23 1553   Resp 13 09/08/23 1553   SpO2 100 % 09/08/23 1553   Vitals shown include unvalidated device data.    Electronically Signed By: DOROTA Ling CRNA  September 8, 2023  3:54 PM

## 2023-09-08 NOTE — ANESTHESIA PROCEDURE NOTES
Airway       Patient location during procedure: OR       Procedure Start/Stop Times: 9/8/2023 9:51 AM  Staff -        CRNA: Areli Flores APRN CRNA       Performed By: CRNA  Consent for Airway        Urgency: elective  Indications and Patient Condition       Indications for airway management: kathi-procedural         Mask difficulty assessment: 2 - vent by mask + OA or adjuvant +/- NMBA    Final Airway Details       Final airway type: endotracheal airway       Successful airway: ETT - single and Oral  Endotracheal Airway Details        ETT size (mm): 7.0       Cuffed: yes       Cuff volume (mL): 5       Successful intubation technique: direct laryngoscopy       DL Blade Type: Luevano 2       Grade View of Cords: 2 (cricoid pressure needed)       Adjucts: stylet       Position: Right       Measured from: gums/teeth       Secured at (cm): 21       Bite block used: None    Post intubation assessment        Placement verified by: capnometry, equal breath sounds and chest rise        Number of attempts at approach: 1       Number of other approaches attempted: 0       Secured with: silk tape       Ease of procedure: easy       Dentition: Intact and Unchanged    Medication(s) Administered   Medication Administration Time: 9/8/2023 9:51 AM

## 2023-09-09 LAB
ANION GAP SERPL CALCULATED.3IONS-SCNC: 7 MMOL/L (ref 7–15)
APTT PPP: 30 SECONDS (ref 22–38)
BASOPHILS # BLD AUTO: 0.1 10E3/UL (ref 0–0.2)
BASOPHILS NFR BLD AUTO: 0 %
BUN SERPL-MCNC: 16.6 MG/DL (ref 8–23)
CALCIUM SERPL-MCNC: 8 MG/DL (ref 8.8–10.2)
CHLORIDE SERPL-SCNC: 106 MMOL/L (ref 98–107)
CREAT SERPL-MCNC: 0.87 MG/DL (ref 0.51–0.95)
DEPRECATED HCO3 PLAS-SCNC: 24 MMOL/L (ref 22–29)
EGFRCR SERPLBLD CKD-EPI 2021: 73 ML/MIN/1.73M2
EOSINOPHIL # BLD AUTO: 0 10E3/UL (ref 0–0.7)
EOSINOPHIL NFR BLD AUTO: 0 %
ERYTHROCYTE [DISTWIDTH] IN BLOOD BY AUTOMATED COUNT: 14.3 % (ref 10–15)
FIBRINOGEN PPP-MCNC: 213 MG/DL (ref 170–490)
GLUCOSE BLDC GLUCOMTR-MCNC: 143 MG/DL (ref 70–99)
GLUCOSE BLDC GLUCOMTR-MCNC: 164 MG/DL (ref 70–99)
GLUCOSE BLDC GLUCOMTR-MCNC: 174 MG/DL (ref 70–99)
GLUCOSE BLDC GLUCOMTR-MCNC: 226 MG/DL (ref 70–99)
GLUCOSE SERPL-MCNC: 199 MG/DL (ref 70–99)
HCT VFR BLD AUTO: 28.4 % (ref 35–47)
HGB BLD-MCNC: 9.4 G/DL (ref 11.7–15.7)
HGB BLD-MCNC: 9.4 G/DL (ref 11.7–15.7)
HGB BLD-MCNC: 9.9 G/DL (ref 11.7–15.7)
HOLD SPECIMEN: NORMAL
IMM GRANULOCYTES # BLD: 0.1 10E3/UL
IMM GRANULOCYTES NFR BLD: 0 %
INR PPP: 1.14 (ref 0.85–1.15)
LYMPHOCYTES # BLD AUTO: 4.4 10E3/UL (ref 0.8–5.3)
LYMPHOCYTES NFR BLD AUTO: 26 %
MAGNESIUM SERPL-MCNC: 1.9 MG/DL (ref 1.7–2.3)
MCH RBC QN AUTO: 29.7 PG (ref 26.5–33)
MCHC RBC AUTO-ENTMCNC: 33.1 G/DL (ref 31.5–36.5)
MCV RBC AUTO: 90 FL (ref 78–100)
MONOCYTES # BLD AUTO: 1.5 10E3/UL (ref 0–1.3)
MONOCYTES NFR BLD AUTO: 9 %
NEUTROPHILS # BLD AUTO: 11.1 10E3/UL (ref 1.6–8.3)
NEUTROPHILS NFR BLD AUTO: 65 %
NRBC # BLD AUTO: 0 10E3/UL
NRBC BLD AUTO-RTO: 0 /100
PHOSPHATE SERPL-MCNC: 2.6 MG/DL (ref 2.5–4.5)
PLATELET # BLD AUTO: 149 10E3/UL (ref 150–450)
PLATELET # BLD AUTO: 172 10E3/UL (ref 150–450)
POTASSIUM SERPL-SCNC: 3.8 MMOL/L (ref 3.4–5.3)
RBC # BLD AUTO: 3.17 10E6/UL (ref 3.8–5.2)
SODIUM SERPL-SCNC: 137 MMOL/L (ref 136–145)
WBC # BLD AUTO: 17.2 10E3/UL (ref 4–11)

## 2023-09-09 PROCEDURE — P9016 RBC LEUKOCYTES REDUCED: HCPCS

## 2023-09-09 PROCEDURE — 85025 COMPLETE CBC W/AUTO DIFF WBC: CPT

## 2023-09-09 PROCEDURE — 250N000011 HC RX IP 250 OP 636: Performed by: SURGERY

## 2023-09-09 PROCEDURE — 85384 FIBRINOGEN ACTIVITY: CPT

## 2023-09-09 PROCEDURE — 250N000013 HC RX MED GY IP 250 OP 250 PS 637: Performed by: INTERNAL MEDICINE

## 2023-09-09 PROCEDURE — 250N000013 HC RX MED GY IP 250 OP 250 PS 637: Performed by: SURGERY

## 2023-09-09 PROCEDURE — 80048 BASIC METABOLIC PNL TOTAL CA: CPT | Performed by: SURGERY

## 2023-09-09 PROCEDURE — 83735 ASSAY OF MAGNESIUM: CPT

## 2023-09-09 PROCEDURE — 85730 THROMBOPLASTIN TIME PARTIAL: CPT

## 2023-09-09 PROCEDURE — 85610 PROTHROMBIN TIME: CPT

## 2023-09-09 PROCEDURE — 200N000001 HC R&B ICU

## 2023-09-09 PROCEDURE — 85018 HEMOGLOBIN: CPT

## 2023-09-09 PROCEDURE — 250N000013 HC RX MED GY IP 250 OP 250 PS 637

## 2023-09-09 PROCEDURE — 250N000012 HC RX MED GY IP 250 OP 636 PS 637: Performed by: INTERNAL MEDICINE

## 2023-09-09 PROCEDURE — 85049 AUTOMATED PLATELET COUNT: CPT | Performed by: SURGERY

## 2023-09-09 PROCEDURE — 84100 ASSAY OF PHOSPHORUS: CPT

## 2023-09-09 PROCEDURE — 99233 SBSQ HOSP IP/OBS HIGH 50: CPT | Performed by: INTERNAL MEDICINE

## 2023-09-09 PROCEDURE — 250N000011 HC RX IP 250 OP 636: Mod: JZ

## 2023-09-09 PROCEDURE — 36415 COLL VENOUS BLD VENIPUNCTURE: CPT | Performed by: SURGERY

## 2023-09-09 PROCEDURE — 36415 COLL VENOUS BLD VENIPUNCTURE: CPT

## 2023-09-09 RX ORDER — GABAPENTIN 300 MG/1
300 CAPSULE ORAL 3 TIMES DAILY
Status: COMPLETED | OUTPATIENT
Start: 2023-09-09 | End: 2023-09-14

## 2023-09-09 RX ORDER — HYDROMORPHONE HYDROCHLORIDE 2 MG/1
2 TABLET ORAL EVERY 4 HOURS PRN
Status: DISCONTINUED | OUTPATIENT
Start: 2023-09-09 | End: 2023-09-15 | Stop reason: HOSPADM

## 2023-09-09 RX ORDER — HYDROMORPHONE HYDROCHLORIDE 2 MG/1
2-4 TABLET ORAL EVERY 4 HOURS PRN
Status: DISCONTINUED | OUTPATIENT
Start: 2023-09-09 | End: 2023-09-09

## 2023-09-09 RX ORDER — MAGNESIUM SULFATE HEPTAHYDRATE 40 MG/ML
2 INJECTION, SOLUTION INTRAVENOUS ONCE
Status: COMPLETED | OUTPATIENT
Start: 2023-09-09 | End: 2023-09-09

## 2023-09-09 RX ORDER — HYDROXYZINE HYDROCHLORIDE 25 MG/1
25 TABLET, FILM COATED ORAL EVERY 6 HOURS PRN
Status: DISCONTINUED | OUTPATIENT
Start: 2023-09-09 | End: 2023-09-15 | Stop reason: HOSPADM

## 2023-09-09 RX ORDER — HYDROMORPHONE HYDROCHLORIDE 4 MG/1
4 TABLET ORAL EVERY 4 HOURS PRN
Status: DISCONTINUED | OUTPATIENT
Start: 2023-09-09 | End: 2023-09-15 | Stop reason: HOSPADM

## 2023-09-09 RX ORDER — HYDROXYZINE HYDROCHLORIDE 25 MG/1
50 TABLET, FILM COATED ORAL EVERY 6 HOURS PRN
Status: DISCONTINUED | OUTPATIENT
Start: 2023-09-09 | End: 2023-09-15 | Stop reason: HOSPADM

## 2023-09-09 RX ORDER — LIDOCAINE 4 G/G
1 PATCH TOPICAL
Status: DISCONTINUED | OUTPATIENT
Start: 2023-09-09 | End: 2023-09-15 | Stop reason: HOSPADM

## 2023-09-09 RX ORDER — CYCLOBENZAPRINE HCL 10 MG
10 TABLET ORAL EVERY 8 HOURS PRN
Status: DISCONTINUED | OUTPATIENT
Start: 2023-09-09 | End: 2023-09-15 | Stop reason: HOSPADM

## 2023-09-09 RX ORDER — METHOCARBAMOL 500 MG/1
500 TABLET, FILM COATED ORAL 4 TIMES DAILY
Status: DISCONTINUED | OUTPATIENT
Start: 2023-09-09 | End: 2023-09-15 | Stop reason: HOSPADM

## 2023-09-09 RX ORDER — TRAMADOL HYDROCHLORIDE 50 MG/1
50 TABLET ORAL EVERY 6 HOURS PRN
Status: DISCONTINUED | OUTPATIENT
Start: 2023-09-09 | End: 2023-09-15 | Stop reason: HOSPADM

## 2023-09-09 RX ADMIN — TRAMADOL HYDROCHLORIDE 50 MG: 50 TABLET, COATED ORAL at 15:16

## 2023-09-09 RX ADMIN — INSULIN ASPART 1 UNITS: 100 INJECTION, SOLUTION INTRAVENOUS; SUBCUTANEOUS at 09:16

## 2023-09-09 RX ADMIN — SENNOSIDES AND DOCUSATE SODIUM 1 TABLET: 50; 8.6 TABLET ORAL at 09:16

## 2023-09-09 RX ADMIN — GABAPENTIN 300 MG: 300 CAPSULE ORAL at 15:16

## 2023-09-09 RX ADMIN — METHOCARBAMOL 500 MG: 500 TABLET ORAL at 20:08

## 2023-09-09 RX ADMIN — EMPAGLIFLOZIN 25 MG: 25 TABLET, FILM COATED ORAL at 09:26

## 2023-09-09 RX ADMIN — INSULIN ASPART 1 UNITS: 100 INJECTION, SOLUTION INTRAVENOUS; SUBCUTANEOUS at 11:59

## 2023-09-09 RX ADMIN — LIDOCAINE 1 PATCH: 4 PATCH TOPICAL at 09:37

## 2023-09-09 RX ADMIN — HYDROCHLOROTHIAZIDE 25 MG: 25 TABLET ORAL at 09:25

## 2023-09-09 RX ADMIN — ACETAMINOPHEN 975 MG: 325 TABLET ORAL at 04:37

## 2023-09-09 RX ADMIN — CEFAZOLIN 1 G: 1 INJECTION, POWDER, FOR SOLUTION INTRAMUSCULAR; INTRAVENOUS at 04:37

## 2023-09-09 RX ADMIN — HYDROMORPHONE HYDROCHLORIDE 2 MG: 2 TABLET ORAL at 02:29

## 2023-09-09 RX ADMIN — POLYETHYLENE GLYCOL 3350 17 G: 17 POWDER, FOR SOLUTION ORAL at 09:15

## 2023-09-09 RX ADMIN — HEPARIN SODIUM 5000 UNITS: 5000 INJECTION, SOLUTION INTRAVENOUS; SUBCUTANEOUS at 21:57

## 2023-09-09 RX ADMIN — GABAPENTIN 300 MG: 300 CAPSULE ORAL at 20:08

## 2023-09-09 RX ADMIN — SITAGLIPTIN 100 MG: 100 TABLET, FILM COATED ORAL at 09:24

## 2023-09-09 RX ADMIN — MAGNESIUM SULFATE HEPTAHYDRATE 2 G: 40 INJECTION, SOLUTION INTRAVENOUS at 15:33

## 2023-09-09 RX ADMIN — PANTOPRAZOLE SODIUM 40 MG: 40 TABLET, DELAYED RELEASE ORAL at 15:33

## 2023-09-09 RX ADMIN — SODIUM PHOSPHATE, DIBASIC, ANHYDROUS, POTASSIUM PHOSPHATE, MONOBASIC, AND SODIUM PHOSPHATE, MONOBASIC, MONOHYDRATE 500 MG: 852; 155; 130 TABLET, COATED ORAL at 16:43

## 2023-09-09 RX ADMIN — INSULIN ASPART 1 UNITS: 100 INJECTION, SOLUTION INTRAVENOUS; SUBCUTANEOUS at 18:25

## 2023-09-09 RX ADMIN — AMLODIPINE BESYLATE 5 MG: 5 TABLET ORAL at 09:15

## 2023-09-09 RX ADMIN — ACETAMINOPHEN 975 MG: 325 TABLET ORAL at 11:56

## 2023-09-09 RX ADMIN — LISINOPRIL 40 MG: 20 TABLET ORAL at 09:20

## 2023-09-09 RX ADMIN — INSULIN DEGLUDEC INJECTION 30 UNITS: 100 INJECTION, SOLUTION SUBCUTANEOUS at 09:21

## 2023-09-09 RX ADMIN — METHOCARBAMOL 500 MG: 500 TABLET ORAL at 18:25

## 2023-09-09 RX ADMIN — HYDROMORPHONE HYDROCHLORIDE 4 MG: 4 TABLET ORAL at 09:14

## 2023-09-09 RX ADMIN — EZETIMIBE 10 MG: 10 TABLET ORAL at 09:25

## 2023-09-09 RX ADMIN — HYDROXYZINE HYDROCHLORIDE 25 MG: 25 TABLET, FILM COATED ORAL at 09:47

## 2023-09-09 RX ADMIN — SENNOSIDES AND DOCUSATE SODIUM 1 TABLET: 50; 8.6 TABLET ORAL at 20:09

## 2023-09-09 RX ADMIN — METHOCARBAMOL 500 MG: 500 TABLET ORAL at 15:16

## 2023-09-09 RX ADMIN — HEPARIN SODIUM 5000 UNITS: 5000 INJECTION, SOLUTION INTRAVENOUS; SUBCUTANEOUS at 15:16

## 2023-09-09 RX ADMIN — CYCLOBENZAPRINE 10 MG: 10 TABLET, FILM COATED ORAL at 15:16

## 2023-09-09 RX ADMIN — HYDROMORPHONE HYDROCHLORIDE 2 MG: 2 TABLET ORAL at 01:36

## 2023-09-09 RX ADMIN — METHOCARBAMOL 500 MG: 500 TABLET ORAL at 11:56

## 2023-09-09 RX ADMIN — ACETAMINOPHEN 975 MG: 325 TABLET ORAL at 20:09

## 2023-09-09 RX ADMIN — PANTOPRAZOLE SODIUM 40 MG: 40 TABLET, DELAYED RELEASE ORAL at 09:15

## 2023-09-09 RX ADMIN — HYDROMORPHONE HYDROCHLORIDE 0.4 MG: 0.2 INJECTION, SOLUTION INTRAMUSCULAR; INTRAVENOUS; SUBCUTANEOUS at 16:40

## 2023-09-09 RX ADMIN — HYDROMORPHONE HYDROCHLORIDE 0.4 MG: 0.2 INJECTION, SOLUTION INTRAMUSCULAR; INTRAVENOUS; SUBCUTANEOUS at 09:30

## 2023-09-09 ASSESSMENT — ACTIVITIES OF DAILY LIVING (ADL)
ADLS_ACUITY_SCORE: 43
ADLS_ACUITY_SCORE: 42
ADLS_ACUITY_SCORE: 43
ADLS_ACUITY_SCORE: 42
ADLS_ACUITY_SCORE: 43
ADLS_ACUITY_SCORE: 43

## 2023-09-09 NOTE — PROVIDER NOTIFICATION
Vascular surgery team notified about hemoglobin drop from 13.7 to 7.4. Vascular surgery coming to assess pt.

## 2023-09-09 NOTE — PROGRESS NOTES
Vascular Surgery Progress Note     Date of Admission:  9/8/2023  Date: September 9, 2023     Subjective  Ms. Aviles reports that her left foot feels fairly good, but she has significant swelling and spasm-type pain along the thigh, calf, and behind the left knee. This limits her ability to move the leg; she is able to passively flex/extend the left hip (by raising the leg with her right foot), can move her foot, feels some numbness of the skin and too much swelling around the knee to easily move. She is eager to start moving more. Her pain is moderately controlled; she feels the analgesics do not adequately cover her pain and wear off too quickly.       Physical Exam   Temp: 98.8  F (37.1  C) Temp src: Oral BP: 121/73 Pulse: 95   Resp: 20 SpO2: 98 % O2 Device: None (Room air) Oxygen Delivery: 1 LPM  Vital Signs with Ranges  Temp:  [97.8  F (36.6  C)-99.2  F (37.3  C)] 98.8  F (37.1  C)  Pulse:  [] 95  Resp:  [6-53] 20  BP: ()/(44-87) 121/73  MAP:  [52 mmHg-114 mmHg] 77 mmHg  Arterial Line BP: ()/(37-74) 129/55  SpO2:  [94 %-100 %] 98 %  149 lbs 14.4 oz    Constitutional: cooperative, no apparent distress; resting comfortably in bed in ICU  Vascular: strong bi-triphasic left PT doppler signal and strong bi-triphasic doppler signal over graft at level of calf.   Musculoskeletal: Able to fluidly wiggle bilateral toes; difficulty flexing/extending left knee due to swelling and pain. TTP around incision, which appears well-approximated with soft swelling of the popliteal fossa. No ecchymoses or fluctuance.  Neurologic: Awake, alert, oriented to name, place, time, and situation    Data   Most Recent 3 CBCs:  Recent Labs   Lab Test 09/09/23  1352 09/09/23  1032 09/09/23  0526 09/09/23  0023 09/08/23  2216 09/08/23  0841 05/19/23  0542   WBC  --   --  17.2*  --   --  11.0 14.0*   HGB 9.4* 9.9* 9.4*  --    < > 13.7 11.1*   MCV  --   --  90  --   --  90 91   PLT  --   --  149* 172  --  288 248    < > =  values in this interval not displayed.       Most Recent 3 BMPs:  Recent Labs   Lab Test 09/09/23  0526 09/05/23  1157 05/18/23  0544    136 138   POTASSIUM 3.8 3.7 4.3   CHLORIDE 106 100 105   CO2 24 24 24   BUN 16.6 15.3 14.9   CR 0.87 0.78 0.72   ANIONGAP 7 12 9   SANTINO 8.0* 9.6 8.8   * 137* 182*    < > = values in this interval not displayed.       Most Recent 3 INRs:  Recent Labs   Lab Test 09/09/23  0023 09/08/23  0841 03/23/23  0712   INR 1.14 0.90 0.83*          Assessment & Plan   Ashanti Aviles is a 68 year old female who is s/p left femoral to tibioperoneal trunk bypass on 9/8/23 as part of staged revascularization with left iliofemoral endarterectomy with retrograde iliac stenting, done for CLTI with rest pain in the left foot.     Acute blood-loss anemia, exacerbated by plavix use: now s/p transfusion overnight for symptoms (tachycardia). Hgb currently stable, no e/o ongoing blood loss.   Swelling as anticipated, likely related to surgical site oozing and revascularization. Will continue to monitor.   PT/OT as able; goal of ambulation OOB  Will increase analgesic regimen and add muscle relaxers; also advised her to continue gentle stretching and ice packs/heat packs as needed    Saskia Anderson MD

## 2023-09-09 NOTE — PROGRESS NOTES
Vascular Surgery Note    Called regarding patient with hgb drop from 13.7 preop to 7.4 postop. Tachycardic low 100s, Bps 100s-130s systolic. Per RN no obvious hematoma in groin or calf. She did receive 500cc bolus from medicine team given tachycardia prior to hgb check with some improvement in tachycardia.    Went to assess patient due to concern for active bleeding. Ordered recheck hgb en route to confirm drop,  as well as 2u pRBC.     Patient is awake, alert, pleasant. She is very hungry and wishes to advance to regular diet, tolerating broth and fluid well.   She does report soreness in her LLE calf when she moves it 6/10. States she has some numbness base of L foot that has been ongoing since right after surgery and some weakness of wiggling toes. Denies lightheadedness, dizziness, palpitations, nausea, vomiting, chest pain, shortness of breath. Has not had a BM here. No blood in allison.     On exam:   General: awake, alert, NAD  Resp: NLB RA   CV: intermittently tachycardic while I'm in room. 90s when I first saw her, up to 110s when moving.   Neuro: CN II-XII grossly in tact,  moving all extremities. BLE with sensation in tact to light touch, patient unable to wiggle LLE toes individually however is able to move LLE toes en bloc. RLE able tot wiggle toes individually.   Incision: L groin: clean, dry, in tact, soft throughout. No overlying skin changes of bruising, no evidence of hematoma. L calf incision: clean, dry, in tact, soft surrounding with no overlying skin changes or bruising no evidence of hematoma.   LLE: warm, well perfused, thigh soft and compressible. Calf with some swelling although compressible. Tender to compression in calf at medial aspect around incision.   Assessed Left flank as well -> no hematoma or bruising here, all soft, no evidence of RP bleed.   Vascular: strong monophasic vs. Biphasic signal PT, monophasic DP signal LLE. Palpable femoral pulse.     UOP is 1.17ml/kg/hr since 7pm -  more than adequate.    A/P:   Ms. Aviles is a pleasant 69yo female with PMH of HTN, DM2, HLD, GERD, and PAD s/p previous L femoral endarterectomy with iliac stenting earlier this year, who is now POD0 s/p  L CFA to TP trunk bypass with Dr. Geller. She has been tachycardic with significant hgb drop on check from 13 preop to 7.4 postop. She did have 500 EBL during the case, however this drop is more that I would expect for this amount of intraoperative loss. On exam, no obvious bleeding, with no hematomas at incision sites, although L calf with some swelling. Recheck hgb came back at 6.5 from 7.4 1 hour prior. High suspicion for bleeding in calf although it is not as large as I would expect for bleeding that would cause this much of a drop. With patient being HDS, will recheck calf in 20 minutes to assess for change, if obvious worsening, will plan for OR emergently for washout, control of presumed bleeding.    - Checking coags stat  - 2u pRBCs ordered already, plan for infusion asap  - Will recheck patient in 20 minutes to assess calf again  - Trend hgb q4 ordered  - Further updates pending recheck in 20 min    Update:   - LLE stable at recheck with no worsening swelling, soft and compressible no palpable hematoma, no skin changes, it is tender to palpation, though stably so. Patient remains HDS, some ongoing tachycardia 100s, hypertensive with BP in 160s-170s Continues to have strong doppler signals in LLE. With no changes, do not suspect arterial bleeding in the calf. She may have lost more than 500cc blood during case and thus drop is reasonable and she is re-equilibrating her hgb levels. She may have a slow venous bleed which should tamponade as well. We will continue to monitor her closely and I will return early this am to check on her again. In the mean time, plan for:     - q1 pulse checks  - Recheck hgb 20 min after 2nd unit blood finishes  - Please page me if hgb recheck is abnormal  - Please page if  any marked change in vital signs, exam, or worsening sustained tachycardia, drop in UOP  - transitioned her to PO dilaudid given h/o nausea with oxycodone  - OK for PO diet    Discussed with Dr. Anderson, vascular surgery staff

## 2023-09-09 NOTE — PROGRESS NOTES
Progress Note      Admitted by surgery for bypass surgery, indication PVD.  Hospital medicine consulted for management of hypertension and diabetes  Preop notes were reviewed    ASSESSMENT AND PLAN:  Severe PVD, limb threatening ischemia, post bypass surgery, surgery managing  Benign essential hypertension, controlled currently, continue home meds  Diabetes type 2 on insulin, hold oral medications for now, continue long-acting insulin  Tachycardia noted at bedside, no cardiopulmonary symptoms  Hyperlipidemia  GERD on PPI    Plan  Continue cardiac monitoring  Check EKG, showing sinus tachycardia  Note recent stress test this year showed no reversible ischemia  IV fluid bolus, continue IV hydration, follow heart rate closely  Temporarily hold metformin, begin sliding scale insulin  Routine blood work ordered for a.m.  Check hemoglobin tonight  Parameters placed for blood pressure meds      DVT PPX:  Mechanical    Barriers to discharge    Anticipated Discharge date 1 to 2 days      Subjective  Patient feels quite comfortable at rest, no significant postop pain, denies chest pain or breathing difficulty, no palpitations    Objective  Vital signs in last 24 hours  Temp:  [97.4  F (36.3  C)-98.9  F (37.2  C)] 98.9  F (37.2  C)  Pulse:  [] 116  Resp:  [10-26] 14  BP: ()/(44-72) 99/63  MAP:  [52 mmHg-78 mmHg] 71 mmHg  Arterial Line BP: ()/(37-57) 106/53  SpO2:  [94 %-100 %] 94 %    Input and Output in 24 hrs     Intake/Output Summary (Last 24 hours) at 9/8/2023 2153  Last data filed at 9/8/2023 1730  Gross per 24 hour   Intake 3300 ml   Output 1325 ml   Net 1975 ml       GEN: Alert and oriented. Not in acute distress  HEENT: Atraumatic    Pupils- round and reactive to light bilaterally   Neck- supple, no JVP elevation, no lymphadenopathy or thyromegaly   Sclera- anicteric   Mucous membrane- moist and pink  CHEST: Clear to auscultation bilaterally  HEART: S1S2 regular. No murmurs, rubs or gallops  tachycardia  ABDOMEN: Soft. Non-tender, non-distended. No organomegaly. No guarding or rigidity. Bowel sounds- active  Extremities: No pedal oedema  CNS: No focal neurological deficit. No involuntary movements  SKIN: No skin rash, no cyanosis or clubbing      Pertinent Labs       Recent Results (from the past 24 hour(s))   Glucose by meter    Collection Time: 09/08/23  8:22 AM   Result Value Ref Range    GLUCOSE BY METER POCT 184 (H) 70 - 99 mg/dL   Glucose    Collection Time: 09/08/23  8:41 AM   Result Value Ref Range    Glucose 192 (H) 70 - 99 mg/dL   Potassium    Collection Time: 09/08/23  8:41 AM   Result Value Ref Range    Potassium 4.3 3.4 - 5.3 mmol/L   Creatinine    Collection Time: 09/08/23  8:41 AM   Result Value Ref Range    Creatinine 0.82 0.51 - 0.95 mg/dL    GFR Estimate 78 >60 mL/min/1.73m2   INR    Collection Time: 09/08/23  8:41 AM   Result Value Ref Range    INR 0.90 0.85 - 1.15   Partial thromboplastin time    Collection Time: 09/08/23  8:41 AM   Result Value Ref Range    aPTT 28 22 - 38 Seconds   CBC with platelets and differential    Collection Time: 09/08/23  8:41 AM   Result Value Ref Range    WBC Count 11.0 4.0 - 11.0 10e3/uL    RBC Count 4.64 3.80 - 5.20 10e6/uL    Hemoglobin 13.7 11.7 - 15.7 g/dL    Hematocrit 41.7 35.0 - 47.0 %    MCV 90 78 - 100 fL    MCH 29.5 26.5 - 33.0 pg    MCHC 32.9 31.5 - 36.5 g/dL    RDW 15.0 10.0 - 15.0 %    Platelet Count 288 150 - 450 10e3/uL    % Neutrophils 47 %    % Lymphocytes 40 %    % Monocytes 9 %    % Eosinophils 2 %    % Basophils 1 %    % Immature Granulocytes 1 %    NRBCs per 100 WBC 0 <1 /100    Absolute Neutrophils 5.4 1.6 - 8.3 10e3/uL    Absolute Lymphocytes 4.4 0.8 - 5.3 10e3/uL    Absolute Monocytes 1.0 0.0 - 1.3 10e3/uL    Absolute Eosinophils 0.2 0.0 - 0.7 10e3/uL    Absolute Basophils 0.1 0.0 - 0.2 10e3/uL    Absolute Immature Granulocytes 0.1 <=0.4 10e3/uL    Absolute NRBCs 0.0 10e3/uL   Adult Type and Screen    Collection Time: 09/08/23   8:41 AM   Result Value Ref Range    ABO/RH(D) A POS     Antibody Screen Negative Negative    SPECIMEN EXPIRATION DATE [id number]    Glucose by meter    Collection Time: 09/08/23  1:04 PM   Result Value Ref Range    GLUCOSE BY METER POCT 120 (H) 70 - 99 mg/dL   Glucose by meter    Collection Time: 09/08/23  2:13 PM   Result Value Ref Range    GLUCOSE BY METER POCT 112 (H) 70 - 99 mg/dL   Glucose by meter    Collection Time: 09/08/23  3:53 PM   Result Value Ref Range    GLUCOSE BY METER POCT 124 (H) 70 - 99 mg/dL   Glucose by meter    Collection Time: 09/08/23  5:22 PM   Result Value Ref Range    GLUCOSE BY METER POCT 182 (H) 70 - 99 mg/dL   ECG 12-LEAD WITH MUSE (LHE)    Collection Time: 09/08/23  8:04 PM   Result Value Ref Range    Systolic Blood Pressure  mmHg    Diastolic Blood Pressure  mmHg    Ventricular Rate 116 BPM    Atrial Rate 116 BPM    CO Interval 126 ms    QRS Duration 74 ms     ms    QTc 461 ms    P Axis 56 degrees    R AXIS 12 degrees    T Axis 58 degrees    Interpretation ECG       Sinus tachycardia  Nonspecific ST abnormality  Abnormal ECG  No previous ECGs available  Confirmed by JESSICA CADENA MD LOC:[facility] ([id number]) on 9/8/2023 9:32:55 PM         EKG Results reviewed       Advanced Care Planning      Rehan Dai MD [id number]

## 2023-09-09 NOTE — PROGRESS NOTES
[facility]    Medicine Progress Note - Hospitalist Service    Date of Admission:  9/8/2023    Assessment & Plan   Ms. Fox is a 68-year-old female with past medical history significant for PVD, hyperlipidemia, hypertension, tobacco abuse, GERD, and diabetes mellitus type 2 on long-term insulin who was admitted on 9/8/2023 after left femoral to tibial bypass grafting.     Severe PVD, limb threatening ischemia s/p post bypass surgery 9/8  -surgery managing    Benign essential hypertension  -controlled currently  -Continue PTA hydrochlorothiazide, lisinopril, amlodipine    Diabetes type 2 on insulin  -Started on Tresiba 30 units daily, moderate sliding scale insulin with meals, empagliflozin, sitagliptin  -Hypoglycemic protocol in place    NSVT  Tachycardia-improving  -Had a 9 beat run of nonsustained V. tach on 9/9, asymptomatic  -Heart rate in the low 100s initially, now down into the 90s  -Continue cardiac monitoring    Hyperlipidemia  -Muscle pain to statins  -Continue PTA Zetia    GERD on PPI  -Pantoprazole 40 mg twice daily     Diet: Combination Diet Moderate Consistent Carb (60 g CHO per Meal) Diet; Low Saturated Fat Diet    DVT Prophylaxis: Heparin SQ  Cedeno Catheter: PRESENT, indication: Strict 1-2 Hour I&O  Lines: PRESENT      Arterial Line 09/08/23 Radial-Site Assessment: WDL      Cardiac Monitoring: None  Code Status: Full Code      Clinically Significant Risk Factors                  # Hypertension: Noted on problem list       # DMII: A1C = 8.3 % (Ref range: 0.0 - 5.6 %) within past 6 months, PRESENT ON ADMISSION             Disposition Plan      Expected Discharge Date: 09/12/2023      Destination: home with family            Ronda Britton MD  Hospitalist Service  [facility]  Securely message with [employer] (more info)  Text page via [employer] Paging/Directory   ______________________________________________________________________    Interval History    Ms. Aviles is new to me today.  She was having a little bit of pain in the left leg at the site of the incision.  Otherwise she is doing well.  She had an asymptomatic 9 beat run of nonsustained V. tach today.  She is on telemetry and we will continue to monitor.  Blood sugars are controlled, blood pressures controlled.  Her tachycardia has resolved and she is now in the 90s.    Physical Exam   Vital Signs: Temp: 98.8  F (37.1  C) Temp src: Oral BP: 121/73 Pulse: 95   Resp: 20 SpO2: 98 % O2 Device: None (Room air) Oxygen Delivery: 1 LPM  Weight: 149 lbs 14.4 oz    General Appearance: Awake, alert, in no acute distress  Respiratory: CTAB, no wheeze  Cardiovascular: RRR  GI: soft, nontender, non distended, normal bowel sounds  Skin: no jaundice, no rash        Medical Decision Making       50 MINUTES SPENT BY ME on the date of service doing chart review, history, exam, documentation & further activities per the note.      Data     I have personally reviewed the following data over the past 24 hrs:    17.2 (H)  \   9.4 (L)   / 149 (L)     137 106 16.6 /  174 (H)   3.8 24 0.87 \     INR:  1.14 PTT:  30   D-dimer:  N/A Fibrinogen:  213       Imaging results reviewed over the past 24 hrs:   No results found for this or any previous visit (from the past 24 hour(s)).

## 2023-09-09 NOTE — ANESTHESIA POSTPROCEDURE EVALUATION
Patient: Ashanti Aviles    Procedure: Procedure(s):  CREATION, BYPASS, ARTERIAL, FEMORAL TO TIBIAL, LEFT       Anesthesia Type:  General    Note:  Disposition: Outpatient   Postop Pain Control: Uneventful            Sign Out: Well controlled pain   PONV: No   Neuro/Psych: Uneventful            Sign Out: Acceptable/Baseline neuro status   Airway/Respiratory: Uneventful            Sign Out: Acceptable/Baseline resp. status   CV/Hemodynamics: Uneventful            Sign Out: Acceptable CV status; No obvious hypovolemia; No obvious fluid overload   Other NRE: NONE   DID A NON-ROUTINE EVENT OCCUR? No           Last vitals:  Vitals Value Taken Time   BP 99/63 09/08/23 1830   Temp 36.6  C (97.8  F) 09/08/23 1830   Pulse 117 09/08/23 1832   Resp 13 09/08/23 1832   SpO2 96 % 09/08/23 1832   Vitals shown include unvalidated device data.    Electronically Signed By: Houston Pandya MD  September 9, 2023  4:47 AM

## 2023-09-09 NOTE — PROGRESS NOTES
Pt had 9 beat run of Vtach. Asymptomatic. Pt was sleeping during episode. Text page sent to Belkys Petersen, Vascular Surgery and updated.

## 2023-09-09 NOTE — PROGRESS NOTES
[facility] - ICU    RN Progress Note: Resumed care [id number].            Pertinent Assessments:      Please refer to flowsheet rows for full assessment     Continued Q1 hour pulse checks with successful assessment with doppler, although very weak. PRN Dilaudid 2 for pain control. 2 units PRBCs given for critical low Hgb.Recheck stable. L calf and posterior knee remains swollen, hot to touch and tender. Remains unchanged since assessed collaboratively with Dr. Seymour.            Key Events - This Shift:       See above.                      Barriers to Discharge / Downgrade:     Hemodynamic stability.         Point of Contact Update YES-OR-NO: No  If No, reason: Night shift

## 2023-09-10 ENCOUNTER — APPOINTMENT (OUTPATIENT)
Dept: PHYSICAL THERAPY | Facility: HOSPITAL | Age: 68
DRG: 253 | End: 2023-09-10
Attending: SURGERY
Payer: COMMERCIAL

## 2023-09-10 LAB
ANION GAP SERPL CALCULATED.3IONS-SCNC: 7 MMOL/L (ref 7–15)
BUN SERPL-MCNC: 10.3 MG/DL (ref 8–23)
CALCIUM SERPL-MCNC: 8.6 MG/DL (ref 8.8–10.2)
CHLORIDE SERPL-SCNC: 100 MMOL/L (ref 98–107)
CREAT SERPL-MCNC: 0.77 MG/DL (ref 0.51–0.95)
DEPRECATED HCO3 PLAS-SCNC: 27 MMOL/L (ref 22–29)
EGFRCR SERPLBLD CKD-EPI 2021: 85 ML/MIN/1.73M2
ERYTHROCYTE [DISTWIDTH] IN BLOOD BY AUTOMATED COUNT: 14.7 % (ref 10–15)
GLUCOSE BLDC GLUCOMTR-MCNC: 138 MG/DL (ref 70–99)
GLUCOSE BLDC GLUCOMTR-MCNC: 148 MG/DL (ref 70–99)
GLUCOSE BLDC GLUCOMTR-MCNC: 240 MG/DL (ref 70–99)
GLUCOSE BLDC GLUCOMTR-MCNC: 90 MG/DL (ref 70–99)
GLUCOSE SERPL-MCNC: 132 MG/DL (ref 70–99)
HCT VFR BLD AUTO: 30.1 % (ref 35–47)
HGB BLD-MCNC: 9.8 G/DL (ref 11.7–15.7)
MAGNESIUM SERPL-MCNC: 2.3 MG/DL (ref 1.7–2.3)
MCH RBC QN AUTO: 29.2 PG (ref 26.5–33)
MCHC RBC AUTO-ENTMCNC: 32.6 G/DL (ref 31.5–36.5)
MCV RBC AUTO: 90 FL (ref 78–100)
PHOSPHATE SERPL-MCNC: 2.4 MG/DL (ref 2.5–4.5)
PLATELET # BLD AUTO: 161 10E3/UL (ref 150–450)
POTASSIUM SERPL-SCNC: 3.6 MMOL/L (ref 3.4–5.3)
RBC # BLD AUTO: 3.36 10E6/UL (ref 3.8–5.2)
SODIUM SERPL-SCNC: 134 MMOL/L (ref 136–145)
WBC # BLD AUTO: 17.8 10E3/UL (ref 4–11)

## 2023-09-10 PROCEDURE — 250N000013 HC RX MED GY IP 250 OP 250 PS 637: Performed by: SURGERY

## 2023-09-10 PROCEDURE — 250N000013 HC RX MED GY IP 250 OP 250 PS 637: Performed by: INTERNAL MEDICINE

## 2023-09-10 PROCEDURE — 80048 BASIC METABOLIC PNL TOTAL CA: CPT | Performed by: SURGERY

## 2023-09-10 PROCEDURE — 250N000013 HC RX MED GY IP 250 OP 250 PS 637

## 2023-09-10 PROCEDURE — 97530 THERAPEUTIC ACTIVITIES: CPT | Mod: GP

## 2023-09-10 PROCEDURE — 36415 COLL VENOUS BLD VENIPUNCTURE: CPT | Performed by: SURGERY

## 2023-09-10 PROCEDURE — 99232 SBSQ HOSP IP/OBS MODERATE 35: CPT | Performed by: INTERNAL MEDICINE

## 2023-09-10 PROCEDURE — 85027 COMPLETE CBC AUTOMATED: CPT | Performed by: SURGERY

## 2023-09-10 PROCEDURE — 84100 ASSAY OF PHOSPHORUS: CPT

## 2023-09-10 PROCEDURE — 83735 ASSAY OF MAGNESIUM: CPT | Performed by: SURGERY

## 2023-09-10 PROCEDURE — 250N000011 HC RX IP 250 OP 636: Mod: JZ | Performed by: SURGERY

## 2023-09-10 PROCEDURE — 200N000001 HC R&B ICU

## 2023-09-10 PROCEDURE — 97162 PT EVAL MOD COMPLEX 30 MIN: CPT | Mod: GP

## 2023-09-10 RX ORDER — POTASSIUM CHLORIDE 7.45 MG/ML
10 INJECTION INTRAVENOUS
Status: DISCONTINUED | OUTPATIENT
Start: 2023-09-10 | End: 2023-09-10

## 2023-09-10 RX ORDER — POTASSIUM CHLORIDE 1500 MG/1
40 TABLET, EXTENDED RELEASE ORAL ONCE
Status: COMPLETED | OUTPATIENT
Start: 2023-09-10 | End: 2023-09-10

## 2023-09-10 RX ORDER — POTASSIUM CHLORIDE 1500 MG/1
20 TABLET, EXTENDED RELEASE ORAL ONCE
Status: DISCONTINUED | OUTPATIENT
Start: 2023-09-10 | End: 2023-09-10

## 2023-09-10 RX ADMIN — LISINOPRIL 40 MG: 20 TABLET ORAL at 08:46

## 2023-09-10 RX ADMIN — INSULIN DEGLUDEC INJECTION 15 UNITS: 100 INJECTION, SOLUTION SUBCUTANEOUS at 08:59

## 2023-09-10 RX ADMIN — HYDROXYZINE HYDROCHLORIDE 50 MG: 50 TABLET, FILM COATED ORAL at 00:27

## 2023-09-10 RX ADMIN — CYCLOBENZAPRINE 10 MG: 10 TABLET, FILM COATED ORAL at 04:31

## 2023-09-10 RX ADMIN — HEPARIN SODIUM 5000 UNITS: 5000 INJECTION, SOLUTION INTRAVENOUS; SUBCUTANEOUS at 13:50

## 2023-09-10 RX ADMIN — PANTOPRAZOLE SODIUM 40 MG: 40 TABLET, DELAYED RELEASE ORAL at 16:05

## 2023-09-10 RX ADMIN — POLYETHYLENE GLYCOL 3350 17 G: 17 POWDER, FOR SOLUTION ORAL at 08:47

## 2023-09-10 RX ADMIN — SITAGLIPTIN 100 MG: 100 TABLET, FILM COATED ORAL at 08:48

## 2023-09-10 RX ADMIN — GABAPENTIN 300 MG: 300 CAPSULE ORAL at 13:50

## 2023-09-10 RX ADMIN — METHOCARBAMOL 500 MG: 500 TABLET ORAL at 13:54

## 2023-09-10 RX ADMIN — TRAMADOL HYDROCHLORIDE 50 MG: 50 TABLET, COATED ORAL at 21:49

## 2023-09-10 RX ADMIN — METHOCARBAMOL 500 MG: 500 TABLET ORAL at 18:15

## 2023-09-10 RX ADMIN — HYDROXYZINE HYDROCHLORIDE 50 MG: 50 TABLET, FILM COATED ORAL at 21:49

## 2023-09-10 RX ADMIN — HYDROMORPHONE HYDROCHLORIDE 4 MG: 4 TABLET ORAL at 00:26

## 2023-09-10 RX ADMIN — INSULIN ASPART 1 UNITS: 100 INJECTION, SOLUTION INTRAVENOUS; SUBCUTANEOUS at 13:50

## 2023-09-10 RX ADMIN — METHOCARBAMOL 500 MG: 500 TABLET ORAL at 08:44

## 2023-09-10 RX ADMIN — SENNOSIDES AND DOCUSATE SODIUM 1 TABLET: 50; 8.6 TABLET ORAL at 20:30

## 2023-09-10 RX ADMIN — SENNOSIDES AND DOCUSATE SODIUM 1 TABLET: 50; 8.6 TABLET ORAL at 08:48

## 2023-09-10 RX ADMIN — ACETAMINOPHEN 975 MG: 325 TABLET ORAL at 20:30

## 2023-09-10 RX ADMIN — ACETAMINOPHEN 975 MG: 325 TABLET ORAL at 04:31

## 2023-09-10 RX ADMIN — HEPARIN SODIUM 5000 UNITS: 5000 INJECTION, SOLUTION INTRAVENOUS; SUBCUTANEOUS at 21:40

## 2023-09-10 RX ADMIN — HYDROCHLOROTHIAZIDE 25 MG: 25 TABLET ORAL at 08:45

## 2023-09-10 RX ADMIN — HYDROMORPHONE HYDROCHLORIDE 4 MG: 4 TABLET ORAL at 16:05

## 2023-09-10 RX ADMIN — POTASSIUM CHLORIDE 40 MEQ: 1500 TABLET, EXTENDED RELEASE ORAL at 12:33

## 2023-09-10 RX ADMIN — AMLODIPINE BESYLATE 5 MG: 5 TABLET ORAL at 08:47

## 2023-09-10 RX ADMIN — CYCLOBENZAPRINE 10 MG: 10 TABLET, FILM COATED ORAL at 19:06

## 2023-09-10 RX ADMIN — GABAPENTIN 300 MG: 300 CAPSULE ORAL at 08:47

## 2023-09-10 RX ADMIN — EZETIMIBE 10 MG: 10 TABLET ORAL at 08:46

## 2023-09-10 RX ADMIN — HYDROMORPHONE HYDROCHLORIDE 4 MG: 4 TABLET ORAL at 11:12

## 2023-09-10 RX ADMIN — PANTOPRAZOLE SODIUM 40 MG: 40 TABLET, DELAYED RELEASE ORAL at 08:44

## 2023-09-10 RX ADMIN — POTASSIUM & SODIUM PHOSPHATES POWDER PACK 280-160-250 MG 1 PACKET: 280-160-250 PACK at 11:12

## 2023-09-10 RX ADMIN — EMPAGLIFLOZIN 25 MG: 25 TABLET, FILM COATED ORAL at 08:46

## 2023-09-10 RX ADMIN — HEPARIN SODIUM 5000 UNITS: 5000 INJECTION, SOLUTION INTRAVENOUS; SUBCUTANEOUS at 05:55

## 2023-09-10 RX ADMIN — METHOCARBAMOL 500 MG: 500 TABLET ORAL at 20:30

## 2023-09-10 RX ADMIN — HYDROMORPHONE HYDROCHLORIDE 4 MG: 4 TABLET ORAL at 04:31

## 2023-09-10 RX ADMIN — POTASSIUM & SODIUM PHOSPHATES POWDER PACK 280-160-250 MG 1 PACKET: 280-160-250 PACK at 05:55

## 2023-09-10 RX ADMIN — ACETAMINOPHEN 975 MG: 325 TABLET ORAL at 12:32

## 2023-09-10 RX ADMIN — POTASSIUM & SODIUM PHOSPHATES POWDER PACK 280-160-250 MG 1 PACKET: 280-160-250 PACK at 16:07

## 2023-09-10 RX ADMIN — HYDROMORPHONE HYDROCHLORIDE 4 MG: 4 TABLET ORAL at 20:30

## 2023-09-10 RX ADMIN — GABAPENTIN 300 MG: 300 CAPSULE ORAL at 20:30

## 2023-09-10 RX ADMIN — LIDOCAINE 1 PATCH: 4 PATCH TOPICAL at 08:47

## 2023-09-10 ASSESSMENT — ACTIVITIES OF DAILY LIVING (ADL)
ADLS_ACUITY_SCORE: 42
ADLS_ACUITY_SCORE: 43
ADLS_ACUITY_SCORE: 42
ADLS_ACUITY_SCORE: 43
ADLS_ACUITY_SCORE: 42
ADLS_ACUITY_SCORE: 43
ADLS_ACUITY_SCORE: 42
ADLS_ACUITY_SCORE: 43
ADLS_ACUITY_SCORE: 43
ADLS_ACUITY_SCORE: 42

## 2023-09-10 NOTE — PROGRESS NOTES
[facility]    Medicine Progress Note - Hospitalist Service    Date of Admission:  9/8/2023    Assessment & Plan   Ms. Fox is a 68-year-old female with past medical history significant for PVD, hyperlipidemia, hypertension, tobacco abuse, GERD, and diabetes mellitus type 2 on long-term insulin who was admitted on 9/8/2023 after left femoral to tibial bypass grafting.     Severe PVD, limb threatening ischemia s/p post bypass surgery 9/8  -surgery managing  -Moving more but having a hard time with moving her left leg    Benign essential hypertension  -controlled currently  -Continue PTA hydrochlorothiazide, lisinopril, amlodipine    Diabetes type 2 on insulin  -Started on Tresiba 30 units daily, moderate sliding scale insulin with meals, empagliflozin, sitagliptin  -Hypoglycemic protocol in place    NSVT  Tachycardia-resolved  -Had a 9 beat run of nonsustained V. tach on 9/9, asymptomatic  -Heart rate in the low 100s initially, now down into the 90s  -Continue cardiac monitoring    Hyperlipidemia  -Muscle pain to statins  -Continue PTA Zetia    GERD on PPI  -Pantoprazole 40 mg twice daily     Diet: Combination Diet Moderate Consistent Carb (60 g CHO per Meal) Diet; Low Saturated Fat Diet    DVT Prophylaxis: Heparin SQ  Cdeeno Catheter: Not present  Lines: None     Cardiac Monitoring: None  Code Status: Full Code      Clinically Significant Risk Factors                  # Hypertension: Noted on problem list       # DMII: A1C = 8.3 % (Ref range: 0.0 - 5.6 %) within past 6 months, PRESENT ON ADMISSION             Disposition Plan      Expected Discharge Date: 09/12/2023      Destination: home with family            Ronda Britton MD  Hospitalist Service  [facility]  Securely message with [employer] (more info)  Text page via [employer] Paging/Directory   ______________________________________________________________________    Interval History   Ms. Aviles is doing well  today.  She is trying to move more.  Her nurse try to get her up earlier today and she was able to sit at the side of the bed.  She is having a lot of pain and difficulty moving the left leg.  Otherwise she is not having any complaints.  Blood pressure and sugars are well controlled.    Physical Exam   Vital Signs: Temp: 97.8  F (36.6  C) Temp src: Oral BP: 105/64 Pulse: 97   Resp: 24 SpO2: 99 % O2 Device: None (Room air)    Weight: 151 lbs 12.8 oz    General Appearance: Awake, alert, in no acute distress  Respiratory: CTAB, no wheeze  Cardiovascular: RRR  GI: soft, nontender, non distended, normal bowel sounds  Skin: no jaundice, no rash    Medical Decision Making       45 MINUTES SPENT BY ME on the date of service doing chart review, history, exam, documentation & further activities per the note.      Data     I have personally reviewed the following data over the past 24 hrs:    17.8 (H)  \   9.8 (L)   / 161     134 (L) 100 10.3 /  148 (H)   3.6 27 0.77 \       Imaging results reviewed over the past 24 hrs:   No results found for this or any previous visit (from the past 24 hour(s)).

## 2023-09-10 NOTE — PLAN OF CARE
[facility] - ICU    RN Progress Note:            Pertinent Assessments:      Please refer to flowsheet rows for full assessment     Patient continues to have pain to left leg, but reports that it has improved. Scheduled and PRN medications administered per request with relief. Post-tibial pulses bilaterally and right pedal pulse moderate via doppler. Left pedal pulse very faint via doppler, no changes this shift. Left calf continues to be swollen and soft. Incision intact to left lower leg and groin. Cedeno with adequate amounts of urine return. Hbg stable with AM. Will continue to monitor.           Key Events - This Shift:          Barriers to Discharge / Downgrade:     Mobilization and pain control-possible downgrade today

## 2023-09-10 NOTE — PROGRESS NOTES
Vascular Surgery Progress Note     Date of Admission:  9/8/2023  Date: September 10, 2023     Subjective  Ms. Aviles reports that her pain has improved today in her leg, although still feels this limits her movement. She did work with PT this morning however and was able to do some sitting up at side of bed and work with leg more. She feels overall this is improving. Pain is adequately controlled. No lightheadedness, dizziness, palpitations. Denies any symptoms at time of 9 beat run of [employer] yesterday.     Physical Exam   Temp: 98.8  F (37.1  C) Temp src: Oral BP: 131/88 Pulse: 99   Resp: 18 SpO2: 100 % O2 Device: None (Room air)    Vital Signs with Ranges  Temp:  [97.8  F (36.6  C)-99.3  F (37.4  C)] 98.8  F (37.1  C)  Pulse:  [] 99  Resp:  [8-47] 18  BP: ()/(54-88) 131/88  MAP:  [64 mmHg-101 mmHg] 87 mmHg  Arterial Line BP: (100-178)/(32-70) 155/58  SpO2:  [95 %-100 %] 100 %  151 lbs 12.8 oz    Constitutional: cooperative, no apparent distress; resting comfortably in bed in ICU  Vascular: strong bi-triphasic left PT and DP doppler signal.   Musculoskeletal: Able to fluidly wiggle bilateral toes; difficulty flexing/extending left knee due to swelling and pain. TTP around incision, which appears well-approximated with soft swelling of the popliteal fossa. No ecchymoses or fluctuance.  Neurologic: Awake, alert, oriented to name, place, time, and situation    Data   Most Recent 3 CBCs:  Recent Labs   Lab Test 09/10/23  0417 09/09/23  1352 09/09/23  1032 09/09/23  0526 09/09/23  0023 09/08/23  2216 09/08/23  0841   WBC 17.8*  --   --  17.2*  --   --  11.0   HGB 9.8* 9.4* 9.9* 9.4*  --    < > 13.7   MCV 90  --   --  90  --   --  90     --   --  149* 172  --  288    < > = values in this interval not displayed.         Most Recent 3 BMPs:  Recent Labs   Lab Test 09/09/23  0526 09/05/23  1157 05/18/23  0544    136 138   POTASSIUM 3.8 3.7 4.3   CHLORIDE 106 100 105   CO2 24 24 24   BUN 16.6 15.3  14.9   CR 0.87 0.78 0.72   ANIONGAP 7 12 9   SANTINO 8.0* 9.6 8.8   * 137* 182*    < > = values in this interval not displayed.       Most Recent 3 INRs:  Recent Labs   Lab Test 09/09/23  0023 09/08/23  0841 03/23/23  0712   INR 1.14 0.90 0.83*            Assessment & Plan   Ashanti Aviles is a 68 year old female who is s/p left femoral to tibioperoneal trunk bypass on 9/8/23 as part of staged revascularization with left iliofemoral endarterectomy with retrograde iliac stenting, done for CLTI with rest pain in the left foot.     Acute blood-loss anemia, exacerbated by plavix use: now s/p transfusion overnight for symptoms (tachycardia). Hgb currently stable, no e/o ongoing blood loss. This remains stable today. Not concerned for bleeding  Swelling as anticipated, likely related to surgical site oozing and revascularization. Will continue to monitor.   PT/OT as able; goal of ambulation OOB  Please have patient OOB up to chair today as able  Continue  muscle relaxers; also advised her to continue gentle stretching and ice packs/heat packs as needed  Will replete potassium and phos this am, added RN K protocol moving forward  Remove allison, please use purewick for patient  Will discuss resumption of plavix with staff, anticipate we will also start her on 2.5 BID xarelto, timing pending discussion with staff.     Belkys Petersen MD

## 2023-09-10 NOTE — PLAN OF CARE
Problem: Plan of Care - These are the overarching goals to be used throughout the patient stay.    Goal: Plan of Care Review  Description: The Plan of Care Review/Shift note should be completed every shift.  The Outcome Evaluation is a brief statement about your assessment that the patient is improving, declining, or no change.  This information will be displayed automatically on your shift note.  Outcome: Progressing   Goal Outcome Evaluation:  [facility] - ICU    RN Progress Note:            Pertinent Assessments:      Please refer to flowsheet rows for full assessment     Afebrile. Pt was very painful this morning at LLE incision site and in tears. Rating pain 10 out of 10. PO and IV Dilaudid given. Vascular updated. New orders for Lidocaine patch, Hydroxyzine, Tramadol, scheduled Gabapentin and Robaxin as well as PRN Flexeril. LLE swollen but calf has not increased in size. Left groin site intact, minimal swelling, no hematoma. Left pedal pulse weak with doppler, moderate post tibial pulse with doppler. Pt eating and drinking well. Pain is now more controlled. Arterial line removed. Allison catheter in place. Large output from allison. IV Magnesium and PO Phosphorus tab given. Will continue to monitor.            Key Events - This Shift:     Pt had 9 beat run of Vtach. Vascular team called and updated.              Barriers to Discharge / Downgrade:              Point of Contact Update YES-OR-NO: Yes  If No, reason:   Name: Patient  Phone Number:  Summary of Conversation:

## 2023-09-10 NOTE — PROGRESS NOTES
09/10/23 1034   Appointment Info   Signing Clinician's Name / Credentials (PT) Robson Martinez, PT, DPT   Rehab Comments (PT) Patient left lying supine with HOB elevated, LLE elevated, bed alarm on, needs within reach.   Living Environment   People in Home spouse   Current Living Arrangements house   Home Accessibility stairs to enter home   Number of Stairs, Main Entrance 3   Stair Railings, Main Entrance railings on both sides of stairs   Living Environment Comments Bathroom: walk in shower, no grab bars, HHSH, no seat available; Regular height toilet with vanity next to it.   Self-Care   Equipment Currently Used at Home walker, rolling  (FWW - has but doesn't use)   Fall history within last six months no   Activity/Exercise/Self-Care Comment Patient is typically indep with mobility without AD. Patient is typically indep with bADL and IADL, including driving. Spouse does not drive but is indep otherwise. Patient uses a pill box to organize medications.   General Information   Onset of Illness/Injury or Date of Surgery 09/08/23   Referring Physician Dyan Geller MD   Pertinent History of Current Problem (include personal factors and/or comorbidities that impact the POC) limb threatening limb ischemia involving left lower extremity; 9/8: Left femoral to tibioperoneal trunk bypass using spliced 4mm and 5 mm Artegraft graft, Redo exposure of the left common femoral artery   Existing Precautions/Restrictions fall   Pain Assessment   Patient Currently in Pain Yes, see Vital Sign flowsheet  (significant pain LLE with attempted WBing)   Range of Motion (ROM)   Range of Motion ROM deficits secondary to surgical procedure   Strength (Manual Muscle Testing)   Strength (Manual Muscle Testing) Deficits observed during functional mobility  (primarily limited by pain LLE)   Bed Mobility   Bed Mobility supine-sit;sit-supine   Supine-Sit [place] (Bed Mobility) minimum assist (75% patient effort);verbal  cues;nonverbal cues (demo/gesture)   Sit-Supine [place] (Bed Mobility) minimum assist (75% patient effort);verbal cues;nonverbal cues (demo/gesture)   Assistive Device (Bed Mobility) bed rails  (HOB elevated)   Transfers   Transfers sit-stand transfer   Sit-Stand Transfer   Sit-Stand [place] (Transfers) contact guard;verbal cues;nonverbal cues (demo/gesture)   Assistive Device (Sit-Stand Transfers) walker, front-wheeled   Gait/Stairs (Locomotion)   Comment, (Gait/Stairs) unable to take a step due to LLE pain   Sensory Examination   Sensory Perception Comments denies numbness/tingling but reports may have baseline tingling in her toes   Clinical Impression   Criteria for Skilled Therapeutic Intervention Yes, treatment indicated   PT Diagnosis (PT) impaired functional mobility   Influenced by the following impairments pain, impaired balance, impaired strength   Functional limitations due to impairments impaired bed mobility, impaired transfers, impaired gait   Clinical Presentation (PT Evaluation Complexity) Evolving/Changing   Clinical Presentation Rationale clinical judgement   Clinical Decision Making (Complexity) moderate complexity   Planned Therapy Interventions (PT) balance training;bed mobility training;gait training;home exercise program;neuromuscular re-education;patient/family education;stair training;strengthening;transfer training;progressive activity/exercise   Anticipated Equipment Needs at Discharge (PT)   (has FWW)   Risk & Benefits of therapy have been explained evaluation/treatment results reviewed;care plan/treatment goals reviewed;participants included;patient   PT Total Evaluation Time   PT Donnaal, Moderate Complexity Minutes (77370) 10   Physical Therapy Goals   PT Frequency Daily   PT Predicted Duration/Target Date for Goal Attainment 09/17/23   PT Goals Bed Mobility;Transfers;Gait;Stairs   PT: Bed Mobility Supervision/stand-by assist;Supine to/from sit   PT: Transfers  Supervision/stand-by assist;Sit to/from stand;Bed to/from chair;Assistive device   PT: Gait Supervision/stand-by assist;Rolling walker;100 feet  (FWW)   PT: Stairs Supervision/stand-by assist;3 stairs;Rail on both sides   Interventions   Interventions Quick Adds Therapeutic Activity   Therapeutic Activity   Therapeutic Activities: dynamic activities to improve functional performance Minutes (96077) 15   Symptoms Noted During/After Treatment Fatigue;Increased pain   Treatment Detail/Skilled Intervention Patient able to tolerate static sitting EOB without LOB for at least 5 minutes, did not report significant increased pain LLE in dependent position. Patient able to statically stand with FWW with TTWB LLE for approx 5 minutes without LOB, CGA only. Patient attempted to achieve L flat foot, reports inability to DF and inability to place foot flat. Discussed with RN regarding potential boot in supine to prevent PF contracture - RN to discuss with vascular. Patient eager to continue to mobilize and progress mobility with nsg as well.   PT Discharge Planning   PT Plan progress mobility as able with FWW, home has 3 JEANETH B rail, progressive LLE WBing   PT Discharge Recommendation (DC Rec) Transitional Care Facility   PT Rationale for DC Rec Patient is currently unable to mobilize safely enough to transition home; however, with continued medical management and progressive mobility as tolerated due to LLE pain, anticipate patient may be able to transition home with spouse. At this time, patient would require TCU, anticipate functional recovery to support transition home by the time patient is medically stable for dc. Patient may benefit from OT evaluation for self care due to patient's pain and decreased LLE ROM from procedure.   PT Brief overview of current status Demetrius bed mobility, CGA sit<>stand FWW (from elevated ICU bed), unable to take a step/WB through LLE, patient reports inability to place L flat foot on floor   Total  Session Time   Timed Code Treatment Minutes 15   Total Session Time (sum of timed and untimed services) 25

## 2023-09-11 ENCOUNTER — APPOINTMENT (OUTPATIENT)
Dept: PHYSICAL THERAPY | Facility: HOSPITAL | Age: 68
DRG: 253 | End: 2023-09-11
Attending: SURGERY
Payer: COMMERCIAL

## 2023-09-11 LAB
GLUCOSE BLDC GLUCOMTR-MCNC: 124 MG/DL (ref 70–99)
GLUCOSE BLDC GLUCOMTR-MCNC: 124 MG/DL (ref 70–99)
GLUCOSE BLDC GLUCOMTR-MCNC: 202 MG/DL (ref 70–99)
GLUCOSE BLDC GLUCOMTR-MCNC: 229 MG/DL (ref 70–99)
MAGNESIUM SERPL-MCNC: 2.1 MG/DL (ref 1.7–2.3)
PHOSPHATE SERPL-MCNC: 2.9 MG/DL (ref 2.5–4.5)
POTASSIUM SERPL-SCNC: 4.2 MMOL/L (ref 3.4–5.3)

## 2023-09-11 PROCEDURE — 250N000013 HC RX MED GY IP 250 OP 250 PS 637: Performed by: SURGERY

## 2023-09-11 PROCEDURE — 250N000013 HC RX MED GY IP 250 OP 250 PS 637: Performed by: PHYSICIAN ASSISTANT

## 2023-09-11 PROCEDURE — 200N000001 HC R&B ICU

## 2023-09-11 PROCEDURE — 36415 COLL VENOUS BLD VENIPUNCTURE: CPT | Performed by: SURGERY

## 2023-09-11 PROCEDURE — 83735 ASSAY OF MAGNESIUM: CPT | Performed by: SURGERY

## 2023-09-11 PROCEDURE — 99233 SBSQ HOSP IP/OBS HIGH 50: CPT | Performed by: INTERNAL MEDICINE

## 2023-09-11 PROCEDURE — 97116 GAIT TRAINING THERAPY: CPT | Mod: GP | Performed by: PHYSICAL THERAPIST

## 2023-09-11 PROCEDURE — 84132 ASSAY OF SERUM POTASSIUM: CPT | Performed by: SURGERY

## 2023-09-11 PROCEDURE — 250N000011 HC RX IP 250 OP 636: Mod: JZ | Performed by: SURGERY

## 2023-09-11 PROCEDURE — 97530 THERAPEUTIC ACTIVITIES: CPT | Mod: GP | Performed by: PHYSICAL THERAPIST

## 2023-09-11 PROCEDURE — 250N000013 HC RX MED GY IP 250 OP 250 PS 637

## 2023-09-11 PROCEDURE — 84100 ASSAY OF PHOSPHORUS: CPT | Performed by: SURGERY

## 2023-09-11 PROCEDURE — 250N000013 HC RX MED GY IP 250 OP 250 PS 637: Performed by: INTERNAL MEDICINE

## 2023-09-11 RX ADMIN — RIVAROXABAN 2.5 MG: 2.5 TABLET, FILM COATED ORAL at 17:40

## 2023-09-11 RX ADMIN — EMPAGLIFLOZIN 25 MG: 25 TABLET, FILM COATED ORAL at 09:08

## 2023-09-11 RX ADMIN — ACETAMINOPHEN 975 MG: 325 TABLET ORAL at 11:45

## 2023-09-11 RX ADMIN — INSULIN ASPART 2 UNITS: 100 INJECTION, SOLUTION INTRAVENOUS; SUBCUTANEOUS at 17:30

## 2023-09-11 RX ADMIN — GABAPENTIN 300 MG: 300 CAPSULE ORAL at 13:51

## 2023-09-11 RX ADMIN — GABAPENTIN 300 MG: 300 CAPSULE ORAL at 21:40

## 2023-09-11 RX ADMIN — AMLODIPINE BESYLATE 5 MG: 5 TABLET ORAL at 09:07

## 2023-09-11 RX ADMIN — TRAMADOL HYDROCHLORIDE 50 MG: 50 TABLET, COATED ORAL at 13:53

## 2023-09-11 RX ADMIN — METHOCARBAMOL 500 MG: 500 TABLET ORAL at 13:51

## 2023-09-11 RX ADMIN — TRAMADOL HYDROCHLORIDE 50 MG: 50 TABLET, COATED ORAL at 22:47

## 2023-09-11 RX ADMIN — METHOCARBAMOL 500 MG: 500 TABLET ORAL at 09:08

## 2023-09-11 RX ADMIN — EZETIMIBE 10 MG: 10 TABLET ORAL at 09:08

## 2023-09-11 RX ADMIN — LISINOPRIL 40 MG: 20 TABLET ORAL at 09:09

## 2023-09-11 RX ADMIN — CLOPIDOGREL BISULFATE 75 MG: 75 TABLET ORAL at 09:07

## 2023-09-11 RX ADMIN — HYDROCHLOROTHIAZIDE 25 MG: 25 TABLET ORAL at 09:08

## 2023-09-11 RX ADMIN — ACETAMINOPHEN 975 MG: 325 TABLET ORAL at 04:01

## 2023-09-11 RX ADMIN — SITAGLIPTIN 100 MG: 100 TABLET, FILM COATED ORAL at 09:09

## 2023-09-11 RX ADMIN — METHOCARBAMOL 500 MG: 500 TABLET ORAL at 17:40

## 2023-09-11 RX ADMIN — HEPARIN SODIUM 5000 UNITS: 5000 INJECTION, SOLUTION INTRAVENOUS; SUBCUTANEOUS at 05:50

## 2023-09-11 RX ADMIN — SENNOSIDES AND DOCUSATE SODIUM 1 TABLET: 50; 8.6 TABLET ORAL at 21:40

## 2023-09-11 RX ADMIN — PANTOPRAZOLE SODIUM 40 MG: 40 TABLET, DELAYED RELEASE ORAL at 16:35

## 2023-09-11 RX ADMIN — LIDOCAINE 1 PATCH: 4 PATCH TOPICAL at 09:07

## 2023-09-11 RX ADMIN — HYDROMORPHONE HYDROCHLORIDE 4 MG: 4 TABLET ORAL at 23:49

## 2023-09-11 RX ADMIN — PANTOPRAZOLE SODIUM 40 MG: 40 TABLET, DELAYED RELEASE ORAL at 07:39

## 2023-09-11 RX ADMIN — INSULIN DEGLUDEC INJECTION 15 UNITS: 100 INJECTION, SOLUTION SUBCUTANEOUS at 09:14

## 2023-09-11 RX ADMIN — GABAPENTIN 300 MG: 300 CAPSULE ORAL at 09:07

## 2023-09-11 RX ADMIN — SENNOSIDES AND DOCUSATE SODIUM 1 TABLET: 50; 8.6 TABLET ORAL at 09:07

## 2023-09-11 RX ADMIN — METHOCARBAMOL 500 MG: 500 TABLET ORAL at 21:40

## 2023-09-11 ASSESSMENT — ACTIVITIES OF DAILY LIVING (ADL)
ADLS_ACUITY_SCORE: 40
ADLS_ACUITY_SCORE: 42
ADLS_ACUITY_SCORE: 40
ADLS_ACUITY_SCORE: 42
ADLS_ACUITY_SCORE: 40
ADLS_ACUITY_SCORE: 42

## 2023-09-11 NOTE — PROGRESS NOTES
Care Management Follow Up    Length of Stay (days): 3    Expected Discharge Date: 09/12/2023     Concerns to be Addressed:  discharge planning      Patient plan of care discussed at interdisciplinary rounds: Yes    Anticipated Discharge Disposition:  TBD     Anticipated Discharge Services:       Education Provided on the Discharge Plan:  Per Care Team   Patient/Family in Agreement with the Plan:  Yes    Referrals Placed by CM/SW:    Private pay costs discussed: Not applicable    Additional Information:  Chart reviewed.    Cm updates:  Per therapy recs TCU, pt was having LLE pain when trial happened. Pt would like to trial therapy further, and think about it a little more before deciding TCU vs home.       Social Hx:  Pt lives at home with spouse. Ind at baseline. No svcs prior. No mobility aides. Spouse does not drive, but otherwise Ind. Family to transport.       Cm will continue to follow plan of care,review recommendations,and assist with any discharge needs anticipated.       Shara Pavon RN

## 2023-09-11 NOTE — PROGRESS NOTES
"[facility]    Medicine Progress Note - Hospitalist Service    Date of Admission:  9/8/2023    Assessment & Plan   Ms. Fox is a 68-year-old female with past medical history significant for PVD, hyperlipidemia, hypertension, tobacco abuse, GERD, and diabetes mellitus type 2 on long-term insulin who was admitted on 9/8/2023 after left femoral to tibial bypass grafting.     Severe PVD, limb threatening ischemia s/p post bypass surgery 9/8  -surgery managing: continue plavix, starting BID xarelto 2.5mg, downgrade from ICU  -PT consulted: recommend TCU- pt. Wants to try to move more    Benign essential hypertension  -well controlled  -Continue PTA hydrochlorothiazide, lisinopril, amlodipine    Diabetes type 2 on insulin  -continue Tresiba 15 units daily, moderate sliding scale insulin with meals, empagliflozin, sitagliptin  -Hypoglycemic protocol in place    NSVT- resolved  Tachycardia-resolved  -Had a 9 beat run of nonsustained V. tach on 9/9, asymptomatic  -Heart rate in the low 100s initially, now down into the 90s  -Continue cardiac monitoring    Hyperlipidemia  -Muscle pain to statins  -Continue PTA Zetia    GERD on PPI  -Pantoprazole 40 mg twice daily     Diet: Combination Diet Moderate Consistent Carb (60 g CHO per Meal) Diet; Low Saturated Fat Diet    DVT Prophylaxis: DOAC  Cedeno Catheter: Not present  Lines: None     Cardiac Monitoring: None  Code Status: Full Code      Clinically Significant Risk Factors                  # Hypertension: Noted on problem list       # DMII: A1C = 8.3 % (Ref range: 0.0 - 5.6 %) within past 6 months, PRESENT ON ADMISSION  # Overweight: Estimated body mass index is 28.85 kg/m  as calculated from the following:    Height as of this encounter: 1.549 m (5' 1\").    Weight as of this encounter: 69.3 kg (152 lb 11.2 oz)., PRESENT ON ADMISSION            Disposition Plan      Expected Discharge Date: 09/13/2023      Destination: home with family        "     Ronda Britton MD  Hospitalist Service  [facility]  Securely message with [employer] (more info)  Text page via [employer] Paging/Directory   ______________________________________________________________________    Interval History   Mrs. Aviles is is doing well today.  She is still trying to move more. PT is recommending TCU but she is hoping to be able to move more.  She has only been getting 15 units of Tresiba.  She has been having sugars in the 200s in the afternoons.  She does have sliding scale insulin, she is not currently getting metformin which will help with her post mealtime sugars at home. Order has been changed to reflect what she is actually getting. Blood pressure well controlled.      Physical Exam   Vital Signs: Temp: 98.5  F (36.9  C) Temp src: Oral BP: 99/57 Pulse: 85   Resp: 18 SpO2: 98 % O2 Device: None (Room air)    Weight: 152 lbs 11.2 oz    General Appearance: Awake, alert, in no acute distress  Respiratory: CTAB, no wheeze  Cardiovascular: RRR, no murmur noted  GI: soft, nontender, non distended, normal bowel sounds  Skin: no jaundice, no rash    Medical Decision Making       45 MINUTES SPENT BY ME on the date of service doing chart review, history, exam, documentation & further activities per the note.      Data     I have personally reviewed the following data over the past 24 hrs:    N/A  \   N/A   / N/A     N/A N/A N/A /  124 (H)   4.2 N/A N/A \       Imaging results reviewed over the past 24 hrs:   No results found for this or any previous visit (from the past 24 hour(s)).

## 2023-09-11 NOTE — PLAN OF CARE
[facility] - ICU    RN Progress Note:            Pertinent Assessments:      Please refer to flowsheet rows for full assessment     VSS, c/o LLE pain 2-4/10 relieved w/ scheduled Tylenol and prn Tramadol. Up to commode w assist x1, increased pain w/ ambulation. Seen by PT, ambulated in room to BR. See flowsheets and MAR for complete assessment.            Key Events - This Shift:       Increased ambulation w/ PT             Barriers to Discharge / Downgrade:     None

## 2023-09-11 NOTE — PLAN OF CARE
Problem: Plan of Care - These are the overarching goals to be used throughout the patient stay.    Goal: Plan of Care Review  Description: The Plan of Care Review/Shift note should be completed every shift.  The Outcome Evaluation is a brief statement about your assessment that the patient is improving, declining, or no change.  This information will be displayed automatically on your shift note.  Outcome: Progressing   Goal Outcome Evaluation:  [facility] - ICU    RN Progress Note:            Pertinent Assessments:      Please refer to flowsheet rows for full assessment     VSS. Afebrile. Pain is much more controlled today. Pt able to stand and pivot to chair. Cedeno catheter discontinued. Voiding on own. Eating and drinking well.            Key Events - This Shift:                  Barriers to Discharge / Downgrade:

## 2023-09-11 NOTE — PROGRESS NOTES
"VASCULAR SURGERY PROGRESS NOTE    Subjective:  Patient was seen and evaluated at the bedside for surgical follow up. Continues to experience pain behind the knee/distal incision site, slowly improving. Worked with PT yesterday but has not yet been able to bear weight on her left leg. Denies any lightheadedness, dizziness, palpitations. No events overnight or new concerns.    Objective:  Intake/Output Summary (Last 24 hours) at 9/11/2023 0847  Last data filed at 9/11/2023 0500  Gross per 24 hour   Intake 740 ml   Output 4100 ml   Net -3360 ml     PHYSICAL EXAM:  /54   Pulse 91   Temp 98.6  F (37  C) (Axillary)   Resp 16   Ht 1.549 m (5' 1\")   Wt 69.3 kg (152 lb 11.2 oz)   SpO2 98%   BMI 28.85 kg/m    General: patient is alert and oriented, appropriate, no acute distress  Psych: pleasant affect, answers questions appropriately  Skin: color appropriate for race, warm, dry.  Respiratory: normal respiratory effort on room air  Vascular: mild left lower extremity edema, soft compartments, left PT signal present via doppler  Incision: left groin and left leg incisions clean, dry, and intact with tegaderm dressing    Imaging:   Pertinent imaging reviewed.     ASSESSMENT:  Ashanti Aviles is a 68 year old female who is s/p left femoral to tibioperoneal trunk bypass on 9/8/23 as part of staged revascularization with left iliofemoral endarterectomy with retrograde iliac stenting, done for CLTI with rest pain in the left foot.       PLAN:  Continue plavix  Will initiate 2.5 mg BID xarelto today  Advance activity as tolerated, PT/OT following  Pain control PRN  OK to downgrade from ICU from vascular standpoint  Appreciate hospitalist    Discussed pt history, exam, assessment and plan with Dr. Geller of the vascular surgery service, who is in agreement with the above.    Tammy Ramirez PA-C  VASCULAR SURGERY                   "

## 2023-09-11 NOTE — PLAN OF CARE
Goal Outcome Evaluation:  [facility] - ICU    RN Progress Note:            Pertinent Assessments:      Please refer to flowsheet rows for full assessment     Alert and oriented, schedule pain med's effective for pain control this shift. Compliant with cares and treatments. Pedal  pluses  audible with doppler. Up to bedside commode, voiding well without issue.           Key Events - This Shift:       Uneventful                 Barriers to Discharge / Downgrade:     NA                 Plan of Care Reviewed With: patient    Overall Patient Progress: improvingOverall Patient Progress: improving

## 2023-09-12 PROBLEM — Z87.410 HISTORY OF CERVICAL DYSPLASIA: Status: ACTIVE | Noted: 2023-09-12

## 2023-09-12 PROBLEM — B37.31 CANDIDAL VULVOVAGINITIS: Status: ACTIVE | Noted: 2023-09-12

## 2023-09-12 PROBLEM — N87.9 CERVICAL INTRAEPITHELIAL NEOPLASIA: Status: ACTIVE | Noted: 2023-09-12

## 2023-09-12 PROBLEM — R10.2 PELVIC PAIN IN FEMALE: Status: ACTIVE | Noted: 2023-09-12

## 2023-09-12 LAB
ANION GAP SERPL CALCULATED.3IONS-SCNC: 10 MMOL/L (ref 7–15)
BUN SERPL-MCNC: 12.8 MG/DL (ref 8–23)
CALCIUM SERPL-MCNC: 8.8 MG/DL (ref 8.8–10.2)
CHLORIDE SERPL-SCNC: 97 MMOL/L (ref 98–107)
CREAT SERPL-MCNC: 0.85 MG/DL (ref 0.51–0.95)
DEPRECATED HCO3 PLAS-SCNC: 26 MMOL/L (ref 22–29)
EGFRCR SERPLBLD CKD-EPI 2021: 75 ML/MIN/1.73M2
GLUCOSE BLDC GLUCOMTR-MCNC: 140 MG/DL (ref 70–99)
GLUCOSE BLDC GLUCOMTR-MCNC: 162 MG/DL (ref 70–99)
GLUCOSE BLDC GLUCOMTR-MCNC: 168 MG/DL (ref 70–99)
GLUCOSE BLDC GLUCOMTR-MCNC: 219 MG/DL (ref 70–99)
GLUCOSE SERPL-MCNC: 147 MG/DL (ref 70–99)
MAGNESIUM SERPL-MCNC: 2.1 MG/DL (ref 1.7–2.3)
PHOSPHATE SERPL-MCNC: 3.3 MG/DL (ref 2.5–4.5)
POTASSIUM SERPL-SCNC: 3.9 MMOL/L (ref 3.4–5.3)
SODIUM SERPL-SCNC: 133 MMOL/L (ref 136–145)

## 2023-09-12 PROCEDURE — 250N000013 HC RX MED GY IP 250 OP 250 PS 637

## 2023-09-12 PROCEDURE — 83735 ASSAY OF MAGNESIUM: CPT | Performed by: SURGERY

## 2023-09-12 PROCEDURE — 250N000013 HC RX MED GY IP 250 OP 250 PS 637: Performed by: PHYSICIAN ASSISTANT

## 2023-09-12 PROCEDURE — 36415 COLL VENOUS BLD VENIPUNCTURE: CPT | Performed by: INTERNAL MEDICINE

## 2023-09-12 PROCEDURE — 99232 SBSQ HOSP IP/OBS MODERATE 35: CPT | Performed by: INTERNAL MEDICINE

## 2023-09-12 PROCEDURE — 120N000001 HC R&B MED SURG/OB

## 2023-09-12 PROCEDURE — 250N000013 HC RX MED GY IP 250 OP 250 PS 637: Performed by: INTERNAL MEDICINE

## 2023-09-12 PROCEDURE — 250N000013 HC RX MED GY IP 250 OP 250 PS 637: Performed by: SURGERY

## 2023-09-12 PROCEDURE — 84100 ASSAY OF PHOSPHORUS: CPT | Performed by: SURGERY

## 2023-09-12 PROCEDURE — 80048 BASIC METABOLIC PNL TOTAL CA: CPT | Performed by: INTERNAL MEDICINE

## 2023-09-12 RX ADMIN — GABAPENTIN 300 MG: 300 CAPSULE ORAL at 20:45

## 2023-09-12 RX ADMIN — EZETIMIBE 10 MG: 10 TABLET ORAL at 09:27

## 2023-09-12 RX ADMIN — INSULIN ASPART 1 UNITS: 100 INJECTION, SOLUTION INTRAVENOUS; SUBCUTANEOUS at 16:57

## 2023-09-12 RX ADMIN — SENNOSIDES AND DOCUSATE SODIUM 1 TABLET: 50; 8.6 TABLET ORAL at 20:45

## 2023-09-12 RX ADMIN — LISINOPRIL 40 MG: 20 TABLET ORAL at 09:27

## 2023-09-12 RX ADMIN — CLOPIDOGREL BISULFATE 75 MG: 75 TABLET ORAL at 09:20

## 2023-09-12 RX ADMIN — PANTOPRAZOLE SODIUM 40 MG: 40 TABLET, DELAYED RELEASE ORAL at 07:07

## 2023-09-12 RX ADMIN — METHOCARBAMOL 500 MG: 500 TABLET ORAL at 09:26

## 2023-09-12 RX ADMIN — RIVAROXABAN 2.5 MG: 2.5 TABLET, FILM COATED ORAL at 16:56

## 2023-09-12 RX ADMIN — SENNOSIDES AND DOCUSATE SODIUM 1 TABLET: 50; 8.6 TABLET ORAL at 09:22

## 2023-09-12 RX ADMIN — PANTOPRAZOLE SODIUM 40 MG: 40 TABLET, DELAYED RELEASE ORAL at 16:56

## 2023-09-12 RX ADMIN — GABAPENTIN 300 MG: 300 CAPSULE ORAL at 13:01

## 2023-09-12 RX ADMIN — SITAGLIPTIN 100 MG: 100 TABLET, FILM COATED ORAL at 09:27

## 2023-09-12 RX ADMIN — METHOCARBAMOL 500 MG: 500 TABLET ORAL at 20:45

## 2023-09-12 RX ADMIN — POLYETHYLENE GLYCOL 3350 17 G: 17 POWDER, FOR SOLUTION ORAL at 09:20

## 2023-09-12 RX ADMIN — RIVAROXABAN 2.5 MG: 2.5 TABLET, FILM COATED ORAL at 09:26

## 2023-09-12 RX ADMIN — EMPAGLIFLOZIN 25 MG: 25 TABLET, FILM COATED ORAL at 09:27

## 2023-09-12 RX ADMIN — HYDROCHLOROTHIAZIDE 25 MG: 25 TABLET ORAL at 09:27

## 2023-09-12 RX ADMIN — AMLODIPINE BESYLATE 5 MG: 5 TABLET ORAL at 09:21

## 2023-09-12 RX ADMIN — METHOCARBAMOL 500 MG: 500 TABLET ORAL at 16:56

## 2023-09-12 RX ADMIN — TRAMADOL HYDROCHLORIDE 50 MG: 50 TABLET, COATED ORAL at 20:45

## 2023-09-12 RX ADMIN — GABAPENTIN 300 MG: 300 CAPSULE ORAL at 09:22

## 2023-09-12 RX ADMIN — INSULIN ASPART 1 UNITS: 100 INJECTION, SOLUTION INTRAVENOUS; SUBCUTANEOUS at 12:56

## 2023-09-12 RX ADMIN — HYDROMORPHONE HYDROCHLORIDE 4 MG: 4 TABLET ORAL at 20:44

## 2023-09-12 RX ADMIN — METHOCARBAMOL 500 MG: 500 TABLET ORAL at 12:57

## 2023-09-12 RX ADMIN — ACETAMINOPHEN 650 MG: 325 TABLET ORAL at 04:58

## 2023-09-12 RX ADMIN — INSULIN ASPART 1 UNITS: 100 INJECTION, SOLUTION INTRAVENOUS; SUBCUTANEOUS at 09:28

## 2023-09-12 ASSESSMENT — ACTIVITIES OF DAILY LIVING (ADL)
ADLS_ACUITY_SCORE: 39
ADLS_ACUITY_SCORE: 40
ADLS_ACUITY_SCORE: 39
ADLS_ACUITY_SCORE: 40
ADLS_ACUITY_SCORE: 40
ADLS_ACUITY_SCORE: 39
ADLS_ACUITY_SCORE: 40
ADLS_ACUITY_SCORE: 39
ADLS_ACUITY_SCORE: 39
ADLS_ACUITY_SCORE: 40
ADLS_ACUITY_SCORE: 39
ADLS_ACUITY_SCORE: 40

## 2023-09-12 NOTE — PLAN OF CARE
Goal Outcome Evaluation:       [facility] - ICU    RN Progress Note:            Pertinent Assessments:      Please refer to flowsheet rows for full assessment     VSS, A&O, patient complained of LLE pain, administered PRN PO Dilaudid and Tylenol. Patient got up to bedside commode w/ asst of 1. Patient still has not had a BM since 9/7.           Key Events - This Shift:       Refer to note above               Barriers to Discharge / Downgrade:

## 2023-09-12 NOTE — PROGRESS NOTES
"[facility]    Medicine Progress Note - Hospitalist Service    Date of Admission:  9/8/2023    Assessment & Plan   Ms. Fox is a 68-year-old female with past medical history significant for PVD, hyperlipidemia, hypertension, tobacco abuse, GERD, and diabetes mellitus type 2 on long-term insulin who was admitted on 9/8/2023 after left femoral to tibial bypass grafting.     Severe PVD, limb threatening ischemia s/p post bypass surgery 9/8  -surgery managing: continue plavix, starting BID xarelto 2.5mg, downgrade from ICU  -PT consulted: recommend TCU, social work has sent referrals pending    Benign essential hypertension  -well controlled  -Continue PTA hydrochlorothiazide, lisinopril, amlodipine    Diabetes type 2 on insulin  -continue Tresiba 15 units daily, moderate sliding scale insulin with meals, empagliflozin, sitagliptin  -Having highs in the 200s in the afternoon but wanting to keep insulin regimen the same, does not want to increase Tresiba dose  -Hypoglycemic protocol in place    NSVT- resolved  Tachycardia-resolved  -Had a 9 beat run of nonsustained V. tach on 9/9, asymptomatic  -Heart rate in the low 100s initially, now down into the 90s  -Continue cardiac monitoring    Hyperlipidemia  -Muscle pain to statins  -Continue PTA Zetia    GERD on PPI  -Pantoprazole 40 mg twice daily       Diet: Combination Diet Moderate Consistent Carb (60 g CHO per Meal) Diet; Low Saturated Fat Diet    DVT Prophylaxis: DOAC  Cedeno Catheter: Not present  Lines: None     Cardiac Monitoring: None  Code Status: Full Code      Clinically Significant Risk Factors                  # Hypertension: Noted on problem list       # DMII: A1C = 8.3 % (Ref range: 0.0 - 5.6 %) within past 6 months   # Overweight: Estimated body mass index is 28.81 kg/m  as calculated from the following:    Height as of this encounter: 1.549 m (5' 1\").    Weight as of this encounter: 69.2 kg (152 lb 8 oz).             Disposition " Plan      Expected Discharge Date: 09/14/2023      Destination: home with family            Ronda Britton MD  Hospitalist Service  [facility]  Securely message with [email or web address] (more info)  Text page via [employer] Paging/Directory   ______________________________________________________________________    Interval History   Ms. Tejeda is doing well today.  She was able to walk around her room yesterday.  She is excited to move to the regular floor so she can get a shower.  No complaints. SW has sent referrals for TCU.    Physical Exam   Vital Signs: Temp: 98.2  F (36.8  C) Temp src: Oral BP: 127/60 Pulse: 96   Resp: 22 SpO2: 99 % O2 Device: None (Room air)    Weight: 152 lbs 8 oz    General Appearance: Awake, alert, in no acute distress  Respiratory: CTAB, no wheeze  Cardiovascular: RRR, no murmur noted  GI: soft, nontender, non distended, normal bowel sounds  Skin: no jaundice, no rash    Medical Decision Making       30 MINUTES SPENT BY ME on the date of service doing chart review, history, exam, documentation & further activities per the note.      Data     I have personally reviewed the following data over the past 24 hrs:    N/A  \   N/A   / N/A     133 (L) 97 (L) 12.8 /  168 (H)   3.9 26 0.85 \       Imaging results reviewed over the past 24 hrs:   No results found for this or any previous visit (from the past 24 hour(s)).

## 2023-09-12 NOTE — PROGRESS NOTES
"Care Management Follow Up    Length of Stay (days): 4    Expected Discharge Date: 09/13/2023     Concerns to be Addressed: discharge planning       Patient plan of care discussed at interdisciplinary rounds: Yes    Anticipated Discharge Disposition:  TCU     Education Provided on the Discharge Plan:  Per Care Team   Patient/Family in Agreement with the Plan:  Yes    Referrals Placed by CM/SW:  TCU  Private pay costs discussed: Not applicable    Additional Information:  Chart reviewed.    CM updates:  Therapy recs TCU at this time. Pt is willing to go to TCU if that is still is what is needing at discharge.  She states living in [place]. She was fine with any decent rated facilities in the area.      Writer sent TCU referrals this morning (pending).       Social Hx:  \"Pt lives at home with spouse. Ind at baseline. No svcs prior. No mobility aides. Spouse does not drive, but otherwise Ind. Family to transport. \"        Cm will continue to follow plan of care,review recommendations,and assist with any discharge needs anticipated.       Shara Pavon RN      "

## 2023-09-12 NOTE — PLAN OF CARE
[facility] - ICU    RN Progress Note:            Pertinent Assessments:      Please refer to flowsheet rows for full assessment     Standby assist with walker            Key Events - This Shift:   - downgraded to general medicine  - inserted new IV peripheral at the right arm  - as per vascular, patient can shower as long as we cover/wrap the affected site in the left leg. Assisted during shower and covered the left leg and groin.  - Report given to P4 Nurse Rainer.

## 2023-09-13 ENCOUNTER — APPOINTMENT (OUTPATIENT)
Dept: PHYSICAL THERAPY | Facility: HOSPITAL | Age: 68
DRG: 253 | End: 2023-09-13
Attending: SURGERY
Payer: COMMERCIAL

## 2023-09-13 LAB
GLUCOSE BLDC GLUCOMTR-MCNC: 149 MG/DL (ref 70–99)
GLUCOSE BLDC GLUCOMTR-MCNC: 184 MG/DL (ref 70–99)
GLUCOSE BLDC GLUCOMTR-MCNC: 224 MG/DL (ref 70–99)
GLUCOSE BLDC GLUCOMTR-MCNC: 245 MG/DL (ref 70–99)
GLUCOSE BLDC GLUCOMTR-MCNC: 262 MG/DL (ref 70–99)
MAGNESIUM SERPL-MCNC: 2.3 MG/DL (ref 1.7–2.3)
PHOSPHATE SERPL-MCNC: 3.9 MG/DL (ref 2.5–4.5)
POTASSIUM SERPL-SCNC: 4.5 MMOL/L (ref 3.4–5.3)

## 2023-09-13 PROCEDURE — 250N000013 HC RX MED GY IP 250 OP 250 PS 637: Performed by: SURGERY

## 2023-09-13 PROCEDURE — 99233 SBSQ HOSP IP/OBS HIGH 50: CPT | Performed by: INTERNAL MEDICINE

## 2023-09-13 PROCEDURE — 83735 ASSAY OF MAGNESIUM: CPT | Performed by: HOSPITALIST

## 2023-09-13 PROCEDURE — 84100 ASSAY OF PHOSPHORUS: CPT | Performed by: HOSPITALIST

## 2023-09-13 PROCEDURE — 84132 ASSAY OF SERUM POTASSIUM: CPT | Performed by: HOSPITALIST

## 2023-09-13 PROCEDURE — 250N000013 HC RX MED GY IP 250 OP 250 PS 637

## 2023-09-13 PROCEDURE — 36415 COLL VENOUS BLD VENIPUNCTURE: CPT | Performed by: HOSPITALIST

## 2023-09-13 PROCEDURE — 250N000013 HC RX MED GY IP 250 OP 250 PS 637: Performed by: PHYSICIAN ASSISTANT

## 2023-09-13 PROCEDURE — 97530 THERAPEUTIC ACTIVITIES: CPT | Mod: GP

## 2023-09-13 PROCEDURE — 120N000001 HC R&B MED SURG/OB

## 2023-09-13 PROCEDURE — 97110 THERAPEUTIC EXERCISES: CPT | Mod: GP

## 2023-09-13 PROCEDURE — 250N000013 HC RX MED GY IP 250 OP 250 PS 637: Performed by: INTERNAL MEDICINE

## 2023-09-13 RX ADMIN — GABAPENTIN 300 MG: 300 CAPSULE ORAL at 20:49

## 2023-09-13 RX ADMIN — SENNOSIDES AND DOCUSATE SODIUM 1 TABLET: 50; 8.6 TABLET ORAL at 08:27

## 2023-09-13 RX ADMIN — EZETIMIBE 10 MG: 10 TABLET ORAL at 08:33

## 2023-09-13 RX ADMIN — INSULIN ASPART 2 UNITS: 100 INJECTION, SOLUTION INTRAVENOUS; SUBCUTANEOUS at 12:48

## 2023-09-13 RX ADMIN — HYDROXYZINE HYDROCHLORIDE 25 MG: 25 TABLET, FILM COATED ORAL at 00:29

## 2023-09-13 RX ADMIN — HYDROMORPHONE HYDROCHLORIDE 4 MG: 4 TABLET ORAL at 20:49

## 2023-09-13 RX ADMIN — METHOCARBAMOL 500 MG: 500 TABLET ORAL at 20:50

## 2023-09-13 RX ADMIN — LISINOPRIL 40 MG: 20 TABLET ORAL at 08:28

## 2023-09-13 RX ADMIN — INSULIN ASPART 3 UNITS: 100 INJECTION, SOLUTION INTRAVENOUS; SUBCUTANEOUS at 17:37

## 2023-09-13 RX ADMIN — EMPAGLIFLOZIN 25 MG: 25 TABLET, FILM COATED ORAL at 08:33

## 2023-09-13 RX ADMIN — LIDOCAINE 1 PATCH: 4 PATCH TOPICAL at 08:26

## 2023-09-13 RX ADMIN — TRAMADOL HYDROCHLORIDE 50 MG: 50 TABLET, COATED ORAL at 06:35

## 2023-09-13 RX ADMIN — METHOCARBAMOL 500 MG: 500 TABLET ORAL at 16:41

## 2023-09-13 RX ADMIN — PANTOPRAZOLE SODIUM 40 MG: 40 TABLET, DELAYED RELEASE ORAL at 16:40

## 2023-09-13 RX ADMIN — HYDROCHLOROTHIAZIDE 25 MG: 25 TABLET ORAL at 08:28

## 2023-09-13 RX ADMIN — RIVAROXABAN 2.5 MG: 2.5 TABLET, FILM COATED ORAL at 16:41

## 2023-09-13 RX ADMIN — RIVAROXABAN 2.5 MG: 2.5 TABLET, FILM COATED ORAL at 08:33

## 2023-09-13 RX ADMIN — METHOCARBAMOL 500 MG: 500 TABLET ORAL at 12:46

## 2023-09-13 RX ADMIN — GABAPENTIN 300 MG: 300 CAPSULE ORAL at 13:18

## 2023-09-13 RX ADMIN — SENNOSIDES AND DOCUSATE SODIUM 1 TABLET: 50; 8.6 TABLET ORAL at 20:50

## 2023-09-13 RX ADMIN — SITAGLIPTIN 100 MG: 100 TABLET, FILM COATED ORAL at 08:28

## 2023-09-13 RX ADMIN — HYDROXYZINE HYDROCHLORIDE 50 MG: 50 TABLET, FILM COATED ORAL at 08:42

## 2023-09-13 RX ADMIN — INSULIN ASPART 1 UNITS: 100 INJECTION, SOLUTION INTRAVENOUS; SUBCUTANEOUS at 08:32

## 2023-09-13 RX ADMIN — AMLODIPINE BESYLATE 5 MG: 5 TABLET ORAL at 08:28

## 2023-09-13 RX ADMIN — PANTOPRAZOLE SODIUM 40 MG: 40 TABLET, DELAYED RELEASE ORAL at 06:35

## 2023-09-13 RX ADMIN — ACETAMINOPHEN 650 MG: 325 TABLET ORAL at 08:42

## 2023-09-13 RX ADMIN — GABAPENTIN 300 MG: 300 CAPSULE ORAL at 08:28

## 2023-09-13 RX ADMIN — METHOCARBAMOL 500 MG: 500 TABLET ORAL at 08:29

## 2023-09-13 RX ADMIN — TRAMADOL HYDROCHLORIDE 50 MG: 50 TABLET, COATED ORAL at 20:50

## 2023-09-13 RX ADMIN — ACETAMINOPHEN 650 MG: 325 TABLET ORAL at 00:28

## 2023-09-13 RX ADMIN — ACETAMINOPHEN 650 MG: 325 TABLET ORAL at 13:17

## 2023-09-13 RX ADMIN — CLOPIDOGREL BISULFATE 75 MG: 75 TABLET ORAL at 08:29

## 2023-09-13 ASSESSMENT — ACTIVITIES OF DAILY LIVING (ADL)
ADLS_ACUITY_SCORE: 39
DEPENDENT_IADLS:: INDEPENDENT
ADLS_ACUITY_SCORE: 39
ADLS_ACUITY_SCORE: 40
ADLS_ACUITY_SCORE: 39
ADLS_ACUITY_SCORE: 40
ADLS_ACUITY_SCORE: 39
ADLS_ACUITY_SCORE: 40

## 2023-09-13 NOTE — PLAN OF CARE
"  Problem: Plan of Care - These are the overarching goals to be used throughout the patient stay.    Goal: Plan of Care Review  Description: The Plan of Care Review/Shift note should be completed every shift.  The Outcome Evaluation is a brief statement about your assessment that the patient is improving, declining, or no change.  This information will be displayed automatically on your shift note.  Outcome: Progressing  Flowsheets (Taken 9/12/2023 1923)  Plan of Care Reviewed With: patient  Goal: Patient-Specific Goal (Individualized)  Description: You can add care plan individualizations to a care plan. Examples of Individualization might be:  \"Parent requests to be called daily at 9am for status\", \"I have a hard time hearing out of my right ear\", or \"Do not touch me to wake me up as it startles me\".  Outcome: Progressing  Goal: Absence of Hospital-Acquired Illness or Injury  Outcome: Progressing  Intervention: Identify and Manage Fall Risk  Recent Flowsheet Documentation  Taken 9/12/2023 1902 by Rainer Reveles RN  Safety Promotion/Fall Prevention: activity supervised  Intervention: Prevent Skin Injury  Recent Flowsheet Documentation  Taken 9/12/2023 1902 by Rainer Reveles RN  Body Position: position changed independently  Intervention: Prevent and Manage VTE (Venous Thromboembolism) Risk  Recent Flowsheet Documentation  Taken 9/12/2023 1902 by Rainer Reveles RN  VTE Prevention/Management: patient refused intervention  Goal: Optimal Comfort and Wellbeing  Outcome: Progressing  Intervention: Provide Person-Centered Care  Recent Flowsheet Documentation  Taken 9/12/2023 1902 by Rainer Reveles RN  Trust Relationship/Rapport:   care explained   emotional support provided   questions answered  Goal: Readiness for Transition of Care  Outcome: Progressing     Problem: Risk for Delirium  Goal: Improved Sleep  Outcome: Progressing     Problem: Fall Injury Risk  Goal: Absence of Fall and Fall-Related Injury  Outcome: " Progressing  Intervention: Identify and Manage Contributors  Recent Flowsheet Documentation  Taken 9/12/2023 1902 by Rainer Reveles, RN  Medication Review/Management: medications reviewed  Intervention: Promote Injury-Free Environment  Recent Flowsheet Documentation  Taken 9/12/2023 1902 by Rainer Reveles, RN  Safety Promotion/Fall Prevention: activity supervised   Goal Outcome Evaluation:      Plan of Care Reviewed With: patient

## 2023-09-13 NOTE — PROGRESS NOTES
"[facility]    Medicine Progress Note - Hospitalist Service    Date of Admission:  9/8/2023    Assessment & Plan   Ms. Aviles is a 68-year-old female with past medical history significant for PVD, hyperlipidemia, hypertension, tobacco abuse, GERD, and diabetes mellitus type 2 on long-term insulin who was admitted on 9/8/2023 after left femoral to tibial bypass grafting.     Severe PVD, limb threatening ischemia s/p post bypass surgery 9/8  -surgery managing: continue plavix, starting BID xarelto 2.5mg  -PT consulted: recommend TCU, [facility]    Benign essential hypertension  -well controlled  -Continue PTA hydrochlorothiazide, lisinopril, amlodipine    Diabetes type 2 on insulin  -continue Tresiba 15 units daily, moderate sliding scale insulin with meals, empagliflozin, sitagliptin  -Having highs in the 200s in the afternoon but wanting to keep insulin regimen the same, does not want to increase Tresiba dose  -Hypoglycemic protocol in place    NSVT- resolved  Tachycardia-resolved  -Had a 9 beat run of nonsustained V. tach on 9/9, asymptomatic  -Heart rate in the low 100s initially, now down into the 90s  -Continue cardiac monitoring    Hyperlipidemia  -Muscle pain to statins  -Continue PTA Zetia    GERD on PPI  -Pantoprazole 40 mg twice daily       Diet: Combination Diet Moderate Consistent Carb (60 g CHO per Meal) Diet; Low Saturated Fat Diet    DVT Prophylaxis: DOAC  Cedeno Catheter: Not present  Lines: None     Cardiac Monitoring: None  Code Status: Full Code      Clinically Significant Risk Factors                  # Hypertension: Noted on problem list       # DMII: A1C = 8.3 % (Ref range: 0.0 - 5.6 %) within past 6 months   # Overweight: Estimated body mass index is 29.58 kg/m  as calculated from the following:    Height as of this encounter: 1.549 m (5' 1\").    Weight as of this encounter: 71 kg (156 lb 8.4 oz).             Disposition Plan      Expected Discharge " Date: 09/14/2023      Destination: inpatient rehabilitation facility            Ronda Britton MD  Hospitalist Service  [facility]  Securely message with [employer] (more info)  Text page via [employer] Paging/Directory   ______________________________________________________________________    Interval History   Mrs. Aviles is doing well today.  She is having some numbness in the calf and some pain in that area as well.  She has been up and walking more with PT and OT.  She is now willing to go to TCU.  She has been accepted to [facility] and has been cleared for discharge per vascular so will likely discharge to TCU tomorrow.  Discussed with social work    Physical Exam   Vital Signs: Temp: 97.8  F (36.6  C) Temp src: Oral BP: 133/69 Pulse: 100   Resp: 18 SpO2: 99 % O2 Device: None (Room air)    Weight: 156 lbs 8.43 oz    General Appearance: Awake, alert, in no acute distress  Respiratory: CTAB, no wheeze  Cardiovascular: Mild tachycardia  GI: soft, nontender, non distended, normal bowel sounds  Skin: no jaundice, no rash      Medical Decision Making       45 MINUTES SPENT BY ME on the date of service doing chart review, history, exam, documentation & further activities per the note.      Data     I have personally reviewed the following data over the past 24 hrs:    N/A  \   N/A   / N/A     N/A N/A N/A /  262 (H)   4.5 N/A N/A \       Imaging results reviewed over the past 24 hrs:   No results found for this or any previous visit (from the past 24 hour(s)).

## 2023-09-13 NOTE — PROGRESS NOTES
"VASCULAR SURGERY PROGRESS NOTE    Subjective:  She is noticing some numbness in the lateral left ankle and pain in the posterior calf. She has had them since immediately after the surgery, but is noticing more now that she is trying to walk. Otherwise, no other complaints.    Objective:  Intake/Output Summary (Last 24 hours) at 9/11/2023 0847  Last data filed at 9/11/2023 0500  Gross per 24 hour   Intake 740 ml   Output 4100 ml   Net -3360 ml     PHYSICAL EXAM:  /64 (BP Location: Left arm)   Pulse 89   Temp 97.4  F (36.3  C) (Oral)   Resp 20   Ht 1.549 m (5' 1\")   Wt 71 kg (156 lb 8.4 oz)   SpO2 98%   BMI 29.58 kg/m    General: patient is alert and oriented, appropriate, no acute distress  Psych: pleasant affect, answers questions appropriately  Skin: color appropriate for race, warm, dry.  Respiratory: normal respiratory effort on room air  Vascular: mild left lower extremity edema, soft compartments, left PT signal present via doppler, left DP signal present, monophasic, doppler signal present over the course of graft area  Incision: left groin and left leg incisions clean, dry, and intact with tegaderm dressing  Neuro: moving ankles with normal strength, no foot drop    Imaging:   Pertinent imaging reviewed.     ASSESSMENT:  Ashanti Aviles is a 68 year old female who is s/p left femoral to tibioperoneal trunk bypass on 9/8/23 as part of staged revascularization with left iliofemoral endarterectomy with retrograde iliac stenting, done for CLTI with rest pain in the left foot.       PLAN:  Continue plavix  Continue 2.5 mg BID xarelto today  Advance activity as tolerated, PT/OT following -- her pain could be post-operative pain and maybe some nerve stretching? Her posterior thigh is soft and signals are stable, will continue to observe this with symptom control.  Pain control PRN  Floor status  Appreciate hospitalist  Dispo: TCU -- she is medically stable for discharge    D/w Dr. Geller      "

## 2023-09-13 NOTE — PLAN OF CARE
Problem: Pain Acute  Goal: Optimal Pain Control and Function  Outcome: Progressing  Intervention: Develop Pain Management Plan  Recent Flowsheet Documentation  Taken 9/13/2023 1243 by Saira Cunha RN  Pain Management Interventions: medication (see MAR)  Intervention: Prevent or Manage Pain  Recent Flowsheet Documentation  Taken 9/13/2023 1230 by Saira Cunha, RN  Medication Review/Management: medications reviewed   Pt alert and oriented. C/o pain to left calf when leg is down or putting pressure on leg. Site c/d/I warm to touch. Pulses present with use of doppler. Numbness to left foot new since procedure per pt. Received scheduled robaxin and prn acetaminophen for pain. Up to commode more comfortable for Pt. Continue to monitor and encourage ambulation.

## 2023-09-13 NOTE — PLAN OF CARE
Problem: Plan of Care - These are the overarching goals to be used throughout the patient stay.    Goal: Plan of Care Review  Description: The Plan of Care Review/Shift note should be completed every shift.  The Outcome Evaluation is a brief statement about your assessment that the patient is improving, declining, or no change.  This information will be displayed automatically on your shift note.  Outcome: Progressing     Problem: Risk for Delirium  Goal: Improved Sleep  Outcome: Progressing     Problem: Pain Acute  Goal: Optimal Pain Control and Function  Outcome: Progressing  Intervention: Develop Pain Management Plan  Recent Flowsheet Documentation  Taken 9/13/2023 0028 by Holli Ace, RN  Pain Management Interventions: medication (see MAR)  Intervention: Prevent or Manage Pain  Recent Flowsheet Documentation  Taken 9/13/2023 0019 by Holli Ace RN  Medication Review/Management: medications reviewed   Goal Outcome Evaluation:               Pt is alert and oriented x 4,left leg incision site intact,given PRN tylenol and hydroxyzine at 0028,Tramadol at 0635  for c/o LLE pain with relief,slept most of the night.

## 2023-09-13 NOTE — CONSULTS
Care Management Initial Consult    General Information  Assessment completed with: Patient,    Type of CM/SW Visit: Initial Assessment    Primary Care Provider verified and updated as needed: Yes   Readmission within the last 30 days: no previous admission in last 30 days      Reason for Consult: discharge planning  Advance Care Planning: Advance Care Planning Reviewed: no concerns identified          Communication Assessment  Patient's communication style: spoken language (English or Bilingual)             Cognitive  Cognitive/Neuro/Behavioral: WDL  Level of Consciousness: alert  Arousal Level: opens eyes spontaneously  Orientation: oriented x 4     Best Language: 0 - No aphasia  Speech: clear    Living Environment:   People in home: spouse     Current living Arrangements: house      Able to return to prior arrangements: yes       Family/Social Support:  Care provided by: self  Provides care for: no one  Marital Status:   , Children          Description of Support System: Supportive, Involved    Support Assessment: Adequate family and caregiver support    Current Resources:   Patient receiving home care services: No     Community Resources: None  Equipment currently used at home: none (has walker, doesn't use)  Supplies currently used at home: None    Employment/Financial:  Employment Status:          Financial Concerns:             Does the patient's insurance plan have a 3 day qualifying hospital stay waiver?  No    Lifestyle & Psychosocial Needs:  Social Determinants of Health     Tobacco Use: Medium Risk (9/8/2023)    Patient History     Smoking Tobacco Use: Former     Smokeless Tobacco Use: Never     Passive Exposure: Not on file   Alcohol Use: Not on file   Financial Resource Strain: Not on file   Food Insecurity: Not on file   Transportation Needs: Not on file   Physical Activity: Not on file   Stress: Not on file   Social Connections: Not on file   Intimate Partner Violence: Not on file    Depression: Not at risk (7/31/2023)    PHQ-2     PHQ-2 Score: 0   Housing Stability: Not on file       Functional Status:  Prior to admission patient needed assistance:   Dependent ADLs:: Independent  Dependent IADLs:: Independent         Additional Information:  CM met with patient in patient's room.  Introduced self and explained role.    Patient lives in a house with spouse, stairs to enter.  Independent with all I/ADLs, drives.  Has walker though does not use it.      Therapy recommendation is TCU.  [facility]    Daughter Zee is supportive and will transport patient to TCU is able.    CM will continue to monitor medical progression and aide in discharge planning as needed.      Griselda Vargas RN

## 2023-09-13 NOTE — PLAN OF CARE
Problem: Fall Injury Risk  Goal: Absence of Fall and Fall-Related Injury  Outcome: Progressing  Intervention: Identify and Manage Contributors  Recent Flowsheet Documentation  Taken 9/13/2023 0842 by Stephen Ly RN  Medication Review/Management: medications reviewed  Intervention: Promote Injury-Free Environment  Recent Flowsheet Documentation  Taken 9/13/2023 0842 by Stephen Ly RN  Safety Promotion/Fall Prevention: activity supervised     Problem: Pain Acute  Goal: Optimal Pain Control and Function  Outcome: Progressing  Intervention: Develop Pain Management Plan  Recent Flowsheet Documentation  Taken 9/13/2023 0842 by Stephen Ly RN  Pain Management Interventions: medication (see MAR)  Intervention: Prevent or Manage Pain  Recent Flowsheet Documentation  Taken 9/13/2023 0842 by Stephen Ly RN  Medication Review/Management: medications reviewed   Goal Outcome Evaluation:         Pt is frustrated with pain control in lle.  Pt states she has high levels of pain when attempting to bear weight on lle.  Pt was educated on prn pain medications.     Pt states pain is highest when leg is not supported.  Pt is using bedside commode with foot resting on bed.

## 2023-09-14 ENCOUNTER — APPOINTMENT (OUTPATIENT)
Dept: PHYSICAL THERAPY | Facility: HOSPITAL | Age: 68
DRG: 253 | End: 2023-09-14
Attending: SURGERY
Payer: COMMERCIAL

## 2023-09-14 LAB
ANION GAP SERPL CALCULATED.3IONS-SCNC: 14 MMOL/L (ref 7–15)
BUN SERPL-MCNC: 21.6 MG/DL (ref 8–23)
CALCIUM SERPL-MCNC: 9.4 MG/DL (ref 8.8–10.2)
CHLORIDE SERPL-SCNC: 96 MMOL/L (ref 98–107)
CREAT SERPL-MCNC: 1.24 MG/DL (ref 0.51–0.95)
DEPRECATED HCO3 PLAS-SCNC: 25 MMOL/L (ref 22–29)
EGFRCR SERPLBLD CKD-EPI 2021: 48 ML/MIN/1.73M2
ERYTHROCYTE [DISTWIDTH] IN BLOOD BY AUTOMATED COUNT: 14.2 % (ref 10–15)
GLUCOSE BLDC GLUCOMTR-MCNC: 175 MG/DL (ref 70–99)
GLUCOSE BLDC GLUCOMTR-MCNC: 176 MG/DL (ref 70–99)
GLUCOSE BLDC GLUCOMTR-MCNC: 210 MG/DL (ref 70–99)
GLUCOSE BLDC GLUCOMTR-MCNC: 249 MG/DL (ref 70–99)
GLUCOSE BLDC GLUCOMTR-MCNC: 257 MG/DL (ref 70–99)
GLUCOSE SERPL-MCNC: 221 MG/DL (ref 70–99)
HCT VFR BLD AUTO: 31.9 % (ref 35–47)
HGB BLD-MCNC: 10.4 G/DL (ref 11.7–15.7)
MAGNESIUM SERPL-MCNC: 2.3 MG/DL (ref 1.7–2.3)
MCH RBC QN AUTO: 29.4 PG (ref 26.5–33)
MCHC RBC AUTO-ENTMCNC: 32.6 G/DL (ref 31.5–36.5)
MCV RBC AUTO: 90 FL (ref 78–100)
PHOSPHATE SERPL-MCNC: 4.8 MG/DL (ref 2.5–4.5)
PLATELET # BLD AUTO: 380 10E3/UL (ref 150–450)
POTASSIUM SERPL-SCNC: 4.2 MMOL/L (ref 3.4–5.3)
POTASSIUM SERPL-SCNC: 4.3 MMOL/L (ref 3.4–5.3)
RBC # BLD AUTO: 3.54 10E6/UL (ref 3.8–5.2)
SODIUM SERPL-SCNC: 135 MMOL/L (ref 136–145)
WBC # BLD AUTO: 12 10E3/UL (ref 4–11)

## 2023-09-14 PROCEDURE — 84132 ASSAY OF SERUM POTASSIUM: CPT | Performed by: INTERNAL MEDICINE

## 2023-09-14 PROCEDURE — 250N000013 HC RX MED GY IP 250 OP 250 PS 637: Performed by: INTERNAL MEDICINE

## 2023-09-14 PROCEDURE — 120N000001 HC R&B MED SURG/OB

## 2023-09-14 PROCEDURE — 36415 COLL VENOUS BLD VENIPUNCTURE: CPT | Performed by: INTERNAL MEDICINE

## 2023-09-14 PROCEDURE — 83735 ASSAY OF MAGNESIUM: CPT | Performed by: INTERNAL MEDICINE

## 2023-09-14 PROCEDURE — 84100 ASSAY OF PHOSPHORUS: CPT | Performed by: INTERNAL MEDICINE

## 2023-09-14 PROCEDURE — 250N000013 HC RX MED GY IP 250 OP 250 PS 637: Performed by: SURGERY

## 2023-09-14 PROCEDURE — 250N000011 HC RX IP 250 OP 636: Mod: JZ | Performed by: SURGERY

## 2023-09-14 PROCEDURE — 250N000013 HC RX MED GY IP 250 OP 250 PS 637: Performed by: PHYSICIAN ASSISTANT

## 2023-09-14 PROCEDURE — 97530 THERAPEUTIC ACTIVITIES: CPT | Mod: GP | Performed by: PHYSICAL THERAPIST

## 2023-09-14 PROCEDURE — 85027 COMPLETE CBC AUTOMATED: CPT | Performed by: INTERNAL MEDICINE

## 2023-09-14 PROCEDURE — 258N000003 HC RX IP 258 OP 636: Performed by: INTERNAL MEDICINE

## 2023-09-14 PROCEDURE — 80048 BASIC METABOLIC PNL TOTAL CA: CPT | Performed by: INTERNAL MEDICINE

## 2023-09-14 PROCEDURE — 99233 SBSQ HOSP IP/OBS HIGH 50: CPT | Performed by: INTERNAL MEDICINE

## 2023-09-14 PROCEDURE — 250N000013 HC RX MED GY IP 250 OP 250 PS 637

## 2023-09-14 RX ORDER — ACETAMINOPHEN 325 MG/1
650 TABLET ORAL EVERY 4 HOURS PRN
Status: ON HOLD
Start: 2023-09-14 | End: 2024-02-15

## 2023-09-14 RX ORDER — METHOCARBAMOL 500 MG/1
500 TABLET, FILM COATED ORAL 4 TIMES DAILY
Qty: 90 TABLET | Refills: 0 | Status: ON HOLD | OUTPATIENT
Start: 2023-09-14 | End: 2024-01-12

## 2023-09-14 RX ORDER — SODIUM CHLORIDE 9 MG/ML
INJECTION, SOLUTION INTRAVENOUS CONTINUOUS
Status: DISCONTINUED | OUTPATIENT
Start: 2023-09-14 | End: 2023-09-15

## 2023-09-14 RX ORDER — LIDOCAINE 4 G/G
1 PATCH TOPICAL EVERY 24 HOURS
Start: 2023-09-14 | End: 2023-10-06

## 2023-09-14 RX ORDER — METFORMIN HCL 500 MG
1000 TABLET, EXTENDED RELEASE 24 HR ORAL DAILY
Status: DISCONTINUED | OUTPATIENT
Start: 2023-09-14 | End: 2023-09-15 | Stop reason: HOSPADM

## 2023-09-14 RX ORDER — TRAMADOL HYDROCHLORIDE 50 MG/1
50 TABLET ORAL EVERY 6 HOURS PRN
Qty: 20 TABLET | Refills: 0 | Status: SHIPPED | OUTPATIENT
Start: 2023-09-14 | End: 2023-09-20

## 2023-09-14 RX ADMIN — HYDROCHLOROTHIAZIDE 25 MG: 25 TABLET ORAL at 08:54

## 2023-09-14 RX ADMIN — RIVAROXABAN 2.5 MG: 2.5 TABLET, FILM COATED ORAL at 16:45

## 2023-09-14 RX ADMIN — METHOCARBAMOL 500 MG: 500 TABLET ORAL at 12:50

## 2023-09-14 RX ADMIN — ACETAMINOPHEN 650 MG: 325 TABLET ORAL at 08:54

## 2023-09-14 RX ADMIN — PANTOPRAZOLE SODIUM 40 MG: 40 TABLET, DELAYED RELEASE ORAL at 16:45

## 2023-09-14 RX ADMIN — METHOCARBAMOL 500 MG: 500 TABLET ORAL at 20:58

## 2023-09-14 RX ADMIN — TRAMADOL HYDROCHLORIDE 50 MG: 50 TABLET, COATED ORAL at 22:28

## 2023-09-14 RX ADMIN — CLOPIDOGREL BISULFATE 75 MG: 75 TABLET ORAL at 08:55

## 2023-09-14 RX ADMIN — HYDROMORPHONE HYDROCHLORIDE 0.4 MG: 0.2 INJECTION, SOLUTION INTRAMUSCULAR; INTRAVENOUS; SUBCUTANEOUS at 01:10

## 2023-09-14 RX ADMIN — METHOCARBAMOL 500 MG: 500 TABLET ORAL at 08:55

## 2023-09-14 RX ADMIN — METFORMIN ER 500 MG 1000 MG: 500 TABLET ORAL at 12:50

## 2023-09-14 RX ADMIN — INSULIN ASPART 1 UNITS: 100 INJECTION, SOLUTION INTRAVENOUS; SUBCUTANEOUS at 08:48

## 2023-09-14 RX ADMIN — INSULIN ASPART 3 UNITS: 100 INJECTION, SOLUTION INTRAVENOUS; SUBCUTANEOUS at 12:47

## 2023-09-14 RX ADMIN — SODIUM CHLORIDE: 9 INJECTION, SOLUTION INTRAVENOUS at 11:13

## 2023-09-14 RX ADMIN — ACETAMINOPHEN 650 MG: 325 TABLET ORAL at 20:57

## 2023-09-14 RX ADMIN — SITAGLIPTIN 100 MG: 100 TABLET, FILM COATED ORAL at 08:54

## 2023-09-14 RX ADMIN — POLYETHYLENE GLYCOL 3350 17 G: 17 POWDER, FOR SOLUTION ORAL at 08:54

## 2023-09-14 RX ADMIN — EMPAGLIFLOZIN 25 MG: 25 TABLET, FILM COATED ORAL at 08:55

## 2023-09-14 RX ADMIN — RIVAROXABAN 2.5 MG: 2.5 TABLET, FILM COATED ORAL at 08:54

## 2023-09-14 RX ADMIN — ACETAMINOPHEN 650 MG: 325 TABLET ORAL at 16:45

## 2023-09-14 RX ADMIN — METHOCARBAMOL 500 MG: 500 TABLET ORAL at 16:45

## 2023-09-14 RX ADMIN — SODIUM CHLORIDE: 9 INJECTION, SOLUTION INTRAVENOUS at 22:31

## 2023-09-14 RX ADMIN — SENNOSIDES AND DOCUSATE SODIUM 1 TABLET: 50; 8.6 TABLET ORAL at 08:54

## 2023-09-14 RX ADMIN — GABAPENTIN 300 MG: 300 CAPSULE ORAL at 08:55

## 2023-09-14 RX ADMIN — PANTOPRAZOLE SODIUM 40 MG: 40 TABLET, DELAYED RELEASE ORAL at 08:56

## 2023-09-14 RX ADMIN — EZETIMIBE 10 MG: 10 TABLET ORAL at 08:55

## 2023-09-14 RX ADMIN — LIDOCAINE 1 PATCH: 4 PATCH TOPICAL at 08:51

## 2023-09-14 RX ADMIN — SENNOSIDES AND DOCUSATE SODIUM 1 TABLET: 50; 8.6 TABLET ORAL at 20:58

## 2023-09-14 RX ADMIN — AMLODIPINE BESYLATE 5 MG: 5 TABLET ORAL at 08:56

## 2023-09-14 RX ADMIN — LISINOPRIL 40 MG: 20 TABLET ORAL at 08:55

## 2023-09-14 ASSESSMENT — ACTIVITIES OF DAILY LIVING (ADL)
ADLS_ACUITY_SCORE: 40
WEAR_GLASSES_OR_BLIND: YES
ADLS_ACUITY_SCORE: 40
ADLS_ACUITY_SCORE: 40
FALL_HISTORY_WITHIN_LAST_SIX_MONTHS: NO
ADLS_ACUITY_SCORE: 38
VISION_MANAGEMENT: GLASSES
DRESSING/BATHING_DIFFICULTY: NO
DIFFICULTY_EATING/SWALLOWING: NO
WALKING_OR_CLIMBING_STAIRS_DIFFICULTY: NO
DOING_ERRANDS_INDEPENDENTLY_DIFFICULTY: NO
ADLS_ACUITY_SCORE: 38
ADLS_ACUITY_SCORE: 40
ADLS_ACUITY_SCORE: 40
CONCENTRATING,_REMEMBERING_OR_MAKING_DECISIONS_DIFFICULTY: NO
TOILETING_ISSUES: NO
ADLS_ACUITY_SCORE: 23
ADLS_ACUITY_SCORE: 38
ADLS_ACUITY_SCORE: 40
ADLS_ACUITY_SCORE: 23
ADLS_ACUITY_SCORE: 23
CHANGE_IN_FUNCTIONAL_STATUS_SINCE_ONSET_OF_CURRENT_ILLNESS/INJURY: YES

## 2023-09-14 NOTE — PROGRESS NOTES
"[facility]    Medicine Progress Note - Hospitalist Service    Date of Admission:  9/8/2023    Assessment & Plan     68-year-old female with history of PVD, hypertension, hyperlipidemia, GERD, DM2, tobacco use who presents 9/8/2023 after left femoral to tibial bypass graft.      MYRON:  Noted to have MYRON a.m. 9/14/2023  -- Hold discharge  -- Start IV hydration  -- Hold home hydrochlorothiazide and lisinopril  -- check Hgb to ensure stability   -- Recheck GFR in the a.m.      PVD: s/p left femoral to tibial bypass graft. 9/8/23  -- continue plavix and xarelto  -- Plan TCU in AM pending improved GFR      DM2:glucoses poorly controlled  -- start mealtime aspart 5U TID  -- increase intensity sliding scale insulin  -- resume home metformin  -- continue home Jardiance, Tresiba,      HTN: continue amlodipine, holding home hydrochlorothiazide/Lisinopril as above      9 beat run NSVT: resolved, asymptomatic  -- continue to monitor  -- start betablocker if any recurrence    HLP: continue zetia      GERD: continue PPI           Diet: Combination Diet Moderate Consistent Carb (60 g CHO per Meal) Diet; Low Saturated Fat Diet  Diet    DVT Prophylaxis: DOAC  Cedeno Catheter: Not present  Lines: None     Cardiac Monitoring: None  Code Status: Full Code      Clinically Significant Risk Factors                 # Acute Kidney Injury, unspecified: based on a >150% or 0.3 mg/dL increase in last creatinine compared to past 90 day average, will monitor renal function  # Hypertension: Noted on problem list       # DMII: A1C = 8.3 % (Ref range: 0.0 - 5.6 %) within past 6 months   # Overweight: Estimated body mass index is 29.58 kg/m  as calculated from the following:    Height as of this encounter: 1.549 m (5' 1\").    Weight as of this encounter: 71 kg (156 lb 8.4 oz).             Disposition Plan      Expected Discharge Date: 09/14/2023, 12:30 PM  Discharge Delays: *Medically Ready for Discharge  Destination: inpatient " rehabilitation facility            Prabhakar Arriaga DO  Hospitalist Service  [facility]  Securely message with [employer] (more info)  Text page via [employer] Paging/Directory   ______________________________________________________________________    Interval History   NAD. Denies any nausea, vomiting, abdominal pain, chest pain, SOB, new swelling, fevers, chills, confusion or headache.     Physical Exam   Vital Signs: Temp: 97.2  F (36.2  C) Temp src: Oral BP: 128/67 Pulse: 103   Resp: 20 SpO2: 100 % O2 Device: None (Room air)    Weight: 156 lbs 8.43 oz  General: NAD  RESPIRATORY: Breathing nonlabored  CARDIOVASCULAR: No le edema bilat.   NEUROLOGIC: Alert, speech clear         Medical Decision Making       >50 MINUTES SPENT BY ME on the date of service doing chart review, history, exam, documentation & further activities per the note.      Data

## 2023-09-14 NOTE — PLAN OF CARE
Problem: Pain Acute  Goal: Optimal Pain Control and Function  Outcome: Progressing  Intervention: Prevent or Manage Pain  Recent Flowsheet Documentation  Taken 9/14/2023 0100 by Julia Lundberg RN  Medication Review/Management: medications reviewed     Problem: Plan of Care - These are the overarching goals to be used throughout the patient stay.    Goal: Optimal Comfort and Wellbeing  Outcome: Progressing  Intervention: Provide Person-Centered Care  Recent Flowsheet Documentation  Taken 9/14/2023 0100 by Julia Lundberg RN  Trust Relationship/Rapport: care explained   Goal Outcome Evaluation:       Pt is A/O X4, denied and numbness or tingling, surgical dressing sites are clean and intact, pain at surgical site controlled with prn IV dilaudid, vitals are stable, using bedside commode, K, mg, phos protocols.

## 2023-09-14 NOTE — DISCHARGE SUMMARY
"Vascular Surgery Discharge Summary    NAME: Ashanti Aviles   MRN: [id number]   : 1955     DATE OF ADMISSION:   2023     PRE/POSTOPERATIVE DIAGNOSES:    Chronic limb threatening ischemia, left lower extremity     PROCEDURES PERFORMED:  Left femoral to tibioperoneal trunk bypass using spliced 4mm and 5 mm Artegraft graft  Redo exposure of the left common femoral artery    INTRAOPERATIVE FINDINGS:  There was good Doppler signal in the foot at the posterior tibial artery level and a good augmentation of the signal with compressing and releasing the vein graft.     POSTOPERATIVE COMPLICATIONS:   None    DATE OF DISCHARGE:   9/15/2023    HOSPITAL COURSE:   Ashanti Aviles is a 68 year old female who on 2023 underwent the above-named procedures. She tolerated the procedure well and postoperatively was transferred to the intensive care unit. She had acute blood loss anemia, hemoglobin remained stable following transfusion. The remainder of her course was essentiallly uncomplicated.  Prior to discharge, her pain was controlled well with robaxin and tramadol.  She had full return of bowel and bladder function.  Pt had some Cr increase on  which resolved on 9/15 with fluids. On 9/15/2023, she was discharged to TCU per therapy recommendations in stable condition.    DISCHARGE INSTRUCTIONS:  See AVS    FOLLOW UP APPOINTMENTS:   OCT    9 Post Op with Dyan Geller MD  Monday Oct 9, 2023 8:25 AM  Please plan to arrive at the clinic at your \"Arrive By\" time for your appointment. Our late policy will be enforced based on the appointment start time. Our address is: [address] [place], [place] [id number]       DISCHARGE MEDICATIONS:     Review of your medicines        START taking        Dose / Directions   acetaminophen 325 MG tablet  Commonly known as: TYLENOL      Dose: 650 mg  Take 2 tablets (650 mg) by mouth every 4 hours as needed for mild pain  Refills: 0     * insulin aspart 100 " UNIT/ML pen  Commonly known as: NovoLOG PEN  Used for: Type 2 diabetes mellitus with diabetic peripheral angiopathy and gangrene, with long-term current use of insulin (H)      Dose: 1-7 Units  Inject 1-7 Units Subcutaneous 3 times daily (before meals)  Quantity: 15 mL  Refills: 0     * insulin aspart 100 UNIT/ML pen  Commonly known as: NovoLOG PEN  Used for: Type 2 diabetes mellitus with diabetic peripheral angiopathy and gangrene, with long-term current use of insulin (H)      Dose: 1-5 Units  Inject 1-5 Units Subcutaneous At Bedtime  Quantity: 15 mL  Refills: 0     Lidocaine 4 % Patch  Commonly known as: LIDOCARE  Used for: PAD (peripheral artery disease) (H)      Dose: 1 patch  Place 1 patch onto the skin every 24 hours To prevent lidocaine toxicity, patient should be patch free for 12 hrs daily.Apply patch(s) to affected area. To prevent lidocaine toxicity, patient should be patch free for 12 hrs daily. Patches may be cut to smaller size prior to removing release liner. Reminder: Remove previous patch before applying new patch.  NEVER APPLY HEAT OVER PATCH which increases absorption and may lead to local anesthetic toxicity. Do not apply over area where liposomal bupivacaine was injected for 96 hours post injection.  Refills: 0     methocarbamol 500 MG tablet  Commonly known as: ROBAXIN  Used for: PAD (peripheral artery disease) (H)      Dose: 500 mg  Take 1 tablet (500 mg) by mouth 4 times daily  Quantity: 90 tablet  Refills: 0     rivaroxaban ANTICOAGULANT 2.5 MG Tabs tablet  Commonly known as: XARELTO  Indication: PAD s/p lower extremity bypass  Used for: PAD (peripheral artery disease) (H)      Dose: 2.5 mg  Take 1 tablet (2.5 mg) by mouth 2 times daily (with meals)  Refills: 0     traMADol 50 MG tablet  Commonly known as: ULTRAM  Used for: PAD (peripheral artery disease) (H)      Dose: 50 mg  Take 1 tablet (50 mg) by mouth every 6 hours as needed for moderate pain (Please give as adjunct to opiate pain  meds)  Quantity: 20 tablet  Refills: 0           * This list has 2 medication(s) that are the same as other medications prescribed for you. Read the directions carefully, and ask your doctor or other care provider to review them with you.                CONTINUE these medicines which have NOT CHANGED        Dose / Directions   amLODIPine 10 MG tablet  Commonly known as: NORVASC      Dose: 5 mg  Take 5 mg by mouth daily  Refills: 0     clopidogrel 75 MG tablet  Commonly known as: PLAVIX      Dose: 75 mg  Take 75 mg by mouth daily  Refills: 0     empagliflozin 25 MG Tabs tablet  Commonly known as: JARDIANCE  Used for: Type 2 diabetes mellitus with diabetic nephropathy, unspecified whether long term insulin use (H)      Dose: 25 mg  Take 1 tablet (25 mg) by mouth daily  Quantity: 90 tablet  Refills: 1     ezetimibe 10 MG tablet  Commonly known as: ZETIA      Dose: 10 mg  Take 10 mg by mouth daily  Refills: 0     FreeStyle Leif 14 Day Sensor Misc      Refills: 0     hydrochlorothiazide 25 MG tablet  Commonly known as: HYDRODIURIL      Dose: 25 mg  [HYDROCHLOROTHIAZIDE (HYDRODIURIL) 25 MG TABLET] Take 25 mg by mouth daily.  Refills: 0     lisinopril 40 MG tablet  Commonly known as: ZESTRIL      Dose: 40 mg  [LISINOPRIL (PRINIVIL,ZESTRIL) 40 MG TABLET] Take 40 mg by mouth daily.  Refills: 0     metFORMIN 500 MG 24 hr tablet  Commonly known as: GLUCOPHAGE XR      Dose: 1,000 mg  Take 1,000 mg by mouth daily  Refills: 0     omeprazole 20 MG DR capsule  Commonly known as: PriLOSEC      Dose: 20 mg  Take 20 mg by mouth daily as needed  Refills: 0     Tradjenta 5 MG Tabs tablet  Generic drug: linagliptin      Dose: 5 mg  Take 5 mg by mouth daily  Refills: 0     Tresiba 100 UNIT/ML Soln  Generic drug: Insulin Degludec      Dose: 30 Units  Inject 30 Units Subcutaneous daily  Refills: 0               Where to get your medicines        These medications were sent to [facility] [place] [address]  [address] [place] [id number]      Phone: [phone]   methocarbamol 500 MG tablet  traMADol 50 MG tablet       Tammy Ramirez PA-C  VASCULAR SURGERY     Ronny - addended to reflect the actual date of discharge.

## 2023-09-14 NOTE — PROGRESS NOTES
Care Management Discharge Note    Discharge Canceled.     Discharge Date: 09/14/2023       Discharge Disposition: Transitional Care    Discharge Services: None    Discharge DME: None    Discharge Transportation: family or friend will provide (daughter Zee)    Private pay costs discussed: Not applicable    Does the patient's insurance plan have a 3 day qualifying hospital stay waiver?  No    PAS Confirmation Code: [id number]  Patient/family educated on Medicare website which has current facility and service quality ratings:  yes    Education Provided on the Discharge Plan: Yes  Persons Notified of Discharge Plans: Patient, RN, [facility], Facility   Patient/Family in Agreement with the Plan: yes    Handoff Referral Completed: Yes    Additional Information:  Patient discharging today to [facility]'s TCU.  CM confirmed with TCU admissions.    CM on hold with [facility] to see if they'd be able to transport patient today.   + [facility] not eligible for Ride [facility].     Daughter Zee will transport, likely wont be able to until 4:30, patient calling to check to see if someone could bring her earlier.   + Patient's daughter picking patient up at 1230.     CM requested discharge orders from vascular by 1100.  Orders place,  CM sent over orders    UPDATE:   RN informed CM discharge canceled by Hospitalist, and Patient calling daughter to cancel ride.   CM informed TCU.    Reassess medical readiness tomorrow.       Griselda Vargas RN

## 2023-09-14 NOTE — PLAN OF CARE
Pt alert and oriented. Left leg and groin incisions c/d/I. Warm to the touch. Numbness to foot, not new. Pulses present with doppler. Pain to leg with weight baring and lowering leg. Given prn tylenol and scheduled robaxin.   Pt not discharging today. Creatinine elevated. Fluids started per order. Continue to monitor.

## 2023-09-14 NOTE — PROGRESS NOTES
"VASCULAR SURGERY PROGRESS NOTE    Subjective:  Patient was seen and evaluated at the bedside for surgical follow-up. Continues to have numbness in the lateral left ankle and pain at her distal incision site.  It is neither worsening nor improving.  Has been working with physical therapy, not yet able to bear weight on her left leg.  No events overnight or other new concerns    Objective:  Intake/Output Summary (Last 24 hours) at 9/14/2023 0913  Last data filed at 9/14/2023 0642  Gross per 24 hour   Intake 780 ml   Output --   Net 780 ml     PHYSICAL EXAM:  /67 (BP Location: Right arm)   Pulse 103   Temp 97.2  F (36.2  C) (Oral)   Resp 20   Ht 1.549 m (5' 1\")   Wt 71 kg (156 lb 8.4 oz)   SpO2 100%   BMI 29.58 kg/m    General: patient is alert and oriented, appropriate, no acute distress  Psych: pleasant affect, answers questions appropriately  Skin: color appropriate for race, warm, dry.  Respiratory: normal respiratory effort on room air  Vascular: mild left lower extremity edema, soft compartments, left PT signal present via doppler, left DP signal present, monophasic, doppler signal present over the course of graft area  Incision: left groin and left leg incisions clean, dry, and intact with tegaderm dressing  Neuro: moving ankles with normal strength, no foot drop      Imaging:   Pertinent imaging reviewed.      ASSESSMENT:  Ashanti Aviles is a 68 year old female who is s/p left femoral to tibioperoneal trunk bypass on 9/8/23 as part of staged revascularization with left iliofemoral endarterectomy with retrograde iliac stenting, done for CLTI with rest pain in the left foot.     PLAN:  Continue plavix  Continue 2.5 mg BID xarelto   Advance activity as tolerated, PT/OT following   Pain control PRN  Appreciate hospitalist  Discharge to TCU, stable from surgery standpoint     Tammy Ramirez PA-C  VASCULAR SURGERY                   "

## 2023-09-15 ENCOUNTER — LAB REQUISITION (OUTPATIENT)
Dept: LAB | Facility: CLINIC | Age: 68
End: 2023-09-15
Payer: COMMERCIAL

## 2023-09-15 VITALS
RESPIRATION RATE: 16 BRPM | HEART RATE: 85 BPM | SYSTOLIC BLOOD PRESSURE: 110 MMHG | WEIGHT: 156.53 LBS | DIASTOLIC BLOOD PRESSURE: 55 MMHG | OXYGEN SATURATION: 99 % | TEMPERATURE: 98 F | BODY MASS INDEX: 29.55 KG/M2 | HEIGHT: 61 IN

## 2023-09-15 DIAGNOSIS — Z11.1 ENCOUNTER FOR SCREENING FOR RESPIRATORY TUBERCULOSIS: ICD-10-CM

## 2023-09-15 LAB
ANION GAP SERPL CALCULATED.3IONS-SCNC: 10 MMOL/L (ref 7–15)
BUN SERPL-MCNC: 16.1 MG/DL (ref 8–23)
CALCIUM SERPL-MCNC: 9.1 MG/DL (ref 8.8–10.2)
CHLORIDE SERPL-SCNC: 99 MMOL/L (ref 98–107)
CREAT SERPL-MCNC: 0.79 MG/DL (ref 0.51–0.95)
DEPRECATED HCO3 PLAS-SCNC: 26 MMOL/L (ref 22–29)
EGFRCR SERPLBLD CKD-EPI 2021: 82 ML/MIN/1.73M2
GLUCOSE BLDC GLUCOMTR-MCNC: 146 MG/DL (ref 70–99)
GLUCOSE BLDC GLUCOMTR-MCNC: 171 MG/DL (ref 70–99)
GLUCOSE SERPL-MCNC: 152 MG/DL (ref 70–99)
HOLD SPECIMEN: NORMAL
MAGNESIUM SERPL-MCNC: 2.2 MG/DL (ref 1.7–2.3)
PHOSPHATE SERPL-MCNC: 3.6 MG/DL (ref 2.5–4.5)
POTASSIUM SERPL-SCNC: 3.9 MMOL/L (ref 3.4–5.3)
SODIUM SERPL-SCNC: 135 MMOL/L (ref 136–145)

## 2023-09-15 PROCEDURE — 250N000013 HC RX MED GY IP 250 OP 250 PS 637: Performed by: INTERNAL MEDICINE

## 2023-09-15 PROCEDURE — 82310 ASSAY OF CALCIUM: CPT | Performed by: INTERNAL MEDICINE

## 2023-09-15 PROCEDURE — 250N000013 HC RX MED GY IP 250 OP 250 PS 637: Performed by: SURGERY

## 2023-09-15 PROCEDURE — 250N000013 HC RX MED GY IP 250 OP 250 PS 637

## 2023-09-15 PROCEDURE — 84100 ASSAY OF PHOSPHORUS: CPT | Performed by: SURGERY

## 2023-09-15 PROCEDURE — 36415 COLL VENOUS BLD VENIPUNCTURE: CPT | Performed by: SURGERY

## 2023-09-15 PROCEDURE — 83735 ASSAY OF MAGNESIUM: CPT | Performed by: SURGERY

## 2023-09-15 PROCEDURE — 250N000013 HC RX MED GY IP 250 OP 250 PS 637: Performed by: PHYSICIAN ASSISTANT

## 2023-09-15 PROCEDURE — 99232 SBSQ HOSP IP/OBS MODERATE 35: CPT | Performed by: INTERNAL MEDICINE

## 2023-09-15 RX ADMIN — EZETIMIBE 10 MG: 10 TABLET ORAL at 08:36

## 2023-09-15 RX ADMIN — HYDROMORPHONE HYDROCHLORIDE 2 MG: 2 TABLET ORAL at 09:48

## 2023-09-15 RX ADMIN — ACETAMINOPHEN 650 MG: 325 TABLET ORAL at 09:48

## 2023-09-15 RX ADMIN — ACETAMINOPHEN 650 MG: 325 TABLET ORAL at 05:43

## 2023-09-15 RX ADMIN — PANTOPRAZOLE SODIUM 40 MG: 40 TABLET, DELAYED RELEASE ORAL at 05:44

## 2023-09-15 RX ADMIN — HYDROXYZINE HYDROCHLORIDE 50 MG: 50 TABLET, FILM COATED ORAL at 00:44

## 2023-09-15 RX ADMIN — RIVAROXABAN 2.5 MG: 2.5 TABLET, FILM COATED ORAL at 08:33

## 2023-09-15 RX ADMIN — CYCLOBENZAPRINE 10 MG: 10 TABLET, FILM COATED ORAL at 01:46

## 2023-09-15 RX ADMIN — METFORMIN ER 500 MG 1000 MG: 500 TABLET ORAL at 08:31

## 2023-09-15 RX ADMIN — METHOCARBAMOL 500 MG: 500 TABLET ORAL at 08:30

## 2023-09-15 RX ADMIN — CLOPIDOGREL BISULFATE 75 MG: 75 TABLET ORAL at 08:30

## 2023-09-15 RX ADMIN — EMPAGLIFLOZIN 25 MG: 25 TABLET, FILM COATED ORAL at 08:33

## 2023-09-15 RX ADMIN — HYDROMORPHONE HYDROCHLORIDE 2 MG: 2 TABLET ORAL at 05:43

## 2023-09-15 RX ADMIN — AMLODIPINE BESYLATE 5 MG: 5 TABLET ORAL at 08:30

## 2023-09-15 RX ADMIN — ACETAMINOPHEN 650 MG: 325 TABLET ORAL at 00:44

## 2023-09-15 ASSESSMENT — ACTIVITIES OF DAILY LIVING (ADL)
ADLS_ACUITY_SCORE: 23
ADLS_ACUITY_SCORE: 21

## 2023-09-15 NOTE — PLAN OF CARE
Physical Therapy Discharge Summary    Reason for therapy discharge:    Discharged to transitional care facility.    Progress towards therapy goal(s). See goals on Care Plan in [facility] electronic health record for goal details.  Goals partially met.  Barriers to achieving goals:   discharge from facility.    Therapy recommendation(s):    Continued therapy is recommended.  Rationale/Recommendations:  recommend continued PT at TCU.    Trinidad Anne, PT  9/15/2023

## 2023-09-15 NOTE — PROGRESS NOTES
Care Management Discharge Note      Discharge Date: 09/15/2023       Discharge Disposition: Transitional Care    Discharge Services: None    Discharge DME: None    Discharge Transportation: family or friend will provide (daughter Zee)    Private pay costs discussed: Not applicable    Does the patient's insurance plan have a 3 day qualifying hospital stay waiver?  No    PAS Confirmation Code: [id number]  Patient/family educated on Medicare website which has current facility and service quality ratings:  yes    Education Provided on the Discharge Plan: Yes  Persons Notified of Discharge Plans: Patient, RN, [facility], Facility   Patient/Family in Agreement with the Plan: yes    Handoff Referral Completed: Yes    Additional Information:  Patient discharging today to [facility]'s TCU.  CM confirmed with TCU admissions.  CM faxed updated discharge paperwork to TCU.    CM met with patient.  Patients bony Koch will transport, picking patient up around 1200.   CM informed facility, RN, charge RN.    Griselda Vargas RN

## 2023-09-15 NOTE — PLAN OF CARE
Patient alert and oriented x4. Pain in left leg, at calf incision site. Used Doppler for pulse. Numbness and tingling to LLE, states this started after surgery. Pain with bearing weight on left extremity. Pain controlled with PRN tylenol x2 and tramadol x1, scheduled robaxin. Hopped with walker to bathroom with Ax1. NS running at 75mL/hr.   Problem: Plan of Care - These are the overarching goals to be used throughout the patient stay.    Goal: Plan of Care Review  Description: The Plan of Care Review/Shift note should be completed every shift.  The Outcome Evaluation is a brief statement about your assessment that the patient is improving, declining, or no change.  This information will be displayed automatically on your shift note.  Outcome: Progressing     Problem: Fall Injury Risk  Goal: Absence of Fall and Fall-Related Injury  Outcome: Progressing  Intervention: Identify and Manage Contributors  Recent Flowsheet Documentation  Taken 9/14/2023 1600 by Trinidad Hagen RN  Medication Review/Management: medications reviewed  Intervention: Promote Injury-Free Environment  Recent Flowsheet Documentation  Taken 9/14/2023 1600 by Trinidad Hagen RN  Safety Promotion/Fall Prevention:   assistive device/personal items within reach   nonskid shoes/slippers when out of bed     Problem: Pain Acute  Goal: Optimal Pain Control and Function  Outcome: Progressing  Intervention: Develop Pain Management Plan  Recent Flowsheet Documentation  Taken 9/14/2023 2227 by Trinidad Hagen RN  Pain Management Interventions: medication (see MAR)  Taken 9/14/2023 2057 by Trinidad Hagen RN  Pain Management Interventions: medication (see MAR)  Taken 9/14/2023 1522 by Trinidad Hagen RN  Pain Management Interventions: medication (see MAR)  Intervention: Prevent or Manage Pain  Recent Flowsheet Documentation  Taken 9/14/2023 1600 by Trinidad Hagen RN  Medication Review/Management: medications reviewed     Problem: Lower  Extremity Revascularization  Goal: Optimal Pain Control and Function  Outcome: Progressing  Intervention: Prevent or Manage Pain  Recent Flowsheet Documentation  Taken 9/14/2023 2227 by Trinidad Hagen, RN  Pain Management Interventions: medication (see MAR)  Taken 9/14/2023 2057 by Trinidad Hagen, RN  Pain Management Interventions: medication (see MAR)  Taken 9/14/2023 1522 by Trinidad aHgen, RN  Pain Management Interventions: medication (see MAR)   Goal Outcome Evaluation:

## 2023-09-15 NOTE — PROGRESS NOTES
"[facility]    Medicine Progress Note - Hospitalist Service    Date of Admission:  9/8/2023    Assessment & Plan     68-year-old female with history of PVD, hypertension, hyperlipidemia, GERD, DM2, tobacco use who presents 9/8/2023 after left femoral to tibial bypass graft.  [facility] [place] [place] consulted to medical management.   Currently stable for TCU discharge      MYRON:  Noted to have MYRON a.m. 9/14/2023. Etiology likely relative hypotension in setting of hydrochlorothiazide and lisinopril.   MYRON resolved with IVF and holding above medications  -- stable for discharge  -- Stopped IV hydration  -- Hold home hydrochlorothiazide and lisinopril after discharge  -- recheck GFR in 3-5 days at TCU and consider starting hydrochlorothiazide and/or lisniopril if hypertensive and GFR stable      PVD: s/p left femoral to tibial bypass graft. 9/8/23  -- continue plavix and xarelto  -- outpatient follow up per vascular      DM2:glucoses poorly controlled  -- started mealtime aspart 5U TID and will continue after discharge  -- continue sliding scale insulin after discharge  -- continue home metformin, Jardiance, Tresiba,      HTN: continue amlodipine, holding home hydrochlorothiazide/Lisinopril as above      9 beat run NSVT: resolved, asymptomatic  -- start betablocker if any recurrence      HLP: continue zetia      GERD: continue PPI           Diet: Combination Diet Moderate Consistent Carb (60 g CHO per Meal) Diet; Low Saturated Fat Diet  Diet    DVT Prophylaxis: DOAC  Cedeno Catheter: Not present  Lines: None     Cardiac Monitoring: None  Code Status: Full Code      Clinically Significant Risk Factors                  # Hypertension: Noted on problem list       # DMII: A1C = 8.3 % (Ref range: 0.0 - 5.6 %) within past 6 months   # Overweight: Estimated body mass index is 29.58 kg/m  as calculated from the following:    Height as of this encounter: 1.549 m (5' 1\").    Weight as of this encounter: 71 kg (156 lb 8.4 oz).   "           Disposition Plan      Expected Discharge Date: 09/15/2023, 12:00 PM    Destination: inpatient rehabilitation facility  Discharge Comments: MYRON, IV fluids morning labs          Prabhakar Arriaga DO  Hospitalist Service  [facility]  Securely message with [employer] (more info)  Text page via [employer] Paging/Directory   ______________________________________________________________________    Interval History   NAD. Denies any complaints    Physical Exam   Vital Signs: Temp: 98  F (36.7  C) Temp src: Oral BP: 110/55 Pulse: 85   Resp: 16 SpO2: 99 % O2 Device: None (Room air)    Weight: 156 lbs 8.43 oz  General: NAD  RESPIRATORY: Breathing nonlabored  CARDIOVASCULAR: No le edema bilat.   NEUROLOGIC: Alert, speech clear         Medical Decision Making       >50 MINUTES SPENT BY ME on the date of service doing chart review, history, exam, documentation & further activities per the note.      Data

## 2023-09-15 NOTE — PLAN OF CARE
Problem: Plan of Care - These are the overarching goals to be used throughout the patient stay.    Goal: Optimal Comfort and Wellbeing  Outcome: Progressing  Intervention: Monitor Pain and Promote Comfort  Recent Flowsheet Documentation  Taken 9/15/2023 0044 by Eulalia Finn RN  Pain Management Interventions:   medication (see MAR)   cold applied     Problem: Fall Injury Risk  Goal: Absence of Fall and Fall-Related Injury  Intervention: Identify and Manage Contributors  Recent Flowsheet Documentation  Taken 9/15/2023 0045 by Eulalia Finn RN  Medication Review/Management: medications reviewed  Intervention: Promote Injury-Free Environment  Recent Flowsheet Documentation  Taken 9/15/2023 0045 by Eulalia Finn RN  Safety Promotion/Fall Prevention:   activity supervised   assistive device/personal items within reach   lighting adjusted   mobility aid in reach   room near nurse's station   safety round/check completed     Problem: Pain Acute  Goal: Optimal Pain Control and Function  Outcome: Progressing  Intervention: Develop Pain Management Plan  Recent Flowsheet Documentation  Taken 9/15/2023 0044 by Eulalia Finn RN  Pain Management Interventions:   medication (see MAR)   cold applied  Intervention: Prevent or Manage Pain  Recent Flowsheet Documentation  Taken 9/15/2023 0045 by Eulalia Finn RN  Medication Review/Management: medications reviewed     Problem: Lower Extremity Revascularization  Goal: Absence of Bleeding  Outcome: Progressing     Problem: Lower Extremity Revascularization  Goal: Nausea and Vomiting Relief  Outcome: Progressing   Goal Outcome Evaluation:    POD 7: Left femoral to tibial bypass graft    Left calf & groin incisions approximated with dermabond. Transparent tegaderm dressings covering surgical sites to left abdomen and left calf; clean, dry and intact.. No signs or symptoms of infection. Running IVF NS 75 ml/hr. Blood sugar 171 @ 0227. Moderate pain managed  with PRN Tylenol, Flexeril, Atarax and Dilaudid. Leg elevated on pillows. Magnesium, Phosphorus and K+ protocol. Labs drawn this am.

## 2023-09-15 NOTE — PLAN OF CARE
"Patient alert and oriented. She continues to have complaints of pain in left leg.  Scheduled robaxin and PRN dilaudid given x 1 and have been helpful. Patient ok to discharge to TCU today.  She had family transport upon discharge. Discharge papers packet given to patient upon discharge.   Escorted to front entrance.  Lizet Pelaez RN     Problem: Plan of Care - These are the overarching goals to be used throughout the patient stay.    Goal: Plan of Care Review  Description: The Plan of Care Review/Shift note should be completed every shift.  The Outcome Evaluation is a brief statement about your assessment that the patient is improving, declining, or no change.  This information will be displayed automatically on your shift note.  Outcome: Adequate for Care Transition  Goal: Patient-Specific Goal (Individualized)  Description: You can add care plan individualizations to a care plan. Examples of Individualization might be:  \"Parent requests to be called daily at 9am for status\", \"I have a hard time hearing out of my right ear\", or \"Do not touch me to wake me up as it startles me\".  Outcome: Adequate for Care Transition  Goal: Absence of Hospital-Acquired Illness or Injury  Outcome: Adequate for Care Transition  Intervention: Identify and Manage Fall Risk  Recent Flowsheet Documentation  Taken 9/15/2023 0830 by Lizet Pelaez RN  Safety Promotion/Fall Prevention:   activity supervised   assistive device/personal items within reach   lighting adjusted   mobility aid in reach   room near nurse's station   safety round/check completed  Intervention: Prevent Skin Injury  Recent Flowsheet Documentation  Taken 9/15/2023 0830 by Lizet Pelaez RN  Body Position: position changed independently  Goal: Optimal Comfort and Wellbeing  Outcome: Adequate for Care Transition  Intervention: Monitor Pain and Promote Comfort  Recent Flowsheet Documentation  Taken 9/15/2023 0948 by Lizet Pelaez RN  Pain " Management Interventions: medication (see MAR)  Taken 9/15/2023 0830 by Lizet Pelaez RN  Pain Management Interventions: (scheduled Robaxin given) medication (see MAR)  Goal: Readiness for Transition of Care  Outcome: Adequate for Care Transition     Problem: Risk for Delirium  Goal: Improved Sleep  Outcome: Adequate for Care Transition     Problem: Fall Injury Risk  Goal: Absence of Fall and Fall-Related Injury  Outcome: Adequate for Care Transition  Intervention: Identify and Manage Contributors  Recent Flowsheet Documentation  Taken 9/15/2023 0830 by Lizet Pelaez RN  Medication Review/Management: medications reviewed  Intervention: Promote Injury-Free Environment  Recent Flowsheet Documentation  Taken 9/15/2023 0830 by Lizet Pelaez RN  Safety Promotion/Fall Prevention:   activity supervised   assistive device/personal items within reach   lighting adjusted   mobility aid in reach   room near nurse's station   safety round/check completed     Problem: Pain Acute  Goal: Optimal Pain Control and Function  Outcome: Adequate for Care Transition  Intervention: Develop Pain Management Plan  Recent Flowsheet Documentation  Taken 9/15/2023 0948 by Liezt Pelaez RN  Pain Management Interventions: medication (see MAR)  Taken 9/15/2023 0830 by Lizet Pelaez RN  Pain Management Interventions: (scheduled Robaxin given) medication (see MAR)  Intervention: Prevent or Manage Pain  Recent Flowsheet Documentation  Taken 9/15/2023 0830 by Lizet Pelaez RN  Medication Review/Management: medications reviewed     Problem: Lower Extremity Revascularization  Goal: Absence of Bleeding  Outcome: Adequate for Care Transition  Goal: Effective Bowel Elimination  Outcome: Adequate for Care Transition  Goal: Fluid and Electrolyte Balance  Outcome: Adequate for Care Transition  Goal: Absence of Infection Signs and Symptoms  Outcome: Adequate for Care Transition  Goal: Anesthesia/Sedation  Recovery  Outcome: Adequate for Care Transition  Intervention: Optimize Anesthesia Recovery  Recent Flowsheet Documentation  Taken 9/15/2023 0830 by Lziet Pelaez RN  Safety Promotion/Fall Prevention:   activity supervised   assistive device/personal items within reach   lighting adjusted   mobility aid in reach   room near nurse's station   safety round/check completed  Goal: Optimal Pain Control and Function  Outcome: Adequate for Care Transition  Intervention: Prevent or Manage Pain  Recent Flowsheet Documentation  Taken 9/15/2023 0948 by Lizet Pelaez RN  Pain Management Interventions: medication (see MAR)  Taken 9/15/2023 0830 by Lizet Pelaez RN  Pain Management Interventions: (scheduled Robaxin given) medication (see MAR)  Goal: Nausea and Vomiting Relief  Outcome: Adequate for Care Transition  Goal: Effective Urinary Elimination  Outcome: Adequate for Care Transition  Goal: Effective Oxygenation and Ventilation  Outcome: Adequate for Care Transition  Goal: Effective Tissue Perfusion  Outcome: Adequate for Care Transition  Intervention: Optimize Tissue Perfusion  Recent Flowsheet Documentation  Taken 9/15/2023 0830 by Lizet Pelaez RN  Body Position: position changed independently   Goal Outcome Evaluation:

## 2023-09-16 ENCOUNTER — PATIENT OUTREACH (OUTPATIENT)
Dept: CARE COORDINATION | Facility: CLINIC | Age: 68
End: 2023-09-16
Payer: COMMERCIAL

## 2023-09-16 ENCOUNTER — TELEPHONE (OUTPATIENT)
Dept: GERIATRICS | Facility: CLINIC | Age: 68
End: 2023-09-16
Payer: COMMERCIAL

## 2023-09-16 PROCEDURE — 86481 TB AG RESPONSE T-CELL SUSP: CPT | Mod: ORL | Performed by: FAMILY MEDICINE

## 2023-09-16 NOTE — PROGRESS NOTES
[facility]    Background: Transitional Care Management program identified per system criteria and reviewed by [facility] Resource Center team for possible outreach.    Assessment: Upon chart review, CCR Team member will not proceed with patient outreach related to this episode of Transitional Care Management program due to reason below:    Patient declined to answer all post hospital discharge questions with CCRC team member and disconnected call.    Plan: Transitional Care Management episode addressed appropriately per reason noted above.      China Gardner MA  [facility] [place], [facility]    *Connected Care Resource Team does NOT follow patient ongoing. Referrals are identified based on internal discharge reports and the outreach is to ensure patient has an understanding of their discharge instructions.

## 2023-09-16 NOTE — TELEPHONE ENCOUNTER
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  She also has been refusing her 5 units aspart with meals and feels her blood sugars are too low.  Per nursing blood sugar today 125.  Orders given to DC meal coverage however patient is on a sliding scale so still will get covered if she becomes high and to update provider next week.         Electronically signed by:   Jacquelyn Bansal CNP

## 2023-09-17 LAB
QUANTIFERON MITOGEN: 10 IU/ML
QUANTIFERON NIL TUBE: 0.02 IU/ML
QUANTIFERON TB1 TUBE: 0.05 IU/ML
QUANTIFERON TB2 TUBE: 0.02

## 2023-09-18 ENCOUNTER — LAB REQUISITION (OUTPATIENT)
Dept: LAB | Facility: CLINIC | Age: 68
End: 2023-09-18
Payer: COMMERCIAL

## 2023-09-18 DIAGNOSIS — I10 ESSENTIAL (PRIMARY) HYPERTENSION: ICD-10-CM

## 2023-09-18 LAB
GAMMA INTERFERON BACKGROUND BLD IA-ACNC: 0.02 IU/ML
M TB IFN-G BLD-IMP: NEGATIVE
M TB IFN-G CD4+ BCKGRND COR BLD-ACNC: 9.98 IU/ML
MITOGEN IGNF BCKGRD COR BLD-ACNC: 0 IU/ML
MITOGEN IGNF BCKGRD COR BLD-ACNC: 0.03 IU/ML

## 2023-09-19 LAB
CREAT SERPL-MCNC: 0.84 MG/DL (ref 0.51–0.95)
EGFRCR SERPLBLD CKD-EPI 2021: 76 ML/MIN/1.73M2

## 2023-09-19 PROCEDURE — 36415 COLL VENOUS BLD VENIPUNCTURE: CPT | Mod: ORL | Performed by: FAMILY MEDICINE

## 2023-09-19 PROCEDURE — P9604 ONE-WAY ALLOW PRORATED TRIP: HCPCS | Mod: ORL | Performed by: FAMILY MEDICINE

## 2023-09-19 PROCEDURE — 82565 ASSAY OF CREATININE: CPT | Mod: ORL | Performed by: FAMILY MEDICINE

## 2023-09-20 ENCOUNTER — VIRTUAL VISIT (OUTPATIENT)
Dept: VASCULAR SURGERY | Facility: CLINIC | Age: 68
End: 2023-09-20
Attending: PODIATRIST
Payer: COMMERCIAL

## 2023-09-20 ENCOUNTER — TRANSITIONAL CARE UNIT VISIT (OUTPATIENT)
Dept: GERIATRICS | Facility: CLINIC | Age: 68
End: 2023-09-20
Payer: COMMERCIAL

## 2023-09-20 ENCOUNTER — TELEPHONE (OUTPATIENT)
Dept: VASCULAR SURGERY | Facility: CLINIC | Age: 68
End: 2023-09-20

## 2023-09-20 VITALS
RESPIRATION RATE: 17 BRPM | TEMPERATURE: 98 F | DIASTOLIC BLOOD PRESSURE: 76 MMHG | BODY MASS INDEX: 27.98 KG/M2 | HEART RATE: 92 BPM | SYSTOLIC BLOOD PRESSURE: 131 MMHG | WEIGHT: 148.2 LBS | OXYGEN SATURATION: 98 % | HEIGHT: 61 IN

## 2023-09-20 DIAGNOSIS — Z74.09 IMPAIRED MOBILITY AND ACTIVITIES OF DAILY LIVING: ICD-10-CM

## 2023-09-20 DIAGNOSIS — L97.523 ULCER OF GREAT TOE, LEFT, WITH NECROSIS OF MUSCLE (H): Primary | ICD-10-CM

## 2023-09-20 DIAGNOSIS — Z79.4 TYPE 2 DIABETES MELLITUS WITH DIABETIC PERIPHERAL ANGIOPATHY AND GANGRENE, WITH LONG-TERM CURRENT USE OF INSULIN (H): Primary | ICD-10-CM

## 2023-09-20 DIAGNOSIS — Z78.9 IMPAIRED MOBILITY AND ACTIVITIES OF DAILY LIVING: ICD-10-CM

## 2023-09-20 DIAGNOSIS — I73.9 PAD (PERIPHERAL ARTERY DISEASE) (H): ICD-10-CM

## 2023-09-20 DIAGNOSIS — D64.9 ANEMIA, UNSPECIFIED TYPE: ICD-10-CM

## 2023-09-20 DIAGNOSIS — I10 ESSENTIAL HYPERTENSION: ICD-10-CM

## 2023-09-20 DIAGNOSIS — R52 PAIN: ICD-10-CM

## 2023-09-20 DIAGNOSIS — E11.52 TYPE 2 DIABETES MELLITUS WITH DIABETIC PERIPHERAL ANGIOPATHY AND GANGRENE, WITH LONG-TERM CURRENT USE OF INSULIN (H): Primary | ICD-10-CM

## 2023-09-20 PROCEDURE — 99310 SBSQ NF CARE HIGH MDM 45: CPT | Performed by: NURSE PRACTITIONER

## 2023-09-20 PROCEDURE — 99213 OFFICE O/P EST LOW 20 MIN: CPT | Mod: 93 | Performed by: PODIATRIST

## 2023-09-20 RX ORDER — TRAMADOL HYDROCHLORIDE 50 MG/1
50 TABLET ORAL EVERY 6 HOURS PRN
Qty: 45 TABLET | Refills: 0 | Status: ON HOLD | OUTPATIENT
Start: 2023-09-20 | End: 2024-01-12

## 2023-09-20 NOTE — PROGRESS NOTES
FOOT AND ANKLE SURGERY/PODIATRY Progress Note      ASSESSMENT:   Ulceration left hallux  PAD    Type of service:  Telephone Visit       Telephone Start and End Time : 7:41/7:46    Originating Location (pt. Location):  home      Distant Location :  [facility] VASCULAR [facility]       Mode of Communication: Telephone    TREATMENT:  -I discussed with the patient that the recent MRI report on her left foot was negative for osteomyelitis of the left hallux.    -I recommend she continue to apply iodine on the left hallux daily monitor for signs of infection which she reports are not visible at this time.    -She is scheduled for repeat ABIs on 10/6.  I will asked my office to coordinate follow-up with me after ABIs have been completed so we have a better understanding of how much perfusion she has into the left foot.    -She is scheduled to follow-up with Dr. Geller on 9/25    Rene Ordaz DPM  [facility]      HPI: Ashanti Aviles was seen again today for telephone visit to discuss her recent MRI report of the left foot.  Patient has received undergone bypass procedure along the left lower extremity with vascular surgery.  Doing well today.    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/2013    PVD (peripheral vascular disease) (H)        Past Surgical History:   Procedure Laterality Date    AMPUTATE TOE(S) Right 2/18/2023    Procedure: Amputation fourth toe right foot;  Surgeon: Benjamin Diez DPM;  Location: [facility]    ANUS SURGERY      BIOPSY CERVICAL, LOCAL EXCISION, SINGLE/MULTIPLE      COLONOSCOPY N/A 9/11/2020    Procedure: EXAM UNDER ANESTHESIA, HIGH RESOLUTION ANOSCOPY INTRA OP;  Surgeon: Preeti Sheehan MD;  Location: [facility];  Service: Gastroenterology    COLONOSCOPY N/A 12/14/2020     Procedure: EXAM UNDER ANESTHESIA WITH HIGH RESOLUTION ANOSCOPY;  Surgeon: Preeti Sheehan MD;  Location: [facility];  Service: General    COLONOSCOPY N/A 6/15/2021    Procedure: EXAM UNDER ANESTHESIA WITH HIGH RESOLUTION ANOSCOPY, BIOPSY, FULGURATION;  Surgeon: Preeti Sheehan MD;  Location: [facility];  Service: Gastroenterology    ENDARTERECTOMY FEMORAL Left 5/17/2023    Procedure: LEFT FEMORAL ENDARTERECTOMY WITH RETROGRADE ILIAC STENT;  Surgeon: Dyan Geller MD;  Location: [facility] OR    EXAM UNDER ANESTHESIA ANUS N/A 9/14/2021    Procedure: EXAM UNDER ANESTHESIA WITH HIGH RESOLUTION ANOSCOPY;  Surgeon: Preeti Sheehan MD;  Location: [facility]    FEMORAL ARTERY - TIBIAL ARTERY BYPASS GRAFT Left 9/8/2023    Procedure: CREATION, BYPASS, ARTERIAL, FEMORAL TO TIBIAL, LEFT;  Surgeon: Dyan Geller MD;  Location: [facility] OR    IR LOWER EXTREMITY ANGIOGRAM LEFT  3/23/2023    IR LOWER EXTREMITY ANGIOGRAM RIGHT  2/16/2023       Allergies   Allergen Reactions    Oxycodone Nausea and Vomiting    Penicillins Unknown     Childhood rxn    Simvastatin Unknown     Muscle pain    Victoza [Liraglutide] Other (See Comments)     Binds bowels         Current Outpatient Medications:     acetaminophen (TYLENOL) 325 MG tablet, Take 2 tablets (650 mg) by mouth every 4 hours as needed for mild pain, Disp: , Rfl:     amLODIPine (NORVASC) 10 MG tablet, Take 5 mg by mouth daily, Disp: , Rfl:     clopidogrel (PLAVIX) 75 MG tablet, Take 75 mg by mouth daily, Disp: , Rfl:     Continuous Blood Gluc Sensor (FREESTYLE PRINCESS 14 DAY SENSOR) MISC, , Disp: , Rfl:     empagliflozin (JARDIANCE) 25 MG TABS tablet, Take 1 tablet (25 mg) by mouth daily, Disp: 90 tablet, Rfl: 1    ezetimibe (ZETIA) 10 MG tablet, Take 10 mg by mouth daily, Disp: , Rfl:     insulin aspart (NOVOLOG PEN) 100 UNIT/ML pen, Inject 5 Units Subcutaneous 3 times daily (with meals), Disp: 15 mL, Rfl: 1     insulin aspart (NOVOLOG PEN) 100 UNIT/ML pen, Inject 1-7 Units Subcutaneous 3 times daily (before meals) Correction Scale - MEDIUM INSULIN RESISTANCE DOSING   Do Not give Correction Insulin if Pre-Meal BG less than 140. For Pre-Meal  - 189 give 1 unit. For Pre-Meal  - 239 give 2 units. For Pre-Meal  - 289 give 3 units. For Pre-Meal  - 339 give 4 units. For Pre-Meal - 399 give 5 units. For Pre-Meal -449 give 6 units For Pre-Meal BG greater than or equal to 450 give 7 units. To be given with prandial insulin, and based on pre-meal blood glucose.  Notify provider if glucose greater than or equal to 350 mg/dL after administration of correction dose. If given at mealtime, administer within 30 minutes of start of meal., Disp: 15 mL, Rfl:     insulin aspart (NOVOLOG PEN) 100 UNIT/ML pen, Inject 1-5 Units Subcutaneous At Bedtime MEDIUM INSULIN RESISTANCE DOSING  Do Not give Bedtime Correction Insulin if BG less than  200. For  - 249 give 1 units. For  - 299 give 2 units. For  - 349 give 3 units. For  -399 give 4 units. For BG greater than or equal to 400 give 5 units. Notify provider if glucose greater than or equal to 350 mg/dL after administration of correction dose. If given at mealtime, administer within 30 minutes of start of meal., Disp: 15 mL, Rfl:     Insulin Degludec (TRESIBA) 100 UNIT/ML SOLN, Inject 30 Units Subcutaneous daily, Disp: , Rfl:     Lidocaine (LIDOCARE) 4 % Patch, Place 1 patch onto the skin every 24 hours To prevent lidocaine toxicity, patient should be patch free for 12 hrs daily.Apply patch(s) to affected area. To prevent lidocaine toxicity, patient should be patch free for 12 hrs daily. Patches may be cut to smaller size prior to removing release liner. Reminder: Remove previous patch before applying new patch.  NEVER APPLY HEAT OVER PATCH which increases absorption and may lead to local anesthetic toxicity. Do not apply over area where  "liposomal bupivacaine was injected for 96 hours post injection., Disp: , Rfl:     linagliptin (TRADJENTA) 5 MG TABS tablet, Take 5 mg by mouth daily, Disp: , Rfl:     metFORMIN (GLUCOPHAGE XR) 500 MG 24 hr tablet, Take 1,000 mg by mouth daily, Disp: , Rfl:     methocarbamol (ROBAXIN) 500 MG tablet, Take 1 tablet (500 mg) by mouth 4 times daily, Disp: 90 tablet, Rfl: 0    omeprazole (PRILOSEC) 20 MG DR capsule, Take 20 mg by mouth daily as needed, Disp: , Rfl:     rivaroxaban ANTICOAGULANT (XARELTO) 2.5 MG TABS tablet, Take 1 tablet (2.5 mg) by mouth 2 times daily (with meals), Disp: , Rfl:     traMADol (ULTRAM) 50 MG tablet, Take 1 tablet (50 mg) by mouth every 6 hours as needed for moderate pain (Please give as adjunct to opiate pain meds), Disp: 20 tablet, Rfl: 0    Review of Systems - 10 point Review of Systems is negative except for MRI report left foot which is noted in HPI.    Imaging:     POC US Guided Vascular Access    Result Date: 9/8/2023  Ultrasound was performed as guidance to an anesthesia procedure.  Click \"PACS images\" hyperlink below to view any stored images.  For specific procedure details, view procedure note authored by anesthesia.    MR Foot Left w/o & w Contrast    Result Date: 9/1/2023  EXAM: MR FOOT LEFT W/O and W CONTRAST LOCATION: [facility] DATE: 9/1/2023 INDICATION: Osteomyelitis left hallux, foot pain. COMPARISON: 03/17/2023 MRI. TECHNIQUE: Routine. Additional postgadolinium T1 sequences were obtained. IV CONTRAST: Gadavist 7mL FINDINGS: JOINTS AND BONES: -No fracture or contusion. Mild reactive osteitis in the tuft of distal phalanx of the great toe without evidence of osteomyelitis. Mild degenerative changes at the first MTP joint. TENDONS: -No tendon tear, tendinopathy, or tenosynovitis. LIGAMENTS: -Lisfranc ligament: Intact. No subluxation. MUSCLES AND SOFT TISSUES: -No soft tissue edema or fluid collection. No muscle atrophy or injury.     IMPRESSION: 1.  " No osteomyelitis or acute injury. 2.  Mild reactive osteitis in the distal phalanx of the great toe. 3.  Mild degenerative changes at the first MTP joint.

## 2023-09-20 NOTE — PROGRESS NOTES
[facility]    PRIMARY CARE PROVIDER AND CLINIC:  Kai Mast MD, [place] [address] [address] [facility] [address]  Chief Complaint   Patient presents with    Hospital F/U      [facility] Record Number:  [id number]  Place of Service where encounter took place:  [facility]) [[id number]]    Ashanti Aviles  is a 68 year old  (1955), admitted to the above facility from  [facility]. Hospital stay 9/8/2023 through 9/15/2023..     HPI:    Brief Summary of Hospital Course: under went procedure - Left femoral to tibioperoneal trunk bypass using spliced 4mm and 5 mm Artegraft graft  Redo exposure of the left common femoral artery  Due to chronic limb threatening ischemia of left lower extrem.   MEDICATION CHANGES: Start acetaminophen, aspart 5 units, aspart sliding scale, lidocaine patch, methocarbamol 4 times a day, rivaroxaban 2.5 mg p.o. twice daily, tramadol as needed.  Stop hydrochlorothiazide and lisinopril.  RECOMMENDED FOLLOW UP: 9/25 vascular nurse.  10/6 Doppler ultrasound 10/9.  Appointment  Updates on Status Since Skilled nursing Admission: 9/16 change degludec to 15 units twice daily  - discontinue scheduled dosing of aspart.     Today: Nurse reports no concerns.  Patient denies complaints.  She shows me her blood sugars on her meter which are all well controlled no readings above 200 and most readings 100-1 50.  She denies problems with constipation and diarrhea.  She denies problems sleeping.  She feels her pain is under good control with the tramadol as needed.  She feels therapy is not going as fast as she would like but she is progressing.    External notes reviewed: Facility EHR including progress notes, vital signs. Hospital discharge summary reviewed. Hospital discharge orders reviewed.  POLST reviewed and signed for full code    CODE STATUS/ADVANCE DIRECTIVES DISCUSSION: Full code    ALLERGIES:   Allergies   Allergen Reactions     Oxycodone Nausea and Vomiting    Penicillins Unknown     Childhood rxn    Simvastatin Unknown     Muscle pain    Victoza [Liraglutide] Other (See Comments)     Binds bowels      PAST MEDICAL HISTORY:   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/2013    PVD (peripheral vascular disease) (H)       PAST SURGICAL HISTORY:   has a past surgical history that includes Anus surgery; Biopsy cervical, local excision, single/multiple; Colonoscopy (N/A, 9/11/2020); Colonoscopy (N/A, 12/14/2020); Colonoscopy (N/A, 6/15/2021); Exam under anesthesia anus (N/A, 9/14/2021); IR Lower Extremity Angiogram Right (2/16/2023); Amputate toe(s) (Right, 2/18/2023); IR Lower Extremity Angiogram Left (3/23/2023); Endarterectomy femoral (Left, 5/17/2023); and Femoral Artery - Tibial Artery Bypass Graft (Left, 9/8/2023).  FAMILY HISTORY: family history is not on file.  SOCIAL HISTORY:   reports that she quit smoking about 7 months ago. Her smoking use included cigarettes. She smoked an average of .25 packs per day. She has never used smokeless tobacco. She reports that she does not currently use alcohol. She reports that she does not currently use drugs.    Post Discharge Medication Reconciliation Status: discharge medications reconciled, continue medications without change  Current Outpatient Medications   Medication Sig    traMADol (ULTRAM) 50 MG tablet Take 1 tablet (50 mg) by mouth every 6 hours as needed for moderate pain (Please give as adjunct to opiate pain meds)    acetaminophen (TYLENOL) 325 MG tablet Take 2 tablets (650 mg) by mouth every 4 hours as needed for mild pain    amLODIPine (NORVASC) 10 MG tablet Take 5 mg by mouth daily    clopidogrel (PLAVIX) 75 MG tablet Take 75 mg by mouth daily    Continuous Blood Gluc Sensor (FREESTYLE PRINCESS 14 DAY SENSOR) MISC     empagliflozin  (JARDIANCE) 25 MG TABS tablet Take 1 tablet (25 mg) by mouth daily    ezetimibe (ZETIA) 10 MG tablet Take 10 mg by mouth daily    insulin aspart (NOVOLOG PEN) 100 UNIT/ML pen Inject 5 Units Subcutaneous 3 times daily (with meals)    insulin aspart (NOVOLOG PEN) 100 UNIT/ML pen Inject 1-7 Units Subcutaneous 3 times daily (before meals) Correction Scale - MEDIUM INSULIN RESISTANCE DOSING   Do Not give Correction Insulin if Pre-Meal BG less than 140. For Pre-Meal  - 189 give 1 unit. For Pre-Meal  - 239 give 2 units. For Pre-Meal  - 289 give 3 units. For Pre-Meal  - 339 give 4 units. For Pre-Meal - 399 give 5 units. For Pre-Meal -449 give 6 units For Pre-Meal BG greater than or equal to 450 give 7 units. To be given with prandial insulin, and based on pre-meal blood glucose.  Notify provider if glucose greater than or equal to 350 mg/dL after administration of correction dose. If given at mealtime, administer within 30 minutes of start of meal.    insulin aspart (NOVOLOG PEN) 100 UNIT/ML pen Inject 1-5 Units Subcutaneous At Bedtime MEDIUM INSULIN RESISTANCE DOSING  Do Not give Bedtime Correction Insulin if BG less than  200. For  - 249 give 1 units. For  - 299 give 2 units. For  - 349 give 3 units. For  -399 give 4 units. For BG greater than or equal to 400 give 5 units. Notify provider if glucose greater than or equal to 350 mg/dL after administration of correction dose. If given at mealtime, administer within 30 minutes of start of meal.    Insulin Degludec (TRESIBA) 100 UNIT/ML SOLN Inject 15 Units Subcutaneous 2 times daily    Lidocaine (LIDOCARE) 4 % Patch Place 1 patch onto the skin every 24 hours To prevent lidocaine toxicity, patient should be patch free for 12 hrs daily.Apply patch(s) to affected area. To prevent lidocaine toxicity, patient should be patch free for 12 hrs daily. Patches may be cut to smaller size prior to removing release liner.  "Reminder: Remove previous patch before applying new patch.  NEVER APPLY HEAT OVER PATCH which increases absorption and may lead to local anesthetic toxicity. Do not apply over area where liposomal bupivacaine was injected for 96 hours post injection.    linagliptin (TRADJENTA) 5 MG TABS tablet Take 5 mg by mouth daily    metFORMIN (GLUCOPHAGE XR) 500 MG 24 hr tablet Take 1,000 mg by mouth daily    methocarbamol (ROBAXIN) 500 MG tablet Take 1 tablet (500 mg) by mouth 4 times daily    omeprazole (PRILOSEC) 20 MG DR capsule Take 20 mg by mouth daily as needed    rivaroxaban ANTICOAGULANT (XARELTO) 2.5 MG TABS tablet Take 1 tablet (2.5 mg) by mouth 2 times daily (with meals)     No current facility-administered medications for this visit.       ROS:  4 point ROS including Respiratory, CV, GI and , other than that noted in the HPI,  is negative    Vitals:  /76   Pulse 92   Temp 98  F (36.7  C)   Resp 17   Ht 1.549 m (5' 1\")   Wt 67.2 kg (148 lb 3.2 oz)   SpO2 98%   BMI 28.00 kg/m    Exam:  GENERAL APPEARANCE:  Alert, in no distress  RESP:  respiratory effort normal  CV:  edema none  M/S:  Gait and station sitting in wheelchair.   SKIN:  Inspection and palpation of skin and subcutaneous tissue at baseline  PSYCH:  insight and judgement, memory intact, affect and mood normal    ASSESSMENT/PLAN:  Type 2 diabetes mellitus with diabetic peripheral angiopathy and gangrene, with long-term current use of insulin (H)  Well-controlled here.  Continue metformin, linagliptin, Jardiance and degludec.  Discontinue bedtime aspart   Component Value Date    A1C 8.3 09/05/2023    A1C 8.5 02/14/2023        PAD (peripheral artery disease) (H)  - Follow-up with surgeon as directed.  - traMADol (ULTRAM) 50 MG tablet; Take 1 tablet (50 mg) by mouth every 6 hours as needed for moderate pain (Please give as adjunct to opiate pain meds)  -Check labs    Essential hypertension  Controlled.  Continue with amlodipine only.  " Hydrochlorothiazide and lisinopril were discontinued at the hospital.    Pain  Managed with scheduled methocarbamol and as needed tramadol.    Impaired mobility and activities of daily living  Admitted to the TCU for therapy and nursing care following a hospital stay.  Progressing  -Continue PT/Ocupational Therapy  - following for discharge planning    Anemia, unspecified type  Hemoglobin   Date Value Ref Range Status   09/14/2023 10.4 (L) 11.7 - 15.7 g/dL Final   -Recheck labs on tuesday    Orders:  -Check CBC, BMP on Tuesday  -Discontinue bedtime aspart    45 MINUTES SPENT BY ME on the date of service doing chart review, history, exam, documentation & further activities per the note.     Electronically signed by: Saba Peng NP

## 2023-09-20 NOTE — TELEPHONE ENCOUNTER
GIOVANNY schedule follow up with Dr. Ordaz after studies on 10/6      Message  Received: Today  [facility]  Please help schedule with Dr. Ordaz after 10/6. Thanks!

## 2023-09-25 ENCOUNTER — LAB REQUISITION (OUTPATIENT)
Dept: LAB | Facility: CLINIC | Age: 68
End: 2023-09-25
Payer: COMMERCIAL

## 2023-09-25 ENCOUNTER — OFFICE VISIT (OUTPATIENT)
Dept: VASCULAR SURGERY | Facility: CLINIC | Age: 68
End: 2023-09-25
Attending: NURSE PRACTITIONER
Payer: COMMERCIAL

## 2023-09-25 VITALS
TEMPERATURE: 97.8 F | RESPIRATION RATE: 12 BRPM | OXYGEN SATURATION: 98 % | HEART RATE: 90 BPM | SYSTOLIC BLOOD PRESSURE: 138 MMHG | DIASTOLIC BLOOD PRESSURE: 85 MMHG

## 2023-09-25 DIAGNOSIS — D64.9 ANEMIA, UNSPECIFIED: ICD-10-CM

## 2023-09-25 DIAGNOSIS — I73.9 PAD (PERIPHERAL ARTERY DISEASE) (H): Primary | ICD-10-CM

## 2023-09-25 PROCEDURE — G0463 HOSPITAL OUTPT CLINIC VISIT: HCPCS

## 2023-09-25 ASSESSMENT — PAIN SCALES - GENERAL: PAINLEVEL: MILD PAIN (3)

## 2023-09-25 NOTE — PATIENT INSTRUCTIONS
"Patient Education   Peripheral Arterial Disease (PAD): Care Instructions  Overview  Peripheral arterial disease (PAD) occurs when the blood vessels (arteries) that supply blood to the legs, belly, pelvis, arms, or neck get narrow or blocked. This reduces blood flow to that area. The legs are affected most often.  PAD is often caused by fatty buildup (plaque) in the arteries. This buildup is also called \"hardening\" of the arteries. Your risk of PAD increases if you smoke or have high cholesterol, high blood pressure, diabetes, or a family history of PAD.  Many people don't have symptoms. If you do have symptoms, you may have weak or tired legs, difficulty walking or balancing, or pain. If you have pain, you might feel a tight, aching, or squeezing pain in the calf, foot, thigh, or buttock that occurs during exercise. The pain usually gets worse during exercise and goes away when you rest. If PAD gets worse, you may have symptoms of poor blood flow, such as leg pain when you rest.  Medicines and lifestyle changes may help your symptoms and lower your risk of heart attack and stroke. In some cases, surgery or other treatment is needed. It is important that you follow up with your doctor.  Follow-up care is a key part of your treatment and safety. Be sure to make and go to all appointments, and call your doctor if you are having problems. It's also a good idea to know your test results and keep a list of the medicines you take.  How can you care for yourself at home?  Do not smoke. Smoking can make PAD worse. If you need help quitting, talk to your doctor about stop-smoking programs and medicines. These can increase your chances of quitting for good.  Take your medicines exactly as prescribed. Call your doctor if you think you are having a problem with your medicine.  If you take a blood thinner, such as aspirin, be sure to get instructions about how to take your medicine safely. Blood thinners can cause serious " bleeding problems.  Ask your doctor if a cardiac rehab program is right for you. Cardiac rehab can help you make lifestyle changes. In cardiac rehab, a team of health professionals provides education and support to help you make new, healthy habits.  Eat heart-healthy foods such as fruits, vegetables, whole grains, fish, lean meats, and low-fat or nonfat dairy foods. Limit sodium, sugar, and alcohol.  If your doctor recommends it, get more exercise. Walking is a good choice. Bit by bit, increase the amount you walk every day. Try for at least 30 minutes on most days of the week. If you have symptoms when you exercise, ask your doctor about a special exercise program that may help relieve your symptoms.  Stay at a healthy weight. Lose weight if you need to.  Take good care of your feet.  Treat cuts and scrapes on your legs right away. Poor blood flow prevents (or slows) quick healing of even small cuts or scrapes. This is even more important if you have diabetes.  Avoid shoes that are too tight or that rub your feet. Shoes should be comfortable and fit well.  Avoid socks or stockings that are tight enough to leave elastic-band marks on your legs. Tight socks can make circulation problems worse.  Keep your feet clean and moisturized to prevent drying and cracking. Place cotton or lamb's wool between your toes to prevent rubbing and to absorb moisture.  If you have a sore on your leg or foot, keep it dry and cover it with a nonstick bandage until you see your doctor.  Avoid infections such as COVID-19, colds, and the flu. Get the flu vaccine every year. Get a pneumococcal vaccine. If you have had one before, ask your doctor whether you need another dose. Stay up to date on your COVID-19 vaccines.  When should you call for help?   Call 911 anytime you think you may need emergency care. For example, call if:    You have symptoms of a heart attack. These may include:  Chest pain or pressure, or a strange feeling in the  "chest.  Sweating.  Shortness of breath.  Nausea or vomiting.  Pain, pressure, or a strange feeling in the back, neck, jaw, or upper belly or in one or both shoulders or arms.  Lightheadedness or sudden weakness.  A fast or irregular heartbeat.   After you call 911, the  may tell you to chew 1 adult-strength or 2 to 4 low-dose aspirin. Wait for an ambulance. Do not try to drive yourself.    You have sudden, severe leg pain, and your leg is cool and pale.     You have symptoms of a stroke. These may include:  Sudden numbness, tingling, weakness, or loss of movement in your face, arm, or leg, especially on only one side of your body.  Sudden vision changes.  Sudden trouble speaking.  Sudden confusion or trouble understanding simple statements.  Sudden problems with walking or balance.  A sudden, severe headache that is different from past headaches.   Call your doctor now or seek immediate medical care if:    You have leg pain that does not go away even if you rest.     Your leg pain changes or gets worse. For example, if you have more pain with normal activity or the same pain with decreased activity, you should call.     You have cold or numb feet or toes.     You have leg or foot sores that are slow to heal.     The skin on your legs or feet changes color.     You have an open sore on your leg or foot that is infected. Signs of infection include:  Increased pain, swelling, warmth, or redness.  Red streaks leading from the sore.  Pus draining from the sore.  A fever.   Watch closely for changes in your health, and be sure to contact your doctor if you have any problems.  Where can you learn more?  Go to https://www.[employer].net/patiented  Enter H735 in the search box to learn more about \"Peripheral Arterial Disease (PAD): Care Instructions.\"  Current as of: September 7, 2022               Content Version: 13.7 2006-2023 [employer].   Care instructions adapted under license by your " healthcare professional. If you have questions about a medical condition or this instruction, always ask your healthcare professional. [employer] disclaims any warranty or liability for your use of this information.

## 2023-09-25 NOTE — PROGRESS NOTES
[facility]  -  Nurse Visit        Date of Service:  September 25, 2023     Requesting Provider: MD Dyan eGller    Diagnosis:     ICD-10-CM    1. PAD (peripheral artery disease) (H)  I73.9           Chief Complaint: Ashanti is being seen today at [facility] [place] for her post op dressing change. Reports pain of 3.  Denies any fevers, chills, or generalized ill feeling. 9/8/23 Left leg BYPASS, ARTERIAL, FEMORAL TO TIBIAL Clean dry and intact. Left lower leg removed peeling tegaderm.cleaned with saline pat dry and steri strips applied.  Patient arrives in wheel chair and transport self. Patients daughter is present.  Dressing on Arrival: gauze in groin,tegaderm over left tibial incision peeling, Upon removal of dressings none No drainage is noted. Patient continues to not smoke.    New Wounds noted: No    Vital Signs: /85   Pulse 90   Temp 97.8  F (36.6  C)   Resp 12   SpO2 98%     Assessment:      General:  Patient presents to clinic in no apparent distress.   Psychiatric:  Alert and oriented x3.   Lower extremity:  edema is not present.    Integumentary:  Skin is WNL    Circumferential volume measures:       No data to display                Wound info:  Wound Toe (Comment  which one) Other (comment) (Active)   Wound Bed Other (Comment) 09/14/23 1600   Drainage Amount None 09/14/23 0100   Dressing Open to air 09/14/23 1007       VASC Wound Left Hallux (Active)   Pre Size Length 0.6 08/25/23 0800   Pre Size Width 0.4 08/25/23 0800   Pre Size Depth 0.5 08/25/23 0800   Pre Total Sq cm 0.24 08/25/23 0800   Post Size Length 0 05/12/23 0900   Post Size Width 0 05/12/23 0900   Post Size Depth 0 05/12/23 0900   Post Total Sq cm 0 05/12/23 0900   Description scab 07/21/23 0811       VASC Wound surgical (Active)   Pre Size Length 1 06/29/23 1200   Pre Size Width 0.5 06/29/23 1200   Pre Size Depth 0.2 06/29/23 1200   Pre Total Sq cm 0.5 06/29/23 1200   Product Used  Alginate 06/05/23 0800       VASC Wound right plantar foot (Active)       Incision/Surgical Site 09/14/21 Rectum (Active)       Incision/Surgical Site 02/18/23 Right Toe (Comment  which one) (Active)       Incision/Surgical Site 05/17/23 Left Groin (Active)       Incision/Surgical Site 09/08/23 Left Leg (Active)   Incision Assessment WDL 09/15/23 0830   Loan-Incision Assessment Edema 09/14/23 1600   Closure Liquid bandage 09/15/23 0830   Incision Drainage Amount None 09/15/23 0830   Incision Care Soap and water 09/12/23 1600   Dressing Intervention Clean, dry, intact 09/15/23 0830       Undermining is not present.    The periwoundskin is WNL      Plan:         1. Patient will return in 10/6/23  for ultrasound.                  Cleansed with: Normal saline    Protected skin with:  none    Dressings Applied to wound: Gauze and steris trip to left tibial incision.                                                                                      Offloading used: None    Trial Products: No    Provider notified regarding concerns: No    Treatment Changes: No    Tolerated Dressing Change:  Yes    Taught Regarding: follow up appointment(s), wound cares, and steri strips    Educational Barriers: No barriers      Justice Sanchez RN

## 2023-09-28 ENCOUNTER — TELEPHONE (OUTPATIENT)
Dept: VASCULAR SURGERY | Facility: CLINIC | Age: 68
End: 2023-09-28
Payer: COMMERCIAL

## 2023-09-28 NOTE — TELEPHONE ENCOUNTER
Pt had CREATION, BYPASS, ARTERIAL, FEMORAL TO TIBIAL on 9/8/23. She stated that her work wants her to return on 10/9/23. Pt has an appointment with Dr. Geller that day, future work status will be determined at that visit. Letter  faxed to employer to excuse patient from work until 10/9/23. Faxed to [phone].

## 2023-09-28 NOTE — LETTER
October 10, 2023      RE: Ashanti Aviles  [address] [place] [address]       To whom it may concern:    Ashanti Aviles will be out of work until 10/16/23. She has an appointment at the clinic on 10/16/23, future return to work date will be determined at that visit.    Sincerely,      Dyan Geller MD

## 2023-09-28 NOTE — LETTER
September 28, 2023      RE: Ashanti Aviles  [address] [place] [address]       To whom it may concern:    Ashanti Aviles will be out of work until 10/9/23. She has an appointment at the clinic on 10/9/23, future return to work date will be determined at that visit.    Sincerely,      Dyan Geller MD

## 2023-10-06 ENCOUNTER — ANCILLARY PROCEDURE (OUTPATIENT)
Dept: VASCULAR ULTRASOUND | Facility: CLINIC | Age: 68
DRG: 315 | End: 2023-10-06
Attending: SURGERY
Payer: COMMERCIAL

## 2023-10-06 ENCOUNTER — APPOINTMENT (OUTPATIENT)
Dept: CT IMAGING | Facility: HOSPITAL | Age: 68
DRG: 315 | End: 2023-10-06
Attending: PHYSICIAN ASSISTANT
Payer: COMMERCIAL

## 2023-10-06 ENCOUNTER — HOSPITAL ENCOUNTER (INPATIENT)
Facility: HOSPITAL | Age: 68
LOS: 1 days | Discharge: HOME | DRG: 315 | End: 2023-10-06
Attending: EMERGENCY MEDICINE | Admitting: EMERGENCY MEDICINE
Payer: COMMERCIAL

## 2023-10-06 ENCOUNTER — TELEPHONE (OUTPATIENT)
Dept: VASCULAR SURGERY | Facility: CLINIC | Age: 68
End: 2023-10-06
Payer: COMMERCIAL

## 2023-10-06 VITALS
WEIGHT: 147.71 LBS | RESPIRATION RATE: 12 BRPM | BODY MASS INDEX: 27.91 KG/M2 | HEART RATE: 86 BPM | SYSTOLIC BLOOD PRESSURE: 135 MMHG | TEMPERATURE: 97.8 F | OXYGEN SATURATION: 100 % | DIASTOLIC BLOOD PRESSURE: 71 MMHG

## 2023-10-06 DIAGNOSIS — I74.3 FEMORAL ARTERY THROMBOSIS, LEFT (H): Primary | ICD-10-CM

## 2023-10-06 DIAGNOSIS — I73.9 PAD (PERIPHERAL ARTERY DISEASE) (H): ICD-10-CM

## 2023-10-06 DIAGNOSIS — I74.9 EMBOLUS (H): ICD-10-CM

## 2023-10-06 LAB
ANION GAP SERPL CALCULATED.3IONS-SCNC: 11 MMOL/L (ref 7–15)
APTT PPP: 29 SECONDS (ref 22–38)
BASO+EOS+MONOS # BLD AUTO: NORMAL 10*3/UL
BASO+EOS+MONOS NFR BLD AUTO: NORMAL %
BASOPHILS # BLD AUTO: 0.1 10E3/UL (ref 0–0.2)
BASOPHILS NFR BLD AUTO: 1 %
BUN SERPL-MCNC: 17 MG/DL (ref 8–23)
CALCIUM SERPL-MCNC: 9.4 MG/DL (ref 8.8–10.2)
CHLORIDE SERPL-SCNC: 99 MMOL/L (ref 98–107)
CREAT SERPL-MCNC: 0.72 MG/DL (ref 0.51–0.95)
DEPRECATED HCO3 PLAS-SCNC: 27 MMOL/L (ref 22–29)
EGFRCR SERPLBLD CKD-EPI 2021: >90 ML/MIN/1.73M2
EOSINOPHIL # BLD AUTO: 0.2 10E3/UL (ref 0–0.7)
EOSINOPHIL NFR BLD AUTO: 1 %
ERYTHROCYTE [DISTWIDTH] IN BLOOD BY AUTOMATED COUNT: 14.2 % (ref 10–15)
GLUCOSE SERPL-MCNC: 269 MG/DL (ref 70–99)
HCT VFR BLD AUTO: 38.7 % (ref 35–47)
HGB BLD-MCNC: 12.5 G/DL (ref 11.7–15.7)
IMM GRANULOCYTES # BLD: 0 10E3/UL
IMM GRANULOCYTES NFR BLD: 0 %
INR PPP: 0.87 (ref 0.85–1.15)
LYMPHOCYTES # BLD AUTO: 3.5 10E3/UL (ref 0.8–5.3)
LYMPHOCYTES NFR BLD AUTO: 33 %
MCH RBC QN AUTO: 29.2 PG (ref 26.5–33)
MCHC RBC AUTO-ENTMCNC: 32.3 G/DL (ref 31.5–36.5)
MCV RBC AUTO: 90 FL (ref 78–100)
MONOCYTES # BLD AUTO: 0.7 10E3/UL (ref 0–1.3)
MONOCYTES NFR BLD AUTO: 7 %
NEUTROPHILS # BLD AUTO: 6 10E3/UL (ref 1.6–8.3)
NEUTROPHILS NFR BLD AUTO: 58 %
NRBC # BLD AUTO: 0 10E3/UL
NRBC BLD AUTO-RTO: 0 /100
PLATELET # BLD AUTO: 336 10E3/UL (ref 150–450)
POTASSIUM SERPL-SCNC: 4.1 MMOL/L (ref 3.4–5.3)
RBC # BLD AUTO: 4.28 10E6/UL (ref 3.8–5.2)
SODIUM SERPL-SCNC: 137 MMOL/L (ref 135–145)
WBC # BLD AUTO: 10.5 10E3/UL (ref 4–11)

## 2023-10-06 PROCEDURE — 99285 EMERGENCY DEPT VISIT HI MDM: CPT | Mod: 25

## 2023-10-06 PROCEDURE — 85730 THROMBOPLASTIN TIME PARTIAL: CPT | Performed by: PHYSICIAN ASSISTANT

## 2023-10-06 PROCEDURE — 93926 LOWER EXTREMITY STUDY: CPT | Mod: LT

## 2023-10-06 PROCEDURE — 250N000011 HC RX IP 250 OP 636: Performed by: INTERNAL MEDICINE

## 2023-10-06 PROCEDURE — 85610 PROTHROMBIN TIME: CPT | Performed by: PHYSICIAN ASSISTANT

## 2023-10-06 PROCEDURE — 36415 COLL VENOUS BLD VENIPUNCTURE: CPT | Performed by: PHYSICIAN ASSISTANT

## 2023-10-06 PROCEDURE — 80048 BASIC METABOLIC PNL TOTAL CA: CPT | Performed by: PHYSICIAN ASSISTANT

## 2023-10-06 PROCEDURE — 85004 AUTOMATED DIFF WBC COUNT: CPT | Performed by: PHYSICIAN ASSISTANT

## 2023-10-06 PROCEDURE — 120N000001 HC R&B MED SURG/OB

## 2023-10-06 PROCEDURE — 93923 UPR/LXTR ART STDY 3+ LVLS: CPT

## 2023-10-06 PROCEDURE — 75635 CT ANGIO ABDOMINAL ARTERIES: CPT

## 2023-10-06 RX ORDER — IOPAMIDOL 755 MG/ML
90 INJECTION, SOLUTION INTRAVASCULAR ONCE
Status: COMPLETED | OUTPATIENT
Start: 2023-10-06 | End: 2023-10-06

## 2023-10-06 RX ORDER — IOPAMIDOL 755 MG/ML
90 INJECTION, SOLUTION INTRAVASCULAR ONCE
Status: DISCONTINUED | OUTPATIENT
Start: 2023-10-06 | End: 2023-10-06

## 2023-10-06 RX ADMIN — IOPAMIDOL 90 ML: 755 INJECTION, SOLUTION INTRAVENOUS at 14:57

## 2023-10-06 ASSESSMENT — ACTIVITIES OF DAILY LIVING (ADL)
ADLS_ACUITY_SCORE: 35
ADLS_ACUITY_SCORE: 35

## 2023-10-06 NOTE — ED NOTES
Expected Patient Referral to ED  10:43 AM    Referring Clinic/Provider:  Vascular clinic    Reason for referral/Clinical facts:  Femoral bypass on 9/8.  F/U US today shows that it is occluded.  Will likely need IR to intervene.     Recommendations provided:  Send to ED for further evaluation    Caller was informed that this institution does possess the capabilities and/or resources to provide for patient and should be transferred to our facility.    Discussed that if direct admit is sought and any hurdles are encountered, this ED would be happy to see the patient and evaluate.    Informed caller that recommendations provided are recommendations based only on the facts provided and that they responsible to accept or reject the advice, or to seek a formal in person consultation as needed and that this ED will see/treat patient should they arrive.      Triston Bansal MD  [facility] EMERGENCY DEPARTMENT  [address] [place] [id number]  [phone]       Triston Bansal MD  10/06/23 1045

## 2023-10-06 NOTE — DISCHARGE INSTRUCTIONS
As we discussed, you are set up for an outpatient procedure on Monday.  Do not eat anything after Sunday at midnight.  Presents to [facility] radiology department which is directly down the stairs from the main entrance at [facility] at 10:30 AM on Monday morning.  Your procedure is scheduled for 12 PM.  Do not take any of your blood thinners over the weekend.  Return to the emergency part with any new or worsening symptoms.    No plavix and no xarelto until after the procedure on Monday.

## 2023-10-06 NOTE — PHARMACY-ADMISSION MEDICATION HISTORY
Pharmacist Admission Medication History    Admission medication history is complete. The information provided in this note is only as accurate as the sources available at the time of the update.    Information Source(s): Patient via in-person, recent admission 9/8/23-9/15/23    Pertinent Information: patient was discharged to [facility] on 9/15/23. Called TCU - staff reported that patient left facility AMA on 9/22/23. Interviewed patient for medication history. Patient was knowledgeable about most meds except for Xarelto (new start during recent admission). Pharmacist looked up packaging and pill color/markings, but patient still did not recognize/think she was on medication. Marked approximate last dose on day patient left TCU, as medication was on orders to administer at facility.    Changes made to PTA medication list:  Added: None  Deleted:   Novolog pen - stopped short-acting insulin upon leaving TCU (back on oral antidiabetic medications)  Lidocaine patch - not taking  Changed: None    Medication Affordability:  Not including over the counter (OTC) medications, was there a time in the past 3 months when you did not take your medications as prescribed because of cost?: No    Allergies reviewed with patient and updates made in EHR: yes    Medication History Completed By: Nicollette McMann, RPH 10/6/2023 3:26 PM    Prior to Admission medications    Medication Sig Last Dose Taking? Auth Provider Long Term End Date   acetaminophen (TYLENOL) 325 MG tablet Take 2 tablets (650 mg) by mouth every 4 hours as needed for mild pain  at PRN Yes Prabhakar Arriaga,      amLODIPine (NORVASC) 5 MG tablet Take 5 mg by mouth daily 10/5/2023 at AM Yes Provider, Historical Yes    clopidogrel (PLAVIX) 75 MG tablet Take 75 mg by mouth daily 10/5/2023 at AM Yes Reported, Patient No    empagliflozin (JARDIANCE) 25 MG TABS tablet Take 1 tablet (25 mg) by mouth daily 10/5/2023 at AM Yes Neha Hicks NP No    ezetimibe  (ZETIA) 10 MG tablet Take 10 mg by mouth daily 10/5/2023 at AM Yes Unknown, Entered By History No    Insulin Degludec (TRESIBA) 100 UNIT/ML SOLN Inject 15 Units Subcutaneous 2 times daily 10/6/2023 at AM (15 units) Yes Reported, Patient     linagliptin (TRADJENTA) 5 MG TABS tablet Take 5 mg by mouth daily 10/5/2023 at AM Yes Reported, Patient Yes    metFORMIN (GLUCOPHAGE XR) 500 MG 24 hr tablet Take 1,000 mg by mouth daily 10/5/2023 at AM Yes Unknown, Entered By History No    methocarbamol (ROBAXIN) 500 MG tablet Take 1 tablet (500 mg) by mouth 4 times daily 10/5/2023 at PM Yes Tammy Ramirez PA-C     omeprazole (PRILOSEC) 20 MG DR capsule Take 20 mg by mouth daily as needed  at PRN Yes Unknown, Entered By History     rivaroxaban ANTICOAGULANT (XARELTO) 2.5 MG TABS tablet Take 1 tablet (2.5 mg) by mouth 2 times daily (with meals) 9/22/2023 Yes Prabhakar Arriaga A, DO Yes    traMADol (ULTRAM) 50 MG tablet Take 1 tablet (50 mg) by mouth every 6 hours as needed for moderate pain (Please give as adjunct to opiate pain meds) 10/5/2023 at AM Yes Saba Peng, LEILA

## 2023-10-06 NOTE — CONSULTS
Interventional Radiology - Pre-Procedure Note:  [facility]  10/06/2023     Procedure Requested: LLE angiogram with intervention, possible lytic directed therapy.  Requested by: Dr. Geller    History and Physical Reviewed: H&P documented within 30 days (by Xiao Lake on 9/7/23).  I have personally reviewed the patient's medical history and have updated the medical record as necessary.    Brief HPI: Ashanti Aviles is a 68 year old female with past medical Hx of Smoker, Hypertension, Diabetic, PAD, Angioplasty, Vascular Surgery, Surgical Follow-up, and Vascular Ulcers was referred to Ed today from vascular clinic. She is s/p 9/8 Left femoral to tibioperoneal trunk bypass using spliced 4mm and 5 mm Artegraft graft, Redo exposure of the left common femoral artery femoral bypass with Dr. Galeas. She was seen in clinic today and has had lower ext US and TRACIE. Which demonstrated decreased flow through graft, concern of clot in graft. Patient is not symptomatic. Denies increased leg pain or swelling.      IMAGING:  10/6 US LE reviewed. No formal results to review    NPO: since 1200  ANTICOAGULANTS: Xarelto2.5mg-last dose yesterday-48 hour hold required prior to IR intervention per SIR protocol  ANTIBIOTICS: not indicated    ALLERGIES  Allergies   Allergen Reactions    Oxycodone Nausea and Vomiting    Penicillins Unknown     Childhood rxn    Simvastatin Unknown     Muscle pain    Victoza [Liraglutide] Other (See Comments)     Binds bowels         LABS:  INR   Date Value Ref Range Status   10/06/2023 0.87 0.85 - 1.15 Final      Hemoglobin   Date Value Ref Range Status   10/06/2023 12.5 11.7 - 15.7 g/dL Final     Platelet Count   Date Value Ref Range Status   10/06/2023 336 150 - 450 10e3/uL Final     Creatinine   Date Value Ref Range Status   10/06/2023 0.72 0.51 - 0.95 mg/dL Final     Potassium   Date Value Ref Range Status   10/06/2023 4.1 3.4 - 5.3 mmol/L Final          EXAM:  BP (!) 143/70   Pulse 105   Temp 97.8  F (36.6  C) (Temporal)   Resp 12   Wt 67 kg (147 lb 11.3 oz)   SpO2 100%   BMI 27.91 kg/m    General:  Stable.  In no acute distress.    Neuro:  A&O x 3. Moves all extremities equally.  Resp:  Lungs clear to auscultation bilaterally. RA  Cardio:  S1S2 and reg, without murmur, clicks or rubs  Abdomen:  Soft, non-distended, non-tender,   Vascular:  left groin incision healing well. Left medial calf incision healing well. Foot iis warm and perfused. No doppler available at bedside.  Skin:  left great toe swelling/discomfort. no obvious open areas.   MSK:  No gross motor weakness.  Sensation intact.      Pre-Sedation Assessment:  Mallampati Airway Classification:  II - Faucial pillars and soft palate may be seen, but uvula is masked by the base of the tongue  Previous reaction to anesthesia/sedation:  No  Sedation plan based on assessment: Moderate (conscious) sedation  ASA Classification: Class 3 - SEVERE SYSTEMIC DISEASE, DEFINITE FUNCTIONAL LIMITATIONS.   Code Status: Full Code intra procedure, per discussion with patient.         ASSESSMENT/PLAN:   Keep NPO  Continue to hold anticoagulation  CTA lower extremity angiogram ordered  Pre procedure labs ordered  Vascular following      Procedural education reviewed with patient/family in detail including, but not limited to risks, benefits and alternatives with understanding verbalized by patient/family.    Total time spent on the date of the encounter: 60 minutes.      DOROTA ACOSTA CNP  Interventional Radiology    normal...

## 2023-10-06 NOTE — ED PROVIDER NOTES
Emergency Department Midlevel Supervisory Note     I personally saw the patient and performed a substantive portion of the visit including all aspects of the medical decision making.    ED Course:  12:26 PM  Luis Orozco PA-C staffed patient with me. I agree with their assessment and plan of management, and I will see the patient.  12:46 PM I met with the patient to introduce myself, gather additional history, perform my initial exam, and discuss the plan.     Brief HPI:     Ashanti Aviles is a 68 year old female who presents for evaluation of     Ashanti Aviles is a 68 year old female with a pertinent history of type 2 diabetes, peripheral artery disease, cataracts, paronychia, ischemic toe, critical limb ischemia in the left lower leg, previous femoral bypass, tibial to femoral on 09/08/2023 who presents to this ED for evaluation of abnormal lab results.  Patient had a follow-up appointment with vascular surgery today, had ultrasounds both venous and arterial done, was called by her clinic, to the emergency department.  On review of the chart, I am not able to see the results, however note from vascular surgery shows that there is possible soft arterial clot in the left side, around the bypass.  Instructed to come to the emergency department.  She is not having any symptoms now, no pain, no fevers, no numbness or tingling.  Incision apparently has been healing well.     I, Osvaldo Espinoza, am serving as a scribe to document services personally performed by Xiao Martinez MD, based on my observations and the provider's statements to me.   I, Xiao Martinez MD attest that Osvaldo Espinoza was acting in a scribe capacity, has observed my performance of the services and has documented them in accordance with my direction.    Brief Physical Exam: BP (!) 143/70   Pulse 105   Temp 97.8  F (36.6  C) (Temporal)   Resp 12   Wt 67 kg (147 lb 11.3 oz)   SpO2 100%   BMI 27.91 kg/m    Constitutional:  Alert, in no  acute distress  EYES: Conjunctivae clear  HENT:  Atraumatic, normocephalic  Respiratory:  Respirations even, unlabored, in no acute respiratory distress  Cardiovascular:  Regular rate and rhythm, good peripheral perfusion  GI: Soft, nondistended, nontender, no palpable masses, no rebound, no guarding   Musculoskeletal:  No edema. No cyanosis. Range of motion major extremities intact.  Left pedal pulse 1+, left toes slightly cool and left great toe slightly discolored and mildly tender without substantial redness or swelling.  Integument: Warm, Dry, No erythema, No rash.   Neurologic:  Alert & oriented, no focal deficits noted  Psych: Normal mood and affect     MDM:  Patient with left fem/tib bypass for claudication and PAD with occlusion on US with some worsening discomfort to great toe. Per discussion with vascular surgery, plan is to CTA, keep NPO, and they will determine dispo with IR after imaging, plan to admit to hospital medicine team.    Labs and Imaging:  Results for orders placed or performed during the hospital encounter of 10/06/23   CBC with platelets and differential   Result Value Ref Range    WBC Count 10.5 4.0 - 11.0 10e3/uL    RBC Count 4.28 3.80 - 5.20 10e6/uL    Hemoglobin 12.5 11.7 - 15.7 g/dL    Hematocrit 38.7 35.0 - 47.0 %    MCV 90 78 - 100 fL    MCH 29.2 26.5 - 33.0 pg    MCHC 32.3 31.5 - 36.5 g/dL    RDW 14.2 10.0 - 15.0 %    Platelet Count 336 150 - 450 10e3/uL    % Neutrophils 58 %    % Lymphocytes 33 %    % Monocytes 7 %    Mids % (Monos, Eos, Basos)      % Eosinophils 1 %    % Basophils 1 %    % Immature Granulocytes 0 %    NRBCs per 100 WBC 0 <1 /100    Absolute Neutrophils 6.0 1.6 - 8.3 10e3/uL    Absolute Lymphocytes 3.5 0.8 - 5.3 10e3/uL    Absolute Monocytes 0.7 0.0 - 1.3 10e3/uL    Mids Abs (Monos, Eos, Basos)      Absolute Eosinophils 0.2 0.0 - 0.7 10e3/uL    Absolute Basophils 0.1 0.0 - 0.2 10e3/uL    Absolute Immature Granulocytes 0.0 <=0.4 10e3/uL    Absolute NRBCs 0.0  10e3/uL     I have reviewed the relevant laboratory and radiology studies      Xiao Martinez MD  [facility] EMERGENCY DEPARTMENT  [facility] [facility] [id number] [phone]       Xiao Martinez MD  10/06/23 8941

## 2023-10-06 NOTE — ED PROVIDER NOTES
EMERGENCY DEPARTMENT ENCOUNTER      NAME: Ashanti Aviles  AGE: 68 year old female  YOB: 1955  MRN: [id number]  EVALUATION DATE & TIME: No admission date for patient encounter.    PCP: Kai Mast    ED PROVIDER: Ralph Orozco PA-C      Chief Complaint   Patient presents with    Deep Vein Thrombosis         FINAL IMPRESSION:  1. Femoral artery thrombosis, left (H)    2. Embolus (H)          ED COURSE & MEDICAL DECISION MAKING:    Pertinent Labs & Imaging studies reviewed. (See chart for details)  12:00 PM I met the patient and performed my initial interview and exam.   12:23 PM Spoke with Dr. Cash, IR recommends IV and admission. Will page out to vascular regarding further recommendations.   12:27 PM Patient staffed with Dr. Martinez.   12:34 PM  Spoke with Dr. Yoo, Vascular surgery, requesting CTA with bilateral runoffs.   1:29 PM Spoke with Dr. Fry, [facility] medicine who accepts the patient for admission.   3:57 PM received call back from interventional radiology, because the patient is on Xarelto, this needs to be held for at least 48 hours, IR recommending discharge and follow-up on Monday for outpatient procedure.  4:12 PM Patient updated on the plan.  Holding her anticoagulation, and will follow-up on Monday for procedure.  Discussed return cautions, patient states understanding.      68 year old female presents to the Emergency Department for evaluation of left sided possible arterial blood clot    ED Course as of 10/06/23 1558   Fri Oct 06, 2023   1221 Patient is a 68-year-old female, presents emergency department for evaluation of referral from vascular surgery.  Had a femoral artery femoral to tibial bypass done on 09/08/2023, had an ultrasound follow-up.  Today, which shows soft clot on the left side.  She is not having any symptoms here now.  Vascular surgery recommending IR recommendations.  Patient placed to interventional radiology regarding recommendations.  Basic  labs will be drawn here in the emergency department including INR, PTT, CBC, and BMP.  IV will be inserted.   1222 Spoke with Dr. Cash interventional radiology, who recommends admission for the patient.  Vascular recommending lytics.  Patient to be admitted to the hospital service at this time.   1247 Updated on plan here.  Plan for CTA of the abdomen pelvis with runoffs.  Updated on the plan for likely admission.  We will proceed with CTA, and the patient will be admitted to medicine.  Pending IR intervention and vascular surgery consultation.   1333 Spoke with hospital medicine, patient will be admitted at this time, pending CTA with runoffs.  Both IR, and vascular surgery involved and aware.  Patient admitted to hospital medicine.  She is aware of plan.  Possible lysis via IR, however have not heard conclusively whether or not that is the plan.   1510 I have independently reviewed the CT abdomen pelvis, do not appreciate any free air or perforations, pending final radiology read.   1558 Received another phone call from interventional radiology, unfortunately because the patient is currently on Xarelto, she needs to be off of this for at least 48 hours.  They are recommending holding this medication, and having a procedure done as an outpatient on Monday.  She needs to be n.p.o. at midnight, and return to [facility] emergency department to the radiology department at 1030 on Monday.  Patient updated on this.  Hospitalist additionally updated on this.  Patient be discharged at this time.     Medical Decision Making    History:  Supplemental history from: Documented in chart, if applicable  External Record(s) reviewed: Outpatient Record: Telephone call from vascular surgery showing soft clot in the left arterial from area surrounding previous bypass.  Previous admission from 09/08/2023 where patient was admitted for femoral to tibial bypass.     Work Up:  Chart documentation includes differential considered and  any EKGs or imaging independently interpreted by provider, where specified.  In additional to work up documented, I considered the following work up: Documented in chart, if applicable.    External consultation:  Discussion of management with another provider: Interventional Radiology    Complicating factors:  Care impacted by chronic illness: Anticoagulated State and Cerebrovascular Disease  Care affected by social determinants of health: N/A    Disposition considerations: Admit.       At the conclusion of the encounter I discussed the results of all of the tests and the disposition. The questions were answered. The patient or family acknowledged understanding and was agreeable with the care plan.     0 minutes of critical care time     MEDICATIONS GIVEN IN THE EMERGENCY:  Medications   iopamidol (ISOVUE-370) solution 90 mL (90 mLs Intravenous $Given 10/6/23 8662)       NEW PRESCRIPTIONS STARTED AT TODAY'S ER VISIT  New Prescriptions    No medications on file       =================================================================  HPI    Patient information was obtained from: Patient     Use of : N/A    Ashantigeni Aviles is a 68 year old female with a pertinent history of type 2 diabetes, peripheral artery disease, cataracts, paronychia, ischemic toe, critical limb ischemia in the left lower leg, previous femoral bypass, tibial to femoral on 09/08/2023 who presents to this ED for evaluation of abnormal lab results.  Patient had a follow-up appointment with vascular surgery today, had ultrasounds both venous and arterial done, was called by her clinic, to the emergency department.  On review of the chart, I am not able to see the results, however note from vascular surgery shows that there is possible soft arterial clot in the left side, around the bypass.  Instructed to come to the emergency department.  She is not having any symptoms now, no pain, no fevers, no numbness or tingling.  Incision apparently  has been healing well.    PAST MEDICAL HISTORY:  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/2013    PVD (peripheral vascular disease) (H24)        PAST SURGICAL HISTORY:  Past Surgical History:   Procedure Laterality Date    AMPUTATE TOE(S) Right 02/18/2023    Procedure: Amputation fourth toe right foot;  Surgeon: Benjamin Diez DPM;  [place] [facility] [place]    ANUS SURGERY      BIOPSY CERVICAL, LOCAL EXCISION, SINGLE/MULTIPLE      COLONOSCOPY N/A 09/11/2020    Procedure: EXAM UNDER ANESTHESIA, HIGH RESOLUTION ANOSCOPY INTRA OP;  Surgeon: Preeti Sheehan MD;  Location: [facility] OR;  Service: Gastroenterology    COLONOSCOPY N/A 12/14/2020    Procedure: EXAM UNDER ANESTHESIA WITH HIGH RESOLUTION ANOSCOPY;  Surgeon: Preeti Sheehan MD;  Location: [facility] OR;  Service: General    COLONOSCOPY N/A 06/15/2021    Procedure: EXAM UNDER ANESTHESIA WITH HIGH RESOLUTION ANOSCOPY, BIOPSY, FULGURATION;  Surgeon: Preeti Sheehan MD;  Location: [facility] OR;  Service: Gastroenterology    ENDARTERECTOMY FEMORAL Left 05/17/2023    Procedure: LEFT FEMORAL ENDARTERECTOMY WITH RETROGRADE ILIAC STENT;  Surgeon: Dyan Geller MD;  [place] [facility]    EXAM UNDER ANESTHESIA ANUS N/A 09/14/2021    Procedure: EXAM UNDER ANESTHESIA WITH HIGH RESOLUTION ANOSCOPY;  Surgeon: Preeti Sheehan MD;  Location: [facility] OR    FEMORAL ARTERY - TIBIAL ARTERY BYPASS GRAFT Left 09/08/2023    Procedure: CREATION, BYPASS, ARTERIAL, FEMORAL TO TIBIAL, LEFT;  Surgeon: Dyan Geller MD;  [place] [facility] [place]    IR LOWER EXTREMITY ANGIOGRAM LEFT  03/23/2023    IR LOWER EXTREMITY ANGIOGRAM RIGHT  02/16/2023    TUBAL LIGATION       CURRENT MEDICATIONS:    acetaminophen (TYLENOL) 325 MG tablet  amLODIPine (NORVASC) 5 MG  tablet  clopidogrel (PLAVIX) 75 MG tablet  empagliflozin (JARDIANCE) 25 MG TABS tablet  ezetimibe (ZETIA) 10 MG tablet  Insulin Degludec (TRESIBA) 100 UNIT/ML SOLN  linagliptin (TRADJENTA) 5 MG TABS tablet  metFORMIN (GLUCOPHAGE XR) 500 MG 24 hr tablet  methocarbamol (ROBAXIN) 500 MG tablet  omeprazole (PRILOSEC) 20 MG DR capsule  rivaroxaban ANTICOAGULANT (XARELTO) 2.5 MG TABS tablet  traMADol (ULTRAM) 50 MG tablet  Continuous Blood Gluc Sensor (FREESTYLE PRINCESS 14 DAY SENSOR) MISC       ALLERGIES:  Allergies   Allergen Reactions    Simvastatin Muscle Pain (Myalgia)     Muscle pain    Victoza [Liraglutide] Other (See Comments)     Binds bowels    Oxycodone Nausea and Vomiting    Penicillins Unknown     Childhood rxn       FAMILY HISTORY:  Family History   Problem Relation Age of Onset    Hypertension Mother     Colon Cancer Mother     Diabetes Type 2  Father     Hypertension Father     Kidney failure Father     Hyperthyroidism Sister     Breast Cancer Paternal Aunt        SOCIAL HISTORY:   Social History     Socioeconomic History    Marital status:    Tobacco Use    Smoking status: Former     Packs/day: 0.25     Types: Cigarettes     Quit date: 2023     Years since quittin.6    Smokeless tobacco: Never   Vaping Use    Vaping Use: Never used   Substance and Sexual Activity    Alcohol use: Not Currently     Comment: Alcoholic Drinks/day: 2x    Drug use: Not Currently    Sexual activity: Yes     Partners: Male       VITALS:  BP (!) 143/70   Pulse 105   Temp 97.8  F (36.6  C) (Temporal)   Resp 12   Wt 67 kg (147 lb 11.3 oz)   SpO2 100%   BMI 27.91 kg/m      PHYSICAL EXAM    Physical Exam  Vitals and nursing note reviewed.   Constitutional:       General: She is not in acute distress.     Appearance: Normal appearance. She is normal weight. She is not toxic-appearing or diaphoretic.   HENT:      Right Ear: External ear normal.      Left Ear: External ear normal.   Eyes:      Conjunctiva/sclera:  Conjunctivae normal.   Cardiovascular:      Rate and Rhythm: Normal rate and regular rhythm.      Pulses: Normal pulses.   Pulmonary:      Effort: Pulmonary effort is normal.   Abdominal:      General: There is no distension.      Palpations: Abdomen is soft.      Tenderness: There is no abdominal tenderness. There is no right CVA tenderness, left CVA tenderness, guarding or rebound.   Skin:     Findings: No erythema or rash.   Neurological:      Mental Status: She is alert. Mental status is at baseline.         LAB:  All pertinent labs reviewed and interpreted.  Labs Ordered and Resulted from Time of ED Arrival to Time of ED Departure   BASIC METABOLIC PANEL - Abnormal       Result Value    Sodium 137      Potassium 4.1      Chloride 99      Carbon Dioxide (CO2) 27      Anion Gap 11      Urea Nitrogen 17.0      Creatinine 0.72      GFR Estimate >90      Calcium 9.4      Glucose 269 (*)    INR - Normal    INR 0.87     PARTIAL THROMBOPLASTIN TIME - Normal    aPTT 29     CBC WITH PLATELETS AND DIFFERENTIAL    WBC Count 10.5      RBC Count 4.28      Hemoglobin 12.5      Hematocrit 38.7      MCV 90      MCH 29.2      MCHC 32.3      RDW 14.2      Platelet Count 336      % Neutrophils 58      % Lymphocytes 33      % Monocytes 7      Mids % (Monos, Eos, Basos)        % Eosinophils 1      % Basophils 1      % Immature Granulocytes 0      NRBCs per 100 WBC 0      Absolute Neutrophils 6.0      Absolute Lymphocytes 3.5      Absolute Monocytes 0.7      Mids Abs (Monos, Eos, Basos)        Absolute Eosinophils 0.2      Absolute Basophils 0.1      Absolute Immature Granulocytes 0.0      Absolute NRBCs 0.0         RADIOLOGY:  Reviewed all pertinent imaging. Please see official radiology report.  CTA Abdomen Pelvis Bilat Leg Runoff w Contr    (Results Pending)   CTA Lower Extremity Bilateral with Contrast    (Results Pending)   IR Thrombolysis Arterial Infusion Initial Day    (Results Pending)     PROCEDURES:   None.     Ralph  ERIC Orozco  Emergency Medicine  [facility] EMERGENCY DEPARTMENT  [address] [id number] [phone]  Dept: [phone]       Ralph Orozco PA-C  10/06/23 1334       Ralph Orozco PA-C  10/06/23 1415       Ralph Orozco PA-C  10/06/23 1613

## 2023-10-06 NOTE — TELEPHONE ENCOUNTER
Patient had post op ultrasound this morning. Had left femoral to tibial bypass done on 9/8/23 with Dr. Geller. Upon ultrasound, bypass was shown to be occluded with soft thrombus. Asymptomatic. Discussed with Dr. Geller. He wants her to go into [facility] ER to get admitted. She will need IR to lyse. Called patient and she understands to go in today. Relayed information with Dr. Bansal, [facility] ER.

## 2023-10-07 ENCOUNTER — PATIENT OUTREACH (OUTPATIENT)
Dept: CARE COORDINATION | Facility: CLINIC | Age: 68
End: 2023-10-07
Payer: COMMERCIAL

## 2023-10-07 NOTE — PROGRESS NOTES
"Clinic Care Coordination Contact  [facility]: Post-Discharge Note  SITUATION                                                      Admission:    Admission Date: 10/06/23   Reason for Admission: Femoral artery thrombosis, left  Discharge:   Discharge Date: 10/06/23  Discharge Diagnosis: Femoral artery thrombosis, left    BACKGROUND                                                      Per hospital discharge summary and inpatient provider notes:  Ashanti Aviles is a 68 year old female with a pertinent history of type 2 diabetes, peripheral artery disease, cataracts, paronychia, ischemic toe, critical limb ischemia in the left lower leg, previous femoral bypass, tibial to femoral on 09/08/2023 who presents to this ED for evaluation of abnormal lab results.  Patient had a follow-up appointment with vascular surgery today, had ultrasounds both venous and arterial done, was called by her clinic, to the emergency department.  On review of the chart, I am not able to see the results, however note from vascular surgery shows that there is possible soft arterial clot in the left side, around the bypass.  Instructed to come to the emergency department.  She is not having any symptoms now, no pain, no fevers, no numbness or tingling.  Incision apparently has been healing well.        ASSESSMENT           Discharge Assessment  How are you doing now that you are home?: \" I'm doing well\"  How are your symptoms? (Red Flag symptoms escalate to triage hotline per guidelines): Improved  Do you feel your condition is stable enough to be safe at home until your provider visit?: Yes  Does the patient have their discharge instructions? : Yes  Does the patient have questions regarding their discharge instructions? : No  Were you started on any new medications or were there changes to any of your previous medications? : Yes  Does the patient have all of their medications?: Yes  Do you have questions regarding any of your " medications? : No  Do you have all of your needed medical supplies or equipment (DME)?  (i.e. oxygen tank, CPAP, cane, etc.): Yes  Discharge follow-up appointment scheduled within 14 calendar days? : Yes  Discharge Follow Up Appointment Date: 10/09/23  Discharge Follow Up Appointment Scheduled with?: Specialty Care Provider    Post-op ([facility] CTA Only)  If the patient had a surgery or procedure, do they have any questions for a nurse?: No           PLAN                                                      Outpatient Plan:     Future Appointments   Date Time Provider Department Center   10[facility]9/2023 11:20 AM [facility]   10/9/2023 12:00 PM N IR 1 [id number]   10/11/2023  8:00 AM [facility]   10/13/2023  1:15 PM Kirstie Reich APRN [facility]         For any urgent concerns, please contact our 24 hour nurse triage line: [phone] ([phone])         Cherri Leblanc MA

## 2023-10-09 ENCOUNTER — HOSPITAL ENCOUNTER (OUTPATIENT)
Dept: INTERVENTIONAL RADIOLOGY/VASCULAR | Facility: HOSPITAL | Age: 68
Discharge: HOME | DRG: 301 | End: 2023-10-09
Attending: SURGERY
Payer: COMMERCIAL

## 2023-10-09 VITALS
RESPIRATION RATE: 16 BRPM | OXYGEN SATURATION: 100 % | DIASTOLIC BLOOD PRESSURE: 78 MMHG | SYSTOLIC BLOOD PRESSURE: 144 MMHG | HEART RATE: 70 BPM | TEMPERATURE: 97.7 F

## 2023-10-09 PROBLEM — T82.898A: Status: ACTIVE | Noted: 2023-10-09

## 2023-10-09 LAB — FIBRINOGEN PPP-MCNC: 361 MG/DL (ref 170–490)

## 2023-10-09 PROCEDURE — 36246 INS CATH ABD/L-EXT ART 2ND: CPT | Mod: LT

## 2023-10-09 PROCEDURE — 272N000117 HC CATH CR2

## 2023-10-09 PROCEDURE — C1887 CATHETER, GUIDING: HCPCS

## 2023-10-09 PROCEDURE — 272N000571 HC SHEATH CR8

## 2023-10-09 PROCEDURE — 75710 ARTERY X-RAYS ARM/LEG: CPT | Mod: LT

## 2023-10-09 PROCEDURE — 258N000003 HC RX IP 258 OP 636: Performed by: NURSE PRACTITIONER

## 2023-10-09 PROCEDURE — B41D1ZZ FLUOROSCOPY OF AORTA AND BILATERAL LOWER EXTREMITY ARTERIES USING LOW OSMOLAR CONTRAST: ICD-10-PCS | Performed by: RADIOLOGY

## 2023-10-09 PROCEDURE — C1769 GUIDE WIRE: HCPCS

## 2023-10-09 PROCEDURE — 272N000566 HC SHEATH CR3

## 2023-10-09 PROCEDURE — 272N000500 HC NEEDLE CR2

## 2023-10-09 PROCEDURE — 255N000002 HC RX 255 OP 636: Performed by: SURGERY

## 2023-10-09 PROCEDURE — 75774 ARTERY X-RAY EACH VESSEL: CPT | Mod: LT

## 2023-10-09 PROCEDURE — 272N000572 HC SHEATH CR9

## 2023-10-09 PROCEDURE — 272N000121 HC CATH CR6

## 2023-10-09 PROCEDURE — 250N000011 HC RX IP 250 OP 636: Performed by: NURSE PRACTITIONER

## 2023-10-09 PROCEDURE — 85384 FIBRINOGEN ACTIVITY: CPT | Performed by: NURSE PRACTITIONER

## 2023-10-09 PROCEDURE — 37211 THROMBOLYTIC ART THERAPY: CPT

## 2023-10-09 PROCEDURE — 36415 COLL VENOUS BLD VENIPUNCTURE: CPT | Performed by: NURSE PRACTITIONER

## 2023-10-09 RX ORDER — SODIUM CHLORIDE 9 MG/ML
INJECTION, SOLUTION INTRAVENOUS CONTINUOUS
Status: DISCONTINUED | OUTPATIENT
Start: 2023-10-09 | End: 2023-10-10 | Stop reason: HOSPADM

## 2023-10-09 RX ORDER — NALOXONE HYDROCHLORIDE 0.4 MG/ML
0.4 INJECTION, SOLUTION INTRAMUSCULAR; INTRAVENOUS; SUBCUTANEOUS
Status: DISCONTINUED | OUTPATIENT
Start: 2023-10-09 | End: 2023-10-10 | Stop reason: HOSPADM

## 2023-10-09 RX ORDER — HEPARIN SODIUM 10000 [USP'U]/100ML
500 INJECTION, SOLUTION INTRAVENOUS CONTINUOUS
Status: DISCONTINUED | OUTPATIENT
Start: 2023-10-09 | End: 2023-10-10 | Stop reason: HOSPADM

## 2023-10-09 RX ORDER — LIDOCAINE 40 MG/G
CREAM TOPICAL
Status: DISCONTINUED | OUTPATIENT
Start: 2023-10-09 | End: 2023-10-10 | Stop reason: HOSPADM

## 2023-10-09 RX ORDER — FLUMAZENIL 0.1 MG/ML
0.2 INJECTION, SOLUTION INTRAVENOUS
Status: DISCONTINUED | OUTPATIENT
Start: 2023-10-09 | End: 2023-10-10 | Stop reason: HOSPADM

## 2023-10-09 RX ORDER — IODIXANOL 320 MG/ML
100 INJECTION, SOLUTION INTRAVASCULAR ONCE
Status: DISCONTINUED | OUTPATIENT
Start: 2023-10-09 | End: 2023-10-10 | Stop reason: HOSPADM

## 2023-10-09 RX ORDER — FENTANYL CITRATE 50 UG/ML
25-50 INJECTION, SOLUTION INTRAMUSCULAR; INTRAVENOUS EVERY 5 MIN PRN
Status: DISCONTINUED | OUTPATIENT
Start: 2023-10-09 | End: 2023-10-10 | Stop reason: HOSPADM

## 2023-10-09 RX ORDER — NALOXONE HYDROCHLORIDE 0.4 MG/ML
0.2 INJECTION, SOLUTION INTRAMUSCULAR; INTRAVENOUS; SUBCUTANEOUS
Status: DISCONTINUED | OUTPATIENT
Start: 2023-10-09 | End: 2023-10-10 | Stop reason: HOSPADM

## 2023-10-09 RX ORDER — HEPARIN SODIUM 200 [USP'U]/100ML
20 INJECTION, SOLUTION INTRAVENOUS CONTINUOUS
Status: DISCONTINUED | OUTPATIENT
Start: 2023-10-09 | End: 2023-10-10 | Stop reason: HOSPADM

## 2023-10-09 RX ORDER — IODIXANOL 320 MG/ML
100 INJECTION, SOLUTION INTRAVASCULAR ONCE
Status: COMPLETED | OUTPATIENT
Start: 2023-10-09 | End: 2023-10-09

## 2023-10-09 RX ORDER — HEPARIN SODIUM 200 [USP'U]/100ML
1 INJECTION, SOLUTION INTRAVENOUS CONTINUOUS PRN
Status: DISCONTINUED | OUTPATIENT
Start: 2023-10-09 | End: 2023-10-10 | Stop reason: HOSPADM

## 2023-10-09 RX ADMIN — FENTANYL CITRATE 50 MCG: 50 INJECTION, SOLUTION INTRAMUSCULAR; INTRAVENOUS at 12:28

## 2023-10-09 RX ADMIN — SODIUM CHLORIDE: 9 INJECTION, SOLUTION INTRAVENOUS at 11:15

## 2023-10-09 RX ADMIN — MIDAZOLAM HYDROCHLORIDE 0.5 MG: 1 INJECTION, SOLUTION INTRAMUSCULAR; INTRAVENOUS at 12:20

## 2023-10-09 RX ADMIN — MIDAZOLAM HYDROCHLORIDE 2 MG: 1 INJECTION, SOLUTION INTRAMUSCULAR; INTRAVENOUS at 12:02

## 2023-10-09 RX ADMIN — FENTANYL CITRATE 50 MCG: 50 INJECTION, SOLUTION INTRAMUSCULAR; INTRAVENOUS at 12:04

## 2023-10-09 RX ADMIN — MIDAZOLAM HYDROCHLORIDE 0.5 MG: 1 INJECTION, SOLUTION INTRAMUSCULAR; INTRAVENOUS at 12:09

## 2023-10-09 RX ADMIN — HEPARIN SODIUM 8 BAG: 200 INJECTION, SOLUTION INTRAVENOUS at 13:30

## 2023-10-09 RX ADMIN — IODIXANOL 80 ML: 320 INJECTION, SOLUTION INTRAVASCULAR at 13:09

## 2023-10-09 RX ADMIN — FENTANYL CITRATE 50 MCG: 50 INJECTION, SOLUTION INTRAMUSCULAR; INTRAVENOUS at 12:14

## 2023-10-09 RX ADMIN — MIDAZOLAM HYDROCHLORIDE 0.5 MG: 1 INJECTION, SOLUTION INTRAMUSCULAR; INTRAVENOUS at 12:43

## 2023-10-09 ASSESSMENT — ACTIVITIES OF DAILY LIVING (ADL)
ADLS_ACUITY_SCORE: 35

## 2023-10-09 NOTE — DISCHARGE INSTRUCTIONS
Angiogram Discharge Instructions:    You had an angiogram procedure. An angiogram is a procedure thatuses x-rays to take pictures of your blood vessels. A long, flexible tube or catheter is inserted through the blood stream (through the procedure site) to help deliver contrast (dye) into the arteries so they can be visibleon the x-ray. Angiograms are used to evaluate and treat possible blockages or other disease in the arterial system. Please follow the below instructions after your angiogram, including monitoring of your procedure site.    Care instructions after angiogram procedure:    - If you received sedation for your procedure, do not drive or operate heavy machinery for the rest of the day.    - Do not lift objects greater than 10 poundsfor 2 days following angiogram procedure.    - Avoid excessive exercise and straining for 2 days.    - Avoid tub baths, pools, hot tubs and Jacuzzis for 3 days or until procedure site is well healed.    - Youmay shower beginning tomorrow. Do not scrub procedure site until well healed; pat dry.    - Return to your normal activities as you tolerate after the 2 day restriction.    - You can expect to return to work 1-2days after your procedure - depending on the nature of your profession.    - It is normal to have some tenderness and minimal swelling at procedure puncture site. A small area of discoloration may be present.Tenderness typically subsides in 1-2 days. A small knot may also be present at puncture site for 6-8 weeks. This can be a normal part of the healing process.    Medications and other post-procedure care:    -If you had a blockage opened please make sure to fill your prescription for any new medication, such as Plavix, that you may have been prescribed and take it everyday    - If you have kidney function issues, please makesure to hydrate yourself well for the next two days by drinking lots of fluids to help clear your body of the dye used. If you have heart  problems such as heart failure, this may have to be moderated.    - If you are onMetformin, please do not resume it for 48hrs.    Follow up:    - Follow up with your vascular surgery team at the [facility] vascular center A follow up appointment should already be arranged for you.If you are unsure of your appointment or do not have a follow up appointment in the next 2-3 weeks, please call our office at [phone]. You will need to have an ultrasound 2-3 weeks after your angiogram and should bescheduled at the time of your follow up appt.    Further follow up will be based on ultrasound results. Typical follow up is every 3 months for the first year, then every 6 months to one year thereafter.    seek medical evaluation for:    - If you develop fevers (greater than 101 F (38.3C)).    - If you develop increasing pain, redness, purulent drainage, tenderness, or swelling at procedure site.    If you experience any bleeding from procedure/puncture site: lie down, firmly apply pressure to puncture site and call 911.    - Seek emergent evaluation if you experience any new leg/arm pain, discoloration ornumbness.    Call the vascular center at [phone] with questions/concerns or if you have any of the above symptoms.

## 2023-10-09 NOTE — SEDATION DOCUMENTATION
Patient Name: Ashanti Aviles  Medical Record Number: [id number]  Today's Date: 10/9/2023    Procedure: angiogram with possible lytics  Proceduralist: Dr. Hoffmann2    Procedure Start: 1200  Procedure end: 1300  Sedation medications administered: 3.5 mg midazolam and 150 mcg fentanyl   Sedation time: 60 minutes      Other Notes: Pt arrived to IR. Consent reviewed. Pt denies any questions or concerns regarding procedure. Pt positioned supine and monitored per protocol. Pt tolerated procedure without any noted complications. Angiogram only. VSS. Pt transferred back to holding for sheath pull.

## 2023-10-09 NOTE — PROGRESS NOTES
Interventional Radiology - Pre-Procedure Note:  Outpatient - [facility]  10/09/2023     Procedure Requested: LLE angiogram with lytics initiation  Requested by: Dr. Cash / Dr. Geller    History and Physical Reviewed: H&P documented within 30 days (by  Xiao /Aleksandra on 9/7/23  on 9/7/23).  I have personally reviewed the patient's medical history and have updated the medical record as necessary.    Brief HPI: Ashanti Aviles is a 68 year old female with past medical Hx of smoker, HTN, DM II, PAD, ischemic toe, critical limb ischemia in LLE, s/p angioplasty, and Vascular Ulcers was referred to ED 10/6 from vascular clinic. She is s/p 9/8 Left femoral to tibioperoneal trunk bypass using spliced 4mm and 5 mm Artegraft graft, Redo exposure of the left common femoral artery femoral bypass with Dr. Galeas.     She was seen in clinic 10/6 with lower ext US and TRACIE completed, which demonstrated decreased flow through graft and concern of clot in graft. Patient was not symptomatic and denied leg pain or swelling; told to present to ED.     Found to be on Xarelto which was recommended to be held 48hr prior to proceeding with LLE angiogram. Patient was subsequently discharged and presents today for LLE angiogram with likely lytic administration     IMAGING:    Study Result    Narrative & Impression   EXAM: CTA ABDOMEN PELVIS BILAT LEG RUNOFF W CONTR  LOCATION: [facility]  DATE: 10/6/2023     INDICATION: PAD. Occluded left femoral posterior tibial bypass bypass COMPARISON: CTA 02/14/2023. Duplex 10/06/2023  TECHNIQUE: Helical acquisition through the abdomen, pelvis, and bilateral lower extremities was performed during the arterial phase of contrast enhancement using IV Contrast. 2D and 3D reconstructions were performed by the CT technologist. Dose reduction   techniques were used.   CONTRAST: isovue 370 90ml     FINDINGS:  AORTA: Calcified plaque at the origin of the  celiac artery. The superior mesenteric and inferior mesenteric arteries are patent. Single renal arteries bilaterally. Calcified plaque at the origin of the renal arteries bilaterally. Calcified plaque   infrarenal abdominal aorta. No evidence of abdominal aortic aneurysm. Patent left common iliac artery stent high-grade stenosis at the distal stent edge. High-grade stenosis proximal left internal iliac artery. Diffusely small left external iliac artery.   Atherosclerotic disease right common iliac artery with moderate 60% moderate stenosis at the origin of the right internal and right external iliac arteries. Diffusely small right external iliac artery. Associated stenosis unchanged in comparison to   previous study.     RIGHT LEG: Dense calcified plaque posterior wall right common femoral artery. Right profunda femoral artery patent. Diffuse severe atherosclerotic disease right superficial femoral artery. High-grade stenosis mid right popliteal artery. Three-vessel   runoff into the right foot.     LEFT LEG: Postoperative changes left] endarterectomy. Left groin lymph node. Left profundofemoral artery patent. Left SFA occluded. Left popliteal artery occluded Occluded left femoral posterior tibial bypass. Two-vessel runoff via the posterior tibial   and peroneal arteries. Proximal opacification of the left anterior tibial artery.     LUNG BASES: Unremarkable     ABDOMEN: The liver, gallbladder, pancreas, spleen, adrenal glands and kidneys are unremarkable. Splenule inferior pole the spleen.     PELVIS: Calcified fibroid.                                                                      IMPRESSION:  1.  Patent left common iliac artery stent with high-grade stenosis at the distal stent edge.  2.  Occluded left femoral to posterior tibial bypass graft. Two-vessel runoff via the posterior tibial and peroneal arteries.  3.  Moderate stenosis right common iliac artery. Severe diffuse atherosclerotic disease right  superficial femoral artery. High-grade stenosis right popliteal artery with three-vessel runoff into the right foot.       NPO: midnight  ANTICOAGULANTS: Xarelto - states has not taken any time recently and last dose was d/t surgery  ANTIBIOTICS: none indicated for exchange    ALLERGIES  Allergies   Allergen Reactions    Simvastatin Muscle Pain (Myalgia)     Muscle pain    Victoza [Liraglutide] Other (See Comments)     Binds bowels    Oxycodone Nausea and Vomiting    Penicillins Unknown     Childhood rxn         LABS:  INR   Date Value Ref Range Status   10/06/2023 0.87 0.85 - 1.15 Final      Hemoglobin   Date Value Ref Range Status   10/06/2023 12.5 11.7 - 15.7 g/dL Final     Platelet Count   Date Value Ref Range Status   10/06/2023 336 150 - 450 10e3/uL Final     Creatinine   Date Value Ref Range Status   10/06/2023 0.72 0.51 - 0.95 mg/dL Final     Potassium   Date Value Ref Range Status   10/06/2023 4.1 3.4 - 5.3 mmol/L Final         EXAM:  BP (!) 173/81   Pulse 65   Temp 97.7  F (36.5  C)   Resp 16   SpO2 100%   General:  Stable.  In no acute distress.    Neuro:  A&O x 3. Moves all extremities equally.  Resp:  Lungs clear to auscultation bilaterally.  Cardio:  S1S2 and reg, without murmur, clicks or rubs  Abdomen:  Soft, non-distended, non-tender.  Vascular:  +2/4 bilateral femoral pulses  Skin:  Without excoriations, ecchymosis, erythema, lesions or open sores on bilateral femoral groin sites.      Pre-Sedation Assessment:  Mallampati Airway Classification:  II - Faucial pillars and soft palate may be seen, but uvula is masked by the base of the tongue  Previous reaction to anesthesia/sedation:  No  Sedation plan based on assessment: Moderate (conscious) sedation  ASA Classification: Class 3 - SEVERE SYSTEMIC DISEASE, DEFINITE FUNCTIONAL LIMITATIONS.   Code Status: Full Code intra procedure, per discussion with patient.       Pre-Thombolytics Assessment:  Patient denies history of stroke, known AV  malformations, aneurysms, or aortic dissection, cancer, bleeding disorders, hemoptysis, hematuria, hematochezia/GI bleed, recent trauma/falls, renal or liver disease, endocarditis, pericarditis, peritonitis, current use of anticoagulants, pregnancy, prolonged/traumatic CPR, severe uncontrolled acute or chronic severe hypertension, and recent surgery, biopsies or joint injections.    ASSESSMENT/PLAN:   Left lower extremity angiogram with potential intervention including angioplasty, stenting and/or lytics initiation with sedation .     Procedural education reviewed with patient in detail including, but not limited to risks, benefits and alternatives with understanding verbalized by patient.    If lytics initiated, patient in need of admission to ICU while running and subsequent angiogram would be performed 10/10. Admission order placed. Recommend Vascular Surgery consult while patient admitted for recommendations.      Total time spent on the date of the encounter: 45 minutes.    DOROTA King CNP  Interventional Radiology

## 2023-10-09 NOTE — PROVIDER NOTIFICATION
Log-roll for bedpan use. Pt. With large amount of clear yellow urine output. Groin site clean, dry and intact post bedpan.

## 2023-10-09 NOTE — PROCEDURES
[facility]    Procedure: IR Procedure Note    Date/Time: 10/9/2023 1:06 PM    Performed by: Anmol Hoffmann MD  Authorized by: Anmol Hoffmann MD      UNIVERSAL PROTOCOL   Site Marked: NA  Prior Images Obtained and Reviewed:  Yes  Required items: Required blood products, implants, devices and special equipment available    Patient identity confirmed:  Verbally with patient, arm band, provided demographic data and hospital-assigned identification number  Patient was reevaluated immediately before administering moderate or deep sedation or anesthesia  Confirmation Checklist:  Patient's identity using two indicators, relevant allergies, procedure was appropriate and matched the consent or emergent situation and correct equipment/implants were available  Time out: Immediately prior to the procedure a time out was called    Universal Protocol: the Joint Commission Universal Protocol was followed    Preparation: Patient was prepped and draped in usual sterile fashion       ANESTHESIA    Anesthesia:  Local infiltration  Local Anesthetic:  Lidocaine 1% without epinephrine      SEDATION  Patient Sedated: Yes    Sedation:  See MAR for details  Vital signs: Vital signs monitored during sedation    See dictated procedure note for full details.  Findings: Tolerated well    Specimens: none    Complications: None    Condition: Stable    Plan: D.c to home  F/up with vascular surgery      PROCEDURE  Describe Procedure: Right CFA access  Aortogram shows patent MIKE/EIA without high grade stenosis  LLE bypass is occluded  Tiny stump of bypass apparent.  I was able to place a catheter in this stump but a wire would not pass.  Multiple catheters and wire combinations attempted with sheath up and over into EIA for support with no better success.   Right sheaths removed, hemostasis manual compression.  Plan:  discharge to home, f/up with vasc surgery.  Patient Tolerance:  Patient tolerated the  procedure well with no immediate complications  Length of time physician/provider present for 1:1 monitoring during sedation: 60

## 2023-10-09 NOTE — PRE-PROCEDURE
GENERAL PRE-PROCEDURE:   Procedure:  LLE angiogram  Date/Time:  10/9/2023 11:37 AM    Written consent obtained?: Yes    Risks and benefits: Risks, benefits and alternatives were discussed    Consent given by:  Patient  Patient states understanding of procedure being performed: Yes    Patient's understanding of procedure matches consent: Yes    Procedure consent matches procedure scheduled: Yes    Expected level of sedation:  Moderate  Appropriately NPO:  Yes  ASA Class:  3  Mallampati  :  Grade 2- soft palate, base of uvula, tonsillar pillars, and portion of posterior pharyngeal wall visible  Lungs:  Lungs clear with good breath sounds bilaterally  Heart:  Normal heart sounds and rate  History & Physical reviewed:  History and physical reviewed and no updates needed  Statement of review:  I have reviewed the lab findings, diagnostic data, medications, and the plan for sedation

## 2023-10-09 NOTE — PROVIDER NOTIFICATION
Patient has been up in chair or walking x 30 min. Right groin remains soft and flat, patient states she feels good, no pain. Verbalized understanding of written discharge instructions. Discharged via wheelchair to car with family members. Belongings returned.

## 2023-10-10 ASSESSMENT — ACTIVITIES OF DAILY LIVING (ADL)
ADLS_ACUITY_SCORE: 35

## 2023-10-10 NOTE — TELEPHONE ENCOUNTER
Patient ended up having a procedure yesterday and had to reschedule her follow up with Dr. Geller to next week 10/16; she would like her return to work note updated to reflect this.

## 2023-10-11 ENCOUNTER — OFFICE VISIT (OUTPATIENT)
Dept: VASCULAR SURGERY | Facility: CLINIC | Age: 68
End: 2023-10-11
Attending: PODIATRIST
Payer: COMMERCIAL

## 2023-10-11 VITALS
RESPIRATION RATE: 16 BRPM | HEART RATE: 83 BPM | TEMPERATURE: 97.8 F | OXYGEN SATURATION: 100 % | DIASTOLIC BLOOD PRESSURE: 83 MMHG | SYSTOLIC BLOOD PRESSURE: 147 MMHG

## 2023-10-11 DIAGNOSIS — L97.523 ULCER OF GREAT TOE, LEFT, WITH NECROSIS OF MUSCLE (H): Primary | ICD-10-CM

## 2023-10-11 PROCEDURE — 99213 OFFICE O/P EST LOW 20 MIN: CPT | Performed by: PODIATRIST

## 2023-10-11 PROCEDURE — 99215 OFFICE O/P EST HI 40 MIN: CPT | Performed by: PODIATRIST

## 2023-10-11 RX ORDER — GABAPENTIN 300 MG/1
300 CAPSULE ORAL 3 TIMES DAILY
Qty: 60 CAPSULE | Refills: 1 | Status: ON HOLD | OUTPATIENT
Start: 2023-10-11 | End: 2024-01-12

## 2023-10-11 ASSESSMENT — PAIN SCALES - GENERAL: PAINLEVEL: MILD PAIN (2)

## 2023-10-11 NOTE — PROGRESS NOTES
FOOT AND ANKLE SURGERY/PODIATRY Progress Note      ASSESSMENT:   Ulceration left hallux  PAD      TREATMENT:  -I discussed with the patient that there is a stable eschar along the dorsal left hallux without signs of infection.    -I reviewed the patient's most recent TRACIE report which indicates a left TBI of 30 mmHg.    -Based on the above, I recommend we continue with application of iodine daily.  I would like to delay any aggressive debridement till after the patient sees Dr. Geller to evaluate for any additional intervention.    -She will follow-up with me in 4 weeks    Rene Ordaz DPM  [facility]      HPI: Ashanti Aviles was seen again today for a left hallux ulceration.  Patient reports that over the past several days she has noticed a callus type buildup over the dorsal part of the toe.  No active drainage.  She is scheduled to see Dr. Geller early next week.    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/2013    PVD (peripheral vascular disease) (H24)        Past Surgical History:   Procedure Laterality Date    AMPUTATE TOE(S) Right 02/18/2023    Procedure: Amputation fourth toe right foot;  Surgeon: Benjamin Diez DPM;  [place] [facility]    ANUS SURGERY      BIOPSY CERVICAL, LOCAL EXCISION, SINGLE/MULTIPLE      COLONOSCOPY N/A 09/11/2020    Procedure: EXAM UNDER ANESTHESIA, HIGH RESOLUTION ANOSCOPY INTRA OP;  Surgeon: Preeti Sheehan MD;  Location: [facility];  Service: Gastroenterology    COLONOSCOPY N/A 12/14/2020    Procedure: EXAM UNDER ANESTHESIA WITH HIGH RESOLUTION ANOSCOPY;  Surgeon: Preeti Sheehan MD;  Location: [facility];  Service: General    COLONOSCOPY N/A 06/15/2021    Procedure: EXAM UNDER ANESTHESIA WITH HIGH RESOLUTION ANOSCOPY, BIOPSY, FULGURATION;  Surgeon: Aguila  Preeti Willson MD;  Location: [facility];  Service: Gastroenterology    ENDARTERECTOMY FEMORAL Left 05/17/2023    Procedure: LEFT FEMORAL ENDARTERECTOMY WITH RETROGRADE ILIAC STENT;  Surgeon: Dyan Geller MD;  [place] [facility]    EXAM UNDER ANESTHESIA ANUS N/A 09/14/2021    Procedure: EXAM UNDER ANESTHESIA WITH HIGH RESOLUTION ANOSCOPY;  Surgeon: Preeti Sheehan MD;  Location: [facility]    FEMORAL ARTERY - TIBIAL ARTERY BYPASS GRAFT Left 09/08/2023    Procedure: CREATION, BYPASS, ARTERIAL, FEMORAL TO TIBIAL, LEFT;  Surgeon: Dyan Geller MD;  [place] [facility]    IR LOWER EXTREMITY ANGIOGRAM LEFT  03/23/2023    IR LOWER EXTREMITY ANGIOGRAM RIGHT  02/16/2023    IR THROMBOLYSIS ARTERIAL INFUSION INITIAL DAY  10/9/2023    TUBAL LIGATION         Allergies   Allergen Reactions    Simvastatin Muscle Pain (Myalgia)     Muscle pain    Victoza [Liraglutide] Other (See Comments)     Binds bowels    Oxycodone Nausea and Vomiting    Penicillins Unknown     Childhood rxn         Current Outpatient Medications:     acetaminophen (TYLENOL) 325 MG tablet, Take 2 tablets (650 mg) by mouth every 4 hours as needed for mild pain, Disp: , Rfl:     amLODIPine (NORVASC) 5 MG tablet, Take 5 mg by mouth daily, Disp: , Rfl:     clopidogrel (PLAVIX) 75 MG tablet, Take 75 mg by mouth daily, Disp: , Rfl:     Continuous Blood Gluc Sensor (FREESTYLE PRINCESS 14 DAY SENSOR) MISC, , Disp: , Rfl:     empagliflozin (JARDIANCE) 25 MG TABS tablet, Take 1 tablet (25 mg) by mouth daily, Disp: 90 tablet, Rfl: 1    ezetimibe (ZETIA) 10 MG tablet, Take 10 mg by mouth daily, Disp: , Rfl:     gabapentin (NEURONTIN) 300 MG capsule, Take 1 capsule (300 mg) by mouth 3 times daily, Disp: 60 capsule, Rfl: 1    Insulin Degludec (TRESIBA) 100 UNIT/ML SOLN, Inject 15 Units Subcutaneous 2 times daily, Disp: , Rfl:     linagliptin (TRADJENTA) 5 MG TABS tablet, Take 5 mg by mouth daily, Disp: , Rfl:      metFORMIN (GLUCOPHAGE XR) 500 MG 24 hr tablet, Take 1,000 mg by mouth daily, Disp: , Rfl:     methocarbamol (ROBAXIN) 500 MG tablet, Take 1 tablet (500 mg) by mouth 4 times daily, Disp: 90 tablet, Rfl: 0    omeprazole (PRILOSEC) 20 MG DR capsule, Take 20 mg by mouth daily as needed, Disp: , Rfl:     rivaroxaban ANTICOAGULANT (XARELTO) 2.5 MG TABS tablet, Take 1 tablet (2.5 mg) by mouth 2 times daily (with meals), Disp: , Rfl:     traMADol (ULTRAM) 50 MG tablet, Take 1 tablet (50 mg) by mouth every 6 hours as needed for moderate pain (Please give as adjunct to opiate pain meds), Disp: 45 tablet, Rfl: 0    Review of Systems - 10 point Review of Systems is negative except for ulceration left hallux which is noted in HPI.      OBJECTIVE:  BP (!) 147/83   Pulse 83   Temp 97.8  F (36.6  C)   Resp 16   SpO2 100%   General appearance: Patient is alert and fully cooperative with history & exam.  No sign of distress is noted during the visit.    Vascular: Dorsalis pedis non-palpableLeft.  Dermatologic:    Wound Toe (Comment  which one) Other (comment) (Active)   Wound Bed Other (Comment) 09/14/23 1600   Drainage Amount None 09/14/23 0100   Dressing Open to air 09/14/23 1007       VASC Wound Left Hallux (Active)   Pre Size Length 0.6 08/25/23 0800   Pre Size Width 0.4 08/25/23 0800   Pre Size Depth 0.5 08/25/23 0800   Pre Total Sq cm 0.24 08/25/23 0800   Post Size Length 0 10/11/23 0800   Post Size Width 0 10/11/23 0800   Post Size Depth 0 10/11/23 0800   Post Total Sq cm 0 10/11/23 0800   Description eschar 10/11/23 0800       VASC Wound surgical (Active)   Pre Size Length 1 06/29/23 1200   Pre Size Width 0.5 06/29/23 1200   Pre Size Depth 0.2 06/29/23 1200   Pre Total Sq cm 0.5 06/29/23 1200   Product Used Alginate 06/05/23 0800       VASC Wound right plantar foot (Active)       Incision/Surgical Site 02/18/23 Right Toe (Comment  which one) (Active)       Incision/Surgical Site 09/08/23 Left Leg (Active)   Incision  Assessment WDL 09/15/23 0830   Loan-Incision Assessment Edema 09/14/23 1600   Closure Liquid bandage 09/15/23 0830   Incision Drainage Amount None 09/15/23 0830   Incision Care Soap and water 09/12/23 1600   Dressing Intervention Clean, dry, intact 09/15/23 0830       Incision/Surgical Site 10/09/23 Anterior;Right Greater Trochanter (Active)   Incision Assessment WDL 10/09/23 1630   Closure Adhesive strip(s) 10/09/23 1630   Incision Drainage Amount None 10/09/23 1630   Dressing Intervention Clean, dry, intact 10/09/23 1630   Stable eschar dorsal medial left hallux.  No erythema left foot.  Neurologic: Intact to light touch Left.  Musculoskeletal: Contracted digits noted Left.      Picture:

## 2023-10-14 ENCOUNTER — OFFICE VISIT (OUTPATIENT)
Dept: FAMILY MEDICINE | Facility: CLINIC | Age: 68
End: 2023-10-14
Payer: COMMERCIAL

## 2023-10-14 VITALS
HEART RATE: 78 BPM | TEMPERATURE: 98.4 F | SYSTOLIC BLOOD PRESSURE: 148 MMHG | DIASTOLIC BLOOD PRESSURE: 86 MMHG | OXYGEN SATURATION: 100 % | RESPIRATION RATE: 16 BRPM | BODY MASS INDEX: 28.04 KG/M2 | WEIGHT: 148.4 LBS

## 2023-10-14 DIAGNOSIS — R22.42 LOCALIZED SWELLING OF TOE OF LEFT FOOT: Primary | ICD-10-CM

## 2023-10-14 LAB — URATE SERPL-MCNC: 3.5 MG/DL (ref 2.4–5.7)

## 2023-10-14 PROCEDURE — 84550 ASSAY OF BLOOD/URIC ACID: CPT | Performed by: NURSE PRACTITIONER

## 2023-10-14 PROCEDURE — 36415 COLL VENOUS BLD VENIPUNCTURE: CPT | Performed by: NURSE PRACTITIONER

## 2023-10-14 PROCEDURE — 99214 OFFICE O/P EST MOD 30 MIN: CPT | Performed by: NURSE PRACTITIONER

## 2023-10-14 RX ORDER — COLCHICINE 0.6 MG/1
0.6 TABLET ORAL DAILY
Qty: 10 TABLET | Refills: 0 | Status: SHIPPED | OUTPATIENT
Start: 2023-10-14 | End: 2023-10-24

## 2023-10-14 ASSESSMENT — PAIN SCALES - GENERAL: PAINLEVEL: SEVERE PAIN (6)

## 2023-10-14 NOTE — PATIENT INSTRUCTIONS
Take colcrys daily until pain is gone    The uric acid level will be back this afternoon.      If it is negative you have to go to the ER for urgent ultrasound to look at the blood vessels in your leg incase there is another clot blocking the blood flow to the leg.      Results for orders placed or performed in visit on 10/14/23   Uric acid     Status: Normal   Result Value Ref Range    Uric Acid 3.5 2.4 - 5.7 mg/dL     The uric acid was normal.  With the recent surgery/thrombus I do want you to go to the ER for emergent evaluation to rule out recurrent clot.    Called and discussed with her and she agrees with the plan.

## 2023-10-14 NOTE — PROGRESS NOTES
Assessment & Plan     Localized swelling of toe of left foot  - Uric acid  - colchicine (COLCRYS) 0.6 MG tablet  Dispense: 10 tablet; Refill: 0  - Uric acid     Patient Instructions   Take colcrys daily until pain is gone    The uric acid level will be back this afternoon.      If it is negative you have to go to the ER for urgent ultrasound to look at the blood vessels in your leg incase there is another clot blocking the blood flow to the leg.      Results for orders placed or performed in visit on 10/14/23   Uric acid     Status: Normal   Result Value Ref Range    Uric Acid 3.5 2.4 - 5.7 mg/dL     The uric acid was normal.  With the recent surgery/thrombus I do want you to go to the ER for emergent evaluation to rule out recurrent clot.    Called and discussed with her and she agrees with the plan.        Return in about 1 week (around 10/21/2023) for with regular provider if symptoms persist.    DOROTA Shankar CNP  [facility]     Ashanti is a 68 year old female who presents to clinic today for the following health issues:  Chief Complaint   Patient presents with    Pain     Left foot has pain and a little swollen started Thursday, pt reports she has not ever had gout- she got on [employer] and daughter had it and the symptoms seem the same     HPI    MS Injury/Pain    Onset of symptoms was 3 day(s) ago.  Location: left big toe  Context:       The injury happened while n/a      Mechanism: n/a      Patient experienced delayed pain, delayed swelling  Course of symptoms is worsening.    Severity moderately severe  Current and Associated symptoms: Pain, Swelling, and Redness  Denies  n/a  Aggravating Factors: walking, weight-bearing, and flexion/extension  Therapies to improve symptoms include: ibuprofen  This is the first time this type of problem has occurred for this patient.     Had left femoral artery thrombus with bypass grafting after ischemia and leading to ulcers in  the left foot/toe in the last 6 months.    This feels different.  The toe had fully healed and this popped up in the last 3 days.      Review of Systems  Constitutional, HEENT, cardiovascular, pulmonary, GI, , musculoskeletal, neuro, skin, endocrine and psych systems are negative, except as otherwise noted.      Objective    BP (!) 148/86   Pulse 78   Temp 98.4  F (36.9  C) (Oral)   Resp 16   Wt 67.3 kg (148 lb 6.4 oz)   SpO2 100%   BMI 28.04 kg/m    Physical Exam   GENERAL: healthy, alert and no distress  RESP: lungs clear to auscultation - no rales, rhonchi or wheezes  CV: regular rate and rhythm, normal S1 S2, no S3 or S4, no murmur, click or rub, no peripheral edema and peripheral pulses strong  ABDOMEN: soft, nontender, no hepatosplenomegaly, no masses and bowel sounds normal  MS: LLE exam shows erythema and edema to PIP left great toe  SKIN: erythema - toes - left big toe at MTP joint

## 2023-10-16 ENCOUNTER — OFFICE VISIT (OUTPATIENT)
Dept: VASCULAR SURGERY | Facility: CLINIC | Age: 68
End: 2023-10-16
Attending: SURGERY
Payer: COMMERCIAL

## 2023-10-16 VITALS
RESPIRATION RATE: 18 BRPM | SYSTOLIC BLOOD PRESSURE: 122 MMHG | HEART RATE: 85 BPM | DIASTOLIC BLOOD PRESSURE: 76 MMHG | OXYGEN SATURATION: 100 %

## 2023-10-16 DIAGNOSIS — I73.9 PAD (PERIPHERAL ARTERY DISEASE) (H): Primary | ICD-10-CM

## 2023-10-16 PROCEDURE — 99213 OFFICE O/P EST LOW 20 MIN: CPT | Performed by: SURGERY

## 2023-10-16 NOTE — PROGRESS NOTES
[facility]        Patient is here for a 1 mo follow up s/p fem/tib bypass     US done 9/6     Pt is currently taking Plavix and Xarelto.    /76   Pulse 85   Resp 18   SpO2 100%     The provider has been notified that the patient has no concerns.     Questions patient would like addressed today are: N/A.    Refills are needed: N/A    Has homecare services and agency name:  No

## 2023-10-16 NOTE — PROGRESS NOTES
Vascular Surgery Clinic Followup Note    LOCATION:  [place]    Ashanti Aviles  Medical Record #:  [id number]  YOB: 1955  Age:  68 year old     Date of Service: 10/16/2023         Assessment and Plan:    68 year old female with left leg rest pain/tissue loss with claudication s/p left fem-TP trunk bypass with artegraft on 9/8 that went down s/p attempted thrombolysis seen today for follow-up.  The patient reports that her claudication is gone.  Denies rest pain.  Her wound in the left hallux is stable.    We discussed observation versus further revascularization.  However, given that her symptoms are stable without worsening of the wounds, we recommended observation first.  The patient voiced understanding and agreed to the plan.    We will follow-up with the patient in 6 months with a repeat studies.  The patient will contact us if she is developing new wounds or symptoms.    Patient was seen and discussed with staff, Dr. Geller.    Astrid Yoo MD PGY6  Vascular Surgery         Interval History:   Patient reports that she is able to walk without claudication symptoms on the left leg now.  Patient was seen at outside clinic for gout leg pain which is gone now.  She thinks her wound is stable.          Medications:     Current Outpatient Medications:     acetaminophen (TYLENOL) 325 MG tablet, Take 2 tablets (650 mg) by mouth every 4 hours as needed for mild pain, Disp: , Rfl:     amLODIPine (NORVASC) 5 MG tablet, Take 5 mg by mouth daily, Disp: , Rfl:     clopidogrel (PLAVIX) 75 MG tablet, Take 75 mg by mouth daily, Disp: , Rfl:     colchicine (COLCRYS) 0.6 MG tablet, Take 1 tablet (0.6 mg) by mouth daily for 10 days, Disp: 10 tablet, Rfl: 0    Continuous Blood Gluc Sensor (FREESTYLE PRINCESS 14 DAY SENSOR) MISC, , Disp: , Rfl:     empagliflozin (JARDIANCE) 25 MG TABS tablet, Take 1 tablet (25 mg) by mouth daily, Disp: 90 tablet, Rfl: 1    ezetimibe (ZETIA) 10 MG tablet, Take 10 mg by mouth daily,  Disp: , Rfl:     gabapentin (NEURONTIN) 300 MG capsule, Take 1 capsule (300 mg) by mouth 3 times daily, Disp: 60 capsule, Rfl: 1    Insulin Degludec (TRESIBA) 100 UNIT/ML SOLN, Inject 15 Units Subcutaneous 2 times daily, Disp: , Rfl:     linagliptin (TRADJENTA) 5 MG TABS tablet, Take 5 mg by mouth daily, Disp: , Rfl:     metFORMIN (GLUCOPHAGE XR) 500 MG 24 hr tablet, Take 1,000 mg by mouth daily, Disp: , Rfl:     methocarbamol (ROBAXIN) 500 MG tablet, Take 1 tablet (500 mg) by mouth 4 times daily, Disp: 90 tablet, Rfl: 0    omeprazole (PRILOSEC) 20 MG DR capsule, Take 20 mg by mouth daily as needed, Disp: , Rfl:     rivaroxaban ANTICOAGULANT (XARELTO) 2.5 MG TABS tablet, Take 1 tablet (2.5 mg) by mouth 2 times daily (with meals), Disp: , Rfl:     traMADol (ULTRAM) 50 MG tablet, Take 1 tablet (50 mg) by mouth every 6 hours as needed for moderate pain (Please give as adjunct to opiate pain meds), Disp: 45 tablet, Rfl: 0         Physical Exam:   /76   Pulse 85   Resp 18   SpO2 100%   Wt Readings from Last 1 Encounters:   10/14/23 67.3 kg (148 lb 6.4 oz)     There is no height or weight on file to calculate BMI.    EXAM:  Gen: no acute distress, sitting comfortably in exam chair  HEENT: Atraumatic, normocephalic  Neuro: Alert and oriented. No gross neurologic deficits   Pulm: nonlabored breathing on room air, no cough  CV: RRR by radial pulse, noncyanotic, nonpalpable pedal pulses  MSK:  Normal active range of motion, no edema  Skin: Left first toe wound that is stable without obvious eschar, no signs of infection  Psych: Normal affect, cooperative            Data:   Imaging:  IR Thrombolysis Arterial Infusion Initial Day    Result Date: 10/9/2023  [facility] EXAM: Pelvic and left lower extremity arteriogram with attempted recanalization of occluded left femoral- TPT bypass graft. LOCATION: [facility] CLINICAL HISTORY: Occluded left femoral to TPT bypass graft. The graft was  placed approximately 1 month prior. Occlusion was noted incidentally at routine follow-up. The patient is asymptomatic. Duration of occlusion is uncertain. PROCEDURES PERFORMED: 1. Right common femoral artery access using ultrasound guidance and vascular sheath placement. 2. Placement of a flush catheter in the distal abdominal aorta for pelvic arteriography. 3. Selective catheterization of the contralateral common, external iliac, and common femoral arteries with left lower extremity arteriography. 4. Attempted, unsuccessful recanalization of left lower extremity bypass graft. MODERATE SEDATION: Versed and Fentanyl were administered intravenously for moderate sedation. Pulse oximetry, heart rate and blood pressure were continuously monitored by an independent trained observer. The physician spent 57 minutes of face-to-face moderate sedation time with the patient. ADDITIONAL MEDICATIONS: See EMR. CONTRAST: See EMR. FLUOROSCOPIC TIME: 20.1 minutes CUMULATIVE AIR KERMA/DOSE: 939.08 mGy STERILE BARRIER TECHNIQUE: Maximal Sterile Barrier Technique Utilized: Cap AND mask AND sterile gown AND sterile gloves AND sterile full body drape AND hand hygiene AND skin preparation 2% chlorhexidine for cutaneous antisepsis (or acceptable alternative  antiseptics).   Sterile Ultrasound Technique Utilized ?Sterile gel AND sterile probe covers. UNIVERSAL PROTOCOL: Standard universal protocol per facility guidelines was followed. See EMR for documentation. TECHNIQUE: Risks, benefits and alternatives were explained to the patient and written, informed consent was obtained. The patient was placed in the supine position on the angiography table. The groins were prepped and draped in the usual fashion and anesthetized with 1% lidocaine. The right common femoral artery was accessed under ultrasound guidance with a micropuncture system and a 5-Greek vascular sheath placed. A Omniflush catheter was advanced over a Bentson wire into the  distal abdominal aorta, and pelvic angiography was performed. The catheter was then used to advance a wire into the contralateral common, external iliac, and finally common femoral artery. An angled glide catheter was advanced into the contralateral left common femoral artery over the wire, and left lower extremity  arteriogram was performed. Attempts were then made to recanalize the occluded left femoral to TPT bypass graft. I was able to position the Glide catheter within the proximal nubbin of the graft, but a wire would not advance. For greater support, with some difficulty, a 6-Kyr[place]z destination sheath was then placed up and over the bifurcation into the distal left external iliac artery. Even with this greater support, multiple catheter wires failed to successfully recanalize the graft. Procedure was then deemed complete. All catheters and wires were removed, followed by removal of the sheath from the right groin with hemostasis obtained with manual compression. Patient tolerated the procedure well. No immediate complications were encountered. She left the angiography suite in stable condition. FINDINGS: Survey ultrasound images of the right common femoral artery demonstrate that is is patent and appropriate for access, though with considerable atherosclerotic plaque, and ultrasound images obtained during access show the needle within the artery. Ultrasound images have been archived permanently for documentation. Pelvic arteriogram demonstrates patency of the distal aorta, bilateral common iliac, bilateral internal iliac, and bilateral external iliac arteries. The left common iliac stent is widely patent. There is no significant left external iliac stenosis. The left internal iliac is severely stenotic proximally. Moderate distal common iliac and proximal external iliac stenosis noted on the right. Left lower extremity arteriogram demonstrates a widely patent common femoral artery with postoperative changes  of endarterectomy. There is moderate stenosis at the profunda femoral origin. The SFA is occluded, as is the prior bypass graft. Profunda collaterals reconstitute a short segment of the proximal popliteal artery. The distal popliteal artery at and below the knee is occluded. The tibioperoneal trunk is occluded. The anterior tibial artery is occluded. Collaterals reconstitute the proximal posterior tibial and peroneal arteries which continue in in-line fashion through the foot and ankle, respectively. Attempts to recanalize the occluded bypass graft were unsuccessful. A tiny stump of the graft is apparent proximally. I was able to seat the catheter within this, but a wire would not pass suggesting occlusion is at least subacute.     IMPRESSION: Attempted recanalization of an occluded left femoral to TPT bypass graft was unsuccessful, as detailed above. Findings were discussed with Dr. Geller. Patient will follow-up with vascular surgery as planned.     CTA Abdomen Pelvis Bilat Leg Runoff w Contr    Result Date: 10/6/2023  EXAM: CTA ABDOMEN PELVIS BILAT LEG RUNOFF W CONTR LOCATION: [facility] DATE: 10/6/2023 INDICATION: PAD. Occluded left femoral posterior tibial bypass bypass COMPARISON: CTA 02/14/2023. Duplex 10/06/2023 TECHNIQUE: Helical acquisition through the abdomen, pelvis, and bilateral lower extremities was performed during the arterial phase of contrast enhancement using IV Contrast. 2D and 3D reconstructions were performed by the CT technologist. Dose reduction  techniques were used. CONTRAST: isovue 370 90ml FINDINGS: AORTA: Calcified plaque at the origin of the celiac artery. The superior mesenteric and inferior mesenteric arteries are patent. Single renal arteries bilaterally. Calcified plaque at the origin of the renal arteries bilaterally. Calcified plaque infrarenal abdominal aorta. No evidence of abdominal aortic aneurysm. Patent left common iliac artery stent high-grade  stenosis at the distal stent edge. High-grade stenosis proximal left internal iliac artery. Diffusely small left external iliac artery.  Atherosclerotic disease right common iliac artery with moderate 60% moderate stenosis at the origin of the right internal and right external iliac arteries. Diffusely small right external iliac artery. Associated stenosis unchanged in comparison to previous study. RIGHT LEG: Dense calcified plaque posterior wall right common femoral artery. Right profunda femoral artery patent. Diffuse severe atherosclerotic disease right superficial femoral artery. High-grade stenosis mid right popliteal artery. Three-vessel runoff into the right foot. LEFT LEG: Postoperative changes left] endarterectomy. Left groin lymph node. Left profundofemoral artery patent. Left SFA occluded. Left popliteal artery occluded Occluded left femoral posterior tibial bypass. Two-vessel runoff via the posterior tibial and peroneal arteries. Proximal opacification of the left anterior tibial artery. LUNG BASES: Unremarkable ABDOMEN: The liver, gallbladder, pancreas, spleen, adrenal glands and kidneys are unremarkable. Splenule inferior pole the spleen. PELVIS: Calcified fibroid.     IMPRESSION: 1.  Patent left common iliac artery stent with high-grade stenosis at the distal stent edge. 2.  Occluded left femoral to posterior tibial bypass graft. Two-vessel runoff via the posterior tibial and peroneal arteries. 3.  Moderate stenosis right common iliac artery. Severe diffuse atherosclerotic disease right superficial femoral artery. High-grade stenosis right popliteal artery with three-vessel runoff into the right foot.    US Lower Extremity Arterial Duplex Left    Result Date: 10/6/2023  Table formatting from the original result was not included. Arterial Duplex Ultrasound (Date: 10/06/23) Lower Extremity Bypass Evaluation Indication: Surveillance Left Leg Arterial:  PAD. S/P  Left CFA-TPT BPG using spliced 4mm and 5  mm Artegraft graft done 9/8/2023, decreased lower extremity pulses, lower extremity pain       Hx: 5/17/2023 IR Left MIKE Stents / Iliofemoral Endarterectomy.              3/23/23 IR Left Leg Angiogram ( Occluded SFA/ Pop. A./ RICKY),              2/16/23 IR Right SFA Proximal Atherectomy/ Balloon Angiogram.              2/18/23 Right 4th Toe Amputation              Left Big Toe Nail Removed/ Painful  Previous: 7/24/23, 2/27/2023; 06/26/23  History: Previous Smoker, Hypertension, Diabetic, PAD, Angioplasty, Vascular Surgery, Surgical Follow-up, and Vascular Ulcers Date of bypass graft: 9/8/2023 Left LEG Technique: Duplex imaging is performed utilizing gray-scale, two-dimensional images, and color-flow imaging. Doppler waveform analysis and spectral Doppler imaging is also performed. Right Leg (cm/s)                 Location Velocity  Waveforms PTA 34 B  DPA  50 B  Waveforms: T=Triphasic, M=Monophasic, B=Biphasic Left Leg (cm/s) Location Velocity  Waveforms  M  M   M SFA Occluded  N/a Inflow Artery: CFA   Proximal Anastamosis Occluded  N/A Proximal Bypass Occluded N/A Mid Bypass Occluded  N/A Distal Bypass Occluded N/A Distal Anastamosis Occluded  N/A Outflow Artery: TPT    TPT ( Distal to Anas.)  37 M PTA 21 M  DPA 12 M  Waveforms: T=Triphasic, M=Monophasic, B=Biphasic Impression: 1. RIGHT LOWER EXTREMITY: Patent posterior tibial and dorsalis pedis arteries with biphasic waveforms. 2. LEFT LOWER EXTREMITY: Patent external iliac, common femoral and profunda femoral arteries with monophasic waveforms suggesting inflow disease.  Occluded common femoral to tibioperoneal trunk bypass graft.  Reconstituted flow in the tibioperoneal trunk caudal to the bypass.  Reconstituted flow in the posterior tibial and dorsalis pedis arteries. Reference: Category Normal Intermediate Severe Occluded  PSV  cm/s 151-300 cm/s <45 or >300cm/s Absent Flow Ratio <1.5 1.5-3.4 >3.4 N/A      US Low Ext Arterial Dop Seg  Pres w/o Exercise    Result Date: 10/6/2023  Table formatting from the original result was not included. BILATERAL RESTING ANKLE-BRACHIAL INDICES (TRACIE'S) (Date: 10/06/23) Indication: Surveillance TRACIE's: PAD. S/P  Left CFA-TPT BPG using spliced 4mm and 5 mm Artegraft graft done 9/8/2023, decreased lower extremity pulses, lower extremity pain       Hx: 5/17/2023 IR Left MIKE Stents / Iliofemoral Endarterectomy.              3/23/23 IR Left Leg Angiogram ( Occluded SFA/ Pop. A./ RICKY),              2/16/23 IR Right SFA Proximal Atherectomy/ Balloon Angiogram.              2/18/23 Right 4th Toe Amputation              Left Big Toe Nail Removed/ Painful  Previous: 7/24/23, 2/27/2023; 06/26/23  History: Previous Smoker, Hypertension, Diabetic, PAD, Angioplasty, Vascular Surgery, Surgical Follow-up, and Vascular Ulcers Resting TRACIE's          Right: mmHg Index     Brachial: 143  Ankle-(PT): 122 0.81 Ankle-(DP): 143 0.95          Digit: 98 0.65               Left: mmHg Index     Brachial: 150  Ankle-(PT): 51 0.34 Ankle-(DP): 42 0.28          Digit: 30 0.20 Resting ankle-brachial index of 0.95 on the right. Toe Pressures of 98 mmHg and TBI of 0.56 Resting ankle-brachial index of 0.34 on the left. Toe Pressures of 30 mmHg and TBI of 0.20  VPR WAVEFORMS: The right volume plethysmography waveforms are mildly abnormal at the lower thigh level, mildly abnormal at the upper calf level and normal at the ankle. The left volume plethysmography waveforms are mildly abnormal at the lower thigh level, moderately abnormal at the upper calf level and severely abnormal at the ankle.  Impression:  1. RIGHT LOWER EXTREMITY: TRACIE is Normal with multiphasic waveforms with an TRACIE of 0.95. Toe Pressures are Mildly abnormal but adequate for wound healing with toe pressures of 98 mmHg. 2. LEFT LOWER EXTREMITY: TRACIE is Abnormal with an TRACIE of 0.34 indicating multilevel disease. Toe Pressures are Abnormal and impaired for wound healing with toe pressures  of 30 mmHg. Reference: Wound classification Grade TRACIE Ankle Systolic Pressure Toe Pressures 0 > 0.80 > 100 mmHg > 60 mmHg 1 0.6 - 0.79 70 - 100 mmHg 40 - 59 mmHg 2 0.4 - 0.59 50-70 mmHg 30 - 39 mmHg 3 < 0.39 < 50 mmHg < 30 mmHg Digit Pressures DBI Disease Category > 0.70 Normal < 0.70 Abnormal > 30 mmHg Potential wound healing < 30 mmHg Impaired wound healing Ankle Brachial Pressures TRACIE Disease Category > 1.3  Likely vessel calcification with monophasic waveforms, non-diagnostic 0.95-1.30 Normal with multiphasic waveforms 0.50-0.95 Single level disease 0.30-0.50 Multilevel disease < 0.30 Critical limb ischema Volume Plethysmography Recording (VPR) at all levels Normal Sharp systolic peak, fast upstroke, prominent dicrotic notch in wave Mild Sharp systolic peak, fast upstroke, absent dicrotic notch in wave Moderate Flattened systolic peak, slowed upstroke, absent dicrotic notch inwave Severe amplitude wave with = upslope and down slope Occluded Flat Line           I have seen and evaluated this patient with a fellow.  Past medical history, surgical history, past family history review of systems, HPI, and physical exam were reviewed by me personally.  I agree with all the above.        Dyan Geller MD,  [facility]  VASCULAR AND ENDOVASCULAR SURGERY

## 2023-10-16 NOTE — PATIENT INSTRUCTIONS
Nba Burrell,    Thank you for entrusting your care with us today. After your visit today with MD Dyan Geller this is the plan that was discussed at your appointment.    We will contact you to schedule 6 month follow-up with ultrasounds      I am including additional information on these things and our contact information if you have any questions or concerns.   Please do not hesitate to reach out to us if you felt we did not answer your questions or you are unsure of the treatment plan after your visit today. Our number is [phone].Thank you for trusting us with your care.         Again thank you for your time.

## 2023-10-21 ENCOUNTER — HEALTH MAINTENANCE LETTER (OUTPATIENT)
Age: 68
End: 2023-10-21

## 2023-11-08 ENCOUNTER — OFFICE VISIT (OUTPATIENT)
Dept: VASCULAR SURGERY | Facility: CLINIC | Age: 68
End: 2023-11-08
Attending: PODIATRIST
Payer: COMMERCIAL

## 2023-11-08 VITALS
HEART RATE: 84 BPM | SYSTOLIC BLOOD PRESSURE: 123 MMHG | RESPIRATION RATE: 20 BRPM | OXYGEN SATURATION: 100 % | DIASTOLIC BLOOD PRESSURE: 74 MMHG | TEMPERATURE: 97.5 F

## 2023-11-08 DIAGNOSIS — L97.523 ULCER OF GREAT TOE, LEFT, WITH NECROSIS OF MUSCLE (H): Primary | ICD-10-CM

## 2023-11-08 DIAGNOSIS — I73.9 PAD (PERIPHERAL ARTERY DISEASE) (H): ICD-10-CM

## 2023-11-08 PROCEDURE — 11043 DBRDMT MUSC&/FSCA 1ST 20/<: CPT | Performed by: PODIATRIST

## 2023-11-08 RX ORDER — HYDROCHLOROTHIAZIDE 25 MG/1
25 TABLET ORAL DAILY
Status: ON HOLD | COMMUNITY
Start: 2023-11-06 | End: 2024-06-11

## 2023-11-08 RX ORDER — HYDROCODONE BITARTRATE AND ACETAMINOPHEN 5; 325 MG/1; MG/1
1 TABLET ORAL EVERY 6 HOURS PRN
Qty: 12 TABLET | Refills: 0 | Status: SHIPPED | OUTPATIENT
Start: 2023-11-08 | End: 2023-11-18

## 2023-11-08 RX ORDER — DOXYCYCLINE 100 MG/1
100 TABLET ORAL 2 TIMES DAILY
COMMUNITY
Start: 2023-11-06 | End: 2023-11-30

## 2023-11-08 RX ORDER — LISINOPRIL 40 MG/1
40 TABLET ORAL DAILY
COMMUNITY
Start: 2023-11-06

## 2023-11-08 ASSESSMENT — PAIN SCALES - GENERAL: PAINLEVEL: EXTREME PAIN (8)

## 2023-11-08 NOTE — PROGRESS NOTES
FOOT AND ANKLE SURGERY/PODIATRY Progress Note      ASSESSMENT:   Ulceration left hallux into muscle   PAD      TREATMENT:  -I discussed with the patient today that the ulceration along the medial border of the left hallux has increased depth with nonviable tissue.  The wound has worsened since her previous visit with me.    -Due to the current presentation I recommend surgical I&D to remove all nonviable tissue with use of wound VAC postoperatively.  However, her prognosis is very closely tied to the increased perfusion to the left foot.  I will contact Dr. Geller to discuss if additional intervention is available.    -I have also refer the patient for updated MRI of the left foot to investigate for underlying osteomyelitis of the left hallux.    -[facility] [place].  I have also refer the patient to a pain clinic.    -After discussion of risk factors, nursing staff removed dressing, cleansed wound and consent obtained 2% Lidocaine HCL jelly was applied, under clean conditions, the left hallux ulceration(s) were debrided using currette.  Devitalized and nonviable tissue, along with any fibrin and slough, was removed to improve granulation tissue formation, stimulate wound healing, decrease overall bacteria load, disrupt biofilm formation and decrease edge senescence. Wound drainage was scant No. Total excisional debridement was 0.35 sq cm into the muscle/fascia with a depth of 0.6 cm.   Ulcers were improved afterwards and .  Measures were as noted on the flow sheet. A gauze dressing was applied. She will continue to apply a gauze dressing qoday.    -I will contact the patient with the MRI report when available and we will be guided by the results.     Rene Ordaz DPM  [facility] [place]      HPI: Ashanti Murraywilliams was seen again today for left hallux ulceration.  Since her previous visit the patient reports increased drainage and pain in the left great toe and request pain medication today.    Past  Medical History:   Diagnosis Date    Diabetes mellitus (H)     Gastroesophageal reflux disease     HLD (hyperlipidemia)     Hypertension     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/29/2013    PVD (peripheral vascular disease) (H24)        Past Surgical History:   Procedure Laterality Date    AMPUTATE TOE(S) Right 02/18/2023    Procedure: Amputation fourth toe right foot;  Surgeon: Benjamin Diez DPM;  [place] [facility] [place]    ANUS SURGERY      BIOPSY CERVICAL, LOCAL EXCISION, SINGLE/MULTIPLE      COLONOSCOPY N/A 09/11/2020    Procedure: EXAM UNDER ANESTHESIA, HIGH RESOLUTION ANOSCOPY INTRA OP;  Surgeon: Preeti Sheehan MD;  Location: [facility] OR;  Service: Gastroenterology    COLONOSCOPY N/A 12/14/2020    Procedure: EXAM UNDER ANESTHESIA WITH HIGH RESOLUTION ANOSCOPY;  Surgeon: Preeti Sheehan MD;  Location: [facility] OR;  Service: General    COLONOSCOPY N/A 06/15/2021    Procedure: EXAM UNDER ANESTHESIA WITH HIGH RESOLUTION ANOSCOPY, BIOPSY, FULGURATION;  Surgeon: Preeti Sheehan MD;  Location: [facility] OR;  Service: Gastroenterology    ENDARTERECTOMY FEMORAL Left 05/17/2023    Procedure: LEFT FEMORAL ENDARTERECTOMY WITH RETROGRADE ILIAC STENT;  Surgeon: Dyan Geller MD;  [place] [facility] [place]    EXAM UNDER ANESTHESIA ANUS N/A 09/14/2021    Procedure: EXAM UNDER ANESTHESIA WITH HIGH RESOLUTION ANOSCOPY;  Surgeon: Preeti Sheehan MD;  Location: [facility] OR    FEMORAL ARTERY - TIBIAL ARTERY BYPASS GRAFT Left 09/08/2023    Procedure: CREATION, BYPASS, ARTERIAL, FEMORAL TO TIBIAL, LEFT;  Surgeon: Dyan Geller MD;  [place] [facility] [place]    IR LOWER EXTREMITY ANGIOGRAM LEFT  03/23/2023    IR LOWER EXTREMITY ANGIOGRAM RIGHT  02/16/2023    IR THROMBOLYSIS ARTERIAL INFUSION INITIAL DAY  10/9/2023    TUBAL LIGATION         Allergies   Allergen Reactions    Simvastatin Muscle Pain (Myalgia)     Muscle pain    Victoza  [Liraglutide] Other (See Comments)     Binds bowels    Oxycodone Nausea and Vomiting    Penicillins Unknown     Childhood rxn         Current Outpatient Medications:     acetaminophen (TYLENOL) 325 MG tablet, Take 2 tablets (650 mg) by mouth every 4 hours as needed for mild pain, Disp: , Rfl:     amLODIPine (NORVASC) 5 MG tablet, Take 5 mg by mouth daily, Disp: , Rfl:     clopidogrel (PLAVIX) 75 MG tablet, Take 75 mg by mouth daily, Disp: , Rfl:     Continuous Blood Gluc Sensor (FREESTYLE PRINCESS 14 DAY SENSOR) MISC, , Disp: , Rfl:     doxycycline monohydrate (ADOXA) 100 MG tablet, Take 100 mg by mouth 2 times daily, Disp: , Rfl:     empagliflozin (JARDIANCE) 25 MG TABS tablet, Take 1 tablet (25 mg) by mouth daily, Disp: 90 tablet, Rfl: 1    ezetimibe (ZETIA) 10 MG tablet, Take 10 mg by mouth daily, Disp: , Rfl:     gabapentin (NEURONTIN) 300 MG capsule, Take 1 capsule (300 mg) by mouth 3 times daily, Disp: 60 capsule, Rfl: 1    hydrochlorothiazide (HYDRODIURIL) 25 MG tablet, Take 25 mg by mouth daily, Disp: , Rfl:     Insulin Degludec (TRESIBA) 100 UNIT/ML SOLN, Inject 15 Units Subcutaneous 2 times daily, Disp: , Rfl:     linagliptin (TRADJENTA) 5 MG TABS tablet, Take 5 mg by mouth daily, Disp: , Rfl:     lisinopril (ZESTRIL) 40 MG tablet, Take 40 mg by mouth daily, Disp: , Rfl:     metFORMIN (GLUCOPHAGE XR) 500 MG 24 hr tablet, Take 1,000 mg by mouth daily, Disp: , Rfl:     omeprazole (PRILOSEC) 20 MG DR capsule, Take 20 mg by mouth daily as needed, Disp: , Rfl:     rivaroxaban ANTICOAGULANT (XARELTO) 2.5 MG TABS tablet, Take 1 tablet (2.5 mg) by mouth 2 times daily (with meals), Disp: , Rfl:     methocarbamol (ROBAXIN) 500 MG tablet, Take 1 tablet (500 mg) by mouth 4 times daily (Patient not taking: Reported on 11/8/2023), Disp: 90 tablet, Rfl: 0    traMADol (ULTRAM) 50 MG tablet, Take 1 tablet (50 mg) by mouth every 6 hours as needed for moderate pain (Please give as adjunct to opiate pain meds) (Patient not  taking: Reported on 11/8/2023), Disp: 45 tablet, Rfl: 0    Review of Systems - 10 point Review of Systems is negative except for left hallux ulcer which is noted in HPI.      OBJECTIVE:  /74   Pulse 84   Temp 97.5  F (36.4  C)   Resp 20   SpO2 100%   General appearance: Patient is alert and fully cooperative with history & exam.  No sign of distress is noted during the visit.    Vascular: Dorsalis pedis non-palpableLeft.  Dermatologic:    Wound Toe (Comment  which one) Other (comment) (Active)       VASC Wound Left Hallux (Active)   Pre Size Length 0.7 11/08/23 1000   Pre Size Width 0.5 11/08/23 1000   Pre Size Depth 0.6 11/08/23 1000   Pre Total Sq cm 0.35 11/08/23 1000   Post Size Length 0 10/11/23 0800   Post Size Width 0 10/11/23 0800   Post Size Depth 0 10/11/23 0800   Post Total Sq cm 0 10/11/23 0800   Description eschar 10/11/23 0800       Incision/Surgical Site 02/18/23 Right Toe (Comment  which one) (Active)       Incision/Surgical Site 09/08/23 Left Leg (Active)       Incision/Surgical Site 10/09/23 Anterior;Right Greater Trochanter (Active)   Incision Assessment WDL 10/09/23 1630   Closure Adhesive strip(s) 10/09/23 1630   Incision Drainage Amount None 10/09/23 1630   Dressing Intervention Clean, dry, intact 10/09/23 1630   Ulceration along the medial border of the left hallux has increased depth with nonviable tissue, no erythema purulent drainage identified.  Neurologic: Intact to light touch Left.  Musculoskeletal: Pain to palpation left hallux.      Picture:

## 2023-11-09 ENCOUNTER — HOSPITAL ENCOUNTER (OUTPATIENT)
Dept: MRI IMAGING | Facility: HOSPITAL | Age: 68
Discharge: HOME | End: 2023-11-09
Attending: PODIATRIST | Admitting: PODIATRIST
Payer: COMMERCIAL

## 2023-11-09 DIAGNOSIS — L97.523 ULCER OF GREAT TOE, LEFT, WITH NECROSIS OF MUSCLE (H): ICD-10-CM

## 2023-11-09 PROCEDURE — 255N000002 HC RX 255 OP 636: Mod: JZ | Performed by: PODIATRIST

## 2023-11-09 PROCEDURE — A9585 GADOBUTROL INJECTION: HCPCS | Mod: JZ | Performed by: PODIATRIST

## 2023-11-09 PROCEDURE — 73720 MRI LWR EXTREMITY W/O&W/DYE: CPT | Mod: LT

## 2023-11-09 RX ORDER — GADOBUTROL 604.72 MG/ML
0.1 INJECTION INTRAVENOUS ONCE
Status: COMPLETED | OUTPATIENT
Start: 2023-11-09 | End: 2023-11-09

## 2023-11-09 RX ADMIN — GADOBUTROL 7 ML: 604.72 INJECTION INTRAVENOUS at 12:20

## 2023-11-13 ENCOUNTER — TELEPHONE (OUTPATIENT)
Dept: VASCULAR SURGERY | Facility: CLINIC | Age: 68
End: 2023-11-13
Payer: COMMERCIAL

## 2023-11-13 NOTE — TELEPHONE ENCOUNTER
Message left to schedule phone visit to go over MRI results.  This can be done this week or next.  [phone]

## 2023-11-15 ENCOUNTER — DOCUMENTATION ONLY (OUTPATIENT)
Dept: VASCULAR SURGERY | Facility: CLINIC | Age: 68
End: 2023-11-15
Payer: COMMERCIAL

## 2023-11-15 ENCOUNTER — PREP FOR PROCEDURE (OUTPATIENT)
Dept: OTHER | Facility: CLINIC | Age: 68
End: 2023-11-15
Payer: COMMERCIAL

## 2023-11-15 ENCOUNTER — VIRTUAL VISIT (OUTPATIENT)
Dept: VASCULAR SURGERY | Facility: CLINIC | Age: 68
End: 2023-11-15
Attending: PODIATRIST
Payer: COMMERCIAL

## 2023-11-15 DIAGNOSIS — L97.523: Primary | ICD-10-CM

## 2023-11-15 DIAGNOSIS — L97.523 ULCER OF GREAT TOE, LEFT, WITH NECROSIS OF MUSCLE (H): Primary | ICD-10-CM

## 2023-11-15 PROCEDURE — 99213 OFFICE O/P EST LOW 20 MIN: CPT | Mod: 93 | Performed by: PODIATRIST

## 2023-11-15 NOTE — PROGRESS NOTES
Spoke to patient to ask who manages her plavix and xarelto- she reports that her primary manages these medications. Informed her that we will contact her primary to get permission to hold these medications. Will also ask them to reach out to her to schedule a pre-op appointment.     Called primary's clinic, call went to voicemail, unable to speak to any  or nursing staff. Will attempt to call again on 11/16.

## 2023-11-15 NOTE — PATIENT INSTRUCTIONS
Ashanti,    Your surgery with [facility] has been scheduled. Please read thoroughly and follow instructions.     Procedure:   I&D left hallux  Procedure Date :   TBD  *A surgery nurse will call you 1-2 days before surgery to inform you of the time of arrival for surgery.  Surgeon:   Dr. Rene Ordaz  Admission Type:   Outpatient  Procedure Location:   [facility]:  [address] [place] [address] (phone: [phone], Fax: [phone])          Preparation Instructions to complete:    []  You will need a Pre-op Physical within 30 days before surgery with your primary care provider. This exam is required by the anesthesiologist to ensure a safe surgical experience.    Failure  to obtain your pre-op physical will lead to cancellation of your surgery  Call them right away to schedule this. Please ensure your Preoperative Physical is faxed to the Hospital (numbers listed above)    [] Preoperative Medication Instructions  We would like you to stop your anticoagulation medications 3-5 days before surgery HOWEVER contact your prescribing provider for instructions before discontinuing any medications. (Examples: Coumadin, Plavix, Xarelto, Eliquis, Pradaxa, Effient, Brilinta) If you are on Coumadin, we would like the goal INR ? 1.4.  IT IS OK TO STAY ON ASPIRIN PRIOR TO SURGERY.   Hold Ibuprofen, Herbal Supplements and Vitamin E 7 days before  Stop Cialis, Levitra and Viagra 2-3 days before surgery  If you take these diabetic medications, please discuss with your primary doctor and follow the hold instructions:   Hold seven (7) days prior for once weekly injectable doses [semaglutide (Ozempic, Wegovy), dulaglutide (Trulicity), exenatide ER (Bydureon), tirzepatide (Mounjaro)]  Hold the day before and day of for once daily injectable GLP-1 agonists [exenatide (Byetta), liraglutide (Saxenda, Victoza)]  Hold seven (7) days for oral semaglutide (Rybelsus)     [] Fasting  Requirements  Nothing to eat for 8 hours before surgery unless instructed differently by the surgery nurse.  You may have clear liquids up to two hours before your arrival time (coffee, tea, water, or Gatorade. No cream or milk)  If your primary care provider has instructed you to continue oral medications, you may take them on the morning of surgery with a small sip of water.    No alcohol or smoking after 12:00am the day of surgery    []  COVID-19 test is no longer needed  If you are experiencing COVID symptoms or have tested positive for COVID-19 within 14 days of procedure date, reach out to the care team to reschedule please.     [] Contact your insurance regarding coverage  If you would like a Good [place] Estimate for your upcoming procedure at [facility] Location, contact Cost of Care Estimates   Advocates are available Monday through Friday 8am - 5pm   [phone]  You may also submit a request online through your [employer] account.  For all self-pay, estimate, or anesthesia billing questions at [facility], the contact information is below:  Who to contact: Karla HERNANDEZ  [facility] number: [phone]  Prepay number: [phone]    [] DO NOT BRING FMLA WITH TO SURGERY.  These should be sent to the provider's office by fax to [phone].     [] Day of Surgery  Medications - Take as indicated with sips of water.   Wear comfortable loose fitting clothes. Wear your glasses-Not contacts. Do not wear jewelry and remove body piercing's. Surgery may be cancelled if they are not removed.   You may have 1 family member wait in the lobby during your surgery. Visitor restrictions are subject to change. Please verify with the surgery nurse when they call.   If same day surgery-Have a someone come with you to surgery that can help you understand the surgeon's instructions, drive you home and stay with you overnight the first night.    [] If the community sees a new COVID-19 surge,  your procedure may need to be postponed. We will contact you if this happens.    [] You will receive a call from a surgery nurse 1-3 days prior to surgery. They will go over more details with you.             ** AFTER SURGERY INSTRUCTIONS **      [] You are to remain NON WEIGHT BEARING on your left foot NON WEIGHT BEARING MEANS NO PRESSURE ON YOUR FOOT OR HEEL AT ANY TIME FOR ANY REASON!    [] Prior to surgery you will be given a CAM BOOT which you will need to WEAR THIS ANYTIME YOU ARE UP AND OUT OF BED, IT IS OKAY TO REMOVE WHEN YOU ARE SLEEPING. Please bring this with you on the day of surgery.    [] You will be given a A ROLLING KNEE WALKER to help you ambulate after surgery. Please also bring this with you on the day of surgery.    [] During surgery Dr. Ordaz will apply a gauze dressing to your foot. This will remain intact until you are seen in clinic for follow up    [] It is NOT OKAY to get your surgical site wet while bathing, you will need to purchase a cast cover to protect your foot from getting wet. You can purchase this at [facility] or your local pharmacy.    [] It is important that you elevate your affected foot after surgery. Proper elevation is raising your foot above your waist. The fluid in your lower extremities needs to get back to your heart so it can get pumped to your kidneys and expelled through urination. So if you notice you have to go to the bathroom more frequently when you are elevating your leg this is a good sign that it is working.     [] It is important that you increase your protein intake after surgery. Protein is essential for wound healing. We recommend you take a protein supplement twice per day. This is in addition to your normal diet. Examples of protein supplements are Ensure, Boost, Glucerna (if you are diabetic), or protein powder. You can purchase these at your local retailer or grocery store.       Notify our office right away, if you have any changes in  your health status, or if you develop a cold, flu, diarrhea, infection, fever or sore throat before your scheduled surgery date. We can be reached at [phone]   Monday-Friday 8 am-4:30 pm if you have any questions.     Thank you for trusting us with your care!

## 2023-11-15 NOTE — PROGRESS NOTES
FOOT AND ANKLE SURGERY/PODIATRY Progress Note      ASSESSMENT:   Ulceration left hallux into muscle   PAD    Type of service:  Telephone Visit       Telephone Start and End Time : 1:16/1:21    Originating Location (pt. Location):  home      Distant Location :  [facility]       Mode of Communication: Telephone    TREATMENT:  MRI left foot:   1.  No evidence of osteomyelitis.  2.  Similar mild reactive osteitis of the tuft of the distal phalanx of the first toe.    -Based on the above, I recommend surgical I&D of the left hallux with use of wound VAC postoperatively.  We discussed that risks of this type of procedure include nonhealing wound, need for amputation of the digit or partial foot amputation in the presence of severe PAD.  All questions invited and answered.  Patient consents to surgery.    -I discussed the above with Dr. Geller who agrees with the treatment plan and we will plan to see the patient should any problems arise postoperatively.    -I will asked my office to coordinate outpatient surgery at [facility] within the next 14 days    Rene Ordaz DPM  [facility]      HPI: Ashanti Aviles was seen again today for telephone visit to discuss her recent left foot MRI report.     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/2013    PVD (peripheral vascular disease) (H24)        Past Surgical History:   Procedure Laterality Date    AMPUTATE TOE(S) Right 02/18/2023    Procedure: Amputation fourth toe right foot;  Surgeon: Benjamin Diez DPM;  Location: [facility] OR    ANUS SURGERY      BIOPSY CERVICAL, LOCAL EXCISION, SINGLE/MULTIPLE      COLONOSCOPY N/A 09/11/2020    Procedure: EXAM UNDER ANESTHESIA, HIGH RESOLUTION ANOSCOPY INTRA OP;  Surgeon: Preeti Sheehan MD;  Location:  [facility];  Service: Gastroenterology    COLONOSCOPY N/A 12/14/2020    Procedure: EXAM UNDER ANESTHESIA WITH HIGH RESOLUTION ANOSCOPY;  Surgeon: Preeti Sheehan MD;  Location: [facility];  Service: General    COLONOSCOPY N/A 06/15/2021    Procedure: EXAM UNDER ANESTHESIA WITH HIGH RESOLUTION ANOSCOPY, BIOPSY, FULGURATION;  Surgeon: Preeti Sheehan MD;  Location: [facility];  Service: Gastroenterology    ENDARTERECTOMY FEMORAL Left 05/17/2023    Procedure: LEFT FEMORAL ENDARTERECTOMY WITH RETROGRADE ILIAC STENT;  Surgeon: Dyan Geller MD;  [place] [facility] [place]    EXAM UNDER ANESTHESIA ANUS N/A 09/14/2021    Procedure: EXAM UNDER ANESTHESIA WITH HIGH RESOLUTION ANOSCOPY;  Surgeon: Preeti Sheehan MD;  Location: [facility]    FEMORAL ARTERY - TIBIAL ARTERY BYPASS GRAFT Left 09/08/2023    Procedure: CREATION, BYPASS, ARTERIAL, FEMORAL TO TIBIAL, LEFT;  Surgeon: Dyna Geller MD;  [place] [facility] [place]    IR LOWER EXTREMITY ANGIOGRAM LEFT  03/23/2023    IR LOWER EXTREMITY ANGIOGRAM RIGHT  02/16/2023    IR THROMBOLYSIS ARTERIAL INFUSION INITIAL DAY  10/9/2023    TUBAL LIGATION         Allergies   Allergen Reactions    Simvastatin Muscle Pain (Myalgia)     Muscle pain    Victoza [Liraglutide] Other (See Comments)     Binds bowels    Oxycodone Nausea and Vomiting    Penicillins Unknown     Childhood rxn         Current Outpatient Medications:     acetaminophen (TYLENOL) 325 MG tablet, Take 2 tablets (650 mg) by mouth every 4 hours as needed for mild pain, Disp: , Rfl:     amLODIPine (NORVASC) 5 MG tablet, Take 5 mg by mouth daily, Disp: , Rfl:     clopidogrel (PLAVIX) 75 MG tablet, Take 75 mg by mouth daily, Disp: , Rfl:     empagliflozin (JARDIANCE) 25 MG TABS tablet, Take 1 tablet (25 mg) by mouth daily, Disp: 90 tablet, Rfl: 1    ezetimibe (ZETIA) 10 MG tablet, Take 10 mg by mouth daily, Disp: , Rfl:     gabapentin (NEURONTIN) 300 MG  capsule, Take 1 capsule (300 mg) by mouth 3 times daily, Disp: 60 capsule, Rfl: 1    hydrochlorothiazide (HYDRODIURIL) 25 MG tablet, Take 25 mg by mouth daily, Disp: , Rfl:     Insulin Degludec (TRESIBA) 100 UNIT/ML SOLN, Inject 15 Units Subcutaneous 2 times daily, Disp: , Rfl:     linagliptin (TRADJENTA) 5 MG TABS tablet, Take 5 mg by mouth daily, Disp: , Rfl:     lisinopril (ZESTRIL) 40 MG tablet, Take 40 mg by mouth daily, Disp: , Rfl:     metFORMIN (GLUCOPHAGE XR) 500 MG 24 hr tablet, Take 1,000 mg by mouth daily, Disp: , Rfl:     omeprazole (PRILOSEC) 20 MG DR capsule, Take 20 mg by mouth daily as needed, Disp: , Rfl:     rivaroxaban ANTICOAGULANT (XARELTO) 2.5 MG TABS tablet, Take 1 tablet (2.5 mg) by mouth 2 times daily (with meals), Disp: , Rfl:     Continuous Blood Gluc Sensor (THE EMPTY JOINTSTYLE PRINCESS 14 DAY SENSOR) MISC, , Disp: , Rfl:     doxycycline monohydrate (ADOXA) 100 MG tablet, Take 100 mg by mouth 2 times daily (Patient not taking: Reported on 11/15/2023), Disp: , Rfl:     HYDROcodone-acetaminophen (NORCO) 5-325 MG tablet, Take 1 tablet by mouth every 6 hours as needed for pain (Patient not taking: Reported on 11/15/2023), Disp: 12 tablet, Rfl: 0    methocarbamol (ROBAXIN) 500 MG tablet, Take 1 tablet (500 mg) by mouth 4 times daily (Patient not taking: Reported on 11/8/2023), Disp: 90 tablet, Rfl: 0    traMADol (ULTRAM) 50 MG tablet, Take 1 tablet (50 mg) by mouth every 6 hours as needed for moderate pain (Please give as adjunct to opiate pain meds) (Patient not taking: Reported on 11/8/2023), Disp: 45 tablet, Rfl: 0    Review of Systems - 10 point Review of Systems is negative except for osteomyelitis left foot which is noted in HPI.    Imaging:     MR Foot Left w/o & w Contrast    Result Date: 11/9/2023  EXAM: MR FOOT LEFT W/O and W CONTRAST LOCATION: [facility] DATE: 11/9/2023 INDICATION: Osteomyelitis left hallux. COMPARISON: Left foot MRI 09/01/2023 TECHNIQUE: Routine.  Additional postgadolinium T1 sequences were obtained. IV CONTRAST: Gadavist 7mL FINDINGS: JOINTS AND BONES: -No fracture or contusion. No confluent enhancing marrow replacing process to suggest osteomyelitis. Similar very mild reactive osteitis of the tuft of the distal phalanx of the first toe. Mild degenerative arthritis of the first metatarsophalangeal joint. No joint effusion or synovitis. TENDONS: -No tendon tear, tendinopathy, or tenosynovitis. LIGAMENTS: -Lisfranc ligament complex and collateral and capsular ligaments are intact. MUSCLES AND SOFT TISSUES: -No muscle atrophy or edema. No soft tissue mass or fluid collection.     IMPRESSION: 1.  No evidence of osteomyelitis. 2.  Similar mild reactive osteitis of the tuft of the distal phalanx of the first toe.

## 2023-11-15 NOTE — PROGRESS NOTES
Surgery Scheduled    Confirmed surgery schedule and instructions with pt. Pt will schedule preop exam.  Sending message to nursing to address blood thinners with pt.  Writer will send instructions to pt via [employer].    Surgery/Procedure: INCISION AND DRAINAGE, left hallux     CPT: 46384     Equipment: pulse lavage     Location: [facility]:  [address] [place] [address] (phone: [phone], Fax: [phone])    Date: 11/27/23    Time: 2:20 PM    Admission Type: Outpatient    Surgeon: Dr. Ordaz    OR Confirmed & :  Yes with Rosibel on 11/15/2023    Entered on provider calendar:  Yes    Post Op: See appt desk    Wound Vac Needed:  TBD    Home Care Needed:  TBD    Blood Thinners Addressed:  Pt on Xarelto & Plavix - routing message to nursing to address

## 2023-11-16 NOTE — PROGRESS NOTES
Left message at [facility] requesting a call back. Patient said she will also talk to them at her pre op appointment.

## 2023-11-17 NOTE — PROGRESS NOTES
Left message for [facility] clinic (second attempt) to discuss holding plavix for 5 days prior to surgery and xarelto for 3 days prior.  Awaiting return call.

## 2023-11-20 NOTE — TELEPHONE ENCOUNTER
Left message for [facility] clinic (3rd attempt) to discuss holding plavix for 5 days prior to surgery and xarelto for 3 days prior.  Awaiting return call.

## 2023-11-21 ENCOUNTER — TRANSFERRED RECORDS (OUTPATIENT)
Dept: HEALTH INFORMATION MANAGEMENT | Facility: CLINIC | Age: 68
End: 2023-11-21

## 2023-11-21 LAB — HBA1C MFR BLD: 7.9 % (ref 4.3–5.7)

## 2023-11-21 NOTE — TELEPHONE ENCOUNTER
Writer spoke with pt, she stated that she had her pre-op today by Dr. Mast. She stated that she was cleared for surgery. She was informed to hold the Plavix and Eliquis 5 days prior to surgery. She stating they will have the pre-op physical today.

## 2023-11-22 NOTE — PROGRESS NOTES
Received call from Mar at [facility]- she is calling to give hold orders for plavix and xarelto. Updated her that ADRIAN Shankar had talked with Ashatni yesterday about holding the medications.

## 2023-11-27 ENCOUNTER — ANESTHESIA (OUTPATIENT)
Dept: SURGERY | Facility: HOSPITAL | Age: 68
End: 2023-11-27
Payer: COMMERCIAL

## 2023-11-27 ENCOUNTER — ANESTHESIA EVENT (OUTPATIENT)
Dept: SURGERY | Facility: HOSPITAL | Age: 68
End: 2023-11-27
Payer: COMMERCIAL

## 2023-11-27 ENCOUNTER — HOSPITAL ENCOUNTER (OUTPATIENT)
Facility: HOSPITAL | Age: 68
Discharge: HOME | End: 2023-11-27
Attending: PODIATRIST | Admitting: PODIATRIST
Payer: COMMERCIAL

## 2023-11-27 VITALS
SYSTOLIC BLOOD PRESSURE: 135 MMHG | DIASTOLIC BLOOD PRESSURE: 75 MMHG | OXYGEN SATURATION: 99 % | HEART RATE: 82 BPM | BODY MASS INDEX: 30.64 KG/M2 | TEMPERATURE: 97.5 F | WEIGHT: 152 LBS | HEIGHT: 59 IN | RESPIRATION RATE: 16 BRPM

## 2023-11-27 DIAGNOSIS — L97.523 ULCER OF GREAT TOE, LEFT, WITH NECROSIS OF MUSCLE (H): Primary | ICD-10-CM

## 2023-11-27 DIAGNOSIS — L97.523 ULCER OF LEFT FOOT WITH NECROSIS OF MUSCLE (H): Primary | ICD-10-CM

## 2023-11-27 LAB
GLUCOSE BLDC GLUCOMTR-MCNC: 155 MG/DL (ref 70–99)
GLUCOSE BLDC GLUCOMTR-MCNC: 209 MG/DL (ref 70–99)
GRAM STAIN RESULT: NORMAL
GRAM STAIN RESULT: NORMAL

## 2023-11-27 PROCEDURE — 710N000012 HC RECOVERY PHASE 2, PER MINUTE: Performed by: PODIATRIST

## 2023-11-27 PROCEDURE — 87075 CULTR BACTERIA EXCEPT BLOOD: CPT | Performed by: PODIATRIST

## 2023-11-27 PROCEDURE — 258N000001 HC RX 258: Performed by: PODIATRIST

## 2023-11-27 PROCEDURE — 250N000011 HC RX IP 250 OP 636

## 2023-11-27 PROCEDURE — 250N000011 HC RX IP 250 OP 636: Performed by: PODIATRIST

## 2023-11-27 PROCEDURE — 87205 SMEAR GRAM STAIN: CPT | Performed by: PODIATRIST

## 2023-11-27 PROCEDURE — 370N000017 HC ANESTHESIA TECHNICAL FEE, PER MIN: Performed by: PODIATRIST

## 2023-11-27 PROCEDURE — 82962 GLUCOSE BLOOD TEST: CPT

## 2023-11-27 PROCEDURE — 87077 CULTURE AEROBIC IDENTIFY: CPT | Mod: 59 | Performed by: PODIATRIST

## 2023-11-27 PROCEDURE — 11043 DBRDMT MUSC&/FSCA 1ST 20/<: CPT | Mod: 51 | Performed by: PODIATRIST

## 2023-11-27 PROCEDURE — 258N000003 HC RX IP 258 OP 636: Performed by: ANESTHESIOLOGY

## 2023-11-27 PROCEDURE — 28002 TREATMENT OF FOOT INFECTION: CPT | Mod: LT | Performed by: PODIATRIST

## 2023-11-27 PROCEDURE — 999N000141 HC STATISTIC PRE-PROCEDURE NURSING ASSESSMENT: Performed by: PODIATRIST

## 2023-11-27 PROCEDURE — 250N000009 HC RX 250

## 2023-11-27 PROCEDURE — 360N000075 HC SURGERY LEVEL 2, PER MIN: Performed by: PODIATRIST

## 2023-11-27 PROCEDURE — 272N000001 HC OR GENERAL SUPPLY STERILE: Performed by: PODIATRIST

## 2023-11-27 RX ORDER — NALOXONE HYDROCHLORIDE 0.4 MG/ML
0.2 INJECTION, SOLUTION INTRAMUSCULAR; INTRAVENOUS; SUBCUTANEOUS
Status: DISCONTINUED | OUTPATIENT
Start: 2023-11-27 | End: 2023-11-27 | Stop reason: HOSPADM

## 2023-11-27 RX ORDER — LIDOCAINE HYDROCHLORIDE 10 MG/ML
INJECTION, SOLUTION INFILTRATION; PERINEURAL PRN
Status: DISCONTINUED | OUTPATIENT
Start: 2023-11-27 | End: 2023-11-27

## 2023-11-27 RX ORDER — CEFAZOLIN SODIUM/WATER 2 G/20 ML
2 SYRINGE (ML) INTRAVENOUS
Status: COMPLETED | OUTPATIENT
Start: 2023-11-27 | End: 2023-11-27

## 2023-11-27 RX ORDER — LIDOCAINE 40 MG/G
CREAM TOPICAL
Status: DISCONTINUED | OUTPATIENT
Start: 2023-11-27 | End: 2023-11-27 | Stop reason: HOSPADM

## 2023-11-27 RX ORDER — FENTANYL CITRATE 50 UG/ML
25 INJECTION, SOLUTION INTRAMUSCULAR; INTRAVENOUS
Status: DISCONTINUED | OUTPATIENT
Start: 2023-11-27 | End: 2023-11-27 | Stop reason: HOSPADM

## 2023-11-27 RX ORDER — OXYCODONE HYDROCHLORIDE 5 MG/1
5-10 TABLET ORAL EVERY 6 HOURS PRN
Qty: 12 TABLET | Refills: 0 | Status: ON HOLD | OUTPATIENT
Start: 2023-11-27 | End: 2024-01-12

## 2023-11-27 RX ORDER — DOXYCYCLINE 100 MG/1
100 CAPSULE ORAL 2 TIMES DAILY
Qty: 20 CAPSULE | Refills: 0 | Status: ON HOLD | OUTPATIENT
Start: 2023-11-27 | End: 2024-01-12

## 2023-11-27 RX ORDER — GLYCOPYRROLATE 0.2 MG/ML
INJECTION, SOLUTION INTRAMUSCULAR; INTRAVENOUS PRN
Status: DISCONTINUED | OUTPATIENT
Start: 2023-11-27 | End: 2023-11-27

## 2023-11-27 RX ORDER — ONDANSETRON 2 MG/ML
4 INJECTION INTRAMUSCULAR; INTRAVENOUS EVERY 30 MIN PRN
Status: DISCONTINUED | OUTPATIENT
Start: 2023-11-27 | End: 2023-11-27 | Stop reason: HOSPADM

## 2023-11-27 RX ORDER — BUPIVACAINE HYDROCHLORIDE 5 MG/ML
INJECTION, SOLUTION PERINEURAL PRN
Status: DISCONTINUED | OUTPATIENT
Start: 2023-11-27 | End: 2023-11-27 | Stop reason: HOSPADM

## 2023-11-27 RX ORDER — ONDANSETRON 2 MG/ML
INJECTION INTRAMUSCULAR; INTRAVENOUS PRN
Status: DISCONTINUED | OUTPATIENT
Start: 2023-11-27 | End: 2023-11-27

## 2023-11-27 RX ORDER — PROPOFOL 10 MG/ML
INJECTION, EMULSION INTRAVENOUS CONTINUOUS PRN
Status: DISCONTINUED | OUTPATIENT
Start: 2023-11-27 | End: 2023-11-27

## 2023-11-27 RX ORDER — SODIUM CHLORIDE, SODIUM LACTATE, POTASSIUM CHLORIDE, CALCIUM CHLORIDE 600; 310; 30; 20 MG/100ML; MG/100ML; MG/100ML; MG/100ML
INJECTION, SOLUTION INTRAVENOUS CONTINUOUS
Status: DISCONTINUED | OUTPATIENT
Start: 2023-11-27 | End: 2023-11-27 | Stop reason: HOSPADM

## 2023-11-27 RX ORDER — FENTANYL CITRATE 0.05 MG/ML
INJECTION, SOLUTION INTRAMUSCULAR; INTRAVENOUS PRN
Status: DISCONTINUED | OUTPATIENT
Start: 2023-11-27 | End: 2023-11-27

## 2023-11-27 RX ORDER — CEFAZOLIN SODIUM/WATER 2 G/20 ML
2 SYRINGE (ML) INTRAVENOUS SEE ADMIN INSTRUCTIONS
Status: DISCONTINUED | OUTPATIENT
Start: 2023-11-27 | End: 2023-11-27 | Stop reason: HOSPADM

## 2023-11-27 RX ORDER — ACETAMINOPHEN 325 MG/1
975 TABLET ORAL
Status: DISCONTINUED | OUTPATIENT
Start: 2023-11-27 | End: 2023-11-27 | Stop reason: HOSPADM

## 2023-11-27 RX ORDER — ONDANSETRON 4 MG/1
4 TABLET, ORALLY DISINTEGRATING ORAL EVERY 30 MIN PRN
Status: DISCONTINUED | OUTPATIENT
Start: 2023-11-27 | End: 2023-11-27 | Stop reason: HOSPADM

## 2023-11-27 RX ORDER — OXYCODONE HYDROCHLORIDE 5 MG/1
5 TABLET ORAL
Status: DISCONTINUED | OUTPATIENT
Start: 2023-11-27 | End: 2023-11-27 | Stop reason: HOSPADM

## 2023-11-27 RX ORDER — OXYCODONE HYDROCHLORIDE 5 MG/1
10 TABLET ORAL
Status: DISCONTINUED | OUTPATIENT
Start: 2023-11-27 | End: 2023-11-27 | Stop reason: HOSPADM

## 2023-11-27 RX ORDER — NALOXONE HYDROCHLORIDE 0.4 MG/ML
0.4 INJECTION, SOLUTION INTRAMUSCULAR; INTRAVENOUS; SUBCUTANEOUS
Status: DISCONTINUED | OUTPATIENT
Start: 2023-11-27 | End: 2023-11-27 | Stop reason: HOSPADM

## 2023-11-27 RX ADMIN — ONDANSETRON 4 MG: 2 INJECTION INTRAMUSCULAR; INTRAVENOUS at 12:41

## 2023-11-27 RX ADMIN — MIDAZOLAM 2 MG: 1 INJECTION INTRAMUSCULAR; INTRAVENOUS at 12:26

## 2023-11-27 RX ADMIN — FENTANYL CITRATE 25 MCG: 0.05 INJECTION, SOLUTION INTRAMUSCULAR; INTRAVENOUS at 12:36

## 2023-11-27 RX ADMIN — FENTANYL CITRATE 25 MCG: 0.05 INJECTION, SOLUTION INTRAMUSCULAR; INTRAVENOUS at 12:42

## 2023-11-27 RX ADMIN — LIDOCAINE HYDROCHLORIDE 5 ML: 10 INJECTION, SOLUTION INFILTRATION; PERINEURAL at 12:30

## 2023-11-27 RX ADMIN — GLYCOPYRROLATE 0.2 MG: 0.2 INJECTION INTRAMUSCULAR; INTRAVENOUS at 12:41

## 2023-11-27 RX ADMIN — PROPOFOL 140 MCG/KG/MIN: 10 INJECTION, EMULSION INTRAVENOUS at 12:30

## 2023-11-27 RX ADMIN — Medication 2 G: at 12:27

## 2023-11-27 RX ADMIN — SODIUM CHLORIDE, POTASSIUM CHLORIDE, SODIUM LACTATE AND CALCIUM CHLORIDE: 600; 310; 30; 20 INJECTION, SOLUTION INTRAVENOUS at 11:30

## 2023-11-27 ASSESSMENT — ACTIVITIES OF DAILY LIVING (ADL)
ADLS_ACUITY_SCORE: 35
ADLS_ACUITY_SCORE: 35

## 2023-11-27 ASSESSMENT — LIFESTYLE VARIABLES: TOBACCO_USE: 1

## 2023-11-27 NOTE — ANESTHESIA CARE TRANSFER NOTE
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    Anesthesia Type:   MAC     Note:    Oropharynx: oropharynx clear of all foreign objects  Level of Consciousness: drowsy  Oxygen Supplementation: face mask  Level of Supplemental Oxygen (L/min / FiO2): 6  Independent Airway: airway patency satisfactory and stable  Dentition: dentition unchanged  Vital Signs Stable: post-procedure vital signs reviewed and stable  Report to RN Given: handoff report given  Patient transferred to: Phase II    Handoff Report: Identifed the Patient, Identified the Reponsible Provider, Reviewed the pertinent medical history, Discussed the surgical course, Reviewed Intra-OP anesthesia mangement and issues during anesthesia, Set expectations for post-procedure period and Allowed opportunity for questions and acknowledgement of understanding      Vitals:  Vitals Value Taken Time   /54 11/27/23 1259   Temp 97.5    Pulse 82 11/27/23 1259   Resp 16    SpO2 100 % 11/27/23 1259   Vitals shown include unfiled device data.    Electronically Signed By: DOROTA Buchanan CRNA  November 27, 2023  1:00 PM

## 2023-11-27 NOTE — INTERVAL H&P NOTE
"I have reviewed the surgical (or preoperative) H&P that is linked to this encounter, and examined the patient. There are no significant changes    Clinical Conditions Present on Arrival:  Clinically Significant Risk Factors Present on Admission                # Drug Induced Coagulation Defect: home medication list includes an anticoagulant medication  # Drug Induced Platelet Defect: home medication list includes an antiplatelet medication  # DMII: A1C = 8.3 % (Ref range: 0.0 - 5.6 %) within past 6 months  # Obesity: Estimated body mass index is 30.64 kg/m  as calculated from the following:    Height as of this encounter: 1.5 m (4' 11.06\").    Weight as of this encounter: 68.9 kg (152 lb).       "

## 2023-11-27 NOTE — OP NOTE
Date: 11/27/2023     Surgeon: LINSEY Ordaz DPM    Preoperative diagnosis:   1. Abscess left hallux  2. Ulceration left hallux    Postoperative diagnosis: Same    Procedure:   1.  Incision and drainage left hallux  2.  Excisional Debridement ulceration left hallux into muscle  3.  Nail avulsion left hallux    Anesthesia: MAC with Local     Hemostasis: None    Pathology:   ID Type Source Tests Collected by Time Destination   A : LEFT HALLUX Wound Toe, Left ANAEROBIC BACTERIAL CULTURE ROUTINE, GRAM STAIN, AEROBIC BACTERIAL CULTURE ROUTINE Rene Ordaz DPM 11/27/2023 12:40 PM         Injectables: None    Materials: None    Complications: None    Blood loss: 1 cc    Findings: Patient presents for operative intervention for an infected wound on the left hallux.  We discussed today surgical procedure to include removal of all nonviable tissue along the medial border of the left hallux.  Risk include not limited to need for additional surgical invention or amputation in the presence of severe PAD.  Consent is been obtained.  Conservative measures were attempted and exhausted. Patient questions invited and answered, including appropriate risk, benefits and complications. No guarantees given or implied. Patient has been NPO.    Description: Patient was brought to the operating room and placed on the table in supine position. IV-sedation was administered by the anesthesia department.  Injection of 0.5% Marcaine plain was administered to surgical site left hallux. The foot was then prepped and draped in usual aseptic manner and the following procedure was then performed: Attention was directed to the area of nonviable tissue with ulceration on the medial border of the left hallux where a Narka elevator was then used to release the nail plate from the nailbed and a hemostat was then used to remove the entire nail plate.  A #15 blade was then used to sharply excisionally debride the nonviable tissue into the level of  "muscle.  Small area of purulent drainage was then noted.  Deep aerobic/anaerobic cultures obtained.  Again any remaining nonviable tissue was then sharply excisionally debrided with a #15 blade into the level of muscle.  The resulting ulceration did not appear to probe to bone of the distal phalanx of the hallux.  A 1000 cc pulse lavage was then used to irrigate the surgical site.  Following irrigation and debridement no remaining nonviable tissue was then noted.    The resulting ulceration measured 1 x 0.4 x 0.8 cm. Sharp, excisional debridement was performed with a #15 blade into muscle debriding 0.4 sq cm.     The wound was packed with 1/4\" gauze, and dressings consistent of 4x4's, ABD, kerlix roll and an ace wrap.     The patient appeared to tolerate all the procedures and anesthesia well without apparent complications. Patient was transported from the operating room to the recovery room with vital signs stable and neurovascular status as it was pre-operatively to the left foot. Patient to be discharged per anesthesia protocol.  She should remain nonweightbearing on the left foot at all times.  Surgical dressing to remain intact until wound VAC is applied tomorrow.  I like to see her for follow-up in approximately 7 to 14 days.  I will start her on doxycycline pending culture and sensitivity report.    Rene Ordaz DPM          "

## 2023-11-27 NOTE — ANESTHESIA POSTPROCEDURE EVALUATION
Patient: Ashanti Aviles    Procedure: Procedure(s):  INCISION AND DRAINAGE, left hallux       Anesthesia Type:  MAC    Note:  Disposition: Outpatient   Postop Pain Control: Uneventful            Sign Out: Well controlled pain   PONV: No   Neuro/Psych: Uneventful            Sign Out: Acceptable/Baseline neuro status   Airway/Respiratory: Uneventful            Sign Out: Acceptable/Baseline resp. status   CV/Hemodynamics: Uneventful            Sign Out: Acceptable CV status; No obvious hypovolemia; No obvious fluid overload   Other NRE:    DID A NON-ROUTINE EVENT OCCUR?            Last vitals:  Vitals Value Taken Time   /75 11/27/23 1345   Temp     Pulse 81 11/27/23 1353   Resp 16 11/27/23 1345   SpO2 100 % 11/27/23 1353   Vitals shown include unfiled device data.    Electronically Signed By: Matilde Wyman MD  November 27, 2023  4:09 PM

## 2023-11-27 NOTE — ANESTHESIA PREPROCEDURE EVALUATION
Anesthesia Pre-Procedure Evaluation    Patient: Ashanti Aviles   MRN: [id number] : 1955        Procedure : Procedure(s):  INCISION AND DRAINAGE, left hallux          Past Medical History:   Diagnosis Date    Chronic ulcer of great toe of left foot with necrosis of muscle (H)     Diabetes mellitus (H)     Gastroesophageal reflux disease     HLD (hyperlipidemia)     Hypertension     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/29/2013    PVD (peripheral vascular disease) (H24)     Yeast vaginitis       Past Surgical History:   Procedure Laterality Date    AMPUTATE TOE(S) Right 2023    Procedure: Amputation fourth toe right foot;  Surgeon: Benjamin Diez DPM;  Location: [facility] OR    ANUS SURGERY      BIOPSY CERVICAL, LOCAL EXCISION, SINGLE/MULTIPLE      COLONOSCOPY N/A 2020    Procedure: EXAM UNDER ANESTHESIA, HIGH RESOLUTION ANOSCOPY INTRA OP;  Surgeon: Preeti Sheehan MD;  Location: [facility];  Service: Gastroenterology    COLONOSCOPY N/A 2020    Procedure: EXAM UNDER ANESTHESIA WITH HIGH RESOLUTION ANOSCOPY;  Surgeon: Preeti Sheehan MD;  Location: [facility] OR;  Service: General    COLONOSCOPY N/A 06/15/2021    Procedure: EXAM UNDER ANESTHESIA WITH HIGH RESOLUTION ANOSCOPY, BIOPSY, FULGURATION;  Surgeon: Preeti Sheehan MD;  Location: [facility] OR;  Service: Gastroenterology    ENDARTERECTOMY FEMORAL Left 2023    Procedure: LEFT FEMORAL ENDARTERECTOMY WITH RETROGRADE ILIAC STENT;  Surgeon: Dyan Geller MD;  Location: [facility] OR    EXAM UNDER ANESTHESIA ANUS N/A 2021    Procedure: EXAM UNDER ANESTHESIA WITH HIGH RESOLUTION ANOSCOPY;  Surgeon: Preeti Sheehan MD;  Location: [facility] OR    FEMORAL ARTERY - TIBIAL ARTERY BYPASS GRAFT Left 2023    Procedure: CREATION, BYPASS, ARTERIAL, FEMORAL TO TIBIAL, LEFT;  Surgeon: Dyan Geller MD;  Location: [facility] LOWER  EXTREMITY ANGIOGRAM LEFT  2023    IR LOWER EXTREMITY ANGIOGRAM RIGHT  2023    IR THROMBOLYSIS ARTERIAL INFUSION INITIAL DAY  10/09/2023    LEEP TX, CERVICAL      2005    TUBAL LIGATION        Allergies   Allergen Reactions    Simvastatin Muscle Pain (Myalgia)     Muscle pain    Victoza [Liraglutide] Other (See Comments)     Binds bowels    Penicillins Unknown     Childhood rxn      Social History     Tobacco Use    Smoking status: Former     Packs/day: .25     Types: Cigarettes     Quit date: 2023     Years since quittin.7    Smokeless tobacco: Never   Substance Use Topics    Alcohol use: Not Currently     Comment: Alcoholic Drinks/day: 2x      Wt Readings from Last 1 Encounters:   10/14/23 67.3 kg (148 lb 6.4 oz)        Anesthesia Evaluation   Pt has had prior anesthetic. Type: MAC.    No history of anesthetic complications       ROS/MED HX  ENT/Pulmonary:     (+)                tobacco use,                       Neurologic:       Cardiovascular:     (+) Dyslipidemia hypertension- -   -  - -                                      METS/Exercise Tolerance:     Hematologic:  - neg hematologic  ROS     Musculoskeletal:  - neg musculoskeletal ROS     GI/Hepatic: Comment: Anal condyloma    (+) GERD,                   Renal/Genitourinary:     (+) renal disease,             Endo:     (+)  type II DM,                    Psychiatric/Substance Use:  - neg psychiatric ROS     Infectious Disease: Comment: Anal condyloma - neg infectious disease ROS     Malignancy: Comment: Andrew grade anal dysplasia - neg malignancy ROS     Other:  - neg other ROS          Physical Exam    Airway        Mallampati: II   TM distance: > 3 FB   Neck ROM: full   Mouth opening: > 3 cm    Respiratory Devices and Support         Dental           Cardiovascular          Rhythm and rate: regular     Pulmonary           breath sounds clear to auscultation           OUTSIDE LABS:  CBC:   Lab Results   Component Value Date    WBC 10.5  "10/06/2023    WBC 12.0 (H) 09/14/2023    HGB 12.5 10/06/2023    HGB 10.4 (L) 09/14/2023    HCT 38.7 10/06/2023    HCT 31.9 (L) 09/14/2023     10/06/2023     09/14/2023     BMP:   Lab Results   Component Value Date     10/06/2023     (L) 09/15/2023    POTASSIUM 4.1 10/06/2023    POTASSIUM 3.9 09/15/2023    CHLORIDE 99 10/06/2023    CHLORIDE 99 09/15/2023    CO2 27 10/06/2023    CO2 26 09/15/2023    BUN 17.0 10/06/2023    BUN 16.1 09/15/2023    CR 0.72 10/06/2023    CR 0.84 09/19/2023     (H) 11/27/2023     (H) 10/06/2023     COAGS:   Lab Results   Component Value Date    PTT 29 10/06/2023    INR 0.87 10/06/2023    FIBR 361 10/09/2023     POC: No results found for: \"BGM\", \"HCG\", \"HCGS\"  HEPATIC:   Lab Results   Component Value Date    ALBUMIN 3.7 02/15/2023    PROTTOTAL 6.5 02/15/2023    ALT 25 02/15/2023    AST 20 02/15/2023    ALKPHOS 75 02/15/2023    BILITOTAL <0.2 02/15/2023     OTHER:   Lab Results   Component Value Date    A1C 8.3 (H) 09/05/2023    SANTINO 9.4 10/06/2023    PHOS 3.6 09/15/2023    MAG 2.2 09/15/2023       Anesthesia Plan    ASA Status:  3    NPO Status:  NPO Appropriate    Anesthesia Type: MAC.              Consents    Anesthesia Plan(s) and associated risks, benefits, and realistic alternatives discussed. Questions answered and patient/representative(s) expressed understanding.     - Discussed: Risks, Benefits and Alternatives for BOTH SEDATION and the PROCEDURE were discussed     - Discussed with:  Patient            Postoperative Care            Comments:               Matilde Wyman MD        "

## 2023-11-28 ENCOUNTER — TELEPHONE (OUTPATIENT)
Dept: VASCULAR SURGERY | Facility: CLINIC | Age: 68
End: 2023-11-28
Payer: COMMERCIAL

## 2023-11-28 NOTE — TELEPHONE ENCOUNTER
Orders placed for home care for wound vac placement.   (Vac has bee ordered)    Please help send referrals.     Jojo Yeh LPN on 11/28/2023 at 8:35 AM

## 2023-11-28 NOTE — TELEPHONE ENCOUNTER
11/28 Home care referrals faxed to:  [facility]- ER 11/28 able to accept referral and can start seeing patient within 48 hours   [facility] to take pt, JS updated them at 2:15 [facility] will be seeing pt    VAC has been approved. Pt will call to schedule NV once she receives the VAC.

## 2023-11-28 NOTE — TELEPHONE ENCOUNTER
"[facility] is asking for the 11/8/2023 office visit note to be printed off and have Dr. Ordaz physically sign and date it so that they can use for their face to face that is required.  The copies that they have do not clearly state \"signed by\" with the date and this is needed.      Fax to [facility] at: [phone]  "

## 2023-11-30 ENCOUNTER — TELEPHONE (OUTPATIENT)
Dept: VASCULAR SURGERY | Facility: CLINIC | Age: 68
End: 2023-11-30
Payer: COMMERCIAL

## 2023-11-30 DIAGNOSIS — L97.523: ICD-10-CM

## 2023-11-30 DIAGNOSIS — L97.523: Primary | ICD-10-CM

## 2023-11-30 LAB
BACTERIA TISS BX CULT: ABNORMAL
BACTERIA TISS BX CULT: ABNORMAL

## 2023-11-30 RX ORDER — CIPROFLOXACIN 500 MG/1
500 TABLET, FILM COATED ORAL 2 TIMES DAILY
Qty: 20 TABLET | Refills: 0 | Status: SHIPPED | OUTPATIENT
Start: 2023-11-30 | End: 2023-12-27

## 2023-11-30 RX ORDER — CIPROFLOXACIN 500 MG/1
500 TABLET, FILM COATED ORAL 2 TIMES DAILY
Qty: 20 TABLET | Refills: 0 | Status: SHIPPED | OUTPATIENT
Start: 2023-11-30 | End: 2023-11-30

## 2023-11-30 NOTE — TELEPHONE ENCOUNTER
Writer spoke with pt to get further information as to when she stopped her Xarelto and who told her to stop it. She stated that she is taking it, she was confused by the generic name on the medication bottle.

## 2023-11-30 NOTE — TELEPHONE ENCOUNTER
----- Message from Rene Ordaz DPM sent at 11/30/2023  7:19 AM CST -----  Reviewed culture and sensitivity report.  I will start the patient on Cipro at this time and ask her to stop taking doxycycline.  Please call.  Thank you

## 2023-11-30 NOTE — TELEPHONE ENCOUNTER
Caller: Brain    Provider: SILVINA Ordaz    Detailed reason for call: Brain from [employer] is calling stating they will changing the wound vac 3 times a week for 9 weeks or longer and 5 PRN for vac malfunction    Best phone number to contact: [phone]    Best time to contact: any    Ok to leave a detailed message: Yes    Ok to speak to authorized person if needed: No      (Noted to patient if reason is related to wound or incision, to please send a photo via email or [email or web address].)

## 2023-11-30 NOTE — TELEPHONE ENCOUNTER
Pt was update on culture results and cipro instructions below.     She requested medication be sent to [facility]- writer sent.     ciprofloxacin (CIPRO) 500 MG tablet 20 tablet 0 11/30/2023  --   Sig - Route: Take 1 tablet (500 mg) by mouth 2 times daily - Oral

## 2023-12-04 LAB — BACTERIA TISS BX CULT: ABNORMAL

## 2023-12-06 ENCOUNTER — OFFICE VISIT (OUTPATIENT)
Dept: VASCULAR SURGERY | Facility: CLINIC | Age: 68
End: 2023-12-06
Attending: PODIATRIST
Payer: COMMERCIAL

## 2023-12-06 VITALS
TEMPERATURE: 97.5 F | SYSTOLIC BLOOD PRESSURE: 160 MMHG | OXYGEN SATURATION: 98 % | HEART RATE: 93 BPM | DIASTOLIC BLOOD PRESSURE: 79 MMHG

## 2023-12-06 DIAGNOSIS — L97.523 ULCER OF GREAT TOE, LEFT, WITH NECROSIS OF MUSCLE (H): Primary | ICD-10-CM

## 2023-12-06 PROCEDURE — 11043 DBRDMT MUSC&/FSCA 1ST 20/<: CPT | Performed by: PODIATRIST

## 2023-12-06 ASSESSMENT — PAIN SCALES - GENERAL: PAINLEVEL: SEVERE PAIN (6)

## 2023-12-06 NOTE — PROGRESS NOTES
FOOT AND ANKLE SURGERY/PODIATRY Progress Note      ASSESSMENT:   Ulceration left hallux into muscle   PAD      TREATMENT:  -I discussed with the patient that the surgical site on the left foot and ulceration are stable without signs of infection.  I recommend she finish current course of Cipro.    -We reviewed that the significant maceration noted on exam today is likely due to her turning off the wound VAC which leads to increased moisture buildup with the occlusive dressing.  Patient does appear to not be able to tolerate current wound VAC setting at 125 mmHg.  I reduce the pressure to 100 mmHg and discussed that this may not be sufficient to create granulation tissue but we will attempt this pressure setting until she follows up with me in 2 weeks.    -Continue nonweightbearing left foot    -After discussion of risk factors, nursing staff removed dressing, cleansed wound and consent obtained 2% Lidocaine HCL jelly was applied, under clean conditions, the left hallux ulceration(s) were debrided using currette.  Devitalized and nonviable tissue, along with any fibrin and slough, was removed to improve granulation tissue formation, stimulate wound healing, decrease overall bacteria load, disrupt biofilm formation and decrease edge senescence. Wound drainage was scant No. Total excisional debridement was 3.6 sq cm into the muscle/fascia with a depth of 0.6 cm.   Ulcers were improved afterwards and .  Measures were as noted on the flow sheet. A gauze dressing was applied.  Wound VAC to be applied to left hallux tomorrow.    -She will follow-up in 2 weeks    Rene Ordaz DPM  [facility] [place]      HPI: Ashanti Aviles was seen again today for an ulceration of her left hallux.  Since the date of surgery she has remained nonweightbearing on her left foot but does admit turning off the back overnight due to discomfort.  She is taking Cipro as directed.  Her  Rodrick is present for today's  visit.    Past Medical History:   Diagnosis Date    Chronic ulcer of great toe of left foot with necrosis of muscle (H)     Diabetes mellitus (H)     Gastroesophageal reflux disease     HLD (hyperlipidemia)     Hypertension     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/29/2013    PVD (peripheral vascular disease) (H24)     Yeast vaginitis        Past Surgical History:   Procedure Laterality Date    AMPUTATE TOE(S) Right 02/18/2023    Procedure: Amputation fourth toe right foot;  Surgeon: Benjamin Diez DPM;  Location: [facility] OR    ANUS SURGERY      BIOPSY CERVICAL, LOCAL EXCISION, SINGLE/MULTIPLE      COLONOSCOPY N/A 09/11/2020    Procedure: EXAM UNDER ANESTHESIA, HIGH RESOLUTION ANOSCOPY INTRA OP;  Surgeon: Preeti Sheehan MD;  Location: [facility] OR;  Service: Gastroenterology    COLONOSCOPY N/A 12/14/2020    Procedure: EXAM UNDER ANESTHESIA WITH HIGH RESOLUTION ANOSCOPY;  Surgeon: Preeti Sheehan MD;  Location: [facility] OR;  Service: General    COLONOSCOPY N/A 06/15/2021    Procedure: EXAM UNDER ANESTHESIA WITH HIGH RESOLUTION ANOSCOPY, BIOPSY, FULGURATION;  Surgeon: Preeti Sheehan MD;  Location: [facility] OR;  Service: Gastroenterology    ENDARTERECTOMY FEMORAL Left 05/17/2023    Procedure: LEFT FEMORAL ENDARTERECTOMY WITH RETROGRADE ILIAC STENT;  Surgeon: Dyan Geller MD;  Location: [facility] OR    EXAM UNDER ANESTHESIA ANUS N/A 09/14/2021    Procedure: EXAM UNDER ANESTHESIA WITH HIGH RESOLUTION ANOSCOPY;  Surgeon: Preeti Sheehan MD;  Location: [facility] OR    FEMORAL ARTERY - TIBIAL ARTERY BYPASS GRAFT Left 09/08/2023    Procedure: CREATION, BYPASS, ARTERIAL, FEMORAL TO TIBIAL, LEFT;  Surgeon: yDan Geller MD;  Location: [facility] OR    INCISION AND DRAINAGE FOOT, COMBINED Left 11/27/2023    Procedure: INCISION AND DRAINAGE, LEFT HALLUX;  Surgeon: Rene Ordaz DPM;  Location: [facility] OR    IR LOWER  EXTREMITY ANGIOGRAM LEFT  03/23/2023    IR LOWER EXTREMITY ANGIOGRAM RIGHT  02/16/2023    IR THROMBOLYSIS ARTERIAL INFUSION INITIAL DAY  10/09/2023    LEEP TX, CERVICAL      2005    TUBAL LIGATION         Allergies   Allergen Reactions    Simvastatin Muscle Pain (Myalgia)     Muscle pain    Victoza [Liraglutide] Other (See Comments)     Binds bowels    Penicillins Unknown     Childhood rxn         Current Outpatient Medications:     acetaminophen (TYLENOL) 325 MG tablet, Take 2 tablets (650 mg) by mouth every 4 hours as needed for mild pain, Disp: , Rfl:     amLODIPine (NORVASC) 5 MG tablet, Take 5 mg by mouth daily, Disp: , Rfl:     ciprofloxacin (CIPRO) 500 MG tablet, Take 1 tablet (500 mg) by mouth 2 times daily, Disp: 20 tablet, Rfl: 0    clopidogrel (PLAVIX) 75 MG tablet, Take 75 mg by mouth daily, Disp: , Rfl:     Continuous Blood Gluc Sensor (CoverMyMedsSTYLE PRINCESS 14 DAY SENSOR) MISC, , Disp: , Rfl:     doxycycline hyclate (VIBRAMYCIN) 100 MG capsule, Take 1 capsule (100 mg) by mouth 2 times daily, Disp: 20 capsule, Rfl: 0    empagliflozin (JARDIANCE) 25 MG TABS tablet, Take 1 tablet (25 mg) by mouth daily, Disp: 90 tablet, Rfl: 1    ezetimibe (ZETIA) 10 MG tablet, Take 10 mg by mouth daily, Disp: , Rfl:     gabapentin (NEURONTIN) 300 MG capsule, Take 1 capsule (300 mg) by mouth 3 times daily, Disp: 60 capsule, Rfl: 1    hydrochlorothiazide (HYDRODIURIL) 25 MG tablet, Take 25 mg by mouth daily, Disp: , Rfl:     Insulin Degludec (TRESIBA) 100 UNIT/ML SOLN, Inject 15 Units Subcutaneous 2 times daily, Disp: , Rfl:     linagliptin (TRADJENTA) 5 MG TABS tablet, Take 5 mg by mouth daily, Disp: , Rfl:     lisinopril (ZESTRIL) 40 MG tablet, Take 40 mg by mouth daily, Disp: , Rfl:     metFORMIN (GLUCOPHAGE XR) 500 MG 24 hr tablet, Take 1,000 mg by mouth daily, Disp: , Rfl:     methocarbamol (ROBAXIN) 500 MG tablet, Take 1 tablet (500 mg) by mouth 4 times daily, Disp: 90 tablet, Rfl: 0    omeprazole (PRILOSEC) 20 MG DR  capsule, Take 20 mg by mouth daily as needed, Disp: , Rfl:     oxyCODONE (ROXICODONE) 5 MG tablet, Take 1-2 tablets (5-10 mg) by mouth every 6 hours as needed for moderate to severe pain, Disp: 12 tablet, Rfl: 0    rivaroxaban ANTICOAGULANT (XARELTO) 2.5 MG TABS tablet, Take 1 tablet (2.5 mg) by mouth 2 times daily (with meals), Disp: , Rfl:     traMADol (ULTRAM) 50 MG tablet, Take 1 tablet (50 mg) by mouth every 6 hours as needed for moderate pain (Please give as adjunct to opiate pain meds), Disp: 45 tablet, Rfl: 0    Review of Systems - 10 point Review of Systems is negative except for ulceration left hallux which is noted in HPI.      OBJECTIVE:  BP (!) 160/79   Pulse 93   Temp 97.5  F (36.4  C)   SpO2 98%   General appearance: Patient is alert and fully cooperative with history & exam.  No sign of distress is noted during the visit.    Vascular: Dorsalis pedis non-palpableLeft.  Dermatologic:    VASC Wound (Active)   Pre Size Length 1.8 12/06/23 0900   Pre Size Width 2 12/06/23 0900   Pre Size Depth 0.6 12/06/23 0900   Pre Total Sq cm 3.6 12/06/23 0900       Incision/Surgical Site 02/18/23 Right Toe (Comment  which one) (Active)       Incision/Surgical Site 09/08/23 Left Leg (Active)       Incision/Surgical Site 10/09/23 Anterior;Right Greater Trochanter (Active)       Incision/Surgical Site 11/27/23 Left Toe (Comment  which one) (Active)   Incision Assessment UTV 11/27/23 1410   Dressing Dry gauze 11/27/23 1410   Loan-Incision Assessment UTV 11/27/23 1410   Closure TEJ 11/27/23 1410   Incision Drainage Amount None 11/27/23 1410   Incision Care Debrided 11/27/23 1306   Dressing Intervention Clean, dry, intact 11/27/23 1410   Ulceration along the dorsal left hallux is intact nailbed with ulceration along the medial margin of the left hallux with increased depth, no purulence or erythema noted.  There is moderate maceration along the periwound of the left hallux.  Neurologic: Intact to light touch  Left.  Musculoskeletal: Contracted digits noted Left.      Picture:

## 2023-12-06 NOTE — PATIENT INSTRUCTIONS
Important lnstructions      WEIGHT BEARING STATUS: You are to remain NON WEIGHT BEARING on your left foot. NON WEIGHT BEARING MEANS NO PRESSURE ON YOUR FOOT OR HEEL AT ANY TIME FOR ANY REASON!    2. OFFLOADING DEVICE: Must use a A ROLLING KNEE WALKER at all times! (do not use affected foot to push wheelchair)    3. STABILIZATION DEVICE: Use a CAM BOOT . You will need to WEAR THIS ANYTIME YOU ARE UP AND OUT OF BED, IT IS OKAY TO REMOVE WHEN YOU ARE SLEEPING..     4. ELEVATE: Elevating your leg means laying with your head on a pillow and your foot ABOVE YOUR HEART.     5. DO NOT MOVE YOUR FOOT.  There is a risk of worsening the wound or incision. To give yourself a higher chance of healing, please DO NOT swing foot back and forth and wiggle foot/toes especially when inside a stabilization device. Limited movement is allowable with therapy as recommended by the doctor.     6. TAKE A PROTEIN SHAKE TWICE A DAY.  (For ex: Boost, Ensure, Glucerna)      Dressing Change lnstructions        DO NOT TURN YOUR WOUND VAC OFF.     Standard - Wound Vac Instructions    1. 3x weekly and as needed cleanse the area with NS    2. Pat dry    3. Apply Cavilon no sting barrier wipe to the skin surrounding the wound to protect from drainage/maceration    4. Apply drape around incision. Cut strip of drape and apply to skin if bridging is needed; plan this area in advance; should not be over bony prominence     5. Cut the foam to fit the area of the incision    6. Cut narrow strip of foam if bridging    7. Cover foam with drape to obtain air tight seal    8. Cut opening the size of a quarter for where the suction pad will be applied    9. Apply Suction pad    10. 100mmHG suction continuous    [employer] Contact [facility] can be reached at [phone], 24 hours a day 7 days a week     - Back up plan in place:     If the negative pressure wound therapy malfunctions or unable to maintain seal: dressing must be removed and reapplied within 2 hours of  the incident. If unable to reapply negative pressure wound dressing, place Normal Saline moistened gauze in wound bed and cover with appropriate dressing to keep wound bed moist.  Change wet-to-dry dressing two times a day until healthcare staff can re-implement negative pressure therapy. Change canister at least weekly.  Pending sale to [facility] Contact Center can be reached at [phone], 24 hours a day 7 days a week    Information on Vacuum-Assisted Closure of a Wound  Vacuum-assisted closure (VAC) of a wound is a type of treatment to help wounds heal. It s also known as negative pressure wound therapy. During the treatment, a device lowers air pressure on the wound. This can help the wound heal more quickly.  Understanding the wound VAC system  A wound VAC system has several parts. A foam or gauze dressing is put directly on the wound. The dressing is changed every 24 to 72 hours. An adhesive film covers and seals the dressing and wound. A drainage tube leads from under the adhesive film and connects to a portable vacuum pump. This pump removes air pressure over the wound. It may do this constantly. Or it may do it in cycles. During the treatment, you ll need to carry the portable pump everywhere you go.  Why wound VAC is used  You might need this therapy for a recent traumatic wound. Or you may need it for a chronic wound. This is a wound that does not heal the way it should over time. This can happen with wounds in people who have diabetes. You may need a wound VAC if you ve had a recent skin graft. And you may need a wound VAC for a large wound. Large wounds can take a longer time to heal.  A wound vacuum system may help your wound heal more quickly by:  Draining extra fluid from the wound  Reducing swelling  Reducing bacteria in the wound  Keeping your wound moist and warm  Helping draw together wound edges  Increasing blood flow to your wound  Decreasing inflammation  Wound VAC offers some other advantages over other types  of wound care. It may decrease your overall discomfort. The dressings usually need to be changed less often. And they may be easier to keep in position.         It IS NOT ok to get your wound wet in the bath or shower    SEEK MEDICAL CARE IF:  You have an increase in swelling, pain, or redness around the wound.  You have an increase in the amount of pus coming from the wound.  There is a bad smell coming from the wound.  The wound appears to be worsening/enlarging  You have a fever greater than 101.5 F      It is ok to continue current wound care treatment/products for the next 2-3 days until new wound care supplies are ordered and arrive. If longer than this please contact our office at [phone].        We want to hear from you!   In the next few weeks, you should receive a call or email to complete a survey about your visit at [facility]. Please help us improve your appointment experience by letting us know how we did today. We strive to make your experience good and value any ways in which we could do better.      We value your input and suggestions.    Thank you for choosing the [facility]!

## 2023-12-11 ENCOUNTER — TELEPHONE (OUTPATIENT)
Dept: VASCULAR SURGERY | Facility: CLINIC | Age: 68
End: 2023-12-11
Payer: COMMERCIAL

## 2023-12-11 NOTE — TELEPHONE ENCOUNTER
Caller: Ashanti    Provider: MD Dyan Geller    Detailed reason for call: Ashanti is asking to be seen sooner that originally recommended.  Right now there are orders for imaging to be done in April 2024.  Patient is stating she is having sharp nerve like pain and is wanting to schedule with Dr. Duyen MCINTYRE.  Please review.  Okay to schedule with ET at next available?  Should we schedule the imaging as well?    Best phone number to contact: [phone]    Best time to contact: any    Ok to leave a detailed message: Yes    Ok to speak to authorized person if needed: No      (Noted to patient if reason is related to wound or incision, to please send a photo via email or [employer]

## 2023-12-11 NOTE — TELEPHONE ENCOUNTER
Called and spoke with Ashanti. Pt is reporting intermittent 5-6/10 sharp, nerve like pain, in L thigh at incision site. Pain sometimes radiates from Lt groin down to foot. Had Left Femoral-tibial bypass graft occlusion done on 10/09/23, pt reports they have had these symptoms intermittently since then. Denies symptoms of infection- just has the pain.

## 2023-12-12 NOTE — TELEPHONE ENCOUNTER
Mena Yeager is calling stating that they did receive  the notes 11/8 however its signed just not dated. They faxed this over last week and haven't received it returned signature and date.  Mena can be reached at [phone] if you have questions.

## 2023-12-18 ENCOUNTER — TELEPHONE (OUTPATIENT)
Dept: VASCULAR SURGERY | Facility: CLINIC | Age: 68
End: 2023-12-18
Payer: COMMERCIAL

## 2023-12-18 ENCOUNTER — OFFICE VISIT (OUTPATIENT)
Dept: VASCULAR SURGERY | Facility: CLINIC | Age: 68
End: 2023-12-18
Attending: SURGERY
Payer: COMMERCIAL

## 2023-12-18 VITALS
HEART RATE: 77 BPM | OXYGEN SATURATION: 100 % | DIASTOLIC BLOOD PRESSURE: 70 MMHG | RESPIRATION RATE: 16 BRPM | SYSTOLIC BLOOD PRESSURE: 120 MMHG | TEMPERATURE: 97.8 F

## 2023-12-18 DIAGNOSIS — I73.9 PAD (PERIPHERAL ARTERY DISEASE) (H): Primary | ICD-10-CM

## 2023-12-18 ASSESSMENT — PAIN SCALES - GENERAL: PAINLEVEL: MILD PAIN (3)

## 2023-12-18 NOTE — PROGRESS NOTES
[facility]        Patient is here for a  follow up  to discuss   Pt is reporting intermittent 5-6/10 sharp, nerve like pain, in L thigh at incision site. Pain sometimes radiates from Lt groin down to foot.   Had Left Femoral-tibial bypass graft occlusion done on 10/09/23, pt reports they have had these symptoms intermittently since then     Pt is currently taking Plavix and Xarelto.      The provider has been notified that the patient see above.     Questions patient would like addressed today are: see above.    Refills are needed: N/A    Has homecare services and agency name: Yes for wound vac - [employer]

## 2023-12-18 NOTE — TELEPHONE ENCOUNTER
Received FMLA paperwork from [employer] that is very specific to Cardiac system. Patient informed of this and she will call to have proper paperwork sent for Dr Ordaz to complete.

## 2023-12-20 ENCOUNTER — PREP FOR PROCEDURE (OUTPATIENT)
Dept: SURGERY | Facility: CLINIC | Age: 68
End: 2023-12-20

## 2023-12-20 ENCOUNTER — OFFICE VISIT (OUTPATIENT)
Dept: VASCULAR SURGERY | Facility: CLINIC | Age: 68
End: 2023-12-20
Attending: PODIATRIST
Payer: COMMERCIAL

## 2023-12-20 VITALS
HEIGHT: 59 IN | WEIGHT: 152 LBS | DIASTOLIC BLOOD PRESSURE: 82 MMHG | BODY MASS INDEX: 30.64 KG/M2 | HEART RATE: 76 BPM | RESPIRATION RATE: 16 BRPM | OXYGEN SATURATION: 99 % | SYSTOLIC BLOOD PRESSURE: 150 MMHG

## 2023-12-20 DIAGNOSIS — L97.524: Primary | ICD-10-CM

## 2023-12-20 DIAGNOSIS — I73.9 PAD (PERIPHERAL ARTERY DISEASE) (H): Primary | ICD-10-CM

## 2023-12-20 PROCEDURE — 11044 DBRDMT BONE 1ST 20 SQ CM/<: CPT | Performed by: PODIATRIST

## 2023-12-20 RX ORDER — CEFAZOLIN SODIUM/WATER 2 G/20 ML
2 SYRINGE (ML) INTRAVENOUS SEE ADMIN INSTRUCTIONS
Status: CANCELLED | OUTPATIENT
Start: 2024-01-12

## 2023-12-20 RX ORDER — CEFAZOLIN SODIUM/WATER 2 G/20 ML
2 SYRINGE (ML) INTRAVENOUS
Status: CANCELLED | OUTPATIENT
Start: 2024-01-12

## 2023-12-20 ASSESSMENT — PAIN SCALES - GENERAL: PAINLEVEL: NO PAIN (0)

## 2023-12-20 NOTE — PROGRESS NOTES
FOOT AND ANKLE SURGERY/PODIATRY Progress Note      ASSESSMENT:   Ulceration left hallux into bone   PAD      TREATMENT:  -I discussed with the patient and her  today that the ulceration on the left hallux has increased depth on exam today, now probes to bone.  Based on this presentation I am concerned about underlying osteomyelitis and recommend surgical I&D with bone biopsy/aerobic and anaerobic culture with skin grafting and use of wound VAC postoperatively.    -Prior to proceeding with the above surgical procedure, I would like to check with vascular surgery to determine if any intervention is available to increase blood flow to left foot.    -We will hold the wound VAC at this time and begin use of a gauze dressing.    -After discussion of risk factors, nursing staff removed dressing, cleansed wound and consent obtained 2% Lidocaine HCL jelly was applied, under clean conditions, the left hallux ulceration(s) were debrided using currette.  Devitalized and nonviable tissue, along with any fibrin and slough, was removed to improve granulation tissue formation, stimulate wound healing, decrease overall bacteria load, disrupt biofilm formation and decrease edge senescence. Wound drainage was scant No. Total excisional debridement was 2.72 sq cm into the bone with a depth of 0.8 cm.   Ulcers were improved afterwards and .  Measures were as noted on the flow sheet. A gauze dressing was applied. She will continue to apply a gauze dressing qday.    -I will plan to follow-up with the patient after discussing further with vascular surgery    SILVINA Geiger [facility]      HPI: Ashanti Aviles was seen again today for a left hallux ulceration.  Since his last visit with me she has tolerated the wound VAC at 100 mmHg.  She continues to walk on the left foot.    Past Medical History:   Diagnosis Date    Chronic ulcer of great toe of left foot with necrosis of muscle (H)      Diabetes mellitus (H)     Gastroesophageal reflux disease     HLD (hyperlipidemia)     Hypertension     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/29/2013    PVD (peripheral vascular disease) (H24)     Yeast vaginitis        Past Surgical History:   Procedure Laterality Date    AMPUTATE TOE(S) Right 02/18/2023    Procedure: Amputation fourth toe right foot;  Surgeon: Benjamin Diez DPM;  Location: [facility] OR    ANUS SURGERY      BIOPSY CERVICAL, LOCAL EXCISION, SINGLE/MULTIPLE      COLONOSCOPY N/A 09/11/2020    Procedure: EXAM UNDER ANESTHESIA, HIGH RESOLUTION ANOSCOPY INTRA OP;  Surgeon: Preeti Sheehan MD;  Location: [facility] OR;  Service: Gastroenterology    COLONOSCOPY N/A 12/14/2020    Procedure: EXAM UNDER ANESTHESIA WITH HIGH RESOLUTION ANOSCOPY;  Surgeon: Preeti Sheehan MD;  Location: [facility] OR;  Service: General    COLONOSCOPY N/A 06/15/2021    Procedure: EXAM UNDER ANESTHESIA WITH HIGH RESOLUTION ANOSCOPY, BIOPSY, FULGURATION;  Surgeon: Preeti Sheehan MD;  Location: [facility] OR;  Service: Gastroenterology    ENDARTERECTOMY FEMORAL Left 05/17/2023    Procedure: LEFT FEMORAL ENDARTERECTOMY WITH RETROGRADE ILIAC STENT;  Surgeon: Dyan Geller MD;  Location: [facility] [place] OR    EXAM UNDER ANESTHESIA ANUS N/A 09/14/2021    Procedure: EXAM UNDER ANESTHESIA WITH HIGH RESOLUTION ANOSCOPY;  Surgeon: Preeti Sheehan MD;  Location: [facility] OR    FEMORAL ARTERY - TIBIAL ARTERY BYPASS GRAFT Left 09/08/2023    Procedure: CREATION, BYPASS, ARTERIAL, FEMORAL TO TIBIAL, LEFT;  Surgeon: Dyan Geller MD;  Location: [facility] OR    INCISION AND DRAINAGE FOOT, COMBINED Left 11/27/2023    Procedure: INCISION AND DRAINAGE, LEFT HALLUX;  Surgeon: Rene Ordaz DPM;  Location: [facility] OR    IR LOWER EXTREMITY ANGIOGRAM LEFT  03/23/2023    IR LOWER EXTREMITY ANGIOGRAM RIGHT  02/16/2023    IR THROMBOLYSIS ARTERIAL INFUSION  INITIAL DAY  10/09/2023    LEEP TX, CERVICAL      2005    TUBAL LIGATION         Allergies   Allergen Reactions    Simvastatin Muscle Pain (Myalgia)     Muscle pain    Victoza [Liraglutide] Other (See Comments)     Binds bowels    Penicillins Unknown     Childhood rxn         Current Outpatient Medications:     acetaminophen (TYLENOL) 325 MG tablet, Take 2 tablets (650 mg) by mouth every 4 hours as needed for mild pain, Disp: , Rfl:     amLODIPine (NORVASC) 5 MG tablet, Take 5 mg by mouth daily, Disp: , Rfl:     clopidogrel (PLAVIX) 75 MG tablet, Take 75 mg by mouth daily, Disp: , Rfl:     Continuous Blood Gluc Sensor (FREESTYLE PRINCESS 14 DAY SENSOR) MISC, , Disp: , Rfl:     empagliflozin (JARDIANCE) 25 MG TABS tablet, Take 1 tablet (25 mg) by mouth daily, Disp: 90 tablet, Rfl: 1    ezetimibe (ZETIA) 10 MG tablet, Take 10 mg by mouth daily, Disp: , Rfl:     gabapentin (NEURONTIN) 300 MG capsule, Take 1 capsule (300 mg) by mouth 3 times daily, Disp: 60 capsule, Rfl: 1    hydrochlorothiazide (HYDRODIURIL) 25 MG tablet, Take 25 mg by mouth daily, Disp: , Rfl:     Insulin Degludec (TRESIBA) 100 UNIT/ML SOLN, Inject 15 Units Subcutaneous 2 times daily, Disp: , Rfl:     linagliptin (TRADJENTA) 5 MG TABS tablet, Take 5 mg by mouth daily, Disp: , Rfl:     lisinopril (ZESTRIL) 40 MG tablet, Take 40 mg by mouth daily, Disp: , Rfl:     metFORMIN (GLUCOPHAGE XR) 500 MG 24 hr tablet, Take 1,000 mg by mouth daily, Disp: , Rfl:     methocarbamol (ROBAXIN) 500 MG tablet, Take 1 tablet (500 mg) by mouth 4 times daily, Disp: 90 tablet, Rfl: 0    omeprazole (PRILOSEC) 20 MG DR capsule, Take 20 mg by mouth daily as needed, Disp: , Rfl:     rivaroxaban ANTICOAGULANT (XARELTO) 2.5 MG TABS tablet, Take 1 tablet (2.5 mg) by mouth 2 times daily (with meals), Disp: , Rfl:     traMADol (ULTRAM) 50 MG tablet, Take 1 tablet (50 mg) by mouth every 6 hours as needed for moderate pain (Please give as adjunct to opiate pain meds), Disp: 45 tablet,  "Rfl: 0    ciprofloxacin (CIPRO) 500 MG tablet, Take 1 tablet (500 mg) by mouth 2 times daily (Patient not taking: Reported on 12/18/2023), Disp: 20 tablet, Rfl: 0    doxycycline hyclate (VIBRAMYCIN) 100 MG capsule, Take 1 capsule (100 mg) by mouth 2 times daily (Patient not taking: Reported on 12/18/2023), Disp: 20 capsule, Rfl: 0    oxyCODONE (ROXICODONE) 5 MG tablet, Take 1-2 tablets (5-10 mg) by mouth every 6 hours as needed for moderate to severe pain (Patient not taking: Reported on 12/18/2023), Disp: 12 tablet, Rfl: 0    Review of Systems - 10 point Review of Systems is negative except for left hallux ulcer which is noted in HPI.      OBJECTIVE:  BP (!) 150/82   Pulse 76   Resp 16   Ht 4' 11\" (1.499 m)   Wt 152 lb (68.9 kg)   SpO2 99%   BMI 30.70 kg/m    General appearance: Patient is alert and fully cooperative with history & exam.  No sign of distress is noted during the visit.    Vascular: Dorsalis pedis non-palpableLeft.  Dermatologic:    VASC Wound left hallux (Active)   Pre Size Length 1.7 12/20/23 0856   Pre Size Width 1.6 12/20/23 0856   Pre Size Depth 0.8 12/20/23 0856   Pre Total Sq cm 2.72 12/20/23 0856       Incision/Surgical Site 02/18/23 Right Toe (Comment  which one) (Active)       Incision/Surgical Site 09/08/23 Left Leg (Active)       Incision/Surgical Site 10/09/23 Anterior;Right Greater Trochanter (Active)       Incision/Surgical Site 11/27/23 Left Toe (Comment  which one) (Active)   Incision Assessment UTV 11/27/23 1410   Dressing Dry gauze 11/27/23 1410   Loan-Incision Assessment UTV 11/27/23 1410   Closure TEJ 11/27/23 1410   Incision Drainage Amount None 11/27/23 1410   Incision Care Debrided 11/27/23 1306   Dressing Intervention Clean, dry, intact 11/27/23 1410   There is granular tissue along the dorsal left hallux ulceration which probes to bone, no erythema.  Mild periwound maceration left hallux.  Neurologic: Intact to light touch Left.  Musculoskeletal: Contracted digits " noted Left.      Picture:

## 2023-12-21 ENCOUNTER — TELEPHONE (OUTPATIENT)
Dept: VASCULAR SURGERY | Facility: CLINIC | Age: 68
End: 2023-12-21
Payer: COMMERCIAL

## 2023-12-21 NOTE — TELEPHONE ENCOUNTER
Caller: Toy Reyes [facility]    Provider: SILVINA Ordaz    Detailed reason for call: Requesting orders to drop home care visits to 2x/week as patient will not be using wound vac. HC would also like to know if there are further plans for surgery.     Best phone number to contact: [phone]    Best time to contact: Any time    Ok to leave a detailed message: Yes    Ok to speak to authorized person if needed: No      (Noted to patient if reason is related to wound or incision, to please send a photo via email or [employer].)

## 2023-12-21 NOTE — TELEPHONE ENCOUNTER
Spoke with Amy and okayed for 2 x week NV to do gauze dressing change and assess for s/s of infection. She stated that Ashanti is able to do it herself on the other days. Mentioned I see an order for surgery but do not see that this was discussed or is scheduled yet. She would like to know the plan is before 3 weeks is up otherwise they would need to discharge from home care since there is no skilled need with the dressing.

## 2023-12-22 ENCOUNTER — TELEPHONE (OUTPATIENT)
Dept: VASCULAR SURGERY | Facility: CLINIC | Age: 68
End: 2023-12-22
Payer: COMMERCIAL

## 2023-12-22 NOTE — TELEPHONE ENCOUNTER
Caller: patient    Provider: SILVINA Ordaz    Detailed reason for call: Would like to know what the plan for follow up/surgery is.    Best phone number to contact: [phone]    Best time to contact: anytime    Ok to leave a detailed message: Yes          (Noted to patient if reason is related to wound or incision, to please send a photo via email or [employer].)

## 2023-12-24 ENCOUNTER — HEALTH MAINTENANCE LETTER (OUTPATIENT)
Age: 68
End: 2023-12-24

## 2023-12-26 NOTE — TELEPHONE ENCOUNTER
Spoke to Ashanti, told her we will ask nursing supervisor Warner to call with update on surgery , surgery schedules and Dr. Geller are out of the office this week.

## 2023-12-26 NOTE — TELEPHONE ENCOUNTER
----- Message from Barry James MD sent at 6/8/2023 12:57 PM CDT -----  WNL - hepC      Caller: Ashanti    Provider: SILVINA Ordaz and MD Dyan Geller    Detailed reason for call: Ashanti is calling wondering if we received  the [place] paperwork yet? She is also following up on her call from last week about what the plan is for her cares.    Best phone number to contact: [phone]    Best time to contact: any    Ok to leave a detailed message: Yes    Ok to speak to authorized person if needed: No      (Noted to patient if reason is related to wound or incision, to please send a photo via email or [facility]

## 2023-12-26 NOTE — TELEPHONE ENCOUNTER
Spoke w/patient. Informed her that we still have not received the correct [facility] paperwork. She will have her daughter help her send it to us via [employer]. Will await the paperwork.

## 2023-12-27 ENCOUNTER — TELEPHONE (OUTPATIENT)
Dept: VASCULAR SURGERY | Facility: CLINIC | Age: 68
End: 2023-12-27
Payer: COMMERCIAL

## 2023-12-27 DIAGNOSIS — L97.523: ICD-10-CM

## 2023-12-27 RX ORDER — CIPROFLOXACIN 500 MG/1
500 TABLET, FILM COATED ORAL 2 TIMES DAILY
Qty: 20 TABLET | Refills: 0 | Status: ON HOLD | OUTPATIENT
Start: 2023-12-27 | End: 2024-01-12

## 2023-12-27 NOTE — TELEPHONE ENCOUNTER
Caller: Jenaro Melgar Pharmacist    Provider: SILVINA Ordaz    Detailed reason for call: would like to clarify directions on ciprofloxacin order sent in today. Order states twice daily for 16 days, but order was only for 20 tablets. Confirmed that yes, it twice a day for sixteen days until she has surgery on the 12th, so quantity adjusted to 32.    Best phone number to contact: [phone]    Best time to contact: anytime    Ok to leave a detailed message: No    Ok to speak to authorized person if needed: Yes: ask for Talia Manuel      (Noted to patient if reason is related to wound or incision, to please send a photo via email or [employer].)

## 2023-12-27 NOTE — PROGRESS NOTES
Surgery Scheduled      Surgery/Procedure: CREATION, BYPASS, ARTERIAL, FEMORAL TO TIBIAL (Left)     Special Equipment: C-arm    Location: [facility]:  [address] [place] [address] (phone: [phone], Fax: [phone])    Date: 1/12    Time: 7:20 am    Admission Type: Inpatient    Surgeon: Dr. Geller    OR Confirmed & :  Yes with Silvia on 12/27/2023    Entered on provider calendar:  Yes    Post Op:     Wound Vac Needed: No    Home Care Needed: No    Reps Contacted: none noted    Blood Thinners Addressed:  Yes stay on plavix. Pt indicated she is not taking xarelto    Stress Test Clearance: NO      Reviewed with pt and sent via [employer]. Antibiotics refilled to continue until surgery

## 2023-12-28 NOTE — TELEPHONE ENCOUNTER
Cinda was able to get patient scheduled with Dr. Geller on 01/12/2024 due to another patient cancelling. Patient updated and surgery instructions sent via [employer].

## 2024-01-05 ENCOUNTER — TELEPHONE (OUTPATIENT)
Dept: VASCULAR SURGERY | Facility: CLINIC | Age: 69
End: 2024-01-05
Payer: COMMERCIAL

## 2024-01-05 DIAGNOSIS — L97.523 ULCER OF GREAT TOE, LEFT, WITH NECROSIS OF MUSCLE (H): Primary | ICD-10-CM

## 2024-01-05 NOTE — TELEPHONE ENCOUNTER
Caller: Home care nurseMatilde RN    Provider: SILVINA Ordaz    Detailed reason for call: Sending this message as an FYI.  Home care is discharging per the patient's request.  They have trained her in the wound care dressings but would be happy to resume care if/when something changes.  She knows that there may be another I&D in the patient's future.  No call back needed unless we have further questions.     Best phone number to contact: [phone]    Best time to contact: any    Ok to leave a detailed message: Yes

## 2024-01-05 NOTE — LETTER
January 12, 2024      Ashanti Aviles  [address] [place] [address]        To Whom It May Concern:    Ashanti Aviles may return to work on 1/22/24 with the following restrictions: Ashanti is to remain NON WEIGHT BEARING on her left foot. Ashanti must use a rolling knee walker and CAM boot at all times. This has been in effect since 11/27/23 until further notice. If unable to accommodate restrictions, return to work date is unknown at this time.      Sincerely,      Rene Ordaz

## 2024-01-11 ENCOUNTER — TELEPHONE (OUTPATIENT)
Dept: VASCULAR SURGERY | Facility: CLINIC | Age: 69
End: 2024-01-11
Payer: COMMERCIAL

## 2024-01-11 ENCOUNTER — ANESTHESIA EVENT (OUTPATIENT)
Dept: SURGERY | Facility: HOSPITAL | Age: 69
DRG: 593 | End: 2024-01-11
Payer: COMMERCIAL

## 2024-01-11 NOTE — TELEPHONE ENCOUNTER
Caller: Ashanti    Provider: MD Dyan Geller    Detailed reason for call: Patient calling again for the return to work letter.  She states she had asked for one in November with Dr. Ordaz but never received one and now she needs one from Dr. Geller for the next surgery coming up.  She is concerned that she called this morning and it's almost the end of the day and she hasn't heard anything and it needs to be done for tomorrow.  She confirmed the fax number listed on Norma's comment below is correct. Ashanti asks for a call back to confirm the message was received and being taken care of ASAP.     Best phone number to contact: [phone]    Best time to contact: any    Ok to leave a detailed message: Yes    Ok to speak to authorized person if needed: No      (Noted to patient if reason is related to wound or incision, to please send a photo via email or [employer].)

## 2024-01-11 NOTE — TELEPHONE ENCOUNTER
Caller: Ashanti    Provider: MD Dyan Geller    Detailed reason for call: Pt calling stating that she will need a letter to return to work following her surgery with Dr. Geller on 1/12/24. Pt states they had another surgery with Dr. Ordaz in November. Pt states to send letter to [employer] with [employer]- fax number is [phone] Attn: [employer] [facility].     Best phone number to contact: [phone]    Best time to contact: Anytime    Ok to leave a detailed message: Yes        (Noted to patient if reason is related to wound or incision, to please send a photo via email or [employer]

## 2024-01-12 ENCOUNTER — DOCUMENTATION ONLY (OUTPATIENT)
Dept: VASCULAR SURGERY | Facility: CLINIC | Age: 69
End: 2024-01-12

## 2024-01-12 ENCOUNTER — HOSPITAL ENCOUNTER (OUTPATIENT)
Facility: HOSPITAL | Age: 69
Discharge: HOME | DRG: 593 | End: 2024-01-12
Attending: SURGERY
Payer: COMMERCIAL

## 2024-01-12 ENCOUNTER — ANESTHESIA (OUTPATIENT)
Dept: SURGERY | Facility: HOSPITAL | Age: 69
DRG: 593 | End: 2024-01-12
Payer: COMMERCIAL

## 2024-01-12 ENCOUNTER — PREP FOR PROCEDURE (OUTPATIENT)
Dept: VASCULAR SURGERY | Facility: CLINIC | Age: 69
End: 2024-01-12

## 2024-01-12 VITALS
DIASTOLIC BLOOD PRESSURE: 70 MMHG | HEIGHT: 62 IN | RESPIRATION RATE: 18 BRPM | TEMPERATURE: 98.1 F | OXYGEN SATURATION: 98 % | WEIGHT: 147.3 LBS | HEART RATE: 88 BPM | BODY MASS INDEX: 27.1 KG/M2 | SYSTOLIC BLOOD PRESSURE: 131 MMHG

## 2024-01-12 DIAGNOSIS — N30.00 ACUTE CYSTITIS WITHOUT HEMATURIA: Primary | ICD-10-CM

## 2024-01-12 DIAGNOSIS — R82.90 ABNORMAL FINDING ON URINALYSIS: Primary | ICD-10-CM

## 2024-01-12 DIAGNOSIS — I73.9 PAD (PERIPHERAL ARTERY DISEASE) (H): Primary | ICD-10-CM

## 2024-01-12 LAB
ABO/RH(D): NORMAL
ACANTHOCYTES BLD QL SMEAR: NORMAL
ALBUMIN UR-MCNC: NEGATIVE MG/DL
ANTIBODY SCREEN: NEGATIVE
APPEARANCE UR: CLEAR
APTT PPP: 29 SECONDS (ref 22–38)
AUER BODIES BLD QL SMEAR: NORMAL
BACTERIA #/AREA URNS HPF: ABNORMAL /HPF
BASO STIPL BLD QL SMEAR: NORMAL
BASOPHILS # BLD AUTO: 0.1 10E3/UL (ref 0–0.2)
BASOPHILS NFR BLD AUTO: 1 %
BILIRUB UR QL STRIP: NEGATIVE
BITE CELLS BLD QL SMEAR: NORMAL
BLISTER CELLS BLD QL SMEAR: NORMAL
BURR CELLS BLD QL SMEAR: NORMAL
COLOR UR AUTO: ABNORMAL
CREAT SERPL-MCNC: 0.86 MG/DL (ref 0.51–0.95)
DACRYOCYTES BLD QL SMEAR: NORMAL
EGFRCR SERPLBLD CKD-EPI 2021: 74 ML/MIN/1.73M2
ELLIPTOCYTES BLD QL SMEAR: NORMAL
EOSINOPHIL # BLD AUTO: 0.3 10E3/UL (ref 0–0.7)
EOSINOPHIL NFR BLD AUTO: 2 %
ERYTHROCYTE [DISTWIDTH] IN BLOOD BY AUTOMATED COUNT: 14.5 % (ref 10–15)
FRAGMENTS BLD QL SMEAR: NORMAL
GLUCOSE BLDC GLUCOMTR-MCNC: 106 MG/DL (ref 70–99)
GLUCOSE SERPL-MCNC: 86 MG/DL (ref 70–99)
GLUCOSE UR STRIP-MCNC: >1000 MG/DL
HCT VFR BLD AUTO: 41.8 % (ref 35–47)
HGB BLD-MCNC: 13.9 G/DL (ref 11.7–15.7)
HGB C CRYSTALS: NORMAL
HGB UR QL STRIP: ABNORMAL
HOWELL-JOLLY BOD BLD QL SMEAR: NORMAL
HYALINE CASTS: 4 /LPF
IMM GRANULOCYTES # BLD: 0.1 10E3/UL
IMM GRANULOCYTES NFR BLD: 0 %
INR PPP: 0.83 (ref 0.85–1.15)
KETONES UR STRIP-MCNC: NEGATIVE MG/DL
LEUKOCYTE ESTERASE UR QL STRIP: ABNORMAL
LYMPHOCYTES # BLD AUTO: 6.5 10E3/UL (ref 0.8–5.3)
LYMPHOCYTES NFR BLD AUTO: 40 %
MCH RBC QN AUTO: 29.1 PG (ref 26.5–33)
MCHC RBC AUTO-ENTMCNC: 33.3 G/DL (ref 31.5–36.5)
MCV RBC AUTO: 88 FL (ref 78–100)
MONOCYTES # BLD AUTO: 1.3 10E3/UL (ref 0–1.3)
MONOCYTES NFR BLD AUTO: 8 %
MUCOUS THREADS #/AREA URNS LPF: PRESENT /LPF
NEUTROPHILS # BLD AUTO: 7.9 10E3/UL (ref 1.6–8.3)
NEUTROPHILS NFR BLD AUTO: 49 %
NEUTS HYPERSEG BLD QL SMEAR: NORMAL
NITRATE UR QL: POSITIVE
NRBC # BLD AUTO: 0 10E3/UL
NRBC BLD AUTO-RTO: 0 /100
PH UR STRIP: 5 [PH] (ref 5–7)
PLAT MORPH BLD: NORMAL
PLATELET # BLD AUTO: 286 10E3/UL (ref 150–450)
POLYCHROMASIA BLD QL SMEAR: NORMAL
POTASSIUM SERPL-SCNC: 3.3 MMOL/L (ref 3.4–5.3)
RBC # BLD AUTO: 4.77 10E6/UL (ref 3.8–5.2)
RBC AGGLUT BLD QL: NORMAL
RBC MORPH BLD: NORMAL
RBC URINE: 3 /HPF
ROULEAUX BLD QL SMEAR: NORMAL
SICKLE CELLS BLD QL SMEAR: NORMAL
SMUDGE CELLS BLD QL SMEAR: NORMAL
SP GR UR STRIP: 1.02 (ref 1–1.03)
SPECIMEN EXPIRATION DATE: NORMAL
SPHEROCYTES BLD QL SMEAR: NORMAL
SQUAMOUS EPITHELIAL: 1 /HPF
STOMATOCYTES BLD QL SMEAR: NORMAL
TARGETS BLD QL SMEAR: NORMAL
TOXIC GRANULES BLD QL SMEAR: NORMAL
UROBILINOGEN UR STRIP-MCNC: <2 MG/DL
VARIANT LYMPHS BLD QL SMEAR: NORMAL
WBC # BLD AUTO: 16.1 10E3/UL (ref 4–11)
WBC URINE: 10 /HPF

## 2024-01-12 PROCEDURE — 82565 ASSAY OF CREATININE: CPT | Performed by: SURGERY

## 2024-01-12 PROCEDURE — 87186 SC STD MICRODIL/AGAR DIL: CPT | Performed by: STUDENT IN AN ORGANIZED HEALTH CARE EDUCATION/TRAINING PROGRAM

## 2024-01-12 PROCEDURE — 85025 COMPLETE CBC W/AUTO DIFF WBC: CPT | Performed by: SURGERY

## 2024-01-12 PROCEDURE — 36415 COLL VENOUS BLD VENIPUNCTURE: CPT | Performed by: SURGERY

## 2024-01-12 PROCEDURE — 85730 THROMBOPLASTIN TIME PARTIAL: CPT | Performed by: SURGERY

## 2024-01-12 PROCEDURE — 87086 URINE CULTURE/COLONY COUNT: CPT | Performed by: STUDENT IN AN ORGANIZED HEALTH CARE EDUCATION/TRAINING PROGRAM

## 2024-01-12 PROCEDURE — 999N000141 HC STATISTIC PRE-PROCEDURE NURSING ASSESSMENT: Performed by: SURGERY

## 2024-01-12 PROCEDURE — 81003 URINALYSIS AUTO W/O SCOPE: CPT | Performed by: STUDENT IN AN ORGANIZED HEALTH CARE EDUCATION/TRAINING PROGRAM

## 2024-01-12 PROCEDURE — 82947 ASSAY GLUCOSE BLOOD QUANT: CPT | Performed by: SURGERY

## 2024-01-12 PROCEDURE — 250N000011 HC RX IP 250 OP 636: Performed by: ANESTHESIOLOGY

## 2024-01-12 PROCEDURE — 84132 ASSAY OF SERUM POTASSIUM: CPT | Performed by: SURGERY

## 2024-01-12 PROCEDURE — 250N000013 HC RX MED GY IP 250 OP 250 PS 637: Performed by: ANESTHESIOLOGY

## 2024-01-12 PROCEDURE — 85610 PROTHROMBIN TIME: CPT | Performed by: SURGERY

## 2024-01-12 PROCEDURE — 86900 BLOOD TYPING SEROLOGIC ABO: CPT | Performed by: SURGERY

## 2024-01-12 PROCEDURE — 258N000003 HC RX IP 258 OP 636: Performed by: ANESTHESIOLOGY

## 2024-01-12 RX ORDER — DEXMEDETOMIDINE HYDROCHLORIDE 4 UG/ML
INJECTION, SOLUTION INTRAVENOUS
Status: DISCONTINUED
Start: 2024-01-12 | End: 2024-01-12 | Stop reason: HOSPADM

## 2024-01-12 RX ORDER — CEFAZOLIN SODIUM/WATER 2 G/20 ML
2 SYRINGE (ML) INTRAVENOUS SEE ADMIN INSTRUCTIONS
Status: CANCELLED | OUTPATIENT
Start: 2024-02-15

## 2024-01-12 RX ORDER — PHENYLEPHRINE HCL IN 0.9% NACL 50MG/250ML
PLASTIC BAG, INJECTION (ML) INTRAVENOUS
Status: DISCONTINUED
Start: 2024-01-12 | End: 2024-01-12 | Stop reason: HOSPADM

## 2024-01-12 RX ORDER — SODIUM CHLORIDE, SODIUM LACTATE, POTASSIUM CHLORIDE, CALCIUM CHLORIDE 600; 310; 30; 20 MG/100ML; MG/100ML; MG/100ML; MG/100ML
INJECTION, SOLUTION INTRAVENOUS CONTINUOUS
Status: DISCONTINUED | OUTPATIENT
Start: 2024-01-12 | End: 2024-01-13 | Stop reason: HOSPADM

## 2024-01-12 RX ORDER — LIDOCAINE 40 MG/G
CREAM TOPICAL
Status: DISCONTINUED | OUTPATIENT
Start: 2024-01-12 | End: 2024-01-13 | Stop reason: HOSPADM

## 2024-01-12 RX ORDER — ACETAMINOPHEN 325 MG/1
975 TABLET ORAL ONCE
Status: COMPLETED | OUTPATIENT
Start: 2024-01-12 | End: 2024-01-12

## 2024-01-12 RX ORDER — CEFAZOLIN SODIUM/WATER 2 G/20 ML
2 SYRINGE (ML) INTRAVENOUS SEE ADMIN INSTRUCTIONS
Status: DISCONTINUED | OUTPATIENT
Start: 2024-01-12 | End: 2024-01-13 | Stop reason: HOSPADM

## 2024-01-12 RX ORDER — PAPAVERINE HYDROCHLORIDE 30 MG/ML
INJECTION INTRAMUSCULAR; INTRAVENOUS
Status: DISCONTINUED
Start: 2024-01-12 | End: 2024-01-12 | Stop reason: HOSPADM

## 2024-01-12 RX ORDER — NITROFURANTOIN 25; 75 MG/1; MG/1
100 CAPSULE ORAL 2 TIMES DAILY
Qty: 14 CAPSULE | Refills: 0 | Status: SHIPPED | OUTPATIENT
Start: 2024-01-12 | End: 2024-01-19

## 2024-01-12 RX ORDER — MAGNESIUM SULFATE 4 G/50ML
4 INJECTION INTRAVENOUS ONCE
Status: COMPLETED | OUTPATIENT
Start: 2024-01-12 | End: 2024-01-12

## 2024-01-12 RX ORDER — CEFAZOLIN SODIUM/WATER 2 G/20 ML
2 SYRINGE (ML) INTRAVENOUS
Status: DISCONTINUED | OUTPATIENT
Start: 2024-01-12 | End: 2024-01-13 | Stop reason: HOSPADM

## 2024-01-12 RX ORDER — CEFAZOLIN SODIUM/WATER 2 G/20 ML
2 SYRINGE (ML) INTRAVENOUS
Status: CANCELLED | OUTPATIENT
Start: 2024-02-15

## 2024-01-12 RX ADMIN — SODIUM CHLORIDE, POTASSIUM CHLORIDE, SODIUM LACTATE AND CALCIUM CHLORIDE: 600; 310; 30; 20 INJECTION, SOLUTION INTRAVENOUS at 06:22

## 2024-01-12 RX ADMIN — ACETAMINOPHEN 975 MG: 325 TABLET ORAL at 06:25

## 2024-01-12 RX ADMIN — MAGNESIUM SULFATE HEPTAHYDRATE 4 G: 80 INJECTION, SOLUTION INTRAVENOUS at 06:26

## 2024-01-12 ASSESSMENT — ACTIVITIES OF DAILY LIVING (ADL)
ADLS_ACUITY_SCORE: 20

## 2024-01-12 NOTE — PROGRESS NOTES
Surgery Scheduled    Patient's surgery was cancelled on 1/12/24 due to UTI.  Writer will confirm with Dr. Geller on 1/15/24 to determine if rescheduled surgery date is appropriate and reach out to pt to confirm plan.    Surgery/Procedure: CREATION, BYPASS, ARTERIAL, FEMORAL TO TIBIAL     Special Equipment: c arm  Length of Surgery: 4 hrs  Preoperative physical needed: Yes  Product Rep needed? Yes  Company? [employer]    Location: [facility]:  [address] [place] [address] (phone: [phone], Fax: [phone])    Date: 2/15/2024    Time: 720 AM    Admission Type: Inpatient    Surgeon: Dr. Geller    OR Confirmed & :  Yes with Rosibel on 1/12/2024    Entered on provider calendar:  Yes    Post Op: TBD    Wound Vac Needed: No    Home Care Needed: No    Blood Thinners Addressed: N/A    Stress Test Clearance: NO

## 2024-01-12 NOTE — TELEPHONE ENCOUNTER
Called and spoke with Ashanti. Letter for return to work was faxed to [employer] at [phone]. Copy of letter and order for RKW placed at  for patient to . Pt will  today.

## 2024-01-12 NOTE — PROGRESS NOTES
Left leg bypass surgery today is canceled due to UTI.  She is asymptomatic however UA is clearly infectious and she has WBC of 16.  She had been taking ciprofloxacin for her left toe wound as directed by Dr. Ordaz.  Discussed with pharmacy who recommended treatment with 1 week of Macrobid.  Will reschedule her her bypass in approximately 2 weeks and she will need repeat UA prior to her operation.    Saúl Molina MD

## 2024-01-12 NOTE — OR NURSING
Dr Saúl BALDERAS and Dr Muir were now both informed of todays WBC 16.1, last plavix was taken yesterday at 0900, K+ 3.3 and it is ok that she keeps her Freestyle Leif in place on her left upper arm.

## 2024-01-12 NOTE — ANESTHESIA PREPROCEDURE EVALUATION
Anesthesia Pre-Procedure Evaluation    Patient: Ashanti Aviles   MRN: [id number] : 1955        Procedure : Procedure(s):  CREATION, BYPASS, ARTERIAL, FEMORAL TO TIBIAL          Past Medical History:   Diagnosis Date    Chronic ulcer of great toe of left foot with necrosis of muscle (H)     Diabetes mellitus (H)     Gastroesophageal reflux disease     History of colonic polyps     HLD (hyperlipidemia)     Hypertension     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/29/2013    OAG (open angle glaucoma)     PVD (peripheral vascular disease) (H24)     Retinopathy     Yeast vaginitis       Past Surgical History:   Procedure Laterality Date    AMPUTATE TOE(S) Right 2023    Procedure: Amputation fourth toe right foot;  Surgeon: Benjamin Diez DPM;  [place] [facility] [place]    ANUS SURGERY      BIOPSY CERVICAL, LOCAL EXCISION, SINGLE/MULTIPLE      COLONOSCOPY N/A 2020    Procedure: EXAM UNDER ANESTHESIA, HIGH RESOLUTION ANOSCOPY INTRA OP;  Surgeon: Preeti Sheehan MD;  Location: [facility] OR;  Service: Gastroenterology    COLONOSCOPY N/A 2020    Procedure: EXAM UNDER ANESTHESIA WITH HIGH RESOLUTION ANOSCOPY;  Surgeon: Preeti Sheehan MD;  Location: [facility] OR;  Service: General    COLONOSCOPY N/A 06/15/2021    Procedure: EXAM UNDER ANESTHESIA WITH HIGH RESOLUTION ANOSCOPY, BIOPSY, FULGURATION;  Surgeon: Preeti Sheehan MD;  Location: [facility] OR;  Service: Gastroenterology    ENDARTERECTOMY FEMORAL Left 2023    Procedure: LEFT FEMORAL ENDARTERECTOMY WITH RETROGRADE ILIAC STENT;  Surgeon: Dyan Geller MD;  [place] [facility] [place]    EXAM UNDER ANESTHESIA ANUS N/A 2021    Procedure: EXAM UNDER ANESTHESIA WITH HIGH RESOLUTION ANOSCOPY;  Surgeon: Preeti Sheehan MD;  Location: [facility] OR    FEMORAL ARTERY - TIBIAL ARTERY BYPASS GRAFT Left 2023    Procedure: CREATION, BYPASS, ARTERIAL, FEMORAL TO TIBIAL, LEFT;   Surgeon: Dyan Geller MD;  Location: [facility] OR    INCISION AND DRAINAGE FOOT, COMBINED Left 2023    Procedure: INCISION AND DRAINAGE, LEFT HALLUX;  Surgeon: Rene Ordaz DPM;  Location: [facility] OR    IR LOWER EXTREMITY ANGIOGRAM LEFT  2023    IR LOWER EXTREMITY ANGIOGRAM RIGHT  2023    IR THROMBOLYSIS ARTERIAL INFUSION INITIAL DAY  10/09/2023    LEEP TX, CERVICAL          TUBAL LIGATION        Allergies   Allergen Reactions    Simvastatin Muscle Pain (Myalgia)     Muscle pain    Victoza [Liraglutide] Other (See Comments)     Binds bowels    Penicillins Unknown     Childhood rxn      Social History     Tobacco Use    Smoking status: Every Day     Packs/day: .5     Types: Cigarettes     Last attempt to quit: 2023     Years since quittin.9    Smokeless tobacco: Never   Substance Use Topics    Alcohol use: Not Currently     Comment: Alcoholic Drinks/day: 2x      Wt Readings from Last 1 Encounters:   24 66.8 kg (147 lb 4.8 oz)        Anesthesia Evaluation            ROS/MED HX  ENT/Pulmonary:  - neg pulmonary ROS     Neurologic:  - neg neurologic ROS     Cardiovascular:  - neg cardiovascular ROS   (+)  hypertension- -   -  - -                                      METS/Exercise Tolerance: >4 METS    Hematologic:  - neg hematologic  ROS     Musculoskeletal:  - neg musculoskeletal ROS     GI/Hepatic:  - neg GI/hepatic ROS   (+) GERD,                   Renal/Genitourinary:  - neg Renal ROS   (+) renal disease,             Endo:  - neg endo ROS   (+) type I DM,                     Psychiatric/Substance Use:  - neg psychiatric ROS     Infectious Disease:  - neg infectious disease ROS     Malignancy:  - neg malignancy ROS     Other:  - neg other ROS          Physical Exam    Airway        Mallampati: II    Neck ROM: full     Respiratory Devices and Support         Dental           Cardiovascular   cardiovascular exam normal          Pulmonary   pulmonary  "exam normal                OUTSIDE LABS:  CBC:   Lab Results   Component Value Date    WBC 16.1 (H) 01/12/2024    WBC 10.5 10/06/2023    HGB 13.9 01/12/2024    HGB 12.5 10/06/2023    HCT 41.8 01/12/2024    HCT 38.7 10/06/2023     01/12/2024     10/06/2023     BMP:   Lab Results   Component Value Date     10/06/2023     (L) 09/15/2023    POTASSIUM 3.3 (L) 01/12/2024    POTASSIUM 4.1 10/06/2023    CHLORIDE 99 10/06/2023    CHLORIDE 99 09/15/2023    CO2 27 10/06/2023    CO2 26 09/15/2023    BUN 17.0 10/06/2023    BUN 16.1 09/15/2023    CR 0.86 01/12/2024    CR 0.72 10/06/2023    GLC 86 01/12/2024     (H) 01/12/2024     COAGS:   Lab Results   Component Value Date    PTT 29 01/12/2024    INR 0.83 (L) 01/12/2024    FIBR 361 10/09/2023     POC: No results found for: \"BGM\", \"HCG\", \"HCGS\"  HEPATIC:   Lab Results   Component Value Date    ALBUMIN 3.7 02/15/2023    PROTTOTAL 6.5 02/15/2023    ALT 25 02/15/2023    AST 20 02/15/2023    ALKPHOS 75 02/15/2023    BILITOTAL <0.2 02/15/2023     OTHER:   Lab Results   Component Value Date    A1C 8.3 (H) 09/05/2023    SANTINO 9.4 10/06/2023    PHOS 3.6 09/15/2023    MAG 2.2 09/15/2023       Anesthesia Plan    ASA Status:  3       Anesthesia Type: General.     - Airway: ETT   Induction: Intravenous.      Techniques and Equipment:     - Lines/Monitors: 2nd IV, Arterial Line     Consents    Anesthesia Plan(s) and associated risks, benefits, and realistic alternatives discussed. Questions answered and patient/representative(s) expressed understanding.     - Discussed: Risks, Benefits and Alternatives for the PROCEDURE were discussed     - Discussed with:  Patient            Postoperative Care       PONV prophylaxis: Ondansetron (or other 5HT-3)     Comments:               Juan Muir MD    I have reviewed the pertinent notes and labs in the chart from the past 30 days and (re)examined the patient.  Any updates or changes from those notes are reflected in " "this note.    # Hypokalemia: Lowest K = 3.3 mmol/L in last 2 days, will replace as needed         # Drug Induced Coagulation Defect: home medication list includes an anticoagulant medication  # Drug Induced Platelet Defect: home medication list includes an antiplatelet medication  # DMII: A1C = N/A within past 6 months  # Overweight: Estimated body mass index is 27.38 kg/m  as calculated from the following:    Height as of this encounter: 1.562 m (5' 1.5\").    Weight as of this encounter: 66.8 kg (147 lb 4.8 oz).      "

## 2024-01-12 NOTE — PHARMACY-ADMISSION MEDICATION HISTORY
Pharmacist Admission Medication History    Admission medication history is complete. The information provided in this note is only as accurate as the sources available at the time of the update.    Information Source(s): Patient via in-person    Pertinent Information:     Changes made to PTA medication list:  Added: None  Deleted: Cipro, Doxycycline, Gabapentin, Methocarbamol, Oxycodone, Tramadol  Changed: None    Medication Affordability:       Allergies reviewed with patient and updates made in EHR: yes    Medication History Completed By: ANDRIY CUMMINGS RPH 1/12/2024 9:15 AM    PTA Med List   Medication Sig Last Dose    acetaminophen (TYLENOL) 325 MG tablet Take 2 tablets (650 mg) by mouth every 4 hours as needed for mild pain Past Week at prn - using ~8 per day    amLODIPine (NORVASC) 5 MG tablet Take 5 mg by mouth daily 1/11/2024 at pm    clopidogrel (PLAVIX) 75 MG tablet Take 75 mg by mouth daily 1/11/2024 at pm    empagliflozin (JARDIANCE) 25 MG TABS tablet Take 1 tablet (25 mg) by mouth daily 1/11/2024 at pm    ezetimibe (ZETIA) 10 MG tablet Take 10 mg by mouth daily 1/11/2024 at pm    hydrochlorothiazide (HYDRODIURIL) 25 MG tablet Take 25 mg by mouth daily 1/11/2024 at pm    Insulin Degludec (TRESIBA) 100 UNIT/ML SOLN Inject 15 Units Subcutaneous 2 times daily 1/11/2024 at pm    linagliptin (TRADJENTA) 5 MG TABS tablet Take 5 mg by mouth daily 1/11/2024 at pm    lisinopril (ZESTRIL) 40 MG tablet Take 40 mg by mouth daily 1/11/2024 at pm    metFORMIN (GLUCOPHAGE XR) 500 MG 24 hr tablet Take 1,000 mg by mouth daily 1/11/2024 at pm    nitroFURantoin macrocrystal-monohydrate (MACROBID) 100 MG capsule Take 1 capsule (100 mg) by mouth 2 times daily for 7 days     omeprazole (PRILOSEC) 20 MG DR capsule Take 20 mg by mouth daily as needed Past Month at prn

## 2024-01-12 NOTE — INTERVAL H&P NOTE
"I have reviewed the surgical (or preoperative) H&P that is linked to this encounter, and examined the patient. There are no significant changes    Clinical Conditions Present on Arrival:  Clinically Significant Risk Factors Present on Admission        # Hypokalemia: Lowest K = 3.3 mmol/L in last 2 days, will replace as needed         # Drug Induced Coagulation Defect: home medication list includes an anticoagulant medication  # Drug Induced Platelet Defect: home medication list includes an antiplatelet medication  # DMII: A1C = N/A within past 6 months  # Overweight: Estimated body mass index is 27.38 kg/m  as calculated from the following:    Height as of this encounter: 1.562 m (5' 1.5\").    Weight as of this encounter: 66.8 kg (147 lb 4.8 oz).       "

## 2024-01-12 NOTE — OR NURSING
Dr Geller cancelled the aurgery for today.  Ashanti knows to go to her pharmacy and  a prescription for a UTI.

## 2024-01-13 ENCOUNTER — PATIENT OUTREACH (OUTPATIENT)
Dept: CARE COORDINATION | Facility: CLINIC | Age: 69
End: 2024-01-13
Payer: COMMERCIAL

## 2024-01-13 LAB — BACTERIA UR CULT: ABNORMAL

## 2024-01-13 NOTE — PROGRESS NOTES
Clinic Care Coordination Contact  [facility]: Post-Discharge Note  SITUATION                                                      Admission:        Reason for Admission: Patient's surgery was cancelled on 1/12/24 due to UTI.  Discharge:   Discharge Diagnosis: Patient's surgery was cancelled on 1/12/24 due to UTI.    BACKGROUND                                                      Per hospital discharge summary and inpatient provider notes:    Patient's surgery was cancelled on 1/12/24 due to UTI.  Writer will confirm with Dr. Geller on 1/15/24 to determine if rescheduled surgery date is appropriate and reach out to pt to confirm plan.     Surgery/Procedure: CREATION, BYPASS, ARTERIAL, FEMORAL TO TIBIAL     ASSESSMENT           Discharge Assessment  How are you doing now that you are home?: feeling well  How are your symptoms? (Red Flag symptoms escalate to triage hotline per guidelines): Unchanged  Do you feel your condition is stable enough to be safe at home until your provider visit?: Yes  Does the patient have their discharge instructions? : Yes  Does the patient have questions regarding their discharge instructions? : No  Were you started on any new medications or were there changes to any of your previous medications? : Yes  Does the patient have all of their medications?: Yes  Do you have questions regarding any of your medications? : No  Do you have all of your needed medical supplies or equipment (DME)?  (i.e. oxygen tank, CPAP, cane, etc.): Yes  Discharge follow-up appointment scheduled within 14 calendar days? : Yes  Discharge Follow Up Appointment Date: 01/29/24  Discharge Follow Up Appointment Scheduled with?: Specialty Care Provider    Post-op ([facility] CTA Only)  If the patient had a surgery or procedure, do they have any questions for a nurse?: No           PLAN                                                      Outpatient Plan:      29  Post Op with Tammy Ramirez  Monday Jan 29, 2024 12:45  "PM  Please plan to arrive at the clinic at your \"Arrive By\" time for your  appointment. Our late policy will be enforced based on the  appointment start time. Our address is: [address] [place] [address], [place] [address].  If you are in need of a wheelchair, please try to bring your own. There  are a limited amount of wheelchairs at the clinic and it is not  guaranteed one will be available at the time of your appointment.    Future Appointments   Date Time Provider Department Center   1/29/2024  1:00 PM Tammy Ramirez PA-C [facility]   2/26/2024 12:15 PM MPLW VC US 1 [facility]   2/26/2024  1:00 PM MPLW VC US 1 [facility]   2/26/2024  1:30 PM FRANC[facility]         For any urgent concerns, please contact our 24 hour nurse triage line: [phone]     AMBERLY Norman  [phone]  Connected Care Resource [facility]               "

## 2024-01-17 ENCOUNTER — TELEPHONE (OUTPATIENT)
Dept: VASCULAR SURGERY | Facility: CLINIC | Age: 69
End: 2024-01-17
Payer: COMMERCIAL

## 2024-01-17 NOTE — PROGRESS NOTES
Confirmed surgery information and instructions with pt.  Sending instructions to pt via Confer.     Cryo Rep emailed 1/17/24 with surgery date.

## 2024-01-17 NOTE — TELEPHONE ENCOUNTER
"Caller: Ashanti Aviles    Provider: SILVINA Ordaz    Detailed reason for call: Work note request (needs changes). Fax to [facility] at [phone]     Best phone number to contact: [phone]    Best time to contact: NA    Ok to leave a detailed message: Yes    Ok to speak to authorized person if needed: No    Ashanti called requesting revisions be made to her work note that was written on 1/12/24. She states that she was given the okay by Dr. Ordaz to walk in her CAM boot and she has never had a rolling knee walker. She is \"walking all around\" in her boot. Let her know I did not see this in the chart so I would need to check with Dr. Ordaz.    Ashanti would like a call back to let her know this is completed.       "

## 2024-01-19 NOTE — TELEPHONE ENCOUNTER
Spoke with Dr. Ordaz. Does not recommend she return to work with no restrictions. Will not be writing letter.     Pt notified and understands.    Jojo Yeh LPN on 1/19/2024 at 3:54 PM

## 2024-01-19 NOTE — TELEPHONE ENCOUNTER
Patient calling again to get update on work note.  She is asking for a work note to include being back to work with no restrictions and for it to be faxed to [facility] [employer] at [phone]  ASAP.  She would like an update once this is completed.  Routing to Dr. Ordaz's team to address.

## 2024-01-22 NOTE — TELEPHONE ENCOUNTER
Call and spoke with Ashanti, we will get an appointment made for her to see Dr. Geller. Advised that if pain gets to be intolerable, to go to urgent care.    Patient needs to establish care for refills. Please assist with scheduling.     Thank you,  Gabbie Schmitt RN

## 2024-01-24 ENCOUNTER — OFFICE VISIT (OUTPATIENT)
Dept: VASCULAR SURGERY | Facility: CLINIC | Age: 69
End: 2024-01-24
Attending: PODIATRIST
Payer: COMMERCIAL

## 2024-01-24 ENCOUNTER — DOCUMENTATION ONLY (OUTPATIENT)
Dept: VASCULAR SURGERY | Facility: CLINIC | Age: 69
End: 2024-01-24

## 2024-01-24 VITALS
HEART RATE: 85 BPM | SYSTOLIC BLOOD PRESSURE: 128 MMHG | TEMPERATURE: 98.4 F | OXYGEN SATURATION: 100 % | DIASTOLIC BLOOD PRESSURE: 77 MMHG

## 2024-01-24 DIAGNOSIS — I73.9 PAD (PERIPHERAL ARTERY DISEASE) (H): ICD-10-CM

## 2024-01-24 DIAGNOSIS — L97.524: Primary | ICD-10-CM

## 2024-01-24 PROCEDURE — 11044 DBRDMT BONE 1ST 20 SQ CM/<: CPT | Performed by: PODIATRIST

## 2024-01-24 ASSESSMENT — PAIN SCALES - GENERAL: PAINLEVEL: MODERATE PAIN (4)

## 2024-01-24 NOTE — LETTER
January 24, 2024      RE: Ashanti Aviles  [address] [place] [address]       To whom it may concern:    Ashanti Aviles was seen in our clinic today. She  may return to work with the following: No restrictions, but must wear CAM boot while at work on or about 1/25/2024.    Sincerely,      Rene Ordaz DPM

## 2024-01-24 NOTE — PROGRESS NOTES
FOOT AND ANKLE SURGERY/PODIATRY Progress Note      ASSESSMENT:   Ulceration left hallux into bone   PAD      TREATMENT:  -I discussed with the patient that the ulceration on the left hallux is stable but continues to have exposed bone of the distal phalanx of the left hallux.    -Due to the delay in bypass procedure and chronicity of exposed bone I recommend an MRI at this time of the left foot to investigate for underlying osteomyelitis.    -We also reviewed surgical plan for debridement of the left hallux ulceration in the operating room setting with application of skin grafting if possible, wound VAC postoperatively.  I will asked my office to prior Auth her for there is skin and dermacell.    -Okay to return to work at this time with limited walking cam boot.    -After discussion of risk factors, nursing staff removed dressing, cleansed wound and consent obtained 2% Lidocaine HCL jelly was applied, under clean conditions, the left hallux ulceration(s) were debrided using currette.  Devitalized and nonviable tissue, along with any fibrin and slough, was removed to improve granulation tissue formation, stimulate wound healing, decrease overall bacteria load, disrupt biofilm formation and decrease edge senescence. Wound drainage was scant No. Total excisional debridement was 0.8 sq cm into the bone with a depth of 0.5 cm.   Ulcers were improved afterwards and .  Measures were as noted on the flow sheet. A gauze dressing was applied. She will continue to apply a gauze dressing qday.    -I will await input from vascular surgery to determine when we can go ahead with left foot surgery.    Rene Ordaz DPM  [facility] [place]      HPI: Ashanti ROSIE Aviles was seen again today for a left hallux ulceration.  She was scheduled for bypass procedure on the left lower extremity but this was canceled due to UTI.  She is been rescheduled for February 14.  Patient requesting a return to work at this  time.    Past Medical History:   Diagnosis Date    Chronic ulcer of great toe of left foot with necrosis of muscle (H)     Diabetes mellitus (H)     Gastroesophageal reflux disease     History of colonic polyps     HLD (hyperlipidemia)     Hypertension     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/29/2013    OAG (open angle glaucoma)     PVD (peripheral vascular disease) (H24)     Retinopathy     Yeast vaginitis        Past Surgical History:   Procedure Laterality Date    AMPUTATE TOE(S) Right 02/18/2023    Procedure: Amputation fourth toe right foot;  Surgeon: Benjamin Diez DPM;  Location: [facility]    ANUS SURGERY      BIOPSY CERVICAL, LOCAL EXCISION, SINGLE/MULTIPLE      COLONOSCOPY N/A 09/11/2020    Procedure: EXAM UNDER ANESTHESIA, HIGH RESOLUTION ANOSCOPY INTRA OP;  Surgeon: Preeti Sheehan MD;  Location: [facility] OR;  Service: Gastroenterology    COLONOSCOPY N/A 12/14/2020    Procedure: EXAM UNDER ANESTHESIA WITH HIGH RESOLUTION ANOSCOPY;  Surgeon: Preeti Sheehan MD;  Location: [facility] OR;  Service: General    COLONOSCOPY N/A 06/15/2021    Procedure: EXAM UNDER ANESTHESIA WITH HIGH RESOLUTION ANOSCOPY, BIOPSY, FULGURATION;  Surgeon: Preeti Sheehan MD;  Location: [facility] OR;  Service: Gastroenterology    ENDARTERECTOMY FEMORAL Left 05/17/2023    Procedure: LEFT FEMORAL ENDARTERECTOMY WITH RETROGRADE ILIAC STENT;  Surgeon: Dyan Geller MD;  Location: [facility]    EXAM UNDER ANESTHESIA ANUS N/A 09/14/2021    Procedure: EXAM UNDER ANESTHESIA WITH HIGH RESOLUTION ANOSCOPY;  Surgeon: Preeti Sheehan MD;  Location: [facility] OR    FEMORAL ARTERY - TIBIAL ARTERY BYPASS GRAFT Left 09/08/2023    Procedure: CREATION, BYPASS, ARTERIAL, FEMORAL TO TIBIAL, LEFT;  Surgeon: Dyan Geller MD;  Location: [facility]    INCISION AND DRAINAGE FOOT, COMBINED Left 11/27/2023    Procedure: INCISION AND DRAINAGE, LEFT HALLUX;   Surgeon: Rene Ordaz DPM;  Location: [facility]    IR LOWER EXTREMITY ANGIOGRAM LEFT  03/23/2023    IR LOWER EXTREMITY ANGIOGRAM RIGHT  02/16/2023    IR THROMBOLYSIS ARTERIAL INFUSION INITIAL DAY  10/09/2023    LEEP TX, CERVICAL      2005    TUBAL LIGATION         Allergies   Allergen Reactions    Simvastatin Muscle Pain (Myalgia)     Muscle pain    Victoza [Liraglutide] Other (See Comments)     Binds bowels    Penicillins Unknown     Childhood rxn         Current Outpatient Medications:     acetaminophen (TYLENOL) 325 MG tablet, Take 2 tablets (650 mg) by mouth every 4 hours as needed for mild pain, Disp: , Rfl:     amLODIPine (NORVASC) 5 MG tablet, Take 5 mg by mouth daily, Disp: , Rfl:     clopidogrel (PLAVIX) 75 MG tablet, Take 75 mg by mouth daily, Disp: , Rfl:     Continuous Blood Gluc Sensor (GameleonSTYLE PRINCESS 14 DAY SENSOR) MISC, , Disp: , Rfl:     empagliflozin (JARDIANCE) 25 MG TABS tablet, Take 1 tablet (25 mg) by mouth daily, Disp: 90 tablet, Rfl: 1    ezetimibe (ZETIA) 10 MG tablet, Take 10 mg by mouth daily, Disp: , Rfl:     hydrochlorothiazide (HYDRODIURIL) 25 MG tablet, Take 25 mg by mouth daily, Disp: , Rfl:     Insulin Degludec (TRESIBA) 100 UNIT/ML SOLN, Inject 15 Units Subcutaneous 2 times daily, Disp: , Rfl:     linagliptin (TRADJENTA) 5 MG TABS tablet, Take 5 mg by mouth daily, Disp: , Rfl:     lisinopril (ZESTRIL) 40 MG tablet, Take 40 mg by mouth daily, Disp: , Rfl:     metFORMIN (GLUCOPHAGE XR) 500 MG 24 hr tablet, Take 1,000 mg by mouth daily, Disp: , Rfl:     omeprazole (PRILOSEC) 20 MG DR capsule, Take 20 mg by mouth daily as needed, Disp: , Rfl:     Review of Systems - 10 point Review of Systems is negative except for left hallux ulcer which is noted in HPI.      OBJECTIVE:  /77   Pulse 85   Temp 98.4  F (36.9  C)   SpO2 100%   General appearance: Patient is alert and fully cooperative with history & exam.  No sign of distress is noted during the  visit.    Vascular: Dorsalis pedis non-palpableLeft.  Dermatologic:    VASC Wound left hallux (Active)   Pre Size Length 0.8 01/24/24 0800   Pre Size Width 1 01/24/24 0800   Pre Size Depth 0.5 01/24/24 0800   Pre Total Sq cm 0.8 01/24/24 0800       Incision/Surgical Site 02/18/23 Right Toe (Comment  which one) (Active)       Incision/Surgical Site 09/08/23 Left Leg (Active)       Incision/Surgical Site 10/09/23 Anterior;Right Greater Trochanter (Active)       Incision/Surgical Site 11/27/23 Left Toe (Comment  which one) (Active)   Incision Assessment Drainage;Moist 01/12/24 0620   Dressing Open to air 01/12/24 0620   Incision Drainage Amount Scant 01/12/24 0620   Drainage Description Serous 01/12/24 0620   Dressing Intervention Open to air / No Dressing 01/12/24 0620   Ulceration left hallux has exposed bone of the distal phalanx, no erythema.  Neurologic: Intact to light touch Left.  Musculoskeletal: Contracted digits noted Left.      Picture:

## 2024-01-24 NOTE — PATIENT INSTRUCTIONS
"*Wheelchairs are never guaranteed at the front door, if you have your own wheel chair or knee walker, please bring that with you to your appointment. Thank you for your understanding!*    Important lnstructions      STABILIZATION DEVICE: Use a CAM BOOT . You will need to WEAR THIS ANYTIME YOU ARE UP AND OUT OF BED, IT IS OKAY TO REMOVE WHEN YOU ARE SLEEPING..       TAKE A PROTEIN SHAKE TWICE A DAY.  (For ex: Boost, Ensure, Glucerna)      Dressing Change lnstructions    Change DAILY to your left great toe    Primary Dressing: Apply dry gauze into/onto the wounds    Secure with non-sterile roll gauze (4\" x 75\" roll) and tape (1\" roll tape) as needed; avoid adhesive directly on the skin     It IS NOT ok to get your wound wet in the bath or shower    SEEK MEDICAL CARE IF:  You have an increase in swelling, pain, or redness around the wound.  You have an increase in the amount of pus coming from the wound.  There is a bad smell coming from the wound.  The wound appears to be worsening/enlarging  You have a fever greater than 101.5 F      It is ok to continue current wound care treatment/products for the next 2-3 days until new wound care supplies are ordered and arrive. If longer than this please contact our office at [phone].      We want to hear from you!   In the next few weeks, you should receive a call or email to complete a survey about your visit at [facility]. Please help us improve your appointment experience by letting us know how we did today. We strive to make your experience good and value any ways in which we could do better.      We value your input and suggestions.    Thank you for choosing the [facility]!    "

## 2024-02-01 ENCOUNTER — TELEPHONE (OUTPATIENT)
Dept: VASCULAR SURGERY | Facility: CLINIC | Age: 69
End: 2024-02-01
Payer: COMMERCIAL

## 2024-02-01 NOTE — TELEPHONE ENCOUNTER
Ashanti called inquiring if Dr. Ordaz had received the most recent paperwork from [facility] ([facility] It is a questionnaire regarding the surgery that was the end of November. Looked and did not see the paperwork. Requested that Ashanti call them and have them refax the paperwork Attn: Dr. Ordaz. Ashanti will call to have the paperwork refaxed.

## 2024-02-05 NOTE — PROGRESS NOTES
Called patient to remind her that she needs to have CBC drawn and UA done, she states that they will be done at her pre op physical.

## 2024-02-14 ENCOUNTER — ANESTHESIA EVENT (OUTPATIENT)
Dept: SURGERY | Facility: HOSPITAL | Age: 69
DRG: 253 | End: 2024-02-14
Payer: COMMERCIAL

## 2024-02-15 ENCOUNTER — ANESTHESIA (OUTPATIENT)
Dept: SURGERY | Facility: HOSPITAL | Age: 69
DRG: 253 | End: 2024-02-15
Payer: COMMERCIAL

## 2024-02-15 ENCOUNTER — HOSPITAL ENCOUNTER (INPATIENT)
Facility: HOSPITAL | Age: 69
LOS: 4 days | Discharge: HOME | DRG: 253 | End: 2024-02-19
Attending: SURGERY | Admitting: SURGERY
Payer: COMMERCIAL

## 2024-02-15 ENCOUNTER — APPOINTMENT (OUTPATIENT)
Dept: RADIOLOGY | Facility: HOSPITAL | Age: 69
DRG: 253 | End: 2024-02-15
Attending: SURGERY
Payer: COMMERCIAL

## 2024-02-15 DIAGNOSIS — I73.9 PAD (PERIPHERAL ARTERY DISEASE) (H): ICD-10-CM

## 2024-02-15 PROBLEM — T88.4XXA HARD TO INTUBATE: Status: ACTIVE | Noted: 2024-02-15

## 2024-02-15 LAB
ABO/RH(D): NORMAL
ANTIBODY SCREEN: NEGATIVE
BASOPHILS # BLD AUTO: 0 10E3/UL (ref 0–0.2)
BASOPHILS NFR BLD AUTO: 1 %
CREAT SERPL-MCNC: 0.85 MG/DL (ref 0.51–0.95)
EGFRCR SERPLBLD CKD-EPI 2021: 75 ML/MIN/1.73M2
EOSINOPHIL # BLD AUTO: 0.2 10E3/UL (ref 0–0.7)
EOSINOPHIL NFR BLD AUTO: 2 %
ERYTHROCYTE [DISTWIDTH] IN BLOOD BY AUTOMATED COUNT: 15.3 % (ref 10–15)
GLUCOSE BLDC GLUCOMTR-MCNC: 110 MG/DL (ref 70–99)
GLUCOSE BLDC GLUCOMTR-MCNC: 123 MG/DL (ref 70–99)
GLUCOSE BLDC GLUCOMTR-MCNC: 155 MG/DL (ref 70–99)
GLUCOSE BLDC GLUCOMTR-MCNC: 172 MG/DL (ref 70–99)
GLUCOSE BLDC GLUCOMTR-MCNC: 178 MG/DL (ref 70–99)
GLUCOSE BLDC GLUCOMTR-MCNC: 196 MG/DL (ref 70–99)
GLUCOSE SERPL-MCNC: 115 MG/DL (ref 70–99)
HBA1C MFR BLD: 8.7 %
HCT VFR BLD AUTO: 37.3 % (ref 35–47)
HGB BLD-MCNC: 12.1 G/DL (ref 11.7–15.7)
HIV 1+2 AB+HIV1P24 AG SERPLBLD IA.RAPID: NON REACTIVE
HIV 1+2 AB+HIV1P24 AG SERPLBLD IA.RAPID: NON REACTIVE
HIV INTERPRETATION: NORMAL
IMM GRANULOCYTES # BLD: 0 10E3/UL
IMM GRANULOCYTES NFR BLD: 0 %
LACTATE SERPL-SCNC: 1.6 MMOL/L (ref 0.7–2)
LYMPHOCYTES # BLD AUTO: 3.6 10E3/UL (ref 0.8–5.3)
LYMPHOCYTES NFR BLD AUTO: 40 %
MCH RBC QN AUTO: 29.4 PG (ref 26.5–33)
MCHC RBC AUTO-ENTMCNC: 32.4 G/DL (ref 31.5–36.5)
MCV RBC AUTO: 91 FL (ref 78–100)
MONOCYTES # BLD AUTO: 0.8 10E3/UL (ref 0–1.3)
MONOCYTES NFR BLD AUTO: 9 %
NEUTROPHILS # BLD AUTO: 4.2 10E3/UL (ref 1.6–8.3)
NEUTROPHILS NFR BLD AUTO: 48 %
NRBC # BLD AUTO: 0 10E3/UL
NRBC BLD AUTO-RTO: 0 /100
PLATELET # BLD AUTO: 273 10E3/UL (ref 150–450)
POTASSIUM SERPL-SCNC: 3.3 MMOL/L (ref 3.4–5.3)
RBC # BLD AUTO: 4.11 10E6/UL (ref 3.8–5.2)
SPECIMEN EXPIRATION DATE: NORMAL
WBC # BLD AUTO: 8.8 10E3/UL (ref 4–11)

## 2024-02-15 PROCEDURE — 258N000003 HC RX IP 258 OP 636: Performed by: STUDENT IN AN ORGANIZED HEALTH CARE EDUCATION/TRAINING PROGRAM

## 2024-02-15 PROCEDURE — 710N000009 HC RECOVERY PHASE 1, LEVEL 1, PER MIN: Performed by: SURGERY

## 2024-02-15 PROCEDURE — C1894 INTRO/SHEATH, NON-LASER: HCPCS | Performed by: SURGERY

## 2024-02-15 PROCEDURE — C1760 CLOSURE DEV, VASC: HCPCS | Performed by: SURGERY

## 2024-02-15 PROCEDURE — 250N000011 HC RX IP 250 OP 636: Performed by: STUDENT IN AN ORGANIZED HEALTH CARE EDUCATION/TRAINING PROGRAM

## 2024-02-15 PROCEDURE — 258N000003 HC RX IP 258 OP 636: Performed by: ANESTHESIOLOGY

## 2024-02-15 PROCEDURE — 370N000017 HC ANESTHESIA TECHNICAL FEE, PER MIN: Performed by: SURGERY

## 2024-02-15 PROCEDURE — 250N000009 HC RX 250: Performed by: SURGERY

## 2024-02-15 PROCEDURE — 258N000003 HC RX IP 258 OP 636: Performed by: SURGERY

## 2024-02-15 PROCEDURE — 272N000004 HC RX 272: Performed by: SURGERY

## 2024-02-15 PROCEDURE — 360N000084 HC SURGERY LEVEL 4 W/ FLUORO, PER MIN: Performed by: SURGERY

## 2024-02-15 PROCEDURE — 36415 COLL VENOUS BLD VENIPUNCTURE: CPT | Performed by: SURGERY

## 2024-02-15 PROCEDURE — 82947 ASSAY GLUCOSE BLOOD QUANT: CPT | Performed by: SURGERY

## 2024-02-15 PROCEDURE — 120N000001 HC R&B MED SURG/OB

## 2024-02-15 PROCEDURE — 82565 ASSAY OF CREATININE: CPT | Performed by: SURGERY

## 2024-02-15 PROCEDURE — 041L0KN BYPASS LEFT FEMORAL ARTERY TO POSTERIOR TIBIAL ARTERY WITH NONAUTOLOGOUS TISSUE SUBSTITUTE, OPEN APPROACH: ICD-10-PCS | Performed by: SURGERY

## 2024-02-15 PROCEDURE — 250N000011 HC RX IP 250 OP 636: Performed by: SURGERY

## 2024-02-15 PROCEDURE — 999N000104 HC STATISTIC NO CHARGE

## 2024-02-15 PROCEDURE — C1876 STENT, NON-COA/NON-COV W/DEL: HCPCS | Performed by: SURGERY

## 2024-02-15 PROCEDURE — 047D0D1 DILATION OF LEFT COMMON ILIAC ARTERY WITH INTRALUMINAL DEVICE, USING DRUG-COATED BALLOON, OPEN APPROACH: ICD-10-PCS | Performed by: SURGERY

## 2024-02-15 PROCEDURE — 85025 COMPLETE CBC W/AUTO DIFF WBC: CPT | Performed by: SURGERY

## 2024-02-15 PROCEDURE — 250N000011 HC RX IP 250 OP 636

## 2024-02-15 PROCEDURE — 250N000005 HC OR RX SURGIFLO HEMOSTATIC MATRIX 10ML 199102S OPNP: Performed by: SURGERY

## 2024-02-15 PROCEDURE — 83605 ASSAY OF LACTIC ACID: CPT | Performed by: SURGERY

## 2024-02-15 PROCEDURE — 250N000013 HC RX MED GY IP 250 OP 250 PS 637

## 2024-02-15 PROCEDURE — P9045 ALBUMIN (HUMAN), 5%, 250 ML: HCPCS | Mod: JZ | Performed by: STUDENT IN AN ORGANIZED HEALTH CARE EDUCATION/TRAINING PROGRAM

## 2024-02-15 PROCEDURE — 999N000182 XR SURGERY CARM FLUORO GREATER THAN 5 MIN: Mod: TC

## 2024-02-15 PROCEDURE — 35700 REOPERATION BYPASS GRAFT: CPT | Mod: LT | Performed by: SURGERY

## 2024-02-15 PROCEDURE — 35666 BPG FEM-ANT TIB PST TIB/PRNL: CPT | Mod: LT | Performed by: SURGERY

## 2024-02-15 PROCEDURE — 999N000141 HC STATISTIC PRE-PROCEDURE NURSING ASSESSMENT: Performed by: SURGERY

## 2024-02-15 PROCEDURE — 84132 ASSAY OF SERUM POTASSIUM: CPT | Performed by: SURGERY

## 2024-02-15 PROCEDURE — 37221 PR REVASC ILIAC ART, ANGIO/STENT, INIT VESSEL: CPT | Mod: LT | Performed by: SURGERY

## 2024-02-15 PROCEDURE — 86900 BLOOD TYPING SEROLOGIC ABO: CPT | Performed by: SURGERY

## 2024-02-15 PROCEDURE — 83036 HEMOGLOBIN GLYCOSYLATED A1C: CPT

## 2024-02-15 PROCEDURE — 250N000025 HC SEVOFLURANE, PER MIN: Performed by: SURGERY

## 2024-02-15 PROCEDURE — 255N000002 HC RX 255 OP 636: Performed by: SURGERY

## 2024-02-15 PROCEDURE — 86923 COMPATIBILITY TEST ELECTRIC: CPT

## 2024-02-15 PROCEDURE — C1762 CONN TISS, HUMAN(INC FASCIA): HCPCS | Performed by: SURGERY

## 2024-02-15 PROCEDURE — C1769 GUIDE WIRE: HCPCS | Performed by: SURGERY

## 2024-02-15 PROCEDURE — 250N000009 HC RX 250: Performed by: STUDENT IN AN ORGANIZED HEALTH CARE EDUCATION/TRAINING PROGRAM

## 2024-02-15 PROCEDURE — 250N000012 HC RX MED GY IP 250 OP 636 PS 637

## 2024-02-15 PROCEDURE — 99232 SBSQ HOSP IP/OBS MODERATE 35: CPT | Performed by: INTERNAL MEDICINE

## 2024-02-15 PROCEDURE — 272N000001 HC OR GENERAL SUPPLY STERILE: Performed by: SURGERY

## 2024-02-15 DEVICE — GRAFT TISS 4MM-5MM DIA 21-29 CM FEM POPLITEAL ART STRL R020: Type: IMPLANTABLE DEVICE | Site: LEG | Status: FUNCTIONAL

## 2024-02-15 DEVICE — IMPLANTABLE DEVICE: Type: IMPLANTABLE DEVICE | Site: LEG | Status: FUNCTIONAL

## 2024-02-15 RX ORDER — NICOTINE POLACRILEX 4 MG
15-30 LOZENGE BUCCAL
Status: DISCONTINUED | OUTPATIENT
Start: 2024-02-15 | End: 2024-02-19 | Stop reason: HOSPADM

## 2024-02-15 RX ORDER — OXYCODONE HYDROCHLORIDE 5 MG/1
5 TABLET ORAL
Status: CANCELLED | OUTPATIENT
Start: 2024-02-15

## 2024-02-15 RX ORDER — DEXTROSE, SODIUM CHLORIDE, SODIUM LACTATE, POTASSIUM CHLORIDE, AND CALCIUM CHLORIDE 5; .6; .31; .03; .02 G/100ML; G/100ML; G/100ML; G/100ML; G/100ML
INJECTION, SOLUTION INTRAVENOUS CONTINUOUS PRN
Status: COMPLETED | OUTPATIENT
Start: 2024-02-15 | End: 2024-02-15

## 2024-02-15 RX ORDER — NALOXONE HYDROCHLORIDE 0.4 MG/ML
0.2 INJECTION, SOLUTION INTRAMUSCULAR; INTRAVENOUS; SUBCUTANEOUS
Status: DISCONTINUED | OUTPATIENT
Start: 2024-02-15 | End: 2024-02-19 | Stop reason: HOSPADM

## 2024-02-15 RX ORDER — PROCHLORPERAZINE MALEATE 5 MG
5 TABLET ORAL EVERY 6 HOURS PRN
Status: DISCONTINUED | OUTPATIENT
Start: 2024-02-15 | End: 2024-02-19 | Stop reason: HOSPADM

## 2024-02-15 RX ORDER — KETOROLAC TROMETHAMINE 30 MG/ML
15 INJECTION, SOLUTION INTRAMUSCULAR; INTRAVENOUS
Status: DISCONTINUED | OUTPATIENT
Start: 2024-02-15 | End: 2024-02-15 | Stop reason: HOSPADM

## 2024-02-15 RX ORDER — PAPAVERINE HYDROCHLORIDE 30 MG/ML
INJECTION INTRAMUSCULAR; INTRAVENOUS
Status: DISCONTINUED
Start: 2024-02-15 | End: 2024-02-15 | Stop reason: WASHOUT

## 2024-02-15 RX ORDER — NALOXONE HYDROCHLORIDE 0.4 MG/ML
0.4 INJECTION, SOLUTION INTRAMUSCULAR; INTRAVENOUS; SUBCUTANEOUS
Status: DISCONTINUED | OUTPATIENT
Start: 2024-02-15 | End: 2024-02-19 | Stop reason: HOSPADM

## 2024-02-15 RX ORDER — PANTOPRAZOLE SODIUM 40 MG/1
40 TABLET, DELAYED RELEASE ORAL DAILY PRN
Status: DISCONTINUED | OUTPATIENT
Start: 2024-02-15 | End: 2024-02-19 | Stop reason: HOSPADM

## 2024-02-15 RX ORDER — ONDANSETRON 2 MG/ML
4 INJECTION INTRAMUSCULAR; INTRAVENOUS EVERY 30 MIN PRN
Status: DISCONTINUED | OUTPATIENT
Start: 2024-02-15 | End: 2024-02-15 | Stop reason: HOSPADM

## 2024-02-15 RX ORDER — CEFAZOLIN SODIUM 1 G/3ML
1 INJECTION, POWDER, FOR SOLUTION INTRAMUSCULAR; INTRAVENOUS EVERY 8 HOURS
Qty: 10 ML | Refills: 0 | Status: COMPLETED | OUTPATIENT
Start: 2024-02-15 | End: 2024-02-16

## 2024-02-15 RX ORDER — OXYCODONE HYDROCHLORIDE 5 MG/1
10 TABLET ORAL
Status: CANCELLED | OUTPATIENT
Start: 2024-02-15

## 2024-02-15 RX ORDER — AMOXICILLIN 250 MG
1 CAPSULE ORAL 2 TIMES DAILY
Status: DISCONTINUED | OUTPATIENT
Start: 2024-02-15 | End: 2024-02-19 | Stop reason: HOSPADM

## 2024-02-15 RX ORDER — FENTANYL CITRATE 50 UG/ML
50 INJECTION, SOLUTION INTRAMUSCULAR; INTRAVENOUS EVERY 5 MIN PRN
Status: DISCONTINUED | OUTPATIENT
Start: 2024-02-15 | End: 2024-02-15 | Stop reason: HOSPADM

## 2024-02-15 RX ORDER — EPHEDRINE SULFATE 50 MG/ML
INJECTION, SOLUTION INTRAMUSCULAR; INTRAVENOUS; SUBCUTANEOUS PRN
Status: DISCONTINUED | OUTPATIENT
Start: 2024-02-15 | End: 2024-02-15

## 2024-02-15 RX ORDER — HEPARIN SODIUM 1000 [USP'U]/ML
INJECTION, SOLUTION INTRAVENOUS; SUBCUTANEOUS PRN
Status: DISCONTINUED | OUTPATIENT
Start: 2024-02-15 | End: 2024-02-15

## 2024-02-15 RX ORDER — CEFAZOLIN SODIUM/WATER 2 G/20 ML
2 SYRINGE (ML) INTRAVENOUS SEE ADMIN INSTRUCTIONS
Status: DISCONTINUED | OUTPATIENT
Start: 2024-02-15 | End: 2024-02-15 | Stop reason: HOSPADM

## 2024-02-15 RX ORDER — ONDANSETRON 2 MG/ML
4 INJECTION INTRAMUSCULAR; INTRAVENOUS EVERY 30 MIN PRN
Status: CANCELLED | OUTPATIENT
Start: 2024-02-15

## 2024-02-15 RX ORDER — OXYCODONE HYDROCHLORIDE 5 MG/1
10 TABLET ORAL EVERY 4 HOURS PRN
Status: DISCONTINUED | OUTPATIENT
Start: 2024-02-15 | End: 2024-02-19 | Stop reason: HOSPADM

## 2024-02-15 RX ORDER — ACETAMINOPHEN 325 MG/1
650 TABLET ORAL EVERY 4 HOURS PRN
Status: DISCONTINUED | OUTPATIENT
Start: 2024-02-18 | End: 2024-02-19 | Stop reason: HOSPADM

## 2024-02-15 RX ORDER — FENTANYL CITRATE 50 UG/ML
25 INJECTION, SOLUTION INTRAMUSCULAR; INTRAVENOUS EVERY 5 MIN PRN
Status: DISCONTINUED | OUTPATIENT
Start: 2024-02-15 | End: 2024-02-15 | Stop reason: HOSPADM

## 2024-02-15 RX ORDER — LIDOCAINE 40 MG/G
CREAM TOPICAL
Status: DISCONTINUED | OUTPATIENT
Start: 2024-02-15 | End: 2024-02-19 | Stop reason: HOSPADM

## 2024-02-15 RX ORDER — CLOPIDOGREL BISULFATE 75 MG/1
75 TABLET ORAL DAILY
Status: DISCONTINUED | OUTPATIENT
Start: 2024-02-16 | End: 2024-02-19 | Stop reason: HOSPADM

## 2024-02-15 RX ORDER — HYDROMORPHONE HCL IN WATER/PF 6 MG/30 ML
0.4 PATIENT CONTROLLED ANALGESIA SYRINGE INTRAVENOUS
Status: DISCONTINUED | OUTPATIENT
Start: 2024-02-15 | End: 2024-02-19 | Stop reason: HOSPADM

## 2024-02-15 RX ORDER — ACETAMINOPHEN 325 MG/1
975 TABLET ORAL EVERY 8 HOURS
Qty: 27 TABLET | Refills: 0 | Status: DISPENSED | OUTPATIENT
Start: 2024-02-15 | End: 2024-02-18

## 2024-02-15 RX ORDER — SODIUM CHLORIDE, SODIUM LACTATE, POTASSIUM CHLORIDE, CALCIUM CHLORIDE 600; 310; 30; 20 MG/100ML; MG/100ML; MG/100ML; MG/100ML
INJECTION, SOLUTION INTRAVENOUS CONTINUOUS PRN
Status: DISCONTINUED | OUTPATIENT
Start: 2024-02-15 | End: 2024-02-15

## 2024-02-15 RX ORDER — SODIUM CHLORIDE, SODIUM LACTATE, POTASSIUM CHLORIDE, CALCIUM CHLORIDE 600; 310; 30; 20 MG/100ML; MG/100ML; MG/100ML; MG/100ML
INJECTION, SOLUTION INTRAVENOUS CONTINUOUS
Status: DISCONTINUED | OUTPATIENT
Start: 2024-02-15 | End: 2024-02-15 | Stop reason: HOSPADM

## 2024-02-15 RX ORDER — VASOPRESSIN IN 0.9 % NACL 2 UNIT/2ML
SYRINGE (ML) INTRAVENOUS PRN
Status: DISCONTINUED | OUTPATIENT
Start: 2024-02-15 | End: 2024-02-15

## 2024-02-15 RX ORDER — HALOPERIDOL 5 MG/ML
1 INJECTION INTRAMUSCULAR
Status: DISCONTINUED | OUTPATIENT
Start: 2024-02-15 | End: 2024-02-15 | Stop reason: HOSPADM

## 2024-02-15 RX ORDER — CLOPIDOGREL BISULFATE 75 MG/1
75 TABLET ORAL DAILY
Status: DISCONTINUED | OUTPATIENT
Start: 2024-02-15 | End: 2024-02-15

## 2024-02-15 RX ORDER — LORAZEPAM 2 MG/ML
.5-1 INJECTION INTRAMUSCULAR
Status: DISCONTINUED | OUTPATIENT
Start: 2024-02-15 | End: 2024-02-15 | Stop reason: HOSPADM

## 2024-02-15 RX ORDER — MEPERIDINE HYDROCHLORIDE 25 MG/ML
12.5 INJECTION INTRAMUSCULAR; INTRAVENOUS; SUBCUTANEOUS EVERY 5 MIN PRN
Status: DISCONTINUED | OUTPATIENT
Start: 2024-02-15 | End: 2024-02-15 | Stop reason: HOSPADM

## 2024-02-15 RX ORDER — POTASSIUM CHLORIDE 7.45 MG/ML
10 INJECTION INTRAVENOUS
Status: COMPLETED | OUTPATIENT
Start: 2024-02-15 | End: 2024-02-15

## 2024-02-15 RX ORDER — PROPOFOL 10 MG/ML
INJECTION, EMULSION INTRAVENOUS CONTINUOUS PRN
Status: DISCONTINUED | OUTPATIENT
Start: 2024-02-15 | End: 2024-02-15

## 2024-02-15 RX ORDER — PHENYLEPHRINE HCL IN 0.9% NACL 50MG/250ML
PLASTIC BAG, INJECTION (ML) INTRAVENOUS
Status: DISCONTINUED
Start: 2024-02-15 | End: 2024-02-15 | Stop reason: HOSPADM

## 2024-02-15 RX ORDER — HYDROMORPHONE HYDROCHLORIDE 1 MG/ML
0.4 INJECTION, SOLUTION INTRAMUSCULAR; INTRAVENOUS; SUBCUTANEOUS EVERY 5 MIN PRN
Status: DISCONTINUED | OUTPATIENT
Start: 2024-02-15 | End: 2024-02-15 | Stop reason: HOSPADM

## 2024-02-15 RX ORDER — LIDOCAINE HYDROCHLORIDE 10 MG/ML
INJECTION, SOLUTION INFILTRATION; PERINEURAL PRN
Status: DISCONTINUED | OUTPATIENT
Start: 2024-02-15 | End: 2024-02-15

## 2024-02-15 RX ORDER — ONDANSETRON 2 MG/ML
4 INJECTION INTRAMUSCULAR; INTRAVENOUS EVERY 6 HOURS PRN
Status: DISCONTINUED | OUTPATIENT
Start: 2024-02-15 | End: 2024-02-19 | Stop reason: HOSPADM

## 2024-02-15 RX ORDER — POLYETHYLENE GLYCOL 3350 17 G/17G
17 POWDER, FOR SOLUTION ORAL DAILY
Status: DISCONTINUED | OUTPATIENT
Start: 2024-02-16 | End: 2024-02-19 | Stop reason: HOSPADM

## 2024-02-15 RX ORDER — OXYCODONE HYDROCHLORIDE 5 MG/1
5 TABLET ORAL EVERY 4 HOURS PRN
Status: DISCONTINUED | OUTPATIENT
Start: 2024-02-15 | End: 2024-02-19 | Stop reason: HOSPADM

## 2024-02-15 RX ORDER — ONDANSETRON 4 MG/1
4 TABLET, ORALLY DISINTEGRATING ORAL EVERY 6 HOURS PRN
Status: DISCONTINUED | OUTPATIENT
Start: 2024-02-15 | End: 2024-02-19 | Stop reason: HOSPADM

## 2024-02-15 RX ORDER — DEXAMETHASONE SODIUM PHOSPHATE 10 MG/ML
INJECTION, SOLUTION INTRAMUSCULAR; INTRAVENOUS PRN
Status: DISCONTINUED | OUTPATIENT
Start: 2024-02-15 | End: 2024-02-15

## 2024-02-15 RX ORDER — LIDOCAINE 40 MG/G
CREAM TOPICAL
Status: DISCONTINUED | OUTPATIENT
Start: 2024-02-15 | End: 2024-02-15 | Stop reason: HOSPADM

## 2024-02-15 RX ORDER — EZETIMIBE 10 MG/1
10 TABLET ORAL DAILY
Status: DISCONTINUED | OUTPATIENT
Start: 2024-02-15 | End: 2024-02-19 | Stop reason: HOSPADM

## 2024-02-15 RX ORDER — HEPARIN SODIUM 10000 [USP'U]/100ML
500 INJECTION, SOLUTION INTRAVENOUS CONTINUOUS
Status: DISPENSED | OUTPATIENT
Start: 2024-02-15 | End: 2024-02-18

## 2024-02-15 RX ORDER — PROTAMINE SULFATE 10 MG/ML
INJECTION, SOLUTION INTRAVENOUS PRN
Status: DISCONTINUED | OUTPATIENT
Start: 2024-02-15 | End: 2024-02-15

## 2024-02-15 RX ORDER — HYDROMORPHONE HYDROCHLORIDE 1 MG/ML
0.2 INJECTION, SOLUTION INTRAMUSCULAR; INTRAVENOUS; SUBCUTANEOUS EVERY 5 MIN PRN
Status: DISCONTINUED | OUTPATIENT
Start: 2024-02-15 | End: 2024-02-15 | Stop reason: HOSPADM

## 2024-02-15 RX ORDER — CEFAZOLIN SODIUM/WATER 2 G/20 ML
2 SYRINGE (ML) INTRAVENOUS
Status: COMPLETED | OUTPATIENT
Start: 2024-02-15 | End: 2024-02-15

## 2024-02-15 RX ORDER — FENTANYL CITRATE 50 UG/ML
INJECTION, SOLUTION INTRAMUSCULAR; INTRAVENOUS PRN
Status: DISCONTINUED | OUTPATIENT
Start: 2024-02-15 | End: 2024-02-15

## 2024-02-15 RX ORDER — MAGNESIUM HYDROXIDE 1200 MG/15ML
LIQUID ORAL PRN
Status: DISCONTINUED | OUTPATIENT
Start: 2024-02-15 | End: 2024-02-15 | Stop reason: HOSPADM

## 2024-02-15 RX ORDER — SENNOSIDES 8.6 MG
1300 CAPSULE ORAL EVERY 8 HOURS PRN
COMMUNITY

## 2024-02-15 RX ORDER — ONDANSETRON 4 MG/1
4 TABLET, ORALLY DISINTEGRATING ORAL EVERY 30 MIN PRN
Status: DISCONTINUED | OUTPATIENT
Start: 2024-02-15 | End: 2024-02-15 | Stop reason: HOSPADM

## 2024-02-15 RX ORDER — HEPARIN SODIUM 10000 [USP'U]/100ML
500 INJECTION, SOLUTION INTRAVENOUS CONTINUOUS
Status: CANCELLED | OUTPATIENT
Start: 2024-02-15

## 2024-02-15 RX ORDER — KETAMINE HYDROCHLORIDE 10 MG/ML
INJECTION INTRAMUSCULAR; INTRAVENOUS PRN
Status: DISCONTINUED | OUTPATIENT
Start: 2024-02-15 | End: 2024-02-15

## 2024-02-15 RX ORDER — DEXTROSE MONOHYDRATE 25 G/50ML
25-50 INJECTION, SOLUTION INTRAVENOUS
Status: DISCONTINUED | OUTPATIENT
Start: 2024-02-15 | End: 2024-02-19 | Stop reason: HOSPADM

## 2024-02-15 RX ORDER — BISACODYL 10 MG
10 SUPPOSITORY, RECTAL RECTAL DAILY PRN
Status: DISCONTINUED | OUTPATIENT
Start: 2024-02-15 | End: 2024-02-19 | Stop reason: HOSPADM

## 2024-02-15 RX ORDER — ONDANSETRON 4 MG/1
4 TABLET, ORALLY DISINTEGRATING ORAL EVERY 30 MIN PRN
Status: CANCELLED | OUTPATIENT
Start: 2024-02-15

## 2024-02-15 RX ORDER — HYDROMORPHONE HCL IN WATER/PF 6 MG/30 ML
0.2 PATIENT CONTROLLED ANALGESIA SYRINGE INTRAVENOUS
Status: DISCONTINUED | OUTPATIENT
Start: 2024-02-15 | End: 2024-02-19 | Stop reason: HOSPADM

## 2024-02-15 RX ORDER — ONDANSETRON 2 MG/ML
INJECTION INTRAMUSCULAR; INTRAVENOUS PRN
Status: DISCONTINUED | OUTPATIENT
Start: 2024-02-15 | End: 2024-02-15

## 2024-02-15 RX ORDER — PROPOFOL 10 MG/ML
INJECTION, EMULSION INTRAVENOUS PRN
Status: DISCONTINUED | OUTPATIENT
Start: 2024-02-15 | End: 2024-02-15

## 2024-02-15 RX ORDER — CLOPIDOGREL BISULFATE 75 MG/1
75 TABLET ORAL DAILY
Status: CANCELLED | OUTPATIENT
Start: 2024-02-16

## 2024-02-15 RX ADMIN — HEPARIN SODIUM 5000 UNITS: 1000 INJECTION INTRAVENOUS; SUBCUTANEOUS at 13:53

## 2024-02-15 RX ADMIN — PHENYLEPHRINE HYDROCHLORIDE 100 MCG: 10 INJECTION INTRAVENOUS at 12:53

## 2024-02-15 RX ADMIN — MIDAZOLAM 1 MG: 1 INJECTION INTRAMUSCULAR; INTRAVENOUS at 08:21

## 2024-02-15 RX ADMIN — Medication 2 UNITS: at 14:21

## 2024-02-15 RX ADMIN — PHENYLEPHRINE HYDROCHLORIDE 100 MCG: 10 INJECTION INTRAVENOUS at 12:31

## 2024-02-15 RX ADMIN — FENTANYL CITRATE 100 MCG: 50 INJECTION INTRAMUSCULAR; INTRAVENOUS at 08:21

## 2024-02-15 RX ADMIN — SODIUM CHLORIDE, POTASSIUM CHLORIDE, SODIUM LACTATE AND CALCIUM CHLORIDE: 600; 310; 30; 20 INJECTION, SOLUTION INTRAVENOUS at 09:45

## 2024-02-15 RX ADMIN — ALBUMIN HUMAN: 0.05 INJECTION, SOLUTION INTRAVENOUS at 13:20

## 2024-02-15 RX ADMIN — EZETIMIBE 10 MG: 10 TABLET ORAL at 18:25

## 2024-02-15 RX ADMIN — PHENYLEPHRINE HYDROCHLORIDE 200 MCG: 10 INJECTION INTRAVENOUS at 13:28

## 2024-02-15 RX ADMIN — PHENYLEPHRINE HYDROCHLORIDE 0.5 MCG/KG/MIN: 10 INJECTION INTRAVENOUS at 08:36

## 2024-02-15 RX ADMIN — Medication 1 UNITS: at 14:14

## 2024-02-15 RX ADMIN — Medication 2 UNITS: at 13:33

## 2024-02-15 RX ADMIN — PHENYLEPHRINE HYDROCHLORIDE 100 MCG: 10 INJECTION INTRAVENOUS at 10:30

## 2024-02-15 RX ADMIN — PROPOFOL 100 MCG/KG/MIN: 10 INJECTION, EMULSION INTRAVENOUS at 08:42

## 2024-02-15 RX ADMIN — PROPOFOL 100 MG: 10 INJECTION, EMULSION INTRAVENOUS at 08:22

## 2024-02-15 RX ADMIN — ROCURONIUM BROMIDE 60 MG: 50 INJECTION, SOLUTION INTRAVENOUS at 08:22

## 2024-02-15 RX ADMIN — ONDANSETRON 4 MG: 2 INJECTION INTRAMUSCULAR; INTRAVENOUS at 18:32

## 2024-02-15 RX ADMIN — PHENYLEPHRINE HYDROCHLORIDE 200 MCG: 10 INJECTION INTRAVENOUS at 08:34

## 2024-02-15 RX ADMIN — SODIUM CHLORIDE, POTASSIUM CHLORIDE, SODIUM LACTATE AND CALCIUM CHLORIDE 10 ML/HR: 600; 310; 30; 20 INJECTION, SOLUTION INTRAVENOUS at 06:44

## 2024-02-15 RX ADMIN — INSULIN ASPART 1 UNITS: 100 INJECTION, SOLUTION INTRAVENOUS; SUBCUTANEOUS at 18:00

## 2024-02-15 RX ADMIN — ACETAMINOPHEN 975 MG: 325 TABLET ORAL at 18:01

## 2024-02-15 RX ADMIN — Medication 5 MG: at 13:45

## 2024-02-15 RX ADMIN — Medication 1 UNITS: at 14:05

## 2024-02-15 RX ADMIN — PHENYLEPHRINE HYDROCHLORIDE 100 MCG: 10 INJECTION INTRAVENOUS at 12:38

## 2024-02-15 RX ADMIN — Medication 2 UNITS: at 13:20

## 2024-02-15 RX ADMIN — ROCURONIUM BROMIDE 20 MG: 50 INJECTION, SOLUTION INTRAVENOUS at 11:40

## 2024-02-15 RX ADMIN — PHENYLEPHRINE HYDROCHLORIDE 100 MCG: 10 INJECTION INTRAVENOUS at 13:45

## 2024-02-15 RX ADMIN — POTASSIUM CHLORIDE 10 MEQ: 7.46 INJECTION, SOLUTION INTRAVENOUS at 10:01

## 2024-02-15 RX ADMIN — POTASSIUM CHLORIDE 10 MEQ: 7.46 INJECTION, SOLUTION INTRAVENOUS at 09:05

## 2024-02-15 RX ADMIN — HYDROMORPHONE HYDROCHLORIDE 0.5 MG: 1 INJECTION, SOLUTION INTRAMUSCULAR; INTRAVENOUS; SUBCUTANEOUS at 09:58

## 2024-02-15 RX ADMIN — Medication 1 UNITS: at 13:08

## 2024-02-15 RX ADMIN — ONDANSETRON 4 MG: 2 INJECTION INTRAMUSCULAR; INTRAVENOUS at 13:28

## 2024-02-15 RX ADMIN — Medication 2 G: at 08:12

## 2024-02-15 RX ADMIN — KETAMINE HYDROCHLORIDE 10 MG: 10 INJECTION INTRAMUSCULAR; INTRAVENOUS at 11:03

## 2024-02-15 RX ADMIN — PHENYLEPHRINE HYDROCHLORIDE 100 MCG: 10 INJECTION INTRAVENOUS at 14:25

## 2024-02-15 RX ADMIN — KETAMINE HYDROCHLORIDE 10 MG: 10 INJECTION INTRAMUSCULAR; INTRAVENOUS at 09:26

## 2024-02-15 RX ADMIN — OXYCODONE HYDROCHLORIDE 5 MG: 5 TABLET ORAL at 19:15

## 2024-02-15 RX ADMIN — ROCURONIUM BROMIDE 40 MG: 50 INJECTION, SOLUTION INTRAVENOUS at 09:20

## 2024-02-15 RX ADMIN — PHENYLEPHRINE HYDROCHLORIDE 100 MCG: 10 INJECTION INTRAVENOUS at 12:57

## 2024-02-15 RX ADMIN — DEXMEDETOMIDINE HYDROCHLORIDE 12 MCG: 100 INJECTION, SOLUTION INTRAVENOUS at 09:26

## 2024-02-15 RX ADMIN — DEXAMETHASONE SODIUM PHOSPHATE 4 MG: 10 INJECTION, SOLUTION INTRAMUSCULAR; INTRAVENOUS at 08:49

## 2024-02-15 RX ADMIN — ROCURONIUM BROMIDE 30 MG: 50 INJECTION, SOLUTION INTRAVENOUS at 10:31

## 2024-02-15 RX ADMIN — CEFAZOLIN 1 G: 1 INJECTION, POWDER, FOR SOLUTION INTRAMUSCULAR; INTRAVENOUS at 19:25

## 2024-02-15 RX ADMIN — Medication 2 UNITS: at 13:41

## 2024-02-15 RX ADMIN — POTASSIUM CHLORIDE 10 MEQ: 7.46 INJECTION, SOLUTION INTRAVENOUS at 07:38

## 2024-02-15 RX ADMIN — HEPARIN SODIUM AND DEXTROSE 500 UNITS/HR: 10000; 5 INJECTION INTRAVENOUS at 19:16

## 2024-02-15 RX ADMIN — HEPARIN SODIUM 8000 UNITS: 1000 INJECTION INTRAVENOUS; SUBCUTANEOUS at 11:53

## 2024-02-15 RX ADMIN — MIDAZOLAM 1 MG: 1 INJECTION INTRAMUSCULAR; INTRAVENOUS at 08:14

## 2024-02-15 RX ADMIN — Medication 5 MG: at 12:10

## 2024-02-15 RX ADMIN — PROPOFOL 75 MCG/KG/MIN: 10 INJECTION, EMULSION INTRAVENOUS at 11:46

## 2024-02-15 RX ADMIN — INSULIN DEGLUDEC 10 UNITS: 100 INJECTION, SOLUTION SUBCUTANEOUS at 19:31

## 2024-02-15 RX ADMIN — ALBUMIN HUMAN: 0.05 INJECTION, SOLUTION INTRAVENOUS at 13:07

## 2024-02-15 RX ADMIN — PROTAMINE SULFATE 50 MG: 10 INJECTION, SOLUTION INTRAVENOUS at 13:26

## 2024-02-15 RX ADMIN — ROCURONIUM BROMIDE 20 MG: 50 INJECTION, SOLUTION INTRAVENOUS at 12:44

## 2024-02-15 RX ADMIN — KETAMINE HYDROCHLORIDE 20 MG: 10 INJECTION INTRAMUSCULAR; INTRAVENOUS at 11:31

## 2024-02-15 RX ADMIN — PROPOFOL 100 MCG/KG/MIN: 10 INJECTION, EMULSION INTRAVENOUS at 10:34

## 2024-02-15 RX ADMIN — SENNOSIDES AND DOCUSATE SODIUM 1 TABLET: 8.6; 5 TABLET ORAL at 20:15

## 2024-02-15 RX ADMIN — PHENYLEPHRINE HYDROCHLORIDE 100 MCG: 10 INJECTION INTRAVENOUS at 13:02

## 2024-02-15 RX ADMIN — HYDROMORPHONE HYDROCHLORIDE 0.5 MG: 1 INJECTION, SOLUTION INTRAMUSCULAR; INTRAVENOUS; SUBCUTANEOUS at 11:33

## 2024-02-15 RX ADMIN — FENTANYL CITRATE 25 MCG: 50 INJECTION, SOLUTION INTRAMUSCULAR; INTRAVENOUS at 15:25

## 2024-02-15 RX ADMIN — Medication 2 UNITS: at 14:28

## 2024-02-15 RX ADMIN — DEXMEDETOMIDINE HYDROCHLORIDE 8 MCG: 100 INJECTION, SOLUTION INTRAVENOUS at 12:47

## 2024-02-15 RX ADMIN — Medication 2 G: at 12:12

## 2024-02-15 RX ADMIN — Medication 5 MG: at 12:57

## 2024-02-15 RX ADMIN — Medication 2 UNITS: at 13:12

## 2024-02-15 RX ADMIN — FENTANYL CITRATE 25 MCG: 50 INJECTION, SOLUTION INTRAMUSCULAR; INTRAVENOUS at 15:38

## 2024-02-15 RX ADMIN — PHENYLEPHRINE HYDROCHLORIDE 100 MCG: 10 INJECTION INTRAVENOUS at 13:12

## 2024-02-15 RX ADMIN — INSULIN HUMAN 5 UNITS/HR: 1 INJECTION, SOLUTION INTRAVENOUS at 13:52

## 2024-02-15 RX ADMIN — LIDOCAINE HYDROCHLORIDE 5 ML: 10 INJECTION, SOLUTION INFILTRATION; PERINEURAL at 08:21

## 2024-02-15 RX ADMIN — SUGAMMADEX 150 MG: 100 INJECTION, SOLUTION INTRAVENOUS at 14:23

## 2024-02-15 RX ADMIN — POTASSIUM CHLORIDE 10 MEQ: 7.46 INJECTION, SOLUTION INTRAVENOUS at 08:03

## 2024-02-15 RX ADMIN — SODIUM CHLORIDE, POTASSIUM CHLORIDE, SODIUM LACTATE AND CALCIUM CHLORIDE: 600; 310; 30; 20 INJECTION, SOLUTION INTRAVENOUS at 08:36

## 2024-02-15 RX ADMIN — DEXMEDETOMIDINE HYDROCHLORIDE 8 MCG: 100 INJECTION, SOLUTION INTRAVENOUS at 09:53

## 2024-02-15 RX ADMIN — HEPARIN SODIUM 3000 UNITS: 1000 INJECTION INTRAVENOUS; SUBCUTANEOUS at 12:55

## 2024-02-15 ASSESSMENT — ACTIVITIES OF DAILY LIVING (ADL)
ADLS_ACUITY_SCORE: 37
ADLS_ACUITY_SCORE: 25
ADLS_ACUITY_SCORE: 22

## 2024-02-15 NOTE — ANESTHESIA PROCEDURE NOTES
Airway         Procedure Start/Stop Times: 2/15/2024 8:24 AM  Staff -        CRNA: Rayo Briggs APRN CRNA       Performed By: CRNA  Consent for Airway        Urgency: elective  Indications and Patient Condition       Indications for airway management: kathi-procedural       Induction type:intravenous       Mask difficulty assessment: 1 - vent by mask    Final Airway Details       Final airway type: endotracheal airway       Successful airway: ETT - single  Endotracheal Airway Details        ETT size (mm): 7.0       Cuffed: yes       Cuff volume (mL): 7       Successful intubation technique: direct laryngoscopy       DL Blade Type: MAC 3       Grade View of Cords: 2       Position: Right       Measured from: gums/teeth       Secured at (cm): 21       Bite block used: Oral Airway    Post intubation assessment        Placement verified by: capnometry and chest rise        Number of attempts at approach: 1       Number of other approaches attempted: 0       Secured with: tape       Ease of procedure: easy       Dentition: Unchanged    Medication(s) Administered   Medication Administration Time: 2/15/2024 8:24 AM    Additional Comments       Recommend glide in the future

## 2024-02-15 NOTE — BRIEF OP NOTE
[facility]    Brief Operative Note    Pre-operative diagnosis: PAD (peripheral artery disease) (H24) [I73.9]  Post-operative diagnosis Same as pre-operative diagnosis    Procedure: CREATION, BYPASS, ARTERIAL, FEMORAL TO TIBIAL, COMPLETION OF ANGIOGRAM, LEFT COMMON ILIAC ARTERY STENT PLACEMENT, Left - Leg    Surgeon: Surgeon(s) and Role:     * Dyan Geller MD - Primary     * Tammy Ramirez PA-C - Assisting     * Belkys Petersen MD - Resident - Assisting  Anesthesia: General   Estimated Blood Loss: 600cc    Drains: None  Specimens: * No specimens in log *  Findings:   Biphasic L PT signal at end of case.  .  Complications: None.  Implants:   Implant Name Type Inv. Item Serial No.  Lot No. LRB No. Used Action   STENT BILIARY VISAPRO GPS BALLOON EXP [id number] - [id number] Stent STENT BILIARY VISAPRO GPS BALLOON EXP [id number]- [employer] [id number] Left 1 Implanted   GRAFT TISS 4MM-5MM MILES 21-29 CM FEM POPLITEAL ART STRL R020 - [id number] Bone/Tissue/Biologic GRAFT TISS 4MM-5MM MILES 21-29 CM FEM POPLITEAL ART STRL R020 [id number] CRYOLIFE  Left 1 Implanted   GRAFT TISS 4MM-5MM MILES 21-29 CM FEM POPLITEAL ART STRL R020 - [id number] Bone/Tissue/Biologic GRAFT TISS 4MM-5MM MILES 21-29 CM FEM POPLITEAL ART STRL R020 [id number] CRYOLIFE  Left 1 Implanted

## 2024-02-15 NOTE — PHARMACY-ADMISSION MEDICATION HISTORY
Pharmacist Admission Medication History    Admission medication history is complete. The information provided in this note is only as accurate as the sources available at the time of the update.    Information Source(s): Patient, Clinic records, and CareEverywhere/SureScripts via in-person    Pertinent Information: Patient states her current Tresiba dose is 30 units daily (divided in 2 doses, units per dose vary depending on BG reading) and can go up to 60 units daily. Typical dose is 15 units.    Allergies reviewed with patient and updates made in EHR: yes    Medication History Completed By: Noble Zepeda [facility] 2/15/2024 6:49 AM    PTA Med List   Medication Sig Last Dose    acetaminophen (ACETAMINOPHEN 8 HOUR) 650 MG CR tablet Take 1,300 mg by mouth every 8 hours as needed for mild pain or fever Past Week    amLODIPine (NORVASC) 5 MG tablet Take 5 mg by mouth daily 2/14/2024    clopidogrel (PLAVIX) 75 MG tablet Take 75 mg by mouth daily 2/14/2024    empagliflozin (JARDIANCE) 25 MG TABS tablet Take 1 tablet (25 mg) by mouth daily 2/14/2024    ezetimibe (ZETIA) 10 MG tablet Take 10 mg by mouth daily 2/14/2024    hydrochlorothiazide (HYDRODIURIL) 25 MG tablet Take 25 mg by mouth daily 2/14/2024    Insulin Degludec (TRESIBA) 100 UNIT/ML SOLN Inject 15 Units Subcutaneous 2 times daily 2/14/2024    linagliptin (TRADJENTA) 5 MG TABS tablet Take 5 mg by mouth daily 2/14/2024    lisinopril (ZESTRIL) 40 MG tablet Take 40 mg by mouth daily 2/14/2024    metFORMIN (GLUCOPHAGE XR) 500 MG 24 hr tablet Take 1,000 mg by mouth daily 2/14/2024 at 0600    omeprazole (PRILOSEC) 20 MG DR capsule Take 20 mg by mouth daily as needed 2/14/2024    UNABLE TO FIND Take 2 capsules by mouth daily MEDICATION NAME: Gui Doe 2/14/2024

## 2024-02-15 NOTE — ANESTHESIA CARE TRANSFER NOTE
Patient: Ashanti Aviles    Procedure: Procedure(s):  CREATION, BYPASS, ARTERIAL, FEMORAL TO TIBIAL, COMPLETION OF ANGIOGRAM, LEFT COMMON ILIAC ARTERY STENT PLACEMENT       Diagnosis: PAD (peripheral artery disease) (H24) [I73.9]  Diagnosis Additional Information: No value filed.    Anesthesia Type:   General     Note:    Oropharynx: oropharynx clear of all foreign objects  Level of Consciousness: awake  Oxygen Supplementation: room air    Independent Airway: airway patency satisfactory and stable  Dentition: dentition unchanged  Vital Signs Stable: post-procedure vital signs reviewed and stable  Report to RN Given: handoff report given  Patient transferred to: PACU    Handoff Report: Identifed the Patient, Identified the Reponsible Provider, Reviewed the pertinent medical history, Discussed the surgical course, Reviewed Intra-OP anesthesia mangement and issues during anesthesia, Set expectations for post-procedure period and Allowed opportunity for questions and acknowledgement of understanding      Vitals:  Vitals Value Taken Time   /54 02/15/24 1500   Temp     Pulse 98 02/15/24 1504   Resp 21 02/15/24 1504   SpO2 100 % 02/15/24 1504   Vitals shown include unfiled device data.    Electronically Signed By: DOROTA Barr CRNA  February 15, 2024  3:05 PM

## 2024-02-15 NOTE — ANESTHESIA POSTPROCEDURE EVALUATION
Patient: Ashanti Aviles    Procedure: Procedure(s):  CREATION, BYPASS, ARTERIAL, FEMORAL TO TIBIAL, COMPLETION OF ANGIOGRAM, LEFT COMMON ILIAC ARTERY STENT PLACEMENT       Anesthesia Type:  General    Note:  Disposition: Outpatient   Postop Pain Control: Uneventful            Sign Out: Well controlled pain   PONV: No   Neuro/Psych: Uneventful            Sign Out: Acceptable/Baseline neuro status   Airway/Respiratory: Uneventful            Sign Out: Acceptable/Baseline resp. status   CV/Hemodynamics: Uneventful            Sign Out: Acceptable CV status; No obvious hypovolemia; No obvious fluid overload   Other NRE: NONE   DID A NON-ROUTINE EVENT OCCUR? No           Last vitals:  Vitals Value Taken Time   /57 02/15/24 1600   Temp 36.4  C (97.6  F) 02/15/24 1600   Pulse 106 02/15/24 1605   Resp 27 02/15/24 1605   SpO2 100 % 02/15/24 1605   Vitals shown include unfiled device data.    Electronically Signed By: Khalif Burton MD  February 15, 2024  4:06 PM

## 2024-02-15 NOTE — ANESTHESIA PREPROCEDURE EVALUATION
Anesthesia Pre-Procedure Evaluation    Patient: Ashanti Aviles   MRN: [id number] : 1955        Procedure : Procedure(s):  CREATION, BYPASS, ARTERIAL, FEMORAL TO TIBIAL          Past Medical History:   Diagnosis Date    Benign gastric polyp     Chronic toe ulcer (H)     Chronic ulcer of great toe of left foot with necrosis of muscle (H)     Diabetes mellitus (H)     Gastroesophageal reflux disease     History of colonic polyps     History of colonic polyps     HLD (hyperlipidemia)     Hypertension     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/29/2013    Nicotine addiction     OAG (open angle glaucoma)     PVD (peripheral vascular disease) (H24)     Reflux esophagitis     Retinopathy     Tubular adenoma     Yeast vaginitis       Past Surgical History:   Procedure Laterality Date    AMPUTATE TOE(S) Right 2023    Procedure: Amputation fourth toe right foot;  Surgeon: Benjamin Diez DPM;  Location: [facility] OR    ANUS SURGERY      BIOPSY CERVICAL, LOCAL EXCISION, SINGLE/MULTIPLE      COLONOSCOPY N/A 2020    Procedure: EXAM UNDER ANESTHESIA, HIGH RESOLUTION ANOSCOPY INTRA OP;  Surgeon: Preeti Sheehan MD;  Location: [facility];  Service: Gastroenterology    COLONOSCOPY N/A 2020    Procedure: EXAM UNDER ANESTHESIA WITH HIGH RESOLUTION ANOSCOPY;  Surgeon: Preeti Sheehan MD;  Location: [facility] OR;  Service: General    COLONOSCOPY N/A 06/15/2021    Procedure: EXAM UNDER ANESTHESIA WITH HIGH RESOLUTION ANOSCOPY, BIOPSY, FULGURATION;  Surgeon: Preeti Sheehan MD;  Location: [facility] OR;  Service: Gastroenterology    ENDARTERECTOMY FEMORAL Left 2023    Procedure: LEFT FEMORAL ENDARTERECTOMY WITH RETROGRADE ILIAC STENT;  Surgeon: Dyan Geller MD;  [facility]    EXAM UNDER ANESTHESIA ANUS N/A 2021    Procedure: EXAM UNDER ANESTHESIA WITH HIGH RESOLUTION ANOSCOPY;  Surgeon: Preeti Sehehan MD;  Location:  [place] [facility] OR    FEMORAL ARTERY - TIBIAL ARTERY BYPASS GRAFT Left 2023    Procedure: CREATION, BYPASS, ARTERIAL, FEMORAL TO TIBIAL, LEFT;  Surgeon: Dyan Geller MD;  Location: [facility] OR    INCISION AND DRAINAGE FOOT, COMBINED Left 2023    Procedure: INCISION AND DRAINAGE, LEFT HALLUX;  Surgeon: Rene Ordaz DPM;  Location: [facility] OR    IR LOWER EXTREMITY ANGIOGRAM LEFT  2023    IR LOWER EXTREMITY ANGIOGRAM RIGHT  2023    IR THROMBOLYSIS ARTERIAL INFUSION INITIAL DAY  10/09/2023    LEEP TX, CERVICAL          TUBAL LIGATION        Allergies   Allergen Reactions    Simvastatin Muscle Pain (Myalgia)     Muscle pain    Victoza [Liraglutide] Other (See Comments)     Binds bowels    Penicillins Unknown     Childhood rxn      Social History     Tobacco Use    Smoking status: Former     Packs/day: .5     Types: Cigarettes     Quit date: 2023     Years since quittin.0    Smokeless tobacco: Never   Substance Use Topics    Alcohol use: Not Currently     Comment: Alcoholic Drinks/MONTH: 2x      Wt Readings from Last 1 Encounters:   02/15/24 66.2 kg (145 lb 14.4 oz)        Anesthesia Evaluation            ROS/MED HX  ENT/Pulmonary:  - neg pulmonary ROS     Neurologic:  - neg neurologic ROS     Cardiovascular:  - neg cardiovascular ROS   (+)  hypertension- -   -  - -                                      METS/Exercise Tolerance: >4 METS    Hematologic:  - neg hematologic  ROS     Musculoskeletal:  - neg musculoskeletal ROS     GI/Hepatic:  - neg GI/hepatic ROS   (+) GERD,                   Renal/Genitourinary:  - neg Renal ROS   (+) renal disease, type: CRI,            Endo:  - neg endo ROS   (+) type I DM,                     Psychiatric/Substance Use:  - neg psychiatric ROS     Infectious Disease:  - neg infectious disease ROS     Malignancy:  - neg malignancy ROS     Other:  - neg other ROS          Physical Exam    Airway        Mallampati: II   "  Neck ROM: full     Respiratory Devices and Support         Dental       (+) Minor Abnormalities - some fillings, tiny chips      Cardiovascular   cardiovascular exam normal          Pulmonary   pulmonary exam normal                OUTSIDE LABS:  CBC:   Lab Results   Component Value Date    WBC 8.8 02/15/2024    WBC 16.1 (H) 01/12/2024    HGB 12.1 02/15/2024    HGB 13.9 01/12/2024    HCT 37.3 02/15/2024    HCT 41.8 01/12/2024     02/15/2024     01/12/2024     BMP:   Lab Results   Component Value Date     10/06/2023     (L) 09/15/2023    POTASSIUM 3.3 (L) 02/15/2024    POTASSIUM 3.3 (L) 01/12/2024    CHLORIDE 99 10/06/2023    CHLORIDE 99 09/15/2023    CO2 27 10/06/2023    CO2 26 09/15/2023    BUN 17.0 10/06/2023    BUN 16.1 09/15/2023    CR 0.85 02/15/2024    CR 0.86 01/12/2024     (H) 02/15/2024     (H) 02/15/2024     COAGS:   Lab Results   Component Value Date    PTT 29 01/12/2024    INR 0.83 (L) 01/12/2024    FIBR 361 10/09/2023     POC: No results found for: \"BGM\", \"HCG\", \"HCGS\"  HEPATIC:   Lab Results   Component Value Date    ALBUMIN 3.7 02/15/2023    PROTTOTAL 6.5 02/15/2023    ALT 25 02/15/2023    AST 20 02/15/2023    ALKPHOS 75 02/15/2023    BILITOTAL <0.2 02/15/2023     OTHER:   Lab Results   Component Value Date    A1C 8.3 (H) 09/05/2023    SANTINO 9.4 10/06/2023    PHOS 3.6 09/15/2023    MAG 2.2 09/15/2023       Anesthesia Plan    ASA Status:  3       Anesthesia Type: General.     - Airway: ETT   Induction: Intravenous.      Techniques and Equipment:     - Lines/Monitors: 2nd IV, Arterial Line     Consents    Anesthesia Plan(s) and associated risks, benefits, and realistic alternatives discussed. Questions answered and patient/representative(s) expressed understanding.     - Discussed: Risks, Benefits and Alternatives for the PROCEDURE were discussed     - Discussed with:  Patient            Postoperative Care       PONV prophylaxis: Ondansetron (or other 5HT-3) " "    Comments:    Other Comments: [place] post induction     Ketamine 20mg at incision, .25mcg/kg precedex, dilaudid titrated to resp rate            Khalif Burton MD    I have reviewed the pertinent notes and labs in the chart from the past 30 days and (re)examined the patient.  Any updates or changes from those notes are reflected in this note.    # Hypokalemia: Lowest K = 3.3 mmol/L in last 2 days, will replace as needed            # Drug Induced Platelet Defect: home medication list includes an antiplatelet medication  # DMII: A1C = N/A within past 6 months  # Overweight: Estimated body mass index is 27.57 kg/m  as calculated from the following:    Height as of this encounter: 1.549 m (5' 1\").    Weight as of this encounter: 66.2 kg (145 lb 14.4 oz).      "

## 2024-02-15 NOTE — PROGRESS NOTES
"[facility]    Medicine Progress Note - Hospitalist Service    Date of Admission:  2/15/2024    Assessment & Plan   68-year-old female with PMH of DM2, hypertension, PAD who underwent  CREATION, BYPASS, ARTERIAL, FEMORAL TO TIBIAL, COMPLETION OF ANGIOGRAM, LEFT COMMON ILIAC ARTERY STENT PLACEMENT, Left - Leg.  [facility] consulted [facility] diabetes management    1.  DM2 with long-term insulin use.  Not optimally controlled as outpatient and hemoglobin A1c 8.7.  Agree with Tresiba 10 units twice daily (home dose 15 units twice daily) and insulin sliding scale with meals.  Hold metformin, Jardiance and Tradjenta while inpatient  2.  Hypokalemia.  Replace per protocol  3.  Constipation.  On scheduled Senokot and MiraLAX  4.  Essential hypertension.  On amlodipine, HCTZ and lisinopril prior to admission.  SBP 110s, hold antihypertensives today, reassess tomorrow  5.  PAD s/p CREATION, BYPASS, ARTERIAL, FEMORAL TO TIBIAL, COMPLETION OF ANGIOGRAM, LEFT COMMON ILIAC ARTERY STENT PLACEMENT, Left - Leg.  Management per vascular surgery          Diet: Advance Diet as Tolerated: Clear Liquid Diet; Regular Diet Adult    DVT Prophylaxis: IV heparin  Cedeno Catheter: PRESENT, indication: Surgical procedure  Lines: None     Cardiac Monitoring: None  Code Status: Full Code      Clinically Significant Risk Factors Present on Admission        # Hypokalemia: Lowest K = 3.3 mmol/L in last 2 days, will replace as needed         # Drug Induced Platelet Defect: home medication list includes an antiplatelet medication   # Hypertension: Noted on problem list     # DMII: A1C = N/A within past 6 months    # Overweight: Estimated body mass index is 27.57 kg/m  as calculated from the following:    Height as of this encounter: 1.549 m (5' 1\").    Weight as of this encounter: 66.2 kg (145 lb 14.4 oz).              Disposition Plan      Expected Discharge Date: 02/16/2024                    Zelda Jimenez MD  Hospitalist Service  " "[facility]  Securely message with Deborah (more info)  Text page via [employer] Paging/Directory   ______________________________________________________________________    Interval History   Patient reports feeling quite well  Postop pain is well-controlled.  No new tingling or numbness  She had some nausea earlier today but now resolved  Tolerated clear liquid  Feels constipated    Physical Exam   Vital Signs: Temp: 98.2  F (36.8  C) Temp src: Oral BP: 100/53 Pulse: 105   Resp: 14 SpO2: 100 % O2 Device: None (Room air)    Weight: 145 lbs 14.4 oz    General Appearance: No acute distress  Respiratory: Lungs are clear  Cardiovascular: Regular rate and rhythm.  Normal S1-S2  GI: Abdomen soft, nontender, bowel sounds present  Other: Awake, alert, nonfocal    Medical Decision Making       40 MINUTES SPENT BY ME on the date of service doing chart review, history, exam, documentation & further activities per the note.  MANAGEMENT DISCUSSED with the following over the past 24 hours: Patient and nursing staff       Data     I have personally reviewed the following data over the past 24 hrs:    8.8  \   12.1   / 273     N/A N/A N/A /  178 (H)   3.3 (L) N/A 0.85 \     TSH: N/A T4: N/A A1C: 8.7 (H)     Procal: N/A CRP: N/A Lactic Acid: 1.6         Imaging results reviewed over the past 24 hrs:   Recent Results (from the past 24 hour(s))   POC US Guided Vascular Access    Narrative    Ultrasound was performed as guidance to an anesthesia procedure.  Click   \"PACS images\" hyperlink below to view any stored images.  For specific   procedure details, view procedure note authored by anesthesia.   XR Surgery HARRIET G/T 5 Min Fluoro    Narrative    This exam was marked as non-reportable because it will not be read by a   radiologist or a [place] non-radiologist provider.           "

## 2024-02-15 NOTE — INTERVAL H&P NOTE
"I have reviewed the surgical (or preoperative) H&P that is linked to this encounter, and examined the patient. There are no significant changes    Clinical Conditions Present on Arrival:  Clinically Significant Risk Factors Present on Admission        # Hypokalemia: Lowest K = 3.3 mmol/L in last 2 days, will replace as needed          # Drug Induced Platelet Defect: home medication list includes an antiplatelet medication  # DMII: A1C = N/A within past 6 months  # Overweight: Estimated body mass index is 27.57 kg/m  as calculated from the following:    Height as of this encounter: 1.549 m (5' 1\").    Weight as of this encounter: 66.2 kg (145 lb 14.4 oz).       "

## 2024-02-15 NOTE — ANESTHESIA PROCEDURE NOTES
Arterial Line Procedure Note    Pre-Procedure   Staff -        Anesthesiologist:  Khalif Burton MD       Performed By: anesthesiologist       Location: pre-op       Pre-Anesthestic Checklist: patient identified, IV checked, risks and benefits discussed, informed consent, monitors and equipment checked, pre-op evaluation and at physician/surgeon's request  Timeout:       Correct Patient: Yes        Correct Procedure: Yes        Correct Site: Yes        Correct Position: Yes   Line Placement:   This line was placed Pre Induction starting at 2/15/2024 8:20 AM and ending at 2/15/2024 8:28 AM  Procedure   Procedure: arterial line       Laterality: right       Insertion Site: radial.  Sterile Prep        Skin prep: Chloraprep  Insertion/Injection        Technique: ultrasound guided, Seldinger Technique and Carter's test completed        1. Ultrasound was used to evaluate the access site.       2. Artery evaluated via ultrasound for patency/adequacy.       3. Using real-time ultrasound the needle/catheter was observed entering the artery/vein.       Catheter Type/Size: 20 G, 12 cm  Narrative         Secured by: suture and anchor securement device       Biopatch and Tegaderm dressing used.       Complications: None apparent,        Arterial waveform: Yes    Comments:  No us image saved for gilda

## 2024-02-16 DIAGNOSIS — I73.9 PAD (PERIPHERAL ARTERY DISEASE) (H): Primary | ICD-10-CM

## 2024-02-16 LAB
ANION GAP SERPL CALCULATED.3IONS-SCNC: 7 MMOL/L (ref 7–15)
BASOPHILS # BLD AUTO: 0 10E3/UL (ref 0–0.2)
BASOPHILS NFR BLD AUTO: 0 %
BLD PROD TYP BPU: NORMAL
BLOOD COMPONENT TYPE: NORMAL
BUN SERPL-MCNC: 13.1 MG/DL (ref 8–23)
CALCIUM SERPL-MCNC: 8.2 MG/DL (ref 8.8–10.2)
CHLORIDE SERPL-SCNC: 105 MMOL/L (ref 98–107)
CODING SYSTEM: NORMAL
CREAT SERPL-MCNC: 0.69 MG/DL (ref 0.51–0.95)
CROSSMATCH: NORMAL
DEPRECATED HCO3 PLAS-SCNC: 26 MMOL/L (ref 22–29)
EGFRCR SERPLBLD CKD-EPI 2021: >90 ML/MIN/1.73M2
EOSINOPHIL # BLD AUTO: 0 10E3/UL (ref 0–0.7)
EOSINOPHIL NFR BLD AUTO: 0 %
ERYTHROCYTE [DISTWIDTH] IN BLOOD BY AUTOMATED COUNT: 15.9 % (ref 10–15)
GLUCOSE BLDC GLUCOMTR-MCNC: 131 MG/DL (ref 70–99)
GLUCOSE BLDC GLUCOMTR-MCNC: 142 MG/DL (ref 70–99)
GLUCOSE BLDC GLUCOMTR-MCNC: 144 MG/DL (ref 70–99)
GLUCOSE BLDC GLUCOMTR-MCNC: 170 MG/DL (ref 70–99)
GLUCOSE BLDC GLUCOMTR-MCNC: 187 MG/DL (ref 70–99)
GLUCOSE BLDC GLUCOMTR-MCNC: 189 MG/DL (ref 70–99)
GLUCOSE SERPL-MCNC: 137 MG/DL (ref 70–99)
HBV SURFACE AG SERPL QL IA: NONREACTIVE
HCT VFR BLD AUTO: 17 % (ref 35–47)
HGB BLD-MCNC: 5.6 G/DL (ref 11.7–15.7)
HGB BLD-MCNC: 8.2 G/DL (ref 11.7–15.7)
HIV 1+2 AB+HIV1 P24 AG SERPL QL IA: NONREACTIVE
IMM GRANULOCYTES # BLD: 0.1 10E3/UL
IMM GRANULOCYTES NFR BLD: 1 %
ISSUE DATE AND TIME: NORMAL
LYMPHOCYTES # BLD AUTO: 4.4 10E3/UL (ref 0.8–5.3)
LYMPHOCYTES NFR BLD AUTO: 27 %
MAGNESIUM SERPL-MCNC: 1.9 MG/DL (ref 1.7–2.3)
MCH RBC QN AUTO: 30.6 PG (ref 26.5–33)
MCHC RBC AUTO-ENTMCNC: 32.9 G/DL (ref 31.5–36.5)
MCV RBC AUTO: 93 FL (ref 78–100)
MONOCYTES # BLD AUTO: 1.7 10E3/UL (ref 0–1.3)
MONOCYTES NFR BLD AUTO: 10 %
NEUTROPHILS # BLD AUTO: 10.2 10E3/UL (ref 1.6–8.3)
NEUTROPHILS NFR BLD AUTO: 62 %
NRBC # BLD AUTO: 0 10E3/UL
NRBC BLD AUTO-RTO: 0 /100
PHOSPHATE SERPL-MCNC: 3.5 MG/DL (ref 2.5–4.5)
PLATELET # BLD AUTO: 164 10E3/UL (ref 150–450)
POTASSIUM SERPL-SCNC: 4.2 MMOL/L (ref 3.4–5.3)
RBC # BLD AUTO: 1.83 10E6/UL (ref 3.8–5.2)
SODIUM SERPL-SCNC: 138 MMOL/L (ref 135–145)
UFH PPP CHRO-ACNC: <0.1 IU/ML
UNIT ABO/RH: NORMAL
UNIT NUMBER: NORMAL
UNIT STATUS: NORMAL
UNIT TYPE ISBT: 6200
WBC # BLD AUTO: 16.3 10E3/UL (ref 4–11)

## 2024-02-16 PROCEDURE — 84100 ASSAY OF PHOSPHORUS: CPT

## 2024-02-16 PROCEDURE — 250N000013 HC RX MED GY IP 250 OP 250 PS 637

## 2024-02-16 PROCEDURE — 85025 COMPLETE CBC W/AUTO DIFF WBC: CPT

## 2024-02-16 PROCEDURE — P9016 RBC LEUKOCYTES REDUCED: HCPCS

## 2024-02-16 PROCEDURE — 250N000013 HC RX MED GY IP 250 OP 250 PS 637: Performed by: SURGERY

## 2024-02-16 PROCEDURE — 36415 COLL VENOUS BLD VENIPUNCTURE: CPT

## 2024-02-16 PROCEDURE — 83735 ASSAY OF MAGNESIUM: CPT

## 2024-02-16 PROCEDURE — 85520 HEPARIN ASSAY: CPT

## 2024-02-16 PROCEDURE — 80048 BASIC METABOLIC PNL TOTAL CA: CPT

## 2024-02-16 PROCEDURE — 120N000001 HC R&B MED SURG/OB

## 2024-02-16 PROCEDURE — 85018 HEMOGLOBIN: CPT

## 2024-02-16 PROCEDURE — 99233 SBSQ HOSP IP/OBS HIGH 50: CPT | Performed by: STUDENT IN AN ORGANIZED HEALTH CARE EDUCATION/TRAINING PROGRAM

## 2024-02-16 PROCEDURE — 250N000011 HC RX IP 250 OP 636

## 2024-02-16 RX ORDER — FUROSEMIDE 10 MG/ML
20 INJECTION INTRAMUSCULAR; INTRAVENOUS ONCE
Status: COMPLETED | OUTPATIENT
Start: 2024-02-16 | End: 2024-02-16

## 2024-02-16 RX ORDER — POTASSIUM CHLORIDE 1500 MG/1
40 TABLET, EXTENDED RELEASE ORAL ONCE
Qty: 2 TABLET | Refills: 0 | Status: COMPLETED | OUTPATIENT
Start: 2024-02-16 | End: 2024-02-16

## 2024-02-16 RX ORDER — SENNOSIDES 8.6 MG
1300 CAPSULE ORAL EVERY 8 HOURS PRN
Status: DISCONTINUED | OUTPATIENT
Start: 2024-02-16 | End: 2024-02-16

## 2024-02-16 RX ADMIN — SENNOSIDES AND DOCUSATE SODIUM 1 TABLET: 8.6; 5 TABLET ORAL at 21:31

## 2024-02-16 RX ADMIN — HYDROMORPHONE HYDROCHLORIDE 0.2 MG: 0.2 INJECTION, SOLUTION INTRAMUSCULAR; INTRAVENOUS; SUBCUTANEOUS at 12:30

## 2024-02-16 RX ADMIN — INSULIN DEGLUDEC 10 UNITS: 100 INJECTION, SOLUTION SUBCUTANEOUS at 21:31

## 2024-02-16 RX ADMIN — FUROSEMIDE 20 MG: 10 INJECTION, SOLUTION INTRAMUSCULAR; INTRAVENOUS at 14:48

## 2024-02-16 RX ADMIN — OXYCODONE HYDROCHLORIDE 10 MG: 5 TABLET ORAL at 01:39

## 2024-02-16 RX ADMIN — HYDROMORPHONE HYDROCHLORIDE 0.4 MG: 0.2 INJECTION, SOLUTION INTRAMUSCULAR; INTRAVENOUS; SUBCUTANEOUS at 02:44

## 2024-02-16 RX ADMIN — ONDANSETRON 4 MG: 2 INJECTION INTRAMUSCULAR; INTRAVENOUS at 01:42

## 2024-02-16 RX ADMIN — INSULIN DEGLUDEC 10 UNITS: 100 INJECTION, SOLUTION SUBCUTANEOUS at 09:28

## 2024-02-16 RX ADMIN — SENNOSIDES AND DOCUSATE SODIUM 1 TABLET: 8.6; 5 TABLET ORAL at 09:27

## 2024-02-16 RX ADMIN — INSULIN ASPART 1 UNITS: 100 INJECTION, SOLUTION INTRAVENOUS; SUBCUTANEOUS at 12:32

## 2024-02-16 RX ADMIN — CLOPIDOGREL BISULFATE 75 MG: 75 TABLET ORAL at 11:07

## 2024-02-16 RX ADMIN — HYDROMORPHONE HYDROCHLORIDE 0.4 MG: 0.2 INJECTION, SOLUTION INTRAMUSCULAR; INTRAVENOUS; SUBCUTANEOUS at 05:19

## 2024-02-16 RX ADMIN — HYDROMORPHONE HYDROCHLORIDE 0.2 MG: 0.2 INJECTION, SOLUTION INTRAMUSCULAR; INTRAVENOUS; SUBCUTANEOUS at 09:48

## 2024-02-16 RX ADMIN — POTASSIUM CHLORIDE 40 MEQ: 1500 TABLET, EXTENDED RELEASE ORAL at 01:57

## 2024-02-16 RX ADMIN — CEFAZOLIN 1 G: 1 INJECTION, POWDER, FOR SOLUTION INTRAMUSCULAR; INTRAVENOUS at 04:15

## 2024-02-16 RX ADMIN — POLYETHYLENE GLYCOL 3350 17 G: 17 POWDER, FOR SOLUTION ORAL at 09:28

## 2024-02-16 RX ADMIN — INSULIN ASPART 1 UNITS: 100 INJECTION, SOLUTION INTRAVENOUS; SUBCUTANEOUS at 16:38

## 2024-02-16 RX ADMIN — EZETIMIBE 10 MG: 10 TABLET ORAL at 09:27

## 2024-02-16 RX ADMIN — PANTOPRAZOLE SODIUM 40 MG: 40 TABLET, DELAYED RELEASE ORAL at 12:30

## 2024-02-16 RX ADMIN — ACETAMINOPHEN 975 MG: 325 TABLET ORAL at 01:48

## 2024-02-16 ASSESSMENT — ACTIVITIES OF DAILY LIVING (ADL)
ADLS_ACUITY_SCORE: 31
DEPENDENT_IADLS:: INDEPENDENT
ADLS_ACUITY_SCORE: 31
ADLS_ACUITY_SCORE: 32
ADLS_ACUITY_SCORE: 31
ADLS_ACUITY_SCORE: 31

## 2024-02-16 NOTE — CONSULTS
Care Management Initial Consult    General Information  Assessment completed with: Patient, patient  Type of CM/SW Visit: Initial Assessment    Primary Care Provider verified and updated as needed: Yes   Readmission within the last 30 days: no previous admission in last 30 days         Advance Care Planning: Advance Care Planning Reviewed: no concerns identified          Communication Assessment  Patient's communication style: spoken language (English or Bilingual)    Hearing Difficulty or Deaf: no   Wear Glasses or Blind: yes    Cognitive  Cognitive/Neuro/Behavioral: speech, .WDL except  Level of Consciousness: alert  Arousal Level: opens eyes spontaneously  Orientation: oriented x 4     Best Language: 0 - No aphasia  Speech: hoarse    Living Environment:   People in home: spouse     Current living Arrangements: house      Able to return to prior arrangements: yes  Living Arrangement Comments: house with     Family/Social Support:  Care provided by: self  Provides care for: no one  Marital Status:   , Children          Description of Support System: Supportive    Support Assessment: Adequate family and caregiver support    Current Resources:   Patient receiving home care services: No     Community Resources: None  Equipment currently used at home: walker, standard (does not use prior to admission, but has walker at home)  Supplies currently used at home: None    Employment/Financial:  Employment Status: employed full-time        Financial Concerns:     Referral to Financial Worker: No       Does the patient's insurance plan have a 3 day qualifying hospital stay waiver?  No    Lifestyle & Psychosocial Needs:  Social Determinants of Health     Food Insecurity: Not on file   Depression: Not at risk (7/31/2023)    PHQ-2     PHQ-2 Score: 0   Housing Stability: Not on file   Tobacco Use: Medium Risk (2/15/2024)    Patient History     Smoking Tobacco Use: Former     Smokeless Tobacco Use: Never      Passive Exposure: Not on file   Financial Resource Strain: Not on file   Alcohol Use: Not on file   Transportation Needs: Not on file   Physical Activity: Not on file   Interpersonal Safety: Not on file   Stress: Not on file   Social Connections: Not on file       Functional Status:  Prior to admission patient needed assistance:   Dependent ADLs:: Independent, Ambulation-walker  Dependent IADLs:: Independent  Assesssment of Functional Status: Not at baseline with ADL Functioning    Mental Health Status:  Mental Health Status: No Current Concerns       Chemical Dependency Status:  Chemical Dependency Status: No Current Concerns             Values/Beliefs:  Spiritual, Cultural Beliefs, Restorationist Practices, Values that affect care: no               Additional Information:  Chart reviewed. Initial assessment completed with patient at bedside. Introduced self and CM role Patient reports she lives in a house with her , no steps that she needs to navigate. She is typically independent at baseline, works outside of the home. Her adult daughters are involved and supportive. She reports Dr Ordaz's office removed a wound vac.   Unknown discharge needs at this time, SW offered resources and support. PT has been consulted to evaluate patient's mobility.     Estefany Bueno LGSW

## 2024-02-16 NOTE — PROVIDER NOTIFICATION
Sepsis protocol fired, (pt tachycardic 110-120s, afebrile) - notified Dr. Espino. Does not see a need to draw another lactic acid as there was one drawn yesterday.

## 2024-02-16 NOTE — PROGRESS NOTES
"Vascular Surgery Progress Note  02/16/2024       Subjective:  Feeling okay this am, hungry. Tachycardic 110s at bedside. Reports was dizzy earlier this am when sat up, has now resolved. Voice somewhat hoarse. Denies fever, chills, palpitations, fatigue.      Objective:  Temp:  [96.4  F (35.8  C)-98.9  F (37.2  C)] 98  F (36.7  C)  Pulse:  [] 109  Resp:  [14-42] 18  BP: ()/(51-68) 112/53  MAP:  [64 mmHg-70 mmHg] 70 mmHg  Arterial Line BP: (109-117)/(46-50) 110/50  SpO2:  [98 %-100 %] 100 %    I/O last 3 completed shifts:  In: 5803.67 [P.O.:1500; I.V.:3503.67; IV Piggyback:300]  Out: 4220 [Urine:3620; Blood:600]      Gen: Awake, alert, NAD  CV: tachycardic per tele  Resp: NLB on RA  Abd:  soft, nondistended, nontender  Incision: c/d/I, no hematoma of left groin, soft. Left calf incision with island dressing clean dry, in tact.  Ext: WWP, mild edema LLE in calf.   Vascular: Monophasic DP/PT signals LLE.      Labs:  Recent Labs   Lab 02/16/24  0638 02/15/24  0612   WBC 16.3* 8.8   HGB 5.6* 12.1    273       Recent Labs   Lab 02/16/24  0725 02/16/24  0638 02/16/24  0604 02/15/24  1007 02/15/24  0612   NA  --  138  --   --   --    POTASSIUM  --  4.2  --   --  3.3*   CHLORIDE  --  105  --   --   --    CO2  --  26  --   --   --    BUN  --  13.1  --   --   --    CR  --  0.69  --   --  0.85   * 137* 144*   < > 115*   SANTINO  --  8.2*  --   --   --    MAG  --  1.9  --   --   --    PHOS  --  3.5  --   --   --     < > = values in this interval not displayed.       Imaging:  XR Surgery HARRIET G/T 5 Min Fluoro    Result Date: 2/15/2024  This exam was marked as non-reportable because it will not be read by a radiologist or a [facility] non-radiologist provider.     POC US Guided Vascular Access    Result Date: 2/15/2024  Ultrasound was performed as guidance to an anesthesia procedure.  Click \"PACS images\" hyperlink below to view any stored images.  For specific procedure details, view procedure note authored by " anesthesia.        Assessment/Plan:   68 year old female with PMH of T2DM, HTN, HLD, osteomyelitis of left foot, and PAD now s/p L MIKE stent placement, redo L fem to PT bypass with spliced cryoartery on 2/16. Hgb this am 5.6 from 12 preop, tachycardic -  no signs of bleeding on exam, groin soft w/o hematoma, calf incision closed w/o hematoma. Will continue to monitor closely     - hold hep gtt for now  - OK for plavix today, ordered  - Will plan for 3u total transfusion prbc for acute blood loss anemia related to surgery. Do not suspect ongoing bleeding at this time   - 20mg IV lasix to follow prbc  - remove allison later this afternoon after lasix   - medicine input appreciated         Discussed with vascular surgery staff, who is in agreement with the above.  - - - - - - - - - - - - - - - - - -  Robson Petersen, PGY-3  Vascular Surgery Resident

## 2024-02-16 NOTE — PLAN OF CARE
Problem: Adult Inpatient Plan of Care  Goal: Absence of Hospital-Acquired Illness or Injury  Intervention: Identify and Manage Fall Risk  Recent Flowsheet Documentation  Taken 2/16/2024 0018 by Mable Hartman RN  Safety Promotion/Fall Prevention:   assistive device/personal items within reach   activity supervised   clutter free environment maintained   nonskid shoes/slippers when out of bed   mobility aid in reach   safety round/check completed   room near nurse's station   patient and family education  Intervention: Prevent Skin Injury  Recent Flowsheet Documentation  Taken 2/16/2024 0018 by Mable Hartman RN  Body Position: position changed independently  Intervention: Prevent and Manage VTE (Venous Thromboembolism) Risk  Recent Flowsheet Documentation  Taken 2/16/2024 0018 by Mable Hartman RN  VTE Prevention/Management: patient refused intervention  Goal: Optimal Comfort and Wellbeing  Intervention: Monitor Pain and Promote Comfort  Recent Flowsheet Documentation  Taken 2/16/2024 0125 by Mable Hartman RN  Pain Management Interventions: medication (see MAR)  Taken 2/16/2024 0018 by Mable Hartman RN  Pain Management Interventions: medication (see MAR)     Problem: Pain Acute  Goal: Optimal Pain Control and Function  Intervention: Develop Pain Management Plan  Recent Flowsheet Documentation  Taken 2/16/2024 0125 by Mable Hartman RN  Pain Management Interventions: medication (see MAR)  Taken 2/16/2024 0018 by Mable Hartman RN  Pain Management Interventions: medication (see MAR)  Intervention: Prevent or Manage Pain  Recent Flowsheet Documentation  Taken 2/16/2024 0018 by Mable Hartman RN  Medication Review/Management: medications reviewed     Problem: Pain Acute  Goal: Optimal Pain Control and Function  Intervention: Prevent or Manage Pain  Recent Flowsheet Documentation  Taken 2/16/2024 0018 by Mable Hartman RN  Medication Review/Management: medications reviewed   Goal Outcome  Evaluation: patient alert and oriented. C/o left foot and Groin pain. Oxycodone, Tylenol and dilaudid given though the night. Patient reports intervention was effective. Reports nausea and dizziness, Zofran given for nausea and VSS except . Noted patient sweating especially on forehead, blood sugar 170. 1 large BM this shift. Patient reports feeling fine after pain was controled. Dressing dry, clean and intact. Rooke boots on with BLE elevated. Heparin infusing 500unit/hr. Tachycardic throughout the night. On room air. Cedeno in place and draining. Postserum 3.3, replaced overnight per order.  Patient awake most of the night.

## 2024-02-16 NOTE — PLAN OF CARE
Problem: Adult Inpatient Plan of Care  Goal: Plan of Care Review  Description: The Plan of Care Review/Shift note should be completed every shift.  The Outcome Evaluation is a brief statement about your assessment that the patient is improving, declining, or no change.  This information will be displayed automatically on your shift  note.  Outcome: Progressing  Goal: Absence of Hospital-Acquired Illness or Injury  Intervention: Identify and Manage Fall Risk  Recent Flowsheet Documentation  Taken 2/15/2024 1700 by Trinidad Hagen, RN  Safety Promotion/Fall Prevention:   assistive device/personal items within reach   activity supervised   clutter free environment maintained   nonskid shoes/slippers when out of bed   mobility aid in reach   safety round/check completed   room near nurse's station   patient and family education  Goal: Optimal Comfort and Wellbeing  Intervention: Monitor Pain and Promote Comfort  Recent Flowsheet Documentation  Taken 2/15/2024 1915 by Trinidad Hagen, RN  Pain Management Interventions: medication (see MAR)  Goal: Readiness for Transition of Care  Intervention: Mutually Develop Transition Plan  Recent Flowsheet Documentation  Taken 2/15/2024 2000 by Trinidad Hagen, RN  Equipment Currently Used at Home: (does not use prior to admission, but has walker at home) walker, standard   Goal Outcome Evaluation:       Pt arrived to unit approx 16:45. Pt alert and oriented x4. Pain to LLE, controlled with sched acetaminophen, PRN oxy. LLE dorsal pedal with Doppler. RLE palpable +1. BLE cool, no discoloration. BLE numbness, not new - had prior to surgery. L calf dressing CDI, groin liquid bandage intact. No drainage. BLE elevated, Rooke boots on. Ate jello, pudding, crackers and became nauseous - PRN zofran given, effective. Tolerating fluids now. Had large BM this shift. VSS.   Cedeno in place, keeping in place until atleast tomorrow per vascular surgery.  Heparin running at 500units/hour.

## 2024-02-16 NOTE — PROGRESS NOTES
[facility]    Medicine Progress Note - Hospitalist Service    Date of Admission:  2/15/2024    Assessment & Plan   68-year-old female with PMH of DM2, hypertension, PAD who underwent  CREATION, BYPASS, ARTERIAL, FEMORAL TO TIBIAL, COMPLETION OF ANGIOGRAM, LEFT COMMON ILIAC ARTERY STENT PLACEMENT, Left - Leg.  [facility] consulted for diabetes management    PAD   -- S/p creating, bypass, arterial , femoral to tibial, completion of angiogram, left common iliac artery stent placement, left leg.  -- Management per vascular surgery  -- Plavix started per vascular    Severe symptomatic anemia  Acute blood loss anemia  -- Pt feels dizzy. No cp.  -- intra-op 600cc blood loss  -- heparin gtt held per vascular- no e/o ongoing bleeding  -- 3 prbc transfusion  -- monitor cbc    DM2 with long-term insulin use  -- Not optimally controlled as outpatient and hemoglobin A1c 8.7.  A  -- C/w PTA Tresiba 10 units twice daily (home dose 15 units twice daily) and insulin sliding scale with meals, I:C 1:15.  Hold metformin, Jardiance and Tradjenta while inpatient    Hypokalemia-resolved  -- Replace per protocol    Constipation  -- On scheduled Senokot and MiraLAX    Essential hypertension  -- was on amlodipine, HCTZ and lisinopril prior to admission.  SBP 110s, hold antihypertensives today, reassess tomorrow  -- hydralazin iv prn          Diet: Combination Diet Moderate Consistent Carb (60 g CHO per Meal) Diet; 2 gm NA Diet    DVT Prophylaxis: heparin gtt- held due to severe anemia  Cedeno Catheter: PRESENT, indication: Surgical procedure  Lines: None     Cardiac Monitoring: None  Code Status: Full Code      Clinically Significant Risk Factors        # Hypokalemia: Lowest K = 3.3 mmol/L in last 2 days, will replace as needed           # Hypertension: Noted on problem list       # DMII: A1C = 8.7 % (Ref range: <5.7 %) within past 6 months, PRESENT ON ADMISSION  # Overweight: Estimated body mass index is 27.57 kg/m  as  "calculated from the following:    Height as of this encounter: 1.549 m (5' 1\").    Weight as of this encounter: 66.2 kg (145 lb 14.4 oz)., PRESENT ON ADMISSION            Disposition Plan      Expected Discharge Date: 02/19/2024                    Elida Espino MD  Hospitalist Service  [facility]  Securely message with [employer] (more info)  Text page via [employer] Paging/Directory   ______________________________________________________________________    Interval History   Patient is new to me today. Chart reviewed.  Patient is seen and examined at bedside.  Pt felt dizzy. Tachycardic. Transfusion consent obtained.    Physical Exam   Vital Signs: Temp: 99.7  F (37.6  C) Temp src: Oral BP: 134/64 Pulse: (!) 122   Resp: 16 SpO2: 99 % O2 Device: None (Room air)    Weight: 145 lbs 14.4 oz    GEN: Alert and oriented. Not in acute distress.  HEENT: Atraumatic, mucous membrane- moist and pink.  Chest: Bilateral air entry.  CVS: S1S2 regular.   Abdomen: Soft. Non-tender, non-distended. No organomegaly. No guarding or rigidity. Bowel sounds active.   Extremities: left extremity surgical dressing c/d/i  CNS: No involuntary movements.  Skin: no cyanosis or clubbing.     Medical Decision Making       53 MINUTES SPENT BY ME on the date of service doing chart review, history, exam, documentation & further activities per the note.      Data     "

## 2024-02-16 NOTE — PLAN OF CARE
Problem: Adult Inpatient Plan of Care  Goal: Plan of Care Review  Description: The Plan of Care Review/Shift note should be completed every shift.  The Outcome Evaluation is a brief statement about your assessment that the patient is improving, declining, or no change.  This information will be displayed automatically on your shift  note.  Outcome: Progressing  Goal: Absence of Hospital-Acquired Illness or Injury  Intervention: Identify and Manage Fall Risk  Recent Flowsheet Documentation  Taken 2/16/2024 0815 by Trinidad Hagen RN  Safety Promotion/Fall Prevention:   activity supervised   assistive device/personal items within reach   clutter free environment maintained   safety round/check completed  Intervention: Prevent Skin Injury  Recent Flowsheet Documentation  Taken 2/16/2024 0815 by Trinidad Hagen RN  Body Position: position changed independently  Intervention: Prevent and Manage VTE (Venous Thromboembolism) Risk  Recent Flowsheet Documentation  Taken 2/16/2024 0815 by Trinidad Hagen RN  VTE Prevention/Management: (incision, has rooke boots on) other (see comments)  Goal: Optimal Comfort and Wellbeing  Intervention: Monitor Pain and Promote Comfort  Recent Flowsheet Documentation  Taken 2/16/2024 0948 by Trinidad Hagen RN  Pain Management Interventions:   medication (see MAR)   emotional support   rest   pillow support provided   Goal Outcome Evaluation:       Pt alert and oriented x4. Hgb 5.6 this AM. Receiving 3 units PRBC. Heparin ggt held. Cedeno in place, received lasix after 2nd unit of PRBC. Dressing CDI. LLE dorsalis pedis pulse Doppler, RLE dorsalis pedis +1 palpable. BLE warm, no new numb/tingling. Pain controlled with IV diladid 0.2mg, rest, ice. Rooke boots on.     Cedeno to remain in for now per vascular surgery.

## 2024-02-17 ENCOUNTER — APPOINTMENT (OUTPATIENT)
Dept: RADIOLOGY | Facility: HOSPITAL | Age: 69
DRG: 253 | End: 2024-02-17
Attending: STUDENT IN AN ORGANIZED HEALTH CARE EDUCATION/TRAINING PROGRAM
Payer: COMMERCIAL

## 2024-02-17 ENCOUNTER — APPOINTMENT (OUTPATIENT)
Dept: ULTRASOUND IMAGING | Facility: HOSPITAL | Age: 69
DRG: 253 | End: 2024-02-17
Attending: STUDENT IN AN ORGANIZED HEALTH CARE EDUCATION/TRAINING PROGRAM
Payer: COMMERCIAL

## 2024-02-17 ENCOUNTER — APPOINTMENT (OUTPATIENT)
Dept: PHYSICAL THERAPY | Facility: HOSPITAL | Age: 69
DRG: 253 | End: 2024-02-17
Payer: COMMERCIAL

## 2024-02-17 LAB
ALBUMIN UR-MCNC: NEGATIVE MG/DL
ANION GAP SERPL CALCULATED.3IONS-SCNC: 10 MMOL/L (ref 7–15)
APPEARANCE UR: CLEAR
BASOPHILS # BLD AUTO: ABNORMAL 10*3/UL
BASOPHILS # BLD MANUAL: 0.2 10E3/UL (ref 0–0.2)
BASOPHILS NFR BLD AUTO: ABNORMAL %
BASOPHILS NFR BLD MANUAL: 1 %
BILIRUB UR QL STRIP: NEGATIVE
BUN SERPL-MCNC: 10.8 MG/DL (ref 8–23)
CALCIUM SERPL-MCNC: 8.3 MG/DL (ref 8.8–10.2)
CHLORIDE SERPL-SCNC: 102 MMOL/L (ref 98–107)
COLOR UR AUTO: ABNORMAL
CREAT SERPL-MCNC: 0.6 MG/DL (ref 0.51–0.95)
DEPRECATED HCO3 PLAS-SCNC: 25 MMOL/L (ref 22–29)
EGFRCR SERPLBLD CKD-EPI 2021: >90 ML/MIN/1.73M2
EOSINOPHIL # BLD AUTO: ABNORMAL 10*3/UL
EOSINOPHIL # BLD MANUAL: 0 10E3/UL (ref 0–0.7)
EOSINOPHIL NFR BLD AUTO: ABNORMAL %
EOSINOPHIL NFR BLD MANUAL: 0 %
ERYTHROCYTE [DISTWIDTH] IN BLOOD BY AUTOMATED COUNT: 17.2 % (ref 10–15)
GLUCOSE BLDC GLUCOMTR-MCNC: 120 MG/DL (ref 70–99)
GLUCOSE BLDC GLUCOMTR-MCNC: 124 MG/DL (ref 70–99)
GLUCOSE BLDC GLUCOMTR-MCNC: 126 MG/DL (ref 70–99)
GLUCOSE BLDC GLUCOMTR-MCNC: 167 MG/DL (ref 70–99)
GLUCOSE BLDC GLUCOMTR-MCNC: 94 MG/DL (ref 70–99)
GLUCOSE BLDC GLUCOMTR-MCNC: 99 MG/DL (ref 70–99)
GLUCOSE SERPL-MCNC: 94 MG/DL (ref 70–99)
GLUCOSE UR STRIP-MCNC: >1000 MG/DL
HCT VFR BLD AUTO: 28.2 % (ref 35–47)
HCV RNA SERPL NAA+PROBE-ACNC: NOT DETECTED IU/ML
HGB BLD-MCNC: 9.9 G/DL (ref 11.7–15.7)
HGB UR QL STRIP: ABNORMAL
IMM GRANULOCYTES # BLD: ABNORMAL 10*3/UL
IMM GRANULOCYTES NFR BLD: ABNORMAL %
KETONES UR STRIP-MCNC: ABNORMAL MG/DL
LEUKOCYTE ESTERASE UR QL STRIP: NEGATIVE
LYMPHOCYTES # BLD AUTO: ABNORMAL 10*3/UL
LYMPHOCYTES # BLD MANUAL: 3 10E3/UL (ref 0.8–5.3)
LYMPHOCYTES NFR BLD AUTO: ABNORMAL %
LYMPHOCYTES NFR BLD MANUAL: 17 %
MCH RBC QN AUTO: 30.1 PG (ref 26.5–33)
MCHC RBC AUTO-ENTMCNC: 35.1 G/DL (ref 31.5–36.5)
MCV RBC AUTO: 86 FL (ref 78–100)
MONOCYTES # BLD AUTO: ABNORMAL 10*3/UL
MONOCYTES # BLD MANUAL: 2.1 10E3/UL (ref 0–1.3)
MONOCYTES NFR BLD AUTO: ABNORMAL %
MONOCYTES NFR BLD MANUAL: 12 %
MUCOUS THREADS #/AREA URNS LPF: PRESENT /LPF
NEUTROPHILS # BLD AUTO: ABNORMAL 10*3/UL
NEUTROPHILS # BLD MANUAL: 12.4 10E3/UL (ref 1.6–8.3)
NEUTROPHILS NFR BLD AUTO: ABNORMAL %
NEUTROPHILS NFR BLD MANUAL: 70 %
NITRATE UR QL: NEGATIVE
NRBC # BLD AUTO: 0 10E3/UL
NRBC BLD AUTO-RTO: 0 /100
PH UR STRIP: 6 [PH] (ref 5–7)
PLAT MORPH BLD: ABNORMAL
PLATELET # BLD AUTO: 150 10E3/UL (ref 150–450)
POTASSIUM SERPL-SCNC: 3.5 MMOL/L (ref 3.4–5.3)
RBC # BLD AUTO: 3.29 10E6/UL (ref 3.8–5.2)
RBC MORPH BLD: ABNORMAL
RBC URINE: 10 /HPF
SODIUM SERPL-SCNC: 137 MMOL/L (ref 135–145)
SP GR UR STRIP: 1.03 (ref 1–1.03)
SQUAMOUS EPITHELIAL: <1 /HPF
UFH PPP CHRO-ACNC: <0.1 IU/ML
UROBILINOGEN UR STRIP-MCNC: <2 MG/DL
WBC # BLD AUTO: 17.7 10E3/UL (ref 4–11)
WBC URINE: 2 /HPF

## 2024-02-17 PROCEDURE — 85520 HEPARIN ASSAY: CPT

## 2024-02-17 PROCEDURE — 97161 PT EVAL LOW COMPLEX 20 MIN: CPT | Mod: GP

## 2024-02-17 PROCEDURE — 85007 BL SMEAR W/DIFF WBC COUNT: CPT | Performed by: STUDENT IN AN ORGANIZED HEALTH CARE EDUCATION/TRAINING PROGRAM

## 2024-02-17 PROCEDURE — 36415 COLL VENOUS BLD VENIPUNCTURE: CPT | Performed by: STUDENT IN AN ORGANIZED HEALTH CARE EDUCATION/TRAINING PROGRAM

## 2024-02-17 PROCEDURE — 250N000013 HC RX MED GY IP 250 OP 250 PS 637: Performed by: SURGERY

## 2024-02-17 PROCEDURE — 250N000013 HC RX MED GY IP 250 OP 250 PS 637

## 2024-02-17 PROCEDURE — 99233 SBSQ HOSP IP/OBS HIGH 50: CPT | Performed by: STUDENT IN AN ORGANIZED HEALTH CARE EDUCATION/TRAINING PROGRAM

## 2024-02-17 PROCEDURE — 71046 X-RAY EXAM CHEST 2 VIEWS: CPT

## 2024-02-17 PROCEDURE — 97116 GAIT TRAINING THERAPY: CPT | Mod: GP

## 2024-02-17 PROCEDURE — 97530 THERAPEUTIC ACTIVITIES: CPT | Mod: GP

## 2024-02-17 PROCEDURE — 120N000001 HC R&B MED SURG/OB

## 2024-02-17 PROCEDURE — 250N000013 HC RX MED GY IP 250 OP 250 PS 637: Performed by: STUDENT IN AN ORGANIZED HEALTH CARE EDUCATION/TRAINING PROGRAM

## 2024-02-17 PROCEDURE — 999N000127 HC STATISTIC PERIPHERAL IV START W US GUIDANCE

## 2024-02-17 PROCEDURE — 80048 BASIC METABOLIC PNL TOTAL CA: CPT | Performed by: STUDENT IN AN ORGANIZED HEALTH CARE EDUCATION/TRAINING PROGRAM

## 2024-02-17 PROCEDURE — 85027 COMPLETE CBC AUTOMATED: CPT | Performed by: STUDENT IN AN ORGANIZED HEALTH CARE EDUCATION/TRAINING PROGRAM

## 2024-02-17 PROCEDURE — 93970 EXTREMITY STUDY: CPT

## 2024-02-17 PROCEDURE — 81003 URINALYSIS AUTO W/O SCOPE: CPT | Performed by: STUDENT IN AN ORGANIZED HEALTH CARE EDUCATION/TRAINING PROGRAM

## 2024-02-17 RX ORDER — POTASSIUM CHLORIDE 1500 MG/1
20 TABLET, EXTENDED RELEASE ORAL ONCE
Status: COMPLETED | OUTPATIENT
Start: 2024-02-17 | End: 2024-02-17

## 2024-02-17 RX ORDER — AMLODIPINE BESYLATE 5 MG/1
5 TABLET ORAL DAILY
Status: DISCONTINUED | OUTPATIENT
Start: 2024-02-17 | End: 2024-02-19 | Stop reason: HOSPADM

## 2024-02-17 RX ADMIN — ACETAMINOPHEN 975 MG: 325 TABLET ORAL at 17:16

## 2024-02-17 RX ADMIN — OXYCODONE HYDROCHLORIDE 5 MG: 5 TABLET ORAL at 22:29

## 2024-02-17 RX ADMIN — OXYCODONE HYDROCHLORIDE 5 MG: 5 TABLET ORAL at 10:07

## 2024-02-17 RX ADMIN — POLYETHYLENE GLYCOL 3350 17 G: 17 POWDER, FOR SOLUTION ORAL at 08:45

## 2024-02-17 RX ADMIN — INSULIN DEGLUDEC 10 UNITS: 100 INJECTION, SOLUTION SUBCUTANEOUS at 08:46

## 2024-02-17 RX ADMIN — EZETIMIBE 10 MG: 10 TABLET ORAL at 08:45

## 2024-02-17 RX ADMIN — CLOPIDOGREL BISULFATE 75 MG: 75 TABLET ORAL at 08:45

## 2024-02-17 RX ADMIN — OXYCODONE HYDROCHLORIDE 5 MG: 5 TABLET ORAL at 06:21

## 2024-02-17 RX ADMIN — SENNOSIDES AND DOCUSATE SODIUM 1 TABLET: 8.6; 5 TABLET ORAL at 08:45

## 2024-02-17 RX ADMIN — INSULIN DEGLUDEC 10 UNITS: 100 INJECTION, SOLUTION SUBCUTANEOUS at 20:13

## 2024-02-17 RX ADMIN — AMLODIPINE BESYLATE 5 MG: 5 TABLET ORAL at 13:36

## 2024-02-17 RX ADMIN — POTASSIUM CHLORIDE 20 MEQ: 1500 TABLET, EXTENDED RELEASE ORAL at 09:10

## 2024-02-17 ASSESSMENT — ACTIVITIES OF DAILY LIVING (ADL)
ADLS_ACUITY_SCORE: 31

## 2024-02-17 NOTE — PLAN OF CARE
Problem: Adult Inpatient Plan of Care  Goal: Absence of Hospital-Acquired Illness or Injury  Intervention: Identify and Manage Fall Risk  Recent Flowsheet Documentation  Taken 2/17/2024 0055 by Mable Hartman RN  Safety Promotion/Fall Prevention:   patient and family education   mobility aid in reach   supervised activity   activity supervised  Intervention: Prevent Skin Injury  Recent Flowsheet Documentation  Taken 2/17/2024 0055 by Mable Hartman RN  Body Position: position changed independently  Intervention: Prevent and Manage VTE (Venous Thromboembolism) Risk  Recent Flowsheet Documentation  Taken 2/17/2024 0055 by Mable Hartman RN  VTE Prevention/Management: other (see comments)     Problem: Pain Acute  Goal: Optimal Pain Control and Function  Intervention: Prevent or Manage Pain  Recent Flowsheet Documentation  Taken 2/17/2024 0055 by Mable Hartman RN  Medication Review/Management: medications reviewed     Problem: Mobility Impairment  Goal: Optimal Mobility  Intervention: Optimize Mobility  Recent Flowsheet Documentation  Taken 2/17/2024 0055 by Mable Hartman RN  Assistive Device Utilized:   gait belt   walker  Activity Management: activity adjusted per tolerance  Positioning/Transfer Devices:   pillows   in use   Goal Outcome Evaluation:Patient alert and oriented X 4. Reports headache and leg pain managed with PRN oxycodone. On room air. Saline locked. Incision dressing dry, clean and intact. Cedeno in place and draining, remove today per vascular surgery. Elevated .

## 2024-02-17 NOTE — PROGRESS NOTES
"Vascular Surgery Progress Note    No issues overnight, pain generally controlled with help in the hallway.    /65 (BP Location: Left arm)   Pulse 97   Temp 98.7  F (37.1  C) (Oral)   Resp 18   Ht 1.549 m (5' 1\")   Wt 66.2 kg (145 lb 14.4 oz)   SpO2 98%   BMI 27.57 kg/m      Resting comfortably in bed  Nonlabored breathing on room air  Abdomen is soft, nontender, nondistended  Left groin incision clean dry intact with Tegaderm dressing in place  Left calf incision is intact with sutures, small amount of serosanguineous drainage, mild edema, no appreciable hematoma  Foot is warm, excellent biphasic DP and PT Doppler signals    Wbc 17.7 (16.3)  Hgb 9.9 (8.2)  Plts 150 (164)  BMP unremarkable    UA noninfectious, negative nitrite and leuk esterase    A/P:  67 yo F with T2DM, HTN, HLD, L foot osteomyelitis, now POD#2 s/p re-do left femoral to posterior tibial artery bypass with spliced cryo artery.    Recovering well however rising leukocytosis without obvious infectious source. Afebrile, no cough or hypoxia, UA is clean.   ?inflammatory response to 3 u RBCs transfusued yesterday  Some component of hemoconcentration with net -2.5 L out yesterday  - cxr, /o PNA  - venous duplex  - hgb stable, no signs of bleeding  - multimodal pain control  - resume heparin gtt 500 u/hr  - cont plavix  - remove allison  - appreciate hospitalist    Saúl Molina MD    "

## 2024-02-17 NOTE — PLAN OF CARE
Problem: Adult Inpatient Plan of Care  Goal: Plan of Care Review  Description: The Plan of Care Review/Shift note should be completed every shift.  The Outcome Evaluation is a brief statement about your assessment that the patient is improving, declining, or no change.  This information will be displayed automatically on your shift  note.  Outcome: Progressing  Goal: Absence of Hospital-Acquired Illness or Injury  Intervention: Identify and Manage Fall Risk  Recent Flowsheet Documentation  Taken 2/16/2024 1600 by Nan Limon, RN  Safety Promotion/Fall Prevention:   patient and family education   mobility aid in reach   supervised activity   activity supervised  Goal: Optimal Comfort and Wellbeing  Intervention: Monitor Pain and Promote Comfort  Recent Flowsheet Documentation  Taken 2/16/2024 1550 by Nan Limon, RN  Pain Management Interventions: medication offered but refused     Problem: Mobility Impairment  Goal: Optimal Mobility  Outcome: Progressing     Goal Outcome Evaluation:  Alert and oriented x 4 and able to make needs known. Patient received 1 unit of PRBC no transfusion reaction.  Cedeno in place until tomorrow per vascular surgery. Doppler used for left dorsalis pedis pulse, right dorsalis pedis palpable but weak (1+). Patient up to bedside commode with assist x 1.

## 2024-02-17 NOTE — PROGRESS NOTES
[facility]    Medicine Progress Note - Hospitalist Service    Date of Admission:  2/15/2024    Assessment & Plan   68-year-old female with PMH of DM2, hypertension, PAD who underwent  CREATION, BYPASS, ARTERIAL, FEMORAL TO TIBIAL, COMPLETION OF ANGIOGRAM, LEFT COMMON ILIAC ARTERY STENT PLACEMENT, Left - Leg.  [facility] consulted for diabetes management    PAD   -- S/p creating, bypass, arterial , femoral to tibial, completion of angiogram, left common iliac artery stent placement, left leg.  -- Management per vascular surgery  -- Plavix and heparin gtt per vascular    Severe symptomatic anemia  Acute blood loss anemia  -- intra-op 600cc blood loss  -- s/p 3 prbc transfusion on 02/16  -- monitor cbc  -- heparin gtt resumed on 02/17    Leukocytosis  -- no sign of infection- no fever, dysuria, cough  -- likely reactive  -- CXR and US-duplex LE pending    DM2 with long-term insulin use  -- Not optimally controlled as outpatient and hemoglobin A1c 8.7.  A  -- C/w PTA Tresiba 10 units twice daily (home dose 15 units twice daily) and insulin sliding scale with meals, I:C 1:15.  Hold metformin, Jardiance and Tradjenta while inpatient    Hypokalemia-resolved  -- Replace per protocol    Constipation  -- On scheduled Senokot and MiraLAX    Essential hypertension  -- Will start amlodipine. Continue to hold HCTZ and lisinopril.    -- hydralazin iv prn          Diet: Combination Diet Moderate Consistent Carb (60 g CHO per Meal) Diet; 2 gm NA Diet    DVT Prophylaxis: heparin gtt- held due to severe anemia  Cedeno Catheter: Not present  Lines: None     Cardiac Monitoring: None  Code Status: Full Code      Clinically Significant Risk Factors                  # Hypertension: Noted on problem list       # DMII: A1C = 8.7 % (Ref range: <5.7 %) within past 6 months, PRESENT ON ADMISSION  # Overweight: Estimated body mass index is 27.57 kg/m  as calculated from the following:    Height as of this encounter: 1.549 m (5'  "1\").    Weight as of this encounter: 66.2 kg (145 lb 14.4 oz)., PRESENT ON ADMISSION            Disposition Plan     Expected Discharge Date: 02/19/2024                    Elida Espino MD  Hospitalist Service  [facility]  Securely message with [employer] (more info)  Text page via [employer] Paging/Directory   ______________________________________________________________________    Interval History   Patient is seen and examined at bedside.  Pt feeling better. No fever, chills, cough, chest pain, or dysuria  Physical Exam   Vital Signs: Temp: 98.7  F (37.1  C) Temp src: Oral BP: 136/65 Pulse: 97   Resp: 18 SpO2: 98 % O2 Device: None (Room air)    Weight: 145 lbs 14.4 oz    GEN: Alert and oriented. Not in acute distress.  HEENT: Atraumatic, mucous membrane- moist and pink.  Chest: Bilateral air entry.  CVS: S1S2 regular.   Abdomen: Soft. Non-tender, non-distended. No organomegaly. No guarding or rigidity. Bowel sounds active.   Extremities: left extremity surgical dressing c/d/i  CNS: No involuntary movements.  Skin: no cyanosis or clubbing.     Medical Decision Making       50 MINUTES SPENT BY ME on the date of service doing chart review, history, exam, documentation & further activities per the note.      Data     "

## 2024-02-17 NOTE — PLAN OF CARE
Problem: Adult Inpatient Plan of Care  Goal: Plan of Care Review  Outcome: Progressing   Goal Outcome Evaluation:       Patient alert and oriented x4. Voice is hoarse from being intubated. LLE discomfort is managed with prn oxycodone and elevation. Staples intact to left shin, drsg change completed. Groin puncture site dry and intact. Heparin infusion restarted at 500units/hr and daily plavix scheduled per Vascular team. Pedal pulse + per doppler. Diabetic diet with good appetite. Blood sugar 124 at noon. Uses walker to ambulate to BR. PT/OT scheduled. Cedeno catheter discontinued at 1200pm, no urge to void at this time.   Cherri Peres RN

## 2024-02-17 NOTE — PROGRESS NOTES
"   02/17/24 0900   Appointment Info   Signing Clinician's Name / Credentials (PT) Ryann Apple, PT, DPT   Living Environment   People in Home spouse   Current Living Arrangements house   Home Accessibility no concerns   Transportation Anticipated family or friend will provide   Self-Care   Usual Activity Tolerance good   Current Activity Tolerance moderate   Equipment Currently Used at Home none   Fall history within last six months no   Activity/Exercise/Self-Care Comment Patient has a walker to use at home.   General Information   Onset of Illness/Injury or Date of Surgery 02/15/24   Referring Physician Belkys Petersen MD   Patient/Family Therapy Goals Statement (PT) Return home   Pertinent History of Current Problem (include personal factors and/or comorbidities that impact the POC) Per chart, \"S/p creating, bypass, arterial , femoral to tibial, completion of angiogram, left common iliac artery stent placement, left leg.\"   Existing Precautions/Restrictions no known precautions/restrictions   Weight-Bearing Status - LLE weight-bearing as tolerated   Weight-Bearing Status - RLE full weight-bearing   Posture    Posture Not impaired   Range of Motion (ROM)   Range of Motion ROM deficits secondary to pain   Strength (Manual Muscle Testing)   Strength (Manual Muscle Testing) Deficits observed during functional mobility   Bed Mobility   Bed Mobility supine-sit   Supine-Sit [place] (Bed Mobility) supervision;verbal cues   Bed Mobility Limitations decreased ability to use legs for bridging/pushing   Impairments Contributing to Impaired Bed Mobility pain;decreased ROM;decreased strength   Transfers   Transfers sit-stand transfer   Maintains Weight-bearing Status (Transfers) able to maintain   Transfer Safety Concerns Noted decreased weight-shifting ability   Impairments Contributing to Impaired Transfers pain;decreased ROM;decreased strength   Sit-Stand Transfer   Sit-Stand [place] (Transfers) " supervision;verbal cues   Assistive Device (Sit-Stand Transfers) walker, front-wheeled   Gait/Stairs (Locomotion)   Bonner Level (Gait) supervision;verbal cues   Assistive Device (Gait) walker, front-wheeled   Distance in Feet (Gait) 5'   Pattern (Gait) other (see comments)  (Hopping on RLE)   Deviations/Abnormal Patterns (Gait) antalgic;gait speed decreased;stride length decreased;weight shifting decreased   Maintains Weight-bearing Status (Gait) able to maintain   Clinical Impression   Criteria for Skilled Therapeutic Intervention Yes, treatment indicated   PT Diagnosis (PT) Impaired functional mobility   Influenced by the following impairments Pain, decreased ROM, decreased strength   Functional limitations due to impairments Bed mobility, transfers, gait   Clinical Presentation (PT Evaluation Complexity) stable   Clinical Presentation Rationale Patient presents as medically diagnosed.   Clinical Decision Making (Complexity) low complexity   Planned Therapy Interventions (PT) balance training;bed mobility training;gait training;home exercise program;patient/family education;ROM (range of motion);strengthening;transfer training;progressive activity/exercise;home program guidelines   Risk & Benefits of therapy have been explained evaluation/treatment results reviewed;care plan/treatment goals reviewed;patient   PT Total Evaluation Time   PT Donnaal, Low Complexity Minutes (25995) 10   Physical Therapy Goals   PT Frequency Daily   PT Predicted Duration/Target Date for Goal Attainment 02/24/24   PT Goals Bed Mobility;Transfers;Gait   PT: Bed Mobility Independent;Supine to/from sit   PT: Transfers Modified independent;Sit to/from stand;Assistive device  (FWW)   PT: Gait Modified independent;Assistive device;150 feet  (FWW)   PT Discharge Planning   PT Plan Progress transfers and gait with FWW as tolerated   PT Discharge Recommendation (DC Rec) home with assist   PT Rationale for DC Rec Patient is mobilizing with  SBA and FWW. Patient reports spouse can assist as needed.   PT Brief overview of current status SBA with FWW.   PT Equipment Needed at Discharge walker, rolling   Total Session Time   Timed Code Treatment Minutes 20   Total Session Time (sum of timed and untimed services) 30

## 2024-02-18 ENCOUNTER — APPOINTMENT (OUTPATIENT)
Dept: PHYSICAL THERAPY | Facility: HOSPITAL | Age: 69
DRG: 253 | End: 2024-02-18
Attending: SURGERY
Payer: COMMERCIAL

## 2024-02-18 LAB
ANION GAP SERPL CALCULATED.3IONS-SCNC: 4 MMOL/L (ref 7–15)
BASOPHILS # BLD AUTO: 0 10E3/UL (ref 0–0.2)
BASOPHILS NFR BLD AUTO: 0 %
BUN SERPL-MCNC: 11.8 MG/DL (ref 8–23)
CALCIUM SERPL-MCNC: 8.3 MG/DL (ref 8.8–10.2)
CHLORIDE SERPL-SCNC: 104 MMOL/L (ref 98–107)
CREAT SERPL-MCNC: 0.67 MG/DL (ref 0.51–0.95)
DEPRECATED HCO3 PLAS-SCNC: 27 MMOL/L (ref 22–29)
EGFRCR SERPLBLD CKD-EPI 2021: >90 ML/MIN/1.73M2
EOSINOPHIL # BLD AUTO: 0.2 10E3/UL (ref 0–0.7)
EOSINOPHIL NFR BLD AUTO: 1 %
ERYTHROCYTE [DISTWIDTH] IN BLOOD BY AUTOMATED COUNT: 17.4 % (ref 10–15)
GLUCOSE BLDC GLUCOMTR-MCNC: 129 MG/DL (ref 70–99)
GLUCOSE BLDC GLUCOMTR-MCNC: 153 MG/DL (ref 70–99)
GLUCOSE BLDC GLUCOMTR-MCNC: 198 MG/DL (ref 70–99)
GLUCOSE BLDC GLUCOMTR-MCNC: 251 MG/DL (ref 70–99)
GLUCOSE BLDC GLUCOMTR-MCNC: 78 MG/DL (ref 70–99)
GLUCOSE BLDC GLUCOMTR-MCNC: 97 MG/DL (ref 70–99)
GLUCOSE SERPL-MCNC: 126 MG/DL (ref 70–99)
HCT VFR BLD AUTO: 26.3 % (ref 35–47)
HGB BLD-MCNC: 8.9 G/DL (ref 11.7–15.7)
IMM GRANULOCYTES # BLD: 0.1 10E3/UL
IMM GRANULOCYTES NFR BLD: 1 %
LYMPHOCYTES # BLD AUTO: 4 10E3/UL (ref 0.8–5.3)
LYMPHOCYTES NFR BLD AUTO: 27 %
MCH RBC QN AUTO: 30 PG (ref 26.5–33)
MCHC RBC AUTO-ENTMCNC: 33.8 G/DL (ref 31.5–36.5)
MCV RBC AUTO: 89 FL (ref 78–100)
MONOCYTES # BLD AUTO: 1.8 10E3/UL (ref 0–1.3)
MONOCYTES NFR BLD AUTO: 12 %
NEUTROPHILS # BLD AUTO: 8.3 10E3/UL (ref 1.6–8.3)
NEUTROPHILS NFR BLD AUTO: 59 %
NRBC # BLD AUTO: 0 10E3/UL
NRBC BLD AUTO-RTO: 0 /100
PLATELET # BLD AUTO: 141 10E3/UL (ref 150–450)
POTASSIUM SERPL-SCNC: 3.7 MMOL/L (ref 3.4–5.3)
RBC # BLD AUTO: 2.97 10E6/UL (ref 3.8–5.2)
SODIUM SERPL-SCNC: 135 MMOL/L (ref 135–145)
WBC # BLD AUTO: 14.4 10E3/UL (ref 4–11)

## 2024-02-18 PROCEDURE — 97116 GAIT TRAINING THERAPY: CPT | Mod: GP

## 2024-02-18 PROCEDURE — 120N000001 HC R&B MED SURG/OB

## 2024-02-18 PROCEDURE — 99233 SBSQ HOSP IP/OBS HIGH 50: CPT | Performed by: STUDENT IN AN ORGANIZED HEALTH CARE EDUCATION/TRAINING PROGRAM

## 2024-02-18 PROCEDURE — 250N000013 HC RX MED GY IP 250 OP 250 PS 637

## 2024-02-18 PROCEDURE — 250N000013 HC RX MED GY IP 250 OP 250 PS 637: Performed by: STUDENT IN AN ORGANIZED HEALTH CARE EDUCATION/TRAINING PROGRAM

## 2024-02-18 PROCEDURE — 85025 COMPLETE CBC W/AUTO DIFF WBC: CPT | Performed by: STUDENT IN AN ORGANIZED HEALTH CARE EDUCATION/TRAINING PROGRAM

## 2024-02-18 PROCEDURE — 97530 THERAPEUTIC ACTIVITIES: CPT | Mod: GP

## 2024-02-18 PROCEDURE — 36415 COLL VENOUS BLD VENIPUNCTURE: CPT | Performed by: STUDENT IN AN ORGANIZED HEALTH CARE EDUCATION/TRAINING PROGRAM

## 2024-02-18 PROCEDURE — 250N000013 HC RX MED GY IP 250 OP 250 PS 637: Performed by: SURGERY

## 2024-02-18 PROCEDURE — 80048 BASIC METABOLIC PNL TOTAL CA: CPT | Performed by: STUDENT IN AN ORGANIZED HEALTH CARE EDUCATION/TRAINING PROGRAM

## 2024-02-18 RX ORDER — POTASSIUM CHLORIDE 1500 MG/1
20 TABLET, EXTENDED RELEASE ORAL ONCE
Status: COMPLETED | OUTPATIENT
Start: 2024-02-18 | End: 2024-02-18

## 2024-02-18 RX ADMIN — INSULIN ASPART 1 UNITS: 100 INJECTION, SOLUTION INTRAVENOUS; SUBCUTANEOUS at 18:08

## 2024-02-18 RX ADMIN — ACETAMINOPHEN 975 MG: 325 TABLET ORAL at 11:29

## 2024-02-18 RX ADMIN — EZETIMIBE 10 MG: 10 TABLET ORAL at 08:42

## 2024-02-18 RX ADMIN — POTASSIUM CHLORIDE 20 MEQ: 1500 TABLET, EXTENDED RELEASE ORAL at 08:42

## 2024-02-18 RX ADMIN — AMLODIPINE BESYLATE 5 MG: 5 TABLET ORAL at 08:42

## 2024-02-18 RX ADMIN — ACETAMINOPHEN 975 MG: 325 TABLET ORAL at 02:23

## 2024-02-18 RX ADMIN — CLOPIDOGREL BISULFATE 75 MG: 75 TABLET ORAL at 08:42

## 2024-02-18 RX ADMIN — APIXABAN 5 MG: 5 TABLET, FILM COATED ORAL at 20:45

## 2024-02-18 RX ADMIN — SENNOSIDES AND DOCUSATE SODIUM 1 TABLET: 8.6; 5 TABLET ORAL at 08:41

## 2024-02-18 RX ADMIN — INSULIN DEGLUDEC 10 UNITS: 100 INJECTION, SOLUTION SUBCUTANEOUS at 20:47

## 2024-02-18 RX ADMIN — INSULIN DEGLUDEC 10 UNITS: 100 INJECTION, SOLUTION SUBCUTANEOUS at 08:47

## 2024-02-18 ASSESSMENT — ACTIVITIES OF DAILY LIVING (ADL)
ADLS_ACUITY_SCORE: 27
ADLS_ACUITY_SCORE: 31
ADLS_ACUITY_SCORE: 27
ADLS_ACUITY_SCORE: 31
ADLS_ACUITY_SCORE: 27
ADLS_ACUITY_SCORE: 31
ADLS_ACUITY_SCORE: 27
ADLS_ACUITY_SCORE: 31
ADLS_ACUITY_SCORE: 31
ADLS_ACUITY_SCORE: 27

## 2024-02-18 NOTE — PLAN OF CARE
Problem: Adult Inpatient Plan of Care  Goal: Absence of Hospital-Acquired Illness or Injury  Intervention: Identify and Manage Fall Risk  Recent Flowsheet Documentation  Taken 2/17/2024 2350 by Mable Hartman RN  Safety Promotion/Fall Prevention:   activity supervised   assistive device/personal items within reach   nonskid shoes/slippers when out of bed   supervised activity  Intervention: Prevent Skin Injury  Recent Flowsheet Documentation  Taken 2/17/2024 2350 by Mable Hartman RN  Body Position: position changed independently  Intervention: Prevent and Manage VTE (Venous Thromboembolism) Risk  Recent Flowsheet Documentation  Taken 2/17/2024 2350 by Mable Hartman RN  VTE Prevention/Management: (Refused) other (see comments)     Problem: Pain Acute  Goal: Optimal Pain Control and Function  Intervention: Prevent or Manage Pain  Recent Flowsheet Documentation  Taken 2/17/2024 2350 by Mable Hartman RN  Medication Review/Management: medications reviewed   Goal Outcome Evaluation: Patient alert and sleeping between cares. Denies pain this shift. Heparin infusing 500units/hr. On room air. VSS. Blood sugar 198 at 0204. Patient voiding without difficulty.

## 2024-02-18 NOTE — PROGRESS NOTES
[facility]    Medicine Progress Note - Hospitalist Service    Date of Admission:  2/15/2024    Assessment & Plan   68-year-old female with PMH of DM2, hypertension, PAD who underwent  CREATION, BYPASS, ARTERIAL, FEMORAL TO TIBIAL, COMPLETION OF ANGIOGRAM, LEFT COMMON ILIAC ARTERY STENT PLACEMENT, Left - Leg.  [facility] consulted for diabetes management    PAD   -- S/p creating, bypass, arterial , femoral to tibial, completion of angiogram, left common iliac artery stent placement, left leg.  -- Management per vascular surgery  -- Plavix and heparin gtt per vascular    Severe symptomatic anemia  Acute blood loss anemia  -- intra-op 600cc blood loss  -- s/p 3 prbc transfusion on 02/16  -- monitor cbc  -- heparin gtt resumed on 02/17    Leukocytosis-improving  -- no sign of infection- no fever, dysuria, cough  -- likely reactive  -- CXR and US-duplex LE negative    DM2 with long-term insulin use  -- Not optimally controlled as outpatient and hemoglobin A1c 8.7.  A  -- C/w PTA Tresiba 10 units twice daily (home dose 15 units twice daily) and insulin sliding scale with meals, I:C 1:15.  Hold metformin, Jardiance and Tradjenta while inpatient    Hypokalemia-resolved  -- Replace per protocol    Constipation  -- On scheduled Senokot and MiraLAX    Essential hypertension  -- Started amlodipine. Continue to hold HCTZ and lisinopril.    -- hydralazin iv prn          Diet: Combination Diet Moderate Consistent Carb (60 g CHO per Meal) Diet; 2 gm NA Diet    DVT Prophylaxis: heparin gtt- held due to severe anemia  Cedeno Catheter: Not present  Lines: None     Cardiac Monitoring: None  Code Status: Full Code      Clinically Significant Risk Factors                  # Hypertension: Noted on problem list       # DMII: A1C = 8.7 % (Ref range: <5.7 %) within past 6 months, PRESENT ON ADMISSION  # Overweight: Estimated body mass index is 27.57 kg/m  as calculated from the following:    Height as of this encounter:  "1.549 m (5' 1\").    Weight as of this encounter: 66.2 kg (145 lb 14.4 oz)., PRESENT ON ADMISSION            Disposition Plan     Expected Discharge Date: 02/19/2024                    Elida Espino MD  Hospitalist Service  [facility]  Securely message with [employer] (more info)  Text page via [employer] Paging/Directory   ______________________________________________________________________    Interval History   Patient is seen and examined at bedside.  Denies fever, chills, cough, chest pain, or dysuria  Physical Exam   Vital Signs: Temp: 98.9  F (37.2  C) Temp src: Oral BP: 120/59 Pulse: 94   Resp: 18 SpO2: 100 % O2 Device: None (Room air)    Weight: 145 lbs 14.4 oz    GEN: Alert and oriented. Not in acute distress.  HEENT: Atraumatic, mucous membrane- moist and pink.  Chest: Bilateral air entry.  CVS: S1S2 regular.   Abdomen: Soft. Non-tender, non-distended. No organomegaly. No guarding or rigidity. Bowel sounds active.   Extremities: left extremity surgical dressing c/d/i  CNS: No involuntary movements.  Skin: no cyanosis or clubbing.     Medical Decision Making       52 MINUTES SPENT BY ME on the date of service doing chart review, history, exam, documentation & further activities per the note.      Data     "

## 2024-02-18 NOTE — PROGRESS NOTES
"Vascular surgery progress note    No issues at night.  Pain well-controlled.  Up ambulating dependently.  Tolerating regular diet      /59 (BP Location: Left arm, Patient Position: Semi-Turpin's, Cuff Size: Adult Regular)   Pulse 94   Temp 98.9  F (37.2  C) (Oral)   Resp 18   Ht 1.549 m (5' 1\")   Wt 66.2 kg (145 lb 14.4 oz)   SpO2 100%   BMI 27.57 kg/m      Exam  Resting company bed  Nonlabored on room air  Left groin incision is clean dry and intact with Tegaderm dressing, no erythema, no induration, appropriately tender  Left calf incisions are clean dry and intact  Brisk monophasic left DP and PT Doppler signals  Left foot warm, normal color, normal capillary refill    Labs reviewed, leukocytosis improving, down to 14, hemoglobin stable    Assessment/plan:  68-year-old female with type 2 diabetes, hypertension, hyperlipidemia, left foot osteomyelitis, now postop day 2 status post redo left femoral to posterior tibial artery bypass with spliced cryo artery.    Recovering well, leukocytosis is downtrending.  Remains afebrile, no cough, no hypoxia, UA is clean, chest x-ray yesterday clean.  Suspect leukocytosis likely related to transfusion of 3 units RBCs 2 days ago    Continue multimodal pain control  Resume Eliquis this evening, stop heparin drip at that time  Continue Plavix  appreciate hospitalist  Anticipate she will discharge home tomorrow    Discussed with staff, Dr. Geller.    Saúl Molina MD    "

## 2024-02-18 NOTE — PROGRESS NOTES
Care Management Follow Up    Length of Stay (days): 3    Expected Discharge Date: 02/19/2024     Concerns to be Addressed:     no needs identified at this time  Patient plan of care discussed at interdisciplinary rounds: Yes    Anticipated Discharge Disposition:  home with family     Anticipated Discharge Services:  none indicated at present  Anticipated Discharge DME:  none indicated at present    Patient/family educated on Medicare website which has current facility and service quality ratings:  n/a  Education Provided on the Discharge Plan:  yes  Patient/Family in Agreement with the Plan:  yes    Referrals Placed by CM/SW:  none  Private pay costs discussed: Not applicable    Additional Information:  Chart reviewed and discussed with hospitalist on this date. No new events overnight. Patient is up with a walker. PT assessed and is recommending home with assist. Cedeno catheter was removed.    From previous CM note:  Patient reports she lives in a house with her , no steps that she needs to navigate. She is typically independent at baseline, works outside of the home. Her adult daughters are involved and supportive. She reports Dr Ordaz's office removed a wound vac.     CM to follow for medical progression and to assist if needs arise.    Rosalba Carty, ZIASW

## 2024-02-18 NOTE — PLAN OF CARE
Problem: Adult Inpatient Plan of Care  Goal: Plan of Care Review  Outcome: Progressing   Goal Outcome Evaluation:       Patient alert and oriented x4. SBA with mobility using a walker. Encouraged patient to put her call-light on when needing bathroom assistance. Gait has improved since yesterday. IV heparin infusing. Staples intact to Left shin, dressing changed. Diabetic diet. Blood sugars 97 and 129 today. Voiding clear yellow urine. Hopes to discharge to home in the morning per Vascular.  Cherri Peres RN

## 2024-02-18 NOTE — PLAN OF CARE
"  Problem: Adult Inpatient Plan of Care  Goal: Patient-Specific Goal (Individualized)  Description: You can add care plan individualizations to a care plan. Examples of Individualization might be:  \"Parent requests to be called daily at 9am for status\", \"I have a hard time hearing out of my right ear\", or \"Do not touch me to wake me up as it startles  me\".  Outcome: Progressing  Goal: Absence of Hospital-Acquired Illness or Injury  Intervention: Identify and Manage Fall Risk  Recent Flowsheet Documentation  Taken 2/17/2024 1600 by Nan Limon, RN  Safety Promotion/Fall Prevention:   activity supervised   assistive device/personal items within reach   nonskid shoes/slippers when out of bed   supervised activity  Intervention: Prevent Skin Injury  Recent Flowsheet Documentation  Taken 2/17/2024 1600 by Nan Limon, RN  Body Position: position changed independently  Goal: Optimal Comfort and Wellbeing  Intervention: Monitor Pain and Promote Comfort  Recent Flowsheet Documentation  Taken 2/17/2024 1801 by Nan Limon, RN  Pain Management Interventions: medication (see MAR)  Taken 2/17/2024 1526 by Nan Limon, RN  Pain Management Interventions:   medication offered but refused   repositioned  Goal: Readiness for Transition of Care  Outcome: Progressing     Problem: Mobility Impairment  Goal: Optimal Mobility  Intervention: Optimize Mobility  Recent Flowsheet Documentation  Taken 2/17/2024 1600 by Nan Limon, RN  Positioning/Transfer Devices: pillows     Goal Outcome Evaluation:       Patient is alert and oriented x 4 and able to make needs known. Patient given scheduled Tylenol and PRN oxycodone for pain, effective per patient. Patient up with assist x 1 with fww and gait belt. Patient pedal pulse, per doppler. Patient heparin gtt running.                  "

## 2024-02-19 ENCOUNTER — APPOINTMENT (OUTPATIENT)
Dept: PHYSICAL THERAPY | Facility: HOSPITAL | Age: 69
DRG: 253 | End: 2024-02-19
Attending: SURGERY
Payer: COMMERCIAL

## 2024-02-19 VITALS
RESPIRATION RATE: 18 BRPM | OXYGEN SATURATION: 99 % | HEIGHT: 61 IN | WEIGHT: 145.9 LBS | HEART RATE: 85 BPM | BODY MASS INDEX: 27.55 KG/M2 | TEMPERATURE: 98 F | DIASTOLIC BLOOD PRESSURE: 66 MMHG | SYSTOLIC BLOOD PRESSURE: 126 MMHG

## 2024-02-19 LAB
ANION GAP SERPL CALCULATED.3IONS-SCNC: 8 MMOL/L (ref 7–15)
BASOPHILS # BLD AUTO: 0 10E3/UL (ref 0–0.2)
BASOPHILS NFR BLD AUTO: 0 %
BUN SERPL-MCNC: 11.7 MG/DL (ref 8–23)
CALCIUM SERPL-MCNC: 8.3 MG/DL (ref 8.8–10.2)
CHLORIDE SERPL-SCNC: 104 MMOL/L (ref 98–107)
CREAT SERPL-MCNC: 0.67 MG/DL (ref 0.51–0.95)
DEPRECATED HCO3 PLAS-SCNC: 25 MMOL/L (ref 22–29)
EGFRCR SERPLBLD CKD-EPI 2021: >90 ML/MIN/1.73M2
EOSINOPHIL # BLD AUTO: 0.2 10E3/UL (ref 0–0.7)
EOSINOPHIL NFR BLD AUTO: 2 %
ERYTHROCYTE [DISTWIDTH] IN BLOOD BY AUTOMATED COUNT: 17.2 % (ref 10–15)
GLUCOSE BLDC GLUCOMTR-MCNC: 137 MG/DL (ref 70–99)
GLUCOSE BLDC GLUCOMTR-MCNC: 180 MG/DL (ref 70–99)
GLUCOSE BLDC GLUCOMTR-MCNC: 191 MG/DL (ref 70–99)
GLUCOSE SERPL-MCNC: 196 MG/DL (ref 70–99)
HCT VFR BLD AUTO: 28.5 % (ref 35–47)
HGB BLD-MCNC: 9.3 G/DL (ref 11.7–15.7)
IMM GRANULOCYTES # BLD: 0.1 10E3/UL
IMM GRANULOCYTES NFR BLD: 1 %
LYMPHOCYTES # BLD AUTO: 3.1 10E3/UL (ref 0.8–5.3)
LYMPHOCYTES NFR BLD AUTO: 26 %
MCH RBC QN AUTO: 29.4 PG (ref 26.5–33)
MCHC RBC AUTO-ENTMCNC: 32.6 G/DL (ref 31.5–36.5)
MCV RBC AUTO: 90 FL (ref 78–100)
MONOCYTES # BLD AUTO: 1.6 10E3/UL (ref 0–1.3)
MONOCYTES NFR BLD AUTO: 13 %
NEUTROPHILS # BLD AUTO: 7.1 10E3/UL (ref 1.6–8.3)
NEUTROPHILS NFR BLD AUTO: 58 %
NRBC # BLD AUTO: 0 10E3/UL
NRBC BLD AUTO-RTO: 0 /100
PLATELET # BLD AUTO: 193 10E3/UL (ref 150–450)
POTASSIUM SERPL-SCNC: 4 MMOL/L (ref 3.4–5.3)
RBC # BLD AUTO: 3.16 10E6/UL (ref 3.8–5.2)
SODIUM SERPL-SCNC: 137 MMOL/L (ref 135–145)
WBC # BLD AUTO: 12.2 10E3/UL (ref 4–11)

## 2024-02-19 PROCEDURE — 36415 COLL VENOUS BLD VENIPUNCTURE: CPT | Performed by: STUDENT IN AN ORGANIZED HEALTH CARE EDUCATION/TRAINING PROGRAM

## 2024-02-19 PROCEDURE — 85041 AUTOMATED RBC COUNT: CPT | Performed by: STUDENT IN AN ORGANIZED HEALTH CARE EDUCATION/TRAINING PROGRAM

## 2024-02-19 PROCEDURE — 97116 GAIT TRAINING THERAPY: CPT | Mod: GP

## 2024-02-19 PROCEDURE — 80048 BASIC METABOLIC PNL TOTAL CA: CPT | Performed by: STUDENT IN AN ORGANIZED HEALTH CARE EDUCATION/TRAINING PROGRAM

## 2024-02-19 PROCEDURE — 250N000013 HC RX MED GY IP 250 OP 250 PS 637: Performed by: STUDENT IN AN ORGANIZED HEALTH CARE EDUCATION/TRAINING PROGRAM

## 2024-02-19 PROCEDURE — 97110 THERAPEUTIC EXERCISES: CPT | Mod: GP

## 2024-02-19 PROCEDURE — 250N000013 HC RX MED GY IP 250 OP 250 PS 637

## 2024-02-19 PROCEDURE — 99232 SBSQ HOSP IP/OBS MODERATE 35: CPT | Performed by: STUDENT IN AN ORGANIZED HEALTH CARE EDUCATION/TRAINING PROGRAM

## 2024-02-19 RX ORDER — OXYCODONE HYDROCHLORIDE 5 MG/1
5 TABLET ORAL EVERY 4 HOURS PRN
Qty: 6 TABLET | Refills: 0 | Status: SHIPPED | OUTPATIENT
Start: 2024-02-19 | End: 2024-03-06

## 2024-02-19 RX ADMIN — EZETIMIBE 10 MG: 10 TABLET ORAL at 09:30

## 2024-02-19 RX ADMIN — APIXABAN 5 MG: 5 TABLET, FILM COATED ORAL at 09:30

## 2024-02-19 RX ADMIN — ACETAMINOPHEN 650 MG: 325 TABLET ORAL at 09:29

## 2024-02-19 RX ADMIN — SENNOSIDES AND DOCUSATE SODIUM 1 TABLET: 8.6; 5 TABLET ORAL at 09:30

## 2024-02-19 RX ADMIN — OXYCODONE HYDROCHLORIDE 5 MG: 5 TABLET ORAL at 00:12

## 2024-02-19 RX ADMIN — INSULIN DEGLUDEC 10 UNITS: 100 INJECTION, SOLUTION SUBCUTANEOUS at 09:31

## 2024-02-19 RX ADMIN — CLOPIDOGREL BISULFATE 75 MG: 75 TABLET ORAL at 09:29

## 2024-02-19 RX ADMIN — AMLODIPINE BESYLATE 5 MG: 5 TABLET ORAL at 09:30

## 2024-02-19 RX ADMIN — ACETAMINOPHEN 650 MG: 325 TABLET ORAL at 00:12

## 2024-02-19 ASSESSMENT — ACTIVITIES OF DAILY LIVING (ADL)
ADLS_ACUITY_SCORE: 27

## 2024-02-19 NOTE — PLAN OF CARE
Problem: Adult Inpatient Plan of Care  Goal: Plan of Care Review  Description: The Plan of Care Review/Shift note should be completed every shift.  The Outcome Evaluation is a brief statement about your assessment that the patient is improving, declining, or no change.  This information will be displayed automatically on your shift  note.  Outcome: Progressing   Goal Outcome Evaluation:         Oxycodone given x1 with relive of pain. Dressing CDI. Right pedal pulse palpable. Left predal pulse positive with doppler. Dressing CDI. CMS intact. VSS.

## 2024-02-19 NOTE — PLAN OF CARE
Problem: Adult Inpatient Plan of Care  Goal: Plan of Care Review  Description: The Plan of Care Review/Shift note should be completed every shift.  The Outcome Evaluation is a brief statement about your assessment that the patient is improving, declining, or no change.  This information will be displayed automatically on your shift  note.  Outcome: Progressing  Goal: Absence of Hospital-Acquired Illness or Injury  Intervention: Identify and Manage Fall Risk  Recent Flowsheet Documentation  Taken 2/18/2024 1600 by Nan Limon RN  Safety Promotion/Fall Prevention: clutter free environment maintained  Intervention: Prevent Skin Injury  Recent Flowsheet Documentation  Taken 2/18/2024 1600 by Nan Limon, RN  Body Position: position changed independently  Intervention: Prevent and Manage VTE (Venous Thromboembolism) Risk  Recent Flowsheet Documentation  Taken 2/18/2024 1600 by Nan Limon RN  VTE Prevention/Management: (heparin drip) other (see comments)  Goal: Optimal Comfort and Wellbeing  Intervention: Monitor Pain and Promote Comfort  Recent Flowsheet Documentation  Taken 2/18/2024 1600 by Nan Limon RN  Pain Management Interventions:   medication offered but refused   pillow support provided   repositioned     Problem: Mobility Impairment  Goal: Optimal Mobility  Outcome: Progressing  Intervention: Optimize Mobility  Recent Flowsheet Documentation  Taken 2/18/2024 1600 by Nan Limon RN  Assistive Device Utilized:   walker   gait belt  Activity Management: ambulated to bathroom     Goal Outcome Evaluation:  Patient is alert and oriented x 4 and able to make needs known. Patient up with stand by assist and walker. Heparin gtt discontinued per order, started on eliquis.

## 2024-02-19 NOTE — PROGRESS NOTES
[facility]    Medicine Progress Note - Hospitalist Service    Date of Admission:  2/15/2024    Assessment & Plan   68-year-old female with PMH of DM2, hypertension, PAD who underwent  CREATION, BYPASS, ARTERIAL, FEMORAL TO TIBIAL, COMPLETION OF ANGIOGRAM, LEFT COMMON ILIAC ARTERY STENT PLACEMENT, Left - Leg.  [facility] consulted for diabetes management    PAD   -- S/p creating, bypass, arterial , femoral to tibial, completion of angiogram, left common iliac artery stent placement, left leg.  -- Management per vascular surgery  -- Plavix and heparin gtt per vascular    Severe symptomatic anemia  Acute blood loss anemia  -- intra-op 600cc blood loss  -- s/p 3 prbc transfusion on 02/16  -- monitor cbc  -- heparin gtt resumed on 02/17  -- 02/19: heparin transitioned to Eliquis per vascular  Leukocytosis-improving  -- no sign of infection- no fever, dysuria, cough  -- likely reactive  -- CXR and US-duplex LE negative    DM2 with long-term insulin use  -- Not optimally controlled as outpatient and hemoglobin A1c 8.7.  A  -- C/w PTA Tresiba 10 units twice daily (home dose 15 units twice daily) and insulin sliding scale with meals, I:C 1:15.  Hold metformin, Jardiance and Tradjenta while inpatient    Hypokalemia-resolved  -- Replace per protocol    Constipation  -- On scheduled Senokot and MiraLAX    Essential hypertension  -- Started amlodipine. Continue to hold HCTZ and lisinopril.    -- hydralazin iv prn          Diet: Combination Diet Moderate Consistent Carb (60 g CHO per Meal) Diet; 2 gm NA Diet  Diet    DVT Prophylaxis: heparin gtt- held due to severe anemia  Cedeno Catheter: Not present  Lines: None     Cardiac Monitoring: None  Code Status: Full Code      Clinically Significant Risk Factors                  # Hypertension: Noted on problem list       # DMII: A1C = 8.7 % (Ref range: <5.7 %) within past 6 months   # Overweight: Estimated body mass index is 27.57 kg/m  as calculated from the  "following:    Height as of this encounter: 1.549 m (5' 1\").    Weight as of this encounter: 66.2 kg (145 lb 14.4 oz).             Disposition Plan      Expected Discharge Date: 02/19/2024                    Elida Espino MD  Hospitalist Service  [facility]  Securely message with [employer] (more info)  Text page via [employer] Paging/Directory   ______________________________________________________________________    Interval History   Patient is seen and examined at bedside.  No acute issues. Going home today.  Physical Exam   Vital Signs: Temp: 98  F (36.7  C) Temp src: Oral BP: 126/66 Pulse: 85   Resp: 18 SpO2: 99 % O2 Device: None (Room air)    Weight: 145 lbs 14.4 oz    GEN: Alert and oriented. Not in acute distress.  HEENT: Atraumatic, mucous membrane- moist and pink.  Chest: Bilateral air entry.  CVS: S1S2 regular.   Abdomen: Soft. Non-tender, non-distended. No organomegaly. No guarding or rigidity. Bowel sounds active.   Extremities: left extremity surgical dressing c/d/i  CNS: No involuntary movements.  Skin: no cyanosis or clubbing.     Medical Decision Making       35 MINUTES SPENT BY ME on the date of service doing chart review, history, exam, documentation & further activities per the note.      Data     "

## 2024-02-19 NOTE — PHARMACY-RX INSURANCE COVERAGE
Test claim for Eliquis-   Covered medication $40 per 30 days supply.     Thank you for allowing me to help with your patient  Keren Ascencio [facility]  Pharmacy Discharge Liaison  [place]

## 2024-02-19 NOTE — PROGRESS NOTES
Care Management Discharge Note    Discharge Date: 02/19/2024       Discharge Disposition: Home    Patient/family educated on Medicare website which has current facility and service quality ratings:  Per Care Team     Education Provided on the Discharge Plan:  Yes  Persons Notified of Discharge Plans: Per Care Team   Patient/Family in Agreement with the Plan:      Handoff Referral Completed: Yes    Additional Information:  Final discharge plan is for pt to return home with  with OP follow up. Family to transport.     Shara Pavon RN

## 2024-02-19 NOTE — PLAN OF CARE
"  Problem: Adult Inpatient Plan of Care  Goal: Plan of Care Review  Description: The Plan of Care Review/Shift note should be completed every shift.  The Outcome Evaluation is a brief statement about your assessment that the patient is improving, declining, or no change.  This information will be displayed automatically on your shift  note.  Outcome: Met  Flowsheets (Taken 2/19/2024 1021)  Plan of Care Reviewed With:   patient   family  Goal: Patient-Specific Goal (Individualized)  Description: You can add care plan individualizations to a care plan. Examples of Individualization might be:  \"Parent requests to be called daily at 9am for status\", \"I have a hard time hearing out of my right ear\", or \"Do not touch me to wake me up as it startles  me\".  Outcome: Met  Goal: Absence of Hospital-Acquired Illness or Injury  Outcome: Met  Intervention: Identify and Manage Fall Risk  Recent Flowsheet Documentation  Taken 2/19/2024 0929 by Rainer Reveles RN  Safety Promotion/Fall Prevention: clutter free environment maintained  Intervention: Prevent Skin Injury  Recent Flowsheet Documentation  Taken 2/19/2024 0929 by Rainer Reveles RN  Body Position: position changed independently  Goal: Optimal Comfort and Wellbeing  Outcome: Met  Intervention: Monitor Pain and Promote Comfort  Recent Flowsheet Documentation  Taken 2/19/2024 0929 by Rainer Reveles RN  Pain Management Interventions: medication (see MAR)  Goal: Readiness for Transition of Care  Outcome: Met     Problem: Pain Acute  Goal: Optimal Pain Control and Function  Outcome: Met  Intervention: Develop Pain Management Plan  Recent Flowsheet Documentation  Taken 2/19/2024 0929 by Rainer Reveles RN  Pain Management Interventions: medication (see MAR)  Intervention: Prevent or Manage Pain  Recent Flowsheet Documentation  Taken 2/19/2024 0929 by Rainer Reveles RN  Medication Review/Management: medications reviewed     Problem: Mobility Impairment  Goal: Optimal " Mobility  Outcome: Met  Intervention: Optimize Mobility  Recent Flowsheet Documentation  Taken 2/19/2024 0929 by Rainer Reveles, RN  Assistive Device Utilized: walker  Activity Management:   ambulated to bathroom   ambulated in room  Positioning/Transfer Devices:   pillows   in use   Goal Outcome Evaluation:      Plan of Care Reviewed With: patient, family

## 2024-02-19 NOTE — DISCHARGE SUMMARY
[facility]  Discharge Summary  Vascular Surgery     Ashanti Aviles MRN# [id number]   YOB: 1955 Age: 68 year old     Date of Admission:  2/15/2024  Date of Discharge::  2/19/2024  1:31 PM  Admitting Physician:  Dyan Geller MD  Discharge Physician:  Dyan Geller MD  Primary Care Physician:        [facility]          Admission Diagnoses:   PAD (peripheral artery disease) (H24) [I73.9]          Discharge Diagnosis:   There are no discharge diagnoses documented for the most recent discharge.   Same as above         Procedures:     Procedure(s):  CREATION, BYPASS, ARTERIAL, FEMORAL TO TIBIAL,  COMPLETION OF ANGIOGRAM, LEFT COMMON ILIAC ARTERY STENT PLACEMENT          Consultations:   HOSPITALIST IP CONSULT  PHYSICAL THERAPY ADULT IP CONSULT  CARE MANAGEMENT / SOCIAL WORK IP CONSULT          Brief History of Illness:   Ms. Aviles is a pleasant 68 year old female with PMH of PAD s/p previous L fem to PT bypass which had occluded and she was initially asymptomatic however developed wound of her L great toe with osteomyelitis. Admitted for planned repeat femoral to PT bypass with cryoartery           Hospital Course:       The patient underwent the above procedure on 2/15/24, which she tolerated well without immediate complications. She was transferred to the PACU and then floor for routine postoperative care. The remainder of her postoperative course was unremarkable. Cedeno was removed on POD#2. She had return of bowel function and her diet was slowly advanced. She was evaluated by PT who felt she was safe for discharge home with assist from spouse.  She did have some voice hoarseness after discharge. No difficulty swallowing, On the day of discharge, she was tolerating regular diet, her pain was well controlled with oral pain medications, she was voiding spontaneously, and ambulating independently. Patient with follow up with Tammy vascular  "PA in  clinic in 1 week           Imaging Studies:     Results for orders placed or performed during the hospital encounter of 02/15/24   POC US Guided Vascular Access    Narrative    Ultrasound was performed as guidance to an anesthesia procedure.  Click   \"PACS images\" hyperlink below to view any stored images.  For specific   procedure details, view procedure note authored by anesthesia.   XR Surgery HARRIET G/T 5 Min Fluoro    Narrative    This exam was marked as non-reportable because it will not be read by a   radiologist or a [facility] non-radiologist provider.         XR Chest 2 Views    Narrative    EXAM: XR CHEST 2 VIEWS  LOCATION: [facility]  DATE: 2/17/2024    INDICATION: Postoperative leukocytosis.  COMPARISON: None available.      Impression    IMPRESSION: No focal airspace consolidation. No pleural effusion or pneumothorax.    Cardiomediastinal silhouette is normal. Atherosclerosis of the thoracic aorta.   US Lower Extremity Venous Duplex Bilateral    Narrative    EXAM: US LOWER EXTREMITY VENOUS DUPLEX BILATERAL  LOCATION: [facility]  DATE: 2/17/2024    INDICATION: leukocytosis, evaluate for DVT  COMPARISON: None.  TECHNIQUE: Venous Duplex ultrasound of bilateral lower extremities with and without compression, augmentation and duplex. Color flow and spectral Doppler with waveform analysis performed.    FINDINGS: Exam includes the common femoral, femoral, popliteal veins as well as segmentally visualized deep calf veins and greater saphenous vein.     RIGHT: No deep vein thrombosis. No superficial thrombophlebitis. No popliteal cyst.    LEFT: No deep vein thrombosis. No superficial thrombophlebitis. No popliteal cyst. Patient had recent bypass surgery in the left calf and patient is very tender in the region of the stitches. Due to these factors, evaluation is limited but no obvious   clot or fluid collection is seen in this region.      Impression    " IMPRESSION:  1.  No deep venous thrombosis in the bilateral lower extremities.                Medications Prior to Admission:     Medications Prior to Admission   Medication Sig Dispense Refill Last Dose    acetaminophen (ACETAMINOPHEN 8 HOUR) 650 MG CR tablet Take 1,300 mg by mouth every 8 hours as needed for mild pain or fever   Past Week    amLODIPine (NORVASC) 5 MG tablet Take 5 mg by mouth daily   2/14/2024    clopidogrel (PLAVIX) 75 MG tablet Take 75 mg by mouth daily   2/14/2024    empagliflozin (JARDIANCE) 25 MG TABS tablet Take 1 tablet (25 mg) by mouth daily 90 tablet 1 2/14/2024    ezetimibe (ZETIA) 10 MG tablet Take 10 mg by mouth daily   2/14/2024    hydrochlorothiazide (HYDRODIURIL) 25 MG tablet Take 25 mg by mouth daily   2/14/2024    Insulin Degludec (TRESIBA) 100 UNIT/ML SOLN Inject 15 Units Subcutaneous 2 times daily   2/14/2024    linagliptin (TRADJENTA) 5 MG TABS tablet Take 5 mg by mouth daily   2/14/2024    lisinopril (ZESTRIL) 40 MG tablet Take 40 mg by mouth daily   2/14/2024    metFORMIN (GLUCOPHAGE XR) 500 MG 24 hr tablet Take 1,000 mg by mouth daily   2/14/2024 at 0600    omeprazole (PRILOSEC) 20 MG DR capsule Take 20 mg by mouth daily as needed   2/14/2024    UNABLE TO FIND Take 2 capsules by mouth daily MEDICATION NAME: Gui Doe   2/14/2024    Continuous Blood Gluc Sensor (FREESTYLE PRINCESS 14 DAY SENSOR) [facility] [place]                  Discharge Medications:     Current Discharge Medication List        START taking these medications    Details   apixaban ANTICOAGULANT (ELIQUIS) 5 MG tablet Take 1 tablet (5 mg) by mouth 2 times daily  Qty: 180 tablet, Refills: 0    Associated Diagnoses: PAD (peripheral artery disease) (H24)      oxyCODONE (ROXICODONE) 5 MG tablet Take 1 tablet (5 mg) by mouth every 4 hours as needed for moderate pain  Qty: 6 tablet, Refills: 0    Associated Diagnoses: PAD (peripheral artery disease) (H24)           CONTINUE these medications which have NOT CHANGED    Details    acetaminophen (ACETAMINOPHEN 8 HOUR) 650 MG CR tablet Take 1,300 mg by mouth every 8 hours as needed for mild pain or fever      amLODIPine (NORVASC) 5 MG tablet Take 5 mg by mouth daily      clopidogrel (PLAVIX) 75 MG tablet Take 75 mg by mouth daily      empagliflozin (JARDIANCE) 25 MG TABS tablet Take 1 tablet (25 mg) by mouth daily  Qty: 90 tablet, Refills: 1    Associated Diagnoses: Type 2 diabetes mellitus with diabetic nephropathy, unspecified whether long term insulin use (H)      ezetimibe (ZETIA) 10 MG tablet Take 10 mg by mouth daily      hydrochlorothiazide (HYDRODIURIL) 25 MG tablet Take 25 mg by mouth daily      Insulin Degludec (TRESIBA) 100 UNIT/ML SOLN Inject 15 Units Subcutaneous 2 times daily      linagliptin (TRADJENTA) 5 MG TABS tablet Take 5 mg by mouth daily      lisinopril (ZESTRIL) 40 MG tablet Take 40 mg by mouth daily      metFORMIN (GLUCOPHAGE XR) 500 MG 24 hr tablet Take 1,000 mg by mouth daily      omeprazole (PRILOSEC) 20 MG DR capsule Take 20 mg by mouth daily as needed      UNABLE TO FIND Take 2 capsules by mouth daily MEDICATION NAME: Gui Doe      Continuous Blood Gluc Sensor (FREESTYLE PRINCESS 14 DAY SENSOR) [facility] [place]                      Medications Discontinued or Adjusted During This Hospitalization:   Start taking eliquis 5mg BID           Antibiotics Prescribed at Discharge:   None prescribed           Day of Discharge Physical Exam:   Temp:  [98  F (36.7  C)-98.3  F (36.8  C)] 98  F (36.7  C)  Pulse:  [91] 91  Resp:  [18] 18  BP: (110-135)/(52-62) 110/52  SpO2:  [99 %] 99 %    General: awake, alert, no acute distress, laying comfortably in bed   CV: warm, well perfused   Pulm: breathing comfortably on room air       Extremities: Mild edema lle. Incisions all well appearing, no drainage, all clean, dry, in tact.      Vascular:  Strong biphasic PT signal, monophasic PT signal.        Final Pathology Result:   Not applicable           Discharge Instructions and Follow-Up:   No  discharge procedures on file.           Home Health Care:     N/A- not needed           Discharge Disposition:     Discharged to home      Condition at discharge: Stable    Patient was discussed with staff, Dr. Geller, on the day of discharge.    Robson Petersen MD  Surgery Resident

## 2024-02-20 ENCOUNTER — PATIENT OUTREACH (OUTPATIENT)
Dept: CARE COORDINATION | Facility: CLINIC | Age: 69
End: 2024-02-20
Payer: COMMERCIAL

## 2024-02-20 NOTE — PROGRESS NOTES
[facility]: [facility]: Post-Discharge Note  SITUATION                                                      Admission:    Admission Date: 02/15/24   Reason for Admission: PAD (peripheral artery disease) (H24) (I73.9)  Discharge:   Discharge Date: 02/19/24  Discharge Diagnosis: PAD (peripheral artery disease) (H24) (I73.9)    BACKGROUND                                                      Per hospital discharge summary and inpatient provider notes:    Ms. Aviles is a pleasant 68 year old female with PMH of PAD s/p previous L fem to PT bypass which had occluded and she was initially asymptomatic however developed wound of her L great toe with osteomyelitis. Admitted for planned repeat femoral to PT bypass with cryoartery    ASSESSMENT           Discharge Assessment  How are you doing now that you are home?: Patient states she is feeling fine.  Patient states symptoms are about the same.  How are your symptoms? (Red Flag symptoms escalate to triage hotline per guidelines): Unchanged  Do you feel your condition is stable enough to be safe at home until your provider visit?: Yes  Does the patient have their discharge instructions? : Yes  Does the patient have questions regarding their discharge instructions? : No  Were you started on any new medications or were there changes to any of your previous medications? : Yes  Does the patient have all of their medications?: Yes  Do you have questions regarding any of your medications? : No  Do you have all of your needed medical supplies or equipment (DME)?  (i.e. oxygen tank, CPAP, cane, etc.): Yes  Discharge follow-up appointment scheduled within 14 calendar days? : Yes  Discharge Follow Up Appointment Date: 02/26/24  Discharge Follow Up Appointment Scheduled with?: Specialty Care Provider         Post-op (Clinicians Only)  Did the patient have surgery or a procedure: Yes  Incision: closed  Drainage: No  Bleeding: none (A little bleeding  yesterday after movement, denies any today.)  Fever: No  Chills: No  Redness: No  Warmth: No  Swelling: Yes (No swelling of incision.  Left foot is swollen, was swollen yesterday when she left. States about the same. Color looks better than it did before surgery.  Warm to touch. Patient will monitor and will call clinic with any new or worsening symptoms.)  Incision site pain: No  Eating & Drinking: eating and drinking without complaints/concerns  PO Intake: regular diet  Additional Symptoms:  (Denies)  Bowel Function: normal  Date of last BM: 02/19/24  Urinary Status: voiding without complaint/concerns      PLAN                                                      Outpatient Plan:  Follow up with vascular surgery clinic in 2 weeks for wound check, then 4 weeks for follow up visit with duplex at that time.      Future Appointments   Date Time Provider Department Center   2/23/2024  1:15 PM [facility] VC US 1 [facility]   2/23/2024  2:15 PM [facility] VC US 1 [facility]   2/26/2024  3:00 PM Tammy Ramirez PA-C [facility]   3/18/2024  1:30 PM [facility] VC US 3 [facility]   3/18/2024  2:15 PM [facility] VC US 3 [facility]   3/18/2024  2:45 PM [facility]         For any urgent concerns, please contact our 24 hour nurse triage line: [phone] ([phone])         Shannon Ferrari RN

## 2024-02-20 NOTE — OR NURSING
Post Operative Phone Call     Surgery: CREATION, BYPASS, ARTERIAL, FEMORAL TO TIBIAL     Date of Surgery:2/15/24    Surgeon: Dr. Dyan Geller    Patient Discharged from Hospital: 2/19/24      Spoke with: Patient      Discussed patients status since discharging home after surgery.     Pt reports their pain is  Currently 3/10, mild and they are using Opiods: Oxycodone (Percocet, Oxycontin)  NSAIDs:  Ibuprofen (Advil, Motrin) to control their pain.     The incision is no drainage. Pt reports they are eating and drinking normal.     Pt is voiding normal and had a bowel movement on in hospital.    Pt is not having any stroke like symptoms or new onset of headaches.         VQI measures: Pt has been prescribed allergic to statin and Plavix  Other blood thinners, Eliquis was started in the hospital and reports they are taking it as prescribed.    Confirmed follow up appointments: YES    Callback number provided for further questions/concerns.    Vonnie Fernando RN

## 2024-02-23 ENCOUNTER — ANCILLARY PROCEDURE (OUTPATIENT)
Dept: VASCULAR ULTRASOUND | Facility: CLINIC | Age: 69
End: 2024-02-23
Attending: PHYSICIAN ASSISTANT
Payer: COMMERCIAL

## 2024-02-23 DIAGNOSIS — I73.9 PAD (PERIPHERAL ARTERY DISEASE) (H): ICD-10-CM

## 2024-02-23 PROCEDURE — 93923 UPR/LXTR ART STDY 3+ LVLS: CPT | Mod: 26 | Performed by: SURGERY

## 2024-02-23 PROCEDURE — 93923 UPR/LXTR ART STDY 3+ LVLS: CPT

## 2024-02-23 PROCEDURE — 93926 LOWER EXTREMITY STUDY: CPT | Mod: LT

## 2024-02-23 PROCEDURE — 93926 LOWER EXTREMITY STUDY: CPT | Mod: 26 | Performed by: SURGERY

## 2024-02-26 ENCOUNTER — OFFICE VISIT (OUTPATIENT)
Dept: VASCULAR SURGERY | Facility: CLINIC | Age: 69
End: 2024-02-26
Attending: PHYSICIAN ASSISTANT
Payer: COMMERCIAL

## 2024-02-26 VITALS
SYSTOLIC BLOOD PRESSURE: 108 MMHG | HEART RATE: 92 BPM | RESPIRATION RATE: 18 BRPM | TEMPERATURE: 97.8 F | DIASTOLIC BLOOD PRESSURE: 73 MMHG

## 2024-02-26 DIAGNOSIS — I73.9 PAD (PERIPHERAL ARTERY DISEASE) (H): Primary | ICD-10-CM

## 2024-02-26 PROCEDURE — 99213 OFFICE O/P EST LOW 20 MIN: CPT | Performed by: PHYSICIAN ASSISTANT

## 2024-02-26 PROCEDURE — 99024 POSTOP FOLLOW-UP VISIT: CPT | Performed by: PHYSICIAN ASSISTANT

## 2024-02-26 ASSESSMENT — PAIN SCALES - GENERAL: PAINLEVEL: NO PAIN (0)

## 2024-02-26 NOTE — PATIENT INSTRUCTIONS
Nba Burrell,    Thank you for entrusting your care with us today. After your visit today with APOORVA Ramirez this is the plan that was discussed at your appointment.    Follow up as scheduled on 3/18 for ultrasounds and appointments with Dr. Geller.    Follow up next week for staple removal.      I am including additional information on these things and our contact information if you have any questions or concerns.   Please do not hesitate to reach out to us if you felt we did not answer your questions or you are unsure of the treatment plan after your visit today. Our number is [phone].Thank you for trusting us with your care.         Again thank you for your time.           Ankle-Brachial Index (TRACIE) or Physiologic Test    Description  An ankle-brachial index test is relatively pain free. Blood pressure cuffs of various sizes are placed on your thigh, calf, foot and toes.  Similar to having your blood pressure checked with an arm cuff, as the technician inflates the cuffs, they progressively tighten and are then quickly released.  You may feel some discomfort, but generally for less than 60 seconds for each measurement. You will be asked to remove your socks and shoes and possibly your pants or shorts. Gowns will be provided. It usually takes about 30-60 minutes.   Depending on the initial readings and patient symptoms, you may be asked to perform a light walk on a treadmill.  The technician will apply ultrasound gel, usually warmed for your comfort, to your ankles and wrists. Through the gel, the technician will use a small hand-held device that emits sound waves.  Risks  There are typically no side effects or complications associated with a physiologic study.  How to Prepare  Eat and take medications as usual.  There is no preparation required for an ankle-brachial index (TRACIE) or physiologic exam.  What Can I Expect After the Test?  The technician will send the ultrasound images to your vascular  surgeon for evaluation. Typically, a report is available in 2-3 days. If anything critical is found, it is standard practice to notify the vascular surgeon immediately.  Reference: [email or web address]-resources/vascular-tests     Lower Extremity Arterial Ultrasound    Description  Ultrasound examinations are painless and easy for the patient. The vascular laboratory will contain a bed and just two or three pieces of equipment. You will be asked to remove pants or shorts and gowns will be provided. It usually takes about 30 minutes.  The technician will tuck a towel under your underpants in the groin. The gel is water-soluble and will not stain your skin or clothes.  Ultrasound gel, usually warmed for your comfort, will be placed on the inner side of your legs.    Through the gel, the technician will apply to your legs a small hand-held device that emits sound waves.  When the test is completed, the technician will remove excess gel from your legs.    Risks  There are typically no side effects or complications associated with a lower extremity arterial duplex ultrasound.  How to Prepare  Eat and take medications as usual.  There is no preparation required for a lower extremity arterial duplex ultrasound.  What Can I Expect After the Test?  The technician will send the ultrasound images to your vascular surgeon for evaluation. Typically, a report is available in 2-3 days. If anything critical is found, it is standard practice to notify the vascular surgeon immediately.  Reference: [email or web address].[email or web address]/patient-resources/vascular-tests

## 2024-02-26 NOTE — PROGRESS NOTES
VASCULAR SURGERY PROGRESS NOTE    LOCATION:  [facility]     Ashanti Aviles  Medical Record #: [id number]  YOB: 1955  Age: 68 year old     Date of Service: 2/26/2024    PRIMARY CARE PROVIDER: [facility]    Reason for visit: Follow-up status post LLE bypass    IMPRESSION: 68-year-old female presenting for follow-up status post left femoral to tibial artery bypass and eft common iliac artery stent placement.  Incision sites are healing nicely with no signs of infection today.  ABIs improved to 0.84 on the left with toe pressures adequate for wound healing.  Arterial duplex demonstrates a patent bypass but with high-grade stenosis of the common femoral artery by velocity criteria and low flow velocities in the mid to distal bypass less than 40 cm/s with monophasic low resistance waveforms.  Patient denies any significant claudication, rest pain or new lower extremity wounds.    RECOMMENDATION/RISKS: Continue best medical management with Plavix, Zetia, and Eliquis.  Encouraged regular activity/ambulation.  Tegaderm dressing removed from groin incision.  Will leave staples in place to leg incision for 1 week to allow for additional healing time.  Educated on signs and symptoms of wound infection instructed to call if any of these develop.  Follow-up in 1 week for nurse visit to remove staples and in 3 weeks as previously scheduled with repeat lower extremity studies.    HPI:  Ashanti Aviles is a 68 year old female with past medical history significant for hypertension, hyperlipidemia, diabetes mellitus, and peripheral arterial disease.  Patient underwent recent left lower extremity redo femoral to posterior tibial artery bypass with spliced cryo artery and left common iliac artery stent placement.  She presents today for follow-up.  Mrs. Aviles is accompanied by family member.  She is doing well and has been ambulating at home.  Pain is controlled; she just feels  sore from surgery.  Incision sites are healing nicely with minimal drainage from her right leg incision.  Denies any fevers or chills.  Patient has a wound to her left great toe and will be scheduled for an appointment with Dr. Ordaz.  She is compliant with her medications.    Ultrasound results were discussed and all questions answered.  No other concerns.    REVIEW OF SYSTEMS:    A 12 point ROS was reviewed and is negative except for what is listed above in HPI.    PHH:    Past Medical History:   Diagnosis Date    Benign gastric polyp     Chronic toe ulcer (H)     Chronic ulcer of great toe of left foot with necrosis of muscle (H)     Diabetes mellitus (H)     Gastroesophageal reflux disease     Hard to intubate 2/15/2024    History of colonic polyps     History of colonic polyps     HLD (hyperlipidemia)     Hypertension     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/29/2013    Nicotine addiction     OAG (open angle glaucoma)     PVD (peripheral vascular disease) (H24)     Reflux esophagitis     Retinopathy     Tubular adenoma     Yeast vaginitis       Past Surgical History:   Procedure Laterality Date    AMPUTATE TOE(S) Right 02/18/2023    Procedure: Amputation fourth toe right foot;  Surgeon: Benjamin Diez DPM;  Location: [facility] OR    ANGIOGRAM Left 2/15/2024    Procedure: COMPLETION OF ANGIOGRAM, LEFT COMMON ILIAC ARTERY STENT PLACEMENT;  Surgeon: Dyan Geller MD;  Location: [facility] OR    ANUS SURGERY      BIOPSY CERVICAL, LOCAL EXCISION, SINGLE/MULTIPLE      COLONOSCOPY N/A 09/11/2020    Procedure: EXAM UNDER ANESTHESIA, HIGH RESOLUTION ANOSCOPY INTRA OP;  Surgeon: Preeti Sheehan MD;  Location: [facility];  Service: Gastroenterology    COLONOSCOPY N/A 12/14/2020    Procedure: EXAM UNDER ANESTHESIA WITH HIGH RESOLUTION ANOSCOPY;  Surgeon: Preeti Sheehan MD;  Location: [facility];  Service: General    COLONOSCOPY N/A 06/15/2021    Procedure: EXAM  UNDER ANESTHESIA WITH HIGH RESOLUTION ANOSCOPY, BIOPSY, FULGURATION;  Surgeon: Preeti Sheehan MD;  Location: [facility];  Service: Gastroenterology    ENDARTERECTOMY FEMORAL Left 05/17/2023    Procedure: LEFT FEMORAL ENDARTERECTOMY WITH RETROGRADE ILIAC STENT;  Surgeon: Dyan Geller MD;  [place] [facility] [place]    EXAM UNDER ANESTHESIA ANUS N/A 09/14/2021    Procedure: EXAM UNDER ANESTHESIA WITH HIGH RESOLUTION ANOSCOPY;  Surgeon: Preeti Sheehan MD;  Location: [facility] OR    FEMORAL ARTERY - TIBIAL ARTERY BYPASS GRAFT Left 09/08/2023    Procedure: CREATION, BYPASS, ARTERIAL, FEMORAL TO TIBIAL, LEFT;  Surgeon: Dyan Geller MD;  Location: [facility]    FEMORAL ARTERY - TIBIAL ARTERY BYPASS GRAFT Left 2/15/2024    Procedure: CREATION, BYPASS, ARTERIAL, FEMORAL TO TIBIAL,;  Surgeon: Dyan Geller MD;  [place] [facility]    INCISION AND DRAINAGE FOOT, COMBINED Left 11/27/2023    Procedure: INCISION AND DRAINAGE, LEFT HALLUX;  Surgeon: Rene Ordaz DPM;  [place] [facility] [place]    IR LOWER EXTREMITY ANGIOGRAM LEFT  03/23/2023    IR LOWER EXTREMITY ANGIOGRAM RIGHT  02/16/2023    IR THROMBOLYSIS ARTERIAL INFUSION INITIAL DAY  10/09/2023    LEEP TX, CERVICAL      2005    TUBAL LIGATION       ALLERGIES:  Simvastatin, Victoza [liraglutide], and Penicillins    MEDS:    Current Outpatient Medications:     acetaminophen (ACETAMINOPHEN 8 HOUR) 650 MG CR tablet, Take 1,300 mg by mouth every 8 hours as needed for mild pain or fever, Disp: , Rfl:     amLODIPine (NORVASC) 5 MG tablet, Take 5 mg by mouth daily, Disp: , Rfl:     apixaban ANTICOAGULANT (ELIQUIS) 5 MG tablet, Take 1 tablet (5 mg) by mouth 2 times daily, Disp: 180 tablet, Rfl: 0    clopidogrel (PLAVIX) 75 MG tablet, Take 75 mg by mouth daily, Disp: , Rfl:     Continuous Blood Gluc Sensor (FREESTYLE PRINCESS 14 DAY SENSOR) MISC, , Disp: , Rfl:     empagliflozin (JARDIANCE) 25 MG  TABS tablet, Take 1 tablet (25 mg) by mouth daily, Disp: 90 tablet, Rfl: 1    ezetimibe (ZETIA) 10 MG tablet, Take 10 mg by mouth daily, Disp: , Rfl:     hydrochlorothiazide (HYDRODIURIL) 25 MG tablet, Take 25 mg by mouth daily, Disp: , Rfl:     Insulin Degludec (TRESIBA) 100 UNIT/ML SOLN, Inject 15 Units Subcutaneous 2 times daily, Disp: , Rfl:     linagliptin (TRADJENTA) 5 MG TABS tablet, Take 5 mg by mouth daily, Disp: , Rfl:     lisinopril (ZESTRIL) 40 MG tablet, Take 40 mg by mouth daily, Disp: , Rfl:     metFORMIN (GLUCOPHAGE XR) 500 MG 24 hr tablet, Take 1,000 mg by mouth daily, Disp: , Rfl:     omeprazole (PRILOSEC) 20 MG DR capsule, Take 20 mg by mouth daily as needed, Disp: , Rfl:     oxyCODONE (ROXICODONE) 5 MG tablet, Take 1 tablet (5 mg) by mouth every 4 hours as needed for moderate pain, Disp: 6 tablet, Rfl: 0    UNABLE TO FIND, Take 2 capsules by mouth daily MEDICATION NAME: Gui Doe, Disp: , Rfl:     SOCIAL HABITS:    History   Smoking Status    Former    Packs/day: 0.50    Types: Cigarettes    Quit date: 2/12/2023   Smokeless Tobacco    Never     Social History    Substance and Sexual Activity      Alcohol use: Not Currently        Comment: Alcoholic Drinks/MONTH: 2x      History   Drug Use Unknown     FAMILY HISTORY:    Family History   Problem Relation Age of Onset    Hypertension Mother     Colon Cancer Mother     Diabetes Type 2  Father     Hypertension Father     Kidney failure Father     Hyperthyroidism Sister     Breast Cancer Paternal Aunt      PE:  /73   Pulse 92   Temp 97.8  F (36.6  C)   Resp 18   Wt Readings from Last 1 Encounters:   02/15/24 66.2 kg (145 lb 14.4 oz)     There is no height or weight on file to calculate BMI.    EXAM:  GENERAL: Well-developed 68 year old female who appears her stated age  CARDIAC: Normal   CHEST/LUNG: Normal respiratory effort   MUSCULOSKELETAL: Grossly normal and both lower extremities are intact, no lower extremity edema  NEUROLOGIC: Focally  intact, alert and oriented x 3  PSYCH: Appropriate affect  VASCULAR: Left groin incision well-healed, right leg incision clean dry and intact with staples.  Minimal serosanguineous drainage.  Left foot warm with strong PT signal and monophasic DP signal with doppler.  Images uploaded to chart    DIAGNOSTIC STUDIES:     Images:    US Lower Extremity Arterial Duplex Left    Result Date: 2/23/2024  Indication: Status post left femoral-tibial bypass graft/left MIKE stent/ Hx of Left CFA-TPT BPG using spliced 4mm and 5 mm Artegraft graft done 9/8/2023, decreased lower extremity pulses, lower extremity pain           Impression:   1. LEFT LOWER EXTREMITY: High grade stenosis of the common femoral artery by velocity criteria although poor grayscale images limit exam. Color flow shows turbulent flow without luminal compromise. Low flow velocities in mid and distal bypass <40 cm/second.  Monophasic, low resistance waveforms with mildly blunted upstroke distally.     US Low Ext Arterial Dop Seg Pres w/o Exercise    Result Date: 2/23/2024  Indication: Status post left femoral-tibial bypass graft/left MIKE stent/ Hx of Left CFA-TPT BPG using spliced 4 mm and 5 mm Artegraft graft done 9/8/2023, decreased lower extremity pulses, lower extremity pain           Impression:    1. RIGHT LOWER EXTREMITY: TRACIE is Abnormal with an TRACIE of 0.76 indicating single level disease. Toe Pressures are Normal and adequate for wound healing with toe pressures of 89 mmHg. Reduced DBI.  TRACIE reduced from previous (0.95)   2. LEFT LOWER EXTREMITY: TRACIE is Abnormal with an TRACIE of 0.84 indicating single level disease. Toe Pressures are Mildly abnormal but adequate for wound healing with toe pressures of 62 mmHg. Reduced DBI.  TRACIE improved from previous although study prior to thrombolysis.     LABS:      Sodium   Date Value Ref Range Status   02/19/2024 137 135 - 145 mmol/L Final     Comment:     Reference intervals for this test were updated on 09/26/2023  to more accurately reflect our healthy population. There may be differences in the flagging of prior results with similar values performed with this method. Interpretation of those prior results can be made in the context of the updated reference intervals.    02/18/2024 135 135 - 145 mmol/L Final     Comment:     Reference intervals for this test were updated on 09/26/2023 to more accurately reflect our healthy population. There may be differences in the flagging of prior results with similar values performed with this method. Interpretation of those prior results can be made in the context of the updated reference intervals.    02/17/2024 137 135 - 145 mmol/L Final     Comment:     Reference intervals for this test were updated on 09/26/2023 to more accurately reflect our healthy population. There may be differences in the flagging of prior results with similar values performed with this method. Interpretation of those prior results can be made in the context of the updated reference intervals.      Urea Nitrogen   Date Value Ref Range Status   02/19/2024 11.7 8.0 - 23.0 mg/dL Final   02/18/2024 11.8 8.0 - 23.0 mg/dL Final   02/17/2024 10.8 8.0 - 23.0 mg/dL Final     Hemoglobin   Date Value Ref Range Status   02/19/2024 9.3 (L) 11.7 - 15.7 g/dL Final   02/18/2024 8.9 (L) 11.7 - 15.7 g/dL Final   02/17/2024 9.9 (L) 11.7 - 15.7 g/dL Final     Platelet Count   Date Value Ref Range Status   02/19/2024 193 150 - 450 10e3/uL Final   02/18/2024 141 (L) 150 - 450 10e3/uL Final   02/17/2024 150 150 - 450 10e3/uL Final     INR   Date Value Ref Range Status   01/12/2024 0.83 (L) 0.85 - 1.15 Final   10/06/2023 0.87 0.85 - 1.15 Final   09/09/2023 1.14 0.85 - 1.15 Final     30 minutes spent on the day of encounter doing chart review, history and exam, documentation, and further activities as noted.     JOVANNY ZamarripaC  VASCULAR SURGERY

## 2024-02-26 NOTE — PROGRESS NOTES
[facility]        Patient is here for a 2 week wound check S/P bypass. 2/23    Pt is currently taking Plavix and Eliquis.    /73   Pulse 92   Temp 97.8  F (36.6  C)   Resp 18     The provider has been notified that the patient has no concerns.     Questions patient would like addressed today are: N/A.    Refills are needed: No    Has homecare services and agency name:  No     Left groin incision 2/26    Left lower leg incision 2/26    Left great toe 2/26

## 2024-02-27 NOTE — OP NOTE
VASCULAR SURGERY OPERATIVE REPORT        LOCATION:    [facility]    Ashanti Aviles   Medical Record #:  [id number]  YOB: 1955  Age:  68 year old     Date of Service: February 15, 2024    PRIMARY CARE PROVIDER: [place]    Preoperative diagnosis    Left lower extremity chronic limb threatening ischemia    Postoperative diagnosis    Same      Surgeon: Dyan Geller MD, RPVI   Assistant: Belkys Petersen MD, vascular surgery resident                    Tammy Ramirez PA-C  The assistance of Tammy Ramirez PA-C, was required in this case due to extreme complexity of this operation.  Tammy Ramirez PA-C assisted by performing and providing most of the steps of this operation including tissue dissection, vascular anastomosis, and wound closure. [place] assistance was also necessary because  a fellow/resident was not fully qualified to assist with all steps of the procedure.                          Name of the procedure    Redo left femoral to posterior tibial artery bypass with spliced cryo artery graft  Retrograde left common iliac artery stent placement, 8 x 37 mm VISI Pro balloon expandable stent  Completion left extremity angiogram    Anesthesia:    General    Indication for procedure:    68-year-old female with chronic intestinal ischemia involving left lower extremity.  Patient scheduled for left femoral to PT bypass.  Patient has a history of failed left femoral to below-knee popliteal bypass.  Patient also has a history of left iliofemoral endarterectomy with right.    Description of procedure:    The patient was placed supine on the operating table. The arms were placed at 80 . Normal bony prominences were padded. The anesthesia team placed appropriate lines and general anesthesia was induced. A Cedeno catheter was placed under sterile technique. The patient s lower abdomen and both lower extremities were circumferentially prepped and draped in the usual  sterile fashion. Preoperative antibiotics were administered prior to skin incision.  A 10- to 12-cm vertical skin incision was performed on the medial aspect of the leg, 2 cm posteromedial and parallel to the tibia. The incision was located directly over the saphenous vein.The skin incision was deepened through the subcutaneous tissue, the saphenous vein was carefully reflected posteriorly. The deep muscular fascia was incised, exposing the medial head of gastrocnemius muscle which is reclined posteriorly. The soleus muscle is then exposed and incised along its attachment down to its posterior deep fascia. This fascia was incised, exposing the posterior tibial and flexor muscles. Gentle dissection between these two muscles was performed and a self-retaining retractor was placed deeper in the wound, exposing the posterior tibial artery and veins. The artery is palpated to assess the feasibility of the distal anastomosis. A 2-cm segment of the posterior tibial artery was sharply dissected. Crossing venae comitantes were ligated and divided.  Then, a vertical curvilinear skin incision overlying the left common femoral artery was made  The incision was deepened through the subcutaneous tissues with electrocautery.  Of note, the dissection was extremely difficult due to significant amount of scar tissue from previous exposures.  The encountered lymphatics were ligated and divided. The common femoral artery was then exposed and sharply dissected circumferentially. The dissection was extended proximally to the inguinal ligament and distally to include the superficial femoral and profunda femoris arteries. The common femoral, superficial femoral, and profunda femoris arteries were encircled with silastic vessel loops. Minor branches of the common femoral artery were identified and spared. A tunnel was then created using a Pedro tunneler.  The tunnel was subsartorial along the medial aspect of the thigh and then anatomic  at the knee level passing between both heads of the gastrocnemius muscle. Below the knee the tunnel was posterior to the soleus muscle.  The patient was given 100 UI/Kg of heparin intravenously.  The left femoral artery was accessed using 19-gauge needle.  Over the needle the glide advantage wire was advanced into the abdominal aorta.  Over the wire 8 Bengali sheath was placed and advanced in the distal aorta.  So angiogram was performed showing significant stenosis of the common iliac artery just distal to the previously placed iliac stent.  Over the wire we introduced additional 8 mm x 37 mm VISI Pro stent.  The sheath was pulled back, and the stent was deployed without any difficulties.  Repeat angiogram did show excellent patency of left iliofemoral region.  Then we focused on bypass creation.  On the back table with spliced 2 segments of cryo artery in end-to-end fashion using 6-0 Prolene suture.  The common femoral artery, profunda, and superficial femoral artery were clamped. A longitudinal arteriotomy in the common femoral artery was performed and extended with Pott s scissors for 1 cm. The graft was then reversed and its distal end was incised along its posterior wall/ creating a T junction shape. The proximal anastomosis was constructed between the spatulated graft and the femoral arteriotomy with a running 6-0 prolene suture. Prior to completing the suture line, back-bleeding, forward-flushing, and irrigation of the anastomosis with heparinized solution was performed. The anastomosis was then completed and checked for hemostasis, which was adequate. The flow through the graft was checked and was pulsatile. The end of the graft was ligated with a 2-0 silk tie. The graft was rechecked for hemostasis. The graft was marked with methylene blue and then passed distended through the tunnel, avoiding any twists.  Proximal and distal control of the posterior tibial artery was then performed Luke vascular clamp  clamp. A 1-cm arteriotomy was then created in the anterior wall of the posterior tibial artery.  The  graft was transected at the appropriate length and was incised along its posterior aspect, spatulating the graft. The distal anastomosis to the posterior tibial artery was constructed with a running 7-0 prolene suture. Prior to completing the suture line,back-bleeding, forward-flushing, and irrigation of the anastomosis with heparinized solution was performed. The anastomosis was then completed and checked for hemostasis, which was adequate.  The suture lines and wounds were then rechecked for hemostasis.   Prior to closure, the micropuncture sheath was inserted into the common femoral artery, over the wire, just proximal to the proximal anastomosis.  Completion angiogram was performed showing excellent patency of the bypass graft without obvious stenosis or twist.  The micropuncture sheath was pulled out and 5-0 Prolene was used to repair arteriotomy.  There was good Doppler signal in the foot at the posterior tibial artery level and a good augmentation of the signal with compress- ing and releasing the graft. The wounds were all irrigated with antibiotic solution .  All wounds were closed in a similar fashion using 2-0 Vicryl, 3-0 Vicryl, and 4 Monocryl.  Infrapopliteal incision was closed skin staples on the skin instead of 4 Monocryl.    Estimated blood loss: 600 cc    Specimens: None    Complications: None      Dyan Geller MD, [facility]  VASCULAR SURGERY

## 2024-03-01 ENCOUNTER — TELEPHONE (OUTPATIENT)
Dept: VASCULAR SURGERY | Facility: CLINIC | Age: 69
End: 2024-03-01
Payer: COMMERCIAL

## 2024-03-01 NOTE — TELEPHONE ENCOUNTER
Per Dr. Ordaz, pt has been cleared for surgery by Dr. ROSE.    Before daryn can proceed with surgery, pt needs to complete MRI.     Spoke with pt and will call to schedule MRI.    Jojo Yeh LPN on 3/1/2024 at 8:24 AM

## 2024-03-03 ENCOUNTER — HEALTH MAINTENANCE LETTER (OUTPATIENT)
Age: 69
End: 2024-03-03

## 2024-03-04 ENCOUNTER — OFFICE VISIT (OUTPATIENT)
Dept: VASCULAR SURGERY | Facility: CLINIC | Age: 69
End: 2024-03-04
Attending: SURGERY
Payer: COMMERCIAL

## 2024-03-04 DIAGNOSIS — I73.9 PAD (PERIPHERAL ARTERY DISEASE) (H): Primary | ICD-10-CM

## 2024-03-04 PROCEDURE — 99215 OFFICE O/P EST HI 40 MIN: CPT

## 2024-03-04 PROCEDURE — G0463 HOSPITAL OUTPT CLINIC VISIT: HCPCS

## 2024-03-04 NOTE — PATIENT INSTRUCTIONS
Nba Burrell,    Thank you for entrusting your care with us today. After your visit today with APOORVA Ramirez this is the plan that was discussed at your appointment.    Follow up as scheduled on 3/18 for ultrasounds and appointments with Dr. Geller.    Every other staple removed and steri strips applied. Allow them to fall off on their own. If after 2 weeks you may remove.    3.   Can cover with a dry piece of gauze. If gauze is wet or saturated, then change. OK to shower.      I am including additional information on these things and our contact information if you have any questions or concerns.   Please do not hesitate to reach out to us if you felt we did not answer your questions or you are unsure of the treatment plan after your visit today. Our number is [phone].Thank you for trusting us with your care.         Again thank you for your time.           Ankle-Brachial Index (TRACIE) or Physiologic Test    Description  An ankle-brachial index test is relatively pain free. Blood pressure cuffs of various sizes are placed on your thigh, calf, foot and toes.  Similar to having your blood pressure checked with an arm cuff, as the technician inflates the cuffs, they progressively tighten and are then quickly released.  You may feel some discomfort, but generally for less than 60 seconds for each measurement. You will be asked to remove your socks and shoes and possibly your pants or shorts. Gowns will be provided. It usually takes about 30-60 minutes.   Depending on the initial readings and patient symptoms, you may be asked to perform a light walk on a treadmill.  The technician will apply ultrasound gel, usually warmed for your comfort, to your ankles and wrists. Through the gel, the technician will use a small hand-held device that emits sound waves.  Risks  There are typically no side effects or complications associated with a physiologic study.  How to Prepare  Eat and take medications as usual.  There  is no preparation required for an ankle-brachial index (TRACIE) or physiologic exam.  What Can I Expect After the Test?  The technician will send the ultrasound images to your vascular surgeon for evaluation. Typically, a report is available in 2-3 days. If anything critical is found, it is standard practice to notify the vascular surgeon immediately.  Reference: [email or web address]-resources/vascular-tests     Lower Extremity Arterial Ultrasound    Description  Ultrasound examinations are painless and easy for the patient. The vascular laboratory will contain a bed and just two or three pieces of equipment. You will be asked to remove pants or shorts and gowns will be provided. It usually takes about 30 minutes.  The technician will tuck a towel under your underpants in the groin. The gel is water-soluble and will not stain your skin or clothes.  Ultrasound gel, usually warmed for your comfort, will be placed on the inner side of your legs.    Through the gel, the technician will apply to your legs a small hand-held device that emits sound waves.  When the test is completed, the technician will remove excess gel from your legs.    Risks  There are typically no side effects or complications associated with a lower extremity arterial duplex ultrasound.  How to Prepare  Eat and take medications as usual.  There is no preparation required for a lower extremity arterial duplex ultrasound.  What Can I Expect After the Test?  The technician will send the ultrasound images to your vascular surgeon for evaluation. Typically, a report is available in 2-3 days. If anything critical is found, it is standard practice to notify the vascular surgeon immediately.  Reference: [email or web address]-resources/vascular-tests

## 2024-03-04 NOTE — PROGRESS NOTES
[facility]  -  Nurse Visit              Date of Service:  March 4, 2024     Requesting Provider: APOORVA Ramirez    Diagnosis:     ICD-10-CM    1. PAD (peripheral artery disease) (H24)  I73.9           Chief Complaint: Ashanti is being seen today at [facility] for her staple removal dressing change. Reports pain of 4/10.  Denies any fevers, chills, or generalized ill feeling.     Concerned of mid-incision area draining. Discussed with APOORVA Morse.    Dressing on Arrival: Staples intact and band-aid. Upon removal of dressings small serosanguinous drainage is noted.    New Wounds noted: No    Vital Signs: There were no vitals taken for this visit.    Assessment:      General:  Patient presents to clinic in no apparent distress.   Psychiatric:  Alert and oriented x3.   Lower extremity:  edema is present.    Integumentary:  Skin is WNL.    Circumferential volume measures:       No data to display                Wound info:  VASC Wound left hallux (Active)   Pre Size Length 0.8 01/24/24 0800   Pre Size Width 1 01/24/24 0800   Pre Size Depth 0.5 01/24/24 0800   Pre Total Sq cm 0.8 01/24/24 0800       Incision/Surgical Site 02/15/24 Anterior;Left Groin (Active)   Incision Assessment WDL 02/19/24 0929   Dressing Other (Comment) 02/16/24 1600   Closure Liquid bandage 02/19/24 0929   Incision Drainage Amount None 02/19/24 0929   Dressing Intervention Open to air / No Dressing 02/19/24 0929       Incision/Surgical Site 02/15/24 Left;Medial Calf (Active)   Incision Assessment UTV 02/19/24 0929   Dressing Dry gauze 02/19/24 0929   Closure TEJ 02/19/24 0929   Incision Drainage Amount None 02/19/24 0929   Dressing Intervention Clean, dry, intact 02/19/24 0929       Undermining is not present.   Middle incision is possibly open with staples remaining.   The periwound skin is WNL      Plan:         1. Patient will return in 1 weeks for nurse visit.             10 staples  removed, 5 remain           Cleansed with: Leg cleansed with soap/water and normal saline to wound/incision    Protected skin with:  tincture prior to steri strip application where staples were removed     Dressings Applied to wound: Gauze and Secured with roll gauze and tape.     Offloading used: None    Trial Products: No    Provider notified regarding concerns: Yes: Tammy Ramirez; 10 staples removed. 5 staples in mid incision remain.    Treatment Changes: Yes: initiating gauze, rolled gauze, and tape.    Tolerated Dressing Change:  Yes    Taught Regarding: follow up appointment(s), wound cares, wound care supplies, and signs of infection    Educational Barriers: No barriers      Majo Yoon RN, CWOCN

## 2024-03-05 ENCOUNTER — TELEPHONE (OUTPATIENT)
Dept: VASCULAR SURGERY | Facility: CLINIC | Age: 69
End: 2024-03-05

## 2024-03-05 ENCOUNTER — PREP FOR PROCEDURE (OUTPATIENT)
Dept: OTHER | Facility: CLINIC | Age: 69
End: 2024-03-05
Payer: COMMERCIAL

## 2024-03-05 ENCOUNTER — HOSPITAL ENCOUNTER (OUTPATIENT)
Dept: MRI IMAGING | Facility: HOSPITAL | Age: 69
Discharge: HOME | End: 2024-03-05
Attending: PODIATRIST
Payer: COMMERCIAL

## 2024-03-05 ENCOUNTER — VIRTUAL VISIT (OUTPATIENT)
Dept: VASCULAR SURGERY | Facility: CLINIC | Age: 69
End: 2024-03-05
Attending: PODIATRIST
Payer: COMMERCIAL

## 2024-03-05 VITALS — WEIGHT: 147 LBS | BODY MASS INDEX: 27.75 KG/M2 | HEIGHT: 61 IN

## 2024-03-05 DIAGNOSIS — L97.524: ICD-10-CM

## 2024-03-05 DIAGNOSIS — M86.172 ACUTE OSTEOMYELITIS OF LEFT FOOT (H): Primary | ICD-10-CM

## 2024-03-05 DIAGNOSIS — I73.9 PAD (PERIPHERAL ARTERY DISEASE) (H): ICD-10-CM

## 2024-03-05 PROCEDURE — 73720 MRI LWR EXTREMITY W/O&W/DYE: CPT | Mod: LT

## 2024-03-05 PROCEDURE — A9585 GADOBUTROL INJECTION: HCPCS | Performed by: PODIATRIST

## 2024-03-05 PROCEDURE — 99441 PR PHYSICIAN TELEPHONE EVALUATION 5-10 MIN: CPT | Mod: 93 | Performed by: PODIATRIST

## 2024-03-05 PROCEDURE — 255N000002 HC RX 255 OP 636: Performed by: PODIATRIST

## 2024-03-05 RX ORDER — GADOBUTROL 604.72 MG/ML
0.1 INJECTION INTRAVENOUS ONCE
Status: COMPLETED | OUTPATIENT
Start: 2024-03-05 | End: 2024-03-05

## 2024-03-05 RX ADMIN — GADOBUTROL 7 ML: 604.72 INJECTION INTRAVENOUS at 09:09

## 2024-03-05 ASSESSMENT — PAIN SCALES - GENERAL: PAINLEVEL: MODERATE PAIN (5)

## 2024-03-05 NOTE — PROGRESS NOTES
"Virtual Visit Details    Type of service:  Telephone Visit   Phone call duration: 5 minutes   Originating Location (pt. Location): Home    Distant Location (provider location):  Off-site    [facility]        Patient is here for a virtual visit to discuss follow up     Pt is currently taking Plavix and Eliquis.    Ht 1.549 m (5' 1\")   Wt 66.7 kg (147 lb)   BMI 27.78 kg/m      The provider has been notified that the patient has no concerns at time of rooming.     Questions patient would like addressed today are: N/A.    Refills are needed: N/A    Has homecare services and agency name:  No         "

## 2024-03-05 NOTE — TELEPHONE ENCOUNTER
Surgery Scheduled    Patient was directed to schedule a pre-op physical.     Surgery/Procedure: I&D, left hallux with exostectomy     Special Equipment: pulse lavage    Location: [facility]:  [address] [place] [address] (phone: [phone], Fax: [phone])    Date: 03/21/2024    Time: 8:30am    Admission Type: Outpatient    Surgeon: Dr. Ordaz    OR Confirmed & :  Yes with Maggi on 3/5/2024    Entered on provider calendar:  Yes    Post Op: See appt desk    Wound Vac Needed:  Yes    Home Care Needed: No    Blood Thinners Addressed: No

## 2024-03-05 NOTE — PROGRESS NOTES
FOOT AND ANKLE SURGERY/PODIATRY Progress Note      ASSESSMENT: Osteomyelitis left hallux    Type of service:  Telephone Visit       Telephone Start and End Time : 11:07/11:13    Originating Location (pt. Location):  home      Distant Location :  [facility]       Mode of Communication: Telephone    TREATMENT:  MRI left foot: 1.  Patchy mildly enhancing T2 hyperintense signal abnormality involving the distal phalanx of the hallux which has slightly progressed since the comparison exam. Findings are indeterminate and may reflect persistent/progressive reactive osteitis,   however, early osteomyelitis cannot be entirely excluded.  2.  Patchy T2 hyperintense nonenhancing intramuscular signal abnormality, nonspecific and possibly related to recent revascularization. Infectious or inflammatory myositis is considered much less likely.  3.  No abscess.  4.  Mild degenerative arthrosis of the first MTP and second TMT joints.    -I reviewed the above MRI report with the patient today and discussed that osteomyelitis may be present along the distal phalanx of the hallux.    -I recommend surgical I&D to remove nonviable tissue and also bone biopsy to determine if osteomyelitis is present and also bone to be sent for aerobic/anaerobic culture.  I discussed the procedure including postoperative course to remain nonweightbearing on her left foot with use of wound VAC and local wound cares.  Risks include but limited to need for additional surgical invention including amputation of the left hallux or more proximal foot amputation in the presence of severe PAD.  All questions invited and answered.  Patient consents to surgery.    -Patient has completed bypass procedure and has been cleared for foot surgery by vascular surgery.      -I will asked my office to coordinate outpatient surgery at [facility]    Rene Ordaz DPM  [facility]      HPI: Ashanti Aviles was  seen again today for telephone visit to discuss her recent MRI report on the left foot.  Patient denies new concerns.  Patient reports that she completed bypass procedure with vascular surgery.    Past Medical History:   Diagnosis Date    Benign gastric polyp     Chronic toe ulcer (H)     Chronic ulcer of great toe of left foot with necrosis of muscle (H)     Diabetes mellitus (H)     Gastroesophageal reflux disease     Hard to intubate 2/15/2024    History of colonic polyps     History of colonic polyps     HLD (hyperlipidemia)     Hypertension     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/29/2013    Nicotine addiction     OAG (open angle glaucoma)     PVD (peripheral vascular disease) (H24)     Reflux esophagitis     Retinopathy     Tubular adenoma     Yeast vaginitis        Past Surgical History:   Procedure Laterality Date    AMPUTATE TOE(S) Right 02/18/2023    Procedure: Amputation fourth toe right foot;  Surgeon: Benjamin Diez DPM;  Location: [facility] OR    ANGIOGRAM Left 2/15/2024    Procedure: COMPLETION OF ANGIOGRAM, LEFT COMMON ILIAC ARTERY STENT PLACEMENT;  Surgeon: Dyan Geller MD;  Location: [facility] OR    ANUS SURGERY      BIOPSY CERVICAL, LOCAL EXCISION, SINGLE/MULTIPLE      COLONOSCOPY N/A 09/11/2020    Procedure: EXAM UNDER ANESTHESIA, HIGH RESOLUTION ANOSCOPY INTRA OP;  Surgeon: Preeti Sheehan MD;  Location: [facility] OR;  Service: Gastroenterology    COLONOSCOPY N/A 12/14/2020    Procedure: EXAM UNDER ANESTHESIA WITH HIGH RESOLUTION ANOSCOPY;  Surgeon: Preeti Sheehan MD;  Location: [facility] OR;  Service: General    COLONOSCOPY N/A 06/15/2021    Procedure: EXAM UNDER ANESTHESIA WITH HIGH RESOLUTION ANOSCOPY, BIOPSY, FULGURATION;  Surgeon: Preeti Sheehan MD;  Location: [facility] OR;  Service: Gastroenterology    ENDARTERECTOMY FEMORAL Left 05/17/2023    Procedure: LEFT FEMORAL ENDARTERECTOMY WITH RETROGRADE ILIAC STENT;   Surgeon: Dyan Geller MD;  Location: [facility] OR    EXAM UNDER ANESTHESIA ANUS N/A 09/14/2021    Procedure: EXAM UNDER ANESTHESIA WITH HIGH RESOLUTION ANOSCOPY;  Surgeon: Preeti Sheehan MD;  Location: [facility]    FEMORAL ARTERY - TIBIAL ARTERY BYPASS GRAFT Left 09/08/2023    Procedure: CREATION, BYPASS, ARTERIAL, FEMORAL TO TIBIAL, LEFT;  Surgeon: Dyan Geller MD;  Location: [facility] OR    FEMORAL ARTERY - TIBIAL ARTERY BYPASS GRAFT Left 2/15/2024    Procedure: CREATION, BYPASS, ARTERIAL, FEMORAL TO TIBIAL,;  Surgeon: Dyan Geller MD;  Location: [facility] OR    INCISION AND DRAINAGE FOOT, COMBINED Left 11/27/2023    Procedure: INCISION AND DRAINAGE, LEFT HALLUX;  Surgeon: Rene Ordaz DPM;  Location: [facility] OR    IR LOWER EXTREMITY ANGIOGRAM LEFT  03/23/2023    IR LOWER EXTREMITY ANGIOGRAM RIGHT  02/16/2023    IR THROMBOLYSIS ARTERIAL INFUSION INITIAL DAY  10/09/2023    LEEP TX, CERVICAL      2005    TUBAL LIGATION         Allergies   Allergen Reactions    Simvastatin Muscle Pain (Myalgia)     Muscle pain    Victoza [Liraglutide] Other (See Comments)     Binds bowels    Penicillins Unknown     Childhood rxn         Current Outpatient Medications:     acetaminophen (ACETAMINOPHEN 8 HOUR) 650 MG CR tablet, Take 1,300 mg by mouth every 8 hours as needed for mild pain or fever, Disp: , Rfl:     amLODIPine (NORVASC) 5 MG tablet, Take 5 mg by mouth daily, Disp: , Rfl:     apixaban ANTICOAGULANT (ELIQUIS) 5 MG tablet, Take 1 tablet (5 mg) by mouth 2 times daily, Disp: 180 tablet, Rfl: 0    clopidogrel (PLAVIX) 75 MG tablet, Take 75 mg by mouth daily, Disp: , Rfl:     Continuous Blood Gluc Sensor (FREESTYLE PRINCESS 14 DAY SENSOR) MISC, , Disp: , Rfl:     empagliflozin (JARDIANCE) 25 MG TABS tablet, Take 1 tablet (25 mg) by mouth daily, Disp: 90 tablet, Rfl: 1    ezetimibe (ZETIA) 10 MG tablet, Take 10 mg by mouth daily, Disp: , Rfl:      hydrochlorothiazide (HYDRODIURIL) 25 MG tablet, Take 25 mg by mouth daily, Disp: , Rfl:     Insulin Degludec (TRESIBA) 100 UNIT/ML SOLN, Inject 15 Units Subcutaneous 2 times daily, Disp: , Rfl:     linagliptin (TRADJENTA) 5 MG TABS tablet, Take 5 mg by mouth daily, Disp: , Rfl:     lisinopril (ZESTRIL) 40 MG tablet, Take 40 mg by mouth daily, Disp: , Rfl:     metFORMIN (GLUCOPHAGE XR) 500 MG 24 hr tablet, Take 1,000 mg by mouth daily, Disp: , Rfl:     omeprazole (PRILOSEC) 20 MG DR capsule, Take 20 mg by mouth daily as needed, Disp: , Rfl:     oxyCODONE (ROXICODONE) 5 MG tablet, Take 1 tablet (5 mg) by mouth every 4 hours as needed for moderate pain, Disp: 6 tablet, Rfl: 0    UNABLE TO FIND, Take 2 capsules by mouth daily MEDICATION NAME: Gui Doe, Disp: , Rfl:   No current facility-administered medications for this visit.    Review of Systems - 10 point Review of Systems is negative except for osteomyelitis left hallux which is noted in HPI.    Imaging:     MR Foot Left w/o & w Contrast    Result Date: 3/5/2024  EXAM: MR FOOT LEFT W/O and W CONTRAST LOCATION: [facility] DATE: 3/5/2024 INDICATION: Osteomyelitis of the left hallux. COMPARISON: Multiple prior comparison exams, the most recent dated 11/09/2023 TECHNIQUE: Routine. Additional postgadolinium T1 sequences were obtained. IV CONTRAST: Gadavist 7mL FINDINGS: JOINTS AND BONES: -Patchy mildly enhancing T2 hyperintense signal abnormality involving the distal phalanx which has slightly progressed since the most recent comparison exam of November 2023. No confluent T1 hypointense marrow replacing signal. Mild degenerative arthrosis of the first metatarsophalangeal and second tarsometatarsal joint. No acute fracture. No joint effusion or enhancing synovitis. TENDONS: -No tendon tear, tendinopathy, or tenosynovitis. LIGAMENTS: -Lisfranc ligament complex and visualized collateral and capsular ligaments are intact. MUSCLES AND SOFT TISSUES:  -Subtle broad-based ulceration over the distal dorsal medial aspect of the first toe. No loculated fluid collection to suggest abscess. -Progressive T2 hyperintense nonenhancing signal abnormality within the intrinsic muscles of the foot (series 4 image 15).     IMPRESSION: 1.  Patchy mildly enhancing T2 hyperintense signal abnormality involving the distal phalanx of the hallux which has slightly progressed since the comparison exam. Findings are indeterminate and may reflect persistent/progressive reactive osteitis, however, early osteomyelitis cannot be entirely excluded. 2.  Patchy T2 hyperintense nonenhancing intramuscular signal abnormality, nonspecific and possibly related to recent revascularization. Infectious or inflammatory myositis is considered much less likely. 3.  No abscess. 4.  Mild degenerative arthrosis of the first MTP and second TMT joints.     US Lower Extremity Arterial Duplex Left    Result Date: 2/23/2024  Table formatting from the original result was not included. Arterial Duplex Ultrasound (Date: 02/23/24) Lower Extremity Bypass Evaluation Indication: Status post left femoral-tibial bypass graft/left MIKE stent/ Hx of Left CFA-TPT BPG using spliced 4mm and 5 mm Artegraft graft done 9/8/2023, decreased lower extremity pulses, lower extremity pain       Hx: 10/09/2023 IR Left Thrombolytics unsuccessful.   5/17/2023 IR Left MIKE Stents / Iliofemoral Endarterectomy.              3/23/23 IR Left Leg Angiogram ( Occluded SFA/ Pop. A./ RICKY),              2/16/23 IR Right SFA Proximal Atherectomy/ Balloon Angiogram.              2/18/23 Right 4th Toe Amputation              Left Big Toe Nail Removed/ Painful  Previous: 7/24/23, 2/27/2023; 06/26/23; 10/06/23-US, TRACIE, CTA; 10/09/23-IR  Date of bypass graft: 9/8/2023 Left LEG History: Previous Smoker, Hypertension, Diabetic, PAD, Angioplasty, Vascular Surgery, Surgical Follow-up, and Vascular Ulcers Technique: Duplex imaging is performed utilizing  gray-scale, two-dimensional images, and color-flow imaging. Doppler waveform analysis and spectral Doppler imaging is also performed. Left Leg (cm/s) Location Velocity  Waveforms  M  M   M SFA 0  - Inflow Artery CFA   Proximal Anastamosis 141 B Proximal Bypass 47 B Mid Bypass 37  M Distal Bypass 23 M Distal Anastamosis 47  M Outflow Artery PTA MID   PTA  M PTA DISTAL 122 M  DPA 15 M  Waveforms: T=Triphasic, M=Monophasic, B=Biphasic Comments: Limited visualization of groin arteries and graft due to recent surgery. Graft placement is deep in leg.  Impression: 1. LEFT LOWER EXTREMITY: High grade stenosis of the common femoral artery by velocity criteria although poor grayscale images limit exam. Color flow shows turbulent flow without luminal compromise. Low flow velocities in mid and distal bypass <40 cm/second.  Monophasic, low resistance waveforms with mildly blunted upstroke distally. Reference: Category Normal Intermediate Severe Occluded  PSV  cm/s 151-300 cm/s <45 or >300cm/s Absent Flow Ratio <1.5 1.5-3.4 >3.4 N/A      US Low Ext Arterial Dop Seg Pres w/o Exercise    Result Date: 2/23/2024  Table formatting from the original result was not included. BILATERAL RESTING ANKLE-BRACHIAL INDICES (TRACIE'S) (Date: 02/23/24) Indication: Status post left femoral-tibial bypass graft/left MIKE stent/ Hx of Left CFA-TPT BPG using spliced 4mm and 5 mm Artegraft graft done 9/8/2023, decreased lower extremity pulses, lower extremity pain       Hx: 10/09/2023 IR Left Thrombolytics unsuccessful.   5/17/2023 IR Left MIKE Stents / Iliofemoral Endarterectomy.              3/23/23 IR Left Leg Angiogram ( Occluded SFA/ Pop. A./ RICKY),              2/16/23 IR Right SFA Proximal Atherectomy/ Balloon Angiogram.              2/18/23 Right 4th Toe Amputation              Left Big Toe Nail Removed/ Painful  Previous: 7/24/23, 2/27/2023; 06/26/23; 10/06/23-US, TRACIE, CTA; 10/09/23-IR  Date of bypass graft:  9/8/2023 Left LEG History: Previous Smoker, Hypertension, Diabetic, PAD, Angioplasty, Vascular Surgery, Surgical Follow-up, and Vascular Ulcers Resting TRACIE's          Right: mmHg Index     Brachial: 137  Ankle-(PT): 104 0.76 Ankle-(DP): 114 0.83          Digit: 89 0.65               Left: mmHg Index     Brachial: 131  Ankle-(PT): 115 0.84 Ankle-(DP): 111 0.81          Digit: 62 0.45 Resting ankle-brachial index of 0.83 on the right. Toe Pressures of 89 mmHg and TBI of 0.65 Resting ankle-brachial index of 0.84 on the left. Toe Pressures of 62 mmHg and TBI of 0.45  VPR WAVEFORMS: The right volume plethysmography waveforms are moderately abnormal at the lower thigh level, moderately abnormal at the upper calf level and moderately abnormal at the ankle. The left volume plethysmography waveforms are mildly abnormal at the lower thigh level, mildly abnormal at the upper calf level and mildly abnormal at the ankle. Impression:  1. RIGHT LOWER EXTREMITY: TRACIE is Abnormal with an TRACIE of 0.76 indicating single level disease. Toe Pressures are Normal and adequate for wound healing with toe pressures of 89 mmHg. Reduced DBI.  TRACIE reduced from previous (0.95) 2. LEFT LOWER EXTREMITY: TRACIE is Abnormal with an TRACIE of 0.84 indicating single level disease. Toe Pressures are Mildly abnormal but adequate for wound healing with toe pressures of 62 mmHg. Reduced DBI.  TRACIE improved from previous although study prior to thrombolysis. Reference: Wound classification Grade TRACIE Ankle Systolic Pressure Toe Pressures 0 > 0.80 > 100 mmHg > 60 mmHg 1 0.6 - 0.79 70 - 100 mmHg 40 - 59 mmHg 2 0.4 - 0.59 50-70 mmHg 30 - 39 mmHg 3 < 0.39 < 50 mmHg < 30 mmHg Digit Pressures DBI Disease Category > 0.70 Normal < 0.70 Abnormal > 30 mmHg Potential wound healing < 30 mmHg Impaired wound healing Ankle Brachial Pressures TRACIE Disease Category > 1.3  Likely vessel calcification with monophasic waveforms, non-diagnostic 0.95-1.30 Normal with multiphasic waveforms  0.50-0.95 Single level disease 0.30-0.50 Multilevel disease < 0.30 Critical limb ischema Volume Plethysmography Recording (VPR) at all levels Normal Sharp systolic peak, fast upstroke, prominent dicrotic notch in wave Mild Sharp systolic peak, fast upstroke, absent dicrotic notch in wave Moderate Flattened systolic peak, slowed upstroke, absent dicrotic notch inwave Severe amplitude wave with = upslope and down slope Occluded Flat Line      US Lower Extremity Venous Duplex Bilateral    Result Date: 2/17/2024  EXAM: US LOWER EXTREMITY VENOUS DUPLEX BILATERAL LOCATION: [facility] DATE: 2/17/2024 INDICATION: leukocytosis, evaluate for DVT COMPARISON: None. TECHNIQUE: Venous Duplex ultrasound of bilateral lower extremities with and without compression, augmentation and duplex. Color flow and spectral Doppler with waveform analysis performed. FINDINGS: Exam includes the common femoral, femoral, popliteal veins as well as segmentally visualized deep calf veins and greater saphenous vein. RIGHT: No deep vein thrombosis. No superficial thrombophlebitis. No popliteal cyst. LEFT: No deep vein thrombosis. No superficial thrombophlebitis. No popliteal cyst. Patient had recent bypass surgery in the left calf and patient is very tender in the region of the stitches. Due to these factors, evaluation is limited but no obvious clot or fluid collection is seen in this region.     IMPRESSION: 1.  No deep venous thrombosis in the bilateral lower extremities.     XR Chest 2 Views    Result Date: 2/17/2024  EXAM: XR CHEST 2 VIEWS LOCATION: [facility] DATE: 2/17/2024 INDICATION: Postoperative leukocytosis. COMPARISON: None available.     IMPRESSION: No focal airspace consolidation. No pleural effusion or pneumothorax. Cardiomediastinal silhouette is normal. Atherosclerosis of the thoracic aorta.    XR Surgery HARRIET G/T 5 Min Fluoro    Result Date: 2/15/2024  This exam was marked as  "non-reportable because it will not be read by a radiologist or a [place] non-radiologist provider.     POC US Guided Vascular Access    Result Date: 2/15/2024  Ultrasound was performed as guidance to an anesthesia procedure.  Click \"PACS images\" hyperlink below to view any stored images.  For specific procedure details, view procedure note authored by anesthesia.           "

## 2024-03-05 NOTE — PATIENT INSTRUCTIONS
Ashanti,    Your surgery with [facility] has been scheduled. Please read thoroughly and follow instructions.     Procedure:   I&D left hallux  Procedure Date :   TBD  *A surgery nurse will call you 1-2 days before surgery to inform you of the time of arrival for surgery.  Surgeon:   Dr. Rene Ordaz  Admission Type:   Outpatient  Procedure Location:   [facility]          Preparation Instructions to complete:    []  You will need a Pre-op Physical within 30 days before surgery with your primary care provider. This exam is required by the anesthesiologist to ensure a safe surgical experience.    Failure  to obtain your pre-op physical will lead to cancellation of your surgery  Call them right away to schedule this. Please ensure your Preoperative Physical is faxed to the Hospital (numbers listed above)    [] Preoperative Medication Instructions  We would like you to stop your anticoagulation medications 3-5 days before surgery HOWEVER contact your prescribing provider for instructions before discontinuing any medications. (Examples: Coumadin, Plavix, Xarelto, Eliquis, Pradaxa, Effient, Brilinta) If you are on Coumadin, we would like the goal INR ? 1.4.  IT IS OK TO STAY ON ASPIRIN PRIOR TO SURGERY.   Hold Ibuprofen, Herbal Supplements and Vitamin E 7 days before  Stop Cialis, Levitra and Viagra 2-3 days before surgery  If you take these diabetic medications, please discuss with your primary doctor and follow the hold instructions:   Hold seven (7) days prior for once weekly injectable doses [semaglutide (Ozempic, Wegovy), dulaglutide (Trulicity), exenatide ER (Bydureon), tirzepatide (Mounjaro)]  Hold the day before and day of for once daily injectable GLP-1 agonists [exenatide (Byetta), liraglutide (Saxenda, Victoza)]  Hold seven (7) days for oral semaglutide (Rybelsus)     [] Fasting Requirements  Nothing to eat for 8 hours before surgery unless instructed differently by the surgery  nurse.  You may have clear liquids up to two hours before your arrival time (coffee, tea, water, or Gatorade. No cream or milk)  If your primary care provider has instructed you to continue oral medications, you may take them on the morning of surgery with a small sip of water.    No alcohol or smoking after 12:00am the day of surgery    []  COVID-19 test is no longer needed  If you are experiencing COVID symptoms or have tested positive for COVID-19 within 14 days of procedure date, reach out to the care team to reschedule please.     [] Contact your insurance regarding coverage  If you would like a Good Heather Estimate for your upcoming procedure at [facility], contact Cost of Care Estimates   Advocates are available Monday through Friday 8am - 5pm   [phone]  You may also submit a request online through your [employer] account.  For all self-pay, estimate, or anesthesia billing questions at [facility], the contact information is below:  Who to contact: Karla HERNANDEZ  [facility] number: [phone]  Prepay number: [phone]    [] DO NOT BRING FMLA WITH TO SURGERY.  These should be sent to the provider's office by fax to [phone].     [] Day of Surgery  Medications - Take as indicated with sips of water.   Wear comfortable loose fitting clothes. Wear your glasses-Not contacts. Do not wear jewelry and remove body piercing's. Surgery may be cancelled if they are not removed.   You may have 1 family member wait in the lobby during your surgery. Visitor restrictions are subject to change. Please verify with the surgery nurse when they call.   If same day surgery-Have a someone come with you to surgery that can help you understand the surgeon's instructions, drive you home and stay with you overnight the first night.    [] If the community sees a new COVID-19 surge, your procedure may need to be postponed. We will contact you if this happens.    [] You will receive a  call from a surgery nurse 1-3 days prior to surgery. They will go over more details with you.             ** AFTER SURGERY INSTRUCTIONS **      [] You are to remain NON WEIGHT BEARING on your left foot NON WEIGHT BEARING MEANS NO PRESSURE ON YOUR FOOT OR HEEL AT ANY TIME FOR ANY REASON!    [] Prior to surgery you should already have a CAM BOOT which you will need to WEAR THIS ANYTIME YOU ARE UP AND OUT OF BED, IT IS OKAY TO REMOVE WHEN YOU ARE SLEEPING. Please bring this with you on the day of surgery.    [] You should already have a A ROLLING KNEE WALKER to help you ambulate after surgery. Please also bring this with you on the day of surgery.    [] During surgery Dr. Ordaz will apply a gauze dressing to your foot. This will remain intact until you are seen in clinic for follow up    [] It is NOT OKAY to get your surgical site wet while bathing, you will need to purchase a cast cover to protect your foot from getting wet. You can purchase this at [facility] or your local pharmacy.    [] It is important that you elevate your affected foot after surgery. Proper elevation is raising your foot above your waist. The fluid in your lower extremities needs to get back to your heart so it can get pumped to your kidneys and expelled through urination. So if you notice you have to go to the bathroom more frequently when you are elevating your leg this is a good sign that it is working.     [] It is important that you increase your protein intake after surgery. Protein is essential for wound healing. We recommend you take a protein supplement twice per day. This is in addition to your normal diet. Examples of protein supplements are Ensure, Boost, Glucerna (if you are diabetic), or protein powder. You can purchase these at your local retailer or grocery store.       Notify our office right away, if you have any changes in your health status, or if you develop a cold, flu, diarrhea, infection, fever or sore throat  before your scheduled surgery date. We can be reached at [phone]   Monday-Friday 8 am-4:30 pm if you have any questions.     Thank you for trusting us with your care!

## 2024-03-06 ENCOUNTER — TELEPHONE (OUTPATIENT)
Dept: VASCULAR SURGERY | Facility: CLINIC | Age: 69
End: 2024-03-06
Payer: COMMERCIAL

## 2024-03-06 ENCOUNTER — OFFICE VISIT (OUTPATIENT)
Dept: FAMILY MEDICINE | Facility: CLINIC | Age: 69
End: 2024-03-06
Payer: COMMERCIAL

## 2024-03-06 VITALS
RESPIRATION RATE: 16 BRPM | SYSTOLIC BLOOD PRESSURE: 106 MMHG | WEIGHT: 145 LBS | DIASTOLIC BLOOD PRESSURE: 68 MMHG | TEMPERATURE: 97.9 F | OXYGEN SATURATION: 100 % | BODY MASS INDEX: 27.4 KG/M2 | HEART RATE: 86 BPM

## 2024-03-06 DIAGNOSIS — I73.9 PAD (PERIPHERAL ARTERY DISEASE) (H): Primary | ICD-10-CM

## 2024-03-06 DIAGNOSIS — Z98.890 S/P FEMORAL-TIBIAL BYPASS: ICD-10-CM

## 2024-03-06 PROBLEM — R29.898 LEFT LEG WEAKNESS: Status: ACTIVE | Noted: 2023-09-27

## 2024-03-06 PROBLEM — R26.9 GAIT ABNORMALITY: Status: ACTIVE | Noted: 2023-09-27

## 2024-03-06 PROCEDURE — 87070 CULTURE OTHR SPECIMN AEROBIC: CPT | Performed by: STUDENT IN AN ORGANIZED HEALTH CARE EDUCATION/TRAINING PROGRAM

## 2024-03-06 PROCEDURE — 87205 SMEAR GRAM STAIN: CPT | Performed by: STUDENT IN AN ORGANIZED HEALTH CARE EDUCATION/TRAINING PROGRAM

## 2024-03-06 PROCEDURE — 99204 OFFICE O/P NEW MOD 45 MIN: CPT | Performed by: STUDENT IN AN ORGANIZED HEALTH CARE EDUCATION/TRAINING PROGRAM

## 2024-03-06 PROCEDURE — 87077 CULTURE AEROBIC IDENTIFY: CPT | Performed by: STUDENT IN AN ORGANIZED HEALTH CARE EDUCATION/TRAINING PROGRAM

## 2024-03-06 PROCEDURE — 87186 SC STD MICRODIL/AGAR DIL: CPT | Performed by: STUDENT IN AN ORGANIZED HEALTH CARE EDUCATION/TRAINING PROGRAM

## 2024-03-06 NOTE — PROGRESS NOTES
Assessment & Plan     PAD (peripheral artery disease) (H24)  S/P femoral-tibial bypass  - Body fluid, unsp Aerobic Bacterial Culture Routine With Gram Stain  - REMOVAL OF SUTURES      Ashanti is presenting 3 weeks following femoral-tibial bypass of the left leg.  Was seen in wound care clinic 2 days ago and 10 staples were removed but the medial 5 were left in.  She reports that since then, she is having significant pain, particularly with walking, yellow discharge, and redness to touch the surrounding area.  When Steri-Strips were removed, there was appropriate granulation tissue and adequate skin approximation. Exam and vitals reassuring against tibial osteo or DVT.  With patient's description of pulling and significant trouble walking, we decided to remove the remaining 5 staples and she had significant improvement in pain with those out.  We completed a culture of the wound discharge and replaced Steri-Strips.   - Advised patient to remain off antibiotics however if symptoms do not improve over the next 24 to 48 hours, will take Keflex that she has at home 500mg BID for 7 days  -Follow-up Monday with wound care    No follow-ups on file.    Jenifer Villegas, DO  she/her  [facility]    Subjective     Ashanti Aviles is a 68 year old female who presents to clinic today for the following health issues:    HPI  Has a history of acute osteomyelitis of the left hallux. MRI 3/5/2024 showed progression of findings with possible reactive osteitis versus early osteomyelitis    Virtual visit yesterday with foot and ankle surgery, who recommended surgical I&D and outpatient surgery was scheduled for 3/21/2024.    Called vascular surgery this morning because left lower extremity bypass staples from 2/15 femoral to PT bypass are in place but she noticed increased oozing and crusting drainage.  Area around the staples is red and warm to touch.  She reports 3-5/10 burning pain  Took out 10 on Monday, supposed to  have the other 5 possible removed on Monday    Hurts a lot when she is walking mostly, feels like the skin is pulling around the staples    Has leftover antibiotics at home from prior infection that she never ended up having to take    Past Medical History:   Diagnosis Date    Benign gastric polyp     Chronic toe ulcer (H)     Chronic ulcer of great toe of left foot with necrosis of muscle (H)     Diabetes mellitus (H)     Gastroesophageal reflux disease     Hard to intubate 2/15/2024    History of colonic polyps     History of colonic polyps     HLD (hyperlipidemia)     Hypertension     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/29/2013    Nicotine addiction     OAG (open angle glaucoma)     PVD (peripheral vascular disease) (H24)     Reflux esophagitis     Retinopathy     Tubular adenoma     Yeast vaginitis        Allergies   Allergen Reactions    Simvastatin Muscle Pain (Myalgia)     Muscle pain    Victoza [Liraglutide] Other (See Comments)     Binds bowels    Penicillins Unknown     Childhood rxn     Current Outpatient Medications   Medication    acetaminophen (ACETAMINOPHEN 8 HOUR) 650 MG CR tablet    amLODIPine (NORVASC) 5 MG tablet    apixaban ANTICOAGULANT (ELIQUIS) 5 MG tablet    clopidogrel (PLAVIX) 75 MG tablet    Continuous Blood Gluc Sensor (MetranomeYLE PRINCESS 14 DAY SENSOR) MISC    empagliflozin (JARDIANCE) 25 MG TABS tablet    ezetimibe (ZETIA) 10 MG tablet    hydrochlorothiazide (HYDRODIURIL) 25 MG tablet    Insulin Degludec (TRESIBA) 100 UNIT/ML SOLN    linagliptin (TRADJENTA) 5 MG TABS tablet    lisinopril (ZESTRIL) 40 MG tablet    metFORMIN (GLUCOPHAGE XR) 500 MG 24 hr tablet    omeprazole (PRILOSEC) 20 MG DR capsule    UNABLE TO FIND     No current facility-administered medications for this visit.      Review of Systems  Constitutional, HEENT, cardiovascular, pulmonary, gi and gu systems are negative, except as otherwise noted.      Objective    /68   Pulse 86   Temp 97.9  F (36.6  C)    Resp 16   Wt 65.8 kg (145 lb)   SpO2 100%   BMI 27.40 kg/m      Physical Exam  Constitutional:       Appearance: Normal appearance.      Comments: Present with    HENT:      Mouth/Throat:      Mouth: Mucous membranes are moist.   Eyes:      Pupils: Pupils are equal, round, and reactive to light.   Cardiovascular:      Rate and Rhythm: Normal rate.   Musculoskeletal:      Comments: No posterior popliteal pain.   Lymphadenopathy:      Comments: No lower extremity swelling   Skin:     General: Skin is warm.             Comments: L medial lower leg with healing surgical site. 5 staples in place in the center of the wound and proximal and distal wound with steri-strips in place. Surrounding skin warm to touch with granulation tissue and minimal yellow pus draining from distal 2 staples   Neurological:      Mental Status: She is alert.       After staples removed:      The use of Dragon/Fabricly dictation services may have been used to construct the content in this note; any grammatical or spelling errors are non-intentional. Please contact the author of this note directly if you are in need of any clarification.

## 2024-03-06 NOTE — TELEPHONE ENCOUNTER
"Caller: Ashanti    Provider: APOORVA Ramirez    Detailed reason for call: Pt had recent LLE bypass. Staples are still in place- per pt there is 5 remaining staples and where the staples are located, there is increased drainage, pt reports its \"oozing\" and \"crusty\". Denies fever/chills. Area around staples is red and warm to the touch per pt. Reports 3-5/10 burning pain. Advised patient they can send us a picture to vascular1 email, and advised patient to be seen at an urgent care to rule out infection. Pt voiced understanding and plans to be seen in urgent care today.       (Noted to patient if reason is related to wound or incision, to please send a photo via email or [employer].)     "

## 2024-03-11 ENCOUNTER — OFFICE VISIT (OUTPATIENT)
Dept: VASCULAR SURGERY | Facility: CLINIC | Age: 69
End: 2024-03-11
Attending: SURGERY
Payer: COMMERCIAL

## 2024-03-11 ENCOUNTER — DOCUMENTATION ONLY (OUTPATIENT)
Dept: VASCULAR SURGERY | Facility: CLINIC | Age: 69
End: 2024-03-11

## 2024-03-11 VITALS
HEART RATE: 84 BPM | SYSTOLIC BLOOD PRESSURE: 141 MMHG | TEMPERATURE: 97.1 F | RESPIRATION RATE: 16 BRPM | DIASTOLIC BLOOD PRESSURE: 72 MMHG

## 2024-03-11 DIAGNOSIS — S81.802D OPEN WOUND OF LEFT LOWER LEG, SUBSEQUENT ENCOUNTER: Primary | ICD-10-CM

## 2024-03-11 LAB
BACTERIA WND CULT: ABNORMAL
GRAM STAIN RESULT: ABNORMAL
GRAM STAIN RESULT: ABNORMAL

## 2024-03-11 PROCEDURE — 97602 WOUND(S) CARE NON-SELECTIVE: CPT

## 2024-03-11 ASSESSMENT — PAIN SCALES - GENERAL: PAINLEVEL: MODERATE PAIN (4)

## 2024-03-11 NOTE — PROGRESS NOTES
EpiFix IVR sent via [employer] portal    Benefits Verification request sent to [employer] for [employer]erm[employer] also sent, but sent in error. Message left for [facility] to disregard

## 2024-03-11 NOTE — RESULT ENCOUNTER NOTE
Already advised on antibiotics and culture pan-sensitive.    Rosibel Vega MD  03/10/24  *You can generally contact me quickly via Epic chat*

## 2024-03-11 NOTE — PROGRESS NOTES
[facility]  -  Nurse Visit        Date of Service:  March 11, 2024     Requesting Provider: APOORVA Ramirez    Diagnosis:     ICD-10-CM    1. Open wound of left lower leg, subsequent encounter  S81.802D Wound care          Chief Complaint: Ashanti is being seen today at [facility] [place] for her left lower leg wound dressing change S/P LLE bypass. Reports pain of 4.  Denies any fevers, chills, or generalized ill feeling. sAhanti had gone to walk in care on 3/6 and they did wound culture (preliminary result at this time) and removed remaining staples. She took her last antibiotic that they advised her to start last evening (Keflex). Today wound does not appear infected but discussed signs and symptoms of infection and what to watch for. She should call clinic or send another photo if she has concerns or return to walk in care. Next follow up in 3/18 with Dr. Geller.    Dressing on Arrival: Allevyn gentle border foam adhesive, Pt is currently using no compression. She reports she is in the process of getting new compression socks. Upon removal of dressings light Serosanguinous drainage is noted.    New Wounds noted: No    Left lower leg 3/11    Vital Signs: BP (!) 141/72   Pulse 84   Temp 97.1  F (36.2  C)   Resp 16     Assessment:      General:  Patient presents to clinic in no apparent distress.   Psychiatric:  Alert and oriented x3.   Lower extremity:  edema is present.    Integumentary:  Skin is WNL    Circumferential volume measures:       No data to display                Wound info:  VASC Wound left hallux (Active)   Pre Size Length 0.8 01/24/24 0800   Pre Size Width 1 01/24/24 0800   Pre Size Depth 0.5 01/24/24 0800   Pre Total Sq cm 0.8 01/24/24 0800       VASC Wound Left lower leg wound (Active)   Pre Size Length 2.2 03/11/24 0900   Pre Size Width 0.3 03/11/24 0900   Pre Size Depth 0.2 03/11/24 0900   Pre Total Sq cm 0.66 03/11/24 0900       Incision/Surgical  Site 02/15/24 Anterior;Left Groin (Active)   Incision Assessment WDL 02/19/24 0929   Dressing Other (Comment) 02/16/24 1600   Closure Liquid bandage 02/19/24 0929   Incision Drainage Amount None 02/19/24 0929   Dressing Intervention Open to air / No Dressing 02/19/24 0929       Incision/Surgical Site 02/15/24 Left;Medial Calf (Active)   Incision Assessment UTV 02/19/24 0929   Dressing Dry gauze 02/19/24 0929   Closure TEJ 02/19/24 0929   Incision Drainage Amount None 02/19/24 0929   Dressing Intervention Clean, dry, intact 02/19/24 0929       Undermining is not present.    The periwoundskin is  pink      Plan:         1. Patient will return in 1 weeks for routine follow up with Dr. Geller and maria a.            2. As listed below, treatment provided irrigation, mechanical cleansing, and dressings to promote autolytic debridement.             Cleansed with: Normal saline    Protected skin with: 3M Cavilon    Dressings Applied to wound: Aquacel Advantage AG, Allevyn gentle border foam adhesive or gauze and roll gauze.    Compression Applied to the right leg: None  Compression Applied to the left leg: None        Offloading used: None    Trial Products: No    Provider notified regarding concerns: No    Treatment Changes: Yes: Added Aquacel Ag to wound area today and taught regarding application.     Tolerated Dressing Change:  Yes    Taught Regarding: follow up appointment(s), wound cares, elevation, offloading, signs of infection, test results, and antibiotics    Educational Barriers: No barriers      Christina Ramírez RN, WTA-C, [facility]

## 2024-03-11 NOTE — PATIENT INSTRUCTIONS
Return on 3/18 for ultrasounds and follow up visit with Dr. Geller. We will recheck your left leg wound at this time.      Wound Care Instructions- Left leg wound     Daily or every other day based on amount of drainage cleanse your left lower leg wound with Normal Saline or Wound Cleanser     Pat dry with non-sterile gauze     Apply Lotion to the intact skin surrounding your wound and other dry skin locations. Some good lotions include: Remedy Skin Repair Cream or Cetaphil     Apply Aquacel Ag into/onto the wounds. Cut to the size of the open area like shown in clinic.     Cover with gauze     Secure with roll gauze as needed        It is not ok to get your wound wet in the bath or shower     It is ok to continue current wound care treatment/products for the next 2-3 days until new wound care supplies are ordered and arrive. If longer than this please contact our office at [phone].     SEEK MEDICAL CARE IF:  You have an increase in swelling, pain, or redness around the wound.  You have an increase in the amount of pus coming from the wound.  There is a bad smell coming from the wound.  The wound appears to be worsening/enlarging  You have a fever greater than 101.5 F

## 2024-03-18 ENCOUNTER — ANCILLARY PROCEDURE (OUTPATIENT)
Dept: VASCULAR ULTRASOUND | Facility: CLINIC | Age: 69
End: 2024-03-18
Attending: SURGERY
Payer: COMMERCIAL

## 2024-03-18 ENCOUNTER — OFFICE VISIT (OUTPATIENT)
Dept: VASCULAR SURGERY | Facility: CLINIC | Age: 69
End: 2024-03-18
Attending: SURGERY
Payer: COMMERCIAL

## 2024-03-18 VITALS
SYSTOLIC BLOOD PRESSURE: 120 MMHG | DIASTOLIC BLOOD PRESSURE: 77 MMHG | RESPIRATION RATE: 14 BRPM | HEART RATE: 65 BPM | OXYGEN SATURATION: 100 %

## 2024-03-18 DIAGNOSIS — I73.9 PAD (PERIPHERAL ARTERY DISEASE) (H): Primary | ICD-10-CM

## 2024-03-18 DIAGNOSIS — I73.9 PAD (PERIPHERAL ARTERY DISEASE) (H): ICD-10-CM

## 2024-03-18 DIAGNOSIS — T82.868A THROMBOSIS OF ARTERIAL GRAFT, INITIAL ENCOUNTER (H): Primary | ICD-10-CM

## 2024-03-18 PROCEDURE — 93923 UPR/LXTR ART STDY 3+ LVLS: CPT | Mod: 26 | Performed by: SURGERY

## 2024-03-18 PROCEDURE — 93926 LOWER EXTREMITY STUDY: CPT | Mod: 26 | Performed by: SURGERY

## 2024-03-18 PROCEDURE — 99024 POSTOP FOLLOW-UP VISIT: CPT | Performed by: SURGERY

## 2024-03-18 PROCEDURE — 93923 UPR/LXTR ART STDY 3+ LVLS: CPT

## 2024-03-18 PROCEDURE — 99213 OFFICE O/P EST LOW 20 MIN: CPT | Mod: 25 | Performed by: SURGERY

## 2024-03-18 PROCEDURE — 93926 LOWER EXTREMITY STUDY: CPT | Mod: LT

## 2024-03-18 RX ORDER — PEN NEEDLE, DIABETIC 31 GX3/16"
NEEDLE, DISPOSABLE MISCELLANEOUS
COMMUNITY
Start: 2024-02-27

## 2024-03-18 ASSESSMENT — PAIN SCALES - GENERAL: PAINLEVEL: NO PAIN (0)

## 2024-03-18 NOTE — PROGRESS NOTES
VASCULAR SURGERY PROGRESS NOTE   VASCULAR SURGEON: Dyan Geller MD, RPVI     LOCATION:  [facility]     Ashanti Aviles   Medical Record #:  [id number]  YOB: 1955  Age:  68 year old     Date of Service: 3/18/2024    PRIMARY CARE PROVIDER: [place]      Reason for visit: Follow-up    IMPRESSION: 68-year-old female who comes to vascular surgery clinic for follow-up.  Patient has a history of redo left femoral to posterior tibial artery bypass with cryo artery.  Unfortunately, the second bypass had failed as well.  Patient has been on anticoagulation and best medical management therapy.  Patient had quit smoking as well.  The completion angiogram looked normal so the technical aspect of the operation is probably not the cause for failure.  I am concerned for underlying medical condition such as hypercoagulable state which could be because of relatively fast bypass failure.    RECOMMENDATION/RISKS: Will refer this patient to Dr. Burleson from vascular medicine for evaluation of potential underlying medical cause of bypass failure.  I will see her back after.  Will also get a CTA with runoff.    HPI:  Ashanti Aviles is a 68 year old female who was seen today for follow-up.  Patient is overall doing okay and denies any significant complaints despite the bypass failure.    REVIEW OF SYSTEMS:    A 12 point ROS was reviewed and is negative .     PHH:    Past Medical History:   Diagnosis Date    Benign gastric polyp     Chronic toe ulcer (H)     Chronic ulcer of great toe of left foot with necrosis of muscle (H)     Diabetes mellitus (H)     Gastroesophageal reflux disease     Hard to intubate 2/15/2024    History of colonic polyps     History of colonic polyps     HLD (hyperlipidemia)     Hypertension     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/29/2013    Nicotine addiction     OAG (open angle glaucoma)     PVD (peripheral vascular disease) (H24)     Reflux  esophagitis     Retinopathy     Tubular adenoma     Yeast vaginitis           Past Surgical History:   Procedure Laterality Date    AMPUTATE TOE(S) Right 02/18/2023    Procedure: Amputation fourth toe right foot;  Surgeon: Benjamin Diez DPM;  Location: [facility] OR    ANGIOGRAM Left 2/15/2024    Procedure: COMPLETION OF ANGIOGRAM, LEFT COMMON ILIAC ARTERY STENT PLACEMENT;  Surgeon: Dyan Geller MD;  Location: [facility] OR    ANUS SURGERY      BIOPSY CERVICAL, LOCAL EXCISION, SINGLE/MULTIPLE      COLONOSCOPY N/A 09/11/2020    Procedure: EXAM UNDER ANESTHESIA, HIGH RESOLUTION ANOSCOPY INTRA OP;  Surgeon: Preeti Sheehan MD;  Location: [facility] OR;  Service: Gastroenterology    COLONOSCOPY N/A 12/14/2020    Procedure: EXAM UNDER ANESTHESIA WITH HIGH RESOLUTION ANOSCOPY;  Surgeon: Preeti Sheehan MD;  Location: [facility] OR;  Service: General    COLONOSCOPY N/A 06/15/2021    Procedure: EXAM UNDER ANESTHESIA WITH HIGH RESOLUTION ANOSCOPY, BIOPSY, FULGURATION;  Surgeon: Preeti Sheehan MD;  Location: [facility] OR;  Service: Gastroenterology    ENDARTERECTOMY FEMORAL Left 05/17/2023    Procedure: LEFT FEMORAL ENDARTERECTOMY WITH RETROGRADE ILIAC STENT;  Surgeon: Dyan Geller MD;  Location: [facility] OR    EXAM UNDER ANESTHESIA ANUS N/A 09/14/2021    Procedure: EXAM UNDER ANESTHESIA WITH HIGH RESOLUTION ANOSCOPY;  Surgeon: Preeti Sheehan MD;  Location: [facility] OR    FEMORAL ARTERY - TIBIAL ARTERY BYPASS GRAFT Left 09/08/2023    Procedure: CREATION, BYPASS, ARTERIAL, FEMORAL TO TIBIAL, LEFT;  Surgeon: Dyan Geller MD;  Location: [facility] OR    FEMORAL ARTERY - TIBIAL ARTERY BYPASS GRAFT Left 2/15/2024    Procedure: CREATION, BYPASS, ARTERIAL, FEMORAL TO TIBIAL,;  Surgeon: Dyan Geller MD;  Location: [facility] OR    INCISION AND DRAINAGE FOOT, COMBINED Left 11/27/2023    Procedure: INCISION AND  DRAINAGE, LEFT HALLUX;  Surgeon: Rene Ordaz DPM;  Location: [facility] OR    IR LOWER EXTREMITY ANGIOGRAM LEFT  03/23/2023    IR LOWER EXTREMITY ANGIOGRAM RIGHT  02/16/2023    IR THROMBOLYSIS ARTERIAL INFUSION INITIAL DAY  10/09/2023    LEEP TX, CERVICAL      2005    TUBAL LIGATION         ALLERGIES:  Simvastatin, Victoza [liraglutide], and Penicillins    MEDS:    Current Outpatient Medications:     acetaminophen (ACETAMINOPHEN 8 HOUR) 650 MG CR tablet, Take 1,300 mg by mouth every 8 hours as needed for mild pain or fever, Disp: , Rfl:     amLODIPine (NORVASC) 5 MG tablet, Take 5 mg by mouth daily, Disp: , Rfl:     clopidogrel (PLAVIX) 75 MG tablet, Take 75 mg by mouth daily, Disp: , Rfl:     Continuous Blood Gluc Sensor (FREESTYLE PRINCESS 14 DAY SENSOR) MISC, , Disp: , Rfl:     empagliflozin (JARDIANCE) 25 MG TABS tablet, Take 1 tablet (25 mg) by mouth daily, Disp: 90 tablet, Rfl: 1    ezetimibe (ZETIA) 10 MG tablet, Take 10 mg by mouth daily, Disp: , Rfl:     hydrochlorothiazide (HYDRODIURIL) 25 MG tablet, Take 25 mg by mouth daily, Disp: , Rfl:     Insulin Degludec (TRESIBA) 100 UNIT/ML SOLN, Inject 15 Units Subcutaneous 2 times daily, Disp: , Rfl:     linagliptin (TRADJENTA) 5 MG TABS tablet, Take 5 mg by mouth daily, Disp: , Rfl:     lisinopril (ZESTRIL) 40 MG tablet, Take 40 mg by mouth daily, Disp: , Rfl:     metFORMIN (GLUCOPHAGE XR) 500 MG 24 hr tablet, Take 1,000 mg by mouth daily, Disp: , Rfl:     omeprazole (PRILOSEC) 20 MG DR capsule, Take 20 mg by mouth daily as needed, Disp: , Rfl:     UNABLE TO FIND, Take 2 capsules by mouth daily MEDICATION NAME: Gui Doe, Disp: , Rfl:     UNIFINE PENTIPS 31G X 5 MM miscellaneous, , Disp: , Rfl:     insulin glargine (LANTUS PEN) 100 UNIT/ML pen, Inject 32 units subcutaneously twice daily for 90 days. (Patient not taking: Reported on 3/18/2024), Disp: , Rfl:     SOCIAL HABITS:    History   Smoking Status    Former    Packs/day: 0.50    Types: Cigarettes     Quit date: 2/12/2023   Smokeless Tobacco    Never     Social History    Substance and Sexual Activity      Alcohol use: Not Currently        Comment: Alcoholic Drinks/MONTH: 2x      History   Drug Use Unknown       FAMILY HISTORY:    Family History   Problem Relation Age of Onset    Hypertension Mother     Colon Cancer Mother     Diabetes Type 2  Father     Hypertension Father     Kidney failure Father     Hyperthyroidism Sister     Breast Cancer Paternal Aunt        PE:  /77   Pulse 65   Resp 14   SpO2 100%   Wt Readings from Last 1 Encounters:   03/06/24 65.8 kg (145 lb)     There is no height or weight on file to calculate BMI.    EXAM:  GENERAL: This is a well-developed 68 year old female who appears her stated age  EYES: Grossly normal.  MOUTH: Buccal mucosa normal   CARDIAC: Normal   CHEST/LUNG: Clear to auscultation bilaterally  GASTROINTESINAL soft nontender nondistended  MUSCULOSKELETAL: Grossly normal and both lower extremities are intact.  HEME/LYMPH: No lymphedema  NEUROLOGIC: Focally intact, Alert and oriented x 3.   PSYCH: appropriate affect  INTEGUMENT: No open lesions or ulcers  Pulse Exam:           DIAGNOSTIC STUDIES:     Images:  MR Foot Left w/o & w Contrast    Result Date: 3/5/2024  EXAM: MR FOOT LEFT W/O and W CONTRAST LOCATION: [facility] DATE: 3/5/2024 INDICATION: Osteomyelitis of the left hallux. COMPARISON: Multiple prior comparison exams, the most recent dated 11/09/2023 TECHNIQUE: Routine. Additional postgadolinium T1 sequences were obtained. IV CONTRAST: Gadavist 7mL FINDINGS: JOINTS AND BONES: -Patchy mildly enhancing T2 hyperintense signal abnormality involving the distal phalanx which has slightly progressed since the most recent comparison exam of November 2023. No confluent T1 hypointense marrow replacing signal. Mild degenerative arthrosis of the first metatarsophalangeal and second tarsometatarsal joint. No acute fracture. No joint effusion or  enhancing synovitis. TENDONS: -No tendon tear, tendinopathy, or tenosynovitis. LIGAMENTS: -Lisfranc ligament complex and visualized collateral and capsular ligaments are intact. MUSCLES AND SOFT TISSUES: -Subtle broad-based ulceration over the distal dorsal medial aspect of the first toe. No loculated fluid collection to suggest abscess. -Progressive T2 hyperintense nonenhancing signal abnormality within the intrinsic muscles of the foot (series 4 image 15).     IMPRESSION: 1.  Patchy mildly enhancing T2 hyperintense signal abnormality involving the distal phalanx of the hallux which has slightly progressed since the comparison exam. Findings are indeterminate and may reflect persistent/progressive reactive osteitis, however, early osteomyelitis cannot be entirely excluded. 2.  Patchy T2 hyperintense nonenhancing intramuscular signal abnormality, nonspecific and possibly related to recent revascularization. Infectious or inflammatory myositis is considered much less likely. 3.  No abscess. 4.  Mild degenerative arthrosis of the first MTP and second TMT joints.     US Lower Extremity Arterial Duplex Left    Result Date: 2/23/2024  Table formatting from the original result was not included. Arterial Duplex Ultrasound (Date: 02/23/24) Lower Extremity Bypass Evaluation Indication: Status post left femoral-tibial bypass graft/left MIKE stent/ Hx of Left CFA-TPT BPG using spliced 4mm and 5 mm Artegraft graft done 9/8/2023, decreased lower extremity pulses, lower extremity pain       Hx: 10/09/2023 IR Left Thrombolytics unsuccessful.   5/17/2023 IR Left MIKE Stents / Iliofemoral Endarterectomy.              3/23/23 IR Left Leg Angiogram ( Occluded SFA/ Pop. A./ RICKY),              2/16/23 IR Right SFA Proximal Atherectomy/ Balloon Angiogram.              2/18/23 Right 4th Toe Amputation              Left Big Toe Nail Removed/ Painful  Previous: 7/24/23, 2/27/2023; 06/26/23; 10/06/23-US, TRACIE, CTA; 10/09/23-IR  Date of bypass  graft: 9/8/2023 Left LEG History: Previous Smoker, Hypertension, Diabetic, PAD, Angioplasty, Vascular Surgery, Surgical Follow-up, and Vascular Ulcers Technique: Duplex imaging is performed utilizing gray-scale, two-dimensional images, and color-flow imaging. Doppler waveform analysis and spectral Doppler imaging is also performed. Left Leg (cm/s) Location Velocity  Waveforms  M  M   M SFA 0  - Inflow Artery CFA   Proximal Anastamosis 141 B Proximal Bypass 47 B Mid Bypass 37  M Distal Bypass 23 M Distal Anastamosis 47  M Outflow Artery PTA MID   PTA  M PTA DISTAL 122 M  DPA 15 M  Waveforms: T=Triphasic, M=Monophasic, B=Biphasic Comments: Limited visualization of groin arteries and graft due to recent surgery. Graft placement is deep in leg.  Impression: 1. LEFT LOWER EXTREMITY: High grade stenosis of the common femoral artery by velocity criteria although poor grayscale images limit exam. Color flow shows turbulent flow without luminal compromise. Low flow velocities in mid and distal bypass <40 cm/second.  Monophasic, low resistance waveforms with mildly blunted upstroke distally. Reference: Category Normal Intermediate Severe Occluded  PSV  cm/s 151-300 cm/s <45 or >300cm/s Absent Flow Ratio <1.5 1.5-3.4 >3.4 N/A      US Low Ext Arterial Dop Seg Pres w/o Exercise    Result Date: 2/23/2024  Table formatting from the original result was not included. BILATERAL RESTING ANKLE-BRACHIAL INDICES (TRACIE'S) (Date: 02/23/24) Indication: Status post left femoral-tibial bypass graft/left MIKE stent/ Hx of Left CFA-TPT BPG using spliced 4mm and 5 mm Artegraft graft done 9/8/2023, decreased lower extremity pulses, lower extremity pain       Hx: 10/09/2023 IR Left Thrombolytics unsuccessful.   5/17/2023 IR Left MIKE Stents / Iliofemoral Endarterectomy.              3/23/23 IR Left Leg Angiogram ( Occluded SFA/ Pop. A./ RICKY),              2/16/23 IR Right SFA Proximal Atherectomy/ Balloon Angiogram.               2/18/23 Right 4th Toe Amputation              Left Big Toe Nail Removed/ Painful  Previous: 7/24/23, 2/27/2023; 06/26/23; 10/06/23-US, TRACIE, CTA; 10/09/23-IR  Date of bypass graft: 9/8/2023 Left LEG History: Previous Smoker, Hypertension, Diabetic, PAD, Angioplasty, Vascular Surgery, Surgical Follow-up, and Vascular Ulcers Resting TRACIE's          Right: mmHg Index     Brachial: 137  Ankle-(PT): 104 0.76 Ankle-(DP): 114 0.83          Digit: 89 0.65               Left: mmHg Index     Brachial: 131  Ankle-(PT): 115 0.84 Ankle-(DP): 111 0.81          Digit: 62 0.45 Resting ankle-brachial index of 0.83 on the right. Toe Pressures of 89 mmHg and TBI of 0.65 Resting ankle-brachial index of 0.84 on the left. Toe Pressures of 62 mmHg and TBI of 0.45  VPR WAVEFORMS: The right volume plethysmography waveforms are moderately abnormal at the lower thigh level, moderately abnormal at the upper calf level and moderately abnormal at the ankle. The left volume plethysmography waveforms are mildly abnormal at the lower thigh level, mildly abnormal at the upper calf level and mildly abnormal at the ankle. Impression:  1. RIGHT LOWER EXTREMITY: TRACIE is Abnormal with an TRACIE of 0.76 indicating single level disease. Toe Pressures are Normal and adequate for wound healing with toe pressures of 89 mmHg. Reduced DBI.  TRACIE reduced from previous (0.95) 2. LEFT LOWER EXTREMITY: TRACIE is Abnormal with an TRACIE of 0.84 indicating single level disease. Toe Pressures are Mildly abnormal but adequate for wound healing with toe pressures of 62 mmHg. Reduced DBI.  TRACIE improved from previous although study prior to thrombolysis. Reference: Wound classification Grade TRACIE Ankle Systolic Pressure Toe Pressures 0 > 0.80 > 100 mmHg > 60 mmHg 1 0.6 - 0.79 70 - 100 mmHg 40 - 59 mmHg 2 0.4 - 0.59 50-70 mmHg 30 - 39 mmHg 3 < 0.39 < 50 mmHg < 30 mmHg Digit Pressures DBI Disease Category > 0.70 Normal < 0.70 Abnormal > 30 mmHg Potential wound healing < 30  mmHg Impaired wound healing Ankle Brachial Pressures TRACIE Disease Category > 1.3  Likely vessel calcification with monophasic waveforms, non-diagnostic 0.95-1.30 Normal with multiphasic waveforms 0.50-0.95 Single level disease 0.30-0.50 Multilevel disease < 0.30 Critical limb ischema Volume Plethysmography Recording (VPR) at all levels Normal Sharp systolic peak, fast upstroke, prominent dicrotic notch in wave Mild Sharp systolic peak, fast upstroke, absent dicrotic notch in wave Moderate Flattened systolic peak, slowed upstroke, absent dicrotic notch inwave Severe amplitude wave with = upslope and down slope Occluded Flat Line      US Lower Extremity Venous Duplex Bilateral    Result Date: 2/17/2024  EXAM: US LOWER EXTREMITY VENOUS DUPLEX BILATERAL LOCATION: [facility] DATE: 2/17/2024 INDICATION: leukocytosis, evaluate for DVT COMPARISON: None. TECHNIQUE: Venous Duplex ultrasound of bilateral lower extremities with and without compression, augmentation and duplex. Color flow and spectral Doppler with waveform analysis performed. FINDINGS: Exam includes the common femoral, femoral, popliteal veins as well as segmentally visualized deep calf veins and greater saphenous vein. RIGHT: No deep vein thrombosis. No superficial thrombophlebitis. No popliteal cyst. LEFT: No deep vein thrombosis. No superficial thrombophlebitis. No popliteal cyst. Patient had recent bypass surgery in the left calf and patient is very tender in the region of the stitches. Due to these factors, evaluation is limited but no obvious clot or fluid collection is seen in this region.     IMPRESSION: 1.  No deep venous thrombosis in the bilateral lower extremities.           LABS:      Sodium   Date Value Ref Range Status   02/19/2024 137 135 - 145 mmol/L Final     Comment:     Reference intervals for this test were updated on 09/26/2023 to more accurately reflect our healthy population. There may be differences in the  flagging of prior results with similar values performed with this method. Interpretation of those prior results can be made in the context of the updated reference intervals.    02/18/2024 135 135 - 145 mmol/L Final     Comment:     Reference intervals for this test were updated on 09/26/2023 to more accurately reflect our healthy population. There may be differences in the flagging of prior results with similar values performed with this method. Interpretation of those prior results can be made in the context of the updated reference intervals.    02/17/2024 137 135 - 145 mmol/L Final     Comment:     Reference intervals for this test were updated on 09/26/2023 to more accurately reflect our healthy population. There may be differences in the flagging of prior results with similar values performed with this method. Interpretation of those prior results can be made in the context of the updated reference intervals.      Urea Nitrogen   Date Value Ref Range Status   02/19/2024 11.7 8.0 - 23.0 mg/dL Final   02/18/2024 11.8 8.0 - 23.0 mg/dL Final   02/17/2024 10.8 8.0 - 23.0 mg/dL Final     Hemoglobin   Date Value Ref Range Status   02/19/2024 9.3 (L) 11.7 - 15.7 g/dL Final   02/18/2024 8.9 (L) 11.7 - 15.7 g/dL Final   02/17/2024 9.9 (L) 11.7 - 15.7 g/dL Final     Platelet Count   Date Value Ref Range Status   02/19/2024 193 150 - 450 10e3/uL Final   02/18/2024 141 (L) 150 - 450 10e3/uL Final   02/17/2024 150 150 - 450 10e3/uL Final     INR   Date Value Ref Range Status   01/12/2024 0.83 (L) 0.85 - 1.15 Final   10/06/2023 0.87 0.85 - 1.15 Final   09/09/2023 1.14 0.85 - 1.15 Final       30 minutes spent on the day of encounter doing chart review, history and exam, documentation, and further activities as noted.         Dyan Geller MD, [facility]  VASCULAR SURGERY

## 2024-03-18 NOTE — PATIENT INSTRUCTIONS
This is the plan that was discussed at your appointment.    Stop Eliquis today, have labs drawn on Friday and see Dr. Luca Burleson on Monday  CTA abd/pelvis with runoff and come back in one month to see Dr. Geller        I am including additional information on these things and our contact information if you have any questions or concerns.   Please do not hesitate to reach out to us if you felt we did not answer your questions or you are unsure of the treatment plan after your visit today. Our number is [phone].  Thank you for trusting us with your care.         Again thank you for your time.

## 2024-03-18 NOTE — PROGRESS NOTES
[facility]        Patient is here for a  follow up  to discuss US before. 02/15/2024 bypass redo.  Check left lower leg wound (Bypass incision). Started on Aquacel Ag 3/11.    Pt is currently taking Plavix and Eliquis.    /77   Pulse 65   Resp 14   SpO2 100%     The provider has been notified that the patient has no concerns.     Questions patient would like addressed today are: N/A.    Refills are needed: N/A    Has homecare services and agency name:  No

## 2024-03-19 ENCOUNTER — TELEPHONE (OUTPATIENT)
Dept: VASCULAR SURGERY | Facility: CLINIC | Age: 69
End: 2024-03-19
Payer: COMMERCIAL

## 2024-03-19 NOTE — TELEPHONE ENCOUNTER
[facility] Call Center    Phone Message    May a detailed message be left on voicemail: yes     Reason for Call: Other: pt called would like to cancel the surgery appt for 03/21, pt will call to r/s at a later time     Action Taken: Other: te    Travel Screening: Not Applicable

## 2024-03-20 ENCOUNTER — OFFICE VISIT (OUTPATIENT)
Dept: FAMILY MEDICINE | Facility: CLINIC | Age: 69
End: 2024-03-20
Payer: COMMERCIAL

## 2024-03-20 ENCOUNTER — TELEPHONE (OUTPATIENT)
Dept: VASCULAR SURGERY | Facility: CLINIC | Age: 69
End: 2024-03-20
Payer: COMMERCIAL

## 2024-03-20 ENCOUNTER — HOSPITAL ENCOUNTER (EMERGENCY)
Facility: HOSPITAL | Age: 69
Discharge: HOME | End: 2024-03-20
Attending: EMERGENCY MEDICINE | Admitting: EMERGENCY MEDICINE
Payer: COMMERCIAL

## 2024-03-20 VITALS
BODY MASS INDEX: 27.75 KG/M2 | WEIGHT: 147 LBS | DIASTOLIC BLOOD PRESSURE: 79 MMHG | RESPIRATION RATE: 18 BRPM | TEMPERATURE: 98.3 F | OXYGEN SATURATION: 97 % | SYSTOLIC BLOOD PRESSURE: 133 MMHG | HEIGHT: 61 IN | HEART RATE: 105 BPM

## 2024-03-20 VITALS
SYSTOLIC BLOOD PRESSURE: 146 MMHG | TEMPERATURE: 98.4 F | DIASTOLIC BLOOD PRESSURE: 68 MMHG | BODY MASS INDEX: 27.4 KG/M2 | OXYGEN SATURATION: 100 % | RESPIRATION RATE: 18 BRPM | HEART RATE: 100 BPM | WEIGHT: 145 LBS

## 2024-03-20 DIAGNOSIS — L02.419 CELLULITIS AND ABSCESS OF LEG: Primary | ICD-10-CM

## 2024-03-20 DIAGNOSIS — T82.898A: ICD-10-CM

## 2024-03-20 DIAGNOSIS — E11.52 TYPE 2 DIABETES MELLITUS WITH DIABETIC PERIPHERAL ANGIOPATHY AND GANGRENE, WITH LONG-TERM CURRENT USE OF INSULIN (H): ICD-10-CM

## 2024-03-20 DIAGNOSIS — L03.116 CELLULITIS OF LEFT LOWER EXTREMITY: ICD-10-CM

## 2024-03-20 DIAGNOSIS — T14.8XXA CHRONIC WOUND: ICD-10-CM

## 2024-03-20 DIAGNOSIS — I73.9 PAD (PERIPHERAL ARTERY DISEASE) (H): ICD-10-CM

## 2024-03-20 DIAGNOSIS — F17.200 TOBACCO USE DISORDER: ICD-10-CM

## 2024-03-20 DIAGNOSIS — L03.119 CELLULITIS AND ABSCESS OF LEG: Primary | ICD-10-CM

## 2024-03-20 DIAGNOSIS — Z79.4 TYPE 2 DIABETES MELLITUS WITH DIABETIC PERIPHERAL ANGIOPATHY AND GANGRENE, WITH LONG-TERM CURRENT USE OF INSULIN (H): ICD-10-CM

## 2024-03-20 LAB
HOLD SPECIMEN: NORMAL

## 2024-03-20 PROCEDURE — 99284 EMERGENCY DEPT VISIT MOD MDM: CPT

## 2024-03-20 PROCEDURE — 99214 OFFICE O/P EST MOD 30 MIN: CPT | Performed by: PHYSICIAN ASSISTANT

## 2024-03-20 PROCEDURE — 87205 SMEAR GRAM STAIN: CPT | Performed by: EMERGENCY MEDICINE

## 2024-03-20 RX ORDER — CEPHALEXIN 500 MG/1
500 CAPSULE ORAL 3 TIMES DAILY
Qty: 30 CAPSULE | Refills: 0 | Status: SHIPPED | OUTPATIENT
Start: 2024-03-20 | End: 2024-03-30

## 2024-03-20 RX ORDER — SULFAMETHOXAZOLE/TRIMETHOPRIM 800-160 MG
1 TABLET ORAL 2 TIMES DAILY
Qty: 20 TABLET | Refills: 0 | Status: SHIPPED | OUTPATIENT
Start: 2024-03-20 | End: 2024-03-30

## 2024-03-20 RX ORDER — FLUCONAZOLE 150 MG/1
TABLET ORAL
Qty: 2 TABLET | Refills: 0 | Status: SHIPPED | OUTPATIENT
Start: 2024-03-20 | End: 2024-03-23

## 2024-03-20 ASSESSMENT — ACTIVITIES OF DAILY LIVING (ADL): ADLS_ACUITY_SCORE: 36

## 2024-03-20 NOTE — ED TRIAGE NOTES
Pt had surgery in February on L leg, femoral bipass. Pt referred from across the street for possible infection.     The drainage from the surgical site started to have an odor to it yesterday.

## 2024-03-20 NOTE — DISCHARGE INSTRUCTIONS
Antibiotics take about 24 hours to fully kick in.  That is when the inflammation and swelling slows down.  If you notice that your symptoms are getting worse after 24 hours, or if at any time you have nausea, vomiting or are unable to keep down antibiotics, I would like you to come back to the emergency department.

## 2024-03-20 NOTE — ED PROVIDER NOTES
EMERGENCY DEPARTMENT ENCOUNTER      NAME: Ashanti Aviles  AGE: 68 year old female  YOB: 1955  MRN: [id number]  EVALUATION DATE & TIME: 3/20/2024  1:49 PM    PCP: [facility]    ED PROVIDER: Berry Rush M.D.      Chief Complaint   Patient presents with    Wound Infection    Fatigue         FINAL IMPRESSION:  1. Chronic wound    2. Cellulitis of left lower extremity          ED COURSE & MEDICAL DECISION MAKING:    Pertinent Labs & Imaging studies reviewed below.  All EKGs below represent my independent interpretation.   ED Course as of 03/20/24 1918   Wed Mar 20, 2024   1436 Patient is a 68-year-old woman with history of femoral artery bypass in the left leg with chronic open wound in this area.  She has noticed increased pain and malodorous drainage over the past few days.  Sent here for evaluation of cellulitis.  On arrival she is a blood pressure 133/79 with normal temperature.  Heart rate is 105.  Normal respiratory rate.  She is comfortable appearing.  She denies any systemic symptoms of sepsis such as fever, chills, malaise, fatigue, lightheadedness.  On exam she has an open wound with some surrounding tenderness and discoloration consistent with cellulitis.  It is responded well to antibiotics in the past.  Wound culture will be obtained.  I do not think lab work will help us guide therapy at this time, she is not showing signs of sepsis and I do not think blood cultures will be needed.  We discussed plan for outpatient antibiotic therapy with cephalexin, Bactrim.  We also carefully discussed return precautions.  She has outpatient follow-up this coming week.       Additional ED Course Timestamps:  2:20 PM I met with the patient, obtained history, performed an initial exam, and discussed options and plan for diagnostics and treatment here in the ED. Made plans for discharge.       Medical Decision Making  Obtained supplemental history:Supplemental history obtained?:  No  Reviewed external records: External records reviewed?: No  Care impacted by chronic illness:Diabetes, Hyperlipidemia, Hypertension, and Peripheral Vascular Disease  Care significantly affected by social determinants of health:N/A  Did you consider but not order tests?: Work up considered but not performed and documented in chart, if applicable  Did you interpret images independently?: Independent interpretation of ECG and images noted in documentation, when applicable.  Consultation discussion with other provider:Did you involve another provider (consultant, , pharmacy, etc.)?: No  Discharge. I prescribed additional prescription strength medication(s) as charted. N/A.    At the conclusion of the encounter I discussed the results of all of the tests and the disposition. The questions were answered. The patient or family acknowledged understanding and was agreeable with the care plan.     MEDICATIONS GIVEN IN THE EMERGENCY:  Medications - No data to display      NEW PRESCRIPTIONS STARTED AT TODAY'S ER VISIT  Discharge Medication List as of 3/20/2024  2:45 PM        START taking these medications    Details   cephALEXin (KEFLEX) 500 MG capsule Take 1 capsule (500 mg) by mouth 3 times daily for 10 days, Disp-30 capsule, R-0, E-Prescribe      fluconazole (DIFLUCAN) 150 MG tablet Take one tablet at the end of your antibiotic course, and one tablet 3 days later, Disp-2 tablet, R-0, E-Prescribe      sulfamethoxazole-trimethoprim (BACTRIM DS) 800-160 MG tablet Take 1 tablet by mouth 2 times daily for 10 days, Disp-20 tablet, R-0, E-Prescribe                =================================================================    HPI    Patient information was obtained from: patient    Use of : N/A         Ashanti Aviles is a 68 year old female with a pertinent history of HTN, HLD, DM II, PVD,  who presents to this ED for evaluation of wound infection.     The patient reports that she had a femoral bypass on  "2/15/24, and since the stitches have been removed, she has been experiencing pain. She reports that her left leg is sore, hurts like a \"son of a gun\" and has an odor to it. She was given antibiotics for it recently and finished the cephalexin 1 week ago. The antibiotics improved her pain, but since she finished them, she has been having pain. Patient has a few follow ups later this month with doctor's addressing her wound.     Patient denies shaking, chills or fevers.          VITALS:  /79   Pulse 105   Temp 98.3  F (36.8  C)   Resp 18   Ht 1.549 m (5' 1\")   Wt 66.7 kg (147 lb)   SpO2 97%   BMI 27.78 kg/m      PHYSICAL EXAM    Constitutional: Well developed, well nourished. Comfortable appearing.   HENT: Normocephalic, atraumatic, mucous membranes moist, nose normal  Eyes: PERRL, EOMI, conjunctiva normal, no discharge.  Neck- Supple, gross ROM intact.   Respiratory: Normal work of breathing, normal rate, speaks in full sentences  Cardiovascular: Normal heart rate  Musculoskeletal: Moving all 4 extremities intentionally and without pain. Open surgical wound of the left medial lower leg, with surrounding tenderness.   Neurologic: Alert & oriented x 3, cranial nerves grossly intact. Normal gross coordination  Psychiatric: Affect normal, cooperative.      I, Marycruz Foster am serving as a scribe to document services personally performed by Dr. Berry Rush based on my observation and the provider's statements to me. IBerry MD attest that Marycruz Foster is acting in a scribe capacity, has observed my performance of the services and has documented them in accordance with my direction.    Berry Rush M.D.  Emergency Medicine  [facility] EMERGENCY DEPARTMENT  [address] [id number] [phone]  Dept: [phone]       Berry Rush MD  03/20/24 1921    "

## 2024-03-20 NOTE — PROGRESS NOTES
Patient presents with:  Follow Up: Wound Infection left lower leg- finished the course of last antibiotics symptoms persist- purulent odor,  and worsen pain       Clinical Decision Making:  Patient has had discharge from the wound and malodorous air and also is having pain along the area.  The wound has dehisced more over the last several days.  Patient has multiple comorbidities to include smoking, peripheral arterial disease, diabetes mellitus type 2 and an occluded graft.  Patient is sent to next higher level of care to the emergency room for definitive evaluation and treatment of the cellulitis of the leg and the wound dehiscence and complications from the diabetes and peripheral artery disease with blocked bypass graft.  Patient voiced understand his concerns and questions were answered to her satisfaction.      ICD-10-CM    1. Cellulitis and abscess of leg  L03.119     L02.419       2. Type 2 diabetes mellitus with diabetic peripheral angiopathy and gangrene, with long-term current use of insulin (H)  E11.52     Z79.4       3. Tobacco use disorder  F17.200       4. PAD (peripheral artery disease) (H24)  I73.9       5. Femoral-tibial bypass graft occlusion, left, initial encounter (H24)  T82.831C           Patient Instructions   Do not eat or drink anything by mouth  Present to the emergency room for evaluation and treatment        HPI:  Ashanti Aviles is a 68 year old female who has a past medical history of diabetes mellitus type 2, smoking, peripheral arterial disease, occluded bypass graft to the left lower extremity who is presenting to clinic for evaluation and treatment of increased pain and increased discharge and increased wound dehiscence on the surgical incision site for the femoral tibial bypass graft location on the left lower extremity.  Shares that she was recently seen by vascular surgery on Monday, 2 days ago.  Subsequent she has had increased pain increased discharge increased smell coming  from the wound.  The wound is also dehisced.  Change the dressing 1 hour ago and has had more discharge from the site.  Currently patient is not having fever chills night sweats or fatigue.  Patient states that her blood glucose levels have been running well.    History obtained from chart review and the patient.    Problem List:  2024-02: Hard to intubate  2023-12: Skin ulcer of toe with necrosis of bone, left (H)  2023-10: Femoral-tibial bypass graft occlusion, left, initial   encounter (H24)  2023-10: Femoral artery thrombosis, left (H)  2023-09: Gait abnormality  2023-09: Left leg weakness  2023-09: Candidal vulvovaginitis  2023-09: Cervical intraepithelial neoplasia  2023-09: History of cervical dysplasia  2023-09: Pelvic pain in female  2023-08: Ulcer of great toe, left, with necrosis of muscle (H)  2023-06: Paronychia of toe, left  2023-05: Critical limb ischemia of left lower extremity (H)  2023-02: Ischemic toe  2022-03: Dyslipidemia  2020-06: Condyloma acuminatum of vulva  2015-03: Bilateral incipient cataracts  2013-10: Microalbuminuric diabetic nephropathy (H)  2013-10: Hyperlipidemia with target LDL less than 70  2013-06: AIN III (anal intraepithelial neoplasia III)  2013-05: Grade 3 vulvar intraepithelial neoplasia  2012-04: PAD (peripheral artery disease) (H24)  2012-04: Type 2 diabetes mellitus (H)  2012-04: Tobacco use disorder  2012-04: GERD (gastroesophageal reflux disease)  2012-04: Essential hypertension      Past Medical History:   Diagnosis Date    Benign gastric polyp     Chronic toe ulcer (H)     Chronic ulcer of great toe of left foot with necrosis of muscle (H)     Diabetes mellitus (H)     Gastroesophageal reflux disease     Hard to intubate 02/15/2024    History of colonic polyps     HLD (hyperlipidemia)     Hypertension     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/29/2013    Nicotine addiction     OAG (open angle glaucoma)     PVD (peripheral vascular disease) (H24)     Reflux  esophagitis     Retinopathy     Toe ulcer (H)     Tubular adenoma     Yeast vaginitis        Social History     Tobacco Use    Smoking status: Former     Packs/day: .5     Types: Cigarettes     Quit date: 2023     Years since quittin.1    Smokeless tobacco: Never   Substance Use Topics    Alcohol use: Not Currently     Comment: Alcoholic Drinks/MONTH: 2x       Review of Systems  As above in HPI otherwise negative.    Vitals:    24 1306   BP: (!) 146/68   Pulse: 100   Resp: 18   Temp: 98.4  F (36.9  C)   SpO2: 100%   Weight: 65.8 kg (145 lb)       General: Patient is resting comfortably no acute distress is afebrile  HEENT: Head is normocephalic atraumatic   eyes are PERRL EOMI sclera anicteric   Skin: Without rash non-diaphoretic  Musculoskeletal:  He has very tender to palpation around the wound margins.  There is also a bad odor.  The dressings were changed there was a order on the dressings and there was discharge into the dressings.  Area is painful to touch and is also tender to touch.  Xeroform, 4 x 4 and roll Curlex were applied for dressing.    Physical Exam              At the end of the encounter, I discussed results, diagnosis, medications. Discussed red flags for immediate return to clinic/ER, as well as indications for follow up if no improvement. Patient understood and agreed to plan. Patient was stable for discharge.

## 2024-03-20 NOTE — TELEPHONE ENCOUNTER
Ashanti called with concerns that her leg wound is infected. Advised to go to urgent care for assessment. She verbalized understanding. Of note, patient did cancel a surgery for tomorrow earlier today.

## 2024-03-20 NOTE — TELEPHONE ENCOUNTER
Surgery removed from schedule per Dr. Geller's advice.  Pt referred to see Dr. Burleson on 3/25/24 for vascular medicine.  Confirmed details with pt.

## 2024-03-20 NOTE — ED NOTES
Pt alert and oriented x4. Ambulated independently with a steady gait. Discharged to home with . Pt instructed to  rx from pharmacy across the street and to follow up with vascular as planned. Pt verbalized understanding.

## 2024-03-20 NOTE — PATIENT INSTRUCTIONS
Do not eat or drink anything by mouth  Present to the emergency room for evaluation and treatment

## 2024-03-21 NOTE — PATIENT INSTRUCTIONS
"Nba Burrell,    Thank you for entrusting your care with us today. After your visit today with MD Luca Burleson this is the plan that was discussed at your appointment.    Follow up with Dr. Geller and CTA scan as discussed today.     Hold off on starting anticoagulation until you see Dr. Geller.     See wound care provider.  This has been scheduled.      See below for wound care instructions.  This may change once you see the wound care provider.     I am including additional information on these things and our contact information if you have any questions or concerns.   Please do not hesitate to reach out to us if you felt we did not answer your questions or you are unsure of the treatment plan after your visit today. Our number is [phone].Thank you for trusting us with your care.         Again thank you for your time.         Wound Care Instructions    DAILY and as needed, Cleanse your left lower leg wound(s) with Normal saline.    Pat Dry with non-sterile gauze    Apply Lotion to the intact skin surrounding your wound and other dry skin locations. Some good lotions include: Remedy Skin Repair Cream, Sarna, Vanicream or Cetaphil    Primary Dressing: Apply aquacel Ag into/onto the wounds    Secondary dressing: Cover with ABD pad    Secure with non-sterile roll gauze (4\" x 75\" roll) and tape (1\" roll tape) as needed; avoid adhesive directly on the skin        If for some reason you are not able to get your dressing(s) changed as outlined above (due to illness, lack of supplies, lack of help) please do the following: remove old, soiled dressings; wash the wounds with saline; pat dry; apply ABD pad or other absorbant pad and secure with rolled gauze; avoid tape directly on your skin; Call the clinic as soon as possible to let us know what the current issues are in receiving wound care [phone].      SEEK MEDICAL CARE IF:  You have an increase in swelling, pain, or redness around the wound.  You have an " increase in the amount of pus coming from the wound.  There is a bad smell coming from the wound.  The wound appears to be worsening/enlarging  You have a fever greater than 101.5 F

## 2024-03-21 NOTE — PROGRESS NOTES
[facility]        Patient is here for a follow up  to discuss evaluation of potential underlying medical cause of bypass failure. Patient states she has left leg pain 5-6/10 near her incision that is worse with walking.  Patient stopped eliquis.    Pt is currently taking Plavix and Zetia.    /72   Pulse 99   Temp 98  F (36.7  C)   Resp 16   SpO2 100%     The provider has been notified that the patient has concerns of leg pain.     Questions patient would like addressed today are: N/A.    Refills are needed: N/A    Has homecare services and agency name:  No

## 2024-03-22 ENCOUNTER — LAB (OUTPATIENT)
Dept: LAB | Facility: CLINIC | Age: 69
End: 2024-03-22
Payer: COMMERCIAL

## 2024-03-22 DIAGNOSIS — T82.868A THROMBOSIS OF ARTERIAL GRAFT, INITIAL ENCOUNTER (H): ICD-10-CM

## 2024-03-22 LAB — HCYS SERPL-SCNC: 27.2 UMOL/L (ref 0–15)

## 2024-03-22 PROCEDURE — 85730 THROMBOPLASTIN TIME PARTIAL: CPT

## 2024-03-22 PROCEDURE — 83090 ASSAY OF HOMOCYSTEINE: CPT

## 2024-03-22 PROCEDURE — 85390 FIBRINOLYSINS SCREEN I&R: CPT | Performed by: PATHOLOGY

## 2024-03-22 PROCEDURE — 85613 RUSSELL VIPER VENOM DILUTED: CPT | Mod: 59

## 2024-03-22 PROCEDURE — 86147 CARDIOLIPIN ANTIBODY EA IG: CPT

## 2024-03-22 PROCEDURE — 86146 BETA-2 GLYCOPROTEIN ANTIBODY: CPT

## 2024-03-22 PROCEDURE — 36415 COLL VENOUS BLD VENIPUNCTURE: CPT

## 2024-03-23 ENCOUNTER — TELEPHONE (OUTPATIENT)
Dept: EMERGENCY MEDICINE | Facility: HOSPITAL | Age: 69
End: 2024-03-23
Payer: COMMERCIAL

## 2024-03-23 LAB
BACTERIA WND CULT: ABNORMAL
GRAM STAIN RESULT: ABNORMAL
GRAM STAIN RESULT: ABNORMAL

## 2024-03-23 NOTE — TELEPHONE ENCOUNTER
"[facility]    Reason for call: Lab Result Notification     Lab Result (including Rx patient on, if applicable).  If culture, copy of lab report at bottom.  Lab Result: Final Wound Aerobic Bacterial Culture (specimen - left leg) report on 3/23/24  [facility] Emergency Dept discharge antibiotic prescribed: Cephalexin (Keflex) 500 mg capsule, 1 capsule (500 mg) by mouth 3 times daily for 10 days and Sulfamethoxazole-Trimethoprim (Bactrim DS, Septra DS) 800-160 mg PO tablet, 1 tablet by mouth 2 times daily for 10 days   #1. Bacteria, Staphylococcus aureus ,  is [SUSCEPTIBLE] to antibiotic - Cephalexin and Bactrim DS  Incision and Drainage performed in the [place] ED: No  Recommendations in treatment per [facility] ED lab result culture protocol    Creatinine Level (mg/dl)   Creatinine   Date Value Ref Range Status   02/19/2024 0.67 0.51 - 0.95 mg/dL Final    Creatinine clearance (ml/min), if applicable    Serum creatinine: 0.67 mg/dL 02/19/24 0534  Estimated creatinine clearance: 72.2 mL/min     Patient's current Symptoms:   \"I think it looks a little better.  No draining as much.\"  Pain is about the same.  No redness.      RN Recommendations/Instructions per [place] ED lab result protocol:   [facility] ED lab result protocol utilized: culture    Patient/care giver notified to contact your PCP clinic or return to the Emergency department if your:  Symptoms do not resolve after completing antibiotic.  Symptoms worsen or other concerning symptoms.        Carmine Jurado RN   "

## 2024-03-23 NOTE — TELEPHONE ENCOUNTER
[facility]    Reason for call: Lab Result Notification     Lab Result (including Rx patient on, if applicable).  If culture, copy of lab report at bottom.  Lab Result: Final Wound Aerobic Bacterial Culture (specimen - left left) report on 3/23/24  [facility] Emergency Dept discharge antibiotic prescribed: Cephalexin (Keflex) 500 mg capsule, 1 capsule (500 mg) by mouth 3 times daily for 10 days and Sulfamethoxazole-Trimethoprim (Bactrim DS, Septra DS) 800-160 mg PO tablet, 1 tablet by mouth 2 times daily for 10 days   #1. Bacteria, Staphylococcus aureus ,  is [SUSCEPTIBLE] to antibiotic - Cephalexin and Bactrim DS  Incision and Drainage performed in the [place] ED: No  Recommendations in treatment per [facility] ED lab result culture protocol    Patient's current Symptoms:   1:38P, Left voicemail message requesting a call back to [facility] ED Lab Result RN at [phone]. RN is available every day between 9 a.m. and 5:30 p.m.     RN Recommendations/Instructions per [place] ED lab result protocol:   [facility] ED lab result protocol utilized: culture    Carmine Jurado RN

## 2024-03-23 NOTE — LETTER
March 23, 2024        Ashanti Aviles  [address] [place] [address]          Dear Ashanti Aviles:    You were seen in the [facility] Emergency Department at [facility] EMERGENCY DEPARTMENT on 3/20/2024.  We are unable to reach you by phone, so we are sending you this letter.     It is important that you call [facility] Emergency Department lab result nurse at [phone], as we have information to relay to you AND/OR we MAY have to make some changes in your treatment.    Best time to call back is between 9AM and 5:30PM, 7 days a week.      Sincerely,     [facility] Emergency Department Lab Result RN  [phone]

## 2024-03-25 ENCOUNTER — OFFICE VISIT (OUTPATIENT)
Dept: VASCULAR SURGERY | Facility: CLINIC | Age: 69
End: 2024-03-25
Attending: HOSPITALIST
Payer: COMMERCIAL

## 2024-03-25 ENCOUNTER — LAB (OUTPATIENT)
Dept: LAB | Facility: CLINIC | Age: 69
End: 2024-03-25
Payer: COMMERCIAL

## 2024-03-25 VITALS
HEART RATE: 99 BPM | TEMPERATURE: 98 F | RESPIRATION RATE: 16 BRPM | OXYGEN SATURATION: 100 % | DIASTOLIC BLOOD PRESSURE: 72 MMHG | SYSTOLIC BLOOD PRESSURE: 127 MMHG

## 2024-03-25 DIAGNOSIS — I74.9 ARTERIAL THROMBOSIS (H): Primary | ICD-10-CM

## 2024-03-25 DIAGNOSIS — R79.89 ELEVATED HOMOCYSTEINE: ICD-10-CM

## 2024-03-25 DIAGNOSIS — I73.9 PAD (PERIPHERAL ARTERY DISEASE) (H): Primary | ICD-10-CM

## 2024-03-25 DIAGNOSIS — L08.9 LOCAL INFECTION OF WOUND: ICD-10-CM

## 2024-03-25 DIAGNOSIS — T14.8XXA LOCAL INFECTION OF WOUND: ICD-10-CM

## 2024-03-25 DIAGNOSIS — E78.5 DYSLIPIDEMIA, GOAL LDL BELOW 70: ICD-10-CM

## 2024-03-25 DIAGNOSIS — I73.9 PERIPHERAL ARTERIAL DISEASE (H): ICD-10-CM

## 2024-03-25 DIAGNOSIS — T82.898D OCCLUSION OF GRAFT OF LOWER EXTREMITY, SUBSEQUENT ENCOUNTER: ICD-10-CM

## 2024-03-25 LAB
B2 GLYCOPROT1 IGG SERPL IA-ACNC: 1.6 U/ML
B2 GLYCOPROT1 IGM SERPL IA-ACNC: 4.3 U/ML
BASOPHILS # BLD AUTO: 0 10E3/UL (ref 0–0.2)
BASOPHILS NFR BLD AUTO: 0 %
CARDIOLIPIN IGG SER IA-ACNC: <2 GPL-U/ML
CARDIOLIPIN IGG SER IA-ACNC: NEGATIVE
CARDIOLIPIN IGM SER IA-ACNC: <2 MPL-U/ML
CARDIOLIPIN IGM SER IA-ACNC: NEGATIVE
CRP SERPL-MCNC: 5.45 MG/L
DRVVT CONFIRM NORMALIZED RATIO: 1.17
DRVVT SCREEN MIX RATIO: 1.03
DRVVT SCREEN RATIO: 1.1
EOSINOPHIL # BLD AUTO: 0.1 10E3/UL (ref 0–0.7)
EOSINOPHIL NFR BLD AUTO: 1 %
ERYTHROCYTE [DISTWIDTH] IN BLOOD BY AUTOMATED COUNT: 14.9 % (ref 10–15)
FOLATE SERPL-MCNC: 8.4 NG/ML (ref 4.6–34.8)
HCT VFR BLD AUTO: 41.3 % (ref 35–47)
HGB BLD-MCNC: 13.6 G/DL (ref 11.7–15.7)
IMM GRANULOCYTES # BLD: 0 10E3/UL
IMM GRANULOCYTES NFR BLD: 0 %
INR PPP: 0.85 (ref 0.85–1.15)
LA PPP-IMP: NEGATIVE
LUPUS INTERPRETATION: ABNORMAL
LYMPHOCYTES # BLD AUTO: 3.5 10E3/UL (ref 0.8–5.3)
LYMPHOCYTES NFR BLD AUTO: 27 %
MCH RBC QN AUTO: 30.2 PG (ref 26.5–33)
MCHC RBC AUTO-ENTMCNC: 32.9 G/DL (ref 31.5–36.5)
MCV RBC AUTO: 92 FL (ref 78–100)
MONOCYTES # BLD AUTO: 0.9 10E3/UL (ref 0–1.3)
MONOCYTES NFR BLD AUTO: 7 %
NEUTROPHILS # BLD AUTO: 8.1 10E3/UL (ref 1.6–8.3)
NEUTROPHILS NFR BLD AUTO: 65 %
PLATELET # BLD AUTO: 336 10E3/UL (ref 150–450)
PTT RATIO: 1.11
RBC # BLD AUTO: 4.51 10E6/UL (ref 3.8–5.2)
THROMBIN TIME: 15.6 SECONDS (ref 13–19)
VIT B12 SERPL-MCNC: 649 PG/ML (ref 232–1245)
WBC # BLD AUTO: 12.6 10E3/UL (ref 4–11)

## 2024-03-25 PROCEDURE — 99214 OFFICE O/P EST MOD 30 MIN: CPT | Mod: 24 | Performed by: HOSPITALIST

## 2024-03-25 PROCEDURE — 85025 COMPLETE CBC W/AUTO DIFF WBC: CPT

## 2024-03-25 PROCEDURE — 86140 C-REACTIVE PROTEIN: CPT

## 2024-03-25 PROCEDURE — 82607 VITAMIN B-12: CPT

## 2024-03-25 PROCEDURE — 82746 ASSAY OF FOLIC ACID SERUM: CPT

## 2024-03-25 PROCEDURE — 99215 OFFICE O/P EST HI 40 MIN: CPT | Performed by: HOSPITALIST

## 2024-03-25 PROCEDURE — 36415 COLL VENOUS BLD VENIPUNCTURE: CPT

## 2024-03-25 RX ORDER — FOLIC ACID 1 MG/1
1 TABLET ORAL DAILY
Qty: 90 TABLET | Refills: 0 | Status: ON HOLD | OUTPATIENT
Start: 2024-03-25 | End: 2024-07-17

## 2024-03-25 ASSESSMENT — PAIN SCALES - GENERAL: PAINLEVEL: SEVERE PAIN (6)

## 2024-03-26 ENCOUNTER — HOSPITAL ENCOUNTER (OUTPATIENT)
Dept: CT IMAGING | Facility: HOSPITAL | Age: 69
Discharge: HOME | End: 2024-03-26
Attending: SURGERY | Admitting: SURGERY
Payer: COMMERCIAL

## 2024-03-26 DIAGNOSIS — I73.9 PAD (PERIPHERAL ARTERY DISEASE) (H): ICD-10-CM

## 2024-03-26 LAB
CREAT BLD-MCNC: 1.3 MG/DL (ref 0.6–1.1)
EGFRCR SERPLBLD CKD-EPI 2021: 45 ML/MIN/1.73M2

## 2024-03-26 PROCEDURE — 75635 CT ANGIO ABDOMINAL ARTERIES: CPT

## 2024-03-26 PROCEDURE — 250N000011 HC RX IP 250 OP 636: Performed by: SURGERY

## 2024-03-26 PROCEDURE — 82565 ASSAY OF CREATININE: CPT

## 2024-03-26 RX ORDER — IOPAMIDOL 755 MG/ML
90 INJECTION, SOLUTION INTRAVASCULAR ONCE
Status: COMPLETED | OUTPATIENT
Start: 2024-03-26 | End: 2024-03-26

## 2024-03-26 RX ADMIN — IOPAMIDOL 90 ML: 755 INJECTION, SOLUTION INTRAVENOUS at 09:26

## 2024-03-27 NOTE — PATIENT INSTRUCTIONS
Wound care supplies were ordered today through [employer] and if you are not receiving your supplies or have a question on your bill please contact Kennedi Larkin at [phone]. Please allow 2-5 business days for delivery of supplies. You may get a call from a [phone] # if there are additional information Ai needs. It is important to  or return their call. PLEASE NOTE: If you need to return your supplies, you MUST call customer service within 15 days of delivery date.         Wound Care Instructions    DAILY and as needed, Cleanse your left leg wound(s) with Normal saline.    Pat Dry with non-sterile gauze    Primary Dressing: Apply aquacel AG into/onto the wounds    Secondary dressing: Cover with zetuvit silicone border    Ok to elevate your legs. If you start to have pain in the legs while elevated, please put your legs back down    It is not ok to get your wound wet in the bath or shower    High Protein Foods  When you have an open ulcer, your bodies protein needs are much higher, so it is recommended eat good sources of protein or take a protein supplement!    Protein Supplements  -Premier Protein  -Ensure  -Boost  -Glucerna, if diabetic    Chicken  -Chicken breast, 3.5oz.-30 grams protein  -Chicken meat, cooked, 4 oz.    Beef  -Hamburger mela, 4 oz-28 grams protein  -Steak, 6 oz-42 grams  -Most cuts of beef- 7 grams of protein per ounce    Fish  -Most fish fillets or steaks are about 22 grams of protein for 3 1/2 oz(100 grams) of cooked fish, or 6 grams per ounce  -Tuna, 6 oz can-40 grams of protein    Pork  -Pork chop, average-22 grams protein  -Pork loin or tenderloin, 4 oz.-29 grams  -Ham, 3oz serving- 19 grams  -Raphael, 1 slice-3 grams    Eggs and Dairy  -Egg, large-7 grams  -Milk, 1 cup-8 grams  -Cottage cheese, 1/2 cup-15 grams  -Greek yogurt, 1 cup-usually 8-12 grams, check label    Beans  -Soy milk, 1 cup-6-10 grams  -Most beans(black, willis, lentils, etc.) about 7-10 grams protein per half cup  of cooked beans    Nuts and Seeds  -Peanut butter, 2 Tablespoons- 8 grams protein  -Almonds, 1/4 cup- 8 grams  -Peanuts, 1/4 cup-9 grams  -Sunflower seeds, 1/4 cup- 6 grams        If for some reason you are not able to get your dressing(s) changed as outlined above (due to illness, lack of supplies, lack of help) please do the following: remove old, soiled dressings; wash the wounds with saline; pat dry; apply ABD pad or other absorbant pad and secure with rolled gauze; avoid tape directly on your skin; Call the clinic as soon as possible to let us know what the current issues are in receiving wound care [phone].      SEEK MEDICAL CARE IF:  You have an increase in swelling, pain, or redness around the wound.  You have an increase in the amount of pus coming from the wound.  There is a bad smell coming from the wound.  The wound appears to be worsening/enlarging  You have a fever greater than 101.5 F      It is ok to continue current wound care treatment/products for the next 2-3 days until new wound care supplies are ordered and arrive. If longer than this please contact our office at [phone].    If you have a 2 layer or 4 layer compression wrap on these are safe to have on for ONLY 7 days. If for some reason you are not able to get the wrap(s) changed (due to illness; lack of supplies, lack of help, lack of transportation) please do the following: unwrap the old 2 or 4 layer compression wrap; avoid using scissors as you could cut your skin and cause wounds; use tubular compression when available. Call to reschedule your home care or clinic visit appointment as soon as possible.    Please NOTE: if you are 15 minutes late to your clinic appointment you will have to be rescheduled. Please call our clinic as soon as possible if you know you will not be able to get to your appointment at [phone].    If you fail to show up to 3 scheduled clinic appointments you will be dismissed from our  clinic.              We want to hear from you!  In the next few weeks, you should receive a call or email to complete a survey about your visit at [facility]. Please help us improve your appointment experience by letting us know how we did today. We strive to make your experience good and value any ways in which we could do better.      We value your input and suggestions.    Thank you for choosing the [facility]!

## 2024-03-28 NOTE — PROGRESS NOTES
VASCULAR MEDICINE CONSULT NOTE          LOCATION:  [facility]       Date of Service: 3/25/2024      Primary Care Provider: [place], [facility]  Referring provider; Dyan Geller MD       Reason for the visit/chief complaint:   Recurrent graft occlusion, evaluate for hypercoagulable status       HPI:  Ashanti Aviles is a pleasant 68 year old female who presents to our Vascular Medicine clinic for the above mentioned reason as a referral from Dr. Geller and vascular surgery.    Ms. Aviles's past medical history significant for hypertension, uncontrolled dyslipidemia, uncontrolled type 2 diabetes mellitus and peripheral arterial disease status post multiple interventions with left hallux ulcer as below.    2/18/2023: Patient underwent right fourth toe amputation due to gangrene with podiatry.  5/17/2023: Patient underwent left iliofemoral endarterectomy with retrograde iliac stent. Was kept on clopidogrel.  9/8/2023: With no improvement in her claudication symptoms after the above procedure, patient subsequently underwent left femoral to tibioperoneal trunk bypass using spliced 4mm and 5 mm Artegraft graft. Based on vascular surgery documentation, the patient was started on rivaroxaban 2.5 mg twice daily on 10/11 in addition to clopidogrel. Patient however today reports that she was never on rivaroxaban medication and was only taking clopidogrel although documentation in the chart reports that she was taking it.  10/6/2023: Graft was found to be occluded on routine follow-up, patient was asymptomatic.  Thrombolysis was attempted by IR with unsuccessful recanalization of the left lower extremity bypass graft.  Given patient was having no symptoms, she was managed conservatively with plan for redo bypass soon.  2/15/2024: Patient underwent left lower extremity redo femoral to posterior tibial artery bypass with spliced cryo artery and left common iliac artery stent  placement. After this, she was started on apixaban 5 mg twice daily in addition to continuing clopidogrel.  His left hallux ulcer and was planned for I&D with Dr. Ordaz from podiatry after her recent redo bypass.  3/18/2024: Patient was found to have occluded her bypass again.  No worsening symptoms.  Met with Dr. Geller from vascular surgery who reviewed her operation and reports no concern from a technical standpoint to be the main reason of her recurrent bypass occlusion.  She was subsequently referred to our clinic to discuss hypercoagulability.      Patient denies any prior personal history or family history of arterial or venous blood clots.  She is not up to date with cancer screening: Reports she has not had mammogram in over 10 years, has not had colonoscopy for many years or Pap smears.  She however reports that she had history of anal and vulvar dysplasia and she is under follow-up with colorectal surgery.  She is currently compliant with taking apixaban twice daily and has not missed a dose.      Cardiovascular risk factors:  Hypertension: Under good control with amlodipine 5 mg, hydrochlorothiazide 25 mg, lisinopril 40 mg.  Diabetes mellitus: Has had diabetes for approximately 22 years.  Most recent hemoglobin A1c 8.7%  Smoking: Smoked since age 16, 1 PPD, quit 5 months ago October 2023 with approximately 50-pack-year.  Dyslipidemia: Uncontrolled.  Last LDL was checked in 2022 which was 145.  Not on statin.  Only on ezetimibe 10 mg.  Has allergy to only simvastatin in the past.  Family history: No pertinent FHx.      REVIEW OF SYSTEMS:    A 12 point ROS was reviewed and is negative except what is mentioned in HPI.       Past medical history, surgical history, medications, family history, social history and allergies were reviewed. Pertinent points mentioned under HPI.      OBJECTIVE:    Vital signs:  /72   Pulse 99   Temp 98  F (36.7  C)   Resp 16   SpO2 100%   Wt Readings from Last 1  Encounters:   03/20/24 147 lb (66.7 kg)     There is no height or weight on file to calculate BMI.    Physical exam:  General appearance: Pleasant female in no apparent distress.    HEENT: NC/AT.    Neck: Carotids +2/2 bilaterally without bruits.  No jugular venous distension.   Heart: RRR. Normal S1, S2. No murmur, rub, click, or gallop.   Chest: Clear to auscultation bilaterally.  Abdomen: Soft, nontender, nondistended. No pulsatile mass.  No bruits.   Extremities and skin: Bilateral lower extremity with normal color and temperature.  DP 2/2 on the right, PT 1/2.  On the left DP and PT 0/2.  Left medial wound dehiscence noted that is getting bigger when compared to prior pictures and follow-ups 3.2 x 1 time 0.6 with undermining 0.5 cm 4-12 o'clock.  Malodorous.  No active drainage.  Periwound erythema with tenderness.  Left hallux ulcer.  Neurological: Alert, awake and oriented           DIAGNOSTIC STUDIES:   Labs and diagnostics reviewed including outside records. Pertinent points are mentioned under HPI and assessment and plan sections.        ASSESSMENT AND PLAN:    Peripheral arterial disease with multiple interventions as mentioned above.  Status post bypass graft occlusion x2  (10/6/2023, 3/18/2024)  Concern for hypercoagulability  Left medial calf wound dehiscence with wound infection  Left hallux ulcer  Former smoker with approximately 50-pack-year quit 5 months ago  Type 2 diabetes mellitus: Uncontrolled last A1c 8.7%  Hypertension: Controlled  Dyslipidemia: Uncontrolled, not on statin for unknown reason  History of prior vulva and anal intraepithelial neoplasia      Ms. conley has very complex medical history as outlined above.  The question for us is if she does not have any significant hypercoagulability contributing to her recurrent graft occlusion.  Recall that she had graft occlusion twice with less than 6 months apart.  Per vascular surgery, there is no concern from technical standpoint.  From  what I gathered from documentation, she was supposed to be on rivaroxaban 2.5 mg twice daily and clopidogrel after her initial bypass in September 2023.  Although this is not necessarily consistent with the Compass trial where rivaroxaban should be coupled with aspirin 81 mg, the patient completely denied the use of rivaroxaban at all.  Again, it has been documented multiple times in the chart that she was taking it.  Nonetheless, she went on and occluded the redo graft that was completed in mid February almost 1 month after despite initiation of apixaban 5 mg twice daily in addition to her known clopidogrel.  Her graft is currently occluded.  Luckily, she has no symptoms and has been walking with no significant claudication.  She was asked to stop apixaban by vascular surgery after seeing them last week.    We obtained antiphospholipid syndrome workup and this came back negative for lupus anticoagulant (while off apixaban), anticardiolipin antibodies and antibeta 2 glycoprotein antibodies IgG and IgM.  We checked homocystine although this has conflicting data behind.  This was mainly to detect if she has any severe homocystinemia.  This came back with mild to moderate elevation but no severe elevation at 27.2 (upper limit of normal 15.0).  This is unlikely to be contributing to any arterial thrombosis with this degree of elevation.  Again, the data behind homocystinemia contributing to arterial thrombosis is not very strong especially if not severe elevation.  We did ask her to start taking folic acid and B complex multivitamin.  Again, no strong data to suggest this practice will decrease CV risk.    Underlying malignancy is another consideration for arterial thrombosis.  She had CT abdomen pelvis couple times in the past 6 months with no clear malignancy seen.  Chest was not imaged.  She has had prior history of vulvar and anal endothelial neoplasia.  I am unable to determine the current status of this.  Per  patient, she does follow-up with colorectal surgery.  She has not had any colonoscopies for so many years.  Additionally, she is not up-to-date with her age-appropriate cancer screenings such as Pap smears and mammograms.  She offers no specific reason.  She was encouraged to follow-up with PCP to get these completed.  Low suspicion for myeloproliferative disease from CBC evaluation.  We will update anyone given previous CBCs were with elevated WBC count in the setting of surgery.  Additionally, low suspicion for PNH therefore, will defer testing at this point.    Overall, I would recommend anticoagulation with enoxaparin and warfarin with goal INR 2-3.  After discussion with Dr. Geller from vascular surgery, he will meet with her next week to assess what can be done for her currently occluded graft.  After which, we would recommend initiating the above anticoagulation plan.  She can continue with aspirin 81 mg or clopidogrel at the same time.  If she has not failed rivaroxaban in the past, combination of rivaroxaban 2.5 mg twice daily and aspirin 81 mg would have been another choice.  However, again there has been a conflict and determining if she was or was not on rivaroxaban in the past.  Therefore, we will avoid.    On a different note, she was concerned about her wound in the left medial calf.  This certainly looks bigger and more infected when compared to her prior images.  She has been on antibiotics already.  Will obtain basic labs such as CBC and CRP and refer her to our wound clinic.  This will be arranged today.  She has upcoming appointment with Dr. Ordaz in 2 days.        Recommendations:  Patient will be scheduled to see Dr. Geller next week for discussion about the occluded graft.  With regards to anticoagulation, recommend warfarin with goal INR 2-3 bridged with enoxaparin 1 mg/kg twice daily.  This can be coupled with aspirin 81 mg or clopidogrel 75 mg.  No evidence of antiphospholipid  syndrome.  No severe homocystinemia.  Low suspicion for myeloproliferative disorder or PNH for which we will defer testing.  Patient was counseled about age-appropriate cancer screening and the need to continue following up with colorectal cancer.  Follow-up with podiatry/Dr. Ordaz as schedule 3/28.  New follow-up ordered with our wound clinic for her infected left calf wound with Dr. Oliva.  With regards to PAD management.  Patient was noted not to be on statin.  We did not get the chance to discuss this today.  We will need an updated lipid panel (last from 2022 with significantly above goal ) and likely initiate high intensity statins.  I put order for new lipid panel.  This will need to be discussed with her at a different date either by vascular surgery or we can see her back in follow-up.      It was a pleasure meeting with Ms. Hewitt in our clinic today.    Luca Burleson MD  Vascular Medicine  March 25, 2024

## 2024-04-01 ENCOUNTER — OFFICE VISIT (OUTPATIENT)
Dept: VASCULAR SURGERY | Facility: CLINIC | Age: 69
End: 2024-04-01
Attending: SURGERY
Payer: COMMERCIAL

## 2024-04-01 VITALS
SYSTOLIC BLOOD PRESSURE: 120 MMHG | TEMPERATURE: 97.5 F | HEART RATE: 87 BPM | DIASTOLIC BLOOD PRESSURE: 78 MMHG | RESPIRATION RATE: 20 BRPM

## 2024-04-01 DIAGNOSIS — Z12.12 ENCOUNTER FOR COLORECTAL CANCER SCREENING: ICD-10-CM

## 2024-04-01 DIAGNOSIS — Z71.6 ENCOUNTER FOR SMOKING CESSATION COUNSELING: ICD-10-CM

## 2024-04-01 DIAGNOSIS — I73.9 PAD (PERIPHERAL ARTERY DISEASE) (H): Primary | ICD-10-CM

## 2024-04-01 DIAGNOSIS — Z12.11 ENCOUNTER FOR COLORECTAL CANCER SCREENING: ICD-10-CM

## 2024-04-01 DIAGNOSIS — Z12.4 CERVICAL CANCER SCREENING: ICD-10-CM

## 2024-04-01 DIAGNOSIS — Z12.31 ENCOUNTER FOR SCREENING MAMMOGRAM FOR BREAST CANCER: ICD-10-CM

## 2024-04-01 PROCEDURE — 99213 OFFICE O/P EST LOW 20 MIN: CPT | Performed by: SURGERY

## 2024-04-01 PROCEDURE — 99214 OFFICE O/P EST MOD 30 MIN: CPT | Mod: 24 | Performed by: SURGERY

## 2024-04-01 RX ORDER — CEFAZOLIN SODIUM/WATER 2 G/20 ML
2 SYRINGE (ML) INTRAVENOUS SEE ADMIN INSTRUCTIONS
Status: CANCELLED | OUTPATIENT
Start: 2024-06-04

## 2024-04-01 RX ORDER — CEFAZOLIN SODIUM/WATER 2 G/20 ML
2 SYRINGE (ML) INTRAVENOUS
Status: CANCELLED | OUTPATIENT
Start: 2024-06-04

## 2024-04-01 RX ORDER — NICOTINE 21 MG/24HR
1 PATCH, TRANSDERMAL 24 HOURS TRANSDERMAL EVERY 24 HOURS
Qty: 30 PATCH | Refills: 3 | Status: ON HOLD | OUTPATIENT
Start: 2024-04-01 | End: 2024-06-04

## 2024-04-01 ASSESSMENT — PAIN SCALES - GENERAL: PAINLEVEL: EXTREME PAIN (8)

## 2024-04-01 NOTE — PATIENT INSTRUCTIONS
This is the plan that was discussed at your appointment.    Prescription for nicotine patches sent  Referrals sent to Ob/gyn for mammogram and pap smear and referral to GI for colonoscopy, Dr. Geller would like these completed prior to the bypass surgery  See information below regarding surgery        I am including additional information on these things and our contact information if you have any questions or concerns.   Please do not hesitate to reach out to us if you felt we did not answer your questions or you are unsure of the treatment plan after your visit today. Our number is [phone].  Thank you for trusting us with your care.         Again thank you for your time.           Ashanti    Your visit to [facility] for your surgery is coming soon and we look forward to seeing you! This friendly reminder and pre-procedure checklist will help to ensure your surgery goes smoothly and meets your expectations. At [facility], our goal is to provide you with a great patient experience and to deliver genuine, professional care to every patient.     Please complete all the steps in advance of your visit. If you have any questions about the items listed below, please give our office a call. We can be reached at [phone] or visit our website at [email or web address]  for more information.     Procedure: Left external iliac artery to posterior tibial artery bypass with cryo artery     Procedure Date :  TBD - WE WILL CONTACT YOU TO SCHEDULE    Arrival Time:  Tentative time and subject to change. The pre-admission nurse will call you 1-2 days before surgery to tell you time of arrival.     Procedure Location: [facility]    Surgeon: Dr. Dyan Geller    Admission Type: Inpatient    COVID-19 Test: is no longer required. If you are experiencing COVID symptoms or have tested positive for COVID-19 within 14 days of procedure date, reach out to the care team  to reschedule please.     Post Operative Appointment: TBD      If you take blood thinners:   PLEASE DO NOT STOP YOUR ASPIRIN OR PLAVIX UNLESS SPECIFICALLY DIRECTED BY THE VASCULAR SURGEON TO STOP!  In most cases Vascular surgeons want you to continue these. This is different from most NON vascular surgeries and may not be well known by your Primary Care Provider    Plavix: PLEASE DO NOT STOP THIS MEDICATION PRIOR TO SURGERY/ PROCEDURE.     If you take these diabetic medications, please discuss with your primary doctor and follow the hold instructions:   Hold seven (7) days prior for once weekly injectable doses [semaglutide (Ozempic, Wegovy), dulaglutide (Trulicity), exenatide ER (Bydureon), tirzepatide (Mounjaro)]  Hold the day before and day of for once daily injectable GLP-1 agonists [exenatide (Byetta), liraglutide (Saxenda, Victoza)]  Hold seven (7) days for oral semaglutide (Rybelsus)       Notify our office right away if you have any changes in your health status or if you develop a cold, flu, diarrhea, infection, fever or sore throat before your scheduled surgery date.   We can be reached at [phone] Monday-Friday 8 am-4:30 pm if you have any questions.       Complete the following checklist before your surgery    []  You will need a Pre-op Physical within 30 days before surgery with your primary care provider. This exam is required by the anesthesiologist to ensure a safe surgical experience.    Failure  to obtain your pre-op physical will lead to cancellation of your surgery  Call them right away to schedule this. Please ensure your Preoperative Physical is faxed to the Hospital if done outside of [facility] system.     [] Preoperative Medication Instructions  Contact your prescribing provider and/or vascular surgeon for instructions before discontinuing any medications especially anticoagulants. (Examples: Coumadin, Plavix, Xarelto, Eliquis, Pradaxa, Effient, Brilinta)   Hold Ibuprofen, Herbal  Supplements and Vitamin E 7 days before  Stop Cialis, Levitra and Viagra 2-3 days before surgery    [] Fasting Requirements  Nothing to eat for 8 hours before surgery unless instructed differently by the surgery nurse.   You may have clear liquids up to two hours before your arrival time (coffee, tea, water, or Gatorade. No cream or milk)  If your primary care provider has instructed you to continue oral medications, you may take them on the morning of surgery with a small sip of water.    No alcohol or smoking 24 hours before surgery.     [] Contact your insurance regarding coverage  If you would like a [place] Estimate for your upcoming procedure at [facility] Location, contact Cost of Care Estimates   Advocates are available Monday through Friday 8am - 5pm   [phone]  You may also submit a request online through your [employer] account.   If your procedure is at [facility] please contact the numbers below for Cost of Care Estimates.   - Facility Charge: [phone]    Anesthesiology charge:  [phone]      [] DO NOT BRING FMLA WITH TO SURGERY.  These should be sent to the provider's office by fax to [phone].     [] Day of Surgery  Medications - Take as indicated with sips of water.   Wear comfortable loose fitting clothes. Wear your glasses-Not contacts. Do not wear jewelry including rings and body piercings.   You may have 1 family member wait in the lobby during your surgery. Visitor restrictions are subject to change. Please verify with the surgery nurse when they call.   If same day surgery-Have an adult  come with you to surgery that can help you understand the surgeon's instructions, drive you home and stay with you overnight the first night.    [] You will receive a call from a surgery nurse 1-3 days prior to surgery. They will go over more details with you.     [] If the hospital is at [facility] and does not have available inpatient beds, your procedure may  need to be postponed. We will contact you if this happens.      Use Chlorhexidine Gluconate 4% soap to help prevent surgical site infections    You play an important part in helping to prevent a surgical site infection. Let s review what you will need to do.    Chlorhexidine Gluconate 4% is a powerful antiseptic that will help make sure your skin is free of germs. It looks and feels just like liquid soap and should be used liked a shower gel.    **[facility] patients can receive free Chlorhexidine Gluconate 4% soap for surgery at any outpatient [facility] pharmacy. You can also buy this over the counter at any pharmacy.**    Do not shave within 12 inches of your incision (surgical cut) area for at least 3 days before surgery. Shaving can make small cuts in the skin. This puts you at a higher risk of infection.    Items you will need for each shower:   1 newly washed towel   4 ounces of one of the above soaps   Clean pajamas or clothes to change into    Follow these steps the evening before surgery and the morning of surgery.  Remove all body piercings and jewelry, and leave them off until after your surgery.  Wash your hair and body with your regular shampoo and soap. Make sure you rinse the shampoo and soap from your hair and body.  Using clean hands, apply about 2 ounces of soap gently on your skin from your ear lobes to your toes. You don t need to scrub your skin, but make sure you liberally wash the area where your surgery will be done. Use on your groin area last. Do not use this soap on your face or head. If you get any soap in your eyes, ears or mouth, rinse right away. It is very important to let the soap stay on your skin for at least 1 minute.  Repeat step 2.   Rinse well and dry off using a clean towel. If you feel any tingling, itching or other irritation, rinse right away. It is normal to feel some coolness on the skin after using the antiseptic soap. Your skin may feel a bit dry after the shower, but do  not use any lotions, creams or moisturizers. Do not use hair spray or other products in your hair. Also, do not wash with your regular soap after using this.  Dress in freshly washed clothes or pajamas. Use fresh pillowcases and sheets on your bed.  Repeat these steps the morning of surgery.    If you are allergic or sensitive to Chlorhexidine Gluconate, use an antibacterial soap instead, following the same steps.    If you cannot use the shower, wash yourself with this soap at the sink. Make sure the sink is clean before you begin, and do the best you can to clean your entire body.

## 2024-04-01 NOTE — LETTER
April 1, 2024    Ashanti Aviles  [address] [place] [address]    To whom it may concern:    Ashanti Aviles is under my professional care for Peripheral Arterial disease.  We are planning for another surgery in May, date to be determined. She will need to remain off of work until the surgery and for 6-8 weeks after surgery for recovery.      Sincerely,    Dr. Dyan Geller    Electronically Signed Physician:  DYAN GELLER             Date: April 1, 2024

## 2024-04-01 NOTE — PROGRESS NOTES
VASCULAR SURGERY PROGRESS NOTE   VASCULAR SURGEON: yDan Geller MD, RPVI     LOCATION:  [facility]     Ashanti Aviles   Medical Record #:  [id number]  YOB: 1955  Age:  68 year old     Date of Service: 4/1/2024    PRIMARY CARE PROVIDER: [place], [facility]      Reason for visit: Follow-up    IMPRESSION: 68-year-old female with left extremity chronic limb threatening ischemia.  Patient has a history of left iliofemoral endarterectomy with retrograde iliac intervention followed by left femoral to TP trunk bypass which had failed last year, and followed by left common femoral to PT bypass with carotid artery which also had failed within the months after creation.  Given a short-term failure of 2 bypasses, I refer this patient to Dr. Burleson from vascular medicine service to rule out any hypercoagulable state or underlying medical condition that would prompt the bypass failure.  Workup for hypercoagulable state was relative negative.  That does not include malignancy.  Patient has not had mammogram, OB/GYN exam, or colonoscopy in quite a while.    RECOMMENDATION/RISKS: We will schedule this patient for an external iliac artery to PT bypass sometime in May given her stable condition, however, patient must undergo a thorough exam by PCP, OB/GYN, and possibly GI to rule out any potential underlying malignancy, even though the likelihood of acute malignancy is quite low.  Continue optimal medical management therapy with antiplatelet therapy and statin.  Patient can be switched to baby aspirin and a statin from now on.  After the bypass we will initiate therapeutic Lovenox and Coumadin.    HPI:  Ashanti Aviles is a 68 year old female who was seen today for follow-up.  Patient is doing okay overall but admits to persistent pain in the left leg.  Left infrapopliteal incision is not healing but looks clean.  REVIEW OF SYSTEMS:    A 12 point ROS was reviewed and is negative except  for left leg pain.     PHH:    Past Medical History:   Diagnosis Date    Benign gastric polyp     Chronic toe ulcer (H)     Chronic ulcer of great toe of left foot with necrosis of muscle (H)     Diabetes mellitus (H)     Gastroesophageal reflux disease     Hard to intubate 02/15/2024    History of colonic polyps     HLD (hyperlipidemia)     Hypertension     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/29/2013    Nicotine addiction     OAG (open angle glaucoma)     PVD (peripheral vascular disease) (H24)     Reflux esophagitis     Retinopathy     Toe ulcer (H)     Tubular adenoma     Yeast vaginitis           Past Surgical History:   Procedure Laterality Date    AMPUTATE TOE(S) Right 02/18/2023    Procedure: Amputation fourth toe right foot;  Surgeon: Benjamin Diez DPM;  [place] [facility]    ANGIOGRAM Left 2/15/2024    Procedure: COMPLETION OF ANGIOGRAM, LEFT COMMON ILIAC ARTERY STENT PLACEMENT;  Surgeon: Dyan Geller MD;  [place] [facility] [place]    ANUS SURGERY      BIOPSY CERVICAL, LOCAL EXCISION, SINGLE/MULTIPLE      COLONOSCOPY N/A 09/11/2020    Procedure: EXAM UNDER ANESTHESIA, HIGH RESOLUTION ANOSCOPY INTRA OP;  Surgeon: Preeti Sheehan MD;  [facility];  Service: Gastroenterology    COLONOSCOPY N/A 12/14/2020    Procedure: EXAM UNDER ANESTHESIA WITH HIGH RESOLUTION ANOSCOPY;  Surgeon: Preeti Sheehan MD;  Location: [facility];  Service: General    COLONOSCOPY N/A 06/15/2021    Procedure: EXAM UNDER ANESTHESIA WITH HIGH RESOLUTION ANOSCOPY, BIOPSY, FULGURATION;  Surgeon: Preeti Sheehan MD;  Location: [facility];  Service: Gastroenterology    ENDARTERECTOMY FEMORAL Left 05/17/2023    Procedure: LEFT FEMORAL ENDARTERECTOMY WITH RETROGRADE ILIAC STENT;  Surgeon: Dyan Geller MD;  [place] [facility] [place]    EXAM UNDER ANESTHESIA ANUS N/A 09/14/2021    Procedure: EXAM UNDER ANESTHESIA WITH HIGH RESOLUTION ANOSCOPY;   Surgeon: Preeti Sheehan MD;  Location: [place] [facility] OR    FEMORAL ARTERY - TIBIAL ARTERY BYPASS GRAFT Left 09/08/2023    Procedure: CREATION, BYPASS, ARTERIAL, FEMORAL TO TIBIAL, LEFT;  Surgeon: Dyan Geller MD;  Location: [facility] OR    FEMORAL ARTERY - TIBIAL ARTERY BYPASS GRAFT Left 2/15/2024    Procedure: CREATION, BYPASS, ARTERIAL, FEMORAL TO TIBIAL,;  Surgeon: Dyan Geller MD;  Location: [facility] OR    INCISION AND DRAINAGE FOOT, COMBINED Left 11/27/2023    Procedure: INCISION AND DRAINAGE, LEFT HALLUX;  Surgeon: Rene Ordaz DPM;  Location: [facility] OR    IR LOWER EXTREMITY ANGIOGRAM LEFT  03/23/2023    IR LOWER EXTREMITY ANGIOGRAM RIGHT  02/16/2023    IR THROMBOLYSIS ARTERIAL INFUSION INITIAL DAY  10/09/2023    LEEP TX, CERVICAL      2005    TUBAL LIGATION         ALLERGIES:  Simvastatin, Victoza [liraglutide], and Penicillins    MEDS:    Current Outpatient Medications:     acetaminophen (ACETAMINOPHEN 8 HOUR) 650 MG CR tablet, Take 1,300 mg by mouth every 8 hours as needed for mild pain or fever, Disp: , Rfl:     amLODIPine (NORVASC) 5 MG tablet, Take 5 mg by mouth daily, Disp: , Rfl:     clopidogrel (PLAVIX) 75 MG tablet, Take 75 mg by mouth daily, Disp: , Rfl:     empagliflozin (JARDIANCE) 25 MG TABS tablet, Take 1 tablet (25 mg) by mouth daily, Disp: 90 tablet, Rfl: 1    ezetimibe (ZETIA) 10 MG tablet, Take 10 mg by mouth daily, Disp: , Rfl:     folic acid (FOLVITE) 1 MG tablet, Take 1 tablet (1 mg) by mouth daily, Disp: 90 tablet, Rfl: 0    hydrochlorothiazide (HYDRODIURIL) 25 MG tablet, Take 25 mg by mouth daily, Disp: , Rfl:     Insulin Degludec (TRESIBA) 100 UNIT/ML SOLN, Inject 15 Units Subcutaneous 2 times daily, Disp: , Rfl:     linagliptin (TRADJENTA) 5 MG TABS tablet, Take 5 mg by mouth daily, Disp: , Rfl:     lisinopril (ZESTRIL) 40 MG tablet, Take 40 mg by mouth daily, Disp: , Rfl:     metFORMIN (GLUCOPHAGE XR) 500 MG 24 hr tablet,  Take 1,000 mg by mouth daily, Disp: , Rfl:     nicotine (NICODERM CQ) 21 MG/24HR 24 hr patch, Place 1 patch onto the skin every 24 hours, Disp: 30 patch, Rfl: 3    omeprazole (PRILOSEC) 20 MG DR capsule, Take 20 mg by mouth daily as needed, Disp: , Rfl:     vitamin B complex with vitamin C (STRESS TAB) tablet, Take 1 tablet by mouth daily, Disp: 90 tablet, Rfl: 0    Continuous Blood Gluc Sensor (FREESTYLE PRINCESS 14 DAY SENSOR) MIS, , Disp: , Rfl:     insulin glargine (LANTUS PEN) 100 UNIT/ML pen, , Disp: , Rfl:     UNABLE TO FIND, Take 2 capsules by mouth daily MEDICATION NAME: Gui Doe, Disp: , Rfl:     UNIFINE PENTIPS 31G X 5 MM miscellaneous, , Disp: , Rfl:     SOCIAL HABITS:    History   Smoking Status    Every Day    Packs/day: 0.50    Types: Cigarettes    Last attempt to quit: 2/12/2023   Smokeless Tobacco    Never     Social History    Substance and Sexual Activity      Alcohol use: Not Currently        Comment: Alcoholic Drinks/MONTH: 2x      History   Drug Use Unknown       FAMILY HISTORY:    Family History   Problem Relation Age of Onset    Hypertension Mother     Colon Cancer Mother     Diabetes Type 2  Father     Hypertension Father     Kidney failure Father     Hyperthyroidism Sister     Breast Cancer Paternal Aunt        PE:  /78   Pulse 87   Temp 97.5  F (36.4  C) (Oral)   Resp 20   Wt Readings from Last 1 Encounters:   03/20/24 147 lb (66.7 kg)     There is no height or weight on file to calculate BMI.    EXAM:  GENERAL: This is a well-developed 68 year old female who appears her stated age  EYES: Grossly normal.  MOUTH: Buccal mucosa normal   CARDIAC: Normal   CHEST/LUNG: Clear to auscultation bilaterally  GASTROINTESINAL soft nontender nondistended  MUSCULOSKELETAL: Grossly normal and both lower extremities are intact.  HEME/LYMPH: No lymphedema  NEUROLOGIC: Focally intact, Alert and oriented x 3.   PSYCH: appropriate affect            DIAGNOSTIC STUDIES:     Images:  CTA Abdomen Pelvis  Runoff w Contrast    Result Date: 3/26/2024  EXAM: CTA ABDOMEN PELVIS RUNOFF W CONTRAST LOCATION: [facility] DATE: 3/26/2024 INDICATION:  PAD (peripheral artery disease) (H24) COMPARISON: None. TECHNIQUE: Helical acquisition through the abdomen, pelvis, and bilateral lower extremities was performed during the arterial phase of contrast enhancement using IV Contrast. 2D and 3D reconstructions were performed by the CT technologist. Dose reduction  techniques were used. CONTRAST: 90ml IV Isovue 370 FINDINGS: AORTA: Calcified plaque at the origin of the celiac artery. The superior mesenteric artery is patent. Small caliber inferior mesenteric artery. Single renal arteries bilaterally. Calcified plaque the origin of the renal arteries bilaterally. Calcified plaque infrarenal abdominal aorta. No evidence of abdominal aortic aneurysm. Patent left common iliac artery stent which extends into the proximal left external iliac artery. High-grade stenosis proximal left internal iliac artery. Diffusely small left external iliac artery. Atherosclerotic disease right common iliac artery with moderate 60% stenosis at the origin. High-grade stenosis at the origin of the right internal and right external iliac arteries. Diffusely small small right external iliac artery RIGHT LEG: Dense calcified plaque posterior wall the right common femoral artery with associated 50% stenosis. Right profunda femoral artery patent. Diffuse severe atherosclerotic disease right superficial femoral artery. High-grade stenosis mid right popliteal artery. Three-vessel runoff into the right foot. LEFT LEG: Postoperative changes left femoral endarterectomy. Moderate stenosis proximal left profunda femoral artery. Left superficial femoral artery occluded proximally with reconstitution of isolated segment of distal left superficial femoral artery. Left popliteal artery occluded. Reconstitution of the left posterior tibial and peroneal  arteries. Segmental reconstitution of the left anterior tibial artery. LUNG BASES: Unremarkable. ABDOMEN: The liver, gallbladder, pancreas, spleen, adrenal glands and kidneys are unremarkable. Splenule inferior pole of the spleen PELVIS: Calcified fibroid     IMPRESSION: 1.  Patent left common iliac/external iliac artery stent. Moderate stenosis proximal left profunda femoral artery. Occluded left superficial femoral artery with reconstitution of isolated segment of distal left superficial femoral artery. Occluded left popliteal artery. Reconstitution of left posterior tibial and peroneal arteries. 2.  Moderate stenosis right common iliac artery with high-grade stenosis at the origin of the right internal and right external iliac arteries. Diffuse atherosclerotic disease right superficial femoral artery with high-grade stenosis right popliteal artery. Three-vessel runoff into the right foot.    US Low Ext Arterial Dop Seg Pres w/o Exercise    Result Date: 3/19/2024  Table formatting from the original result was not included. BILATERAL RESTING ANKLE-BRACHIAL INDICES (TRACIE'S) (Date: 03/18/24) Indication: Status post left femoral-tibial bypass graft/left MIKE stent/ Hx of Left CFA-TPT BPG using spliced 4mm and 5 mm Artegraft graft done 9/8/2023, decreased lower extremity pulses, lower extremity pain       Hx: 10/09/2023 IR Left Thrombolytics unsuccessful.   5/17/2023 IR Left MIKE Stents / Iliofemoral Endarterectomy.              3/23/23 IR Left Leg Angiogram ( Occluded SFA/ Pop. A./ RICKY),              2/16/23 IR Right SFA Proximal Atherectomy/ Balloon Angiogram.              2/18/23 Right 4th Toe Amputation              Left Big Toe Nail Removed/ Painful  Previous: 2/23/24  Date of bypass graft: 9/8/2023 Left LEG  History: Previous Smoker, Hypertension, Diabetic, PAD, Angioplasty, Vascular Surgery, Surgical Follow-up, and Vascular Ulcers Resting TRACIE's          Right: mmHg Index     Brachial: 144  Ankle-(PT): 117 0.81  Ankle-(DP): 124 0.86          Digit: 87 0.60               Left: mmHg Index     Brachial: 140  Ankle-(PT): 45 0.31 Ankle-(DP): 41 0.28          Digit: 0 0.00 Resting ankle-brachial index of 0.86 on the right. Toe Pressures of 87 mmHg and TBI of 0.60 Resting ankle-brachial index of 0.31 on the left. Toe Pressures of 0 mmHg and TBI of 0.00  VPR WAVEFORMS: The right volume plethysmography waveforms are mildly abnormal at the lower thigh level, mildly abnormal at the upper calf level and mildly abnormal at the ankle. The left volume plethysmography waveforms are moderately abnormal at the lower thigh level, moderately abnormal at the upper calf level and moderately abnormal at the ankle.  Impression:  1. RIGHT LOWER EXTREMITY: TRACIE is Abnormal with an TRACIE of 0.86 indicating single level disease. Toe Pressures are Normal and adequate for wound healing with toe pressures of 87 mmHg. 2. LEFT LOWER EXTREMITY: TRACIE is Abnormal with an TRACIE of 0.30 indicating critical limb ischemia. Toe Pressures are Abnormal and impaired for wound healing with toe pressures of 0 mmHg. Reference: Wound classification Grade TRACIE Ankle Systolic Pressure Toe Pressures 0 > 0.80 > 100 mmHg > 60 mmHg 1 0.6 - 0.79 70 - 100 mmHg 40 - 59 mmHg 2 0.4 - 0.59 50-70 mmHg 30 - 39 mmHg 3 < 0.39 < 50 mmHg < 30 mmHg Digit Pressures DBI Disease Category > 0.70 Normal < 0.70 Abnormal > 30 mmHg Potential wound healing < 30 mmHg Impaired wound healing Ankle Brachial Pressures TRACIE Disease Category > 1.3  Likely vessel calcification with monophasic waveforms, non-diagnostic 0.95-1.30 Normal with multiphasic waveforms 0.50-0.95 Single level disease 0.30-0.50 Multilevel disease < 0.30 Critical limb ischema Volume Plethysmography Recording (VPR) at all levels Normal Sharp systolic peak, fast upstroke, prominent dicrotic notch in wave Mild Sharp systolic peak, fast upstroke, absent dicrotic notch in wave Moderate Flattened systolic peak, slowed upstroke, absent dicrotic  notch inwave Severe amplitude wave with = upslope and down slope Occluded Flat Line      US Lower Extremity Arterial Duplex Left    Result Date: 3/19/2024  Table formatting from the original result was not included. Arterial Duplex Ultrasound (Date: 03/18/24) Lower Extremity Bypass Evaluation Indication: Status post left femoral-tibial bypass graft/left MIKE stent/ Hx of Left CFA-TPT BPG using spliced 4mm and 5 mm Artegraft graft done 9/8/2023, decreased lower extremity pulses, lower extremity pain       Hx: 10/09/2023 IR Left Thrombolytics unsuccessful.   5/17/2023 IR Left MIKE Stents / Iliofemoral Endarterectomy.              3/23/23 IR Left Leg Angiogram ( Occluded SFA/ Pop. A./ RICKY),              2/16/23 IR Right SFA Proximal Atherectomy/ Balloon Angiogram.              2/18/23 Right 4th Toe Amputation              Left Big Toe Nail Removed/ Painful  Previous: 2/23/24  Date of bypass graft: 9/8/2023 Left LEG  History: Previous Smoker, Hypertension, Diabetic, PAD, Angioplasty, Vascular Surgery, Surgical Follow-up, and Vascular Ulcers Technique: Duplex imaging is performed utilizing gray-scale, two-dimensional images, and color-flow imaging. Doppler waveform analysis and spectral Doppler imaging is also performed. Right Leg (cm/s)                 Location Velocity  Waveforms PTA 27 B  DPA  40 B  Waveforms: T=Triphasic, M=Monophasic, B=Biphasic Left Leg (cm/s) Location Velocity  Waveforms  M MIKE 202 M   M Inflow Artery CFA   Proximal Anastamosis 93 M Proximal Bypass OCC - Mid Bypass OCC  - Distal Bypass OCC - Distal Anastamosis OCC  - Outflow Artery PTA   Popliteal Artery OCC - PTA 17 M  DPA 9 M  Waveforms: T=Triphasic, M=Monophasic, B=Biphasic  Impression: 1. RIGHT LOWER EXTREMITY: Not fully examined but biphasic flow in tibial vessels suggest lack of more proximal disease 2. LEFT LOWER EXTREMITY: Triphasic flow in common artery suggesting no inflow disease.  A new occlusion of recently placed femoral  to posterior tibial artery bypass with very faint flow distally in the posterior tibial artery. Reference: Category Normal Intermediate Severe Occluded  PSV  cm/s 151-300 cm/s <45 or >300cm/s Absent Flow Ratio <1.5 1.5-3.4 >3.4 N/A      MR Foot Left w/o & w Contrast    Result Date: 3/5/2024  EXAM: MR FOOT LEFT W/O and W CONTRAST LOCATION: [facility] DATE: 3/5/2024 INDICATION: Osteomyelitis of the left hallux. COMPARISON: Multiple prior comparison exams, the most recent dated 11/09/2023 TECHNIQUE: Routine. Additional postgadolinium T1 sequences were obtained. IV CONTRAST: Gadavist 7mL FINDINGS: JOINTS AND BONES: -Patchy mildly enhancing T2 hyperintense signal abnormality involving the distal phalanx which has slightly progressed since the most recent comparison exam of November 2023. No confluent T1 hypointense marrow replacing signal. Mild degenerative arthrosis of the first metatarsophalangeal and second tarsometatarsal joint. No acute fracture. No joint effusion or enhancing synovitis. TENDONS: -No tendon tear, tendinopathy, or tenosynovitis. LIGAMENTS: -Lisfranc ligament complex and visualized collateral and capsular ligaments are intact. MUSCLES AND SOFT TISSUES: -Subtle broad-based ulceration over the distal dorsal medial aspect of the first toe. No loculated fluid collection to suggest abscess. -Progressive T2 hyperintense nonenhancing signal abnormality within the intrinsic muscles of the foot (series 4 image 15).     IMPRESSION: 1.  Patchy mildly enhancing T2 hyperintense signal abnormality involving the distal phalanx of the hallux which has slightly progressed since the comparison exam. Findings are indeterminate and may reflect persistent/progressive reactive osteitis, however, early osteomyelitis cannot be entirely excluded. 2.  Patchy T2 hyperintense nonenhancing intramuscular signal abnormality, nonspecific and possibly related to recent revascularization. Infectious or  inflammatory myositis is considered much less likely. 3.  No abscess. 4.  Mild degenerative arthrosis of the first MTP and second TMT joints.           LABS:      Sodium   Date Value Ref Range Status   02/19/2024 137 135 - 145 mmol/L Final     Comment:     Reference intervals for this test were updated on 09/26/2023 to more accurately reflect our healthy population. There may be differences in the flagging of prior results with similar values performed with this method. Interpretation of those prior results can be made in the context of the updated reference intervals.    02/18/2024 135 135 - 145 mmol/L Final     Comment:     Reference intervals for this test were updated on 09/26/2023 to more accurately reflect our healthy population. There may be differences in the flagging of prior results with similar values performed with this method. Interpretation of those prior results can be made in the context of the updated reference intervals.    02/17/2024 137 135 - 145 mmol/L Final     Comment:     Reference intervals for this test were updated on 09/26/2023 to more accurately reflect our healthy population. There may be differences in the flagging of prior results with similar values performed with this method. Interpretation of those prior results can be made in the context of the updated reference intervals.      Urea Nitrogen   Date Value Ref Range Status   02/19/2024 11.7 8.0 - 23.0 mg/dL Final   02/18/2024 11.8 8.0 - 23.0 mg/dL Final   02/17/2024 10.8 8.0 - 23.0 mg/dL Final     Hemoglobin   Date Value Ref Range Status   03/25/2024 13.6 11.7 - 15.7 g/dL Final   02/19/2024 9.3 (L) 11.7 - 15.7 g/dL Final   02/18/2024 8.9 (L) 11.7 - 15.7 g/dL Final     Platelet Count   Date Value Ref Range Status   03/25/2024 336 150 - 450 10e3/uL Final   02/19/2024 193 150 - 450 10e3/uL Final   02/18/2024 141 (L) 150 - 450 10e3/uL Final     INR   Date Value Ref Range Status   03/22/2024 0.85 0.85 - 1.15 Final   01/12/2024 0.83 (L)  0.85 - 1.15 Final   10/06/2023 0.87 0.85 - 1.15 Final       30 minutes spent on the day of encounter doing chart review, history and exam, documentation, and further activities as noted.         Dyan Geller MD, [facility]  VASCULAR SURGERY

## 2024-04-02 DIAGNOSIS — Z12.12 ENCOUNTER FOR COLORECTAL CANCER SCREENING: Primary | ICD-10-CM

## 2024-04-02 DIAGNOSIS — Z12.11 ENCOUNTER FOR COLORECTAL CANCER SCREENING: Primary | ICD-10-CM

## 2024-04-04 ENCOUNTER — OFFICE VISIT (OUTPATIENT)
Dept: VASCULAR SURGERY | Facility: CLINIC | Age: 69
End: 2024-04-04
Attending: FAMILY MEDICINE
Payer: COMMERCIAL

## 2024-04-04 VITALS — DIASTOLIC BLOOD PRESSURE: 76 MMHG | SYSTOLIC BLOOD PRESSURE: 129 MMHG | OXYGEN SATURATION: 100 % | HEART RATE: 79 BPM

## 2024-04-04 DIAGNOSIS — L97.922 ULCER OF LEFT LOWER EXTREMITY WITH FAT LAYER EXPOSED (H): Primary | ICD-10-CM

## 2024-04-04 DIAGNOSIS — R60.0 LOWER EXTREMITY EDEMA: ICD-10-CM

## 2024-04-04 PROBLEM — L97.524: Status: RESOLVED | Noted: 2023-12-20 | Resolved: 2024-04-04

## 2024-04-04 PROBLEM — L97.523: Status: RESOLVED | Noted: 2023-08-25 | Resolved: 2024-04-04

## 2024-04-04 PROCEDURE — 11042 DBRDMT SUBQ TIS 1ST 20SQCM/<: CPT | Performed by: FAMILY MEDICINE

## 2024-04-04 PROCEDURE — 99204 OFFICE O/P NEW MOD 45 MIN: CPT | Mod: 24 | Performed by: FAMILY MEDICINE

## 2024-04-04 PROCEDURE — 99215 OFFICE O/P EST HI 40 MIN: CPT | Mod: 25 | Performed by: FAMILY MEDICINE

## 2024-04-04 ASSESSMENT — PAIN SCALES - GENERAL: PAINLEVEL: SEVERE PAIN (7)

## 2024-04-04 NOTE — LETTER
[facility]  [address] [place] [address] [address]  [place], [place] [address]  [phone]      Fax [phone]    [facility]           Fax: [phone]            Customer Service: [phone]        Account #: [id number]    Wound Dressing Rx and Order Form  Order Status: new  Verbal: Jenifer  Date: 2024     Ashanti Aviles  Gender: female  : 1955  [address] [place] [address]  [phone] (home)     Medical Record: [id number]  Primary Care Provider: Dyan Geller      ICD-10-CM    1. Ulcer of left lower extremity with fat layer exposed (H)  L97.922 DEBRIDE SKIN/SUBQ TISSUE     Wound care      2. Lower extremity edema  R60.0             Insurance Info:  INSURER: Payor: [employer] / Plan: [employer] OPEN ACCESS / Product Type: HMO /   Policy ID#:  [id number]  SECONDARY INSURANCE:    Secondary Policy ID#:  N/A        Physician Info:   Name:  KLAUS MARCIAL     Dept Address/Phones:   [address] [place] [address], [address]  [facility] [id number] [phone]  Fax: [phone]    Lymphedema circumferential measurements (in cm):       No data to display                  Wound info:  VASC Wound left hallux (Active)   Pre Size Length 0.8 24 0800   Pre Size Width 1 24 0800   Pre Size Depth 0.5 24 0800   Pre Total Sq cm 0.8 24 0800   Number of days: 120       VASC Wound Left lower leg wound (Active)   Pre Size Length 3.8 24 0800   Pre Size Width 2 24 0800   Pre Size Depth 0.8 24 0800   Pre Total Sq cm 6.4 24 0800   Post Size Length 3.2 24 0800   Post Size Width 2 24 0800   Post Size Depth 0.8 24 0800   Post Total Sq cm 6.4 24 0800   Undermined 0.5cm 4 to 12 oclock 24 1208   Number of days: 24       Incision/Surgical Site 02/15/24 Anterior;Left Groin (Active)   Number of days: 49       Incision/Surgical Site 02/15/24 Left;Medial Calf (Active)   Number of days: 49     "    Drainage: moderate  Thickness:  full  Duration of Need: 30 DAYS  Days Supply: 30 DAYS  Start Date: 4/4/2024  Starter Kit, Ancillary Kit (saline, gloves, gauze): Yes   Qualifying wound/Debridement: Yes     NO SUBSTITUTIONS. Call [phone].      Dressing Type Brand Size Frequency of change  Quantity   Primary Aquacel AG  2\"x2\" DAILY and as needed 30    Zetuvit plus silicone border  3\"x3\" DAILY and as needed 30     NO SUBSTITUTIONS. Call [phone] with questions.       OK to forward to covered supplier.    Electronically Signed Physician:  KLAUS MARCIAL             Date: April 4, 2024    "

## 2024-04-04 NOTE — PROGRESS NOTES
Wound Clinic Note         Visit date: 04/04/2024       Cheif Complaint:     Ashanti Aviles is a 68 year old  female had concerns including Wound Check.  The patient has lower extremity edema and a left leg ulcer         HISTORY OF PRESENT ILLNESS:    Ashanti Aviles reports the wound has been present since mid February 2024.  The wound began after a recent surgery and the incision did not heal normally.   She has critical limb ischemia and has undergone 2 left leg artery bypass surgeries, the most recent on February 15, 2024.  Postoperatively a portion of the incision dehisced leading to the current wound.  Unfortunately both of the arterial bypasses have gone down.  She is currently undergoing a hypercoagulability workup but is planned to undergo another bypass surgery in May.        The patient has been bandaging the wound with Aquacel and a dry gauze changed twice a day.  There has been Heavy  drainage from the wound.       The pateint denies fevers or chills.  They report the pain from the wound has been 6/10 and has remained about the same recently.      The patient reports they currently do not have any routine for elevating their legs.     Today the patient reports maintaining a regular diet without special attention to protein.        The patient denies a history of smoking or chronic steroid use.  The patient confirms having diabetes and reports the blood sugars are well controlled.         The patient has not had any symptoms of infection relating to the wound recently and is not currently on antibiotics.       Problem List:   Past Medical History:   Diagnosis Date    Benign gastric polyp     Chronic toe ulcer (H)     Chronic ulcer of great toe of left foot with necrosis of muscle (H)     Diabetes mellitus (H)     Gastroesophageal reflux disease     Hard to intubate 02/15/2024    History of colonic polyps     HLD (hyperlipidemia)     Hypertension     Microalbuminuric diabetic nephropathy (H)  10/29/2013    Nicotine addiction     OAG (open angle glaucoma)     PVD (peripheral vascular disease) (H24)     Reflux esophagitis     Retinopathy     Toe ulcer (H)     Tubular adenoma     Yeast vaginitis              Family Hx: family history includes Breast Cancer in her paternal aunt; Colon Cancer in her mother; Diabetes Type 2  in her father; Hypertension in her father and mother; Hyperthyroidism in her sister; Kidney failure in her father.       Surgical Hx:   Past Surgical History:   Procedure Laterality Date    AMPUTATE TOE(S) Right 02/18/2023    Procedure: Amputation fourth toe right foot;  Surgeon: Benjamin Diez DPM;  Location: [facility]    ANGIOGRAM Left 2/15/2024    Procedure: COMPLETION OF ANGIOGRAM, LEFT COMMON ILIAC ARTERY STENT PLACEMENT;  Surgeon: Dyan Geller MD;  Location: [facility]    ANUS SURGERY      BIOPSY CERVICAL, LOCAL EXCISION, SINGLE/MULTIPLE      COLONOSCOPY N/A 09/11/2020    Procedure: EXAM UNDER ANESTHESIA, HIGH RESOLUTION ANOSCOPY INTRA OP;  Surgeon: Preeti Sheehan MD;  Location: [facility] OR;  Service: Gastroenterology    COLONOSCOPY N/A 12/14/2020    Procedure: EXAM UNDER ANESTHESIA WITH HIGH RESOLUTION ANOSCOPY;  Surgeon: Preeti Sheehan MD;  Location: [facility] OR;  Service: General    COLONOSCOPY N/A 06/15/2021    Procedure: EXAM UNDER ANESTHESIA WITH HIGH RESOLUTION ANOSCOPY, BIOPSY, FULGURATION;  Surgeon: Preeti Sheehan MD;  Location: [facility] OR;  Service: Gastroenterology    ENDARTERECTOMY FEMORAL Left 05/17/2023    Procedure: LEFT FEMORAL ENDARTERECTOMY WITH RETROGRADE ILIAC STENT;  Surgeon: Dyan Geller MD;  Location: [facility]    EXAM UNDER ANESTHESIA ANUS N/A 09/14/2021    Procedure: EXAM UNDER ANESTHESIA WITH HIGH RESOLUTION ANOSCOPY;  Surgeon: Preeti Sheehan MD;  Location: [facility] OR    FEMORAL ARTERY - TIBIAL ARTERY BYPASS GRAFT Left 09/08/2023    Procedure: CREATION,  BYPASS, ARTERIAL, FEMORAL TO TIBIAL, LEFT;  Surgeon: Dyan Geller MD;  Location: [facility] OR    FEMORAL ARTERY - TIBIAL ARTERY BYPASS GRAFT Left 2/15/2024    Procedure: CREATION, BYPASS, ARTERIAL, FEMORAL TO TIBIAL,;  Surgeon: Dyan Geller MD;  Location: [facility] OR    INCISION AND DRAINAGE FOOT, COMBINED Left 11/27/2023    Procedure: INCISION AND DRAINAGE, LEFT HALLUX;  Surgeon: Rene Ordaz DPM;  Location: [facility] OR    IR LOWER EXTREMITY ANGIOGRAM LEFT  03/23/2023    IR LOWER EXTREMITY ANGIOGRAM RIGHT  02/16/2023    IR THROMBOLYSIS ARTERIAL INFUSION INITIAL DAY  10/09/2023    LEEP TX, CERVICAL      2005    TUBAL LIGATION            Allergies:    Allergies   Allergen Reactions    Simvastatin Muscle Pain (Myalgia)     Muscle pain    Victoza [Liraglutide] Other (See Comments)     Binds bowels    Penicillins Unknown     Childhood rxn              Medication History:    Current Outpatient Medications   Medication Sig Dispense Refill    acetaminophen (ACETAMINOPHEN 8 HOUR) 650 MG CR tablet Take 1,300 mg by mouth every 8 hours as needed for mild pain or fever      amLODIPine (NORVASC) 5 MG tablet Take 5 mg by mouth daily      apixaban ANTICOAGULANT (ELIQUIS ANTICOAGULANT) 5 MG tablet 1 tablet Orally      clopidogrel (PLAVIX) 75 MG tablet Take 75 mg by mouth daily      Continuous Blood Gluc Sensor (FREESTYLE PRINCESS 14 DAY SENSOR) MISC       empagliflozin (JARDIANCE) 25 MG TABS tablet Take 1 tablet (25 mg) by mouth daily 90 tablet 1    ezetimibe (ZETIA) 10 MG tablet Take 10 mg by mouth daily      folic acid (FOLVITE) 1 MG tablet Take 1 tablet (1 mg) by mouth daily 90 tablet 0    hydrochlorothiazide (HYDRODIURIL) 25 MG tablet Take 25 mg by mouth daily      Insulin Degludec (TRESIBA) 100 UNIT/ML SOLN Inject 15 Units Subcutaneous 2 times daily      insulin glargine (LANTUS PEN) 100 UNIT/ML pen       linagliptin (TRADJENTA) 5 MG TABS tablet Take 5 mg by mouth daily       lisinopril (ZESTRIL) 40 MG tablet Take 40 mg by mouth daily      metFORMIN (GLUCOPHAGE XR) 500 MG 24 hr tablet Take 1,000 mg by mouth daily      nicotine (NICODERM CQ) 21 MG/24HR 24 hr patch Place 1 patch onto the skin every 24 hours 30 patch 3    omeprazole (PRILOSEC) 20 MG DR capsule Take 20 mg by mouth daily as needed      UNABLE TO FIND Take 2 capsules by mouth daily MEDICATION NAME: Gui KEITH PENTIPS 31G X 5 MM miscellaneous       vitamin B complex with vitamin C (STRESS TAB) tablet Take 1 tablet by mouth daily 90 tablet 0     No current facility-administered medications for this visit.         Tobacco History:  reports that she has been smoking cigarettes. She has been smoking an average of 0.5 packs per day. She has never used smokeless tobacco.       REVIEW OF SYMPTOMS:   The review of systems was negative except as noted in the HPI.           PHYSICAL EXAMINATION:     /76   Pulse 79   SpO2 100%            GENERAL: The patient overall appears well and is no acute distress.   HEAD: normocephalic   EYES: Sclera and conjunctiva clear   NECK: no obvious masses   LUNGS: breathing is unlabored.   EXTREMITIES: No clubbing, cyanosis or edema   SKIN: No rashes or other abnormalities except as noted under the Wound section below.   NEUROLOGICAL: normal motor and sensory function   EDEMA: Mild    Vascular: Her foot is cool but not cold, she does not have palpable pedal pulses but her sensory and motor function in her foot is intact.    WOUND: The wound appears healthy with no sign of infection.   Wound bed: necrotic material  Periwound: healthy intact skin  There is some loose wet necrotic material in the wound bed.      Also see below for wound details:       Circumferential volume measures:             No data to display                Ulceration(s)/Wound(s):   Please see the media tab under the chart review for pictures of the wounds.  Nursing staff removed dressings and cleansed wound.    VASC  Wound Left lower leg wound (Active)   Pre Size Length 3.8 04/04/24 0800   Pre Size Width 2 04/04/24 0800   Pre Size Depth 0.8 04/04/24 0800   Pre Total Sq cm 6.4 04/04/24 0800   Post Size Length 3.2 04/04/24 0800   Post Size Width 2 04/04/24 0800   Post Size Depth 0.8 04/04/24 0800   Post Total Sq cm 6.4 04/04/24 0800             Recent Labs   Lab Test 02/15/24  0612 09/05/23  1157 02/14/23  1744 06/26/19  0000 04/03/19  0000 02/26/19  0000   HGBA1C  --   --   --  8.0* 8.8* 10.5*   A1C 8.7* 8.3* 8.5*  --   --   --           Recent Labs   Lab Test 02/15/23  0814   ALBUMIN 3.7         WBC Count   Date Value Ref Range Status   03/25/2024 12.6 (H) 4.0 - 11.0 10e3/uL Final     Albumin   Date Value Ref Range Status   02/15/2023 3.7 3.5 - 5.2 g/dL Final           Procedure note:  Informed Consent:  Patient acknowledges that I have explained the patient's general medical condition to him/her.  Patient has been informed and acknowledges that I have explained the risks or complications of wound debridement including, but not limited to, scarring, damage to blood vessels or surrounding areas such as nerves and organs, allergic reactions to topical and injected anaesthetic and/or skin prep solutions.  Other risks include excessive bleeding, removal of healthy tissue, infection, pain and inflammation, and prolonged or failure to heal.  Patient acknowledges that bleeding after debridement and pain may worsen after debridement and that dead/necrotic tissue may cause bacteria and toxins to be released into the bloodstream and cause sepsis or shock.  Patient acknowledges that I have explained that the wound may be larger after debridement.  Patient acknowledges that they may need serial debridements while under care in the wound department.  Patient acknowledges that they were given an opportunity to ask questions about treatment and I have answered patient's questions.    Anesthetized as needed with lidocaine.  Using a curette  and/or a scalpel I performed an excisional debridement removing all necrotic material at the left leg wound in to the level of viable subcutaneous tissue.  I obtained hemostasis with direct pressure.  The patient tolerated the procedure well.  EBL: <5 ml  Total debridement surface area: Less than 20 cm   Due to her severe peripheral artery disease I did a very gentle debridement with very minimal bleeding to decrease increased oxygen demand, however the necrotic material that was there was very loose and fairly easily removed.              ASSESSMENT:   This is a 68 year old  female with a left leg ulcer, the patient also has lower extremity edema which was also managed during today's clinic visit.          PLAN:   We'll bandage the area with Aquacel and a silicone adhesive bandage changed once a day.  At the request of Dr. Burleson we will not apply any compression.    Separate from the wound care instructions we then discussed management strategies for lower extremity edema.  I explained the keys for managing lower extremity edema are compression and elevation.  I explained to the patient today that controlling the edema is probably the most important thing we can do to help heal the wound.  I have specifically recommended that they lay down with their legs above the level of the heart for 30 minutes at least twice a day.   I advised her that if she starts having pain in her foot with leg elevation she should put her leg down.    I have explained to the patient the importance of protein intake to wound healing.  I have explained that increasing protein intake will speed wound healing.  We discussed several types of food that are high in protein and the wound care nurse gave the patient a handout that summarizes this information.  In addition to further speed wound healing I have encouraged the patient to take a protein supplement.   The patient will return to the wound clinic in 3 weeks to see me again.        45  minutes spent on the date of the encounter doing chart review, history and exam, documentation and further activities per the note, this time excludes any procedure time      Robert Oliva MD  04/04/2024   9:12 AM   [facility] Vascular/Wound  [phone]    This note was electronically signed by Robert Oliva MD

## 2024-04-08 ENCOUNTER — TELEPHONE (OUTPATIENT)
Dept: GASTROENTEROLOGY | Facility: CLINIC | Age: 69
End: 2024-04-08
Payer: COMMERCIAL

## 2024-04-08 DIAGNOSIS — Z12.11 SPECIAL SCREENING FOR MALIGNANT NEOPLASMS, COLON: Primary | ICD-10-CM

## 2024-04-08 RX ORDER — BISACODYL 5 MG/1
TABLET, DELAYED RELEASE ORAL
Qty: 4 TABLET | Refills: 0 | Status: ON HOLD | OUTPATIENT
Start: 2024-04-08 | End: 2024-06-04

## 2024-04-08 NOTE — TELEPHONE ENCOUNTER
Pre visit planning completed.    Sent for review - Difficult intubation listed on problem list    Procedure details:    Patient scheduled for Colonoscopy  on 4/15/24.     Arrival time: 0915. Procedure time 1015    Facility location: [facility]; [address] [place] [address], [address] Floor, [place] [address]. Check in location: 5th Floor.    Sedation type: Conscious sedation     Pre op exam needed? N/A    Indication for procedure: Screening      Chart review:     Electronic implanted devices? No    Recent diagnosis of diverticulitis within the last 6 weeks? No    Diabetic? Yes. Diabetic medication HOLDING recommendations: Oral diabetic medications: Yes:  Jardiance (empagliflozin): HOLD  3 days before procedure.  Metformin (glucophage): HOLD day of procedure.  Tradjenta (linagliptin): HOLD  day of procedure.   Insulin: Yes. Consult with managing provider       Medication review:    Anticoagulants? Yes Apixaban (Eliquis): Recommended HOLD 2 days before procedure.  Consult with your managing provider.  Clopidogrel (Plavix): Recommended HOLD 5 days before procedure.  Consult with your managing provider.    NSAIDS? No    Other medication HOLDING recommendations:  N/A      Prep for procedure:     Bowel prep recommendation: Standard Golytely.     Bowel prep prescription sent to    [facility] [place], [place] - [address]    Due to: standard bowel prep.    Prep instructions sent via [employer]         Maggie Wooten RN  Endoscopy Procedure Pre Assessment RN  [phone] option 4

## 2024-04-08 NOTE — TELEPHONE ENCOUNTER
"Endoscopy Scheduling Screen    Have you had a positive Covid test in the last 14 days?  No    What is your communication preference for Instructions and/or Bowel Prep?   MyChart    What insurance is in the chart?  Other:      Ordering/Referring Provider:   SOURAV SARMIENTO      (If ordering provider performs procedure, schedule with ordering provider unless otherwise instructed. )    BMI: Estimated body mass index is 27.78 kg/m  as calculated from the following:    Height as of 3/20/24: 1.549 m (5' 1\").    Weight as of 3/20/24: 66.7 kg (147 lb).     Sedation Ordered  moderate sedation.   If patient BMI > 50 do not schedule in [facility].    If patient BMI > 45 do not schedule at [facility].    Are you taking methadone or Suboxone?  No    Have you had difficulties, pain, or discomfort during past endoscopy procedures?  No    Are you taking any prescription medications for pain 3 or more times per week?   NO, No RN review required.    Do you have a history of malignant hyperthermia?  No    (Females) Are you currently pregnant?   No     Have you been diagnosed or told you have pulmonary hypertension?   No    Do you have an LVAD?  No    Have you been told you have moderate to severe sleep apnea?  No    Have you been told you have COPD, asthma, or any other lung disease?  No    Do you have any heart conditions?  No     Have you ever had or are you waiting for an organ transplant?  No. Continue scheduling, no site restrictions.    Have you had a stroke or transient ischemic attack (TIA aka \"mini stroke\" in the last 6 months?   No    Have you been diagnosed with or been told you have cirrhosis of the liver?   No    Are you currently on dialysis?   No    Do you need assistance transferring?   No    BMI: Estimated body mass index is 27.78 kg/m  as calculated from the following:    Height as of 3/20/24: 1.549 m (5' 1\").    Weight as of 3/20/24: 66.7 kg (147 lb).     Is patients BMI > 40 and scheduling location [facility]?  No    Do " you take an injectable medication for weight loss or diabetes (excluding insulin)?  No    Do you take the medication Naltrexone?  No    Do you take blood thinners?  Yes     Are you taking Effient/Prasugrel?  No, you must contact your prescribing provider for direction on holding or bridging with a different medication.       Prep   Are you currently on dialysis or do you have chronic kidney disease?  No    Do you have a diagnosis of diabetes?  Yes (Golytely Prep)    Do you have a diagnosis of cystic fibrosis (CF)?  No    On a regular basis do you go 3 -5 days between bowel movements?  No    BMI > 40?  No    Preferred Pharmacy:      [facility] [id number] [facility]  [address]  [facility] [address]  Phone: [phone] Fax: [phone]      Final Scheduling Details     Procedure scheduled  Colonoscopy    Surgeon:  Dmitry     Date of procedure:  4/15     Pre-OP / PAC:   No - Not required for this site.    Location  [facility] - [facility] - Patient preference.    Sedation   Moderate Sedation - Per order.      Patient Reminders:   You will receive a call from a Nurse to review instructions and health history.  This assessment must be completed prior to your procedure.  Failure to complete the Nurse assessment may result in the procedure being cancelled.      On the day of your procedure, please designate an adult(s) who can drive you home stay with you for the next 24 hours. The medicines used in the exam will make you sleepy. You will not be able to drive.      You cannot take public transportation, ride share services, or non-medical taxi service without a responsible caregiver.  Medical transport services are allowed with the requirement that a responsible caregiver will receive you at your destination.  We require that drivers and caregivers are confirmed prior to your procedure.

## 2024-04-09 ENCOUNTER — ANCILLARY PROCEDURE (OUTPATIENT)
Dept: MAMMOGRAPHY | Facility: HOSPITAL | Age: 69
End: 2024-04-09
Attending: SURGERY
Payer: COMMERCIAL

## 2024-04-09 DIAGNOSIS — Z12.31 VISIT FOR SCREENING MAMMOGRAM: ICD-10-CM

## 2024-04-09 PROCEDURE — 77063 BREAST TOMOSYNTHESIS BI: CPT

## 2024-04-09 NOTE — TELEPHONE ENCOUNTER
Cullen Washington APRN CRNA Faust, Jennifer L, RN  Last 2 intubations were labeled EASY..  OK for [facility]    Cullen Greco CRNA

## 2024-04-09 NOTE — TELEPHONE ENCOUNTER
Pre assessment completed for upcoming procedure.      Procedure details:    Patient scheduled for Colonoscopy  on 4/15/24.     Arrival time: 0915. Procedure time 1015     Facility location: [facility]; [address] [place] [address], [address] Floor, [place] [address]. Check in location: 5th Floor.     Sedation type: Conscious sedation     Designated  policy reviewed. Instructed to have someone stay 6 hours post procedure.       Chart review:     Diabetic? Yes. Diabetic medication HOLDING recommendations: Oral diabetic medications: Yes:  Jardiance (empagliflozin): HOLD  3 days before procedure.  Metformin (glucophage): HOLD day of procedure.  Tradjenta (linagliptin): HOLD  day of procedure.   Insulin: Yes. Consult with managing provider       Medication review:    Anticoagulants? Yes Clopidogrel (Plavix): Recommended HOLD 5 days before procedure. Consult with your managing provider.     Prep for procedure:     Reviewed procedure prep instructions.     Patient verbalized understanding and had no questions or concerns at this time.        Maggie Wooten RN  Endoscopy Procedure Pre Assessment RN  [phone] option 4

## 2024-04-15 ENCOUNTER — ANESTHESIA EVENT (OUTPATIENT)
Dept: SURGERY | Facility: AMBULATORY SURGERY CENTER | Age: 69
End: 2024-04-15
Payer: COMMERCIAL

## 2024-04-15 ENCOUNTER — ANESTHESIA (OUTPATIENT)
Dept: SURGERY | Facility: AMBULATORY SURGERY CENTER | Age: 69
End: 2024-04-15
Payer: COMMERCIAL

## 2024-04-15 ENCOUNTER — HOSPITAL ENCOUNTER (OUTPATIENT)
Facility: AMBULATORY SURGERY CENTER | Age: 69
Discharge: HOME | End: 2024-04-15
Attending: INTERNAL MEDICINE | Admitting: INTERNAL MEDICINE
Payer: COMMERCIAL

## 2024-04-15 VITALS
OXYGEN SATURATION: 100 % | HEART RATE: 79 BPM | BODY MASS INDEX: 27.75 KG/M2 | WEIGHT: 147 LBS | RESPIRATION RATE: 16 BRPM | DIASTOLIC BLOOD PRESSURE: 61 MMHG | SYSTOLIC BLOOD PRESSURE: 138 MMHG | TEMPERATURE: 96.9 F | HEIGHT: 61 IN

## 2024-04-15 VITALS — HEART RATE: 78 BPM

## 2024-04-15 LAB
COLONOSCOPY: NORMAL
GLUCOSE BLDC GLUCOMTR-MCNC: 117 MG/DL (ref 70–99)

## 2024-04-15 PROCEDURE — 82962 GLUCOSE BLOOD TEST: CPT | Performed by: PATHOLOGY

## 2024-04-15 PROCEDURE — 88305 TISSUE EXAM BY PATHOLOGIST: CPT | Mod: TC | Performed by: INTERNAL MEDICINE

## 2024-04-15 PROCEDURE — 88305 TISSUE EXAM BY PATHOLOGIST: CPT | Mod: 26 | Performed by: STUDENT IN AN ORGANIZED HEALTH CARE EDUCATION/TRAINING PROGRAM

## 2024-04-15 PROCEDURE — 45385 COLONOSCOPY W/LESION REMOVAL: CPT | Mod: 33 | Performed by: INTERNAL MEDICINE

## 2024-04-15 PROCEDURE — 45380 COLONOSCOPY AND BIOPSY: CPT | Performed by: ANESTHESIOLOGY

## 2024-04-15 PROCEDURE — 45380 COLONOSCOPY AND BIOPSY: CPT | Performed by: NURSE ANESTHETIST, CERTIFIED REGISTERED

## 2024-04-15 RX ORDER — NALOXONE HYDROCHLORIDE 0.4 MG/ML
0.2 INJECTION, SOLUTION INTRAMUSCULAR; INTRAVENOUS; SUBCUTANEOUS
Status: DISCONTINUED | OUTPATIENT
Start: 2024-04-15 | End: 2024-04-16 | Stop reason: HOSPADM

## 2024-04-15 RX ORDER — LIDOCAINE 40 MG/G
CREAM TOPICAL
Status: DISCONTINUED | OUTPATIENT
Start: 2024-04-15 | End: 2024-04-16 | Stop reason: HOSPADM

## 2024-04-15 RX ORDER — ONDANSETRON 2 MG/ML
4 INJECTION INTRAMUSCULAR; INTRAVENOUS EVERY 6 HOURS PRN
Status: DISCONTINUED | OUTPATIENT
Start: 2024-04-15 | End: 2024-04-16 | Stop reason: HOSPADM

## 2024-04-15 RX ORDER — NALOXONE HYDROCHLORIDE 0.4 MG/ML
0.4 INJECTION, SOLUTION INTRAMUSCULAR; INTRAVENOUS; SUBCUTANEOUS
Status: DISCONTINUED | OUTPATIENT
Start: 2024-04-15 | End: 2024-04-16 | Stop reason: HOSPADM

## 2024-04-15 RX ORDER — PROCHLORPERAZINE MALEATE 5 MG
5 TABLET ORAL EVERY 6 HOURS PRN
Status: DISCONTINUED | OUTPATIENT
Start: 2024-04-15 | End: 2024-04-16 | Stop reason: HOSPADM

## 2024-04-15 RX ORDER — PROPOFOL 10 MG/ML
INJECTION, EMULSION INTRAVENOUS CONTINUOUS PRN
Status: DISCONTINUED | OUTPATIENT
Start: 2024-04-15 | End: 2024-04-15

## 2024-04-15 RX ORDER — PROPOFOL 10 MG/ML
INJECTION, EMULSION INTRAVENOUS PRN
Status: DISCONTINUED | OUTPATIENT
Start: 2024-04-15 | End: 2024-04-15

## 2024-04-15 RX ORDER — FLUMAZENIL 0.1 MG/ML
0.2 INJECTION, SOLUTION INTRAVENOUS
Status: ACTIVE | OUTPATIENT
Start: 2024-04-15 | End: 2024-04-15

## 2024-04-15 RX ORDER — ONDANSETRON 2 MG/ML
4 INJECTION INTRAMUSCULAR; INTRAVENOUS
Status: DISCONTINUED | OUTPATIENT
Start: 2024-04-15 | End: 2024-04-16 | Stop reason: HOSPADM

## 2024-04-15 RX ORDER — SODIUM CHLORIDE, SODIUM LACTATE, POTASSIUM CHLORIDE, CALCIUM CHLORIDE 600; 310; 30; 20 MG/100ML; MG/100ML; MG/100ML; MG/100ML
INJECTION, SOLUTION INTRAVENOUS CONTINUOUS
Status: DISCONTINUED | OUTPATIENT
Start: 2024-04-15 | End: 2024-04-16 | Stop reason: HOSPADM

## 2024-04-15 RX ORDER — LIDOCAINE HYDROCHLORIDE 20 MG/ML
INJECTION, SOLUTION INFILTRATION; PERINEURAL PRN
Status: DISCONTINUED | OUTPATIENT
Start: 2024-04-15 | End: 2024-04-15

## 2024-04-15 RX ORDER — ONDANSETRON 4 MG/1
4 TABLET, ORALLY DISINTEGRATING ORAL EVERY 6 HOURS PRN
Status: DISCONTINUED | OUTPATIENT
Start: 2024-04-15 | End: 2024-04-16 | Stop reason: HOSPADM

## 2024-04-15 RX ADMIN — PROPOFOL 30 MG: 10 INJECTION, EMULSION INTRAVENOUS at 10:36

## 2024-04-15 RX ADMIN — PROPOFOL 150 MCG/KG/MIN: 10 INJECTION, EMULSION INTRAVENOUS at 10:32

## 2024-04-15 RX ADMIN — PROPOFOL 50 MG: 10 INJECTION, EMULSION INTRAVENOUS at 10:34

## 2024-04-15 RX ADMIN — LIDOCAINE HYDROCHLORIDE 60 MG: 20 INJECTION, SOLUTION INFILTRATION; PERINEURAL at 10:32

## 2024-04-15 RX ADMIN — PROPOFOL 30 MG: 10 INJECTION, EMULSION INTRAVENOUS at 10:40

## 2024-04-15 RX ADMIN — PROPOFOL 30 MG: 10 INJECTION, EMULSION INTRAVENOUS at 11:03

## 2024-04-15 RX ADMIN — PROPOFOL 30 MG: 10 INJECTION, EMULSION INTRAVENOUS at 11:05

## 2024-04-15 RX ADMIN — SODIUM CHLORIDE, SODIUM LACTATE, POTASSIUM CHLORIDE, CALCIUM CHLORIDE: 600; 310; 30; 20 INJECTION, SOLUTION INTRAVENOUS at 10:30

## 2024-04-15 NOTE — ANESTHESIA POSTPROCEDURE EVALUATION
Patient: Ashanti Aviles    Procedure: Procedure(s):  COLONOSCOPY, WITH POLYPECTOMY       Anesthesia Type:  MAC    Note:  Disposition: Outpatient   Postop Pain Control: Uneventful            Sign Out: Well controlled pain   PONV: No   Neuro/Psych: Uneventful            Sign Out: Acceptable/Baseline neuro status   Airway/Respiratory: Uneventful            Sign Out: Acceptable/Baseline resp. status   CV/Hemodynamics: Uneventful            Sign Out: Acceptable CV status; No obvious hypovolemia; No obvious fluid overload   Other NRE:    DID A NON-ROUTINE EVENT OCCUR? No           Last vitals:  Vitals Value Taken Time   /61 04/15/24 1125   Temp 36.1  C (96.9  F) 04/15/24 1125   Pulse 79 04/15/24 1125   Resp 16 04/15/24 1125   SpO2 100 % 04/15/24 1125       Electronically Signed By: Les Varela MD  April 15, 2024  12:42 PM

## 2024-04-15 NOTE — H&P
Ashanti Aviles  [id number]  female  68 year old      Reason for procedure/surgery: colon polyp surveillance    Patient Active Problem List   Diagnosis    Type 2 diabetes mellitus (H)    Tobacco use disorder    PAD (peripheral artery disease) (H24)    Microalbuminuric diabetic nephropathy (H)    Hyperlipidemia with target LDL less than 70    Grade 3 vulvar intraepithelial neoplasia    GERD (gastroesophageal reflux disease)    Essential hypertension    Dyslipidemia    AIN III (anal intraepithelial neoplasia III)    Bilateral incipient cataracts    Condyloma acuminatum of vulva    Ischemic toe    Critical limb ischemia of left lower extremity (H)    Paronychia of toe, left    Candidal vulvovaginitis    Cervical intraepithelial neoplasia    History of cervical dysplasia    Pelvic pain in female    Femoral artery thrombosis, left (H)    Femoral-tibial bypass graft occlusion, left, initial encounter (H24)    Hard to intubate    Gait abnormality    Left leg weakness    Ulcer of left lower extremity with fat layer exposed (H)    Lower extremity edema       Past Surgical History:    Past Surgical History:   Procedure Laterality Date    AMPUTATE TOE(S) Right 02/18/2023    Procedure: Amputation fourth toe right foot;  Surgeon: Benjamin Diez DPM;  [facility]    ANGIOGRAM Left 2/15/2024    Procedure: COMPLETION OF ANGIOGRAM, LEFT COMMON ILIAC ARTERY STENT PLACEMENT;  Surgeon: Dyan Geller MD;  Location: [facility] OR    ANUS SURGERY      BIOPSY CERVICAL, LOCAL EXCISION, SINGLE/MULTIPLE      COLONOSCOPY N/A 09/11/2020    Procedure: EXAM UNDER ANESTHESIA, HIGH RESOLUTION ANOSCOPY INTRA OP;  Surgeon: Preeti Sheehan MD;  Location: [facility];  Service: Gastroenterology    COLONOSCOPY N/A 12/14/2020    Procedure: EXAM UNDER ANESTHESIA WITH HIGH RESOLUTION ANOSCOPY;  Surgeon: Preeti Sheehan MD;  Location: [facility];  Service: General    COLONOSCOPY N/A  06/15/2021    Procedure: EXAM UNDER ANESTHESIA WITH HIGH RESOLUTION ANOSCOPY, BIOPSY, FULGURATION;  Surgeon: Preeti Sheehan MD;  Location: [facility] OR;  Service: Gastroenterology    ENDARTERECTOMY FEMORAL Left 05/17/2023    Procedure: LEFT FEMORAL ENDARTERECTOMY WITH RETROGRADE ILIAC STENT;  Surgeon: Dyan Geller MD;  Location: [facility] OR    EXAM UNDER ANESTHESIA ANUS N/A 09/14/2021    Procedure: EXAM UNDER ANESTHESIA WITH HIGH RESOLUTION ANOSCOPY;  Surgeon: Preeti Sheehan MD;  Location: [facility]    FEMORAL ARTERY - TIBIAL ARTERY BYPASS GRAFT Left 09/08/2023    Procedure: CREATION, BYPASS, ARTERIAL, FEMORAL TO TIBIAL, LEFT;  Surgeon: Dyan Geller MD;  Location: [facility] OR    FEMORAL ARTERY - TIBIAL ARTERY BYPASS GRAFT Left 2/15/2024    Procedure: CREATION, BYPASS, ARTERIAL, FEMORAL TO TIBIAL,;  Surgeon: Dyan Geller MD;  Location: [facility] OR    INCISION AND DRAINAGE FOOT, COMBINED Left 11/27/2023    Procedure: INCISION AND DRAINAGE, LEFT HALLUX;  Surgeon: Rene Ordaz DPM;  Location: [facility] OR    IR LOWER EXTREMITY ANGIOGRAM LEFT  03/23/2023    IR LOWER EXTREMITY ANGIOGRAM RIGHT  02/16/2023    IR THROMBOLYSIS ARTERIAL INFUSION INITIAL DAY  10/09/2023    LEEP TX, CERVICAL      2005    TUBAL LIGATION         Past Medical History:   Past Medical History:   Diagnosis Date    Benign gastric polyp     Chronic toe ulcer (H)     Chronic ulcer of great toe of left foot with necrosis of muscle (H)     Diabetes mellitus (H)     Gastroesophageal reflux disease     Hard to intubate 02/15/2024    History of colonic polyps     HLD (hyperlipidemia)     Hypertension     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/29/2013    Nicotine addiction     OAG (open angle glaucoma)     PVD (peripheral vascular disease) (H24)     Reflux esophagitis     Retinopathy     Toe ulcer (H)     Tubular adenoma     Yeast vaginitis        Social  History:   Social History     Tobacco Use    Smoking status: Every Day     Current packs/day: 0.00     Types: Cigarettes     Last attempt to quit: 2023     Years since quittin.1    Smokeless tobacco: Never   Substance Use Topics    Alcohol use: Not Currently     Comment: Alcoholic Drinks/MONTH: 2x       Family History:   Family History   Problem Relation Age of Onset    Hypertension Mother     Colon Cancer Mother     Diabetes Type 2  Father     Hypertension Father     Kidney failure Father     Hyperthyroidism Sister     Breast Cancer Paternal Aunt        Allergies:   Allergies   Allergen Reactions    Simvastatin Muscle Pain (Myalgia)     Muscle pain    Victoza [Liraglutide] Other (See Comments)     Binds bowels    Penicillins Unknown     Childhood rxn       Active Medications:   Current Outpatient Medications   Medication Sig Dispense Refill    acetaminophen (ACETAMINOPHEN 8 HOUR) 650 MG CR tablet Take 1,300 mg by mouth every 8 hours as needed for mild pain or fever      amLODIPine (NORVASC) 5 MG tablet Take 5 mg by mouth daily      apixaban ANTICOAGULANT (ELIQUIS ANTICOAGULANT) 5 MG tablet 1 tablet Orally      bisacodyl (DULCOLAX) 5 MG EC tablet Take 2 tablets at 3 pm the day before your procedure. If your procedure is before 11 am, take 2 additional tablets at 11 pm. If your procedure is after 11 am, take 2 additional tablets at 6 am. For additional instructions refer to your colonoscopy prep instructions. 4 tablet 0    clopidogrel (PLAVIX) 75 MG tablet Take 75 mg by mouth daily      Continuous Blood Gluc Sensor (FREESTYLE PRINCESS 14 DAY SENSOR) MISC       empagliflozin (JARDIANCE) 25 MG TABS tablet Take 1 tablet (25 mg) by mouth daily 90 tablet 1    ezetimibe (ZETIA) 10 MG tablet Take 10 mg by mouth daily      folic acid (FOLVITE) 1 MG tablet Take 1 tablet (1 mg) by mouth daily 90 tablet 0    hydrochlorothiazide (HYDRODIURIL) 25 MG tablet Take 25 mg by mouth daily      Insulin Degludec (TRESIBA) 100  "UNIT/ML SOLN Inject 15 Units Subcutaneous 2 times daily      insulin glargine (LANTUS PEN) 100 UNIT/ML pen       linagliptin (TRADJENTA) 5 MG TABS tablet Take 5 mg by mouth daily      lisinopril (ZESTRIL) 40 MG tablet Take 40 mg by mouth daily      metFORMIN (GLUCOPHAGE XR) 500 MG 24 hr tablet Take 1,000 mg by mouth daily      nicotine (NICODERM CQ) 21 MG/24HR 24 hr patch Place 1 patch onto the skin every 24 hours 30 patch 3    omeprazole (PRILOSEC) 20 MG DR capsule Take 20 mg by mouth daily as needed      polyethylene glycol (GOLYTELY) 236 g suspension The night before the exam at 6 pm drink an 8-ounce glass every 15 minutes until the jug is half empty. If you arrive before 11 AM: Drink the other half of the Golytely jug at 11 PM night before procedure. If you arrive after 11 AM: Drink the other half of the Golytely jug at 6 AM day of procedure. For additional instructions refer to your colonoscopy prep instructions. 4000 mL 0    UNABLE TO FIND Take 2 capsules by mouth daily MEDICATION NAME: Gui KEITH PENTIPS 31G X 5 MM miscellaneous       vitamin B complex with vitamin C (STRESS TAB) tablet Take 1 tablet by mouth daily 90 tablet 0       Systemic Review:   CONSTITUTIONAL: NEGATIVE for fever, chills, change in weight  ENT/MOUTH: NEGATIVE for ear, mouth and throat problems  RESP: NEGATIVE for significant cough or SOB  CV: NEGATIVE for chest pain, palpitations or peripheral edema    Physical Examination:   Vital Signs: BP (!) 154/80 (BP Location: Right arm)   Pulse 73   Temp 97.3  F (36.3  C) (Temporal)   Resp 18   Ht 1.549 m (5' 1\")   Wt 66.7 kg (147 lb)   SpO2 100%   BMI 27.78 kg/m    GENERAL: healthy, alert and no distress  NECK: no adenopathy, no asymmetry, masses, or scars  RESP: lungs clear to auscultation - no rales, rhonchi or wheezes  CV: regular rate and rhythm, normal S1 S2, no S3 or S4, no murmur, click or rub, no peripheral edema and peripheral pulses strong  ABDOMEN: soft, nontender, " no hepatosplenomegaly, no masses and bowel sounds normal  MS: no gross musculoskeletal defects noted, no edema    Plan: Appropriate to proceed as scheduled.      Shen Andres MD  4/15/2024    PCP:  Dyan Geller

## 2024-04-15 NOTE — ANESTHESIA CARE TRANSFER NOTE
Patient: Ashanti Aviles    Procedure: Procedure(s):  COLONOSCOPY, WITH POLYPECTOMY       Diagnosis: Encounter for colorectal cancer screening [Z12.11, Z12.12]  Diagnosis Additional Information: No value filed.    Anesthesia Type:   MAC     Note:    Oropharynx: oropharynx clear of all foreign objects and spontaneously breathing  Level of Consciousness: awake  Oxygen Supplementation: room air    Independent Airway: airway patency satisfactory and stable  Dentition: dentition unchanged  Vital Signs Stable: post-procedure vital signs reviewed and stable  Report to RN Given: handoff report given  Patient transferred to: Phase II    Handoff Report: Identifed the Patient, Identified the Reponsible Provider, Reviewed the pertinent medical history, Discussed the surgical course, Reviewed Intra-OP anesthesia mangement and issues during anesthesia, Set expectations for post-procedure period and Allowed opportunity for questions and acknowledgement of understanding      Vitals:  Vitals Value Taken Time   /64 04/15/24 1113   Temp 36.1  C (96.9  F) 04/15/24 1113   Pulse 76 04/15/24 1113   Resp 16 04/15/24 1113   SpO2 100 % 04/15/24 1113       Electronically Signed By: DOROTA Salcedo CRNA  April 15, 2024  11:16 AM

## 2024-04-15 NOTE — ANESTHESIA POSTPROCEDURE EVALUATION
Patient: Ashanti Aviles    Procedure: Procedure(s):  COLONOSCOPY, WITH POLYPECTOMY       Anesthesia Type:  MAC    Note:  Disposition: Outpatient   Postop Pain Control: Uneventful            Sign Out: Well controlled pain   PONV: No   Neuro/Psych: Uneventful            Sign Out: Acceptable/Baseline neuro status   Airway/Respiratory: Uneventful            Sign Out: Acceptable/Baseline resp. status   CV/Hemodynamics: Uneventful            Sign Out: Acceptable CV status; No obvious hypovolemia; No obvious fluid overload   Other NRE:    DID A NON-ROUTINE EVENT OCCUR? No           Last vitals:  Vitals Value Taken Time   /61 04/15/24 1125   Temp 36.1  C (96.9  F) 04/15/24 1125   Pulse 79 04/15/24 1125   Resp 16 04/15/24 1125   SpO2 100 % 04/15/24 1125       Electronically Signed By: Les Varela MD  April 15, 2024  12:43 PM

## 2024-04-15 NOTE — ANESTHESIA PREPROCEDURE EVALUATION
Anesthesia Pre-Procedure Evaluation    Patient: Ashanti Aviles   MRN: [id number] : 1955        Procedure : Procedure(s):  Colonoscopy          Past Medical History:   Diagnosis Date    Benign gastric polyp     Chronic toe ulcer (H)     Chronic ulcer of great toe of left foot with necrosis of muscle (H)     Diabetes mellitus (H)     Gastroesophageal reflux disease     Hard to intubate 02/15/2024    History of colonic polyps     HLD (hyperlipidemia)     Hypertension     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/29/2013    Nicotine addiction     OAG (open angle glaucoma)     PVD (peripheral vascular disease) (H24)     Reflux esophagitis     Retinopathy     Toe ulcer (H)     Tubular adenoma     Yeast vaginitis       Past Surgical History:   Procedure Laterality Date    AMPUTATE TOE(S) Right 2023    Procedure: Amputation fourth toe right foot;  Surgeon: Benjamin Diez DPM;  Location: [facility]    ANGIOGRAM Left 2/15/2024    Procedure: COMPLETION OF ANGIOGRAM, LEFT COMMON ILIAC ARTERY STENT PLACEMENT;  Surgeon: Dyan Geller MD;  Location: [facility]    ANUS SURGERY      BIOPSY CERVICAL, LOCAL EXCISION, SINGLE/MULTIPLE      COLONOSCOPY N/A 2020    Procedure: EXAM UNDER ANESTHESIA, HIGH RESOLUTION ANOSCOPY INTRA OP;  Surgeon: Preeti Sheehan MD;  Location: [facility] OR;  Service: Gastroenterology    COLONOSCOPY N/A 2020    Procedure: EXAM UNDER ANESTHESIA WITH HIGH RESOLUTION ANOSCOPY;  Surgeon: Preeti Sheehan MD;  Location: [facility] OR;  Service: General    COLONOSCOPY N/A 06/15/2021    Procedure: EXAM UNDER ANESTHESIA WITH HIGH RESOLUTION ANOSCOPY, BIOPSY, FULGURATION;  Surgeon: Preeti Sheehan MD;  Location: [facility] OR;  Service: Gastroenterology    ENDARTERECTOMY FEMORAL Left 2023    Procedure: LEFT FEMORAL ENDARTERECTOMY WITH RETROGRADE ILIAC STENT;  Surgeon: Dyan Geller MD;  Location: [facility]    EXAM UNDER ANESTHESIA ANUS N/A 2021    Procedure: EXAM UNDER ANESTHESIA WITH HIGH RESOLUTION ANOSCOPY;  Surgeon: Preeti Sheehan MD;  Location: [facility]    FEMORAL ARTERY - TIBIAL ARTERY BYPASS GRAFT Left 2023    Procedure: CREATION, BYPASS, ARTERIAL, FEMORAL TO TIBIAL, LEFT;  Surgeon: Dyan Geller MD;  Location: [facility]    FEMORAL ARTERY - TIBIAL ARTERY BYPASS GRAFT Left 2/15/2024    Procedure: CREATION, BYPASS, ARTERIAL, FEMORAL TO TIBIAL,;  Surgeon: Dyan Geller MD;  Location: [facility]    INCISION AND DRAINAGE FOOT, COMBINED Left 2023    Procedure: INCISION AND DRAINAGE, LEFT HALLUX;  Surgeon: Rene Ordaz DPM;  Location: [facility]    IR LOWER EXTREMITY ANGIOGRAM LEFT  2023    IR LOWER EXTREMITY ANGIOGRAM RIGHT  2023    IR THROMBOLYSIS ARTERIAL INFUSION INITIAL DAY  10/09/2023    LEEP TX, CERVICAL          TUBAL LIGATION        Allergies   Allergen Reactions    Simvastatin Muscle Pain (Myalgia)     Muscle pain    Victoza [Liraglutide] Other (See Comments)     Binds bowels    Penicillins Unknown     Childhood rxn      Social History     Tobacco Use    Smoking status: Every Day     Current packs/day: 0.00     Types: Cigarettes     Last attempt to quit: 2023     Years since quittin.1    Smokeless tobacco: Never   Substance Use Topics    Alcohol use: Not Currently     Comment: Alcoholic Drinks/MONTH: 2x      Wt Readings from Last 1 Encounters:   04/15/24 66.7 kg (147 lb)        Anesthesia Evaluation            ROS/MED HX  ENT/Pulmonary:  - neg pulmonary ROS     Neurologic:  - neg neurologic ROS     Cardiovascular:     (+)  hypertension- Peripheral Vascular Disease (multiple lower extremity vascular surgeries)-- Symptomatic.   -  - -   Taking blood thinners                                (-) murmur   METS/Exercise Tolerance: >4 METS    Hematologic:       Musculoskeletal:       GI/Hepatic:    "  (+) GERD,                   Renal/Genitourinary:     (+) renal disease,             Endo:     (+)  type II DM,   Using insulin,                 Psychiatric/Substance Use:  - neg psychiatric ROS     Infectious Disease:       Malignancy:       Other:            Physical Exam    Airway        Mallampati: I   TM distance: > 3 FB   Neck ROM: full   Mouth opening: > 3 cm    Respiratory Devices and Support         Dental       (+) Modest Abnormalities - crowns, retainers, 1 or 2 missing teeth      Cardiovascular          Rhythm and rate: regular and normal (-) no murmur    Pulmonary   pulmonary exam normal        breath sounds clear to auscultation           OUTSIDE LABS:  CBC:   Lab Results   Component Value Date    WBC 12.6 (H) 03/25/2024    WBC 12.2 (H) 02/19/2024    HGB 13.6 03/25/2024    HGB 9.3 (L) 02/19/2024    HCT 41.3 03/25/2024    HCT 28.5 (L) 02/19/2024     03/25/2024     02/19/2024     BMP:   Lab Results   Component Value Date     02/19/2024     02/18/2024    POTASSIUM 4.0 02/19/2024    POTASSIUM 3.7 02/18/2024    CHLORIDE 104 02/19/2024    CHLORIDE 104 02/18/2024    CO2 25 02/19/2024    CO2 27 02/18/2024    BUN 11.7 02/19/2024    BUN 11.8 02/18/2024    CR 1.3 (H) 03/26/2024    CR 0.67 02/19/2024     (H) 02/19/2024     (H) 02/19/2024     COAGS:   Lab Results   Component Value Date    PTT 29 01/12/2024    INR 0.85 03/22/2024    FIBR 361 10/09/2023     POC: No results found for: \"BGM\", \"HCG\", \"HCGS\"  HEPATIC:   Lab Results   Component Value Date    ALBUMIN 3.7 02/15/2023    PROTTOTAL 6.5 02/15/2023    ALT 25 02/15/2023    AST 20 02/15/2023    ALKPHOS 75 02/15/2023    BILITOTAL <0.2 02/15/2023     OTHER:   Lab Results   Component Value Date    LACT 1.6 02/15/2024    A1C 8.7 (H) 02/15/2024    SANTINO 8.3 (L) 02/19/2024    PHOS 3.5 02/16/2024    MAG 1.9 02/16/2024       Anesthesia Plan    ASA Status:  3    NPO Status:  NPO Appropriate    Anesthesia Type: MAC.     - Reason for " "MAC: immobility needed   Induction: Intravenous, Propofol.   Maintenance: TIVA.        Consents    Anesthesia Plan(s) and associated risks, benefits, and realistic alternatives discussed. Questions answered and patient/representative(s) expressed understanding.     - Discussed:     - Discussed with:  Patient      - Extended Intubation/Ventilatory Support Discussed: No.      - Patient is DNR/DNI Status: No     Use of blood products discussed: No .     Postoperative Care    Pain management: IV analgesics.   PONV prophylaxis: Ondansetron (or other 5HT-3), Background Propofol Infusion     Comments:    Other Comments: Discussed plan for IV anesthetic with native airway. Discussed potential need for conversion to general anesthetic with airway management and potential for recall.      Listed as difficult airway but recent uncomplicated intubation with glidescope. Held plavix appropriately.            Les Varela MD    I have reviewed the pertinent notes and labs in the chart from the past 30 days and (re)examined the patient.  Any updates or changes from those notes are reflected in this note.            # Drug Induced Coagulation Defect: home medication list includes an anticoagulant medication  # Drug Induced Platelet Defect: home medication list includes an antiplatelet medication  # DMII: A1C = 8.7 % (Ref range: <5.7 %) within past 6 months  # Overweight: Estimated body mass index is 27.78 kg/m  as calculated from the following:    Height as of this encounter: 1.549 m (5' 1\").    Weight as of this encounter: 66.7 kg (147 lb).      "

## 2024-04-17 ENCOUNTER — OFFICE VISIT (OUTPATIENT)
Dept: VASCULAR SURGERY | Facility: CLINIC | Age: 69
End: 2024-04-17
Attending: PODIATRIST
Payer: COMMERCIAL

## 2024-04-17 ENCOUNTER — OFFICE VISIT (OUTPATIENT)
Dept: MIDWIFE SERVICES | Facility: CLINIC | Age: 69
End: 2024-04-17
Attending: SURGERY
Payer: COMMERCIAL

## 2024-04-17 VITALS
HEART RATE: 67 BPM | HEIGHT: 61 IN | SYSTOLIC BLOOD PRESSURE: 144 MMHG | BODY MASS INDEX: 27.75 KG/M2 | RESPIRATION RATE: 16 BRPM | WEIGHT: 147 LBS | OXYGEN SATURATION: 100 % | DIASTOLIC BLOOD PRESSURE: 80 MMHG

## 2024-04-17 VITALS
OXYGEN SATURATION: 100 % | WEIGHT: 151 LBS | HEIGHT: 61 IN | RESPIRATION RATE: 16 BRPM | HEART RATE: 67 BPM | BODY MASS INDEX: 28.51 KG/M2

## 2024-04-17 DIAGNOSIS — Z11.51 SPECIAL SCREENING EXAMINATION FOR HUMAN PAPILLOMAVIRUS (HPV): Primary | ICD-10-CM

## 2024-04-17 DIAGNOSIS — Z12.31 ENCOUNTER FOR SCREENING MAMMOGRAM FOR BREAST CANCER: ICD-10-CM

## 2024-04-17 DIAGNOSIS — L97.524 ULCER OF GREAT TOE, LEFT, WITH NECROSIS OF BONE (H): Primary | ICD-10-CM

## 2024-04-17 DIAGNOSIS — Z12.4 CERVICAL CANCER SCREENING: ICD-10-CM

## 2024-04-17 DIAGNOSIS — L97.923: ICD-10-CM

## 2024-04-17 PROCEDURE — 87624 HPV HI-RISK TYP POOLED RSLT: CPT | Performed by: ADVANCED PRACTICE MIDWIFE

## 2024-04-17 PROCEDURE — 88175 CYTOPATH C/V AUTO FLUID REDO: CPT | Performed by: ADVANCED PRACTICE MIDWIFE

## 2024-04-17 PROCEDURE — 11043 DBRDMT MUSC&/FSCA 1ST 20/<: CPT | Performed by: PODIATRIST

## 2024-04-17 PROCEDURE — 11044 DBRDMT BONE 1ST 20 SQ CM/<: CPT | Performed by: PODIATRIST

## 2024-04-17 PROCEDURE — 99203 OFFICE O/P NEW LOW 30 MIN: CPT | Mod: 24 | Performed by: ADVANCED PRACTICE MIDWIFE

## 2024-04-17 ASSESSMENT — PAIN SCALES - GENERAL: PAINLEVEL: MODERATE PAIN (4)

## 2024-04-17 NOTE — PATIENT INSTRUCTIONS
Wound care supplies were ordered today through [facility] and if you are not receiving your supplies or have a question on your bill please contact Kennedi Larkin at [phone]. Please allow 2-5 business days for delivery of supplies. You may get a call from a [phone] # if there are additional information Ai needs. It is important to  or return their call. PLEASE NOTE: If you need to return your supplies, you MUST call customer service within 15 days of delivery date.         Wound Care Instructions    DAILY and as needed, Cleanse your left leg and foot wound(s) with Normal saline.    Pat Dry with non-sterile gauze    Primary Dressing: Apply aquacel AG into/onto the wounds    Secondary dressing: Cover with zetuvit silicone border    Ok to elevate your legs. If you start to have pain in the legs while elevated, please put your legs back down    It is not ok to get your wound wet in the bath or shower    High Protein Foods  When you have an open ulcer, your bodies protein needs are much higher, so it is recommended eat good sources of protein or take a protein supplement!    Protein Supplements  -Premier Protein  -Ensure  -Boost  -Glucerna, if diabetic    Chicken  -Chicken breast, 3.5oz.-30 grams protein  -Chicken meat, cooked, 4 oz.    Beef  -Hamburger mela, 4 oz-28 grams protein  -Steak, 6 oz-42 grams  -Most cuts of beef- 7 grams of protein per ounce    Fish  -Most fish fillets or steaks are about 22 grams of protein for 3 1/2 oz(100 grams) of cooked fish, or 6 grams per ounce  -Tuna, 6 oz can-40 grams of protein    Pork  -Pork chop, average-22 grams protein  -Pork loin or tenderloin, 4 oz.-29 grams  -Ham, 3oz serving- 19 grams  -Raphael, 1 slice-3 grams    Eggs and Dairy  -Egg, large-7 grams  -Milk, 1 cup-8 grams  -Cottage cheese, 1/2 cup-15 grams  -Greek yogurt, 1 cup-usually 8-12 grams, check label    Beans  -Soy milk, 1 cup-6-10 grams  -Most beans(black, willis, lentils, etc.) about 7-10 grams protein per  half cup of cooked beans    Nuts and Seeds  -Peanut butter, 2 Tablespoons- 8 grams protein  -Almonds, 1/4 cup- 8 grams  -Peanuts, 1/4 cup-9 grams  -Sunflower seeds, 1/4 cup- 6 grams        If for some reason you are not able to get your dressing(s) changed as outlined above (due to illness, lack of supplies, lack of help) please do the following: remove old, soiled dressings; wash the wounds with saline; pat dry; apply ABD pad or other absorbant pad and secure with rolled gauze; avoid tape directly on your skin; Call the clinic as soon as possible to let us know what the current issues are in receiving wound care [phone].      SEEK MEDICAL CARE IF:  You have an increase in swelling, pain, or redness around the wound.  You have an increase in the amount of pus coming from the wound.  There is a bad smell coming from the wound.  The wound appears to be worsening/enlarging  You have a fever greater than 101.5 F      It is ok to continue current wound care treatment/products for the next 2-3 days until new wound care supplies are ordered and arrive. If longer than this please contact our office at [phone].    If you have a 2 layer or 4 layer compression wrap on these are safe to have on for ONLY 7 days. If for some reason you are not able to get the wrap(s) changed (due to illness; lack of supplies, lack of help, lack of transportation) please do the following: unwrap the old 2 or 4 layer compression wrap; avoid using scissors as you could cut your skin and cause wounds; use tubular compression when available. Call to reschedule your home care or clinic visit appointment as soon as possible.    Please NOTE: if you are 15 minutes late to your clinic appointment you will have to be rescheduled. Please call our clinic as soon as possible if you know you will not be able to get to your appointment at [phone].    If you fail to show up to 3 scheduled clinic appointments you will be dismissed from our  clinic.              We want to hear from you!  In the next few weeks, you should receive a call or email to complete a survey about your visit at [facility]. Please help us improve your appointment experience by letting us know how we did today. We strive to make your experience good and value any ways in which we could do better.      We value your input and suggestions.    Thank you for choosing the [facility]!

## 2024-04-17 NOTE — PROGRESS NOTES
FOOT AND ANKLE SURGERY/PODIATRY Progress Note      ASSESSMENT:   Ulceration left hallux into bone   Ulceration left leg into muscle  Osteomyelitis left hallux  PAD      TREATMENT:  -I discussed the patient with the ulceration left hallux continues to have exposed bone.  We again reviewed most recent MRI report which is suggestive of osteomyelitis of the distal phalanx of the hallux.  Reviewed proposed surgical procedure to include removal of nonviable tissue with bone biopsy and use of wound VAC postoperatively.  All questions invited and answered.  I will also ask her to contact [facility] to discuss possible skin grafting.    -Patient request to sharp debridement of the left leg ulceration today.  She does follow with Dr. Oliva for this wound.    -After discussion of risk factors, nursing staff removed dressing, cleansed wound and consent obtained 2% Lidocaine HCL jelly was applied, under clean conditions, the left hallux ulceration(s) were debrided using currette.  Devitalized and nonviable tissue, along with any fibrin and slough, was removed to improve granulation tissue formation, stimulate wound healing, decrease overall bacteria load, disrupt biofilm formation and decrease edge senescence. Wound drainage was scant No. Total excisional debridement was 0.88 sq cm into the bone with a depth of 0.6 cm.   Ulcers were improved afterwards and .  Measures were as noted on the flow sheet.  Gauze dressing applied today which she will will continue to apply daily.    -After discussion of risk factors, nursing staff removed dressing, cleansed wound and consent obtained 2% Lidocaine HCL jelly was applied, under clean conditions, the left leg ulceration(s) were debrided using #15 blade scalpel.  Devitalized and nonviable tissue, along with any fibrin and slough, was removed to improve granulation tissue formation, stimulate wound healing, decrease overall bacteria load, disrupt biofilm formation  and decrease edge senescence. Wound drainage was scant No. Total excisional debridement was 10.35 sq cm into the muscle/fascia with a depth of 0.8 cm.   Ulcers were improved afterwards and .  Measures were as noted on the flow sheet.  Aquacel applied today per Dr. Oliva previous orders.  She will continue to follow with Dr. Oliva for this wound at her existing appointment for next week.    -She will follow-up with me in 3 to 4 weeks.  Left lower extremity bypass per vascular surgery scheduling.    Rene Ordaz DPM  [facility] [place]      HPI: Ashanti Aviles was seen again today for a left hallux ulceration.  Since her last visit with me patient has been rescheduled for repeat bypass left lower extremity with vascular surgery.  She has also developed an ulceration on the left leg and request that I inspect this wound today and debride if possible.    Past Medical History:   Diagnosis Date    Benign gastric polyp     Chronic toe ulcer (H)     Chronic ulcer of great toe of left foot with necrosis of muscle (H)     Diabetes mellitus (H)     Gastroesophageal reflux disease     Hard to intubate 02/15/2024    History of colonic polyps     HLD (hyperlipidemia)     Hypertension     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/29/2013    Nicotine addiction     OAG (open angle glaucoma)     PVD (peripheral vascular disease) (H24)     Reflux esophagitis     Retinopathy     Toe ulcer (H)     Tubular adenoma     Yeast vaginitis        Past Surgical History:   Procedure Laterality Date    AMPUTATE TOE(S) Right 02/18/2023    Procedure: Amputation fourth toe right foot;  Surgeon: Benjamin Diez DPM;  Location: [facility] [place] OR    ANGIOGRAM Left 2/15/2024    Procedure: COMPLETION OF ANGIOGRAM, LEFT COMMON ILIAC ARTERY STENT PLACEMENT;  Surgeon: Dyan Geller MD;  Location: [facility] [place] OR    ANUS SURGERY      BIOPSY CERVICAL, LOCAL EXCISION, SINGLE/MULTIPLE      COLONOSCOPY  N/A 09/11/2020    Procedure: EXAM UNDER ANESTHESIA, HIGH RESOLUTION ANOSCOPY INTRA OP;  Surgeon: Preeti Sheehan MD;  Location: [place] [facility] OR;  Service: Gastroenterology    COLONOSCOPY N/A 12/14/2020    Procedure: EXAM UNDER ANESTHESIA WITH HIGH RESOLUTION ANOSCOPY;  Surgeon: Preeti Sheehan MD;  Location: [facility] OR;  Service: General    COLONOSCOPY N/A 06/15/2021    Procedure: EXAM UNDER ANESTHESIA WITH HIGH RESOLUTION ANOSCOPY, BIOPSY, FULGURATION;  Surgeon: Preeti Sheehan MD;  Location: [facility] OR;  Service: Gastroenterology    COLONOSCOPY N/A 4/15/2024    Procedure: COLONOSCOPY, WITH POLYPECTOMY;  Surgeon: Shen Andres MD;  Location: [facility] OR    ENDARTERECTOMY FEMORAL Left 05/17/2023    Procedure: LEFT FEMORAL ENDARTERECTOMY WITH RETROGRADE ILIAC STENT;  Surgeon: Dyan Geller MD;  Location: [facility] [place] [facility]    EXAM UNDER ANESTHESIA ANUS N/A 09/14/2021    Procedure: EXAM UNDER ANESTHESIA WITH HIGH RESOLUTION ANOSCOPY;  Surgeon: Preeti Sheehan MD;  Location: [facility] OR    FEMORAL ARTERY - TIBIAL ARTERY BYPASS GRAFT Left 09/08/2023    Procedure: CREATION, BYPASS, ARTERIAL, FEMORAL TO TIBIAL, LEFT;  Surgeon: Dyan Geller MD;  Location: [facility]    FEMORAL ARTERY - TIBIAL ARTERY BYPASS GRAFT Left 2/15/2024    Procedure: CREATION, BYPASS, ARTERIAL, FEMORAL TO TIBIAL,;  Surgeon: Dyan Geller MD;  Location: [facility]    INCISION AND DRAINAGE FOOT, COMBINED Left 11/27/2023    Procedure: INCISION AND DRAINAGE, LEFT HALLUX;  Surgeon: Rene Ordaz DPM;  Location: [facility] [place] [facility]    IR LOWER EXTREMITY ANGIOGRAM LEFT  03/23/2023    IR LOWER EXTREMITY ANGIOGRAM RIGHT  02/16/2023    IR THROMBOLYSIS ARTERIAL INFUSION INITIAL DAY  10/09/2023    LEEP TX, CERVICAL      2005    TUBAL LIGATION         Allergies   Allergen Reactions    Simvastatin Muscle Pain (Myalgia)     Muscle pain    Victoza  [Liraglutide] Other (See Comments)     Binds bowels    Penicillins Unknown     Childhood rxn         Current Outpatient Medications:     acetaminophen (ACETAMINOPHEN 8 HOUR) 650 MG CR tablet, Take 1,300 mg by mouth every 8 hours as needed for mild pain or fever, Disp: , Rfl:     amLODIPine (NORVASC) 5 MG tablet, Take 5 mg by mouth daily, Disp: , Rfl:     apixaban ANTICOAGULANT (ELIQUIS ANTICOAGULANT) 5 MG tablet, 1 tablet Orally, Disp: , Rfl:     bisacodyl (DULCOLAX) 5 MG EC tablet, Take 2 tablets at 3 pm the day before your procedure. If your procedure is before 11 am, take 2 additional tablets at 11 pm. If your procedure is after 11 am, take 2 additional tablets at 6 am. For additional instructions refer to your colonoscopy prep instructions., Disp: 4 tablet, Rfl: 0    clopidogrel (PLAVIX) 75 MG tablet, Take 75 mg by mouth daily, Disp: , Rfl:     Continuous Blood Gluc Sensor (FREESTYLE PRINCESS 14 DAY SENSOR) MISC, , Disp: , Rfl:     empagliflozin (JARDIANCE) 25 MG TABS tablet, Take 1 tablet (25 mg) by mouth daily, Disp: 90 tablet, Rfl: 1    ezetimibe (ZETIA) 10 MG tablet, Take 10 mg by mouth daily, Disp: , Rfl:     folic acid (FOLVITE) 1 MG tablet, Take 1 tablet (1 mg) by mouth daily, Disp: 90 tablet, Rfl: 0    hydrochlorothiazide (HYDRODIURIL) 25 MG tablet, Take 25 mg by mouth daily, Disp: , Rfl:     Insulin Degludec (TRESIBA) 100 UNIT/ML SOLN, Inject 15 Units Subcutaneous 2 times daily, Disp: , Rfl:     insulin glargine (LANTUS PEN) 100 UNIT/ML pen, , Disp: , Rfl:     linagliptin (TRADJENTA) 5 MG TABS tablet, Take 5 mg by mouth daily, Disp: , Rfl:     lisinopril (ZESTRIL) 40 MG tablet, Take 40 mg by mouth daily, Disp: , Rfl:     metFORMIN (GLUCOPHAGE XR) 500 MG 24 hr tablet, Take 1,000 mg by mouth daily, Disp: , Rfl:     nicotine (NICODERM CQ) 21 MG/24HR 24 hr patch, Place 1 patch onto the skin every 24 hours, Disp: 30 patch, Rfl: 3    omeprazole (PRILOSEC) 20 MG DR capsule, Take 20 mg by mouth daily as needed,  "Disp: , Rfl:     polyethylene glycol (GOLYTELY) 236 g suspension, The night before the exam at 6 pm drink an 8-ounce glass every 15 minutes until the jug is half empty. If you arrive before 11 AM: Drink the other half of the Golytely jug at 11 PM night before procedure. If you arrive after 11 AM: Drink the other half of the Golytely jug at 6 AM day of procedure. For additional instructions refer to your colonoscopy prep instructions., Disp: 4000 mL, Rfl: 0    UNABLE TO FIND, Take 2 capsules by mouth daily MEDICATION NAME: Gui Doe, Disp: , Rfl:     UNIFINE PENTIPS 31G X 5 MM miscellaneous, , Disp: , Rfl:     vitamin B complex with vitamin C (STRESS TAB) tablet, Take 1 tablet by mouth daily, Disp: 90 tablet, Rfl: 0    Review of Systems - 10 point Review of Systems is negative except for left foot and leg ulcers which is noted in HPI.      OBJECTIVE:  BP (!) 144/80   Pulse 67   Resp 16   Ht 5' 1\" (1.549 m)   Wt 147 lb (66.7 kg)   SpO2 100%   BMI 27.78 kg/m    General appearance: Patient is alert and fully cooperative with history & exam.  No sign of distress is noted during the visit.    Vascular: Dorsalis pedis non-palpableLeft.  Dermatologic:    VASC Wound left hallux (Active)   Pre Size Length 0.8 04/17/24 0800   Pre Size Width 1.1 04/17/24 0800   Pre Size Depth 0.6 04/17/24 0800   Pre Total Sq cm 0.88 04/17/24 0800       VASC Wound Left lower leg wound (Active)   Pre Size Length 4.5 04/17/24 0800   Pre Size Width 2.3 04/17/24 0800   Pre Size Depth 0.8 04/17/24 0800   Pre Total Sq cm 10.35 04/17/24 0800   Post Size Length 3.2 04/04/24 0800   Post Size Width 2 04/04/24 0800   Post Size Depth 0.8 04/04/24 0800   Post Total Sq cm 6.4 04/04/24 0800   Undermined 0.5cm 4 to 12 oclock 03/25/24 1208       Incision/Surgical Site 02/15/24 Anterior;Left Groin (Active)       Incision/Surgical Site 02/15/24 Left;Medial Calf (Active)   Ulceration dorsal left hallux has exposed bone of the distal phalanx.  Ulceration medial " anterior left leg has slough with increased depth.  No erythema left lower extremity.  Neurologic: Intact to light touch left foot.  Musculoskeletal: Contracted digits noted Left.      Picture:

## 2024-04-17 NOTE — PROGRESS NOTES
"Pap Only Visit      Subjective:  Ashanti Aviles is a 68 year old  here today for pap screening with HPV Co-testing only.  Although she is over the typical age for screening, she has a known hx of cervical, intraepithelial, vulvar, and anal dysplasia, so screening is therefore indicated and has been recommended by her current physician.        Objective:      Vitals:    24 1031   Pulse: 67   Resp: 16   SpO2: 100%   Weight: 68.5 kg (151 lb)   Height: 1.549 m (5' 1\")       Physical Exam:  General Appearance: Alert, cooperative, pleasant, mild physical distress, appears stated age  Pelvic: External genitalia abnormal with labial lumps. Vagina and cervix with normal discharge show no lesions, inflammation, or tenderness.       Assessment:  Due for cervical cancer screening with HPV co-testing.      Plan:  - Lab: Pap with HPV co-testing.  Follow up based on pap and HPV results.  Discussed normal time for labs to result, that no recommendation can be made until both labs are resulted, and follow up will be based on results.  No further questions.  Further follow up with [facility] or OB/GYN MD as indicated/desired.    - Return to primary care.      Time spent:  Chart review/Pre-chartin min day of service  Face-to-face visit: 10 min exam, counseling and education  Documentation: 3 min  Total time spent on day of service: 15 min      DOROTA Leon CNM   2024  12:34 PM     "

## 2024-04-18 NOTE — PATIENT INSTRUCTIONS
Wound care supplies were ordered today through [facility] and if you are not receiving your supplies or have a question on your bill please contact Kennedi Larkin at [phone]. Please allow 2-5 business days for delivery of supplies. You may get a call from a 1-800 # if there are additional information Ai needs. It is important to  or return their call. PLEASE NOTE: If you need to return your supplies, you MUST call customer service within 15 days of delivery date.         Wound Care Instructions    DAILY and as needed, Cleanse your left leg wound(s) with Normal saline.    Pat Dry with non-sterile gauze    Primary Dressing: Apply aquacel AG into/onto the wounds    Secondary dressing: Cover with zetuvit silicone border    Ok to elevate your legs. If you start to have pain in the legs while elevated, please put your legs back down    It is not ok to get your wound wet in the bath or shower    High Protein Foods  When you have an open ulcer, your bodies protein needs are much higher, so it is recommended eat good sources of protein or take a protein supplement!    Protein Supplements  -Premier Protein  -Ensure  -Boost  -Glucerna, if diabetic    Chicken  -Chicken breast, 3.5oz.-30 grams protein  -Chicken meat, cooked, 4 oz.    Beef  -Hamburger mela, 4 oz-28 grams protein  -Steak, 6 oz-42 grams  -Most cuts of beef- 7 grams of protein per ounce    Fish  -Most fish fillets or steaks are about 22 grams of protein for 3 1/2 oz(100 grams) of cooked fish, or 6 grams per ounce  -Tuna, 6 oz can-40 grams of protein    Pork  -Pork chop, average-22 grams protein  -Pork loin or tenderloin, 4 oz.-29 grams  -Ham, 3oz serving- 19 grams  -Raphael, 1 slice-3 grams    Eggs and Dairy  -Egg, large-7 grams  -Milk, 1 cup-8 grams  -Cottage cheese, 1/2 cup-15 grams  -Greek yogurt, 1 cup-usually 8-12 grams, check label    Beans  -Soy milk, 1 cup-6-10 grams  -Most beans(black, willis, lentils, etc.) about 7-10 grams protein per half cup  of cooked beans    Nuts and Seeds  -Peanut butter, 2 Tablespoons- 8 grams protein  -Almonds, 1/4 cup- 8 grams  -Peanuts, 1/4 cup-9 grams  -Sunflower seeds, 1/4 cup- 6 grams        If for some reason you are not able to get your dressing(s) changed as outlined above (due to illness, lack of supplies, lack of help) please do the following: remove old, soiled dressings; wash the wounds with saline; pat dry; apply ABD pad or other absorbant pad and secure with rolled gauze; avoid tape directly on your skin; Call the clinic as soon as possible to let us know what the current issues are in receiving wound care [phone].      SEEK MEDICAL CARE IF:  You have an increase in swelling, pain, or redness around the wound.  You have an increase in the amount of pus coming from the wound.  There is a bad smell coming from the wound.  The wound appears to be worsening/enlarging  You have a fever greater than 101.5 F      It is ok to continue current wound care treatment/products for the next 2-3 days until new wound care supplies are ordered and arrive. If longer than this please contact our office at [phone].    If you have a 2 layer or 4 layer compression wrap on these are safe to have on for ONLY 7 days. If for some reason you are not able to get the wrap(s) changed (due to illness; lack of supplies, lack of help, lack of transportation) please do the following: unwrap the old 2 or 4 layer compression wrap; avoid using scissors as you could cut your skin and cause wounds; use tubular compression when available. Call to reschedule your home care or clinic visit appointment as soon as possible.    Please NOTE: if you are 15 minutes late to your clinic appointment you will have to be rescheduled. Please call our clinic as soon as possible if you know you will not be able to get to your appointment at [phone].    If you fail to show up to 3 scheduled clinic appointments you will be dismissed from our  clinic.              We want to hear from you!  In the next few weeks, you should receive a call or email to complete a survey about your visit at [facility]. Please help us improve your appointment experience by letting us know how we did today. We strive to make your experience good and value any ways in which we could do better.      We value your input and suggestions.    Thank you for choosing the [facility]!

## 2024-04-19 LAB
BKR LAB AP GYN ADEQUACY: NORMAL
BKR LAB AP GYN INTERPRETATION: NORMAL
BKR LAB AP HPV REFLEX: NORMAL
BKR LAB AP PREVIOUS ABNL DX: NORMAL
BKR LAB AP PREVIOUS ABNORMAL: NORMAL
PATH REPORT.COMMENTS IMP SPEC: NORMAL
PATH REPORT.COMMENTS IMP SPEC: NORMAL
PATH REPORT.RELEVANT HX SPEC: NORMAL

## 2024-04-23 ENCOUNTER — TELEPHONE (OUTPATIENT)
Dept: VASCULAR SURGERY | Facility: CLINIC | Age: 69
End: 2024-04-23
Payer: COMMERCIAL

## 2024-04-23 DIAGNOSIS — M86.172 ACUTE OSTEOMYELITIS OF LEFT FOOT (H): Primary | ICD-10-CM

## 2024-04-23 LAB
HUMAN PAPILLOMA VIRUS 16 DNA: NEGATIVE
HUMAN PAPILLOMA VIRUS 18 DNA: NEGATIVE
HUMAN PAPILLOMA VIRUS FINAL DIAGNOSIS: NORMAL
HUMAN PAPILLOMA VIRUS OTHER HR: NEGATIVE

## 2024-04-23 RX ORDER — OXYCODONE HYDROCHLORIDE 5 MG/1
5 TABLET ORAL EVERY 6 HOURS PRN
Qty: 12 TABLET | Refills: 0 | Status: SHIPPED | OUTPATIENT
Start: 2024-04-23 | End: 2024-04-26

## 2024-04-23 NOTE — TELEPHONE ENCOUNTER
Spoke with dr. Ordaz. Cortisone is not an option at this time d/t open wound with bone and poor blood flow.     Spoke with pt, requesting pain medications, per Dr. Ordaz pt should be referred to pain clinic. Pt is open to this, but would like something sent in the mean time.    Would like meds sent to [facility] pharmacy.     Jojo Yeh LPN on 4/23/2024 at 10:31 AM

## 2024-04-23 NOTE — PROGRESS NOTES
Patient contacted my office requesting pain medication for ongoing discomfort in her left great toe.  I will start the patient on oxycodone at this time and have also referred her to a pain clinic for consultation.

## 2024-04-23 NOTE — TELEPHONE ENCOUNTER
Patient called, having left foot pain not helped by tylenol and ibuprofen.  She asked if she could make an appointment with Dr. Ordaz for a cortisone shot.    Message sent to Dr. Ordaz's team, they are checking with him

## 2024-04-24 PROBLEM — Z98.890 HISTORY OF LOOP ELECTRICAL EXCISION PROCEDURE (LEEP): Status: ACTIVE | Noted: 2024-04-24

## 2024-04-25 ENCOUNTER — OFFICE VISIT (OUTPATIENT)
Dept: VASCULAR SURGERY | Facility: CLINIC | Age: 69
End: 2024-04-25
Attending: FAMILY MEDICINE
Payer: COMMERCIAL

## 2024-04-25 ENCOUNTER — DOCUMENTATION ONLY (OUTPATIENT)
Dept: VASCULAR SURGERY | Facility: CLINIC | Age: 69
End: 2024-04-25

## 2024-04-25 VITALS
HEART RATE: 76 BPM | OXYGEN SATURATION: 100 % | RESPIRATION RATE: 20 BRPM | DIASTOLIC BLOOD PRESSURE: 76 MMHG | SYSTOLIC BLOOD PRESSURE: 116 MMHG | TEMPERATURE: 97.7 F

## 2024-04-25 DIAGNOSIS — R60.0 LOWER EXTREMITY EDEMA: ICD-10-CM

## 2024-04-25 DIAGNOSIS — L97.922 ULCER OF LEFT LOWER EXTREMITY WITH FAT LAYER EXPOSED (H): Primary | ICD-10-CM

## 2024-04-25 PROCEDURE — 11042 DBRDMT SUBQ TIS 1ST 20SQCM/<: CPT | Performed by: FAMILY MEDICINE

## 2024-04-25 PROCEDURE — 99214 OFFICE O/P EST MOD 30 MIN: CPT | Mod: 24 | Performed by: FAMILY MEDICINE

## 2024-04-25 PROCEDURE — 99215 OFFICE O/P EST HI 40 MIN: CPT | Performed by: FAMILY MEDICINE

## 2024-04-25 NOTE — LETTER
[facility]  [address] [place] [address] [address]  [place], [place] [address]  [phone]      Fax [phone]    [facility]           Fax: [phone]            Customer Service: [phone]        Account #: [id number]    Wound Dressing Rx and Order Form  Order Status: refill  Verbal: Jenifer  Date: 2024     Ashanti Aviles  Gender: female  : 1955  [address] [place] [address]  [phone] (home)     Medical Record: [id number]  Primary Care Provider: Dyan Geller    No diagnosis found.      Insurance Info:  INSURER: Payor: [employer] / Plan: [employer] OPEN ACCESS / Product Type: HMO /   Policy ID#:  [id number]  SECONDARY INSURANCE:    Secondary Policy ID#:  N/A        Physician Info:   Name:  KLAUS MARCIAL     Dept Address/Phones:   [address] [place] [address], [address] [id number] [phone]  Fax: [phone]    Lymphedema circumferential measurements (in cm):       No data to display                  Wound info:  VASC Wound left hallux (Active)   Pre Size Length 0.8 24 0800   Pre Size Width 1.1 24 0800   Pre Size Depth 0.6 24 0800   Pre Total Sq cm 0.88 24 0800   Number of days: 141       VASC Wound Left lower leg wound (Active)   Pre Size Length 4.5 24 1100   Pre Size Width 2 24 1100   Pre Size Depth 0.6 24 1100   Pre Total Sq cm 9 24 1100   Post Size Length 3.2 24 0800   Post Size Width 2 24 0800   Post Size Depth 0.8 24 0800   Post Total Sq cm 6.4 24 0800   Undermined 0.5cm 4 to 12 oclock 24 1208   Number of days: 45       Incision/Surgical Site 02/15/24 Anterior;Left Groin (Active)   Number of days: 70       Incision/Surgical Site 02/15/24 Left;Medial Calf (Active)   Number of days: 70        Drainage: moderate  Thickness:  full  Duration of Need: 30 DAYS  Days Supply: 30 DAYS  Start Date: 2024  Starter Kit, Ancillary Kit (saline,  "gloves, gauze): Yes   Qualifying wound/Debridement: Yes     NO SUBSTITUTIONS. Call [phone].      Dressing Type Brand Size Frequency of change  Quantity   Primary Zetuvit plus silicone border  3\"x3\" DAILY and as needed      NO SUBSTITUTIONS. Call [phone] with questions.       OK to forward to covered supplier.    Electronically Signed Physician:  KLAUS MARCIAL             Date: April 25, 2024    "

## 2024-04-25 NOTE — PROGRESS NOTES
Date:4/26/2024      COMPREHENSIVE PAIN CLINIC INITIAL EVALUATION    I had the pleasure of meeting Ms. Ashanti Aviles on 4/26/2024 in the [facility] in consult for Dr. Ordaz, Vascular Surgery with regards to her pain.  The patient is a 68 year old female with past medical history of osteomyelitis of L) foot, open wound L) medial calf, chronic intractable pain, DM, nicotine addiction, PVD, GERD, who presents for evaluation of chronic pain. UDS and opioid contract completed on 04/26/2024.      History of of chronic pain on initial exam 04/26/24                               Subjective:  Patient endorses chronic pain in left leg due to non-healing wounds x 2.  She has a L) medial calf wound and L) great toe wound.   Patient has numbness and tingling in L) foot present for 2 years.  She was on gabapentin in the past which was helpful for the nerve pain and would like another prescription.   The patient describes the pain as intermittent, burning, throbbing like a toothache.  She reports that the pain is made worse by walking too much.  Her pain is improved with elevation, acetaminophen and ibuprofen. Oxycodone was not effective in reducing the pain.  She had a script for Norco 5/325 which was more effective.  She usually takes a pain med at night because that is when the pain is the worst and she can't sleep.  She rates her currenty pain score at 6/10, but it can be as low as 3/10 or as severe as 8/10. She does her own dressing changes once a day.  June 4, 2024 scheduled another bypass at [facility].  She denies any current wound infections.  She is not on antibiotics today.  She goes to the wound clinic every 2-3 weeks.      Patient denies anxiety and depression.  Patient does not follow with a mental health care provider.   She does not exercise on a regular basis.  She does have a walker that she will use after the next bypass.      Progress Notes Reviewed:  04/26/2024 Dr. El Carolina,  Surgery/Procedure: Left external iliac artery to posterior tibial artery bypass with cryo artery   04/25/2024 Dr. Robert Oliva, Wound Clinic  the wound has been present since mid February 2024.  The wound began after a recent surgery and the incision did not heal normally.   She has critical limb ischemia and has undergone 2 left leg artery bypass surgeries, the most recent on February 15, 2024.  Postoperatively a portion of the incision dehisced leading to the current wound.  Unfortunately both of the arterial bypasses have gone down.  She is currently undergoing a hypercoagulability workup but is planned to undergo another bypass surgery in May.   04/17/2023 Dr. Rene Ordaz, Podiatrist  04/15/2024 Dr. Shen Holly, GI Service  03/20/2024 ED - L) leg cellulitis    She denies any new problems with falls or balance, any new numbness or weakness of the arms or legs, any new bowel or bladder incontinence, any night sweats or unexplained fevers, or any sudden or unexpected weight loss.      Ashanti Aviles has not been seen at a pain clinic in the past.        Current Treatments:  Oxycodone 5mg filled 04/23/2024 with last dose last night.    Anticoagulation: plavix 75mg      Previous Medication Treatments Included:  Anti-convulsants: Gabapentin was helpful in the past  Muscle relaxors: none  Anti-depressants: none  Benzodiazapine's: none  Acetaminophen/NSAIDs: both help a little  Topicals: lidocaine cream is not helpful  Opioids: Tramadol, hydrocodone-acet 5/325, oxycodone 5mg      Other Treatments Have Included:  Physical therapy: no  Pain Psychology: no  Chiropractic: no  Acupuncture: noo  TENs Unit: no  Injections: no  Surgeries: R) 4th toe amputation  Dry Needling: no  Massage:no    Implantable devices:  none      Past Medical History:  Medical history reviewed.  Past Medical History:   Diagnosis Date    Benign gastric polyp     Chronic toe ulcer (H)     Chronic ulcer of great toe of left foot with  necrosis of muscle (H)     Diabetes mellitus (H)     Gastroesophageal reflux disease     Hard to intubate 02/15/2024    History of colonic polyps     HLD (hyperlipidemia)     Hypertension     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/29/2013    Nicotine addiction     OAG (open angle glaucoma)     PVD (peripheral vascular disease) (H24)     Reflux esophagitis     Retinopathy     Toe ulcer (H)     Tubular adenoma     Yeast vaginitis       Patient Active Problem List   Diagnosis    Type 2 diabetes mellitus (H)    Tobacco use disorder    PAD (peripheral artery disease) (H24)    Microalbuminuric diabetic nephropathy (H)    Hyperlipidemia with target LDL less than 70    Grade 3 vulvar intraepithelial neoplasia    GERD (gastroesophageal reflux disease)    Essential hypertension    Dyslipidemia    AIN III (anal intraepithelial neoplasia III)    Bilateral incipient cataracts    Condyloma acuminatum of vulva    Ischemic toe    Critical limb ischemia of left lower extremity (H)    Paronychia of toe, left    Candidal vulvovaginitis    Cervical intraepithelial neoplasia    History of cervical dysplasia    Pelvic pain in female    Femoral artery thrombosis, left (H)    Femoral-tibial bypass graft occlusion, left, initial encounter (H24)    Hard to intubate    Gait abnormality    Left leg weakness    Ulcer of left lower extremity with fat layer exposed (H)    Lower extremity edema    History of loop electrical excision procedure (LEEP)         Past Surgical History:  Pertinent surgical history reviewed.  Past Surgical History:   Procedure Laterality Date    AMPUTATE TOE(S) Right 02/18/2023    Procedure: Amputation fourth toe right foot;  Surgeon: Benjamin Diez DPM;  Location: [facility] OR    ANGIOGRAM Left 2/15/2024    Procedure: COMPLETION OF ANGIOGRAM, LEFT COMMON ILIAC ARTERY STENT PLACEMENT;  Surgeon: Dyan Geller MD;  Location: [facility] OR    ANUS SURGERY      BIOPSY CERVICAL, LOCAL  EXCISION, SINGLE/MULTIPLE      COLONOSCOPY N/A 09/11/2020    Procedure: EXAM UNDER ANESTHESIA, HIGH RESOLUTION ANOSCOPY INTRA OP;  Surgeon: Preeti Sheehan MD;  Location: [facility];  Service: Gastroenterology    COLONOSCOPY N/A 12/14/2020    Procedure: EXAM UNDER ANESTHESIA WITH HIGH RESOLUTION ANOSCOPY;  Surgeon: Preeti Sheehan MD;  Location: [facility];  Service: General    COLONOSCOPY N/A 06/15/2021    Procedure: EXAM UNDER ANESTHESIA WITH HIGH RESOLUTION ANOSCOPY, BIOPSY, FULGURATION;  Surgeon: Preeti Sheehan MD;  Location: [facility];  Service: Gastroenterology    COLONOSCOPY N/A 4/15/2024    Procedure: COLONOSCOPY, WITH POLYPECTOMY;  Surgeon: Shen Andres MD;  Location: [facility] OR    ENDARTERECTOMY FEMORAL Left 05/17/2023    Procedure: LEFT FEMORAL ENDARTERECTOMY WITH RETROGRADE ILIAC STENT;  Surgeon: Dyan Geller MD;  Location: [facility]    EXAM UNDER ANESTHESIA ANUS N/A 09/14/2021    Procedure: EXAM UNDER ANESTHESIA WITH HIGH RESOLUTION ANOSCOPY;  Surgeon: Preeti Sheehan MD;  Location: [facility]    FEMORAL ARTERY - TIBIAL ARTERY BYPASS GRAFT Left 09/08/2023    Procedure: CREATION, BYPASS, ARTERIAL, FEMORAL TO TIBIAL, LEFT;  Surgeon: Dyan Geller MD;  Location: [facility]    FEMORAL ARTERY - TIBIAL ARTERY BYPASS GRAFT Left 2/15/2024    Procedure: CREATION, BYPASS, ARTERIAL, FEMORAL TO TIBIAL,;  Surgeon: Dyan Geller MD;  Location: [facility]    INCISION AND DRAINAGE FOOT, COMBINED Left 11/27/2023    Procedure: INCISION AND DRAINAGE, LEFT HALLUX;  Surgeon: Rene Ordaz DPM;  Location: [facility] OR    IR LOWER EXTREMITY ANGIOGRAM LEFT  03/23/2023    IR LOWER EXTREMITY ANGIOGRAM RIGHT  02/16/2023    IR THROMBOLYSIS ARTERIAL INFUSION INITIAL DAY  10/09/2023    LEEP TX, CERVICAL      2005    TUBAL LIGATION            Medications: Pertinent medications reviewed.  Current  Outpatient Medications   Medication Sig Dispense Refill    acetaminophen (ACETAMINOPHEN 8 HOUR) 650 MG CR tablet Take 1,300 mg by mouth every 8 hours as needed for mild pain or fever      amLODIPine (NORVASC) 5 MG tablet Take 5 mg by mouth daily      apixaban ANTICOAGULANT (ELIQUIS ANTICOAGULANT) 5 MG tablet 1 tablet Orally      bisacodyl (DULCOLAX) 5 MG EC tablet Take 2 tablets at 3 pm the day before your procedure. If your procedure is before 11 am, take 2 additional tablets at 11 pm. If your procedure is after 11 am, take 2 additional tablets at 6 am. For additional instructions refer to your colonoscopy prep instructions. 4 tablet 0    clopidogrel (PLAVIX) 75 MG tablet Take 75 mg by mouth daily      Continuous Blood Gluc Sensor (FREESTYLE PRINCESS 14 DAY SENSOR) MISC       empagliflozin (JARDIANCE) 25 MG TABS tablet Take 1 tablet (25 mg) by mouth daily 90 tablet 1    ezetimibe (ZETIA) 10 MG tablet Take 10 mg by mouth daily      folic acid (FOLVITE) 1 MG tablet Take 1 tablet (1 mg) by mouth daily 90 tablet 0    hydrochlorothiazide (HYDRODIURIL) 25 MG tablet Take 25 mg by mouth daily      Insulin Degludec (TRESIBA) 100 UNIT/ML SOLN Inject 15 Units Subcutaneous 2 times daily      insulin glargine (LANTUS PEN) 100 UNIT/ML pen       linagliptin (TRADJENTA) 5 MG TABS tablet Take 5 mg by mouth daily      lisinopril (ZESTRIL) 40 MG tablet Take 40 mg by mouth daily      metFORMIN (GLUCOPHAGE XR) 500 MG 24 hr tablet Take 1,000 mg by mouth daily      nicotine (NICODERM CQ) 21 MG/24HR 24 hr patch Place 1 patch onto the skin every 24 hours 30 patch 3    omeprazole (PRILOSEC) 20 MG DR capsule Take 20 mg by mouth daily as needed      oxyCODONE (ROXICODONE) 5 MG tablet Take 1 tablet (5 mg) by mouth every 6 hours as needed for pain 12 tablet 0    polyethylene glycol (GOLYTELY) 236 g suspension The night before the exam at 6 pm drink an 8-ounce glass every 15 minutes until the jug is half empty. If you arrive before 11 AM: Drink the  other half of the [employer] jug at 11 PM night before procedure. If you arrive after 11 AM: Drink the other half of the [employer] jug at 6 AM day of procedure. For additional instructions refer to your colonoscopy prep instructions. 4000 mL 0    UNABLE TO FIND Take 2 capsules by mouth daily MEDICATION NAME: Gui KEITH PENTIPS 31G X 5 MM miscellaneous       vitamin B complex with vitamin C (STRESS TAB) tablet Take 1 tablet by mouth daily 90 tablet 0       [place] Prescription Monitoring Program reviewed 4/26/2024.  No concern for abuse or misuse of controlled medications based on this report.  02/19/2024 Oxycodone 5mg 6 tabs for 1 day  12/06/2023 Gabapentin 300mg 60 tabs for 20 days.      Allergies: Pertinent allergies reviewed.     Allergies   Allergen Reactions    Simvastatin Muscle Pain (Myalgia)     Muscle pain    Victoza [Liraglutide] Other (See Comments)     Binds bowels    Penicillins Unknown     Childhood rxn       Family History:   family history includes Breast Cancer in her paternal aunt; Colon Cancer in her mother; Diabetes Type 2  in her father; Hypertension in her father and mother; Hyperthyroidism in her sister; Kidney failure in her father.    Social History:   She is  and lives in a house in [place] with her  and 4 [facility].  She has 2 grandchildren.  There are no pets.  She is on a short term leave of absence due to the wounds. She is independent in ADL's.  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  She denies any recent or remote substance use.  She denies any history of chemical dependency.  She  reports that she quit smoking about 14 months ago. Her smoking use included cigarettes. She has never been exposed to tobacco smoke. She has never used smokeless tobacco. She reports that she does not currently use alcohol. She reports that she does not currently use drugs.  Social History     Social History Narrative    Not on file         Review of Systems:      (Positive responses  bolded)  GENERAL: fever/chills, fatigue, general unwell feeling, weight gain/loss  HEAD/EYES:  headache, dizziness, or vision changes  EARS/NOSE/THROAT: nosebleeds, hearing loss, sinus infection, earache, tinnitus  IMMUNE:  allergies, cancer, immune deficiency, or infections  SKIN:  itching, rash, hives  HEME/Lymphatic: anemia, easy bruising, easy bleeding  RESPIRATORY: cough, wheezing, or shortness of breath  CARDIOVASCULAR/Circulation: extremity edema, syncope, hypertension, tachycardia, or angina  GASTROINTESTINAL: abdominal pain, nausea/emesis, diarrhea, constipation, hematochezia, or melena  ENDOCRINE:  diabetes, steroid use, thyroid disease or osteoporosis  MUSCULOSKELETAL: myalgias, joint pain, stiffness, neck pain, back pain, arthritis, or gout  GENITOURINARY: frequency, urgency, dysuria, difficulty voiding, hematuria or incontinence  NEUROLOGIC: weakness, numbness, paresthesias, seizure, tremor, stroke or memory loss  PSYCHIATRIC: depression, anxiety, stress, suicidal thoughts/attempts or mood swings      Physical Exam:  /86   Pulse 92     Constitutional: She is oriented to person, place, and time.  She appears well-developed and well-nourished. She is not in acute distress.   HENT:     Head: Normocephalic and atraumatic.     Eyes: Pupils are equal, round, and reactive to light. EOM are normal. No scleral icterus.   Pulmonary/Chest:  NWOB. No respiratory distress.   Neurological: She is alert and oriented to person, place, and time. Coordination grossly normal.  Romberg test negative. Finn's test is negative  Skin: Skin is warm and dry. She is not diaphoretic.   Psychiatric: She has a normal mood and affect. Her behavior is normal. Judgment and thought content normal.  Patient answers questions appropriately.  MSK: Gait is normal.    L) leg - 2 wounds    1) Left medial mid calf - no foul smell, purulent drainage, edema, erythema, streaking noted.  2) Left great toe - nail is removed - no foul  smell, purulent drainage, edema, erythema, streaking noted.       DIRE Score for ongoing opioid management is calculated as follows:    Diagnosis = 3    Intractability = 3    Risk: Psych = 3  Chem Hlth = 3  Reliability = 3  Social = 3    Efficacy = 3    Total DIRE Score = 21 (14 or higher predicts good candidate for ongoing opioid management; 13 or lower predicts poor candidate for opioid management)         Imaging:  EXAM: MR FOOT LEFT W/O and W CONTRAST  LOCATION: [facility]  DATE: 3/5/2024     INDICATION: Osteomyelitis of the left hallux.  COMPARISON: Multiple prior comparison exams, the most recent dated 11/09/2023  TECHNIQUE: Routine. Additional postgadolinium T1 sequences were obtained.  IV CONTRAST: Gadavist 7mL     FINDINGS:      JOINTS AND BONES:   -Patchy mildly enhancing T2 hyperintense signal abnormality involving the distal phalanx which has slightly progressed since the most recent comparison exam of November 2023. No confluent T1 hypointense marrow replacing signal. Mild degenerative   arthrosis of the first metatarsophalangeal and second tarsometatarsal joint. No acute fracture. No joint effusion or enhancing synovitis.      TENDONS:   -No tendon tear, tendinopathy, or tenosynovitis.      LIGAMENTS:   -Lisfranc ligament complex and visualized collateral and capsular ligaments are intact.     MUSCLES AND SOFT TISSUES:   -Subtle broad-based ulceration over the distal dorsal medial aspect of the first toe. No loculated fluid collection to suggest abscess.  -Progressive T2 hyperintense nonenhancing signal abnormality within the intrinsic muscles of the foot (series 4 image 15).                                                                      IMPRESSION:  1.  Patchy mildly enhancing T2 hyperintense signal abnormality involving the distal phalanx of the hallux which has slightly progressed since the comparison exam. Findings are indeterminate and may reflect  persistent/progressive reactive osteitis,   however, early osteomyelitis cannot be entirely excluded.  2.  Patchy T2 hyperintense nonenhancing intramuscular signal abnormality, nonspecific and possibly related to recent revascularization. Infectious or inflammatory myositis is considered much less likely.  3.  No abscess.  4.  Mild degenerative arthrosis of the first MTP and second TMT joints.          Narrative & Impression   BILATERAL RESTING ANKLE-BRACHIAL INDICES (TRACIE'S) (Date: 03/18/24)        Indication: Status post left femoral-tibial bypass graft/left MIKE stent/ Hx of Left CFA-TPT BPG using spliced 4mm and 5 mm Artegraft graft done 9/8/2023, decreased lower extremity pulses, lower extremity pain        Hx: 10/09/2023 IR Left Thrombolytics unsuccessful.     5/17/2023 IR Left MIKE Stents / Iliofemoral Endarterectomy.                3/23/23 IR Left Leg Angiogram ( Occluded SFA/ Pop. A./ RICKY),                2/16/23 IR Right SFA Proximal Atherectomy/ Balloon Angiogram.                2/18/23 Right 4th Toe Amputation               Left Big Toe Nail Removed/ Painful      Previous: 2/23/24     Date of bypass graft: 9/8/2023 Left LEG      History: Previous Smoker, Hypertension, Diabetic, PAD, Angioplasty, Vascular Surgery, Surgical Follow-up, and Vascular Ulcers        Resting TRACIE's              Right: mmHg Index       Brachial: 144     Ankle-(PT): 117 0.81   Ankle-(DP): 124 0.86            Digit: 87 0.60                      Left: mmHg Index       Brachial: 140     Ankle-(PT): 45 0.31   Ankle-(DP): 41 0.28            Digit: 0 0.00      Resting ankle-brachial index of 0.86 on the right. Toe Pressures of 87 mmHg and TBI of 0.60     Resting ankle-brachial index of 0.31 on the left. Toe Pressures of 0 mmHg and TBI of 0.00     VPR WAVEFORMS:   The right volume plethysmography waveforms are mildly abnormal at the lower thigh level, mildly abnormal at the upper calf level and mildly abnormal at the ankle.      The left  volume plethysmography waveforms are moderately abnormal at the lower thigh level, moderately abnormal at the upper calf level and moderately abnormal at the ankle.           Impression:       1. RIGHT LOWER EXTREMITY: TRACIE is Abnormal with an TRACIE of 0.86 indicating single level disease. Toe Pressures are Normal and adequate for wound healing with toe pressures of 87 mmHg.     2. LEFT LOWER EXTREMITY: TRACIE is Abnormal with an TRACIE of 0.30 indicating critical limb ischemia. Toe Pressures are Abnormal and impaired for wound healing with toe pressures of 0 mmHg.     Reference:      Wound classification  Grade TRACIE Ankle Systolic Pressure Toe Pressures   0 > 0.80 > 100 mmHg > 60 mmHg   1 0.6 - 0.79 70 - 100 mmHg 40 - 59 mmHg   2 0.4 - 0.59 50-70 mmHg 30 - 39 mmHg   3 < 0.39 < 50 mmHg < 30 mmHg      Digit Pressures  DBI Disease Category   > 0.70 Normal   < 0.70 Abnormal   > 30 mmHg Potential wound healing   < 30 mmHg Impaired wound healing      Ankle Brachial Pressures  TRACIE Disease Category   > 1.3  Likely vessel calcification with monophasic waveforms, non-diagnostic   0.95-1.30 Normal with multiphasic waveforms   0.50-0.95 Single level disease   0.30-0.50 Multilevel disease   < 0.30 Critical limb ischema      Volume Plethysmography Recording (VPR) at all levels  Normal Sharp systolic peak, fast upstroke, prominent dicrotic notch in wave   Mild Sharp systolic peak, fast upstroke, absent dicrotic notch in wave   Moderate Flattened systolic peak, slowed upstroke, absent dicrotic notch inwave   Severe amplitude wave with = upslope and down slope   Occluded Flat Line              EMG:  na      Diagnosis:  (S91.102A) Open wound of left great toe, initial encounter  (primary encounter diagnosis)  Comment:   Plan: HYDROcodone-acetaminophen (NORCO) 5-325 MG         tablet, gabapentin (NEURONTIN) 300 MG capsule            (L97.922) Ulcer of left lower extremity with fat layer exposed (H)  Comment:   Plan: HYDROcodone-acetaminophen  (NORCO) 5-325 MG         tablet, gabapentin (NEURONTIN) 300 MG capsule            (I70.222) Critical limb ischemia of left lower extremity (H)  Comment:   Plan:     (G89.29) Chronic intractable pain  Comment:   Plan: gabapentin (NEURONTIN) 300 MG capsule                Plan on initial consult 04/26/2024:  We will have her do a UDS and sign an opioid agreement today.    Continue high protein diet.    Diagnostics: none    Medications:  Start Hydrocodone/acet 5/325mg 1 tab BID PRN severe pain  Start Gabapentin 300mg 1 tab q hs times 3 days then twice daily for 3 days then three times daily.    Therapies: none    Interventions:  none    Follow up:   Follow-up in clinic in 1 month.      DOROTA Anderson, RN, CNP, FNP  [facility]        BILLING TIME DOCUMENTATION:   The total TIME spent on this patient on the date of the encounter/appointment was 84 minutes.            Answers submitted by the patient for this visit:  CHELSEY-7 (Submitted on 4/26/2024)  CHELSEY 7 TOTAL SCORE: 1

## 2024-04-25 NOTE — PROGRESS NOTES
Wound Clinic Note         Visit date: 04/25/2024       Cheif Complaint:     Ashanti Aviles is a 68 year old  female had concerns including Wound Check.  The patient has lower extremity edema and a left leg ulcer         HISTORY OF PRESENT ILLNESS:    Ashanti Aviles reports the wound has been present since mid February 2024.  The wound began after a recent surgery and the incision did not heal normally.   She has critical limb ischemia and has undergone 2 left leg artery bypass surgeries, the most recent on February 15, 2024.  Postoperatively a portion of the incision dehisced leading to the current wound.  Unfortunately both of the arterial bypasses have gone down.  She is currently undergoing a hypercoagulability workup but is planned to undergo another bypass surgery in May.        The patient has been bandaging the wound with Aquacel and a silicone adhesive changed once a day.  There has been Moderate drainage from the wound.       The pateint denies fevers or chills.  They report the pain from the wound has been 3/10 and has remained about the same recently.      The patient reports they currently do not have any routine for elevating their legs.     Today the patient reports maintaining a high protein diet, but has not been taking protein supplements lately.        The patient denies a history of smoking or chronic steroid use.  The patient confirms having diabetes and reports the blood sugars are well controlled.         The patient has not had any symptoms of infection relating to the wound recently and is not currently on antibiotics.       Problem List:   Past Medical History:   Diagnosis Date    Benign gastric polyp     Chronic toe ulcer (H)     Chronic ulcer of great toe of left foot with necrosis of muscle (H)     Diabetes mellitus (H)     Gastroesophageal reflux disease     Hard to intubate 02/15/2024    History of colonic polyps     HLD (hyperlipidemia)     Hypertension     Microalbuminuric  diabetic nephropathy (H) 10/29/2013    Nicotine addiction     OAG (open angle glaucoma)     PVD (peripheral vascular disease) (H24)     Reflux esophagitis     Retinopathy     Toe ulcer (H)     Tubular adenoma     Yeast vaginitis              Family Hx: family history includes Breast Cancer in her paternal aunt; Colon Cancer in her mother; Diabetes Type 2  in her father; Hypertension in her father and mother; Hyperthyroidism in her sister; Kidney failure in her father.       Surgical Hx:   Past Surgical History:   Procedure Laterality Date    AMPUTATE TOE(S) Right 02/18/2023    Procedure: Amputation fourth toe right foot;  Surgeon: Benjamin Diez DPM;  Location: [facility]    ANGIOGRAM Left 2/15/2024    Procedure: COMPLETION OF ANGIOGRAM, LEFT COMMON ILIAC ARTERY STENT PLACEMENT;  Surgeon: Dyan Geller MD;  Location: [facility]    ANUS SURGERY      BIOPSY CERVICAL, LOCAL EXCISION, SINGLE/MULTIPLE      COLONOSCOPY N/A 09/11/2020    Procedure: EXAM UNDER ANESTHESIA, HIGH RESOLUTION ANOSCOPY INTRA OP;  Surgeon: Preeti Sheehan MD;  Location: [facility];  Service: Gastroenterology    COLONOSCOPY N/A 12/14/2020    Procedure: EXAM UNDER ANESTHESIA WITH HIGH RESOLUTION ANOSCOPY;  Surgeon: Preeti Sheehan MD;  Location: [facility] OR;  Service: General    COLONOSCOPY N/A 06/15/2021    Procedure: EXAM UNDER ANESTHESIA WITH HIGH RESOLUTION ANOSCOPY, BIOPSY, FULGURATION;  Surgeon: Preeti Sheehan MD;  Location: [facility] OR;  Service: Gastroenterology    COLONOSCOPY N/A 4/15/2024    Procedure: COLONOSCOPY, WITH POLYPECTOMY;  Surgeon: Shen Andres MD;  Location: [facility]    ENDARTERECTOMY FEMORAL Left 05/17/2023    Procedure: LEFT FEMORAL ENDARTERECTOMY WITH RETROGRADE ILIAC STENT;  Surgeon: Dyan Geller MD;  Location: [facility]    EXAM UNDER ANESTHESIA ANUS N/A 09/14/2021    Procedure: EXAM UNDER ANESTHESIA WITH HIGH RESOLUTION  ANOSCOPY;  Surgeon: Preeti Sheehan MD;  Location: [facility]    FEMORAL ARTERY - TIBIAL ARTERY BYPASS GRAFT Left 09/08/2023    Procedure: CREATION, BYPASS, ARTERIAL, FEMORAL TO TIBIAL, LEFT;  Surgeon: Dyan Geller MD;  Location: [facility]    FEMORAL ARTERY - TIBIAL ARTERY BYPASS GRAFT Left 2/15/2024    Procedure: CREATION, BYPASS, ARTERIAL, FEMORAL TO TIBIAL,;  Surgeon: Dyan Geller MD;  Location: [facility]    INCISION AND DRAINAGE FOOT, COMBINED Left 11/27/2023    Procedure: INCISION AND DRAINAGE, LEFT HALLUX;  Surgeon: Rene Ordaz DPM;  Location: [facility]    IR LOWER EXTREMITY ANGIOGRAM LEFT  03/23/2023    IR LOWER EXTREMITY ANGIOGRAM RIGHT  02/16/2023    IR THROMBOLYSIS ARTERIAL INFUSION INITIAL DAY  10/09/2023    LEEP TX, CERVICAL      2005    TUBAL LIGATION            Allergies:    Allergies   Allergen Reactions    Simvastatin Muscle Pain (Myalgia)     Muscle pain    Victoza [Liraglutide] Other (See Comments)     Binds bowels    Penicillins Unknown     Childhood rxn              Medication History:    Current Outpatient Medications   Medication Sig Dispense Refill    acetaminophen (ACETAMINOPHEN 8 HOUR) 650 MG CR tablet Take 1,300 mg by mouth every 8 hours as needed for mild pain or fever      amLODIPine (NORVASC) 5 MG tablet Take 5 mg by mouth daily      apixaban ANTICOAGULANT (ELIQUIS ANTICOAGULANT) 5 MG tablet 1 tablet Orally      bisacodyl (DULCOLAX) 5 MG EC tablet Take 2 tablets at 3 pm the day before your procedure. If your procedure is before 11 am, take 2 additional tablets at 11 pm. If your procedure is after 11 am, take 2 additional tablets at 6 am. For additional instructions refer to your colonoscopy prep instructions. 4 tablet 0    clopidogrel (PLAVIX) 75 MG tablet Take 75 mg by mouth daily      Continuous Blood Gluc Sensor (FREESTYLE PRINCESS 14 DAY SENSOR) MISC       empagliflozin (JARDIANCE) 25 MG TABS tablet Take 1 tablet (25  mg) by mouth daily 90 tablet 1    ezetimibe (ZETIA) 10 MG tablet Take 10 mg by mouth daily      folic acid (FOLVITE) 1 MG tablet Take 1 tablet (1 mg) by mouth daily 90 tablet 0    hydrochlorothiazide (HYDRODIURIL) 25 MG tablet Take 25 mg by mouth daily      Insulin Degludec (TRESIBA) 100 UNIT/ML SOLN Inject 15 Units Subcutaneous 2 times daily      insulin glargine (LANTUS PEN) 100 UNIT/ML pen       linagliptin (TRADJENTA) 5 MG TABS tablet Take 5 mg by mouth daily      lisinopril (ZESTRIL) 40 MG tablet Take 40 mg by mouth daily      metFORMIN (GLUCOPHAGE XR) 500 MG 24 hr tablet Take 1,000 mg by mouth daily      nicotine (NICODERM CQ) 21 MG/24HR 24 hr patch Place 1 patch onto the skin every 24 hours 30 patch 3    omeprazole (PRILOSEC) 20 MG DR capsule Take 20 mg by mouth daily as needed      oxyCODONE (ROXICODONE) 5 MG tablet Take 1 tablet (5 mg) by mouth every 6 hours as needed for pain 12 tablet 0    polyethylene glycol (GOLYTELY) 236 g suspension The night before the exam at 6 pm drink an 8-ounce glass every 15 minutes until the jug is half empty. If you arrive before 11 AM: Drink the other half of the Golytely jug at 11 PM night before procedure. If you arrive after 11 AM: Drink the other half of the Golytely jug at 6 AM day of procedure. For additional instructions refer to your colonoscopy prep instructions. 4000 mL 0    UNABLE TO FIND Take 2 capsules by mouth daily MEDICATION NAME: Gui KEITH PENTIPS 31G X 5 MM miscellaneous       vitamin B complex with vitamin C (STRESS TAB) tablet Take 1 tablet by mouth daily 90 tablet 0     No current facility-administered medications for this visit.         Tobacco History:  reports that she quit smoking about 14 months ago. Her smoking use included cigarettes. She has never been exposed to tobacco smoke. She has never used smokeless tobacco.       REVIEW OF SYMPTOMS:   The review of systems was negative except as noted in the HPI.           PHYSICAL EXAMINATION:      /76   Pulse 76   Temp 97.7  F (36.5  C)   Resp 20   SpO2 100%            GENERAL: The patient overall appears well and is no acute distress.   HEAD: normocephalic   EYES: Sclera and conjunctiva clear   NECK: no obvious masses   LUNGS: breathing is unlabored.   EXTREMITIES: No clubbing, cyanosis or edema   SKIN: No rashes or other abnormalities except as noted under the Wound section below.   NEUROLOGICAL: normal motor and sensory function   EDEMA: Mild    Vascular: Her foot is cool but not cold, she does not have palpable pedal pulses but her sensory and motor function in her foot is intact.    WOUND: The wound appears healthy with no sign of infection.   Wound bed: necrotic material  Periwound: healthy intact skin  The wound bed is overall much improved compared with her last clinic visit with me and the wound measurement is smaller.      Also see below for wound details:       Circumferential volume measures:             No data to display                Ulceration(s)/Wound(s):   Please see the media tab under the chart review for pictures of the wounds.  Nursing staff removed dressings and cleansed wound.    VASC Wound Left lower leg wound (Active)   Pre Size Length 4.5 04/25/24 1100   Pre Size Width 2 04/25/24 1100   Pre Size Depth 0.6 04/25/24 1100   Pre Total Sq cm 9 04/25/24 1100             Recent Labs   Lab Test 02/15/24  0612 09/05/23  1157 02/14/23  1744 06/26/19  0000 04/03/19  0000 02/26/19  0000   HGBA1C  --   --   --  8.0* 8.8* 10.5*   A1C 8.7* 8.3* 8.5*  --   --   --           Recent Labs   Lab Test 02/15/23  0814   ALBUMIN 3.7         WBC Count   Date Value Ref Range Status   03/25/2024 12.6 (H) 4.0 - 11.0 10e3/uL Final     Albumin   Date Value Ref Range Status   02/15/2023 3.7 3.5 - 5.2 g/dL Final           Procedure note:  I had previous obtain informed consent from the patient to perform serial debridements, I confirmed this again with the patient today verbally.  Anesthetized as  needed with lidocaine.  Using a curette and/or a scalpel I performed an excisional debridement removing all necrotic material at the left leg wound in to the level of viable subcutaneous tissue.  I obtained hemostasis with direct pressure.  The patient tolerated the procedure well.  EBL: <5 ml  Total debridement surface area: Less than 20 cm   Due to her severe peripheral artery disease I did a very gentle debridement with very minimal bleeding to decrease increased oxygen demand.              ASSESSMENT:   This is a 68 year old  female with a left leg ulcer, the patient also has lower extremity edema which was also managed during today's clinic visit.          PLAN:   We'll bandage the area with Aquacel and a silicone adhesive bandage changed once a day.  At the request of Dr. Burleson we will not apply any compression.    Separate from the wound care instructions we then discussed management strategies for lower extremity edema.  I explained the keys for managing lower extremity edema are compression and elevation.  I have again explained to the patient today that controlling the edema is probably the most important thing we can do to help heal the wound.  I have specifically recommended that they lay down with their legs above the level of the heart for 30 minutes at least twice a day.  I emphasized that if we can not control the edema we will likely not be able to get this wound to heal.   I advised her that if she starts having pain in her foot with leg elevation she should put her leg down.    I have encouraged the patient to continue on their high protein diet to aid in wound healing.   The patient will return to the wound clinic in 3-4 weeks to see me again.        30 minutes spent on the date of the encounter doing chart review, history and exam, documentation and further activities per the note, this time excludes any procedure time      Robert Oliva MD  04/25/2024   11:43 AM   [facility]  Vascular/Wound  [phone]    This note was electronically signed by Robert Oliva MD

## 2024-04-25 NOTE — PROGRESS NOTES
Surgery Scheduled    Confirmed surgery date and instructions with pt. Pt will schedule preop exam.      Surgery/Procedure: Left external iliac artery to posterior tibial artery bypass with cryo artery     CPT: 77138  Special Equipment: C-arm  Length of Surgery: 5 hours  Preoperative physical needed: Yes  Product Rep needed? Yes  Company?  [employer]    Location: [facility]:  [address] [place] [address] (phone: [phone], Fax: [phone])    Date: 6/4/2024    Time: 730 AM    Admission Type: Inpatient    Surgeon: Dr. Gelelr    OR Confirmed & :  Yes with QV on 4/25/2024    Entered on provider calendar:  Yes    Post Op: see appt desk    Wound Vac Needed: No    Home Care Needed: No    Reps Contacted: cryo rep emailed 4/25/24          Blood Thinners Addressed:  pt on [employer] - routing message to nursing    Stress Test Clearance: NO

## 2024-04-26 ENCOUNTER — OFFICE VISIT (OUTPATIENT)
Dept: PALLIATIVE MEDICINE | Facility: OTHER | Age: 69
End: 2024-04-26
Attending: PODIATRIST
Payer: COMMERCIAL

## 2024-04-26 ENCOUNTER — TELEPHONE (OUTPATIENT)
Dept: PALLIATIVE MEDICINE | Facility: OTHER | Age: 69
End: 2024-04-26

## 2024-04-26 ENCOUNTER — PATIENT OUTREACH (OUTPATIENT)
Dept: MIDWIFE SERVICES | Facility: CLINIC | Age: 69
End: 2024-04-26
Payer: COMMERCIAL

## 2024-04-26 VITALS — DIASTOLIC BLOOD PRESSURE: 86 MMHG | HEART RATE: 92 BPM | SYSTOLIC BLOOD PRESSURE: 128 MMHG

## 2024-04-26 DIAGNOSIS — G89.29 CHRONIC INTRACTABLE PAIN: ICD-10-CM

## 2024-04-26 DIAGNOSIS — Z79.891 LONG TERM (CURRENT) USE OF OPIATE ANALGESIC: Primary | ICD-10-CM

## 2024-04-26 DIAGNOSIS — L97.922 ULCER OF LEFT LOWER EXTREMITY WITH FAT LAYER EXPOSED (H): ICD-10-CM

## 2024-04-26 DIAGNOSIS — S91.102A OPEN WOUND OF LEFT GREAT TOE, INITIAL ENCOUNTER: ICD-10-CM

## 2024-04-26 DIAGNOSIS — I70.222 CRITICAL LIMB ISCHEMIA OF LEFT LOWER EXTREMITY (H): ICD-10-CM

## 2024-04-26 LAB
CANNABINOIDS UR QL SCN: NORMAL
CREAT UR-MCNC: 43 MG/DL
ETHANOL UR QL SCN: NORMAL

## 2024-04-26 PROCEDURE — 80365 DRUG SCREENING OXYCODONE: CPT | Performed by: NURSE PRACTITIONER

## 2024-04-26 PROCEDURE — 99417 PROLNG OP E/M EACH 15 MIN: CPT | Performed by: NURSE PRACTITIONER

## 2024-04-26 PROCEDURE — 80307 DRUG TEST PRSMV CHEM ANLYZR: CPT | Performed by: NURSE PRACTITIONER

## 2024-04-26 PROCEDURE — 80373 DRUG SCREENING TRAMADOL: CPT | Performed by: NURSE PRACTITIONER

## 2024-04-26 PROCEDURE — G2211 COMPLEX E/M VISIT ADD ON: HCPCS | Performed by: NURSE PRACTITIONER

## 2024-04-26 PROCEDURE — 99205 OFFICE O/P NEW HI 60 MIN: CPT | Performed by: NURSE PRACTITIONER

## 2024-04-26 PROCEDURE — 99214 OFFICE O/P EST MOD 30 MIN: CPT | Performed by: NURSE PRACTITIONER

## 2024-04-26 RX ORDER — GABAPENTIN 300 MG/1
300 CAPSULE ORAL 3 TIMES DAILY
Qty: 90 CAPSULE | Refills: 0 | Status: SHIPPED | OUTPATIENT
Start: 2024-04-26 | End: 2024-05-16

## 2024-04-26 RX ORDER — HYDROCODONE BITARTRATE AND ACETAMINOPHEN 5; 325 MG/1; MG/1
1 TABLET ORAL 2 TIMES DAILY PRN
Qty: 60 TABLET | Refills: 0 | Status: SHIPPED | OUTPATIENT
Start: 2024-04-26 | End: 2024-07-09

## 2024-04-26 ASSESSMENT — PAIN SCALES - PAIN ENJOYMENT GENERAL ACTIVITY SCALE (PEG)
INTERFERED_ENJOYMENT_LIFE: 6
AVG_PAIN_PASTWEEK: 5
INTERFERED_ENJOYMENT_LIFE: 6
PEG_TOTALSCORE: 5.67
PEG_TOTALSCORE: 5.67
INTERFERED_GENERAL_ACTIVITY: 6
INTERFERED_GENERAL_ACTIVITY: 6
AVG_PAIN_PASTWEEK: 5

## 2024-04-26 ASSESSMENT — ANXIETY QUESTIONNAIRES
6. BECOMING EASILY ANNOYED OR IRRITABLE: NOT AT ALL
GAD7 TOTAL SCORE: 1
4. TROUBLE RELAXING: SEVERAL DAYS
2. NOT BEING ABLE TO STOP OR CONTROL WORRYING: NOT AT ALL
8. IF YOU CHECKED OFF ANY PROBLEMS, HOW DIFFICULT HAVE THESE MADE IT FOR YOU TO DO YOUR WORK, TAKE CARE OF THINGS AT HOME, OR GET ALONG WITH OTHER PEOPLE?: NOT DIFFICULT AT ALL
3. WORRYING TOO MUCH ABOUT DIFFERENT THINGS: NOT AT ALL
GAD7 TOTAL SCORE: 1
7. FEELING AFRAID AS IF SOMETHING AWFUL MIGHT HAPPEN: NOT AT ALL
5. BEING SO RESTLESS THAT IT IS HARD TO SIT STILL: NOT AT ALL
7. FEELING AFRAID AS IF SOMETHING AWFUL MIGHT HAPPEN: NOT AT ALL
1. FEELING NERVOUS, ANXIOUS, OR ON EDGE: NOT AT ALL
IF YOU CHECKED OFF ANY PROBLEMS ON THIS QUESTIONNAIRE, HOW DIFFICULT HAVE THESE PROBLEMS MADE IT FOR YOU TO DO YOUR WORK, TAKE CARE OF THINGS AT HOME, OR GET ALONG WITH OTHER PEOPLE: NOT DIFFICULT AT ALL

## 2024-04-26 ASSESSMENT — PAIN SCALES - GENERAL: PAINLEVEL: SEVERE PAIN (6)

## 2024-04-26 NOTE — PROGRESS NOTES
04/26/24 0908   PEG: A Thee-Item Scale Assessing Pain Intensity and Interference        0 = No pain / No interference    10 = Pain as bad as you can imagine / Completely interferes   What number best describes your pain on average in the past week? 7   What number best describes how, during the past week, pain has interfered with your enjoyment of life? 5   What number best describes how, during the past week, pain has interfered with your general activity? 5   PEG Total Score 5.67

## 2024-04-26 NOTE — TELEPHONE ENCOUNTER
Prior Authorization Retail Medication Request    Medication/Dose: HYDROcodone-acetaminophen (NORCO) 5-325 MG tablet  Diagnosis and ICD code (if different than what is on RX):    Open wound of left great toe, initial encounter [S91.102A]  - Primary      Ulcer of left lower extremity with fat layer exposed (H) [L97.922]            [employer] - [facility] OPEN ACCESS  Subscriber:  Ashanti Aviles  Subscriber ID:[id number]  Relationship:Self  Member:Ashanti Aviles  Member ID:[id number]  LOB:None  Plan year:  1/1/2024 - [place]  Effective dates:  1/1/2023 - [place]  Group number:  [id number]      Pharmacy Information (if different than what is on RX)  Name:  [facility] - [place], [place] - [address] [place] [address]   Phone:  [phone]   Fax:[phone]

## 2024-04-26 NOTE — LETTER
Opioid / Opioid Plus Controlled Substance Agreement    This is an agreement between you and your provider about the safe and appropriate use of controlled substance/opioids prescribed by your care team. Controlled substances are medicines that can cause physical and mental dependence (abuse).    There are strict laws about having and using these medicines. We here at [facility] are committing to working with you in your efforts to get better. To support you in this work, we ll help you schedule regular office appointments for medicine refills. If we must cancel or change your appointment for any reason, we ll make sure you have enough medicine to last until your next appointment.     As a Provider, I will:  Listen carefully to your concerns and treat you with respect.   Recommend a treatment plan that I believe is in your best interest. This plan may involve therapies other than opioid pain medication.   Talk with you often about the possible benefits, and the risk of harm of any medicine that we prescribe for you.   Provide a plan on how to taper (discontinue or go off) using this medicine if the decision is made to stop its use.    As a Patient, I understand that opioid(s):   Are a controlled substance prescribed by my care team to help me function or work and manage my condition(s).   Are strong medicines and can cause serious side effects such as:  Drowsiness, which can seriously affect my driving ability  A lower breathing rate, enough to cause death  Harm to my thinking ability   Depression   Abuse of and addiction to this medicine  Need to be taken exactly as prescribed. Combining opioids with certain medicines or chemicals (such as illegal drugs, sedatives, sleeping pills, and benzodiazepines) can be dangerous or even fatal. If I stop opioids suddenly, I may have severe withdrawal symptoms.  Do not work for all types of pain nor for all patients. If they re not helpful, I may be asked to stop  them.        The risks, benefits and side effects of these medicine(s) were explained to me. I agree that:  I will take part in other treatments as advised by my care team. This may be psychiatry or counseling, physical therapy, behavioral therapy, group treatment or a referral to a specialist.     I will keep all my appointments. I understand that this is part of the monitoring of opioids. My care team may require an office visit for EVERY opioid/controlled substance refill. If I miss appointments or don t follow instructions, my care team may stop my medicine.    I will take my medicines as prescribed. I will not change the dose or schedule unless my care team tells me to. There will be no refills if I run out early.     I may be asked to come to the clinic and complete a urine drug test or complete a pill count at any time. If I don t give a urine sample or participate in a pill count, the care team may stop my medicine.    I will only receive prescriptions from this clinic for chronic pain. If I am treated by another provider for acute pain issues, I will tell them that I am taking opioid pain medication for chronic pain and that I have a treatment agreement with this provider. I will inform my [facility] care team within one business day if I am given a prescription for any pain medication by another healthcare provider. My [facility] care team can contact other providers and pharmacists about my use of any medicines.    It is up to me to make sure that I don t run out of my medicines on weekends or holidays. If my care team is willing to refill my opioid prescription without a visit, I must request refills only during office hours. Refills may take up to 3 business days to process. I will use one pharmacy to fill all my opioid and other controlled substance prescriptions. I will notify the clinic about any changes to my insurance or medication availability.    I am responsible for my  prescriptions. If the medicine/prescription is lost, stolen or destroyed, it will not be replaced. I also agree not to share controlled substance medicines with anyone.    I am aware I should not use any illegal or recreational drugs. I agree not to drink alcohol unless my care team says I can.       If I enroll in the [place] [facility] program, I will tell my care team prior to my next refill.     I will tell my care team right away if I become pregnant, have a new medical problem treated outside of my regular clinic, or have a change in my medications.    I understand that this medicine can affect my thinking, judgment and reaction time. Alcohol and drugs affect the brain and body, which can affect the safety of my driving. Being under the influence of alcohol or drugs can affect my decision-making, behaviors, personal safety, and the safety of others. Driving while impaired (DWI) can occur if a person is driving, operating, or in physical control of a car, motorcycle, boat, snowmobile, ATV, motorbike, off-road vehicle, or any other motor vehicle ([place] Statute 169A.20). I understand the risk if I choose to drive or operate any vehicle or machinery.    I understand that if I do not follow any of the conditions above, my prescriptions or treatment may be stopped or changed.          Opioids  What You Need to Know    What are opioids?   Opioids are pain medicines that must be prescribed by a doctor. They are also known as narcotics.     Examples are:   morphine (MS Contin, Karen)  oxycodone (Oxycontin)  oxycodone and acetaminophen (Percocet)  hydrocodone and acetaminophen (Vicodin, Norco)   fentanyl patch (Duragesic)   hydromorphone (Dilaudid)   methadone  codeine (Tylenol #3)     What do opioids do well?   Opioids are best for severe short-term pain such as after a surgery or injury. They may work well for cancer pain. They may help some people with long-lasting (chronic) pain.     What do opioids NOT do  well?   Opioids never get rid of pain entirely, and they don t work well for most patients with chronic pain. Opioids don t reduce swelling, one of the causes of pain.                                    Other ways to manage chronic pain and improve function include:     Treat the health problem that may be causing pain  Anti-inflammation medicines, which reduce swelling and tenderness, such as ibuprofen (Advil, Motrin) or naproxen (Aleve)  Acetaminophen (Tylenol)  Antidepressants and anti-seizure medicines, especially for nerve pain  Topical treatments such as patches or creams  Injections or nerve blocks  Chiropractic or osteopathic treatment  Acupuncture, massage, deep breathing, meditation, visual imagery, aromatherapy  Use heat or ice at the pain site  Physical therapy   Exercise  Stop smoking  Take part in therapy       Risks and side effects     Talk to your doctor before you start or decide to keep taking opioids. Possible side effects include:    Lowering your breathing rate enough to cause death  Overdose, including death, especially if taking higher than prescribed doses  Worse depression symptoms; less pleasure in things you usually enjoy  Feeling tired or sluggish  Slower thoughts or cloudy thinking  Being more sensitive to pain over time; pain is harder to control  Trouble sleeping or restless sleep  Changes in hormone levels (for example, less testosterone)  Changes in sex drive or ability to have sex  Constipation  Unsafe driving  Itching and sweating  Dizziness  Nausea, throwing up and dry mouth    What else should I know about opioids?    Opioids may lead to dependence, tolerance, or addiction.    Dependence means that if you stop or reduce the medicine too quickly, you will have withdrawal symptoms. These include loose poop (diarrhea), jitters, flu-like symptoms, nervousness and tremors. Dependence is not the same as addiction.                     Tolerance means needing higher doses over time to  get the same effect. This may increase the chance of serious side effects.    Addiction is when people improperly use a substance that harms their body, their mind or their relations with others. Use of opiates can cause a relapse of addiction if you have a history of drug or alcohol abuse.    People who have used opioids for a long time may have a lower quality of life, worse depression, higher levels of pain and more visits to doctors.    You can overdose on opioids. Take these steps to lower your risk of overdose:    Recognize the signs:  Signs of overdose include decrease or loss of consciousness (blackout), slowed breathing, trouble waking up and blue lips. If someone is worried about overdose, they should call 911.    Talk to your doctor about Narcan (naloxone).   If you are at risk for overdose, you may be given a prescription for Narcan. This medicine very quickly reverses the effects of opioids.   If you overdose, a friend or family member can give you Narcan while waiting for the ambulance. They need to know the signs of overdose and how to give Narcan.     Don't use alcohol or street drugs.   Taking them with opioids can cause death.    Do not take any of these medicines unless your doctor says it s OK. Taking these with opioids can cause death:  Benzodiazepines, such as lorazepam (Ativan), alprazolam (Xanax) or diazepam (Valium)  Muscle relaxers, such as cyclobenzaprine (Flexeril)  Sleeping pills like zolpidem (Ambien)   Other opioids      How to keep you and other people safe while taking opioids:    Never share your opioids with others.  Opioid medicines are regulated by the Drug Enforcement Agency (MATIAS). Selling or sharing medications is a criminal act.    2. Be sure to store opioids in a secure place, locked up if possible. Young children can easily swallow them and overdose.    3. When you are traveling with your medicines, keep them in the original bottles. If you use a pill box, be sure you also  carry a copy of your medicine list from your clinic or pharmacy.    4. Safe disposal of opioids    Most pharmacies have places to get rid of medicine, called disposal kiosks. Medicine disposal options are also available in every [facility]. Search your county and  medication disposal  to find more options. You can find more details at:  https://www.pca.Counts include 234 beds at the [facility].[place]./living-green/managing-unwanted-medications     I agree that my provider, clinic care team, and pharmacy may work with any city, state or federal law enforcement agency that investigates the misuse, sale, or other diversion of my controlled medicine. I will allow my provider to discuss my care with, or share a copy of, this agreement with any other treating provider, pharmacy or emergency room where I receive care.    I have read this agreement and have asked questions about anything I did not understand.    _______________________________________________________  Patient Signature - Ashanti VELEZ Quilling _____________________                   Date     _______________________________________________________  Provider Signature - DOROTA Jimenez CNP   _____________________                   Date     _______________________________________________________  Witness Signature (required if provider not present while patient signing)   _____________________                   Date

## 2024-04-26 NOTE — PATIENT INSTRUCTIONS
Plan on initial consult 04/26/2024:  We will have her do a UDS and sign an opioid agreement today.    Continue high protein diet.    Diagnostics: none    Medications:  Start Hydrocodone/acet 5/325mg 1 tab BID PRN severe pain  Start Gabapentin 300mg 1 tab q hs times 3 days then twice daily for 3 days then three times daily.    Therapies: none    Interventions:  none    Follow up:   Follow-up in clinic in 1 month.      DOROTA Anderson, RN, CNP, FNP  [facility]        ----------------------------------------------------------------  Clinic Number:  [phone]   Call with any questions about your care and for scheduling assistance.   Calls are returned Monday through Friday between 8 AM and 4:30 PM. We usually get back to you within 2 business days depending on the issue/request.    If we are prescribing your medications:  For opioid medication refills, call the clinic or send a [employer] message 7 days in advance.  Please include:  Name of requested medication  Name of the pharmacy.  For non-opioid medications, call your pharmacy directly to request a refill. Please allow 3-4 days to be processed.   Per [place] State Law:  All controlled substance prescriptions must be filled within 30 days of being written.    For those controlled substances allowing refills, pickup must occur within 30 days of last fill.      We believe regular attendance is key to your success in our program!    Any time you are unable to keep your appointment we ask that you call us at least 24 hours in advance to cancel.This will allow us to offer the appointment time to another patient.   Multiple missed appointments may lead to dismissal from the clinic.

## 2024-04-29 NOTE — PROGRESS NOTES
Spoke with patient, she is not currently taking eliquis and was told by Dr Geller to stop.   The only blood thinner she is currently taking is Plavix.

## 2024-04-30 NOTE — TELEPHONE ENCOUNTER
Returned call to Omar, pharmacist at [facility] out-pt pharmacy to inform we are still waiting on the PA decision, that the request was sent 4/26..

## 2024-04-30 NOTE — TELEPHONE ENCOUNTER
[facility] Call Center    Phone Message    May a detailed message be left on voicemail: yes     Reason for Call: Other: Omar calling from pharmacy wondering the status of the prior auth.  Please call him back to advise.      Action Taken: Message routed to:  Other: [facility]    Travel Screening: Not Applicable

## 2024-05-01 LAB
OXYCODONE UR CFM-MCNC: 136 NG/ML
OXYCODONE/CREAT UR: 316 NG/MG {CREAT}
OXYMORPHONE UR CFM-MCNC: 343 NG/ML
OXYMORPHONE/CREAT UR: 798 NG/MG {CREAT}

## 2024-05-11 NOTE — TELEPHONE ENCOUNTER
Retail Pharmacy Prior Authorization Team   Phone: [phone]    PA Initiation    Medication: HYDROCODONE-ACETAMINOPHEN 5-325 MG PO TABS  Insurance Company: [employer] - Phone [phone] Fax [phone]  Pharmacy Filling the Rx: [facility] - [place], [place] - [address] [place] [address]  Filling Pharmacy Phone: [phone]  Filling Pharmacy Fax:    Start Date: 5/11/2024

## 2024-05-13 NOTE — TELEPHONE ENCOUNTER
Prior Authorization Approval    Medication: HYDROCODONE-ACETAMINOPHEN 5-325 MG PO TABS  Authorization Effective Date: 4/11/2024  Authorization Expiration Date: 6/10/2024  Approved Dose/Quantity:   Reference #:     Insurance Company: [employer] - Phone [phone] Fax [phone]  Expected CoPay: $    CoPay Card Available:      Financial Assistance Needed:   Which Pharmacy is filling the prescription: [facility] - [place], [place] - [address] [place] [address]  Pharmacy Notified: Yes  Patient Notified: **Instructed pharmacy to notify patient when script is ready to /ship.**

## 2024-05-16 DIAGNOSIS — S91.102A OPEN WOUND OF LEFT GREAT TOE, INITIAL ENCOUNTER: ICD-10-CM

## 2024-05-16 DIAGNOSIS — G89.29 CHRONIC INTRACTABLE PAIN: ICD-10-CM

## 2024-05-16 DIAGNOSIS — L97.922 ULCER OF LEFT LOWER EXTREMITY WITH FAT LAYER EXPOSED (H): ICD-10-CM

## 2024-05-16 RX ORDER — GABAPENTIN 300 MG/1
300 CAPSULE ORAL 3 TIMES DAILY
Qty: 90 CAPSULE | Refills: 0 | Status: SHIPPED | OUTPATIENT
Start: 2024-05-16 | End: 2024-08-22

## 2024-05-16 NOTE — TELEPHONE ENCOUNTER
Received fax from pharmacy requesting refill(s) for     gabapentin (NEURONTIN) 300 MG capsule    Date last filled 4/26/24    Last Appt Date:4/26/24    Next Appt scheduled: 5/29/24    Pharmacy:   [facility] - [place], [place] - [address] [place] [address]     Pending Prescriptions:                       Disp   Refills    gabapentin (NEURONTIN) 300 MG capsule     90 cap*0            Sig: Take 1 capsule (300 mg) by mouth 3 times daily           Start with one capsule at bedtime and increase           dose as instructed

## 2024-05-17 NOTE — PATIENT INSTRUCTIONS
Wound care supplies were ordered today through [employer] and if you are not receiving your supplies or have a question on your bill please contact Kennedi Larkin at [phone]. Please allow 2-5 business days for delivery of supplies. You may get a call from a [phone] if there are additional information Ai needs. It is important to  or return their call. PLEASE NOTE: If you need to return your supplies, you MUST call customer service within 15 days of delivery date.         Wound Care Instructions    DAILY and as needed, Cleanse your left leg wound(s) with Normal saline.    Pat Dry with non-sterile gauze    Primary Dressing: Apply aquacel AG into/onto the wounds    Secondary dressing: Cover with zetuvit silicone border    Ok to elevate your legs. If you start to have pain in the legs while elevated, please put your legs back down    It is not ok to get your wound wet in the bath or shower    If for some reason you are not able to get your dressing(s) changed as outlined above (due to illness, lack of supplies, lack of help) please do the following: remove old, soiled dressings; wash the wounds with saline; pat dry; apply ABD pad or other absorbant pad and secure with rolled gauze; avoid tape directly on your skin; Call the clinic as soon as possible to let us know what the current issues are in receiving wound care [phone].      SEEK MEDICAL CARE IF:  You have an increase in swelling, pain, or redness around the wound.  You have an increase in the amount of pus coming from the wound.  There is a bad smell coming from the wound.  The wound appears to be worsening/enlarging  You have a fever greater than 101.5 F      It is ok to continue current wound care treatment/products for the next 2-3 days until new wound care supplies are ordered and arrive. If longer than this please contact our office at [phone].    If you have a 2 layer or 4 layer compression wrap on these are safe to have on for ONLY 7  days. If for some reason you are not able to get the wrap(s) changed (due to illness; lack of supplies, lack of help, lack of transportation) please do the following: unwrap the old 2 or 4 layer compression wrap; avoid using scissors as you could cut your skin and cause wounds; use tubular compression when available. Call to reschedule your home care or clinic visit appointment as soon as possible.    Please NOTE: if you are 15 minutes late to your clinic appointment you will have to be rescheduled. Please call our clinic as soon as possible if you know you will not be able to get to your appointment at [phone].    If you fail to show up to 3 scheduled clinic appointments you will be dismissed from our clinic.              We want to hear from you!  In the next few weeks, you should receive a call or email to complete a survey about your visit at [facility]. Please help us improve your appointment experience by letting us know how we did today. We strive to make your experience good and value any ways in which we could do better.      We value your input and suggestions.    Thank you for choosing the [facility]!

## 2024-05-22 ENCOUNTER — OFFICE VISIT (OUTPATIENT)
Dept: VASCULAR SURGERY | Facility: CLINIC | Age: 69
End: 2024-05-22
Attending: PODIATRIST
Payer: COMMERCIAL

## 2024-05-22 VITALS — OXYGEN SATURATION: 100 % | HEART RATE: 95 BPM | DIASTOLIC BLOOD PRESSURE: 80 MMHG | SYSTOLIC BLOOD PRESSURE: 131 MMHG

## 2024-05-22 DIAGNOSIS — L97.524 ULCER OF GREAT TOE, LEFT, WITH NECROSIS OF BONE (H): Primary | ICD-10-CM

## 2024-05-22 PROCEDURE — 11044 DBRDMT BONE 1ST 20 SQ CM/<: CPT | Performed by: PODIATRIST

## 2024-05-22 ASSESSMENT — PAIN SCALES - GENERAL: PAINLEVEL: MODERATE PAIN (5)

## 2024-05-22 NOTE — PATIENT INSTRUCTIONS
Wound care supplies were ordered today through [employer] and if you are not receiving your supplies or have a question on your bill please contact Kennedi Larkin at [phone]. Please allow 2-5 business days for delivery of supplies. You may get a call from a [phone] # if there are additional information Ai needs. It is important to  or return their call. PLEASE NOTE: If you need to return your supplies, you MUST call customer service within 15 days of delivery date.         Wound Care Instructions    DAILY and as needed, Cleanse your left leg and foot wound(s) with Normal saline.    Pat Dry with non-sterile gauze    Primary Dressing: Apply aquacel AG into/onto the wounds    Secondary dressing: Cover with zetuvit silicone border    Ok to elevate your legs. If you start to have pain in the legs while elevated, please put your legs back down    It is not ok to get your wound wet in the bath or shower    High Protein Foods  When you have an open ulcer, your bodies protein needs are much higher, so it is recommended eat good sources of protein or take a protein supplement!    Protein Supplements  -Premier Protein  -Ensure  -Boost  -Glucerna, if diabetic    Chicken  -Chicken breast, 3.5oz.-30 grams protein  -Chicken meat, cooked, 4 oz.    Beef  -Hamburger mela, 4 oz-28 grams protein  -Steak, 6 oz-42 grams  -Most cuts of beef- 7 grams of protein per ounce    Fish  -Most fish fillets or steaks are about 22 grams of protein for 3 1/2 oz(100 grams) of cooked fish, or 6 grams per ounce  -Tuna, 6 oz can-40 grams of protein    Pork  -Pork chop, average-22 grams protein  -Pork loin or tenderloin, 4 oz.-29 grams  -Ham, 3oz serving- 19 grams  -Raphael, 1 slice-3 grams    Eggs and Dairy  -Egg, large-7 grams  -Milk, 1 cup-8 grams  -Cottage cheese, 1/2 cup-15 grams  -Greek yogurt, 1 cup-usually 8-12 grams, check label    Beans  -Soy milk, 1 cup-6-10 grams  -Most beans(black, willis, lentils, etc.) about 7-10 grams protein per  half cup of cooked beans    Nuts and Seeds  -Peanut butter, 2 Tablespoons- 8 grams protein  -Almonds, 1/4 cup- 8 grams  -Peanuts, 1/4 cup-9 grams  -Sunflower seeds, 1/4 cup- 6 grams        If for some reason you are not able to get your dressing(s) changed as outlined above (due to illness, lack of supplies, lack of help) please do the following: remove old, soiled dressings; wash the wounds with saline; pat dry; apply ABD pad or other absorbant pad and secure with rolled gauze; avoid tape directly on your skin; Call the clinic as soon as possible to let us know what the current issues are in receiving wound care [phone].      SEEK MEDICAL CARE IF:  You have an increase in swelling, pain, or redness around the wound.  You have an increase in the amount of pus coming from the wound.  There is a bad smell coming from the wound.  The wound appears to be worsening/enlarging  You have a fever greater than 101.5 F      It is ok to continue current wound care treatment/products for the next 2-3 days until new wound care supplies are ordered and arrive. If longer than this please contact our office at [phone].    If you have a 2 layer or 4 layer compression wrap on these are safe to have on for ONLY 7 days. If for some reason you are not able to get the wrap(s) changed (due to illness; lack of supplies, lack of help, lack of transportation) please do the following: unwrap the old 2 or 4 layer compression wrap; avoid using scissors as you could cut your skin and cause wounds; use tubular compression when available. Call to reschedule your home care or clinic visit appointment as soon as possible.    Please NOTE: if you are 15 minutes late to your clinic appointment you will have to be rescheduled. Please call our clinic as soon as possible if you know you will not be able to get to your appointment at [phone].    If you fail to show up to 3 scheduled clinic appointments you will be dismissed from our  clinic.              We want to hear from you!  In the next few weeks, you should receive a call or email to complete a survey about your visit at [facility]. Please help us improve your appointment experience by letting us know how we did today. We strive to make your experience good and value any ways in which we could do better.      We value your input and suggestions.    Thank you for choosing the [facility]!

## 2024-05-22 NOTE — PROGRESS NOTES
FOOT AND ANKLE SURGERY/PODIATRY Progress Note      ASSESSMENT:   Ulceration left hallux into bone   Osteomyelitis left hallux  PAD      TREATMENT:  -I discussed with the patient that the ulceration on the left hallux is stable without signs of infection.    -We again reviewed proposed treatment for attempted salvage of the left hallux to include resection of nonviable tissue with bone biopsy for aerobic/anaerobic culture and use of wound VAC postoperatively.  We also reviewed that salvage may not be an option based on blood flow and amputation of the digit or partial foot or limb may be necessary.    -I have asked my staff to reach out to vascular surgery to discuss left groin pain symptoms with the patient     -After discussion of risk factors, nursing staff removed dressing, cleansed wound and consent obtained 2% Lidocaine HCL jelly was applied, under clean conditions, the left hallux ulceration(s) were debrided using currette.  Devitalized and nonviable tissue, along with any fibrin and slough, was removed to improve granulation tissue formation, stimulate wound healing, decrease overall bacteria load, disrupt biofilm formation and decrease edge senescence. Wound drainage was scant No. Total excisional debridement was 1.4 sq cm into the bone with a depth of 0.5 cm.   Ulcers were improved afterwards and .  Measures were as noted on the flow sheet.  Aquaphor with Primapore applied today.  Which she will continue to apply.    -She will follow-up with me 3 to 4 weeks after left lower extremity bypass procedure with vascular surgery.  I will plan to discuss further with vascular surgery after the bypass has been completed.    Rene Ordaz DPM  [facility] [place]      HPI: Ashanti Aviles was seen again today for a left hallux ulceration doing well today.  She does complain of pain in the left foot and also complains of pain in her groin which has been ongoing and does feel like a shooting  nerve pain to her.  She is scheduled for bypass with vascular surgery early next month.    Past Medical History:   Diagnosis Date    Benign gastric polyp     Chronic toe ulcer (H)     Chronic ulcer of great toe of left foot with necrosis of muscle (H)     Diabetes mellitus (H)     Gastroesophageal reflux disease     Hard to intubate 02/15/2024    History of colonic polyps     HLD (hyperlipidemia)     Hypertension     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/29/2013    Nicotine addiction     OAG (open angle glaucoma)     PVD (peripheral vascular disease) (H24)     Reflux esophagitis     Retinopathy     Toe ulcer (H)     Tubular adenoma     Yeast vaginitis        Past Surgical History:   Procedure Laterality Date    AMPUTATE TOE(S) Right 02/18/2023    Procedure: Amputation fourth toe right foot;  Surgeon: Benjamin Diez DPM;  Location: [facility] OR    ANGIOGRAM Left 2/15/2024    Procedure: COMPLETION OF ANGIOGRAM, LEFT COMMON ILIAC ARTERY STENT PLACEMENT;  Surgeon: Dyan Geller MD;  Location: [facility] OR    ANUS SURGERY      BIOPSY CERVICAL, LOCAL EXCISION, SINGLE/MULTIPLE      COLONOSCOPY N/A 09/11/2020    Procedure: EXAM UNDER ANESTHESIA, HIGH RESOLUTION ANOSCOPY INTRA OP;  Surgeon: Preeti Sheehan MD;  Location: [facility];  Service: Gastroenterology    COLONOSCOPY N/A 12/14/2020    Procedure: EXAM UNDER ANESTHESIA WITH HIGH RESOLUTION ANOSCOPY;  Surgeon: Preeti Sheehan MD;  Location: [facility] OR;  Service: General    COLONOSCOPY N/A 06/15/2021    Procedure: EXAM UNDER ANESTHESIA WITH HIGH RESOLUTION ANOSCOPY, BIOPSY, FULGURATION;  Surgeon: Preeti Sheehan MD;  Location: [facility];  Service: Gastroenterology    COLONOSCOPY N/A 4/15/2024    Procedure: COLONOSCOPY, WITH POLYPECTOMY;  Surgeon: Shen Andres MD;  Location: [facility] OR    ENDARTERECTOMY FEMORAL Left 05/17/2023    Procedure: LEFT FEMORAL ENDARTERECTOMY WITH RETROGRADE  ILIAC STENT;  Surgeon: Dyan Geller MD;  Location: [facility] OR    EXAM UNDER ANESTHESIA ANUS N/A 09/14/2021    Procedure: EXAM UNDER ANESTHESIA WITH HIGH RESOLUTION ANOSCOPY;  Surgeon: Preeti Sheehan MD;  Location: [facility] OR    FEMORAL ARTERY - TIBIAL ARTERY BYPASS GRAFT Left 09/08/2023    Procedure: CREATION, BYPASS, ARTERIAL, FEMORAL TO TIBIAL, LEFT;  Surgeon: Dyan Geller MD;  Location: [facility] OR    FEMORAL ARTERY - TIBIAL ARTERY BYPASS GRAFT Left 2/15/2024    Procedure: CREATION, BYPASS, ARTERIAL, FEMORAL TO TIBIAL,;  Surgeon: Dyan Geller MD;  Location: [facility] OR    INCISION AND DRAINAGE FOOT, COMBINED Left 11/27/2023    Procedure: INCISION AND DRAINAGE, LEFT HALLUX;  Surgeon: Rene Ordaz DPM;  Location: [facility] OR    IR LOWER EXTREMITY ANGIOGRAM LEFT  03/23/2023    IR LOWER EXTREMITY ANGIOGRAM RIGHT  02/16/2023    IR THROMBOLYSIS ARTERIAL INFUSION INITIAL DAY  10/09/2023    LEEP TX, CERVICAL      2005    TUBAL LIGATION         Allergies   Allergen Reactions    Simvastatin Muscle Pain (Myalgia)     Muscle pain    Victoza [Liraglutide] Other (See Comments)     Binds bowels    Penicillins Unknown     Childhood rxn         Current Outpatient Medications:     acetaminophen (ACETAMINOPHEN 8 HOUR) 650 MG CR tablet, Take 1,300 mg by mouth every 8 hours as needed for mild pain or fever, Disp: , Rfl:     amLODIPine (NORVASC) 5 MG tablet, Take 5 mg by mouth daily, Disp: , Rfl:     bisacodyl (DULCOLAX) 5 MG EC tablet, Take 2 tablets at 3 pm the day before your procedure. If your procedure is before 11 am, take 2 additional tablets at 11 pm. If your procedure is after 11 am, take 2 additional tablets at 6 am. For additional instructions refer to your colonoscopy prep instructions., Disp: 4 tablet, Rfl: 0    clopidogrel (PLAVIX) 75 MG tablet, Take 75 mg by mouth daily, Disp: , Rfl:     Continuous Blood Gluc Sensor (FREESTYLE PRINCESS 14  DAY SENSOR) MISC, , Disp: , Rfl:     empagliflozin (JARDIANCE) 25 MG TABS tablet, Take 1 tablet (25 mg) by mouth daily, Disp: 90 tablet, Rfl: 1    ezetimibe (ZETIA) 10 MG tablet, Take 10 mg by mouth daily, Disp: , Rfl:     folic acid (FOLVITE) 1 MG tablet, Take 1 tablet (1 mg) by mouth daily, Disp: 90 tablet, Rfl: 0    gabapentin (NEURONTIN) 300 MG capsule, Take 1 capsule (300 mg) by mouth 3 times daily Start with one capsule at bedtime and increase dose as instructed, Disp: 90 capsule, Rfl: 0    hydrochlorothiazide (HYDRODIURIL) 25 MG tablet, Take 25 mg by mouth daily, Disp: , Rfl:     HYDROcodone-acetaminophen (NORCO) 5-325 MG tablet, Take 1 tablet by mouth 2 times daily as needed for severe pain (max of 2 tablets per day), Disp: 60 tablet, Rfl: 0    Insulin Degludec (TRESIBA) 100 UNIT/ML SOLN, Inject 15 Units Subcutaneous 2 times daily, Disp: , Rfl:     insulin glargine (LANTUS PEN) 100 UNIT/ML pen, , Disp: , Rfl:     linagliptin (TRADJENTA) 5 MG TABS tablet, Take 5 mg by mouth daily, Disp: , Rfl:     lisinopril (ZESTRIL) 40 MG tablet, Take 40 mg by mouth daily, Disp: , Rfl:     metFORMIN (GLUCOPHAGE XR) 500 MG 24 hr tablet, Take 1,000 mg by mouth daily, Disp: , Rfl:     nicotine (NICODERM CQ) 21 MG/24HR 24 hr patch, Place 1 patch onto the skin every 24 hours, Disp: 30 patch, Rfl: 3    omeprazole (PRILOSEC) 20 MG DR capsule, Take 20 mg by mouth daily as needed, Disp: , Rfl:     polyethylene glycol (GOLYTELY) 236 g suspension, The night before the exam at 6 pm drink an 8-ounce glass every 15 minutes until the jug is half empty. If you arrive before 11 AM: Drink the other half of the Golytely jug at 11 PM night before procedure. If you arrive after 11 AM: Drink the other half of the Golytely jug at 6 AM day of procedure. For additional instructions refer to your colonoscopy prep instructions., Disp: 4000 mL, Rfl: 0    UNABLE TO FIND, Take 2 capsules by mouth daily MEDICATION NAME: Gui Doe, Disp: , Rfl:     UNIFINJIN  PENTIPS 31G X 5 MM miscellaneous, , Disp: , Rfl:     vitamin B complex with vitamin C (STRESS TAB) tablet, Take 1 tablet by mouth daily, Disp: 90 tablet, Rfl: 0    Review of Systems - 10 point Review of Systems is negative except for ulceration left hallux which is noted in HPI.      OBJECTIVE:  /80   Pulse 95   SpO2 100%   General appearance: Patient is alert and fully cooperative with history & exam.  No sign of distress is noted during the visit.    Vascular: Dorsalis pedis non-palpableLeft.  Dermatologic:    VASC Wound left hallux (Active)   Pre Size Length 1 05/22/24 0700   Pre Size Width 1.4 05/22/24 0700   Pre Size Depth 0.5 05/22/24 0700   Pre Total Sq cm 1.4 05/22/24 0700       VASC Wound Left lower leg wound (Active)   Pre Size Length 4.5 04/25/24 1100   Pre Size Width 2 04/25/24 1100   Pre Size Depth 0.6 04/25/24 1100   Pre Total Sq cm 9 04/25/24 1100   Post Size Length 3.2 04/04/24 0800   Post Size Width 2 04/04/24 0800   Post Size Depth 0.8 04/04/24 0800   Post Total Sq cm 6.4 04/04/24 0800   Undermined 0.5cm 4 to 12 oclock 03/25/24 1208       Incision/Surgical Site 02/15/24 Anterior;Left Groin (Active)       Incision/Surgical Site 02/15/24 Left;Medial Calf (Active)   Ulceration dorsal distal left hallux probes to bone with exposed distal phalanx, no erythema.  Neurologic: Intact to light touch Left.  Musculoskeletal: Contracted digits noted Left.      Picture:

## 2024-05-23 ENCOUNTER — OFFICE VISIT (OUTPATIENT)
Dept: VASCULAR SURGERY | Facility: CLINIC | Age: 69
End: 2024-05-23
Attending: FAMILY MEDICINE
Payer: COMMERCIAL

## 2024-05-23 VITALS — SYSTOLIC BLOOD PRESSURE: 133 MMHG | HEART RATE: 88 BPM | DIASTOLIC BLOOD PRESSURE: 78 MMHG | OXYGEN SATURATION: 100 %

## 2024-05-23 DIAGNOSIS — L97.922 ULCER OF LEFT LOWER EXTREMITY WITH FAT LAYER EXPOSED (H): Primary | ICD-10-CM

## 2024-05-23 DIAGNOSIS — R60.0 LOWER EXTREMITY EDEMA: ICD-10-CM

## 2024-05-23 PROCEDURE — 99215 OFFICE O/P EST HI 40 MIN: CPT | Performed by: FAMILY MEDICINE

## 2024-05-23 PROCEDURE — 99214 OFFICE O/P EST MOD 30 MIN: CPT | Performed by: FAMILY MEDICINE

## 2024-05-23 RX ORDER — LIDOCAINE 50 MG/G
OINTMENT TOPICAL DAILY PRN
Status: DISCONTINUED | OUTPATIENT
Start: 2024-05-23 | End: 2024-07-17

## 2024-05-23 RX ADMIN — LIDOCAINE: 50 OINTMENT TOPICAL at 11:18

## 2024-05-23 ASSESSMENT — PAIN SCALES - GENERAL: PAINLEVEL: MODERATE PAIN (5)

## 2024-05-23 NOTE — PROGRESS NOTES
Wound Clinic Note         Visit date: 05/23/2024       Cheif Complaint:     Ashanti Aviles is a 68 year old  female had concerns including Wound Check.  The patient has lower extremity edema and a left leg ulcer         HISTORY OF PRESENT ILLNESS:    Ashanti Aviles reports the wound has been present since mid February 2024.  The wound began after a recent surgery and the incision did not heal normally.   She has critical limb ischemia and has undergone 2 left leg artery bypass surgeries, the most recent on February 15, 2024.  Postoperatively a portion of the incision dehisced leading to the current wound.  Unfortunately both of the arterial bypasses have gone down.  She is currently undergoing a hypercoagulability workup but is planned to undergo another bypass surgery in May.        The patient has been bandaging the wound with Aquacel and a silicone adhesive changed once a day.  There has been Moderate drainage from the wound.       The pateint denies fevers or chills.  They report the pain from the wound has been 0/10 and has remained about the same recently.      The patient reports propping up their legs occasionally during the day, but they do not elevate their legs above the level of their heart.     Today the patient reports maintaining a high protein diet, but has not been taking protein supplements lately.        The patient denies a history of smoking or chronic steroid use.  The patient confirms having diabetes and reports the blood sugars are well controlled.         The patient has not had any symptoms of infection relating to the wound recently and is not currently on antibiotics.       Problem List:   Past Medical History:   Diagnosis Date    Benign gastric polyp     Chronic toe ulcer (H)     Chronic ulcer of great toe of left foot with necrosis of muscle (H)     Diabetes mellitus (H)     Gastroesophageal reflux disease     Hard to intubate 02/15/2024    History of colonic polyps     HLD  (hyperlipidemia)     Hypertension     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/29/2013    Nicotine addiction     OAG (open angle glaucoma)     PVD (peripheral vascular disease) (H24)     Reflux esophagitis     Retinopathy     Toe ulcer (H)     Tubular adenoma     Yeast vaginitis              Family Hx: family history includes Breast Cancer in her paternal aunt; Colon Cancer in her mother; Diabetes Type 2  in her father; Hypertension in her father and mother; Hyperthyroidism in her sister; Kidney failure in her father.       Surgical Hx:   Past Surgical History:   Procedure Laterality Date    AMPUTATE TOE(S) Right 02/18/2023    Procedure: Amputation fourth toe right foot;  Surgeon: Benjamin Diez DPM;  Location: [facility] OR    ANGIOGRAM Left 2/15/2024    Procedure: COMPLETION OF ANGIOGRAM, LEFT COMMON ILIAC ARTERY STENT PLACEMENT;  Surgeon: Dyan Geller MD;  Location: [facility]    ANUS SURGERY      BIOPSY CERVICAL, LOCAL EXCISION, SINGLE/MULTIPLE      COLONOSCOPY N/A 09/11/2020    Procedure: EXAM UNDER ANESTHESIA, HIGH RESOLUTION ANOSCOPY INTRA OP;  Surgeon: Preeti Sheehan MD;  Location: [facility];  Service: Gastroenterology    COLONOSCOPY N/A 12/14/2020    Procedure: EXAM UNDER ANESTHESIA WITH HIGH RESOLUTION ANOSCOPY;  Surgeon: Preeti Sheehan MD;  Location: [facility] OR;  Service: General    COLONOSCOPY N/A 06/15/2021    Procedure: EXAM UNDER ANESTHESIA WITH HIGH RESOLUTION ANOSCOPY, BIOPSY, FULGURATION;  Surgeon: Preeti Sheehan MD;  Location: [facility] OR;  Service: Gastroenterology    COLONOSCOPY N/A 4/15/2024    Procedure: COLONOSCOPY, WITH POLYPECTOMY;  Surgeon: Shen Andres MD;  Location: [facility] OR    ENDARTERECTOMY FEMORAL Left 05/17/2023    Procedure: LEFT FEMORAL ENDARTERECTOMY WITH RETROGRADE ILIAC STENT;  Surgeon: Dyan Geller MD;  Location: [facility] OR    EXAM UNDER ANESTHESIA ANUS N/A 09/14/2021     Procedure: EXAM UNDER ANESTHESIA WITH HIGH RESOLUTION ANOSCOPY;  Surgeon: Preeti Sheehan MD;  Location: [facility]    FEMORAL ARTERY - TIBIAL ARTERY BYPASS GRAFT Left 09/08/2023    Procedure: CREATION, BYPASS, ARTERIAL, FEMORAL TO TIBIAL, LEFT;  Surgeon: Dyan Geller MD;  Location: [facility] OR    FEMORAL ARTERY - TIBIAL ARTERY BYPASS GRAFT Left 2/15/2024    Procedure: CREATION, BYPASS, ARTERIAL, FEMORAL TO TIBIAL,;  Surgeon: Dyan Geller MD;  Location: [facility] OR    INCISION AND DRAINAGE FOOT, COMBINED Left 11/27/2023    Procedure: INCISION AND DRAINAGE, LEFT HALLUX;  Surgeon: Rene Ordaz DPM;  Location: [facility] OR    IR LOWER EXTREMITY ANGIOGRAM LEFT  03/23/2023    IR LOWER EXTREMITY ANGIOGRAM RIGHT  02/16/2023    IR THROMBOLYSIS ARTERIAL INFUSION INITIAL DAY  10/09/2023    LEEP TX, CERVICAL      2005    TUBAL LIGATION            Allergies:    Allergies   Allergen Reactions    Simvastatin Muscle Pain (Myalgia)     Muscle pain    Victoza [Liraglutide] Other (See Comments)     Binds bowels    Penicillins Unknown     Childhood rxn              Medication History:    Current Outpatient Medications   Medication Sig Dispense Refill    acetaminophen (ACETAMINOPHEN 8 HOUR) 650 MG CR tablet Take 1,300 mg by mouth every 8 hours as needed for mild pain or fever      amLODIPine (NORVASC) 5 MG tablet Take 5 mg by mouth daily      bisacodyl (DULCOLAX) 5 MG EC tablet Take 2 tablets at 3 pm the day before your procedure. If your procedure is before 11 am, take 2 additional tablets at 11 pm. If your procedure is after 11 am, take 2 additional tablets at 6 am. For additional instructions refer to your colonoscopy prep instructions. 4 tablet 0    clopidogrel (PLAVIX) 75 MG tablet Take 75 mg by mouth daily      Continuous Blood Gluc Sensor (FREESTYLE PRINCESS 14 DAY SENSOR) MISC       empagliflozin (JARDIANCE) 25 MG TABS tablet Take 1 tablet (25 mg) by mouth daily 90  tablet 1    ezetimibe (ZETIA) 10 MG tablet Take 10 mg by mouth daily      folic acid (FOLVITE) 1 MG tablet Take 1 tablet (1 mg) by mouth daily 90 tablet 0    gabapentin (NEURONTIN) 300 MG capsule Take 1 capsule (300 mg) by mouth 3 times daily Start with one capsule at bedtime and increase dose as instructed 90 capsule 0    hydrochlorothiazide (HYDRODIURIL) 25 MG tablet Take 25 mg by mouth daily      HYDROcodone-acetaminophen (NORCO) 5-325 MG tablet Take 1 tablet by mouth 2 times daily as needed for severe pain (max of 2 tablets per day) 60 tablet 0    Insulin Degludec (TRESIBA) 100 UNIT/ML SOLN Inject 15 Units Subcutaneous 2 times daily      insulin glargine (LANTUS PEN) 100 UNIT/ML pen       linagliptin (TRADJENTA) 5 MG TABS tablet Take 5 mg by mouth daily      lisinopril (ZESTRIL) 40 MG tablet Take 40 mg by mouth daily      metFORMIN (GLUCOPHAGE XR) 500 MG 24 hr tablet Take 1,000 mg by mouth daily      nicotine (NICODERM CQ) 21 MG/24HR 24 hr patch Place 1 patch onto the skin every 24 hours 30 patch 3    omeprazole (PRILOSEC) 20 MG DR capsule Take 20 mg by mouth daily as needed      polyethylene glycol (GOLYTELY) 236 g suspension The night before the exam at 6 pm drink an 8-ounce glass every 15 minutes until the jug is half empty. If you arrive before 11 AM: Drink the other half of the Golytely jug at 11 PM night before procedure. If you arrive after 11 AM: Drink the other half of the Golytely jug at 6 AM day of procedure. For additional instructions refer to your colonoscopy prep instructions. 4000 mL 0    UNABLE TO FIND Take 2 capsules by mouth daily MEDICATION NAME: Gui KEITH PENTIPS 31G X 5 MM miscellaneous       vitamin B complex with vitamin C (STRESS TAB) tablet Take 1 tablet by mouth daily 90 tablet 0     Current Facility-Administered Medications   Medication Dose Route Frequency Provider Last Rate Last Admin    lidocaine (XYLOCAINE) 5 % ointment   Topical Daily PRN Robert Oliva MD    Given at 05/23/24 1118         Tobacco History:  reports that she quit smoking about 15 months ago. Her smoking use included cigarettes. She has never been exposed to tobacco smoke. She has never used smokeless tobacco.       REVIEW OF SYMPTOMS:   The review of systems was negative except as noted in the HPI.           PHYSICAL EXAMINATION:     /78   Pulse 88   SpO2 100%            GENERAL: The patient overall appears well and is no acute distress.   HEAD: normocephalic   EYES: Sclera and conjunctiva clear   NECK: no obvious masses   LUNGS: breathing is unlabored.   EXTREMITIES: No clubbing, cyanosis or edema   SKIN: No rashes or other abnormalities except as noted under the Wound section below.   NEUROLOGICAL: normal motor and sensory function   EDEMA: Mild    Vascular: Her foot is cool but not cold, she does not have palpable pedal pulses but her sensory and motor function in her foot is intact.    WOUND: The wound appears healthy with no sign of infection.   Wound bed: granulation tissue  Periwound: healthy intact skin  The wound is again quite a bit improved and is smaller compared with her last clinic visit.  There is no remaining necrotic material.    Also see below for wound details:       Circumferential volume measures:             No data to display                Ulceration(s)/Wound(s):   Please see the media tab under the chart review for pictures of the wounds.  Nursing staff removed dressings and cleansed wound.    VASC Wound Left lower leg wound (Active)   Pre Size Length 3 05/23/24 1100   Pre Size Width 1.5 05/23/24 1100   Pre Size Depth 0.3 05/23/24 1100   Pre Total Sq cm 4.5 05/23/24 1100               Recent Labs   Lab Test 02/15/24  0612 09/05/23  1157 02/14/23  1744 06/26/19  0000 04/03/19  0000 02/26/19  0000   HGBA1C  --   --   --  8.0* 8.8* 10.5*   A1C 8.7* 8.3* 8.5*  --   --   --           Recent Labs   Lab Test 02/15/23  0814   ALBUMIN 3.7         WBC Count   Date Value Ref Range  Status   03/25/2024 12.6 (H) 4.0 - 11.0 10e3/uL Final     Albumin   Date Value Ref Range Status   02/15/2023 3.7 3.5 - 5.2 g/dL Final           No sharp debridement performed today.                 ASSESSMENT:   This is a 68 year old  female with a left leg ulcer, the patient also has lower extremity edema which was also managed during today's clinic visit.          PLAN:   We'll bandage the area with Aquacel and a silicone adhesive bandage changed once a day.  At the request of Dr. Burleson we will not apply any compression.    Separate from the wound care instructions we then discussed management strategies for lower extremity edema.  I explained the keys for managing lower extremity edema are compression and elevation.  I have again explained to the patient today that controlling the edema is probably the most important thing we can do to help heal the wound.  I have specifically recommended that they lay down with their legs above the level of the heart for 30 minutes at least twice a day.  I emphasized that if we can not control the edema we will likely not be able to get this wound to heal.   I advised her that if she starts having pain in her foot with leg elevation she should put her leg down.    I have encouraged the patient to continue on their high protein diet to aid in wound healing.   The patient will return to the wound clinic in 3-4 weeks to see me again.        30 minutes spent on the date of the encounter doing chart review, history and exam, documentation and further activities per the note, this time excludes any procedure time      Robert Oliva MD  05/23/2024   11:21 AM   [facility]  [phone]    This note was electronically signed by Robert Oliva MD

## 2024-05-23 NOTE — LETTER
[facility]  [address] [place] [address] [address], [place] [address]  [phone]      Fax [phone]    [facility]           Fax: [phone]            Customer Service: [phone]        Account #: [id number]    Wound Dressing Rx and Order Form  Order Status: refill  Verbal: Jenifer  Date: May 23, 2024     Ashanti Aviles  Gender: female  : 1955  [address] [place] [address]  [phone] (home)     Medical Record: [id number]  Primary Care Provider: Dyan Geller      ICD-10-CM    1. Ulcer of left lower extremity with fat layer exposed (H)  L97.922 lidocaine (XYLOCAINE) 5 % ointment     Wound care      2. Lower extremity edema  R60.0 Wound care            Insurance Info:  INSURER: Payor: [employer] / Plan: [employer] OPEN ACCESS / Product Type: HMO /   Policy ID#:  [id number]  SECONDARY INSURANCE:    Secondary Policy ID#:  N/A        Physician Info:   Name:  KLAUS MARCIAL     Dept Address/Phones:   [address] [place] [address], [address] [place] [address] [phone]  Fax: [phone]    Lymphedema circumferential measurements (in cm):       No data to display                  Wound info:  VASC Wound left hallux (Active)   Pre Size Length 1 24 0700   Pre Size Width 1.4 24 0700   Pre Size Depth 0.5 24 0700   Pre Total Sq cm 1.4 24 0700   Number of days: 169       VASC Wound Left lower leg wound (Active)   Pre Size Length 3 24 1100   Pre Size Width 1.5 24 1100   Pre Size Depth 0.3 24 1100   Pre Total Sq cm 4.5 24 1100   Post Size Length 3.2 24 0800   Post Size Width 2 24 0800   Post Size Depth 0.8 24 0800   Post Total Sq cm 6.4 24 0800   Undermined 0.5cm 4 to 12 oclock 24 1208   Number of days: 73       Incision/Surgical Site 02/15/24 Anterior;Left Groin (Active)   Number of days: 98       Incision/Surgical Site 02/15/24 Left;Medial Calf (Active)   Number of  "days: 98        Drainage: moderate  Thickness:  full  Duration of Need: 30 DAYS  Days Supply: 30 DAYS  Start Date: 5/23/2024  Starter Kit, Ancillary Kit (saline, gloves, gauze): Yes   Qualifying wound/Debridement: Yes     NO SUBSTITUTIONS. Call [phone].      Dressing Type Brand Size Frequency of change  Quantity   Primary Aquacel AG  4\"x5\" DAILY and as needed     Zetuvit plus silicone border  3\"x3\" DAILY and as needed      NO SUBSTITUTIONS. Call [phone] with questions.       OK to forward to covered supplier.    Electronically Signed Physician:  KLAUS MARCIAL             Date: May 23, 2024    "

## 2024-05-23 NOTE — PROGRESS NOTES
Date:05/29/2024      COMPREHENSIVE PAIN CLINIC FOLLOW UP EVALUATION    I had the pleasure of meeting Ms. Ashanti Aviles on 4/26/2024 in the [facility] in consult for Dr. Ordaz, Vascular Surgery with regards to her pain.  The patient is a 68 year old female with past medical history of osteomyelitis of L) foot, open wound L) medial calf, chronic intractable pain, DMII, nicotine addiction, PVD, GERD, chronic anticoagulation with plavix who presents for evaluation of chronic pain. UDS and opioid contract completed on 04/26/2024.      Updates since initial appointment on 04/26/2024.  Continue high protein diet.    Last dose of Norco 5/325mg last night.  She only take norco for severe pain.  She does not need a refill today.    She is having bypass surgery on 06/04/2024 with Dr. Geller.    L) Toe hurts the most.    She is not using a cane or walker.    She is planning on attending a wedding 06/07/2024 in [place].      Interventions/Injections: none      Progress Notes Reviewed:  05/23/2024 Dr. Robert Oliva, General Surgery - wound check, L) leg ulcer  The wound began after a recent surgery and the incision did not heal normally.   She has critical limb ischemia and has undergone 2 left leg artery bypass surgeries, the most recent on February 15, 2024.  Postoperatively a portion of the incision dehisced leading to the current wound.  Unfortunately both of the arterial bypasses have gone down.  She is currently undergoing a hypercoagulability workup but is planned to undergo another bypass surgery in May.   05/22/2024 Dr. Rene Ordaz, DPM  reviewed proposed treatment for attempted salvage of the left hallux to include resection of nonviable tissue with bone biopsy for aerobic/anaerobic culture and use of wound VAC postoperatively. We also reviewed that salvage may not be an option based on blood flow and amputation of the digit or partial foot or limb may be necessary.       Any hospitalizations/ER/UC  visits since last appointment:  no  Any falls/accidents since last appointment:  no      Primary Pain :  Patient endorses chronic pain in left leg due to non-healing wounds x 2.  She has a L) medial calf wound and L) great toe wound.   She does her own dressing changes once a day.  June 4, 2024 scheduled another bypass at [facility].  She denies any current wound infections.  She is not on antibiotics today.  She goes to the wound clinic every 2-3 weeks.       Characteristics:  No changes in pain characteristics since last appointment.    The patient describes the pain as intermittent, burning, throbbing like a toothache.     Oxycodone was not effective in reducing the pain.  She had a script for Norco 5/325 which was more effective.  She usually takes a pain med at night because that is when the pain is the worst and she can't sleep.       What makes the pain better:  Her pain is improved with elevation, acetaminophen and ibuprofen.  What makes the pain worse:  She reports that the pain is made worse by walking too much.    05/29/2024 current pain on 0/10 VAS:  4     Worst pain:   8     Best pain:   4    04/26/2024 current pain score at 6/10, but it can be as low as 3/10 or as severe as 8/10.      Current Pain Related Medications:  Any medications changes since last appointment:  no    Oxycodone 5mg filled 05/12/2024 with last dose last night  Neurontin 300mg 1 tab TID  Acetaminophen 650mg q 8hrs prn    Anticoagulant:  Plavix 75mg 1 tab daily      Therapies discuss on initial consult:   Physical therapy, Pain Psychology, TENs unit, Grounding Mat, Frequency Specific Micro Current, Anti-inflammatory Lifestyle      Social:  She is  and lives in a house in [place] with her  and 4 chldren.  She has 2 grandchildren.  There are no pets.    She denies any recent or remote substance use.  She denies any history of chemical dependency.    Employment:    She is on a short term leave of absence due to the  wounds. She is independent in ADL's.  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    Exercise:  She does not exercise on a regular basis.  She does have a walker that she will use after the next bypass.    Hobbies:  puzzles and coloring.    Mental Health:    Patient denies anxiety and depression.  Patient does not follow with a mental health care provider.               Plan on initial consult 04/26/2024:  We will have her do a UDS and sign an opioid agreement today.    Continue high protein diet.    Diagnostics: none    Medications:  Start Hydrocodone/acet 5/325mg 1 tab BID PRN severe pain  Start Gabapentin 300mg 1 tab q hs times 3 days then twice daily for 3 days then three times daily.      Therapies: none      Interventions:  none      Follow up:   Follow-up in clinic in 1 month.          History of of chronic pain on initial exam 04/26/24                               Subjective:  Patient endorses chronic pain in left leg due to non-healing wounds x 2.  She has a L) medial calf wound and L) great toe wound.   Patient has numbness and tingling in L) foot present for 2 years.  She was on gabapentin in the past which was helpful for the nerve pain and would like another prescription.   The patient describes the pain as intermittent, burning, throbbing like a toothache.  She reports that the pain is made worse by walking too much.  Her pain is improved with elevation, acetaminophen and ibuprofen. Oxycodone was not effective in reducing the pain.  She had a script for Norco 5/325 which was more effective.  She usually takes a pain med at night because that is when the pain is the worst and she can't sleep.  She rates her currenty pain score at 6/10, but it can be as low as 3/10 or as severe as 8/10. She does her own dressing changes once a day.  June 4, 2024 scheduled another bypass at [facility].  She denies any current wound infections.  She is not on antibiotics today.  She goes to the wound clinic every 2-3  weeks.      Patient denies anxiety and depression.  Patient does not follow with a mental health care provider.   She does not exercise on a regular basis.  She does have a walker that she will use after the next bypass.      Progress Notes Reviewed:  04/26/2024 Dr. El Carolina, Surgery/Procedure: Left external iliac artery to posterior tibial artery bypass with cryo artery   04/25/2024 Dr. Robert Oliva, Wound Clinic  the wound has been present since mid February 2024.  The wound began after a recent surgery and the incision did not heal normally.   She has critical limb ischemia and has undergone 2 left leg artery bypass surgeries, the most recent on February 15, 2024.  Postoperatively a portion of the incision dehisced leading to the current wound.  Unfortunately both of the arterial bypasses have gone down.  She is currently undergoing a hypercoagulability workup but is planned to undergo another bypass surgery in May.   04/17/2023 Dr. Rene Ordaz, Podiatrist  04/15/2024 Dr. Shen Holly, GI Service  03/20/2024 ED - L) leg cellulitis    She denies any new problems with falls or balance, any new numbness or weakness of the arms or legs, any new bowel or bladder incontinence, any night sweats or unexplained fevers, or any sudden or unexpected weight loss.      Ashanti VELEZ Trevorwilliams has not been seen at a pain clinic in the past.        Current Treatments:  Oxycodone 5mg filled 04/23/2024 with last dose last night.    Anticoagulation: plavix 75mg      Previous Medication Treatments Included:  Anti-convulsants: Gabapentin was helpful in the past  Muscle relaxors: none  Anti-depressants: none  Benzodiazapine's: none  Acetaminophen/NSAIDs: both help a little  Topicals: lidocaine cream is not helpful  Opioids: Tramadol, hydrocodone-acet 5/325, oxycodone 5mg      Other Treatments Have Included:  Physical therapy: no  Pain Psychology: no  Chiropractic: no  Acupuncture: noo  TENs Unit: no  Injections:  no  Surgeries: R) 4th toe amputation  Dry Needling: no  Massage:no    Implantable devices:  none      Past Medical History:  Medical history reviewed.  Past Medical History:   Diagnosis Date    Benign gastric polyp     Chronic toe ulcer (H)     Chronic ulcer of great toe of left foot with necrosis of muscle (H)     Diabetes mellitus (H)     Gastroesophageal reflux disease     Hard to intubate 02/15/2024    History of colonic polyps     HLD (hyperlipidemia)     Hypertension     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/29/2013    Nicotine addiction     OAG (open angle glaucoma)     PVD (peripheral vascular disease) (H24)     Reflux esophagitis     Retinopathy     Toe ulcer (H)     Tubular adenoma     Yeast vaginitis       Patient Active Problem List   Diagnosis    Type 2 diabetes mellitus (H)    Tobacco use disorder    PAD (peripheral artery disease) (H24)    Microalbuminuric diabetic nephropathy (H)    Hyperlipidemia with target LDL less than 70    Grade 3 vulvar intraepithelial neoplasia    GERD (gastroesophageal reflux disease)    Essential hypertension    Dyslipidemia    AIN III (anal intraepithelial neoplasia III)    Bilateral incipient cataracts    Condyloma acuminatum of vulva    Ischemic toe    Critical limb ischemia of left lower extremity (H)    Paronychia of toe, left    Candidal vulvovaginitis    Cervical intraepithelial neoplasia    History of cervical dysplasia    Pelvic pain in female    Femoral artery thrombosis, left (H)    Femoral-tibial bypass graft occlusion, left, initial encounter (H24)    Hard to intubate    Gait abnormality    Left leg weakness    Ulcer of left lower extremity with fat layer exposed (H)    Lower extremity edema    History of loop electrical excision procedure (LEEP)         Past Surgical History:  Pertinent surgical history reviewed.  Past Surgical History:   Procedure Laterality Date    AMPUTATE TOE(S) Right 02/18/2023    Procedure: Amputation fourth toe right foot;   Surgeon: Benjamin Diez DPM;  [place] [facility] [place]    ANGIOGRAM Left 2/15/2024    Procedure: COMPLETION OF ANGIOGRAM, LEFT COMMON ILIAC ARTERY STENT PLACEMENT;  Surgeon: Dyan Geller MD;  [place] [facility] [place]    ANUS SURGERY      BIOPSY CERVICAL, LOCAL EXCISION, SINGLE/MULTIPLE      COLONOSCOPY N/A 09/11/2020    Procedure: EXAM UNDER ANESTHESIA, HIGH RESOLUTION ANOSCOPY INTRA OP;  Surgeon: Preeti Sheehan MD;  Location: [facility] OR;  Service: Gastroenterology    COLONOSCOPY N/A 12/14/2020    Procedure: EXAM UNDER ANESTHESIA WITH HIGH RESOLUTION ANOSCOPY;  Surgeon: Preeti Sheehan MD;  Location: [facility] OR;  Service: General    COLONOSCOPY N/A 06/15/2021    Procedure: EXAM UNDER ANESTHESIA WITH HIGH RESOLUTION ANOSCOPY, BIOPSY, FULGURATION;  Surgeon: Preeti Sheehan MD;  Location: [facility] OR;  Service: Gastroenterology    COLONOSCOPY N/A 4/15/2024    Procedure: COLONOSCOPY, WITH POLYPECTOMY;  Surgeon: Shen Andres MD;  Location: [facility] OR    ENDARTERECTOMY FEMORAL Left 05/17/2023    Procedure: LEFT FEMORAL ENDARTERECTOMY WITH RETROGRADE ILIAC STENT;  Surgeon: Dyan Geller MD;  [place] [facility] [place]    EXAM UNDER ANESTHESIA ANUS N/A 09/14/2021    Procedure: EXAM UNDER ANESTHESIA WITH HIGH RESOLUTION ANOSCOPY;  Surgeon: Preeti Sheehan MD;  Location: [facility] OR    FEMORAL ARTERY - TIBIAL ARTERY BYPASS GRAFT Left 09/08/2023    Procedure: CREATION, BYPASS, ARTERIAL, FEMORAL TO TIBIAL, LEFT;  Surgeon: Dyan Geller MD;  [place] [facility] [place]    FEMORAL ARTERY - TIBIAL ARTERY BYPASS GRAFT Left 2/15/2024    Procedure: CREATION, BYPASS, ARTERIAL, FEMORAL TO TIBIAL,;  Surgeon: Dyan Geller MD;  [place] [facility] [place]    INCISION AND DRAINAGE FOOT, COMBINED Left 11/27/2023    Procedure: INCISION AND DRAINAGE, LEFT HALLUX;  Surgeon: Rene Ordaz DPM;  Location: [facility]  Main OR    IR LOWER EXTREMITY ANGIOGRAM LEFT  03/23/2023    IR LOWER EXTREMITY ANGIOGRAM RIGHT  02/16/2023    IR THROMBOLYSIS ARTERIAL INFUSION INITIAL DAY  10/09/2023    LEEP TX, CERVICAL      2005    TUBAL LIGATION            Medications: Pertinent medications reviewed.  Current Outpatient Medications   Medication Sig Dispense Refill    acetaminophen (ACETAMINOPHEN 8 HOUR) 650 MG CR tablet Take 1,300 mg by mouth every 8 hours as needed for mild pain or fever      amLODIPine (NORVASC) 5 MG tablet Take 5 mg by mouth daily      bisacodyl (DULCOLAX) 5 MG EC tablet Take 2 tablets at 3 pm the day before your procedure. If your procedure is before 11 am, take 2 additional tablets at 11 pm. If your procedure is after 11 am, take 2 additional tablets at 6 am. For additional instructions refer to your colonoscopy prep instructions. 4 tablet 0    clopidogrel (PLAVIX) 75 MG tablet Take 75 mg by mouth daily      Continuous Blood Gluc Sensor (FREESTYLE PRINCESS 14 DAY SENSOR) MISC       empagliflozin (JARDIANCE) 25 MG TABS tablet Take 1 tablet (25 mg) by mouth daily 90 tablet 1    ezetimibe (ZETIA) 10 MG tablet Take 10 mg by mouth daily      folic acid (FOLVITE) 1 MG tablet Take 1 tablet (1 mg) by mouth daily 90 tablet 0    gabapentin (NEURONTIN) 300 MG capsule Take 1 capsule (300 mg) by mouth 3 times daily Start with one capsule at bedtime and increase dose as instructed 90 capsule 0    hydrochlorothiazide (HYDRODIURIL) 25 MG tablet Take 25 mg by mouth daily      HYDROcodone-acetaminophen (NORCO) 5-325 MG tablet Take 1 tablet by mouth 2 times daily as needed for severe pain (max of 2 tablets per day) 60 tablet 0    Insulin Degludec (TRESIBA) 100 UNIT/ML SOLN Inject 15 Units Subcutaneous 2 times daily      insulin glargine (LANTUS PEN) 100 UNIT/ML pen       linagliptin (TRADJENTA) 5 MG TABS tablet Take 5 mg by mouth daily      lisinopril (ZESTRIL) 40 MG tablet Take 40 mg by mouth daily      metFORMIN (GLUCOPHAGE XR) 500 MG 24 hr  tablet Take 1,000 mg by mouth daily      nicotine (NICODERM CQ) 21 MG/24HR 24 hr patch Place 1 patch onto the skin every 24 hours 30 patch 3    omeprazole (PRILOSEC) 20 MG DR capsule Take 20 mg by mouth daily as needed      polyethylene glycol (GOLYTELY) 236 g suspension The night before the exam at 6 pm drink an 8-ounce glass every 15 minutes until the jug is half empty. If you arrive before 11 AM: Drink the other half of the Golytely jug at 11 PM night before procedure. If you arrive after 11 AM: Drink the other half of the Golytely jug at 6 AM day of procedure. For additional instructions refer to your colonoscopy prep instructions. 4000 mL 0    UNABLE TO FIND Take 2 capsules by mouth daily MEDICATION NAME: Gui Doe      UNIFINE PENTIPS 31G X 5 MM miscellaneous       vitamin B complex with vitamin C (STRESS TAB) tablet Take 1 tablet by mouth daily 90 tablet 0       [place] Prescription Monitoring Program reviewed 5/29/2024.  No concern for abuse or misuse of controlled medications based on this report.    05/18/2024 Gabapentin 300mg 90 tabs for 30 days.  05/12/2024 Hydrocodone/acet 5/325mg 60 tabs for 30 days  04/26/2024 Gabapentin 300mg 90 tabs for 30 days  04/23/2024 Oxycodone 5mg 12 tabs for 3 days  02/19/2024 Oxycodone 5mg 6 tabs for 1 day  12/06/2023 Gabapentin 300mg 60 tabs for 20 days.      Allergies: Pertinent allergies reviewed.     Allergies   Allergen Reactions    Simvastatin Muscle Pain (Myalgia)     Muscle pain    Victoza [Liraglutide] Other (See Comments)     Binds bowels    Penicillins Unknown     Childhood rxn       Family History:   family history includes Breast Cancer in her paternal aunt; Colon Cancer in her mother; Diabetes Type 2  in her father; Hypertension in her father and mother; Hyperthyroidism in her sister; Kidney failure in her father.    Social History:   She is  and lives in a house in [place] with her  and 4 chldren.  She has 2 grandchildren.  There are no pets.  She is  on a short term leave of absence due to the wounds. She is independent in ADL's.  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  She denies any recent or remote substance use.  She denies any history of chemical dependency.  She  reports that she quit smoking about 15 months ago. Her smoking use included cigarettes. She has never been exposed to tobacco smoke. She has never used smokeless tobacco. She reports that she does not currently use alcohol. She reports that she does not currently use drugs.  Social History     Social History Narrative    Not on file         Review of Systems:      (Positive responses bolded)  GENERAL: fever/chills, fatigue, general unwell feeling, weight gain/loss  HEAD/EYES:  headache, dizziness, or vision changes  EARS/NOSE/THROAT: nosebleeds, hearing loss, sinus infection, earache, tinnitus  IMMUNE:  allergies, cancer, immune deficiency, or infections  SKIN:  itching, rash, hives  HEME/Lymphatic: anemia, easy bruising, easy bleeding  RESPIRATORY: cough, wheezing, or shortness of breath  CARDIOVASCULAR/Circulation: extremity edema, syncope, hypertension, tachycardia, or angina  GASTROINTESTINAL: abdominal pain, nausea/emesis, diarrhea, constipation, hematochezia, or melena  ENDOCRINE:  diabetes, steroid use, thyroid disease or osteoporosis  MUSCULOSKELETAL: myalgias, joint pain, stiffness, neck pain, back pain, arthritis, or gout  GENITOURINARY: frequency, urgency, dysuria, difficulty voiding, hematuria or incontinence  NEUROLOGIC: weakness, numbness, paresthesias, seizure, tremor, stroke or memory loss  PSYCHIATRIC: depression, anxiety, stress, suicidal thoughts/attempts or mood swings      Physical Exam:  There were no vitals taken for this visit.    Constitutional: She is oriented to person, place, and time.  She appears well-developed and well-nourished. She is not in acute distress.   HENT:     Head: Normocephalic and atraumatic.     Eyes: Pupils are equal, round, and reactive to  light. EOM are normal. No scleral icterus.   Pulmonary/Chest:  NWOB. No respiratory distress.   Neurological: She is alert and oriented to person, place, and time. Coordination grossly normal.  Romberg test negative. Finn's test is negative  Skin: Skin is warm and dry. She is not diaphoretic.   Psychiatric: She has a normal mood and affect. Her behavior is normal. Judgment and thought content normal.  Patient answers questions appropriately.  MSK: Gait is normal.    L) leg - 2 wounds    1) Left medial mid calf - no foul smell, purulent drainage, edema, erythema, streaking noted.  2) Left great toe - nail is removed - no foul smell, purulent drainage, edema, erythema, streaking noted.       DIRE Score for ongoing opioid management is calculated as follows:    Diagnosis = 3    Intractability = 3    Risk: Psych = 3  Chem Hlth = 3  Reliability = 3  Social = 3    Efficacy = 3    Total DIRE Score = 21 (14 or higher predicts good candidate for ongoing opioid management; 13 or lower predicts poor candidate for opioid management)         Imaging:  EXAM: MR FOOT LEFT W/O and W CONTRAST  LOCATION: [facility]  DATE: 3/5/2024     INDICATION: Osteomyelitis of the left hallux.  COMPARISON: Multiple prior comparison exams, the most recent dated 11/09/2023  TECHNIQUE: Routine. Additional postgadolinium T1 sequences were obtained.  IV CONTRAST: Gadavist 7mL     FINDINGS:      JOINTS AND BONES:   -Patchy mildly enhancing T2 hyperintense signal abnormality involving the distal phalanx which has slightly progressed since the most recent comparison exam of November 2023. No confluent T1 hypointense marrow replacing signal. Mild degenerative   arthrosis of the first metatarsophalangeal and second tarsometatarsal joint. No acute fracture. No joint effusion or enhancing synovitis.      TENDONS:   -No tendon tear, tendinopathy, or tenosynovitis.      LIGAMENTS:   -Lisfranc ligament complex and visualized  collateral and capsular ligaments are intact.     MUSCLES AND SOFT TISSUES:   -Subtle broad-based ulceration over the distal dorsal medial aspect of the first toe. No loculated fluid collection to suggest abscess.  -Progressive T2 hyperintense nonenhancing signal abnormality within the intrinsic muscles of the foot (series 4 image 15).                                                                      IMPRESSION:  1.  Patchy mildly enhancing T2 hyperintense signal abnormality involving the distal phalanx of the hallux which has slightly progressed since the comparison exam. Findings are indeterminate and may reflect persistent/progressive reactive osteitis,   however, early osteomyelitis cannot be entirely excluded.  2.  Patchy T2 hyperintense nonenhancing intramuscular signal abnormality, nonspecific and possibly related to recent revascularization. Infectious or inflammatory myositis is considered much less likely.  3.  No abscess.  4.  Mild degenerative arthrosis of the first MTP and second TMT joints.          Narrative & Impression   BILATERAL RESTING ANKLE-BRACHIAL INDICES (TRACIE'S) (Date: 03/18/24)        Indication: Status post left femoral-tibial bypass graft/left MIKE stent/ Hx of Left CFA-TPT BPG using spliced 4mm and 5 mm Artegraft graft done 9/8/2023, decreased lower extremity pulses, lower extremity pain        Hx: 10/09/2023 IR Left Thrombolytics unsuccessful.     5/17/2023 IR Left MIKE Stents / Iliofemoral Endarterectomy.                3/23/23 IR Left Leg Angiogram ( Occluded SFA/ Pop. A./ RICKY),                2/16/23 IR Right SFA Proximal Atherectomy/ Balloon Angiogram.                2/18/23 Right 4th Toe Amputation               Left Big Toe Nail Removed/ Painful      Previous: 2/23/24     Date of bypass graft: 9/8/2023 Left LEG      History: Previous Smoker, Hypertension, Diabetic, PAD, Angioplasty, Vascular Surgery, Surgical Follow-up, and Vascular Ulcers        Resting TRACIE's              Right:  mmHg Index       Brachial: 144     Ankle-(PT): 117 0.81   Ankle-(DP): 124 0.86            Digit: 87 0.60                      Left: mmHg Index       Brachial: 140     Ankle-(PT): 45 0.31   Ankle-(DP): 41 0.28            Digit: 0 0.00      Resting ankle-brachial index of 0.86 on the right. Toe Pressures of 87 mmHg and TBI of 0.60     Resting ankle-brachial index of 0.31 on the left. Toe Pressures of 0 mmHg and TBI of 0.00     VPR WAVEFORMS:   The right volume plethysmography waveforms are mildly abnormal at the lower thigh level, mildly abnormal at the upper calf level and mildly abnormal at the ankle.      The left volume plethysmography waveforms are moderately abnormal at the lower thigh level, moderately abnormal at the upper calf level and moderately abnormal at the ankle.           Impression:       1. RIGHT LOWER EXTREMITY: TRACIE is Abnormal with an TRACIE of 0.86 indicating single level disease. Toe Pressures are Normal and adequate for wound healing with toe pressures of 87 mmHg.     2. LEFT LOWER EXTREMITY: TRACIE is Abnormal with an TRACIE of 0.30 indicating critical limb ischemia. Toe Pressures are Abnormal and impaired for wound healing with toe pressures of 0 mmHg.     Reference:      Wound classification  Grade TRACIE Ankle Systolic Pressure Toe Pressures   0 > 0.80 > 100 mmHg > 60 mmHg   1 0.6 - 0.79 70 - 100 mmHg 40 - 59 mmHg   2 0.4 - 0.59 50-70 mmHg 30 - 39 mmHg   3 < 0.39 < 50 mmHg < 30 mmHg      Digit Pressures  DBI Disease Category   > 0.70 Normal   < 0.70 Abnormal   > 30 mmHg Potential wound healing   < 30 mmHg Impaired wound healing      Ankle Brachial Pressures  TRACIE Disease Category   > 1.3  Likely vessel calcification with monophasic waveforms, non-diagnostic   0.95-1.30 Normal with multiphasic waveforms   0.50-0.95 Single level disease   0.30-0.50 Multilevel disease   < 0.30 Critical limb ischema      Volume Plethysmography Recording (VPR) at all levels  Normal Sharp systolic peak, fast upstroke,  prominent dicrotic notch in wave   Mild Sharp systolic peak, fast upstroke, absent dicrotic notch in wave   Moderate Flattened systolic peak, slowed upstroke, absent dicrotic notch inwave   Severe amplitude wave with = upslope and down slope   Occluded Flat Line              EMG:  na      Diagnosis:  (S91.102A) Open wound of left great toe, initial encounter  (primary encounter diagnosis)  Comment:   Plan: HYDROcodone-acetaminophen (NORCO) 5-325 MG         tablet, gabapentin (NEURONTIN) 300 MG capsule            (L97.922) Ulcer of left lower extremity with fat layer exposed (H)  Comment:   Plan: HYDROcodone-acetaminophen (NORCO) 5-325 MG         tablet, gabapentin (NEURONTIN) 300 MG capsule            (I70.222) Critical limb ischemia of left lower extremity (H)  Comment:   Plan:     (G89.29) Chronic intractable pain  Comment:   Plan: gabapentin (NEURONTIN) 300 MG capsule            Plan on 05/29/2024:  June 4 bypass surgery in patient x 2 days.    Follow-up end of June in clinic.    No refills of Norco 5/325mg prn needed at this time.      Kendy Guido APRN, RN, CNP, FNP  [facility] [place]LLING TIME DOCUMENTATION:   The total TIME spent on this patient on the date of the encounter/appointment was 22 minutes.

## 2024-05-24 ENCOUNTER — TRANSFERRED RECORDS (OUTPATIENT)
Dept: HEALTH INFORMATION MANAGEMENT | Facility: CLINIC | Age: 69
End: 2024-05-24

## 2024-05-29 ENCOUNTER — OFFICE VISIT (OUTPATIENT)
Dept: PALLIATIVE MEDICINE | Facility: OTHER | Age: 69
End: 2024-05-29
Attending: NURSE PRACTITIONER
Payer: COMMERCIAL

## 2024-05-29 VITALS
HEART RATE: 61 BPM | OXYGEN SATURATION: 100 % | SYSTOLIC BLOOD PRESSURE: 124 MMHG | BODY MASS INDEX: 28.36 KG/M2 | WEIGHT: 150.1 LBS | DIASTOLIC BLOOD PRESSURE: 68 MMHG

## 2024-05-29 DIAGNOSIS — L97.922 ULCER OF LEFT LOWER EXTREMITY WITH FAT LAYER EXPOSED (H): ICD-10-CM

## 2024-05-29 DIAGNOSIS — Z79.891 LONG TERM (CURRENT) USE OF OPIATE ANALGESIC: ICD-10-CM

## 2024-05-29 DIAGNOSIS — I70.222 CRITICAL LIMB ISCHEMIA OF LEFT LOWER EXTREMITY (H): ICD-10-CM

## 2024-05-29 DIAGNOSIS — G89.29 CHRONIC INTRACTABLE PAIN: ICD-10-CM

## 2024-05-29 DIAGNOSIS — S91.102A OPEN WOUND OF LEFT GREAT TOE, INITIAL ENCOUNTER: Primary | ICD-10-CM

## 2024-05-29 PROCEDURE — 99214 OFFICE O/P EST MOD 30 MIN: CPT | Performed by: NURSE PRACTITIONER

## 2024-05-29 PROCEDURE — 99213 OFFICE O/P EST LOW 20 MIN: CPT | Performed by: NURSE PRACTITIONER

## 2024-05-29 ASSESSMENT — PAIN SCALES - GENERAL: PAINLEVEL: MODERATE PAIN (4)

## 2024-05-29 NOTE — PATIENT INSTRUCTIONS
Plan on 05/29/2024:  June 4 bypass surgery in patient x 2 days.    Follow-up end of June in clinic.    No refills of Norco 5/325mg prn needed at this time.      DOROTA Anderson, RN, CNP, FNP  [facility] Locations        ----------------------------------------------------------------  Clinic Number:  [phone]   Call with any questions about your care and for scheduling assistance.   Calls are returned Monday through Friday between 8 AM and 4:30 PM. We usually get back to you within 2 business days depending on the issue/request.    If we are prescribing your medications:  For opioid medication refills, call the clinic or send a [employer] message 7 days in advance.  Please include:  Name of requested medication  Name of the pharmacy.  For non-opioid medications, call your pharmacy directly to request a refill. Please allow 3-4 days to be processed.   Per [place] State Law:  All controlled substance prescriptions must be filled within 30 days of being written.    For those controlled substances allowing refills, pickup must occur within 30 days of last fill.      We believe regular attendance is key to your success in our program!    Any time you are unable to keep your appointment we ask that you call us at least 24 hours in advance to cancel.This will allow us to offer the appointment time to another patient.   Multiple missed appointments may lead to dismissal from the clinic.

## 2024-06-03 RX ORDER — FLUCONAZOLE 150 MG/1
150 TABLET ORAL ONCE
Status: ON HOLD | COMMUNITY
End: 2024-06-04

## 2024-06-04 ENCOUNTER — HOSPITAL ENCOUNTER (INPATIENT)
Facility: HOSPITAL | Age: 69
LOS: 7 days | Discharge: HOME | DRG: 252 | End: 2024-06-11
Attending: SURGERY | Admitting: INTERNAL MEDICINE
Payer: COMMERCIAL

## 2024-06-04 ENCOUNTER — ANESTHESIA (OUTPATIENT)
Dept: SURGERY | Facility: HOSPITAL | Age: 69
DRG: 252 | End: 2024-06-04
Payer: COMMERCIAL

## 2024-06-04 ENCOUNTER — APPOINTMENT (OUTPATIENT)
Dept: RADIOLOGY | Facility: HOSPITAL | Age: 69
DRG: 252 | End: 2024-06-04
Attending: SURGERY
Payer: COMMERCIAL

## 2024-06-04 ENCOUNTER — APPOINTMENT (OUTPATIENT)
Dept: RADIOLOGY | Facility: HOSPITAL | Age: 69
DRG: 252 | End: 2024-06-04
Attending: STUDENT IN AN ORGANIZED HEALTH CARE EDUCATION/TRAINING PROGRAM
Payer: COMMERCIAL

## 2024-06-04 ENCOUNTER — ANESTHESIA EVENT (OUTPATIENT)
Dept: SURGERY | Facility: HOSPITAL | Age: 69
DRG: 252 | End: 2024-06-04
Payer: COMMERCIAL

## 2024-06-04 DIAGNOSIS — I10 ESSENTIAL HYPERTENSION: ICD-10-CM

## 2024-06-04 DIAGNOSIS — I73.9 PAD (PERIPHERAL ARTERY DISEASE) (H): ICD-10-CM

## 2024-06-04 DIAGNOSIS — S81.802A OPEN WOUND OF LOWER LIMB, LEFT, INITIAL ENCOUNTER: ICD-10-CM

## 2024-06-04 DIAGNOSIS — I70.222 CRITICAL LIMB ISCHEMIA OF LEFT LOWER EXTREMITY (H): Primary | ICD-10-CM

## 2024-06-04 LAB
ABO/RH(D): NORMAL
ANTIBODY SCREEN: NEGATIVE
APTT PPP: >300 SECONDS (ref 22–38)
BASE EXCESS BLDA CALC-SCNC: -4.7 MMOL/L (ref -3–3)
BASOPHILS # BLD AUTO: 0.1 10E3/UL (ref 0–0.2)
BASOPHILS NFR BLD AUTO: 1 %
BLD PROD TYP BPU: NORMAL
BLOOD COMPONENT TYPE: NORMAL
CODING SYSTEM: NORMAL
COHGB MFR BLD: 100 % (ref 95–96)
CREAT SERPL-MCNC: 0.84 MG/DL (ref 0.51–0.95)
CROSSMATCH: NORMAL
EGFRCR SERPLBLD CKD-EPI 2021: 76 ML/MIN/1.73M2
EOSINOPHIL # BLD AUTO: 0.2 10E3/UL (ref 0–0.7)
EOSINOPHIL NFR BLD AUTO: 2 %
ERYTHROCYTE [DISTWIDTH] IN BLOOD BY AUTOMATED COUNT: 13.8 % (ref 10–15)
ERYTHROCYTE [DISTWIDTH] IN BLOOD BY AUTOMATED COUNT: 14.1 % (ref 10–15)
ERYTHROCYTE [DISTWIDTH] IN BLOOD BY AUTOMATED COUNT: 14.6 % (ref 10–15)
FIBRINOGEN PPP-MCNC: 164 MG/DL (ref 170–490)
GLUCOSE BLDC GLUCOMTR-MCNC: 157 MG/DL (ref 70–99)
GLUCOSE BLDC GLUCOMTR-MCNC: 267 MG/DL (ref 70–99)
GLUCOSE BLDC GLUCOMTR-MCNC: 271 MG/DL (ref 70–99)
GLUCOSE SERPL-MCNC: 153 MG/DL (ref 70–99)
HCO3 BLD-SCNC: 21 MMOL/L (ref 21–28)
HCT VFR BLD AUTO: 22.5 % (ref 35–47)
HCT VFR BLD AUTO: 26.4 % (ref 35–47)
HCT VFR BLD AUTO: 40.2 % (ref 35–47)
HGB BLD-MCNC: 11.8 G/DL (ref 11.7–15.7)
HGB BLD-MCNC: 13.2 G/DL (ref 11.7–15.7)
HGB BLD-MCNC: 7.8 G/DL (ref 11.7–15.7)
HGB BLD-MCNC: 9.2 G/DL (ref 11.7–15.7)
IMM GRANULOCYTES # BLD: 0.1 10E3/UL
IMM GRANULOCYTES NFR BLD: 0 %
INR PPP: 1.4 (ref 0.85–1.15)
ISSUE DATE AND TIME: NORMAL
LACTATE SERPL-SCNC: 3.1 MMOL/L (ref 0.7–2)
LYMPHOCYTES # BLD AUTO: 4.8 10E3/UL (ref 0.8–5.3)
LYMPHOCYTES NFR BLD AUTO: 40 %
MCH RBC QN AUTO: 29.3 PG (ref 26.5–33)
MCH RBC QN AUTO: 30.1 PG (ref 26.5–33)
MCH RBC QN AUTO: 31.6 PG (ref 26.5–33)
MCHC RBC AUTO-ENTMCNC: 32.8 G/DL (ref 31.5–36.5)
MCHC RBC AUTO-ENTMCNC: 34.7 G/DL (ref 31.5–36.5)
MCHC RBC AUTO-ENTMCNC: 34.8 G/DL (ref 31.5–36.5)
MCV RBC AUTO: 87 FL (ref 78–100)
MCV RBC AUTO: 89 FL (ref 78–100)
MCV RBC AUTO: 91 FL (ref 78–100)
MONOCYTES # BLD AUTO: 1.1 10E3/UL (ref 0–1.3)
MONOCYTES NFR BLD AUTO: 9 %
NEUTROPHILS # BLD AUTO: 5.8 10E3/UL (ref 1.6–8.3)
NEUTROPHILS NFR BLD AUTO: 48 %
NRBC # BLD AUTO: 0 10E3/UL
NRBC BLD AUTO-RTO: 0 /100
O2/TOTAL GAS SETTING VFR VENT: 40 %
PCO2 BLD: 35 MM HG (ref 35–45)
PEEP: 6 CM H2O
PH BLD: 7.37 [PH] (ref 7.35–7.45)
PLATELET # BLD AUTO: 235 10E3/UL (ref 150–450)
PLATELET # BLD AUTO: 309 10E3/UL (ref 150–450)
PLATELET # BLD AUTO: 99 10E3/UL (ref 150–450)
PO2 BLD: 138 MM HG (ref 80–105)
POTASSIUM SERPL-SCNC: 3.5 MMOL/L (ref 3.4–5.3)
RBC # BLD AUTO: 2.59 10E6/UL (ref 3.8–5.2)
RBC # BLD AUTO: 2.91 10E6/UL (ref 3.8–5.2)
RBC # BLD AUTO: 4.5 10E6/UL (ref 3.8–5.2)
SAO2 % BLDA: 97 % (ref 92–100)
SPECIMEN EXPIRATION DATE: NORMAL
UFH PPP CHRO-ACNC: 1.09 IU/ML
UNIT ABO/RH: NORMAL
UNIT NUMBER: NORMAL
UNIT STATUS: NORMAL
UNIT TYPE ISBT: 6200
WBC # BLD AUTO: 12 10E3/UL (ref 4–11)
WBC # BLD AUTO: 26.5 10E3/UL (ref 4–11)
WBC # BLD AUTO: 8.1 10E3/UL (ref 4–11)

## 2024-06-04 PROCEDURE — 04CS0ZZ EXTIRPATION OF MATTER FROM LEFT POSTERIOR TIBIAL ARTERY, OPEN APPROACH: ICD-10-PCS | Performed by: SURGERY

## 2024-06-04 PROCEDURE — 83605 ASSAY OF LACTIC ACID: CPT | Performed by: SURGERY

## 2024-06-04 PROCEDURE — 85025 COMPLETE CBC W/AUTO DIFF WBC: CPT | Performed by: SURGERY

## 2024-06-04 PROCEDURE — 99291 CRITICAL CARE FIRST HOUR: CPT | Performed by: INTERNAL MEDICINE

## 2024-06-04 PROCEDURE — 258N000003 HC RX IP 258 OP 636: Performed by: SURGERY

## 2024-06-04 PROCEDURE — 250N000011 HC RX IP 250 OP 636: Performed by: ANESTHESIOLOGY

## 2024-06-04 PROCEDURE — 272N000452 HC KIT SHRLOCK 5FR POWER PICC TRIPLE LUMEN

## 2024-06-04 PROCEDURE — 85730 THROMBOPLASTIN TIME PARTIAL: CPT | Performed by: INTERNAL MEDICINE

## 2024-06-04 PROCEDURE — 04WY0KZ REVISION OF NONAUTOLOGOUS TISSUE SUBSTITUTE IN LOWER ARTERY, OPEN APPROACH: ICD-10-PCS | Performed by: SURGERY

## 2024-06-04 PROCEDURE — 36415 COLL VENOUS BLD VENIPUNCTURE: CPT | Performed by: SURGERY

## 2024-06-04 PROCEDURE — 370N000017 HC ANESTHESIA TECHNICAL FEE, PER MIN: Performed by: SURGERY

## 2024-06-04 PROCEDURE — P9045 ALBUMIN (HUMAN), 5%, 250 ML: HCPCS | Mod: JZ | Performed by: ANESTHESIOLOGY

## 2024-06-04 PROCEDURE — 250N000009 HC RX 250: Performed by: ANESTHESIOLOGY

## 2024-06-04 PROCEDURE — 86923 COMPATIBILITY TEST ELECTRIC: CPT | Performed by: STUDENT IN AN ORGANIZED HEALTH CARE EDUCATION/TRAINING PROGRAM

## 2024-06-04 PROCEDURE — 35681 COMPOSITE BYP GRFT PROS&VEIN: CPT | Mod: LT | Performed by: SURGERY

## 2024-06-04 PROCEDURE — 258N000003 HC RX IP 258 OP 636: Performed by: ANESTHESIOLOGY

## 2024-06-04 PROCEDURE — 710N000011 HC RECOVERY PHASE 1, LEVEL 3, PER MIN: Performed by: SURGERY

## 2024-06-04 PROCEDURE — 35666 BPG FEM-ANT TIB PST TIB/PRNL: CPT | Mod: LT | Performed by: SURGERY

## 2024-06-04 PROCEDURE — 85384 FIBRINOGEN ACTIVITY: CPT | Performed by: INTERNAL MEDICINE

## 2024-06-04 PROCEDURE — 250N000011 HC RX IP 250 OP 636: Performed by: SURGERY

## 2024-06-04 PROCEDURE — 86900 BLOOD TYPING SEROLOGIC ABO: CPT | Performed by: SURGERY

## 2024-06-04 PROCEDURE — 250N000025 HC SEVOFLURANE, PER MIN: Performed by: SURGERY

## 2024-06-04 PROCEDURE — 200N000001 HC R&B ICU

## 2024-06-04 PROCEDURE — 250N000009 HC RX 250: Performed by: STUDENT IN AN ORGANIZED HEALTH CARE EDUCATION/TRAINING PROGRAM

## 2024-06-04 PROCEDURE — 255N000002 HC RX 255 OP 636: Performed by: SURGERY

## 2024-06-04 PROCEDURE — 250N000013 HC RX MED GY IP 250 OP 250 PS 637: Performed by: ANESTHESIOLOGY

## 2024-06-04 PROCEDURE — C1894 INTRO/SHEATH, NON-LASER: HCPCS | Performed by: SURGERY

## 2024-06-04 PROCEDURE — 360N000084 HC SURGERY LEVEL 4 W/ FLUORO, PER MIN: Performed by: SURGERY

## 2024-06-04 PROCEDURE — 250N000012 HC RX MED GY IP 250 OP 636 PS 637: Mod: JZ | Performed by: SURGERY

## 2024-06-04 PROCEDURE — 85610 PROTHROMBIN TIME: CPT | Performed by: INTERNAL MEDICINE

## 2024-06-04 PROCEDURE — P9016 RBC LEUKOCYTES REDUCED: HCPCS | Performed by: ANESTHESIOLOGY

## 2024-06-04 PROCEDURE — 82947 ASSAY GLUCOSE BLOOD QUANT: CPT | Performed by: SURGERY

## 2024-06-04 PROCEDURE — 272N000004 HC RX 272: Performed by: SURGERY

## 2024-06-04 PROCEDURE — 250N000009 HC RX 250: Performed by: SURGERY

## 2024-06-04 PROCEDURE — 82805 BLOOD GASES W/O2 SATURATION: CPT | Performed by: SURGERY

## 2024-06-04 PROCEDURE — 86923 COMPATIBILITY TEST ELECTRIC: CPT | Performed by: INTERNAL MEDICINE

## 2024-06-04 PROCEDURE — C1760 CLOSURE DEV, VASC: HCPCS | Performed by: SURGERY

## 2024-06-04 PROCEDURE — 999N000180 XR SURGERY CARM FLUORO LESS THAN 5 MIN: Mod: TC

## 2024-06-04 PROCEDURE — 272N000001 HC OR GENERAL SUPPLY STERILE: Performed by: SURGERY

## 2024-06-04 PROCEDURE — 250N000011 HC RX IP 250 OP 636: Performed by: INTERNAL MEDICINE

## 2024-06-04 PROCEDURE — 250N000011 HC RX IP 250 OP 636: Mod: JZ | Performed by: SURGERY

## 2024-06-04 PROCEDURE — 85018 HEMOGLOBIN: CPT | Performed by: INTERNAL MEDICINE

## 2024-06-04 PROCEDURE — 250N000013 HC RX MED GY IP 250 OP 250 PS 637: Performed by: STUDENT IN AN ORGANIZED HEALTH CARE EDUCATION/TRAINING PROGRAM

## 2024-06-04 PROCEDURE — C1757 CATH, THROMBECTOMY/EMBOLECT: HCPCS | Performed by: SURGERY

## 2024-06-04 PROCEDURE — 99233 SBSQ HOSP IP/OBS HIGH 50: CPT | Performed by: INTERNAL MEDICINE

## 2024-06-04 PROCEDURE — 85520 HEPARIN ASSAY: CPT | Performed by: STUDENT IN AN ORGANIZED HEALTH CARE EDUCATION/TRAINING PROGRAM

## 2024-06-04 PROCEDURE — 258N000003 HC RX IP 258 OP 636: Performed by: NURSE ANESTHETIST, CERTIFIED REGISTERED

## 2024-06-04 PROCEDURE — 71045 X-RAY EXAM CHEST 1 VIEW: CPT

## 2024-06-04 PROCEDURE — 86923 COMPATIBILITY TEST ELECTRIC: CPT | Performed by: ANESTHESIOLOGY

## 2024-06-04 PROCEDURE — 85027 COMPLETE CBC AUTOMATED: CPT | Performed by: SURGERY

## 2024-06-04 PROCEDURE — 250N000012 HC RX MED GY IP 250 OP 636 PS 637: Performed by: ANESTHESIOLOGY

## 2024-06-04 PROCEDURE — 85041 AUTOMATED RBC COUNT: CPT | Performed by: SURGERY

## 2024-06-04 PROCEDURE — 84132 ASSAY OF SERUM POTASSIUM: CPT | Performed by: SURGERY

## 2024-06-04 PROCEDURE — 36569 INSJ PICC 5 YR+ W/O IMAGING: CPT

## 2024-06-04 PROCEDURE — 82565 ASSAY OF CREATININE: CPT | Performed by: SURGERY

## 2024-06-04 PROCEDURE — C1762 CONN TISS, HUMAN(INC FASCIA): HCPCS | Performed by: SURGERY

## 2024-06-04 PROCEDURE — 250N000011 HC RX IP 250 OP 636: Performed by: STUDENT IN AN ORGANIZED HEALTH CARE EDUCATION/TRAINING PROGRAM

## 2024-06-04 PROCEDURE — 250N000011 HC RX IP 250 OP 636: Performed by: NURSE ANESTHETIST, CERTIFIED REGISTERED

## 2024-06-04 PROCEDURE — 999N000141 HC STATISTIC PRE-PROCEDURE NURSING ASSESSMENT: Performed by: SURGERY

## 2024-06-04 DEVICE — GRAFT TISS 4MM-5MM DIA 21-29 CM FEM POPLITEAL ART STRL R020: Type: IMPLANTABLE DEVICE | Site: LEG | Status: FUNCTIONAL

## 2024-06-04 DEVICE — BARD® PTFE FELT PLEDGETS, (RECTANGLE), 4.8 MM X 6 MM
Type: IMPLANTABLE DEVICE | Site: LEG | Status: FUNCTIONAL
Brand: BARD® PTFE FELT PLEDGETS

## 2024-06-04 RX ORDER — ACETAMINOPHEN 325 MG/1
975 TABLET ORAL ONCE
Status: COMPLETED | OUTPATIENT
Start: 2024-06-04 | End: 2024-06-04

## 2024-06-04 RX ORDER — SODIUM CHLORIDE, SODIUM LACTATE, POTASSIUM CHLORIDE, CALCIUM CHLORIDE 600; 310; 30; 20 MG/100ML; MG/100ML; MG/100ML; MG/100ML
INJECTION, SOLUTION INTRAVENOUS CONTINUOUS
Status: DISCONTINUED | OUTPATIENT
Start: 2024-06-04 | End: 2024-06-04 | Stop reason: HOSPADM

## 2024-06-04 RX ORDER — MAGNESIUM SULFATE 4 G/50ML
4 INJECTION INTRAVENOUS ONCE
Status: COMPLETED | OUTPATIENT
Start: 2024-06-04 | End: 2024-06-04

## 2024-06-04 RX ORDER — MAGNESIUM HYDROXIDE 1200 MG/15ML
LIQUID ORAL PRN
Status: DISCONTINUED | OUTPATIENT
Start: 2024-06-04 | End: 2024-06-04 | Stop reason: HOSPADM

## 2024-06-04 RX ORDER — DEXTROSE, SODIUM CHLORIDE, SODIUM LACTATE, POTASSIUM CHLORIDE, AND CALCIUM CHLORIDE 5; .6; .31; .03; .02 G/100ML; G/100ML; G/100ML; G/100ML; G/100ML
INJECTION, SOLUTION INTRAVENOUS CONTINUOUS PRN
Status: COMPLETED | OUTPATIENT
Start: 2024-06-04 | End: 2024-06-04

## 2024-06-04 RX ORDER — CALCIUM GLUCONATE 20 MG/ML
1 INJECTION, SOLUTION INTRAVENOUS ONCE
Status: COMPLETED | OUTPATIENT
Start: 2024-06-04 | End: 2024-06-04

## 2024-06-04 RX ORDER — ASPIRIN 81 MG/1
81 TABLET ORAL DAILY
Status: DISCONTINUED | OUTPATIENT
Start: 2024-06-05 | End: 2024-06-11 | Stop reason: HOSPADM

## 2024-06-04 RX ORDER — NALOXONE HYDROCHLORIDE 0.4 MG/ML
0.4 INJECTION, SOLUTION INTRAMUSCULAR; INTRAVENOUS; SUBCUTANEOUS
Status: DISCONTINUED | OUTPATIENT
Start: 2024-06-04 | End: 2024-06-04

## 2024-06-04 RX ORDER — ONDANSETRON 2 MG/ML
4 INJECTION INTRAMUSCULAR; INTRAVENOUS EVERY 6 HOURS PRN
Status: DISCONTINUED | OUTPATIENT
Start: 2024-06-04 | End: 2024-06-07

## 2024-06-04 RX ORDER — FENTANYL CITRATE 50 UG/ML
25 INJECTION, SOLUTION INTRAMUSCULAR; INTRAVENOUS EVERY 5 MIN PRN
Status: DISCONTINUED | OUTPATIENT
Start: 2024-06-04 | End: 2024-06-04 | Stop reason: HOSPADM

## 2024-06-04 RX ORDER — OXYCODONE HYDROCHLORIDE 5 MG/1
10 TABLET ORAL EVERY 4 HOURS PRN
Status: DISCONTINUED | OUTPATIENT
Start: 2024-06-04 | End: 2024-06-06

## 2024-06-04 RX ORDER — LISINOPRIL 20 MG/1
40 TABLET ORAL DAILY
Status: DISCONTINUED | OUTPATIENT
Start: 2024-06-05 | End: 2024-06-04

## 2024-06-04 RX ORDER — GLYCOPYRROLATE 0.2 MG/ML
INJECTION, SOLUTION INTRAMUSCULAR; INTRAVENOUS PRN
Status: DISCONTINUED | OUTPATIENT
Start: 2024-06-04 | End: 2024-06-04

## 2024-06-04 RX ORDER — ONDANSETRON 2 MG/ML
INJECTION INTRAMUSCULAR; INTRAVENOUS PRN
Status: DISCONTINUED | OUTPATIENT
Start: 2024-06-04 | End: 2024-06-04

## 2024-06-04 RX ORDER — NICOTINE POLACRILEX 4 MG
15-30 LOZENGE BUCCAL
Status: DISCONTINUED | OUTPATIENT
Start: 2024-06-04 | End: 2024-06-05

## 2024-06-04 RX ORDER — PANTOPRAZOLE SODIUM 40 MG/1
40 TABLET, DELAYED RELEASE ORAL
Status: DISCONTINUED | OUTPATIENT
Start: 2024-06-05 | End: 2024-06-11 | Stop reason: HOSPADM

## 2024-06-04 RX ORDER — CEFAZOLIN SODIUM/WATER 2 G/20 ML
2 SYRINGE (ML) INTRAVENOUS
Status: COMPLETED | OUTPATIENT
Start: 2024-06-04 | End: 2024-06-04

## 2024-06-04 RX ORDER — HYDROCHLOROTHIAZIDE 25 MG/1
25 TABLET ORAL DAILY
Status: DISCONTINUED | OUTPATIENT
Start: 2024-06-05 | End: 2024-06-04

## 2024-06-04 RX ORDER — PHENYLEPHRINE HCL IN 0.9% NACL 50MG/250ML
.1-6 PLASTIC BAG, INJECTION (ML) INTRAVENOUS CONTINUOUS
Status: DISCONTINUED | OUTPATIENT
Start: 2024-06-04 | End: 2024-06-06

## 2024-06-04 RX ORDER — OXYCODONE HYDROCHLORIDE 5 MG/1
5 TABLET ORAL EVERY 4 HOURS PRN
Status: DISCONTINUED | OUTPATIENT
Start: 2024-06-04 | End: 2024-06-06

## 2024-06-04 RX ORDER — FENTANYL CITRATE 50 UG/ML
25-100 INJECTION, SOLUTION INTRAMUSCULAR; INTRAVENOUS
Status: DISCONTINUED | OUTPATIENT
Start: 2024-06-04 | End: 2024-06-04 | Stop reason: HOSPADM

## 2024-06-04 RX ORDER — VASOPRESSIN IN 0.9 % NACL 2 UNIT/2ML
SYRINGE (ML) INTRAVENOUS PRN
Status: DISCONTINUED | OUTPATIENT
Start: 2024-06-04 | End: 2024-06-04

## 2024-06-04 RX ORDER — CALCIUM CHLORIDE 100 MG/ML
INJECTION INTRAVENOUS; INTRAVENTRICULAR PRN
Status: DISCONTINUED | OUTPATIENT
Start: 2024-06-04 | End: 2024-06-04

## 2024-06-04 RX ORDER — LIDOCAINE 40 MG/G
CREAM TOPICAL
Status: ACTIVE | OUTPATIENT
Start: 2024-06-04 | End: 2024-06-07

## 2024-06-04 RX ORDER — HYDROMORPHONE HCL IN WATER/PF 6 MG/30 ML
0.4 PATIENT CONTROLLED ANALGESIA SYRINGE INTRAVENOUS
Status: DISCONTINUED | OUTPATIENT
Start: 2024-06-04 | End: 2024-06-05

## 2024-06-04 RX ORDER — LABETALOL HYDROCHLORIDE 5 MG/ML
INJECTION, SOLUTION INTRAVENOUS PRN
Status: DISCONTINUED | OUTPATIENT
Start: 2024-06-04 | End: 2024-06-04

## 2024-06-04 RX ORDER — LIDOCAINE HYDROCHLORIDE 10 MG/ML
INJECTION, SOLUTION INFILTRATION; PERINEURAL
Status: COMPLETED | OUTPATIENT
Start: 2024-06-04 | End: 2024-06-04

## 2024-06-04 RX ORDER — HYDROMORPHONE HCL IN WATER/PF 6 MG/30 ML
0.4 PATIENT CONTROLLED ANALGESIA SYRINGE INTRAVENOUS EVERY 5 MIN PRN
Status: DISCONTINUED | OUTPATIENT
Start: 2024-06-04 | End: 2024-06-04 | Stop reason: HOSPADM

## 2024-06-04 RX ORDER — AMLODIPINE BESYLATE 5 MG/1
5 TABLET ORAL DAILY
Status: DISCONTINUED | OUTPATIENT
Start: 2024-06-05 | End: 2024-06-04

## 2024-06-04 RX ORDER — NALOXONE HYDROCHLORIDE 0.4 MG/ML
0.1 INJECTION, SOLUTION INTRAMUSCULAR; INTRAVENOUS; SUBCUTANEOUS
Status: DISCONTINUED | OUTPATIENT
Start: 2024-06-04 | End: 2024-06-04 | Stop reason: HOSPADM

## 2024-06-04 RX ORDER — PROTAMINE SULFATE 10 MG/ML
INJECTION, SOLUTION INTRAVENOUS PRN
Status: DISCONTINUED | OUTPATIENT
Start: 2024-06-04 | End: 2024-06-04

## 2024-06-04 RX ORDER — CEFAZOLIN SODIUM/WATER 2 G/20 ML
2 SYRINGE (ML) INTRAVENOUS SEE ADMIN INSTRUCTIONS
Status: DISCONTINUED | OUTPATIENT
Start: 2024-06-04 | End: 2024-06-04 | Stop reason: HOSPADM

## 2024-06-04 RX ORDER — NALOXONE HYDROCHLORIDE 0.4 MG/ML
0.2 INJECTION, SOLUTION INTRAMUSCULAR; INTRAVENOUS; SUBCUTANEOUS
Status: DISCONTINUED | OUTPATIENT
Start: 2024-06-04 | End: 2024-06-11 | Stop reason: HOSPADM

## 2024-06-04 RX ORDER — SODIUM CHLORIDE, SODIUM LACTATE, POTASSIUM CHLORIDE, CALCIUM CHLORIDE 600; 310; 30; 20 MG/100ML; MG/100ML; MG/100ML; MG/100ML
INJECTION, SOLUTION INTRAVENOUS CONTINUOUS PRN
Status: DISCONTINUED | OUTPATIENT
Start: 2024-06-04 | End: 2024-06-04

## 2024-06-04 RX ORDER — DEXAMETHASONE SODIUM PHOSPHATE 4 MG/ML
4 INJECTION, SOLUTION INTRA-ARTICULAR; INTRALESIONAL; INTRAMUSCULAR; INTRAVENOUS; SOFT TISSUE
Status: DISCONTINUED | OUTPATIENT
Start: 2024-06-04 | End: 2024-06-04 | Stop reason: HOSPADM

## 2024-06-04 RX ORDER — PAPAVERINE HYDROCHLORIDE 30 MG/ML
INJECTION INTRAMUSCULAR; INTRAVENOUS
Status: DISCONTINUED
Start: 2024-06-04 | End: 2024-06-04 | Stop reason: HOSPADM

## 2024-06-04 RX ORDER — CEFAZOLIN SODIUM 2 G/100ML
2 INJECTION, SOLUTION INTRAVENOUS EVERY 8 HOURS
Qty: 200 ML | Refills: 0 | Status: COMPLETED | OUTPATIENT
Start: 2024-06-04 | End: 2024-06-05

## 2024-06-04 RX ORDER — DEXTROSE MONOHYDRATE 25 G/50ML
25-50 INJECTION, SOLUTION INTRAVENOUS
Status: DISCONTINUED | OUTPATIENT
Start: 2024-06-04 | End: 2024-06-05

## 2024-06-04 RX ORDER — FENTANYL CITRATE 50 UG/ML
50 INJECTION, SOLUTION INTRAMUSCULAR; INTRAVENOUS EVERY 5 MIN PRN
Status: DISCONTINUED | OUTPATIENT
Start: 2024-06-04 | End: 2024-06-04 | Stop reason: HOSPADM

## 2024-06-04 RX ORDER — NALOXONE HYDROCHLORIDE 0.4 MG/ML
0.4 INJECTION, SOLUTION INTRAMUSCULAR; INTRAVENOUS; SUBCUTANEOUS
Status: DISCONTINUED | OUTPATIENT
Start: 2024-06-04 | End: 2024-06-11 | Stop reason: HOSPADM

## 2024-06-04 RX ORDER — DEXAMETHASONE SODIUM PHOSPHATE 10 MG/ML
INJECTION, SOLUTION INTRAMUSCULAR; INTRAVENOUS PRN
Status: DISCONTINUED | OUTPATIENT
Start: 2024-06-04 | End: 2024-06-04

## 2024-06-04 RX ORDER — NALOXONE HYDROCHLORIDE 0.4 MG/ML
0.2 INJECTION, SOLUTION INTRAMUSCULAR; INTRAVENOUS; SUBCUTANEOUS
Status: DISCONTINUED | OUTPATIENT
Start: 2024-06-04 | End: 2024-06-04

## 2024-06-04 RX ORDER — ONDANSETRON 2 MG/ML
4 INJECTION INTRAMUSCULAR; INTRAVENOUS EVERY 30 MIN PRN
Status: DISCONTINUED | OUTPATIENT
Start: 2024-06-04 | End: 2024-06-04 | Stop reason: HOSPADM

## 2024-06-04 RX ORDER — PROPOFOL 10 MG/ML
INJECTION, EMULSION INTRAVENOUS CONTINUOUS PRN
Status: DISCONTINUED | OUTPATIENT
Start: 2024-06-04 | End: 2024-06-04

## 2024-06-04 RX ORDER — LABETALOL HYDROCHLORIDE 5 MG/ML
5 INJECTION, SOLUTION INTRAVENOUS EVERY 10 MIN PRN
Status: DISCONTINUED | OUTPATIENT
Start: 2024-06-04 | End: 2024-06-04 | Stop reason: HOSPADM

## 2024-06-04 RX ORDER — KETAMINE HYDROCHLORIDE 10 MG/ML
INJECTION INTRAMUSCULAR; INTRAVENOUS PRN
Status: DISCONTINUED | OUTPATIENT
Start: 2024-06-04 | End: 2024-06-04

## 2024-06-04 RX ORDER — HYDROMORPHONE HCL IN WATER/PF 6 MG/30 ML
0.2 PATIENT CONTROLLED ANALGESIA SYRINGE INTRAVENOUS
Status: DISCONTINUED | OUTPATIENT
Start: 2024-06-04 | End: 2024-06-05

## 2024-06-04 RX ORDER — ACETAMINOPHEN 325 MG/1
975 TABLET ORAL EVERY 8 HOURS
Qty: 27 TABLET | Refills: 0 | Status: DISCONTINUED | OUTPATIENT
Start: 2024-06-04 | End: 2024-06-06

## 2024-06-04 RX ORDER — HYDROMORPHONE HCL IN WATER/PF 6 MG/30 ML
0.2 PATIENT CONTROLLED ANALGESIA SYRINGE INTRAVENOUS EVERY 5 MIN PRN
Status: DISCONTINUED | OUTPATIENT
Start: 2024-06-04 | End: 2024-06-04 | Stop reason: HOSPADM

## 2024-06-04 RX ORDER — NITROGLYCERIN 20 MG/100ML
INJECTION INTRAVENOUS
Status: DISCONTINUED
Start: 2024-06-04 | End: 2024-06-04 | Stop reason: WASHOUT

## 2024-06-04 RX ORDER — LIDOCAINE 40 MG/G
CREAM TOPICAL
Status: DISCONTINUED | OUTPATIENT
Start: 2024-06-04 | End: 2024-06-04 | Stop reason: HOSPADM

## 2024-06-04 RX ORDER — LIDOCAINE 40 MG/G
CREAM TOPICAL
Status: DISCONTINUED | OUTPATIENT
Start: 2024-06-04 | End: 2024-06-07

## 2024-06-04 RX ORDER — HEPARIN SODIUM 10000 [USP'U]/100ML
0-5000 INJECTION, SOLUTION INTRAVENOUS CONTINUOUS
Status: DISCONTINUED | OUTPATIENT
Start: 2024-06-04 | End: 2024-06-05

## 2024-06-04 RX ORDER — NITROGLYCERIN 5 MG/ML
VIAL (ML) INTRAVENOUS PRN
Status: DISCONTINUED | OUTPATIENT
Start: 2024-06-04 | End: 2024-06-04 | Stop reason: HOSPADM

## 2024-06-04 RX ORDER — HEPARIN SODIUM 10000 [USP'U]/100ML
500 INJECTION, SOLUTION INTRAVENOUS CONTINUOUS
Status: DISCONTINUED | OUTPATIENT
Start: 2024-06-04 | End: 2024-06-04

## 2024-06-04 RX ORDER — PROPOFOL 10 MG/ML
INJECTION, EMULSION INTRAVENOUS PRN
Status: DISCONTINUED | OUTPATIENT
Start: 2024-06-04 | End: 2024-06-04

## 2024-06-04 RX ORDER — ACETAMINOPHEN 325 MG/1
650 TABLET ORAL EVERY 4 HOURS PRN
Status: DISCONTINUED | OUTPATIENT
Start: 2024-06-07 | End: 2024-06-06

## 2024-06-04 RX ORDER — FENTANYL CITRATE 50 UG/ML
INJECTION, SOLUTION INTRAMUSCULAR; INTRAVENOUS PRN
Status: DISCONTINUED | OUTPATIENT
Start: 2024-06-04 | End: 2024-06-04

## 2024-06-04 RX ORDER — ONDANSETRON 4 MG/1
4 TABLET, ORALLY DISINTEGRATING ORAL EVERY 30 MIN PRN
Status: DISCONTINUED | OUTPATIENT
Start: 2024-06-04 | End: 2024-06-04 | Stop reason: HOSPADM

## 2024-06-04 RX ORDER — PROCHLORPERAZINE MALEATE 5 MG
5 TABLET ORAL EVERY 6 HOURS PRN
Status: DISCONTINUED | OUTPATIENT
Start: 2024-06-04 | End: 2024-06-07

## 2024-06-04 RX ORDER — BISACODYL 10 MG
10 SUPPOSITORY, RECTAL RECTAL DAILY PRN
Status: DISCONTINUED | OUTPATIENT
Start: 2024-06-07 | End: 2024-06-07

## 2024-06-04 RX ORDER — NITROGLYCERIN 5 MG/ML
VIAL (ML) INTRAVENOUS
Status: DISCONTINUED
Start: 2024-06-04 | End: 2024-06-04 | Stop reason: HOSPADM

## 2024-06-04 RX ORDER — ONDANSETRON 4 MG/1
4 TABLET, ORALLY DISINTEGRATING ORAL EVERY 6 HOURS PRN
Status: DISCONTINUED | OUTPATIENT
Start: 2024-06-04 | End: 2024-06-07

## 2024-06-04 RX ORDER — LIDOCAINE HYDROCHLORIDE 10 MG/ML
INJECTION, SOLUTION INFILTRATION; PERINEURAL PRN
Status: DISCONTINUED | OUTPATIENT
Start: 2024-06-04 | End: 2024-06-04

## 2024-06-04 RX ORDER — AMOXICILLIN 250 MG
1 CAPSULE ORAL 2 TIMES DAILY
Status: DISCONTINUED | OUTPATIENT
Start: 2024-06-04 | End: 2024-06-05

## 2024-06-04 RX ORDER — PHENYLEPHRINE HCL IN 0.9% NACL 50MG/250ML
.5-1.25 PLASTIC BAG, INJECTION (ML) INTRAVENOUS CONTINUOUS
Status: DISCONTINUED | OUTPATIENT
Start: 2024-06-04 | End: 2024-06-04

## 2024-06-04 RX ORDER — POLYETHYLENE GLYCOL 3350 17 G/17G
17 POWDER, FOR SOLUTION ORAL DAILY
Status: DISCONTINUED | OUTPATIENT
Start: 2024-06-05 | End: 2024-06-11 | Stop reason: HOSPADM

## 2024-06-04 RX ORDER — SODIUM CHLORIDE, SODIUM LACTATE, POTASSIUM CHLORIDE, CALCIUM CHLORIDE 600; 310; 30; 20 MG/100ML; MG/100ML; MG/100ML; MG/100ML
INJECTION, SOLUTION INTRAVENOUS CONTINUOUS
Status: DISCONTINUED | OUTPATIENT
Start: 2024-06-04 | End: 2024-06-05

## 2024-06-04 RX ORDER — HEPARIN SODIUM 1000 [USP'U]/ML
INJECTION, SOLUTION INTRAVENOUS; SUBCUTANEOUS PRN
Status: DISCONTINUED | OUTPATIENT
Start: 2024-06-04 | End: 2024-06-04

## 2024-06-04 RX ADMIN — PROPOFOL 120 MG: 10 INJECTION, EMULSION INTRAVENOUS at 10:12

## 2024-06-04 RX ADMIN — ACETAMINOPHEN 975 MG: 325 TABLET ORAL at 09:00

## 2024-06-04 RX ADMIN — MIDAZOLAM HYDROCHLORIDE 1 MG: 1 INJECTION, SOLUTION INTRAMUSCULAR; INTRAVENOUS at 09:22

## 2024-06-04 RX ADMIN — PHENYLEPHRINE HYDROCHLORIDE 200 MCG: 10 INJECTION INTRAVENOUS at 13:11

## 2024-06-04 RX ADMIN — CALCIUM CHLORIDE 0.5 G: 100 INJECTION INTRAVENOUS; INTRAVENTRICULAR at 14:34

## 2024-06-04 RX ADMIN — SUGAMMADEX 200 MG: 100 INJECTION, SOLUTION INTRAVENOUS at 16:47

## 2024-06-04 RX ADMIN — FENTANYL CITRATE 25 MCG: 50 INJECTION, SOLUTION INTRAMUSCULAR; INTRAVENOUS at 18:00

## 2024-06-04 RX ADMIN — Medication 2 G: at 09:54

## 2024-06-04 RX ADMIN — HYDROMORPHONE HYDROCHLORIDE 0.5 MG: 1 INJECTION, SOLUTION INTRAMUSCULAR; INTRAVENOUS; SUBCUTANEOUS at 10:10

## 2024-06-04 RX ADMIN — HEPARIN SODIUM 3000 UNITS: 1000 INJECTION INTRAVENOUS; SUBCUTANEOUS at 14:14

## 2024-06-04 RX ADMIN — Medication 2 UNITS: at 15:39

## 2024-06-04 RX ADMIN — Medication 2 UNITS: at 15:55

## 2024-06-04 RX ADMIN — PROPOFOL 50 MCG/KG/MIN: 10 INJECTION, EMULSION INTRAVENOUS at 10:32

## 2024-06-04 RX ADMIN — ROCURONIUM BROMIDE 20 MG: 50 INJECTION, SOLUTION INTRAVENOUS at 14:15

## 2024-06-04 RX ADMIN — PHENYLEPHRINE HYDROCHLORIDE 0.5 MCG/KG/MIN: 10 INJECTION INTRAVENOUS at 10:17

## 2024-06-04 RX ADMIN — MIDAZOLAM 2 MG: 1 INJECTION INTRAMUSCULAR; INTRAVENOUS at 09:57

## 2024-06-04 RX ADMIN — GLYCOPYRROLATE 0.4 MG: 0.2 INJECTION INTRAMUSCULAR; INTRAVENOUS at 10:10

## 2024-06-04 RX ADMIN — LIDOCAINE HYDROCHLORIDE 0.4 ML: 10 INJECTION, SOLUTION EPIDURAL; INFILTRATION; INTRACAUDAL; PERINEURAL at 20:30

## 2024-06-04 RX ADMIN — ROCURONIUM BROMIDE 50 MG: 50 INJECTION, SOLUTION INTRAVENOUS at 10:10

## 2024-06-04 RX ADMIN — PHENYLEPHRINE HYDROCHLORIDE 100 MCG: 10 INJECTION INTRAVENOUS at 10:21

## 2024-06-04 RX ADMIN — OXYCODONE HYDROCHLORIDE 10 MG: 5 TABLET ORAL at 22:58

## 2024-06-04 RX ADMIN — CEFAZOLIN SODIUM 2 G: 2 INJECTION, SOLUTION INTRAVENOUS at 22:49

## 2024-06-04 RX ADMIN — CALCIUM GLUCONATE 1 G: 20 INJECTION, SOLUTION INTRAVENOUS at 21:41

## 2024-06-04 RX ADMIN — HYDROMORPHONE HYDROCHLORIDE 0.4 MG: 0.2 INJECTION, SOLUTION INTRAMUSCULAR; INTRAVENOUS; SUBCUTANEOUS at 21:37

## 2024-06-04 RX ADMIN — ROCURONIUM BROMIDE 20 MG: 50 INJECTION, SOLUTION INTRAVENOUS at 12:19

## 2024-06-04 RX ADMIN — PHENYLEPHRINE HYDROCHLORIDE 200 MCG: 10 INJECTION INTRAVENOUS at 13:22

## 2024-06-04 RX ADMIN — FENTANYL CITRATE 25 MCG: 50 INJECTION, SOLUTION INTRAMUSCULAR; INTRAVENOUS at 17:41

## 2024-06-04 RX ADMIN — SODIUM CHLORIDE, POTASSIUM CHLORIDE, SODIUM LACTATE AND CALCIUM CHLORIDE: 600; 310; 30; 20 INJECTION, SOLUTION INTRAVENOUS at 10:22

## 2024-06-04 RX ADMIN — Medication 2 G: at 13:54

## 2024-06-04 RX ADMIN — Medication 16 MCG: at 10:10

## 2024-06-04 RX ADMIN — PHENYLEPHRINE HYDROCHLORIDE 100 MCG: 10 INJECTION INTRAVENOUS at 11:41

## 2024-06-04 RX ADMIN — ACETAMINOPHEN 975 MG: 325 TABLET ORAL at 19:42

## 2024-06-04 RX ADMIN — Medication 2 UNITS: at 14:55

## 2024-06-04 RX ADMIN — Medication 2 UNITS: at 13:38

## 2024-06-04 RX ADMIN — HEPARIN SODIUM 1200 UNITS/HR: 10000 INJECTION, SOLUTION INTRAVENOUS at 17:20

## 2024-06-04 RX ADMIN — PHENYLEPHRINE HYDROCHLORIDE 100 MCG: 10 INJECTION INTRAVENOUS at 16:19

## 2024-06-04 RX ADMIN — SODIUM CHLORIDE, POTASSIUM CHLORIDE, SODIUM LACTATE AND CALCIUM CHLORIDE: 600; 310; 30; 20 INJECTION, SOLUTION INTRAVENOUS at 13:40

## 2024-06-04 RX ADMIN — SODIUM CHLORIDE, POTASSIUM CHLORIDE, SODIUM LACTATE AND CALCIUM CHLORIDE: 600; 310; 30; 20 INJECTION, SOLUTION INTRAVENOUS at 09:11

## 2024-06-04 RX ADMIN — PHENYLEPHRINE HYDROCHLORIDE 200 MCG: 10 INJECTION INTRAVENOUS at 11:27

## 2024-06-04 RX ADMIN — KETAMINE HYDROCHLORIDE 30 MG: 10 INJECTION INTRAMUSCULAR; INTRAVENOUS at 10:10

## 2024-06-04 RX ADMIN — PROTAMINE SULFATE 80 MG: 10 INJECTION, SOLUTION INTRAVENOUS at 15:09

## 2024-06-04 RX ADMIN — ALBUMIN HUMAN: 0.05 INJECTION, SOLUTION INTRAVENOUS at 12:27

## 2024-06-04 RX ADMIN — LIDOCAINE HYDROCHLORIDE 5 ML: 10 INJECTION, SOLUTION INFILTRATION; PERINEURAL at 10:10

## 2024-06-04 RX ADMIN — ALBUMIN HUMAN: 0.05 INJECTION, SOLUTION INTRAVENOUS at 13:22

## 2024-06-04 RX ADMIN — LIDOCAINE HYDROCHLORIDE 1 ML: 10 INJECTION, SOLUTION INFILTRATION; PERINEURAL at 09:36

## 2024-06-04 RX ADMIN — ALBUMIN HUMAN: 0.05 INJECTION, SOLUTION INTRAVENOUS at 13:14

## 2024-06-04 RX ADMIN — HEPARIN SODIUM 4000 UNITS: 1000 INJECTION INTRAVENOUS; SUBCUTANEOUS at 12:31

## 2024-06-04 RX ADMIN — LABETALOL HYDROCHLORIDE 10 MG: 5 INJECTION, SOLUTION INTRAVENOUS at 17:10

## 2024-06-04 RX ADMIN — KETAMINE HYDROCHLORIDE 10 MG: 10 INJECTION INTRAMUSCULAR; INTRAVENOUS at 16:10

## 2024-06-04 RX ADMIN — Medication 2 UNITS: at 14:28

## 2024-06-04 RX ADMIN — ONDANSETRON 4 MG: 2 INJECTION INTRAMUSCULAR; INTRAVENOUS at 15:36

## 2024-06-04 RX ADMIN — HEPARIN SODIUM 3000 UNITS: 1000 INJECTION INTRAVENOUS; SUBCUTANEOUS at 13:24

## 2024-06-04 RX ADMIN — FENTANYL CITRATE 50 MCG: 50 INJECTION INTRAMUSCULAR; INTRAVENOUS at 11:12

## 2024-06-04 RX ADMIN — FENTANYL CITRATE 50 MCG: 50 INJECTION INTRAMUSCULAR; INTRAVENOUS at 10:10

## 2024-06-04 RX ADMIN — Medication 2 UNITS: at 17:02

## 2024-06-04 RX ADMIN — HYDROMORPHONE HYDROCHLORIDE 0.2 MG: 0.2 INJECTION, SOLUTION INTRAMUSCULAR; INTRAVENOUS; SUBCUTANEOUS at 19:37

## 2024-06-04 RX ADMIN — HYDROMORPHONE HYDROCHLORIDE 0.4 MG: 0.2 INJECTION, SOLUTION INTRAMUSCULAR; INTRAVENOUS; SUBCUTANEOUS at 23:33

## 2024-06-04 RX ADMIN — SENNOSIDES AND DOCUSATE SODIUM 1 TABLET: 8.6; 5 TABLET ORAL at 22:21

## 2024-06-04 RX ADMIN — Medication 2 UNITS: at 13:49

## 2024-06-04 RX ADMIN — HEPARIN SODIUM 6000 UNITS: 1000 INJECTION INTRAVENOUS; SUBCUTANEOUS at 16:24

## 2024-06-04 RX ADMIN — PHENYLEPHRINE HYDROCHLORIDE 100 MCG: 10 INJECTION INTRAVENOUS at 16:01

## 2024-06-04 RX ADMIN — Medication 4 MCG: at 15:28

## 2024-06-04 RX ADMIN — PHENYLEPHRINE HYDROCHLORIDE 200 MCG: 10 INJECTION INTRAVENOUS at 12:25

## 2024-06-04 RX ADMIN — HEPARIN SODIUM 6000 UNITS: 1000 INJECTION INTRAVENOUS; SUBCUTANEOUS at 11:47

## 2024-06-04 RX ADMIN — FENTANYL CITRATE 50 MCG: 50 INJECTION, SOLUTION INTRAMUSCULAR; INTRAVENOUS at 09:21

## 2024-06-04 RX ADMIN — Medication 2 UNITS: at 14:12

## 2024-06-04 RX ADMIN — INSULIN ASPART 3 UNITS: 100 INJECTION, SOLUTION INTRAVENOUS; SUBCUTANEOUS at 21:14

## 2024-06-04 RX ADMIN — Medication 2 UNITS: at 16:19

## 2024-06-04 RX ADMIN — DEXAMETHASONE SODIUM PHOSPHATE 10 MG: 10 INJECTION, SOLUTION INTRAMUSCULAR; INTRAVENOUS at 10:33

## 2024-06-04 RX ADMIN — Medication 2 UNITS: at 14:45

## 2024-06-04 RX ADMIN — CALCIUM CHLORIDE 0.5 G: 100 INJECTION INTRAVENOUS; INTRAVENTRICULAR at 14:50

## 2024-06-04 RX ADMIN — ROCURONIUM BROMIDE 10 MG: 50 INJECTION, SOLUTION INTRAVENOUS at 13:42

## 2024-06-04 RX ADMIN — PHENYLEPHRINE HYDROCHLORIDE 0.5 MCG/KG/MIN: 10 INJECTION INTRAVENOUS at 18:02

## 2024-06-04 RX ADMIN — MAGNESIUM SULFATE HEPTAHYDRATE 4 G: 80 INJECTION, SOLUTION INTRAVENOUS at 09:11

## 2024-06-04 RX ADMIN — Medication 2 UNITS: at 15:13

## 2024-06-04 ASSESSMENT — ACTIVITIES OF DAILY LIVING (ADL)
ADLS_ACUITY_SCORE: 23

## 2024-06-04 NOTE — OP NOTE
VASCULAR SURGERY OPERATIVE REPORT        LOCATION:    [facility]    Ashanti Aviles   Medical Record #:  [id number]  YOB: 1955  Age:  68 year old     Date of Service: June 4, 2024    PRIMARY CARE PROVIDER: Dyan Geller    Preoperative diagnosis    Chronic limb threatening ischemia involving left lower extremity    Postoperative diagnosis    Same      Surgeon: Dyan Geller MD, RPVI     Assistant: Belkys Harmon DO                         Name of the procedure    Left external iliac artery to posterior tibial artery bypass with spliced cryo artery graft  Reduction and repair of the left femoral hernia  Thrombectomy of the posterior tibial artery and bypass graft  Left lower extremity angiogram    Anesthesia:    General    Intraoperative findings    Significant limited outflow in the foot.  High risk for bypass failure.  No additional options for revascularization.  If bypass fails, patient will need an amputation.    Indication for procedure:    68-year-old female with a history of left iliofemoral endarterectomy with retrograde iliac intervention followed by left femoral to below-knee popliteal bypass which had failed.  Patient underwent left femoral to posterior tibial artery bypass with cryo artery which also had failed.  Today she is scheduled for redo bypass.  Risk and benefits of this operation were explained.  I discussed a significant chance of potential failure of bypass graft which would inevitably lead to an amputation.  Patient understood risk and benefits and agreed to proceed.    Description of procedure:    The patient was placed supine on the operating table. The arms were placed at 80 . Normal bony prominences were padded. The anesthesia team placed appropriate lines and general anesthesia was induced. A Cedeno catheter was placed under sterile technique. The patient s lower abdomen and both lower extremities were circumferentially prepped and draped  in the usual sterile fashion. Preoperative antibiotics were administered prior to skin incision.  A 10- to 12-cm vertical skin incision was performed on the medial aspect of the distal leg, 2 cm posteromedial and parallel to the tibia. The skin incision was deepened through the subcutaneous tissue, the saphenous vein was carefully reflected posteriorly. The deep muscular fascia was incised, exposing the medial head of gastrocnemius muscle which is reclined posteriorly. The soleus muscle is then exposed and incised along its attachment down to its posterior deep fascia. This fascia was incised, exposing the posterior tibial and flexor muscles. Gentle dissection between these two muscles was performed and a self-retaining retractor was placed deeper in the wound, exposing the posterior tibial artery and veins. The artery is palpated to assess the feasibility of the distal anastomosis.  The artery was found to be relatively small but soft.  A 2-cm segment of the posterior tibial artery was sharply dissected. Crossing venae comitantes were ligated and divided.  Additional incision was made 2 cm above the ankle ligament.  Subcutaneous tissue was divided using electrocautery.  The fascia was divided use electrocautery.  This the abdominal muscles were bluntly divided.  The transversalis fascia was divided using electrocautery.  Then I was able to mobilize peritoneal cavity medially exposing the psoas muscle.  The external cautery was just medial to it.  It was dissected circumferentially, and was found to be suitable for the bypass.  Then we proceeded with a tunneling between 2 incisions.  An extra counterincision was made on the medial surface of the left leg just below the knee.  A tunnel was then created using a Pedro tunneler.  The tunnel was subsartorial along the medial aspect of the thigh and then anatomic at the knee level passing between both heads of the gastrocnemius muscle. Below the knee the tunnel was  posterior to the soleus muscle.  Then, additional tunnel was created between 2 leg incisions stent inferior to nonhealing wound.  Then we proceeded with a splicing of 2 segments of the cryo artery which was performed on the back table.  And 2 anastomosis was performed using 6-0 Prolene suture.  The patient was given 100 UI/Kg of heparin intravenously. The saphenous vein was transected at the saphenofemoral junction and its stump suture ligated with 2-0 silk sutures. The distal end was double ligated and transected.  The external carotid was clamped distally and proximally.  A longitudinal arteriotomy in the common femoral artery was performed and extended with Pott s scissors for 1 cm. The graft was then introduced and its distal end was incised along its posterior wall/ creating a T junction shape. The proximal anastomosis was constructed between the spatulated graft and the arteriotomy with a running 6-0 prolene suture. Prior to completing the suture line, back-bleeding, forward-flushing, and irrigation of the anastomosis with heparinized solution was performed. The anastomosis was then completed and checked for hemostasis, which was adequate. The flow through the vein was checked and was pulsatile. The end of the graft was ligated with a 2-0 silk tie. The graft was rechecked for hemostasis. The graft was marked with methylene blue and then passed distended through the tunnel, avoiding any twists.  Proximal and distal control of the posterior tibial artery was then performed Luke vascular clamp clamp. A 1-cm arteriotomy was then created in the anterior wall of the posterior tibial artery.  The graft was transected at the appropriate length and was incised along its posterior aspect, spatulating the vein. The distal anastomosis to the posterior tibial artery was constructed with a running 8-0 prolene suture given the small size of the artery. Prior to completing the suture line,back-bleeding, forward-flushing,  and irrigation of the anastomosis with heparinized solution was performed. The anastomosis was then completed and checked for hemostasis, which was adequate.  Then, proceeded with a completion angiogram.  The micropuncture sheath was inserted through the graft into the external iliac artery.  Angiogram did show the patency of the bypass graft without any twists.  Proximal and distal anastomosis were patent.  There is only concern of relatively sluggish flow through the bypass graft due to the outflow.  There is only one-vessel runoff down to the left foot but with relatively minimal outflow in the foot.  The arch is incomplete.    The suture lines and wounds were then rechecked for hemostasis. There was good Doppler signal in the foot at the posterior tibial artery level and a good augmentation of the signal with compress- ing and releasing the vein graft. The wounds were all irrigated with antibiotic solution .  All wounds were closed in a similar fashion using 2-0 Vicryl, 3-0 Vicryl, and 4 Monocryl.  Infrapopliteal incision was closed skin staples on the skin instead of 4 Monocryl.  At the completion of the operation we noticed that patient lost PT pulse.  The infra popliteal incisions were reopened.  We immediately noticed that the bypass graft had failed.  We proceeded with a graftotomy just proximal to the distal anastomosis.  The thrombectomy of the entire graft was performed using a size 4 Shabnam catheter.  We also performed a thrombectomy of posterior tibial artery using size 2 Shabnam catheter.  Patient was started on heparin drip immediately along with a bolus.  Incisions were closed using skin staples.    Estimated blood loss: 1200 cc    Specimens: None    Complications: None      Dyan Geller MD, [facility]  VASCULAR SURGERY

## 2024-06-04 NOTE — PHARMACY-ADMISSION MEDICATION HISTORY
Pharmacist Admission Medication History    Admission medication history is complete. The information provided in this note is only as accurate as the sources available at the time of the update.    Information Source(s): Patient and CareEverywhere/SureScripts via in-person    Pertinent Information: Patient is using up current supply of Tresiba, then will switch to Lantus.    Allergies reviewed with patient and updates made in EHR: yes    Medication History Completed By: Shannan Ross RPH 6/4/2024 9:57 AM    Current Facility-Administered Medications for the 6/4/24 encounter (Hospital Encounter)   Medication    lidocaine (XYLOCAINE) 5 % ointment     PTA Med List   Medication Sig Last Dose    acetaminophen (ACETAMINOPHEN 8 HOUR) 650 MG CR tablet Take 1,300 mg by mouth every 8 hours as needed for mild pain or fever Past Week at unknown    amLODIPine (NORVASC) 5 MG tablet Take 5 mg by mouth daily 6/3/2024 at am    clopidogrel (PLAVIX) 75 MG tablet Take 75 mg by mouth daily 6/3/2024 at am    empagliflozin (JARDIANCE) 25 MG TABS tablet Take 1 tablet (25 mg) by mouth daily 6/3/2024 at am    ezetimibe (ZETIA) 10 MG tablet Take 10 mg by mouth daily 6/3/2024 at am    folic acid (FOLVITE) 1 MG tablet Take 1 tablet (1 mg) by mouth daily 6/3/2024 at am    gabapentin (NEURONTIN) 300 MG capsule Take 1 capsule (300 mg) by mouth 3 times daily Start with one capsule at bedtime and increase dose as instructed 6/3/2024 at pm    hydrochlorothiazide (HYDRODIURIL) 25 MG tablet Take 25 mg by mouth daily 6/3/2024 at am    insulin degludec (TRESIBA) 100 UNIT/ML pen Inject 30 Units Subcutaneous 2 times daily 6/4/2024 at 10 units - am    linagliptin (TRADJENTA) 5 MG TABS tablet Take 5 mg by mouth daily 6/3/2024 at am    lisinopril (ZESTRIL) 40 MG tablet Take 40 mg by mouth daily 6/3/2024 at am    metFORMIN (GLUCOPHAGE XR) 500 MG 24 hr tablet Take 1,000 mg by mouth daily 6/3/2024 at am    omeprazole (PRILOSEC) 20 MG DR capsule Take 20 mg by  mouth daily 6/3/2024 at am    vitamin B complex with vitamin C (STRESS TAB) tablet Take 1 tablet by mouth daily 6/3/2024 at am

## 2024-06-04 NOTE — ANESTHESIA CARE TRANSFER NOTE
Patient: Ashanti Aviles    Procedure: Procedure(s):  LEFT EXTERNAL ILIAC ARTERY TO POSTERIOR TIBIAL ARTERY BYPASS WITH CRYO ARTERY WITH A FEMORAL HERNIA REPAIR       Diagnosis: PAD (peripheral artery disease) (H24) [I73.9]  Diagnosis Additional Information: No value filed.    Anesthesia Type:   General     Note:    Oropharynx: oropharynx clear of all foreign objects  Level of Consciousness: awake  Oxygen Supplementation: face mask  Level of Supplemental Oxygen (L/min / FiO2): 6  Independent Airway: airway patency satisfactory and stable  Dentition: dentition unchanged  Vital Signs Stable: post-procedure vital signs reviewed and stable  Report to RN Given: handoff report given  Patient transferred to: PACU  Comments: Pt off pressors  Handoff Report: Identifed the Patient, Identified the Reponsible Provider, Reviewed the pertinent medical history, Discussed the surgical course, Reviewed Intra-OP anesthesia mangement and issues during anesthesia, Set expectations for post-procedure period and Allowed opportunity for questions and acknowledgement of understanding      Vitals:  Vitals Value Taken Time   /51    Temp 36.8    Pulse 85 06/04/24 1722   Resp 23 06/04/24 1722   SpO2 100 % 06/04/24 1722   Vitals shown include unfiled device data.    Electronically Signed By: DOROTA Nagy CRNA  June 4, 2024  5:24 PM

## 2024-06-04 NOTE — H&P
"PULMONARY/CRITICAL CARE CONSULT NOTE    Date / Time of Admission:  6/4/2024  8:23 AM  Assessment:   68yoF with history of osteomyelitis of left hallux, severe peripheral vascular disease who presented for left external iliac artery to posterior tib artery bypass requiring reopening of  invision with thrombectomy from graft which was complicated by hemorrhagic shock and poor reperfusion    Clinically Significant Risk Factors Present on Admission                # Drug Induced Platelet Defect: home medication list includes an antiplatelet medication   # Hypertension: Noted on problem list    # Anemia: based on hgb <11        #Precipitous drop in Hgb/Hct: Lowest Hgb this hospitalization: 7.8 g/dL. Will continue to monitor and treat/transfuse as appropriate.    # DMII: A1C = N/A within past 6 months            # Anemia: based on hgb <11         Active Problems:    PAD (peripheral artery disease) (H24)      Critical Care Time greater than: 30 minutes, critically ill requiring vasopressors for hemodynamic instability.       Plan:   C/V:  1) Hemorrhagic shock  -Volume resuscitation  -Holding home lisinopril/norvasc  -Currently requiring Phenylephrine at low dose. Hope to wean off     Renal:  1) Lactic acidosis from poor perfusion    Pulmonary  No issues currently but does carry a \"difficult airway\" label.     Neuro:  1) Need for pain control  -PRN dilaudid    ID:  -Cefazolin for perioperative prophylactic antibiotic.     Vascular:  1) Bypass with concerns for ongoing ischemia  -May require amputation later this week.   -Heparin drip    Heme:  1) Acute blood loss anemia  -Most recent hemoglobin 7.8 from 13 at start of case. Transfused 1Unit PRBC so far, would like to transfuse 2nd unit  -Requiring heparin drip due to graft thrombus so will monitor serial hemoglobins every 4 hours.   -Check coags given concern for consumptive DIC.     Endo:  1) DMII  -NISS              Subjective:    cc: hypotension    HPI: 68 year old female " with history of DMII, peripheral vascular disease who presented for elective eft external iliac artery to posterior tib artery bypass.    Patient did ok during initial phase of the case but after closing there was no pulse so distal incision reopened, thrombectomy with removal of fresh thrombus performed. Angiogram performed afterward with sluggish flow through the bypass graft with concern that there will be poor perfusion.    Patient extubated and was started on heparin drip.  She was started on a small dose of phenylephrine due to hypotension.     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/29/2013    Nicotine addiction     OAG (open angle glaucoma)     PVD (peripheral vascular disease) (H24)     Reflux esophagitis     Retinopathy     Toe ulcer (H)     Tubular adenoma     Yeast vaginitis        Social History     Tobacco Use    Smoking status: Former     Current packs/day: 0.00     Types: Cigarettes     Quit date: 2023     Years since quittin.3     Passive exposure: Never    Smokeless tobacco: Never   Substance Use Topics    Alcohol use: Not Currently     Comment: Alcoholic Drinks/MONTH: 2x       Family History   Problem Relation Age of Onset    Hypertension Mother     Colon Cancer Mother     Diabetes Type 2  Father     Hypertension Father     Kidney failure Father     Hyperthyroidism Sister     Breast Cancer Paternal Aunt        Current Facility-Administered Medications   Medication Dose Route Frequency Provider Last Rate Last Admin    dexAMETHasone (DECADRON) injection 4 mg  4 mg Intravenous Once PRN Bandar  Naila Kruger MD        glucose gel 15-30 g  15-30 g Oral Q15 Min PRN Meri Christianson MD        Or    dextrose 50 % injection 25-50 mL  25-50 mL Intravenous Q15 Min PRN Meri Christianson MD        Or    glucagon injection 1 mg  1 mg Subcutaneous Q15 Min PRN Meri Christianson MD        fentaNYL (PF) (SUBLIMAZE) injection 25 mcg  25 mcg Intravenous Q5 Min PRN Naila Portillo MD   25 mcg at 06/04/24 1800    fentaNYL (PF) (SUBLIMAZE) injection 50 mcg  50 mcg Intravenous Q5 Min PRN Naila Portillo MD        heparin infusion 25,000 units in 0.45% NaCl 250 mL ANTICOAGULANT  500 Units/hr Intravenous Continuous Dyan Geller MD 12 mL/hr at 06/04/24 1752 1,200 Units/hr at 06/04/24 1752    HYDROmorphone (DILAUDID) injection 0.2 mg  0.2 mg Intravenous Q5 Min PRN Naila Portillo MD        HYDROmorphone (DILAUDID) injection 0.4 mg  0.4 mg Intravenous Q5 Min PRN Naila Portillo MD        insulin aspart (NovoLOG) injection (RAPID ACTING)  1-7 Units Subcutaneous TID AC Meri Christianson MD        insulin aspart (NovoLOG) injection (RAPID ACTING)  1-5 Units Subcutaneous At Bedtime Meri Christianson MD        labetalol (NORMODYNE/TRANDATE) injection 5 mg  5 mg Intravenous Q10 Min PRN Naila Portillo MD        lactated ringers infusion   Intravenous Continuous Naila Portillo MD        naloxone (NARCAN) injection 0.1 mg  0.1 mg Intravenous Q2 Min PRN Naila Portillo MD        naloxone (NARCAN) injection 0.2 mg  0.2 mg Intravenous Q2 Min PRN Dyan Geller MD        Or    naloxone (NARCAN) injection 0.4 mg  0.4 mg Intravenous Q2 Min PRN Dyan Geller MD        Or    naloxone (NARCAN) injection 0.2 mg  0.2 mg Intramuscular Q2 Min PRN Dyan Geller MD        Or    naloxone (NARCAN) injection 0.4 mg  0.4 mg Intramuscular Q2 Min PRN Dyan Geller MD        ondansetron  (ZOFRAN ODT) ODT tab 4 mg  4 mg Oral Q30 Min PRN Naila Portillo MD        Or    ondansetron (ZOFRAN) injection 4 mg  4 mg Intravenous Q30 Min PRN Naila Portillo MD        prochlorperazine (COMPAZINE) injection 5 mg  5 mg Intravenous Q6H PRN Naila Portillo MD             Review of Systems: 12-point Review performed and negative aside from that noted in HPI.    Objective:    Vital signs:  BP 92/51   Pulse 86   Temp 98.3  F (36.8  C) (Temporal)   Resp 23   Wt 67.6 kg (149 lb)   SpO2 100%   BMI 28.15 kg/m      GENERAL APPEARANCE: healthy, alert and no distress     EYES: EOMI, - PERRL     NECK: no adenopathy, no asymmetry, masses, or scars and thyroid normal to palpation     RESP: lungs clear to auscultation - no rales, rhonchi or wheezes     CV: regular rates and rhythm, normal S1 S2, no S3 or S4 and no murmur, click or rub -     ABDOMEN:  soft, nontender, no HSM or masses and bowel sounds normal     MS: extremities normal- no gross deformities noted, no evidence of inflammation in joints, FROM in all extremities.     SKIN: no suspicious lesions or rashes     NEURO: Normal strength and tone, sensory exam grossly normal, mentation intact and speech normal     PSYCH: mentation appears normal. and affect normal/bright     LYMPHATICS: No axillary, cervical, inguinal, or supraclavicular nodes        Data    Laboratory:  Results for orders placed or performed during the hospital encounter of 02/15/24   Basic metabolic panel   Result Value Ref Range    Sodium 137 135 - 145 mmol/L    Potassium 4.0 3.4 - 5.3 mmol/L    Chloride 104 98 - 107 mmol/L    Carbon Dioxide (CO2) 25 22 - 29 mmol/L    Anion Gap 8 7 - 15 mmol/L    Urea Nitrogen 11.7 8.0 - 23.0 mg/dL    Creatinine 0.67 0.51 - 0.95 mg/dL    GFR Estimate >90 >60 mL/min/1.73m2    Calcium 8.3 (L) 8.8 - 10.2 mg/dL    Glucose 196 (H) 70 - 99 mg/dL     Lab Results   Component Value Date    WBC 26.5 (H) 06/04/2024    HGB 7.8 (L) 06/04/2024     HCT 22.5 (L) 06/04/2024    MCV 87 06/04/2024    PLT 99 (L) 06/04/2024       7.37/35/138/21    Hgb 13.2-->7.8

## 2024-06-04 NOTE — ANESTHESIA PROCEDURE NOTES
Airway       Patient location during procedure: OR       Procedure Start/Stop Times: 6/4/2024 10:15 AM  Staff -        Performed By: CRNAIndications and Patient Condition       Indications for airway management: kathi-procedural       Induction type:intravenous       Mask difficulty assessment: 0 - not attempted    Final Airway Details       Final airway type: endotracheal airway       Successful airway: ETT - single  Endotracheal Airway Details        ETT size (mm): 7.0       Cuffed: yes       Successful intubation technique: direct laryngoscopy       DL Blade Type: MAC 3       Grade View of Cords: 1       Adjucts: stylet       Position: Right       Measured from: gums/teeth       Secured at (cm): 20       Bite block used: None    Post intubation assessment        Placement verified by: capnometry and equal breath sounds        Number of attempts at approach: 1       Number of other approaches attempted: 0       Secured with: tape       Ease of procedure: easy       Dentition: Intact and Unchanged       Dental guard used and removed.    Medication(s) Administered   Medication Administration Time: 6/4/2024 10:15 AM

## 2024-06-04 NOTE — ANESTHESIA PROCEDURE NOTES
Arterial Line Procedure Note    Pre-Procedure   Staff -        Anesthesiologist:  Naila Portillo MD       Performed By: anesthesiologist       Location: pre-op       Pre-Anesthestic Checklist: patient identified, IV checked, risks and benefits discussed, informed consent, monitors and equipment checked, pre-op evaluation and at physician/surgeon's request  Timeout:       Correct Patient: Yes        Correct Procedure: Yes        Correct Site: Yes        Correct Position: Yes   Line Placement:   This line was placed Pre Induction starting at 6/4/2024 9:36 AM and ending at 6/4/2024 9:43 AM  Procedure   Procedure: arterial line and new line       Laterality: left       Insertion Site: radial.  Sterile Prep        Standard elements of sterile barrier followed       Skin prep: Chloraprep  Insertion/Injection        Technique: ultrasound guided        1. Ultrasound was used to evaluate the access site.       2. Artery evaluated via ultrasound for patency/adequacy.       3. Using real-time ultrasound the needle/catheter was observed entering the artery/vein.       4. Permanent image was captured and entered into the patient's record.       5. The visualized structures were anatomically normal.       6. There were no apparent abnormal pathologic findings.       Catheter Type/Size: 20 G, 12 cm  Narrative         Secured by: suture       Tegaderm and Biopatch dressing used.       Complications: None apparent,        Arterial waveform: Yes

## 2024-06-04 NOTE — ANESTHESIA PROCEDURE NOTES
Arterial Line Procedure Note    Pre-Procedure   Staff -        Anesthesiologist:  Meri Christianson MD       Performed By: anesthesiologist       Location: pre-op       Pre-Anesthestic Checklist: patient identified, IV checked, risks and benefits discussed, informed consent, monitors and equipment checked, pre-op evaluation and at physician/surgeon's request  Timeout:       Correct Patient: Yes        Correct Procedure: Yes        Correct Site: Yes        Correct Position: Yes   Line Placement:   This line was placed Post Induction starting at 6/4/2024 4:50 PM and ending at 6/4/2024 4:56 PM  Procedure   Procedure: arterial line       Laterality: right       Insertion Site: brachial.  Sterile Prep        Standard elements of sterile barrier followed       Skin prep: Chloraprep  Insertion/Injection        Technique: ultrasound guided        1. Ultrasound was used to evaluate the access site.       2. Artery evaluated via ultrasound for patency/adequacy.       3. Using real-time ultrasound the needle/catheter was observed entering the artery/vein.       Catheter Type/Size: 20 G, 12 cm  Narrative        Biopatch and Tegaderm dressing used.       Complications: None apparent,        Arterial waveform: Yes        IBP within 10% of NIBP: Yes   Comments:  Significant calcification with extremely small caliber radial artery, decision made to proceed with brachial insertion.

## 2024-06-04 NOTE — BRIEF OP NOTE
[facility]    Brief Operative Note    Pre-operative diagnosis: PAD (peripheral artery disease) (H24) [I73.9]  Post-operative diagnosis Same as pre-operative diagnosis    Procedure: LEFT EXTERNAL ILIAC ARTERY TO POSTERIOR TIBIAL ARTERY BYPASS WITH CRYO ARTERY WITH A FEMORAL HERNIA REPAIR, Left - Leg    Surgeon: Surgeons and Role:     * Dyan Geller MD - Primary     * Belkys Harmon DO - Assisting  Anesthesia: General   Estimated Blood Loss: 1000cc    Drains: Incisional vac left  leg  Specimens: * No specimens in log *  Findings:   Soft left external artery and distal PT, bypass performed with spliced cryoartery. Strong pulse in graft and PT prior to closure, no pulse after skin closure. Distal incisions reopened, thrombectomy with removal of fresh thrombus from proximal graft. Palpable pulse in graft and PT from incision at conclusion of case .  Complications: None.  Implants:   Implant Name Type Inv. Item Serial No.  Lot No. LRB No. Used Action   GRAFT TISS 4MM-5MM MILES 21-29 CM FEM POPLITEAL ART STRL R020 - [id number] Bone/Tissue/Biologic GRAFT TISS 4MM-5MM MILES 21-29 CM FEM POPLITEAL ART STRL R020 [id number] CRYOLIFE NA Left 1 Implanted   GRAFT TISS 4MM-5MM MILES 21-29 CM FEM POPLITEAL ART STRL R020 - [id number] Bone/Tissue/Biologic GRAFT TISS 4MM-5MM MILES 21-29 CM FEM POPLITEAL ART STRL R020 [id number] CRYOLIFE NA Left 1 Implanted   FELT NAINA PLEDGET 4.5 X 6 [id number] - [id number] Graft FELT NAINA PLEDGET 4.5 X 6 [id number]   [employer] [facility] [id number] Left 1 Implanted

## 2024-06-04 NOTE — ANESTHESIA POSTPROCEDURE EVALUATION
Patient: Ashanti Aviles    Procedure: Procedure(s):  LEFT EXTERNAL ILIAC ARTERY TO POSTERIOR TIBIAL ARTERY BYPASS WITH CRYO ARTERY WITH A FEMORAL HERNIA REPAIR       Anesthesia Type:  General    Note:  Disposition: Admission; ICU            ICU Sign Out: Anesthesiologist/ICU physician sign out WAS performed   Postop Pain Control: Uneventful            Sign Out: Well controlled pain   PONV: No   Neuro/Psych: Uneventful            Sign Out: Acceptable/Baseline neuro status   Airway/Respiratory: Uneventful            Sign Out: Acceptable/Baseline resp. status   CV/Hemodynamics:             Sign Out: Acceptable CV status; No obvious hypovolemia; No obvious fluid overload   Other NRE: NONE   DID A NON-ROUTINE EVENT OCCUR? No    Event details/Postop Comments:  Patient on heparin gtt. Phenylephrine gtt to maintain MAP > 65. Sign-out provided to covering intensivist Dr. Elaine Howard. CBC finally came back just as patient was rolling out of PACU, Hbg 7.8 - 1 unit(s) pRBC ordered to be administered in ICU.            Last vitals:  Vitals Value Taken Time   BP  06/04/24 1803   Temp 36.8  C (98.3  F) 06/04/24 1730   Pulse 85 06/04/24 1805   Resp 16 06/04/24 1805   SpO2 100 % 06/04/24 1805   Vitals shown include unfiled device data.    Electronically Signed By: Meri Christianson MD  June 4, 2024  6:20 PM

## 2024-06-05 LAB
ALBUMIN SERPL BCG-MCNC: 3.4 G/DL (ref 3.5–5.2)
ALP SERPL-CCNC: 45 U/L (ref 40–150)
ALT SERPL W P-5'-P-CCNC: 48 U/L (ref 0–50)
ANION GAP SERPL CALCULATED.3IONS-SCNC: 12 MMOL/L (ref 7–15)
APTT PPP: 26 SECONDS (ref 22–38)
APTT PPP: 51 SECONDS (ref 22–38)
APTT PPP: 54 SECONDS (ref 22–38)
AST SERPL W P-5'-P-CCNC: 59 U/L (ref 0–45)
BASOPHILS # BLD AUTO: ABNORMAL 10*3/UL
BASOPHILS # BLD MANUAL: 0 10E3/UL (ref 0–0.2)
BASOPHILS NFR BLD AUTO: ABNORMAL %
BASOPHILS NFR BLD MANUAL: 0 %
BILIRUB DIRECT SERPL-MCNC: 0.61 MG/DL (ref 0–0.3)
BILIRUB SERPL-MCNC: 1 MG/DL
BLD PROD TYP BPU: NORMAL
BLOOD COMPONENT TYPE: NORMAL
BUN SERPL-MCNC: 26.7 MG/DL (ref 8–23)
CALCIUM SERPL-MCNC: 8.5 MG/DL (ref 8.8–10.2)
CHLORIDE SERPL-SCNC: 105 MMOL/L (ref 98–107)
CODING SYSTEM: NORMAL
CREAT SERPL-MCNC: 1.07 MG/DL (ref 0.51–0.95)
CROSSMATCH: NORMAL
DEPRECATED HCO3 PLAS-SCNC: 18 MMOL/L (ref 22–29)
EGFRCR SERPLBLD CKD-EPI 2021: 57 ML/MIN/1.73M2
EOSINOPHIL # BLD AUTO: ABNORMAL 10*3/UL
EOSINOPHIL # BLD MANUAL: 0 10E3/UL (ref 0–0.7)
EOSINOPHIL NFR BLD AUTO: ABNORMAL %
EOSINOPHIL NFR BLD MANUAL: 0 %
ERYTHROCYTE [DISTWIDTH] IN BLOOD BY AUTOMATED COUNT: 15.3 % (ref 10–15)
ERYTHROCYTE [DISTWIDTH] IN BLOOD BY AUTOMATED COUNT: 15.4 % (ref 10–15)
ERYTHROCYTE [DISTWIDTH] IN BLOOD BY AUTOMATED COUNT: 15.7 % (ref 10–15)
GLUCOSE BLDC GLUCOMTR-MCNC: 142 MG/DL (ref 70–99)
GLUCOSE BLDC GLUCOMTR-MCNC: 145 MG/DL (ref 70–99)
GLUCOSE BLDC GLUCOMTR-MCNC: 177 MG/DL (ref 70–99)
GLUCOSE BLDC GLUCOMTR-MCNC: 196 MG/DL (ref 70–99)
GLUCOSE BLDC GLUCOMTR-MCNC: 224 MG/DL (ref 70–99)
GLUCOSE BLDC GLUCOMTR-MCNC: 230 MG/DL (ref 70–99)
GLUCOSE BLDC GLUCOMTR-MCNC: 251 MG/DL (ref 70–99)
GLUCOSE BLDC GLUCOMTR-MCNC: 255 MG/DL (ref 70–99)
GLUCOSE SERPL-MCNC: 275 MG/DL (ref 70–99)
HBA1C MFR BLD: 6.7 %
HCT VFR BLD AUTO: 20.4 % (ref 35–47)
HCT VFR BLD AUTO: 21.8 % (ref 35–47)
HCT VFR BLD AUTO: 23.3 % (ref 35–47)
HGB BLD-MCNC: 10.2 G/DL (ref 11.7–15.7)
HGB BLD-MCNC: 7.2 G/DL (ref 11.7–15.7)
HGB BLD-MCNC: 7.7 G/DL (ref 11.7–15.7)
HGB BLD-MCNC: 8.2 G/DL (ref 11.7–15.7)
IMM GRANULOCYTES # BLD: ABNORMAL 10*3/UL
IMM GRANULOCYTES NFR BLD: ABNORMAL %
ISSUE DATE AND TIME: NORMAL
LACTATE SERPL-SCNC: 1.5 MMOL/L (ref 0.7–2)
LACTATE SERPL-SCNC: 3 MMOL/L (ref 0.7–2)
LACTATE SERPL-SCNC: 3.8 MMOL/L (ref 0.7–2)
LYMPHOCYTES # BLD AUTO: ABNORMAL 10*3/UL
LYMPHOCYTES # BLD MANUAL: 1.8 10E3/UL (ref 0.8–5.3)
LYMPHOCYTES NFR BLD AUTO: ABNORMAL %
LYMPHOCYTES NFR BLD MANUAL: 6 %
MCH RBC QN AUTO: 29 PG (ref 26.5–33)
MCH RBC QN AUTO: 29.4 PG (ref 26.5–33)
MCH RBC QN AUTO: 29.5 PG (ref 26.5–33)
MCHC RBC AUTO-ENTMCNC: 35.2 G/DL (ref 31.5–36.5)
MCHC RBC AUTO-ENTMCNC: 35.3 G/DL (ref 31.5–36.5)
MCHC RBC AUTO-ENTMCNC: 35.3 G/DL (ref 31.5–36.5)
MCV RBC AUTO: 82 FL (ref 78–100)
MCV RBC AUTO: 84 FL (ref 78–100)
MCV RBC AUTO: 84 FL (ref 78–100)
MONOCYTES # BLD AUTO: ABNORMAL 10*3/UL
MONOCYTES # BLD MANUAL: 2.4 10E3/UL (ref 0–1.3)
MONOCYTES NFR BLD AUTO: ABNORMAL %
MONOCYTES NFR BLD MANUAL: 8 %
NEUTROPHILS # BLD AUTO: ABNORMAL 10*3/UL
NEUTROPHILS # BLD MANUAL: 26.1 10E3/UL (ref 1.6–8.3)
NEUTROPHILS NFR BLD AUTO: ABNORMAL %
NEUTROPHILS NFR BLD MANUAL: 86 %
NRBC # BLD AUTO: 0 10E3/UL
NRBC BLD AUTO-RTO: 0 /100
PF4 HEPARIN CMPLX AB SER QL: NEGATIVE
PLAT MORPH BLD: ABNORMAL
PLATELET # BLD AUTO: 96 10E3/UL (ref 150–450)
PLATELET # BLD AUTO: 97 10E3/UL (ref 150–450)
PLATELET # BLD AUTO: 99 10E3/UL (ref 150–450)
POTASSIUM SERPL-SCNC: 4.5 MMOL/L (ref 3.4–5.3)
PROT SERPL-MCNC: 5 G/DL (ref 6.4–8.3)
RBC # BLD AUTO: 2.48 10E6/UL (ref 3.8–5.2)
RBC # BLD AUTO: 2.61 10E6/UL (ref 3.8–5.2)
RBC # BLD AUTO: 2.79 10E6/UL (ref 3.8–5.2)
RBC MORPH BLD: ABNORMAL
SODIUM SERPL-SCNC: 135 MMOL/L (ref 135–145)
UFH PPP CHRO-ACNC: 0.83 IU/ML
UNIT ABO/RH: NORMAL
UNIT NUMBER: NORMAL
UNIT STATUS: NORMAL
UNIT TYPE ISBT: 6200
WBC # BLD AUTO: 29.4 10E3/UL (ref 4–11)
WBC # BLD AUTO: 29.5 10E3/UL (ref 4–11)
WBC # BLD AUTO: 30.4 10E3/UL (ref 4–11)

## 2024-06-05 PROCEDURE — 85018 HEMOGLOBIN: CPT | Performed by: STUDENT IN AN ORGANIZED HEALTH CARE EDUCATION/TRAINING PROGRAM

## 2024-06-05 PROCEDURE — 250N000013 HC RX MED GY IP 250 OP 250 PS 637: Performed by: STUDENT IN AN ORGANIZED HEALTH CARE EDUCATION/TRAINING PROGRAM

## 2024-06-05 PROCEDURE — 80048 BASIC METABOLIC PNL TOTAL CA: CPT | Performed by: STUDENT IN AN ORGANIZED HEALTH CARE EDUCATION/TRAINING PROGRAM

## 2024-06-05 PROCEDURE — 83036 HEMOGLOBIN GLYCOSYLATED A1C: CPT | Performed by: STUDENT IN AN ORGANIZED HEALTH CARE EDUCATION/TRAINING PROGRAM

## 2024-06-05 PROCEDURE — 83605 ASSAY OF LACTIC ACID: CPT | Performed by: STUDENT IN AN ORGANIZED HEALTH CARE EDUCATION/TRAINING PROGRAM

## 2024-06-05 PROCEDURE — 250N000013 HC RX MED GY IP 250 OP 250 PS 637: Performed by: SURGERY

## 2024-06-05 PROCEDURE — 85520 HEPARIN ASSAY: CPT | Performed by: SURGERY

## 2024-06-05 PROCEDURE — 85007 BL SMEAR W/DIFF WBC COUNT: CPT | Performed by: STUDENT IN AN ORGANIZED HEALTH CARE EDUCATION/TRAINING PROGRAM

## 2024-06-05 PROCEDURE — 99233 SBSQ HOSP IP/OBS HIGH 50: CPT | Performed by: STUDENT IN AN ORGANIZED HEALTH CARE EDUCATION/TRAINING PROGRAM

## 2024-06-05 PROCEDURE — 85730 THROMBOPLASTIN TIME PARTIAL: CPT | Performed by: STUDENT IN AN ORGANIZED HEALTH CARE EDUCATION/TRAINING PROGRAM

## 2024-06-05 PROCEDURE — P9016 RBC LEUKOCYTES REDUCED: HCPCS | Performed by: STUDENT IN AN ORGANIZED HEALTH CARE EDUCATION/TRAINING PROGRAM

## 2024-06-05 PROCEDURE — 250N000011 HC RX IP 250 OP 636: Performed by: STUDENT IN AN ORGANIZED HEALTH CARE EDUCATION/TRAINING PROGRAM

## 2024-06-05 PROCEDURE — 85018 HEMOGLOBIN: CPT | Performed by: INTERNAL MEDICINE

## 2024-06-05 PROCEDURE — G0463 HOSPITAL OUTPT CLINIC VISIT: HCPCS | Mod: 25

## 2024-06-05 PROCEDURE — 999N000287 HC ICU ADULT ROUNDING, EACH 10 MINS

## 2024-06-05 PROCEDURE — 86022 PLATELET ANTIBODIES: CPT | Performed by: STUDENT IN AN ORGANIZED HEALTH CARE EDUCATION/TRAINING PROGRAM

## 2024-06-05 PROCEDURE — 250N000009 HC RX 250: Performed by: STUDENT IN AN ORGANIZED HEALTH CARE EDUCATION/TRAINING PROGRAM

## 2024-06-05 PROCEDURE — 258N000003 HC RX IP 258 OP 636: Performed by: STUDENT IN AN ORGANIZED HEALTH CARE EDUCATION/TRAINING PROGRAM

## 2024-06-05 PROCEDURE — 82248 BILIRUBIN DIRECT: CPT | Performed by: SURGERY

## 2024-06-05 PROCEDURE — 85027 COMPLETE CBC AUTOMATED: CPT | Performed by: STUDENT IN AN ORGANIZED HEALTH CARE EDUCATION/TRAINING PROGRAM

## 2024-06-05 PROCEDURE — 85730 THROMBOPLASTIN TIME PARTIAL: CPT | Performed by: SURGERY

## 2024-06-05 PROCEDURE — 250N000009 HC RX 250: Performed by: SURGERY

## 2024-06-05 PROCEDURE — 200N000001 HC R&B ICU

## 2024-06-05 PROCEDURE — 97605 NEG PRS WND THER DME<=50SQCM: CPT

## 2024-06-05 RX ORDER — FUROSEMIDE 10 MG/ML
20 INJECTION INTRAMUSCULAR; INTRAVENOUS ONCE
Status: COMPLETED | OUTPATIENT
Start: 2024-06-05 | End: 2024-06-05

## 2024-06-05 RX ORDER — HYDROMORPHONE HYDROCHLORIDE 1 MG/ML
0.5 INJECTION, SOLUTION INTRAMUSCULAR; INTRAVENOUS; SUBCUTANEOUS ONCE
Status: COMPLETED | OUTPATIENT
Start: 2024-06-05 | End: 2024-06-05

## 2024-06-05 RX ORDER — POLYETHYLENE GLYCOL 3350 17 G/17G
17 POWDER, FOR SOLUTION ORAL DAILY
Status: CANCELLED | OUTPATIENT
Start: 2024-06-05

## 2024-06-05 RX ORDER — AMOXICILLIN 250 MG
2 CAPSULE ORAL 2 TIMES DAILY
Status: CANCELLED | OUTPATIENT
Start: 2024-06-05

## 2024-06-05 RX ORDER — DEXTROSE MONOHYDRATE 100 MG/ML
INJECTION, SOLUTION INTRAVENOUS CONTINUOUS PRN
Status: DISCONTINUED | OUTPATIENT
Start: 2024-06-05 | End: 2024-06-11 | Stop reason: HOSPADM

## 2024-06-05 RX ORDER — NICOTINE POLACRILEX 4 MG
15-30 LOZENGE BUCCAL
Status: DISCONTINUED | OUTPATIENT
Start: 2024-06-05 | End: 2024-06-11 | Stop reason: HOSPADM

## 2024-06-05 RX ORDER — POLYETHYLENE GLYCOL 3350 17 G/17G
17 POWDER, FOR SOLUTION ORAL DAILY
Status: DISCONTINUED | OUTPATIENT
Start: 2024-06-06 | End: 2024-06-05

## 2024-06-05 RX ORDER — AMOXICILLIN 250 MG
1 CAPSULE ORAL 2 TIMES DAILY
Status: DISCONTINUED | OUTPATIENT
Start: 2024-06-06 | End: 2024-06-11 | Stop reason: HOSPADM

## 2024-06-05 RX ORDER — AMOXICILLIN 250 MG
2 CAPSULE ORAL 2 TIMES DAILY
Status: DISCONTINUED | OUTPATIENT
Start: 2024-06-06 | End: 2024-06-11 | Stop reason: HOSPADM

## 2024-06-05 RX ORDER — DEXTROSE MONOHYDRATE 25 G/50ML
25-50 INJECTION, SOLUTION INTRAVENOUS
Status: DISCONTINUED | OUTPATIENT
Start: 2024-06-05 | End: 2024-06-11 | Stop reason: HOSPADM

## 2024-06-05 RX ORDER — AMOXICILLIN 250 MG
1 CAPSULE ORAL 2 TIMES DAILY
Status: CANCELLED | OUTPATIENT
Start: 2024-06-05

## 2024-06-05 RX ADMIN — OXYCODONE HYDROCHLORIDE 10 MG: 5 TABLET ORAL at 06:00

## 2024-06-05 RX ADMIN — HYDROMORPHONE HYDROCHLORIDE 0.4 MG: 0.2 INJECTION, SOLUTION INTRAMUSCULAR; INTRAVENOUS; SUBCUTANEOUS at 19:45

## 2024-06-05 RX ADMIN — HYDROMORPHONE HYDROCHLORIDE 0.4 MG: 0.2 INJECTION, SOLUTION INTRAMUSCULAR; INTRAVENOUS; SUBCUTANEOUS at 09:52

## 2024-06-05 RX ADMIN — HYDROMORPHONE HYDROCHLORIDE 0.4 MG: 0.2 INJECTION, SOLUTION INTRAMUSCULAR; INTRAVENOUS; SUBCUTANEOUS at 01:30

## 2024-06-05 RX ADMIN — ASPIRIN 81 MG: 81 TABLET, COATED ORAL at 08:32

## 2024-06-05 RX ADMIN — ACETAMINOPHEN 975 MG: 325 TABLET ORAL at 12:33

## 2024-06-05 RX ADMIN — OXYCODONE HYDROCHLORIDE 10 MG: 5 TABLET ORAL at 17:18

## 2024-06-05 RX ADMIN — SODIUM CHLORIDE, POTASSIUM CHLORIDE, SODIUM LACTATE AND CALCIUM CHLORIDE 125 ML/HR: 600; 310; 30; 20 INJECTION, SOLUTION INTRAVENOUS at 18:12

## 2024-06-05 RX ADMIN — ONDANSETRON 4 MG: 4 TABLET, ORALLY DISINTEGRATING ORAL at 19:52

## 2024-06-05 RX ADMIN — SENNOSIDES AND DOCUSATE SODIUM 1 TABLET: 8.6; 5 TABLET ORAL at 21:27

## 2024-06-05 RX ADMIN — HYDROMORPHONE HYDROCHLORIDE 0.5 MG: 1 INJECTION, SOLUTION INTRAMUSCULAR; INTRAVENOUS; SUBCUTANEOUS at 05:14

## 2024-06-05 RX ADMIN — ACETAMINOPHEN 975 MG: 325 TABLET ORAL at 03:25

## 2024-06-05 RX ADMIN — Medication 1.2 MCG/KG/MIN: at 01:59

## 2024-06-05 RX ADMIN — ONDANSETRON 4 MG: 2 INJECTION INTRAMUSCULAR; INTRAVENOUS at 05:15

## 2024-06-05 RX ADMIN — ACETAMINOPHEN 975 MG: 325 TABLET ORAL at 19:45

## 2024-06-05 RX ADMIN — OXYCODONE HYDROCHLORIDE 10 MG: 5 TABLET ORAL at 02:05

## 2024-06-05 RX ADMIN — INSULIN HUMAN 3 UNITS/HR: 1 INJECTION, SOLUTION INTRAVENOUS at 08:41

## 2024-06-05 RX ADMIN — HYDROMORPHONE HYDROCHLORIDE 0.4 MG: 0.2 INJECTION, SOLUTION INTRAMUSCULAR; INTRAVENOUS; SUBCUTANEOUS at 06:43

## 2024-06-05 RX ADMIN — CEFAZOLIN SODIUM 2 G: 2 INJECTION, SOLUTION INTRAVENOUS at 05:15

## 2024-06-05 RX ADMIN — SENNOSIDES AND DOCUSATE SODIUM 1 TABLET: 8.6; 5 TABLET ORAL at 08:32

## 2024-06-05 RX ADMIN — POLYETHYLENE GLYCOL 3350 17 G: 17 POWDER, FOR SOLUTION ORAL at 08:33

## 2024-06-05 RX ADMIN — Medication: at 21:58

## 2024-06-05 RX ADMIN — SODIUM CHLORIDE, POTASSIUM CHLORIDE, SODIUM LACTATE AND CALCIUM CHLORIDE: 600; 310; 30; 20 INJECTION, SOLUTION INTRAVENOUS at 05:32

## 2024-06-05 RX ADMIN — HYDROMORPHONE HYDROCHLORIDE 0.4 MG: 0.2 INJECTION, SOLUTION INTRAMUSCULAR; INTRAVENOUS; SUBCUTANEOUS at 03:25

## 2024-06-05 RX ADMIN — OXYCODONE HYDROCHLORIDE 10 MG: 5 TABLET ORAL at 21:26

## 2024-06-05 RX ADMIN — ARGATROBAN 1 MCG/KG/MIN: 50 INJECTION INTRAVENOUS at 12:17

## 2024-06-05 RX ADMIN — PANTOPRAZOLE SODIUM 40 MG: 40 TABLET, DELAYED RELEASE ORAL at 06:00

## 2024-06-05 RX ADMIN — SODIUM CHLORIDE, POTASSIUM CHLORIDE, SODIUM LACTATE AND CALCIUM CHLORIDE: 600; 310; 30; 20 INJECTION, SOLUTION INTRAVENOUS at 09:59

## 2024-06-05 RX ADMIN — SODIUM CHLORIDE, POTASSIUM CHLORIDE, SODIUM LACTATE AND CALCIUM CHLORIDE 500 ML: 600; 310; 30; 20 INJECTION, SOLUTION INTRAVENOUS at 05:15

## 2024-06-05 RX ADMIN — FUROSEMIDE 20 MG: 10 INJECTION, SOLUTION INTRAMUSCULAR; INTRAVENOUS at 17:18

## 2024-06-05 RX ADMIN — INSULIN ASPART 2 UNITS: 100 INJECTION, SOLUTION INTRAVENOUS; SUBCUTANEOUS at 17:19

## 2024-06-05 RX ADMIN — ARGATROBAN 1 MCG/KG/MIN: 50 INJECTION INTRAVENOUS at 22:01

## 2024-06-05 ASSESSMENT — ACTIVITIES OF DAILY LIVING (ADL)
ADLS_ACUITY_SCORE: 27
DEPENDENT_IADLS:: INDEPENDENT
ADLS_ACUITY_SCORE: 27
ADLS_ACUITY_SCORE: 24
ADLS_ACUITY_SCORE: 27
ADLS_ACUITY_SCORE: 23
ADLS_ACUITY_SCORE: 27
ADLS_ACUITY_SCORE: 27
ADLS_ACUITY_SCORE: 29
ADLS_ACUITY_SCORE: 29
ADLS_ACUITY_SCORE: 27
ADLS_ACUITY_SCORE: 29
ADLS_ACUITY_SCORE: 24
ADLS_ACUITY_SCORE: 24
ADLS_ACUITY_SCORE: 23
ADLS_ACUITY_SCORE: 27
ADLS_ACUITY_SCORE: 27
ADLS_ACUITY_SCORE: 24
ADLS_ACUITY_SCORE: 27

## 2024-06-05 NOTE — PROGRESS NOTES
[facility]    Medicine Progress Note - Hospitalist Service    Date of Admission:  6/4/2024    Assessment & Plan   Ahsanti is a 67 YO female admitted in ICU from OR for the management of hemorrhagic shock. Patient has a h/o hypertension, DM-II, severe PAD, left hallux osteomyelitis. Patient presented for left external iliac artery to post tibial artery bypass. Surgery was complicated by loss of peripheral pulse requiring re-opening of incision with thrombectomy from the graft, and hemorrhagic shock requiring ICU admission on pressor and blood transfusion    Severe PAD  -- Left external andree artery to posterior tibial artery bypass requiring reopening and revision with thrombectomy form graft on 06/04.  -- High risk for bypass failure, no additional options for revascularization. If bypass fails, pateint will need an amputation per vascular team.  -- C/w Aspirin  -- Argatroban gtt per vascular team.     Hemorrhagic shock-resolved  Acute blood loss anemia  -- Weaned off of phenylephrine gtt.   -- Resuscitation with IVF and pRBCs.   -- S/p 3 PRBCs  -- Coag profiles not suggestive of consumptive DIC  -- Q6 CBC per vascular    Thrombocytopenia  -- heparin gtt discontinued  -- HIT screen pending    Lactic acidosis  --Due to poor perfusion.   -- Monitor Q6    Leukocytosis, low grade fever  -- Likely reactive post op  -- Encouarge to use IS  -- Monitor wbc and temp curve  -- Discussed with Vascular- will hold off Abx for now    DM-II  -- On Jardiance, Trajenta, Metfromin and Tresiba 30 units BID at home.   -- Accu-checks, cont with sliding scale insulin. Will likely start Tresiba tomorrow     Leukocytosis  -- Was normal earlier. Likely stress reaction    Essential HTN  -- PTA meds (Norvasc, Lisinopril, HCTZ) held due to shock. Will resume when appropriate.  -- Monitor vital signs per protocol  -- hydralazine iv prn          Diet: Moderate Consistent Carb (60 g CHO per Meal) Diet    DVT Prophylaxis:  Argatroban  Cedeno Catheter: PRESENT, indication: Surgical procedure  Lines: PRESENT      PICC 06/04/24 Triple Lumen Left Brachial vein medial-Site Assessment: WDL  Arterial Line 06/04/24 Brachial-Site Assessment: WDL Except;Positional      Cardiac Monitoring: None  Code Status: Full Code      Clinically Significant Risk Factors              # Hypoalbuminemia: Lowest albumin = 3.4 g/dL at 6/5/2024  4:17 AM, will monitor as appropriate  # Coagulation Defect: INR = 1.40 (Ref range: 0.85 - 1.15) and/or PTT = 54 Seconds (Ref range: 22 - 38 Seconds), will monitor for bleeding  # Thrombocytopenia: Lowest platelets = 96 in last 2 days, will monitor for bleeding   # Hypertension: Noted on problem list           #Precipitous drop in Hgb/Hct: Lowest Hgb this hospitalization: 7.7 g/dL. Will continue to monitor and treat/transfuse as appropriate.    # DMII: A1C = 6.7 % (Ref range: <5.7 %) within past 6 months, PRESENT ON ADMISSION             Disposition Plan     Medically Ready for Discharge: Anticipated in 2-4 Days             Elida Espino MD  Hospitalist Service  [facility]  Securely message with [employer] (more info)  Text page via [facility] Paging/Directory   ______________________________________________________________________    Interval History   Patient is new to me today. Chart reviewed.  Patient is seen and examined at bedside.   Pt denied fever, chills. No bleeding.   Plan of care discussed with patient. All questions answered. Pt verbalized understanding.   Discussed with Vascular, bedside RN.  Physical Exam   Vital Signs: Temp: 98.2  F (36.8  C) Temp src: Oral BP: 99/55 Pulse: 115   Resp: 19 SpO2: 96 % O2 Device: None (Room air) Oxygen Delivery: 4 LPM  Weight: 142 lbs 13.73 oz    GEN: Alert and oriented. Not in acute distress.  HEENT: Atraumatic, mucous membrane- moist and pink.  Chest: Bilateral air entry.  CVS: S1S2 regular.   Abdomen: Soft. Non-tender, non-distended. No organomegaly. No  guarding or rigidity. Bowel sounds active.   Extremities: left LE wound vac. Left LE swollen but no deep tenderness. Left foot cold. Chronic wound over left hallux  CNS: No involuntary movements.  Skin: no cyanosis or clubbing.     Medical Decision Making       55 MINUTES SPENT BY ME on the date of service doing chart review, history, exam, documentation & further activities per the note.      Data

## 2024-06-05 NOTE — CONSULTS
Care Management Initial Consult    General Information  Assessment completed with: Patient,    Type of CM/SW Visit: Initial Assessment    Primary Care Provider verified and updated as needed: Yes-see Dr. Mccracken at [facility]    Readmission within the last 30 days: no previous admission in last 30 days         Advance Care Planning: Advance Care Planning Reviewed:  (no HCD)          Communication Assessment  Patient's communication style: spoken language (English or Bilingual)    Hearing Difficulty or Deaf: no   Wear Glasses or Blind: yes    Cognitive  Cognitive/Neuro/Behavioral: .WDL except, mood/behavior  Level of Consciousness: alert  Arousal Level: opens eyes spontaneously  Orientation: oriented x 4  Mood/Behavior: anxious, restless  Best Language: 0 - No aphasia  Speech: hoarse    Living Environment:   People in home: spouse     Current living Arrangements: house      Able to return to prior arrangements: yes       Family/Social Support:  Care provided by: self  Provides care for: no one  Marital Status:   , Children          Description of Support System: Supportive, Involved         Current Resources:   Patient receiving home care services: No     Community Resources: None  Equipment currently used at home: none  Supplies currently used at home: None    Employment/Financial:  Employment Status:  (medical leave)        Financial Concerns:             Does the patient's insurance plan have a 3 day qualifying hospital stay waiver?  Yes     Which insurance plan 3 day waiver is available? Alternative insurance waiver    Will the waiver be used for post-acute placement? Undetermined at this time    Lifestyle & Psychosocial Needs:  Social Determinants of Health     Food Insecurity: Not on file   Depression: Not at risk (3/5/2024)    PHQ-2     PHQ-2 Score: 0   Housing Stability: Not on file   Tobacco Use: Medium Risk (6/4/2024)    Patient History     Smoking Tobacco Use: Former     Smokeless  Tobacco Use: Never     Passive Exposure: Never   Financial Resource Strain: Not on file   Alcohol Use: Not on file   Transportation Needs: Not on file   Physical Activity: Not on file   Interpersonal Safety: Not on file   Stress: Not on file   Social Connections: Not on file   Health Literacy: Not on file       Functional Status:  Prior to admission patient needed assistance:   Dependent ADLs:: Independent  Dependent IADLs:: Independent         Additional Information:    Assessment completed with patient. Patient reports she lives in her house with spouse. She is independent with ADLs/IADLs, ambulates without devices and is on medical leave from work. Patient requesting a cane at discharge. Daughter Zee is primary family contact and family willing to transport at discharge.     Kenia Romero RN

## 2024-06-05 NOTE — PROGRESS NOTES
ICU SIGNOFF NOTE:  Critical Care will sign off this patient, please feel free to contact us with any questions.    Roxanne GARNETT Bebo  Pulmonary and Critical Care  [id number]

## 2024-06-05 NOTE — CONSULTS
"[facility]  WO Nurse Inpatient Assessment     Consulted for: LLE incisional VAC, LLE wound    Summary: Initial Prevena placed in OR - included both incision sites and small wound in between the incision lines    Patient History (according to provider note(s):      Assessment:   68yoF with history of osteomyelitis of left hallux, severe peripheral vascular disease who presented for left external iliac artery to posterior tib artery bypass requiring reopening of  invision with thrombectomy from graft which was complicated by hemorrhagic shock and poor reperfusion        Clinically Significant Risk Factors Present on Admission                # Drug Induced Platelet Defect: home medication list includes an antiplatelet medication   # Hypertension: Noted on problem list    # Anemia: based on hgb <11           #Precipitous drop in Hgb/Hct: Lowest Hgb this hospitalization: 7.8 g/dL. Will continue to monitor and treat/transfuse as appropriate.     # DMII: A1C = N/A within past 6 months             # Anemia: based on hgb <11         Active Problems:    PAD (peripheral artery disease) (H24)        Critical Care Time greater than: 30 minutes, critically ill requiring vasopressors for hemodynamic instability.         Plan:   C/V:  1) Hemorrhagic shock  -Volume resuscitation  -Holding home lisinopril/norvasc  -Currently requiring Phenylephrine at low dose. Hope to wean off      Renal:  1) Lactic acidosis from poor perfusion     Pulmonary  No issues currently but does carry a \"difficult airway\" label.      Neuro:  1) Need for pain control  -PRN dilaudid     ID:  -Cefazolin for perioperative prophylactic antibiotic.      Vascular:  1) Bypass with concerns for ongoing ischemia  -May require amputation later this week.   -Heparin drip     Heme:  1) Acute blood loss anemia  -Most recent hemoglobin 7.8 from 13 at start of case. Transfused 1Unit PRBC so far, would like to transfuse 2nd unit  -Requiring heparin " drip due to graft thrombus so will monitor serial hemoglobins every 4 hours.   -Check coags given concern for consumptive DIC.      Endo:  1) DMII  -NISS    Assessment:    Skin Injury Location: LLE      Last photo: 6/5/24  Skin injury due to:  Surgical incision lines and one denudement - chronic wound and new need for revascularization  Skin history and plan of care: Patient has followed in the past with SILVINA Ordaz and MD Oliva at wound clinic  Affected area:      Skin assessment: Intact and Edema     Measurements (length x width x depth, in cm) Each incision line is approximately 9 cm in length; the wound is 3 cm x 2 cm x 0.3 cm     Color: normal and consistent with surrounding tissue     Temperature  normal      Drainage: small .      Color: bloody      Odor: none  Pain: mild, constant  Pain interventions prior to dressing change: patient tolerated well and slow and gentle cares   Treatment goal: Heal , Drainage control, and Maintain (prevention of deterioration)  STATUS: initial assessment  Supplies ordered: at bedside and ordered multiple Prevena dressings  First dressing changed at 1100 and second replaced at time of note file  Will continue wound cares as at home per patient request.  Treatment Plan:   Prevena - Leave in place up to 14 days  Contact surgeon with additional questions.    LLE wound (between incision dressings) - Daily  Cleanse with saline, gently dry.  Apply thin layer Aquaphor to wound bed, cover with Mepilex 3 x 3.  (Request sent to provider to order Aquaphor as that is a phm item; if no Aquaphor available ok to use thin layer Vaseline)    Orders: Written    RECOMMEND PRIMARY TEAM ORDER: None, at this time  Education provided: plan of care  Discussed plan of care with: Patient and Nurse  WOC nurse follow-up plan: weekly  Notify WOC if wound(s) deteriorate.  Nursing to notify the Provider(s) and re-consult the WOC Nurse if new skin concern.    DATA:     Current support surface: Standard   Low air loss (SANTY pump, Isolibrium, Pulsate)  Containment of urine/stool: Continent of bowel and Indwelling catheter  BMI: Body mass index is 26.99 kg/m .   Active diet order: Orders Placed This Encounter      Moderate Consistent Carb (60 g CHO per Meal) Diet     Output: I/O last 3 completed shifts:  In: 7945.8 [P.O.:2640; I.V.:3135.8; IV Piggyback:500]  Out: 1630 [Urine:1630]     Labs:   Recent Labs   Lab 06/05/24  1138 06/05/24  0548 06/05/24  0417 06/04/24  2240 06/04/24  2035   ALBUMIN  --   --  3.4*  --   --    HGB 7.7* 8.2*  --    < >  --    INR  --   --   --   --  1.40*   WBC 29.5* 30.4*  --   --   --    A1C  --  6.7*  --   --   --     < > = values in this interval not displayed.     Pressure injury risk assessment:   Sensory Perception: 4-->no impairment  Moisture: 4-->rarely moist  Activity: 1-->bedfast  Mobility: 3-->slightly limited  Nutrition: 3-->adequate  Friction and Shear: 3-->no apparent problem  [place] Score: 18    LADY Boyle RN CWOCN  Pager no longer is use, please contact through [employer] group: [facility]

## 2024-06-05 NOTE — PROGRESS NOTES
VASCULAR SURGERY PROGRESS NOTE    Subjective:  Resting comfortably this AM with complaints of left toe pain, denies any pain in left leg and only minimal kathi incisional pain. Tachycardia noted overnight. Appropriate UOP since or but with decrease in Hgb this AM to 8.2.     Objective:  Intake/Output Summary (Last 24 hours) at 6/5/2024 0738  Last data filed at 6/5/2024 0600  Gross per 24 hour   Intake 7945.8 ml   Output 1630 ml   Net 6315.8 ml     PHYSICAL EXAM:  BP 99/55   Pulse 116   Temp 98.2  F (36.8  C) (Oral)   Resp 24   Wt 64.8 kg (142 lb 13.7 oz)   SpO2 99%   BMI 26.99 kg/m    General: The patient is alert and oriented. Appropriate. No acute distress  Psych: pleasant affect, answers questions appropriately  Skin: Color appropriate for race, warm, dry.  Respiratory: The patient does require supplemental oxygen. Breathing unlabored on nasal cannula  GI:  Abdomen soft, nontender to light palpation.  Extremities: Left PT pulse palpable, obstructive signal, monophasic DP. Significant swelling to LLE but non tender to deep palpation on calf. No pain on passive movement of left foot. Calf compartments soft but swollen. Motor and sensation intact to left leg. LLQ incision with glue in place, soft, no hematoma noted, no abdominal tenderness       Labs:  Na 135  Cr 1.07 from 0.8  Lactic acid 3.8 from 3.1  WBC 30  Hgb 8.2 from 11.8 post op  Platelet 97 from 235    ASSESSMENT:  69 yo female with CLTI LLE with hx of left fem distal bypass x2 failure due to early thrombosis now with third redo bypass left external iliac to distal PT on 6/4, on heparin infusion overnight with significant swelling in left leg      PLAN:  Pause heparin this AM   TALA panel   Will restart anticoagulation with argatroban when stable  Q6 hour CBC  Q6 hour lactic acid  Cont LR @125cc/hr  Ok for OOB  Wound care consult left toe  Podiatry consult left toe  Cont ICU care  Hospitalist consult for assistance with diabetic management, appreciate  care  SCDs      Discussed pt history, exam, assessment and plan with Dr. Geller of the vascular surgery service, who is in agreement with the above.    Belkys Harmon,   Fellow  VASCULAR SURGERY

## 2024-06-05 NOTE — DISCHARGE INSTRUCTIONS
Prevena - Leave in place up to 14 days  Contact surgeon with additional questions.    LLE wound (between incision dressings) - Daily  Cleanse with saline, gently dry.  Apply thin layer Aquaphor to wound bed, cover with Mepilex 3 x 3.  (Request sent to provider to order Aquaphor as that is a phm item; if no Aquaphor available ok to use thin layer Vaseline)    You will need outpt INR monitoring in the Coumadin clinic. Please contact Dr. Mccracken at [facility] to arrange!  [phone]    6/12/24 at 0820 appt with Matilde Donnelly  [address] [facility] - [place]  [phone]  Dr. Ordaz  6/21/24 10:05 am

## 2024-06-05 NOTE — PROGRESS NOTES
[facility]    Medicine Progress Note - Hospitalist Service    Date of Admission:  6/4/2024    Assessment & Plan   Ashanti is a 67 YO female admitted in ICU from OR for the management of hemorrhagic shock. Patient has a h/o hypertension, DM-II, severe PAD, left hallux osteomyelitis. Patient presented for left external iliac artery to post tibial artery bypass. Surgery was complicated by loss of peripheral pulse requiring re-opening of incision with thrombectomy from the graft, and hemorrhagic shock requiring ICU admission on pressor and blood transfusion    Hemorrhagic shock  -On phenylephrine gtt. Intensivist managing.   -Resuscitation with IVF and pRBCs. Hgb drop from 13.2 to 7.98 noted. Transfused and unit so far, transfusing 2nd unit per intensivist. Checking Coags for possible consumptive DIC    Lactic acidosis  -Due to poor perfusion.   -Monitor.    Severe PAD  -Surgery as above. High risk for bypass failure, no additional options for revascularization. If bypass fails, pateint will need an amputation per vascular team.  -Cont aspirin  -Heprin gtt per vascular team.     DM-II  -On Jardiance, Trajenta, Metfromin and Tresiba 30 units BID at home.   -On clears. Cont with sliding scale insulin. Will likely add Tresiba tomorrow when diet advanced further.    Leucocytosis  -Was normal earlier. Likely stress reaction          Diet: Advance Diet as Tolerated: Clear Liquid Diet    DVT Prophylaxis: Heparin gtt  Cedeno Catheter: PRESENT, indication: Surgical procedure  Lines: PRESENT             Cardiac Monitoring: None  Code Status: Full Code      Clinically Significant Risk Factors Present on Admission                # Drug Induced Platelet Defect: home medication list includes an antiplatelet medication   # Hypertension: Noted on problem list   # Circulatory Shock: required vasopressors within past 24 hours     # Anemia: based on hgb <11        #Precipitous drop in Hgb/Hct: Lowest Hgb this  "hospitalization: 7.8 g/dL. Will continue to monitor and treat/transfuse as appropriate.    # DMII: A1C = N/A within past 6 months               Disposition Plan     Medically Ready for Discharge: Defer to primary team        LEVI Holland  Hospitalist Service  [facility]  Securely message with [employer] (more info)  Text page via [employer] Paging/Directory   ______________________________________________________________________    Interval History   Patient is new to me today. Reviewed with nursing staff. Patient lying in bed, comfortable. RN reports the left LE has no pulse since coming to ICU; surgical team is aware.     Physical Exam   Vital Signs: Temp: 97.7  F (36.5  C) Temp src: Oral BP: 112/53 Pulse: 85   Resp: 19 SpO2: 100 % O2 Device: Oxymask Oxygen Delivery: 4 LPM  Weight: 149 lbs 0 oz      General: Not in obvious distress.  HEENT: NC, AT   Chest: Clear to auscultation bilaterally  Heart: S1S2 normal, regular. No M/R/G  Abdomen: Soft. NT, ND. Bowel sounds- active.  Neuro: Alert and awake, grossly non-focal      Medical Decision Making             Data     I have personally reviewed the following data over the past 24 hrs:    26.5 (H)  \   7.8 (L)   / 99 (L)     N/A N/A N/A /  271 (H)   3.5 N/A 0.84 \     Procal: N/A CRP: N/A Lactic Acid: 3.1 (H)       INR:  1.40 (H) PTT:  N/A   D-dimer:  N/A Fibrinogen:  164 (L)       Imaging results reviewed over the past 24 hrs:   Recent Results (from the past 24 hour(s))   POC US Guided Vascular Access    Narrative    Ultrasound was performed as guidance to an anesthesia procedure.  Click   \"PACS images\" hyperlink below to view any stored images.  For specific   procedure details, view procedure note authored by anesthesia.   XR Surgery HARRIET Fluoro L/T 5 Min    Narrative    This exam was marked as non-reportable because it will not be read by a   radiologist or a [place] non-radiologist provider.         XR Chest Port 1 View    Narrative    " EXAM: XR CHEST PORT 1 VIEW  LOCATION: [facility]  DATE: 6/4/2024    INDICATION: PICC catheter placement.  COMPARISON: 2/17/2024.      Impression    IMPRESSION: Left upper extremity PICC catheter has tip overlying the superior cavoatrial junction.    Lungs are clear. No pleural effusion or pneumothorax.    Cardiomediastinal silhouette is normal. Atherosclerotic calcifications of the thoracic aorta.

## 2024-06-05 NOTE — PLAN OF CARE
[facility] - ICU    RN Progress Note:            Pertinent Assessments:      Please refer to flowsheet rows for full assessment                Key Events - This Shift:     Left pedal doppler positive, Left post tibial weak doppler. Heparin gtt currently at 600 units/hr. Hemoglobin 8.2 this morning.     Increased pain and tightness on left calf. Pain Score 10/10  constant throbbing, aching, tightness pain. Restless, moaning and crying. Updated Dr. White with unrelieved pain  despite pain management prior to rounds and during rounds (Plan for Dilaudid PCA). Administered Dilaudid 0.4 mg IV every 2 hours and oxycodone 10 mg every 4 hours.     Also Messaged Vascular Surgery Dr. Harmon regarding leg tightness and uncontrolled pain. Ordered extra Dilaudid 0.5 mg IV  and  ml bolus then increased LR to 125 ml/hr.    Able to titrate and weaned off Wiliam after LR bolus. MAP remained >65.    Trending up WBC and Lactic acid.          Problem: Adult Inpatient Plan of Care  Goal: Optimal Comfort and Wellbeing  Intervention: Monitor Pain and Promote Comfort  Recent Flowsheet Documentation  Taken 6/5/2024 0325 by Meri Padilla RN  Pain Management Interventions:   medication (see MAR)   emotional support   therapeutic presence  Taken 6/5/2024 0205 by Meri Padilla RN  Pain Management Interventions:   medication (see MAR)   Provider notified (comment)   emotional support   repositioned   therapeutic presence  Taken 6/5/2024 0130 by Meri Padilla RN  Pain Management Interventions:   medication (see MAR)   Provider notified (comment)  Taken 6/4/2024 2333 by Meri Padilla RN  Pain Management Interventions:   medication (see MAR)   emotional support   repositioned     Problem: Adult Inpatient Plan of Care  Goal: Absence of Hospital-Acquired Illness or Injury  Intervention: Prevent Infection  Recent Flowsheet Documentation  Taken 6/5/2024 0400 by Meri Padilla  RN  Infection Prevention:   equipment surfaces disinfected   rest/sleep promoted   hand hygiene promoted  Taken 6/5/2024 0000 by Meri Padilla RN  Infection Prevention:   equipment surfaces disinfected   rest/sleep promoted   hand hygiene promoted

## 2024-06-05 NOTE — PROCEDURES
[facility]    Triple Lumen PICC Placement    Date/Time: 6/4/2024 9:21 PM    Performed by: Ene Wyman RN  Authorized by: Elaine Howard MD  Indications: vascular access      UNIVERSAL PROTOCOL   Site Marked: NA  Prior Images Obtained and Reviewed:  NA  Required items: Required blood products, implants, devices and special equipment available    Patient identity confirmed:  Arm band and provided demographic data  NA - No sedation, light sedation, or local anesthesia  Confirmation Checklist:  Patient's identity using two indicators, relevant allergies, procedure was appropriate and matched the consent or emergent situation and correct equipment/implants were available  Time out: Immediately prior to the procedure a time out was called    Universal Protocol: the Joint Commission Universal Protocol was followed    Preparation: Patient was prepped and draped in usual sterile fashion    ESBL (mL):  1     ANESTHESIA    Anesthesia:  Local infiltration  Local Anesthetic:  Lidocaine 1% without epinephrine  Anesthetic Total (mL):  0.4      SEDATION    Patient Sedated: No        Preparation: skin prepped with 2% chlorhexidine  Skin prep agent: skin prep agent completely dried prior to procedure  Sterile barriers: maximum sterile barriers were used: cap, mask, sterile gown, sterile gloves, and large sterile sheet  Hand hygiene: hand hygiene performed prior to central venous catheter insertion  Type of line used: PICC  Catheter type: triple lumen  Lumen type: valved and power PICC  Lumen Identification: Gray, Red and White  Catheter size: 5 Fr  Brand: Bard  Lot number: [id number]  Placement method: MST and ultrasound  Number of attempts: 1  Difficulty threading catheter: no  Successful placement: yes  Orientation: left  Catheter to Vein (%): 13  Location: brachial vein (medial)  Arm circumference: adults 10 cm  Extremity circumference: 32  Visible catheter length: 3  Total catheter length:  42  Dressing and securement: antibiotic disc placed, statlock and transparent dressing  Post procedure assessment: blood return through all ports, free fluid flow, placement verified by x-ray and placement verified by 3CG technology  PROCEDURE   Patient Tolerance:  Patient tolerated the procedure well with no immediate complicationsDescribe Procedure: Consent per patient. Uneventful PICC placement to left upper arm medial brachial vein, first attempt, ultrasound guidance. Catheter advanced easily. 3CG utilized for placement but unable to download image. Peaked P waves, no deflection. Brisk blood return and flushes easily all lumens. Placement confirmed by xray. Patient tolerated procedure well. Primary RN aware PICC is good to use.  Educational material left at bedside.   Disposal: sharps and needle count correct at the end of procedure, needles and guidewire disposed in sharps container

## 2024-06-06 ENCOUNTER — PREP FOR PROCEDURE (OUTPATIENT)
Dept: OTHER | Facility: CLINIC | Age: 69
End: 2024-06-06
Payer: COMMERCIAL

## 2024-06-06 ENCOUNTER — APPOINTMENT (OUTPATIENT)
Dept: PHYSICAL THERAPY | Facility: HOSPITAL | Age: 69
DRG: 252 | End: 2024-06-06
Attending: STUDENT IN AN ORGANIZED HEALTH CARE EDUCATION/TRAINING PROGRAM
Payer: COMMERCIAL

## 2024-06-06 DIAGNOSIS — M86.172 ACUTE OSTEOMYELITIS OF TOE OF LEFT FOOT (H): Primary | ICD-10-CM

## 2024-06-06 LAB
ANION GAP SERPL CALCULATED.3IONS-SCNC: 8 MMOL/L (ref 7–15)
APTT PPP: 60 SECONDS (ref 22–38)
BUN SERPL-MCNC: 14.7 MG/DL (ref 8–23)
CALCIUM SERPL-MCNC: 8.2 MG/DL (ref 8.8–10.2)
CHLORIDE SERPL-SCNC: 102 MMOL/L (ref 98–107)
CREAT SERPL-MCNC: 0.73 MG/DL (ref 0.51–0.95)
DEPRECATED HCO3 PLAS-SCNC: 25 MMOL/L (ref 22–29)
EGFRCR SERPLBLD CKD-EPI 2021: 90 ML/MIN/1.73M2
ERYTHROCYTE [DISTWIDTH] IN BLOOD BY AUTOMATED COUNT: 14.6 % (ref 10–15)
ERYTHROCYTE [DISTWIDTH] IN BLOOD BY AUTOMATED COUNT: 14.9 % (ref 10–15)
ERYTHROCYTE [DISTWIDTH] IN BLOOD BY AUTOMATED COUNT: 15.3 % (ref 10–15)
ERYTHROCYTE [DISTWIDTH] IN BLOOD BY AUTOMATED COUNT: 15.4 % (ref 10–15)
GLUCOSE BLDC GLUCOMTR-MCNC: 141 MG/DL (ref 70–99)
GLUCOSE BLDC GLUCOMTR-MCNC: 168 MG/DL (ref 70–99)
GLUCOSE BLDC GLUCOMTR-MCNC: 173 MG/DL (ref 70–99)
GLUCOSE BLDC GLUCOMTR-MCNC: 197 MG/DL (ref 70–99)
GLUCOSE SERPL-MCNC: 158 MG/DL (ref 70–99)
HCT VFR BLD AUTO: 22.1 % (ref 35–47)
HCT VFR BLD AUTO: 22.2 % (ref 35–47)
HCT VFR BLD AUTO: 22.5 % (ref 35–47)
HCT VFR BLD AUTO: 23.3 % (ref 35–47)
HGB BLD-MCNC: 7.8 G/DL (ref 11.7–15.7)
HGB BLD-MCNC: 7.8 G/DL (ref 11.7–15.7)
HGB BLD-MCNC: 8 G/DL (ref 11.7–15.7)
HGB BLD-MCNC: 8.2 G/DL (ref 11.7–15.7)
LACTATE SERPL-SCNC: 1 MMOL/L (ref 0.7–2)
MCH RBC QN AUTO: 29.1 PG (ref 26.5–33)
MCH RBC QN AUTO: 29.1 PG (ref 26.5–33)
MCH RBC QN AUTO: 29.5 PG (ref 26.5–33)
MCH RBC QN AUTO: 29.5 PG (ref 26.5–33)
MCHC RBC AUTO-ENTMCNC: 35.1 G/DL (ref 31.5–36.5)
MCHC RBC AUTO-ENTMCNC: 35.2 G/DL (ref 31.5–36.5)
MCHC RBC AUTO-ENTMCNC: 35.3 G/DL (ref 31.5–36.5)
MCHC RBC AUTO-ENTMCNC: 35.6 G/DL (ref 31.5–36.5)
MCV RBC AUTO: 83 FL (ref 78–100)
MCV RBC AUTO: 84 FL (ref 78–100)
PLATELET # BLD AUTO: 88 10E3/UL (ref 150–450)
PLATELET # BLD AUTO: 89 10E3/UL (ref 150–450)
PLATELET # BLD AUTO: 91 10E3/UL (ref 150–450)
PLATELET # BLD AUTO: 94 10E3/UL (ref 150–450)
POTASSIUM SERPL-SCNC: 4 MMOL/L (ref 3.4–5.3)
RBC # BLD AUTO: 2.64 10E6/UL (ref 3.8–5.2)
RBC # BLD AUTO: 2.68 10E6/UL (ref 3.8–5.2)
RBC # BLD AUTO: 2.71 10E6/UL (ref 3.8–5.2)
RBC # BLD AUTO: 2.82 10E6/UL (ref 3.8–5.2)
SODIUM SERPL-SCNC: 135 MMOL/L (ref 135–145)
WBC # BLD AUTO: 26.3 10E3/UL (ref 4–11)
WBC # BLD AUTO: 27 10E3/UL (ref 4–11)
WBC # BLD AUTO: 27.1 10E3/UL (ref 4–11)
WBC # BLD AUTO: 27.4 10E3/UL (ref 4–11)

## 2024-06-06 PROCEDURE — 999N000287 HC ICU ADULT ROUNDING, EACH 10 MINS

## 2024-06-06 PROCEDURE — 97116 GAIT TRAINING THERAPY: CPT | Mod: GP

## 2024-06-06 PROCEDURE — 83605 ASSAY OF LACTIC ACID: CPT | Performed by: STUDENT IN AN ORGANIZED HEALTH CARE EDUCATION/TRAINING PROGRAM

## 2024-06-06 PROCEDURE — 120N000013 HC R&B IMCU

## 2024-06-06 PROCEDURE — 250N000011 HC RX IP 250 OP 636: Mod: JZ | Performed by: STUDENT IN AN ORGANIZED HEALTH CARE EDUCATION/TRAINING PROGRAM

## 2024-06-06 PROCEDURE — 250N000011 HC RX IP 250 OP 636: Performed by: PHYSICIAN ASSISTANT

## 2024-06-06 PROCEDURE — 250N000013 HC RX MED GY IP 250 OP 250 PS 637: Performed by: STUDENT IN AN ORGANIZED HEALTH CARE EDUCATION/TRAINING PROGRAM

## 2024-06-06 PROCEDURE — 99233 SBSQ HOSP IP/OBS HIGH 50: CPT | Mod: 57 | Performed by: PODIATRIST

## 2024-06-06 PROCEDURE — 85730 THROMBOPLASTIN TIME PARTIAL: CPT | Performed by: STUDENT IN AN ORGANIZED HEALTH CARE EDUCATION/TRAINING PROGRAM

## 2024-06-06 PROCEDURE — 97162 PT EVAL MOD COMPLEX 30 MIN: CPT | Mod: GP

## 2024-06-06 PROCEDURE — 99233 SBSQ HOSP IP/OBS HIGH 50: CPT | Performed by: STUDENT IN AN ORGANIZED HEALTH CARE EDUCATION/TRAINING PROGRAM

## 2024-06-06 PROCEDURE — 80048 BASIC METABOLIC PNL TOTAL CA: CPT | Performed by: STUDENT IN AN ORGANIZED HEALTH CARE EDUCATION/TRAINING PROGRAM

## 2024-06-06 PROCEDURE — 85048 AUTOMATED LEUKOCYTE COUNT: CPT | Performed by: SURGERY

## 2024-06-06 PROCEDURE — 97530 THERAPEUTIC ACTIVITIES: CPT | Mod: GP

## 2024-06-06 PROCEDURE — 250N000013 HC RX MED GY IP 250 OP 250 PS 637: Performed by: SURGERY

## 2024-06-06 RX ORDER — FUROSEMIDE 10 MG/ML
20 INJECTION INTRAMUSCULAR; INTRAVENOUS ONCE
Status: COMPLETED | OUTPATIENT
Start: 2024-06-06 | End: 2024-06-06

## 2024-06-06 RX ORDER — HYDROMORPHONE HYDROCHLORIDE 2 MG/1
2-4 TABLET ORAL EVERY 4 HOURS PRN
Status: DISCONTINUED | OUTPATIENT
Start: 2024-06-07 | End: 2024-06-10

## 2024-06-06 RX ORDER — ACETAMINOPHEN 325 MG/1
975 TABLET ORAL EVERY 6 HOURS
Status: DISCONTINUED | OUTPATIENT
Start: 2024-06-06 | End: 2024-06-11 | Stop reason: HOSPADM

## 2024-06-06 RX ORDER — ENOXAPARIN SODIUM 100 MG/ML
1 INJECTION SUBCUTANEOUS
Status: DISCONTINUED | OUTPATIENT
Start: 2024-06-06 | End: 2024-06-11 | Stop reason: HOSPADM

## 2024-06-06 RX ORDER — GABAPENTIN 300 MG/1
300 CAPSULE ORAL 3 TIMES DAILY
Status: DISCONTINUED | OUTPATIENT
Start: 2024-06-06 | End: 2024-06-10

## 2024-06-06 RX ADMIN — SENNOSIDES AND DOCUSATE SODIUM 2 TABLET: 8.6; 5 TABLET ORAL at 20:28

## 2024-06-06 RX ADMIN — INSULIN ASPART 1 UNITS: 100 INJECTION, SOLUTION INTRAVENOUS; SUBCUTANEOUS at 18:03

## 2024-06-06 RX ADMIN — POLYETHYLENE GLYCOL 3350 17 G: 17 POWDER, FOR SOLUTION ORAL at 08:00

## 2024-06-06 RX ADMIN — GABAPENTIN 300 MG: 300 CAPSULE ORAL at 20:28

## 2024-06-06 RX ADMIN — ACETAMINOPHEN 975 MG: 325 TABLET ORAL at 04:25

## 2024-06-06 RX ADMIN — FUROSEMIDE 20 MG: 10 INJECTION, SOLUTION INTRAMUSCULAR; INTRAVENOUS at 07:59

## 2024-06-06 RX ADMIN — ARGATROBAN 1 MCG/KG/MIN: 50 INJECTION INTRAVENOUS at 10:06

## 2024-06-06 RX ADMIN — ENOXAPARIN SODIUM 70 MG: 80 INJECTION SUBCUTANEOUS at 10:59

## 2024-06-06 RX ADMIN — ENOXAPARIN SODIUM 70 MG: 80 INJECTION SUBCUTANEOUS at 21:45

## 2024-06-06 RX ADMIN — ASPIRIN 81 MG: 81 TABLET, COATED ORAL at 08:00

## 2024-06-06 RX ADMIN — INSULIN ASPART 2 UNITS: 100 INJECTION, SOLUTION INTRAVENOUS; SUBCUTANEOUS at 12:12

## 2024-06-06 RX ADMIN — ACETAMINOPHEN 975 MG: 325 TABLET ORAL at 12:22

## 2024-06-06 RX ADMIN — FUROSEMIDE 20 MG: 10 INJECTION, SOLUTION INTRAMUSCULAR; INTRAVENOUS at 14:15

## 2024-06-06 RX ADMIN — PANTOPRAZOLE SODIUM 40 MG: 40 TABLET, DELAYED RELEASE ORAL at 08:00

## 2024-06-06 RX ADMIN — ACETAMINOPHEN 975 MG: 325 TABLET ORAL at 18:43

## 2024-06-06 RX ADMIN — SENNOSIDES AND DOCUSATE SODIUM 2 TABLET: 8.6; 5 TABLET ORAL at 08:00

## 2024-06-06 RX ADMIN — INSULIN ASPART 1 UNITS: 100 INJECTION, SOLUTION INTRAVENOUS; SUBCUTANEOUS at 08:00

## 2024-06-06 ASSESSMENT — ACTIVITIES OF DAILY LIVING (ADL)
ADLS_ACUITY_SCORE: 32
ADLS_ACUITY_SCORE: 32
ADLS_ACUITY_SCORE: 28
ADLS_ACUITY_SCORE: 29
ADLS_ACUITY_SCORE: 28
ADLS_ACUITY_SCORE: 32
ADLS_ACUITY_SCORE: 29
ADLS_ACUITY_SCORE: 32
ADLS_ACUITY_SCORE: 28
ADLS_ACUITY_SCORE: 32
ADLS_ACUITY_SCORE: 29
ADLS_ACUITY_SCORE: 32
ADLS_ACUITY_SCORE: 32
ADLS_ACUITY_SCORE: 28
ADLS_ACUITY_SCORE: 29
ADLS_ACUITY_SCORE: 28

## 2024-06-06 NOTE — PHARMACY-CONSULT NOTE
With negative HIT screen, argatroban was changed to enoxaparin dosing this morning.    Would recommend continuing enoxaparin as long as hemoglobin, platelets and renal function remain stable.  Could consider transition back to heparin drip if on/off needed. When/if appropriate could consider apixaban or rivaroxaban if long-term anticoagulation needed.    If further questions about anticoagulation selection, please reconsult pharmacist or consider hematology consult if labs do not remain stable.    Adelaida Monroe RPH on 6/6/2024 at 4:46 PM

## 2024-06-06 NOTE — PROGRESS NOTES
06/06/24 1333   Appointment Info   Signing Clinician's Name / Credentials (PT) Missy Hinkle,PT   Living Environment   People in Home spouse   Current Living Arrangements house  (able to live on 1 level)   Home Accessibility stairs to enter home   Number of Stairs, Main Entrance 5   Stair Railings, Main Entrance other (see comments)  (1 railing)   Living Environment Comments pt reports  able to help her at home   Self-Care   Equipment Currently Used at Home walker, rolling;other (see comments)  (normally no AD)   Fall history within last six months no   Activity/Exercise/Self-Care Comment pt I ADLs/IALDs, drives   General Information   Onset of Illness/Injury or Date of Surgery 06/04/24   Referring Physician Dr. Dyan Geller   Patient/Family Therapy Goals Statement (PT) return home   Pertinent History of Current Problem (include personal factors and/or comorbidities that impact the POC) HPI: 68 year old female with history of DMII, peripheral vascular disease who presented for elective eft external iliac artery to posterior tib artery bypass.     Patient did ok during initial phase of the case but after closing there was no pulse so distal incision reopened, thrombectomy with removal of fresh thrombus performed. Angiogram performed afterward with sluggish flow through the bypass graft with concern that there will be poor perfusion.     Patient extubated and was started on heparin drip.  She was started on a small dose of phenylephrine due to hypotension.   Existing Precautions/Restrictions other (see comments)  (wound VAC)   General Observations pt in bed agreeable to PT   Cognition   Affect/Mental Status (Cognition) WFL   Pain Assessment   Patient Currently in Pain Yes, see Vital Sign flowsheet  (L lower leg 7/10)   Integumentary/Edema   Integumentary/Edema Comments LLE   Range of Motion (ROM)   ROM Comment RLE WFL, LLE limited   Strength (Manual Muscle Testing)   Strength Comments RLE WFL ,LLE  limited   Bed Mobility   Bed Mobility Limitations decreased ability to use legs for bridging/pushing   Impairments Contributing to Impaired Bed Mobility pain;decreased strength   Assistive Device (Bed Mobility) bed rails   Comment, (Bed Mobility) CGA supine > sit   Transfers   Transfer Safety Concerns Noted decreased weight-shifting ability   Impairments Contributing to Impaired Transfers pain;decreased strength   Comment, (Transfers) sit>stand CGA w/ FWW   Gait/Stairs (Locomotion)   [place] Level (Gait) 1 person assist   Assistive Device (Gait) walker, front-wheeled   Distance in Feet (Gait) 10'   Pattern (Gait) step-to   Deviations/Abnormal Patterns (Gait) antalgic;weight shifting decreased;stride length decreased;gait speed decreased   Comment, (Gait/Stairs) pt LLE toeo nthe ground, decreased wt shift due to pain   Balance   Balance Comments CGA w/ FWW   Clinical Impression   Criteria for Skilled Therapeutic Intervention Yes, treatment indicated   PT Diagnosis (PT) decreased functional mobility   Influenced by the following impairments pain, weakness, wound VAC   Functional limitations due to impairments bed mob, transfers, gait and stairs   Clinical Presentation (PT Evaluation Complexity) evolving   Clinical Presentation Rationale presents as medically diagnosed   Clinical Decision Making (Complexity) moderate complexity   Planned Therapy Interventions (PT) bed mobility training;gait training;transfer training;strengthening;progressive activity/exercise   Risk & Benefits of therapy have been explained evaluation/treatment results reviewed   PT Total Evaluation Time   PT Eval, Moderate Complexity Minutes (49469) 12   Physical Therapy Goals   PT Frequency Daily   PT Predicted Duration/Target Date for Goal Attainment 06/13/24   PT Goals Bed Mobility;Transfers;Gait;Stairs   PT: Bed Mobility Modified independent;Supine to/from sit   PT: Transfers Modified independent;Sit to/from stand;Bed to/from chair;Assistive  device   PT: Gait Supervision/stand-by assist;Rolling walker;100 feet   PT: Stairs Minimal assist;5 stairs  (1 railing)   Interventions   Interventions Quick Adds Gait Training;Therapeutic Activity   Therapeutic Activity   Therapeutic Activities: dynamic activities to improve functional performance Minutes (94338) 10   Symptoms Noted During/After Treatment Increased pain;Fatigue   Treatment Detail/Skilled Intervention pt educated on supine LEex AP-pt can wiggle toes on LLE, knee flex limited LLE, unable to lift LLE off bed, QS, GS x5reps, sit>supine minAx1 LLE,   Gait Training   Gait Training Minutes (80059) 8   Symptoms Noted During/After Treatment (Gait Training) increased pain;fatigue   Treatment Detail/Skilled Intervention decreased wt shift and toe clearance LLE, pt only has L toes on the ground during  ambulation, verbal instruction in use of FWW with LLE   Distance in Feet 12'   [place] Level (Gait Training) contact guard   Physical Assistance Level (Gait Training) 1 person assist;other (see comments)  (wound vac lines and catheter)   Assistive Device (Gait Training) rolling walker   Pattern Analysis (Gait Training) swing-to gait   Gait Analysis Deviations decreased adriana;decreased step length;increased time in double stance;decreased weight-shifting ability;decreased toe-to-floor clearance   Impairments (Gait Analysis/Training) pain;strength decreased   PT Discharge Planning   PT Plan bed mob, transfers and gait w/ FWW, progress to stairs   PT Discharge Recommendation (DC Rec) home with assist;home with home care physical therapy   PT Rationale for DC Rec pending progress with gait and stirs, if pt unable to perform stairs may need TCU   PT Brief overview of current status CGA supine > sit, minAx1 sit>supine LLE, CGA sit<>stand w/ FW, gait 10'x2 with FW and CGA   PT Equipment Needed at Discharge walker, rolling;wheelchair   Total Session Time   Timed Code Treatment Minutes 18   Total Session Time (sum  of timed and untimed services) 30

## 2024-06-06 NOTE — PLAN OF CARE
[facility] - ICU    RN Progress Note:            Pertinent Assessments:      Please refer to flowsheet rows for full assessment     Pt Aox4, having frequent complaints of pain (5-8) and overall restlessness. On call vascular surgeon paged regarding getting pt a PCA pump. PCA pump initiated around 2200 with positive results, reeducation completed with patient aain around 0400 as she was having increased pain again.     Vitally stable, afebrile, alert and oriented. No output from wound vac, pedal pulses still doppler-able. Left foot cool with numbness and tingling present. Wound vac dressing dry and intact with no evidence of loose seal/no suction.     Pt received 1 unit of PRBCs for HGB of 7.2, recheck was 8.2, see results and flowsheets for more            Key Events - This Shift:       Increased pain, initiation of PCA pump             Barriers to Discharge / Downgrade:     [place]

## 2024-06-06 NOTE — CONSULTS
6/6  Test claim for Eliquis and Xarelto both covered medications, covered 100% no cost for the patient.   Thank you for allowing me to help with your patient  Keren Ascencio [facility]  Pharmacy Discharge Liaison [facility]

## 2024-06-06 NOTE — PROGRESS NOTES
VASCULAR SURGERY PROGRESS NOTE    Subjective:  Patient was seen and evaluated at the bedside for surgical follow-up.  Pain control improved with addition of PCA pump overnight.  Continues on argatroban gtt. Received 1 unit PRBCs overnight for hemoglobin of 7.2, recheck was 8.2.  No other concerns today    Objective:  Intake/Output Summary (Last 24 hours) at 6/6/2024 0943  Last data filed at 6/6/2024 0800  Gross per 24 hour   Intake 4556.11 ml   Output 6460 ml   Net -1903.89 ml     PHYSICAL EXAM:  BP 99/55   Pulse 98   Temp 98.6  F (37  C) (Oral)   Resp 20   Wt 65.1 kg (143 lb 9.6 oz)   SpO2 93%   BMI 27.13 kg/m    General: The patient is alert and oriented. Appropriate. No acute distress  Psych: Pleasant affect, answers questions appropriately  Skin: Color appropriate for race, warm, dry.  Respiratory: Normal respiratory effort  Extremities: Left lower extremity edema, Prevena vacs intact with out output in the canister, PT and DP signal present via Doppler and foot warm to touch      Imaging:   Pertinent imaging reviewed    ASSESSMENT:  69 yo female with CLTI LLE with hx of left fem distal bypass x2 failure due to early thrombosis now with third redo bypass left external iliac to distal PT on 6/4, on heparin infusion overnight with significant swelling in left leg       PLAN:  Continue ASA  HIT negative, will initiate therapeutic lovenox and discontinue argatroban  Follow hemoglobin and platelets  Pain control PRN  Continue incisional wound vac to left lower extremity  Wound care to L calf wound per WOC recommendations  Activity as tolerated, PT and OT consulted  Appreciate hospitalist input  Discontinue arterial line and allison catheter  OK to downgrade from ICU    Discussed pt history, exam, assessment and plan with Dr. Harmon (fellow) of the vascular surgery service, who is in agreement with the above.    Tammy Ramirez PA-C  VASCULAR SURGERY

## 2024-06-06 NOTE — PROGRESS NOTES
FOOT AND ANKLE SURGERY/PODIATRY PROGRESS NOTE        ASSESSMENT:   Ulceration left hallux into bone  Osteomyelitis left hallux  PAD      TREATMENT:  -I discussed with the patient that our original plan was attempted salvage of the left hallux which would include surgical debridement with resection of bone for aerobic/anaerobic culture, antibiotics per ID, use of wound VAC postoperatively and then local wound cares to try to heal the ulceration along the dorsal left hallux.  However, after discussing further with vascular surgery we are concerned that because previous bypass procedures failed she may not have additional opportunities to heal the surgical site and thus recommend amputation of the left hallux at this time.  We reviewed the surgical procedure to include disarticulation at the first MPJ with primary closure.  Risks include but not limited to more proximal first ray amputation, transmetatarsal amputation, midfoot amputation, show parts amputation or loss of limb in the setting of severe PAD.    -I discussed the above in detail with the patient and her daughter Zee today.  After discussion patient is in agreement with the recommendation for amputation of the left hallux and understands the significant associated risks mentioned above.  All questions invited and answered.  Patient consents to surgery.    -Medical management per hospitalist/vascular surgery.  Left foot surgery to be scheduled for tomorrow morning.  N.p.o. at midnight.    Rene Ordaz DPM  [facility] Podiatry/Foot & Ankle Surgery      HPI: Patient seen this afternoon regarding osteomyelitis of the left hallux with an ulceration with exposed bone.  She is status post redo bypass left lower extremity with vascular surgery.  She admits to mild leg pain but has moderate to severe pain along the left hallux.  Her daughter is present for my visit today.    Past Medical History:   Diagnosis Date    Anal dysplasia     high grade anal  dysplasia s/p anoscopy 10/2020    Benign gastric polyp     Chronic toe ulcer (H)     Chronic ulcer of great toe of left foot with necrosis of muscle (H)     Diabetes mellitus (H)     Gastroesophageal reflux disease     Hard to intubate 02/15/2024    History of colonic polyps     HLD (hyperlipidemia)     Hypertension     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/29/2013    Nicotine addiction     OAG (open angle glaucoma)     PVD (peripheral vascular disease) (H24)     Reflux esophagitis     Retinopathy     Toe ulcer (H)     Tubular adenoma     Yeast vaginitis        Past Surgical History:   Procedure Laterality Date    AMPUTATE TOE(S) Right 02/18/2023    Procedure: Amputation fourth toe right foot;  Surgeon: Benjamin Diez DPM;  [place] [facility]    ANGIOGRAM Left 2/15/2024    Procedure: COMPLETION OF ANGIOGRAM, LEFT COMMON ILIAC ARTERY STENT PLACEMENT;  Surgeon: Dyan Geller MD;  [place] [facility]    ANUS SURGERY      BIOPSY CERVICAL, LOCAL EXCISION, SINGLE/MULTIPLE      COLONOSCOPY N/A 09/11/2020    Procedure: EXAM UNDER ANESTHESIA, HIGH RESOLUTION ANOSCOPY INTRA OP;  Surgeon: Preeti Sheehan MD;  Location: [facility];  Service: Gastroenterology    COLONOSCOPY N/A 12/14/2020    Procedure: EXAM UNDER ANESTHESIA WITH HIGH RESOLUTION ANOSCOPY;  Surgeon: Preeti Sheehan MD;  Location: [facility] OR;  Service: General    COLONOSCOPY N/A 06/15/2021    Procedure: EXAM UNDER ANESTHESIA WITH HIGH RESOLUTION ANOSCOPY, BIOPSY, FULGURATION;  Surgeon: Preeti Sheehan MD;  Location: [facility];  Service: Gastroenterology    COLONOSCOPY N/A 4/15/2024    Procedure: COLONOSCOPY, WITH POLYPECTOMY;  Surgeon: Shen Andres MD;  Location: [facility]    ENDARTERECTOMY FEMORAL Left 05/17/2023    Procedure: LEFT FEMORAL ENDARTERECTOMY WITH RETROGRADE ILIAC STENT;  Surgeon: Dyan Geller MD;  [place] [facility]    EXAM UNDER  ANESTHESIA ANUS N/A 09/14/2021    Procedure: EXAM UNDER ANESTHESIA WITH HIGH RESOLUTION ANOSCOPY;  Surgeon: Preeti Sheehan MD;  Location: [place] [facility] OR    FEMORAL ARTERY - TIBIAL ARTERY BYPASS GRAFT Left 09/08/2023    Procedure: CREATION, BYPASS, ARTERIAL, FEMORAL TO TIBIAL, LEFT;  Surgeon: Dyan Geller MD;  Location: [facility] OR    FEMORAL ARTERY - TIBIAL ARTERY BYPASS GRAFT Left 2/15/2024    Procedure: CREATION, BYPASS, ARTERIAL, FEMORAL TO TIBIAL,;  Surgeon: Dyan Geller MD;  Location: [facility] OR    FEMORAL ARTERY - TIBIAL ARTERY BYPASS GRAFT Left 6/4/2024    Procedure: LEFT EXTERNAL ILIAC ARTERY TO POSTERIOR TIBIAL ARTERY BYPASS WITH CRYO ARTERY WITH A FEMORAL HERNIA REPAIR;  Surgeon: Dyan Geller MD;  Location: [facility] OR    INCISION AND DRAINAGE FOOT, COMBINED Left 11/27/2023    Procedure: INCISION AND DRAINAGE, LEFT HALLUX;  Surgeon: Rene Ordaz DPM;  Location: [facility] OR    IR LOWER EXTREMITY ANGIOGRAM LEFT  03/23/2023    IR LOWER EXTREMITY ANGIOGRAM RIGHT  02/16/2023    IR THROMBOLYSIS ARTERIAL INFUSION INITIAL DAY  10/09/2023    LEEP TX, CERVICAL      2005    TUBAL LIGATION         Allergies   Allergen Reactions    Simvastatin Muscle Pain (Myalgia)     Muscle pain    Victoza [Liraglutide] Other (See Comments)     Binds bowels    Penicillins Unknown     Childhood rxn         Current Facility-Administered Medications:     acetaminophen (TYLENOL) tablet 975 mg, 975 mg, Oral, Q6H, Elida Espino MD    aspirin EC tablet 81 mg, 81 mg, Oral, Daily, Belkys Harmon DO, 81 mg at 06/06/24 0800    [START ON 6/7/2024] bisacodyl (DULCOLAX) suppository 10 mg, 10 mg, Rectal, Daily PRN, Belkys Harmon DO    dextrose 10% infusion, , Intravenous, Continuous PRN, Elida Espino MD    glucose gel 15-30 g, 15-30 g, Oral, Q15 Min PRN **OR** dextrose 50 % injection 25-50 mL, 25-50 mL, Intravenous, Q15 Min PRN **OR** glucagon  injection 1 mg, 1 mg, Subcutaneous, Q15 Min PRN, Elida Espino MD    enoxaparin ANTICOAGULANT (LOVENOX) injection 70 mg, 1 mg/kg, Subcutaneous, Q12H [facility] (10/22), Tammy Ramirez PA-C, 70 mg at 06/06/24 1059    gabapentin (NEURONTIN) capsule 300 mg, 300 mg, Oral, TID, Elida Espino MD    HYDROmorphone (DILAUDID) PCA 0.2 mg/mL OPIOID NAIVE, , Intravenous, Continuous, Elida Espino MD    [START ON 6/7/2024] HYDROmorphone (DILAUDID) tablet 2-4 mg, 2-4 mg, Oral, Q4H PRN, Elida Espino MD    insulin aspart (NovoLOG) injection (RAPID ACTING), , Subcutaneous, TID AC, Elida Espino MD, 3 Units at 06/06/24 1802    insulin aspart (NovoLOG) injection (RAPID ACTING), 1-7 Units, Subcutaneous, TID AC, Elida Espino MD, 1 Units at 06/06/24 1803    insulin aspart (NovoLOG) injection (RAPID ACTING), 1-5 Units, Subcutaneous, At Bedtime, Elida Espino MD, 1 Units at 06/05/24 2246    lidocaine (LMX4) cream, , Topical, Q1H PRN, Belkys Harmon,     lidocaine (LMX4) cream, , Topical, Q1H PRN, Elaine Howard MD    lidocaine 1 % 0.1-1 mL, 0.1-1 mL, Other, Q1H PRN, Belkys Harmon DO, 0.4 mL at 06/04/24 2030    lidocaine 1 % 0.1-5 mL, 0.1-5 mL, Other, Q1H PRN, Elaine Howard MD    magnesium hydroxide (MILK OF MAGNESIA) suspension 30 mL, 30 mL, Oral, Daily PRN, Belkys Harmon DO    naloxone (NARCAN) injection 0.2 mg, 0.2 mg, Intravenous, Q2 Min PRN **OR** naloxone (NARCAN) injection 0.4 mg, 0.4 mg, Intravenous, Q2 Min PRN **OR** naloxone (NARCAN) injection 0.2 mg, 0.2 mg, Intramuscular, Q2 Min PRN **OR** naloxone (NARCAN) injection 0.4 mg, 0.4 mg, Intramuscular, Q2 Min PRN, Dyan Geller MD    ondansetron (ZOFRAN ODT) ODT tab 4 mg, 4 mg, Oral, Q6H PRN, 4 mg at 06/05/24 1952 **OR** ondansetron (ZOFRAN) injection 4 mg, 4 mg, Intravenous, Q6H PRN, Belkys Harmon DO, 4 mg at 06/05/24 0515    pantoprazole (PROTONIX) EC tablet 40 mg, 40 mg, Oral, QAM JIM, Dyan Geller  MD Monster, 40 mg at 24 0800    polyethylene glycol (MIRALAX) Packet 17 g, 17 g, Oral, Daily, Belkys Harmon DO, 17 g at 24 0800    prochlorperazine (COMPAZINE) injection 5 mg, 5 mg, Intravenous, Q6H PRN **OR** prochlorperazine (COMPAZINE) tablet 5 mg, 5 mg, Oral, Q6H PRN, Belkys Harmon DO    senna-docusate (SENOKOT-S/PERICOLACE) 8.6-50 MG per tablet 1 tablet, 1 tablet, Oral, BID **OR** senna-docusate (SENOKOT-S/PERICOLACE) 8.6-50 MG per tablet 2 tablet, 2 tablet, Oral, BID, Astrid Yoo MD, 2 tablet at 24 0800    sodium chloride (PF) 0.9% PF flush 3 mL, 3 mL, Intracatheter, Q8H, Belkys Harmon DO, 3 mL at 24 0237    sodium chloride (PF) 0.9% PF flush 3 mL, 3 mL, Intracatheter, q1 min prn, Belkys Harmon DO    Family History   Problem Relation Age of Onset    Hypertension Mother     Colon Cancer Mother     Diabetes Type 2  Father     Hypertension Father     Kidney failure Father     Hyperthyroidism Sister     Breast Cancer Paternal Aunt        Social History     Socioeconomic History    Marital status:      Spouse name: Not on file    Number of children: Not on file    Years of education: Not on file    Highest education level: Not on file   Occupational History    Not on file   Tobacco Use    Smoking status: Former     Current packs/day: 0.00     Types: Cigarettes     Quit date: 2023     Years since quittin.3     Passive exposure: Never    Smokeless tobacco: Never   Vaping Use    Vaping status: Never Used   Substance and Sexual Activity    Alcohol use: Not Currently     Comment: Alcoholic Drinks/MONTH: 2x    Drug use: Not Currently    Sexual activity: Yes     Partners: Male   Other Topics Concern    Not on file   Social History Narrative    Not on file     Social Determinants of Health     Financial Resource Strain: Not on file   Food Insecurity: Not on file   Transportation Needs: Not on file   Physical Activity: Not on file   Stress: Not on file  "  Social Connections: Not on file   Interpersonal Safety: Not on file   Housing Stability: Not on file       10 point Review of Systems is negative except for acute osteomyelitis left hallux which is noted in HPI.     BP (!) 146/65   Pulse 112   Temp 98.2  F (36.8  C) (Oral)   Resp 27   Wt 65.1 kg (143 lb 9.6 oz)   SpO2 96%   BMI 27.13 kg/m      BMI= Body mass index is 27.13 kg/m .    OBJECTIVE:  General appearance: Patient is alert and fully cooperative with history & exam.  No sign of distress is noted during the visit.    Vascular: Dorsalis pedis and posterior tibial pulses are nonpalpable left foot.  Dermatologic: Ulceration dorsal medial left hallux with exposed bone of the distal phalanx, no erythema.  Neurologic: All epicritic and proprioceptive sensations are grossly intact left.  Musculoskeletal: Pain to palpation left hallux.      Imaging:     XR Chest Port 1 View    Result Date: 6/4/2024  EXAM: XR CHEST PORT 1 VIEW LOCATION: [facility] DATE: 6/4/2024 INDICATION: PICC catheter placement. COMPARISON: 2/17/2024.     IMPRESSION: Left upper extremity PICC catheter has tip overlying the superior cavoatrial junction. Lungs are clear. No pleural effusion or pneumothorax. Cardiomediastinal silhouette is normal. Atherosclerotic calcifications of the thoracic aorta.    XR Surgery HARRIET Fluoro L/T 5 Min    Result Date: 6/4/2024  This exam was marked as non-reportable because it will not be read by a radiologist or a [place] non-radiologist provider.     POC US Guided Vascular Access    Result Date: 6/4/2024  Ultrasound was performed as guidance to an anesthesia procedure.  Click \"PACS images\" hyperlink below to view any stored images.  For specific procedure details, view procedure note authored by anesthesia.       "

## 2024-06-06 NOTE — PROGRESS NOTES
[facility]    Medicine Progress Note - Hospitalist Service    Date of Admission:  6/4/2024    Assessment & Plan   Ashanti is a 67 YO female admitted in ICU from OR for the management of hemorrhagic shock. Patient has a h/o hypertension, DM-II, severe PAD, left hallux osteomyelitis. Patient presented for left external iliac artery to post tibial artery bypass. Surgery was complicated by loss of peripheral pulse requiring re-opening of incision with thrombectomy from the graft, and hemorrhagic shock requiring ICU admission on pressor and blood transfusion    Severe PAD  -- Left external andree artery to posterior tibial artery bypass requiring reopening and revision with thrombectomy form graft on 06/04.  -- High risk for bypass failure, no additional options for revascularization. If bypass fails, pateint will need an amputation per vascular team.  -- C/w Aspirin  -- 06/06 Argatroban switched to Lovenox    Hemorrhagic shock-resolved  Acute blood loss anemia  -- Weaned off of phenylephrine gtt.   -- Resuscitation with IVF and pRBCs.   -- S/p 4 PRBCs (3 units 06/04, 1 unit 06/05)  -- Coag profiles not suggestive of consumptive DIC    Thrombocytopenia  -- Low risk of HIT score 0-3. HIT screen negative.   -- Monitor CBC    Lactic acidosis-resolved  -- Due to poor perfusion.   -- Monitor Q6    Leukocytosis- improving  low grade fever-resolved  -- Likely reactive post op  -- Encouarge to use IS  -- Monitor wbc and temp curve  -- Discussed with Vascular- will moniotr off of Abx.    DM-II  -- On Jardiance, Trajenta, Metfromin and Tresiba 30 units BID at home.   -- Accu-checks, cont with moderate sliding scale insulin. I:C 1:15    Essential HTN  -- PTA meds (Norvasc, Lisinopril, HCTZ) held due to shock. Will resume when appropriate.  -- Monitor vital signs per protocol  -- hydralazine iv prn          Diet: Moderate Consistent Carb (60 g CHO per Meal) Diet    DVT Prophylaxis: Lovenox  Cedeno Catheter:  PRESENT, indication: Surgical procedure  Lines: PRESENT      PICC 06/04/24 Triple Lumen Left Brachial vein medial-Site Assessment: WDL  [REMOVED] Arterial Line 06/04/24 Brachial-Site Assessment: WDL Except;Positional      Cardiac Monitoring: None  Code Status: Full Code      Clinically Significant Risk Factors          # Hypocalcemia: Lowest Ca = 8.2 mg/dL in last 2 days, will monitor and replace as appropriate     # Hypoalbuminemia: Lowest albumin = 3.4 g/dL at 6/5/2024  4:17 AM, will monitor as appropriate    # Coagulation Defect: INR = 1.40 (Ref range: 0.85 - 1.15) and/or PTT = 60 Seconds (Ref range: 22 - 38 Seconds), will monitor for bleeding  # Thrombocytopenia: Lowest platelets = 88 in last 2 days, will monitor for bleeding   # Hypertension: Noted on problem list           #Precipitous drop in Hgb/Hct: Lowest Hgb this hospitalization: 7.2 g/dL. Will continue to monitor and treat/transfuse as appropriate.    # DMII: A1C = 6.7 % (Ref range: <5.7 %) within past 6 months, PRESENT ON ADMISSION             Disposition Plan     Medically Ready for Discharge: Anticipated in 2-4 Days             Elida Espino MD  Hospitalist Service  [facility]  Securely message with [employer] (more info)  Text page via Open[employer] Paging/Directory   ______________________________________________________________________    Interval History   Patient is seen and examined at bedside.   No bleeding.   Plan of care discussed with patient. All questions answered. Pt verbalized understanding.     Physical Exam   Vital Signs: Temp: 98.6  F (37  C) Temp src: Oral BP: 126/58 Pulse: 104   Resp: 27 SpO2: 96 % O2 Device: None (Room air)    Weight: 143 lbs 9.6 oz    GEN: Alert and oriented. Not in acute distress.  HEENT: Atraumatic, mucous membrane- moist and pink.  Chest: Bilateral air entry.  CVS: S1S2 regular.   Abdomen: Soft. Non-tender, non-distended. No organomegaly. No guarding or rigidity. Bowel sounds active.    Extremities: left LE wound vac. Left LE swollen but no deep tenderness. Left foot warm. Chronic wound over left hallux  CNS: No involuntary movements.  Skin: no cyanosis or clubbing.     Medical Decision Making       52 MINUTES SPENT BY ME on the date of service doing chart review, history, exam, documentation & further activities per the note.      Data

## 2024-06-06 NOTE — PROGRESS NOTES
[facility] ACUTE PAIN SERVICE CONSULTATION   [facility], [facility], [facility], [facility], [place]     Date of Admission:  6/4/2024  Date of Consult (When I saw the patient): 06/06/24  Physician requesting consult: Dr. Espino      Assessment/Plan:     Ashanti Aviles is a 68 year old female who was admitted on 6/4/2024.  Pain team was asked to see the patient for left LE post-op pain management. Admitted for hemorrhagic shock post left external iliac artery to post tibial artery bypass procedure. History of hypertension, DM-II, severe PAD, left hallux osteomyelitis.   The patient does not smoke (former, quit in 2023) and no chemical dependency history.     Post op day: 2 Days Post-Op.     Opioid Induced Respiratory Depression Risk Assessment:  High due to the following risk factors: renal dysfunction, Smoker, Age>60, opioid naive status, or post surgical ?     The patient's home MME was 10 mg daily. In the last 19 hrs per PCA pt has used 6.8 mg of IV dilaudid = 136 MME in 19 hours.     Pt reports pain in LLE in foot/calf - described as numb, burning pain. Feels PCA effective -given late consult - will change po meds to start tomorrow AM, and increase home gabapentin at this time.     PLAN:   1) Pain is consistent with  post op pain  Multimodal Medication Therapy  Topical: None  NSAID'S: CrCl 65.5 mL/min - none  Muscle Relaxants: None  Adjuvants:   Acetaminophen 975 mg TID + 650 mg Q4H PRN - increase to q6h, resume home gabapentin  Antidepressants/anxiolytics: None  Opioids:   Oxycodone 5-10 mg Q4H PRN - on hold while on PCA - prefers po dilaudid - order 2-4 mg po dilaudid q4h prn pain first line - starting tomorrow AM at 0630  IV Pain medication:   Dilaudid PCA - stop this tomorrow AM at 630  Non-medication interventions: per nursing  Constipation Prophylaxis: senna-docusate BID, bisacodyl supp daily PRN    -Opioid prescriber has been Kendy Guido or Rene Ordaz  -MN  pulled from system on 6/6/24.    5/20: Gabapentin 300 mg caps, #90/90  5/13: Norco 5-325 #60/30 4/26: Gabapentin 300 mg caps, #90/30 4/25: Oxycodone 5 mg tabs, #12/3      Discharge Recommendations - We recommend prescribing the following at the time of discharge: TBD     History of Present Illness (HPI):       Ashanti Aviles is a 68 year old female who presented with past medical history as above.     Per MN  review, the patient does not have an opioid tolerance.     Reviewed medical record, labs, imaging, ED note, and care everywhere.     Home pain medications/psych medications/anticoagulation medications include: APAP 1300 mg q8h prn, gabapentin 300 mg po TID, lidocaine daily prn,       Moira FrenchD, BCPS  Acute Care Pain Management Program   [facility]   Monday-Friday 8a-4p   Page via Epic or Smart Device Media

## 2024-06-07 ENCOUNTER — ANESTHESIA (OUTPATIENT)
Dept: SURGERY | Facility: HOSPITAL | Age: 69
DRG: 252 | End: 2024-06-07
Payer: COMMERCIAL

## 2024-06-07 ENCOUNTER — ANESTHESIA EVENT (OUTPATIENT)
Dept: SURGERY | Facility: HOSPITAL | Age: 69
DRG: 252 | End: 2024-06-07
Payer: COMMERCIAL

## 2024-06-07 LAB
ANION GAP SERPL CALCULATED.3IONS-SCNC: 13 MMOL/L (ref 7–15)
APTT PPP: 42 SECONDS (ref 22–38)
BACTERIA SPEC CULT: NORMAL
BUN SERPL-MCNC: 11.4 MG/DL (ref 8–23)
CALCIUM SERPL-MCNC: 7.8 MG/DL (ref 8.8–10.2)
CHLORIDE SERPL-SCNC: 96 MMOL/L (ref 98–107)
CREAT SERPL-MCNC: 0.59 MG/DL (ref 0.51–0.95)
DEPRECATED HCO3 PLAS-SCNC: 23 MMOL/L (ref 22–29)
EGFRCR SERPLBLD CKD-EPI 2021: >90 ML/MIN/1.73M2
ERYTHROCYTE [DISTWIDTH] IN BLOOD BY AUTOMATED COUNT: 15.3 % (ref 10–15)
ERYTHROCYTE [DISTWIDTH] IN BLOOD BY AUTOMATED COUNT: 15.5 % (ref 10–15)
ERYTHROCYTE [DISTWIDTH] IN BLOOD BY AUTOMATED COUNT: 15.6 % (ref 10–15)
ERYTHROCYTE [DISTWIDTH] IN BLOOD BY AUTOMATED COUNT: 15.8 % (ref 10–15)
GLUCOSE BLDC GLUCOMTR-MCNC: 112 MG/DL (ref 70–99)
GLUCOSE BLDC GLUCOMTR-MCNC: 116 MG/DL (ref 70–99)
GLUCOSE BLDC GLUCOMTR-MCNC: 149 MG/DL (ref 70–99)
GLUCOSE BLDC GLUCOMTR-MCNC: 172 MG/DL (ref 70–99)
GLUCOSE BLDC GLUCOMTR-MCNC: 208 MG/DL (ref 70–99)
GLUCOSE SERPL-MCNC: 121 MG/DL (ref 70–99)
GRAM STAIN RESULT: NORMAL
GRAM STAIN RESULT: NORMAL
HCT VFR BLD AUTO: 21.4 % (ref 35–47)
HCT VFR BLD AUTO: 24.6 % (ref 35–47)
HCT VFR BLD AUTO: 25.3 % (ref 35–47)
HCT VFR BLD AUTO: 27.2 % (ref 35–47)
HGB BLD-MCNC: 7.4 G/DL (ref 11.7–15.7)
HGB BLD-MCNC: 8.6 G/DL (ref 11.7–15.7)
HGB BLD-MCNC: 8.7 G/DL (ref 11.7–15.7)
HGB BLD-MCNC: 9.3 G/DL (ref 11.7–15.7)
INR PPP: 1.07 (ref 0.85–1.15)
MCH RBC QN AUTO: 28.4 PG (ref 26.5–33)
MCH RBC QN AUTO: 28.4 PG (ref 26.5–33)
MCH RBC QN AUTO: 29.1 PG (ref 26.5–33)
MCH RBC QN AUTO: 29.1 PG (ref 26.5–33)
MCHC RBC AUTO-ENTMCNC: 34 G/DL (ref 31.5–36.5)
MCHC RBC AUTO-ENTMCNC: 34.2 G/DL (ref 31.5–36.5)
MCHC RBC AUTO-ENTMCNC: 34.6 G/DL (ref 31.5–36.5)
MCHC RBC AUTO-ENTMCNC: 35.4 G/DL (ref 31.5–36.5)
MCV RBC AUTO: 82 FL (ref 78–100)
MCV RBC AUTO: 83 FL (ref 78–100)
MCV RBC AUTO: 84 FL (ref 78–100)
MCV RBC AUTO: 84 FL (ref 78–100)
PLATELET # BLD AUTO: 101 10E3/UL (ref 150–450)
PLATELET # BLD AUTO: 107 10E3/UL (ref 150–450)
PLATELET # BLD AUTO: 115 10E3/UL (ref 150–450)
PLATELET # BLD AUTO: 98 10E3/UL (ref 150–450)
POTASSIUM SERPL-SCNC: 3.2 MMOL/L (ref 3.4–5.3)
RBC # BLD AUTO: 2.54 10E6/UL (ref 3.8–5.2)
RBC # BLD AUTO: 2.99 10E6/UL (ref 3.8–5.2)
RBC # BLD AUTO: 3.03 10E6/UL (ref 3.8–5.2)
RBC # BLD AUTO: 3.27 10E6/UL (ref 3.8–5.2)
SODIUM SERPL-SCNC: 132 MMOL/L (ref 135–145)
WBC # BLD AUTO: 22.2 10E3/UL (ref 4–11)
WBC # BLD AUTO: 24.5 10E3/UL (ref 4–11)
WBC # BLD AUTO: 24.9 10E3/UL (ref 4–11)
WBC # BLD AUTO: 25 10E3/UL (ref 4–11)

## 2024-06-07 PROCEDURE — 250N000013 HC RX MED GY IP 250 OP 250 PS 637: Performed by: PAIN MEDICINE

## 2024-06-07 PROCEDURE — 87070 CULTURE OTHR SPECIMN AEROBIC: CPT | Performed by: PODIATRIST

## 2024-06-07 PROCEDURE — 250N000013 HC RX MED GY IP 250 OP 250 PS 637: Performed by: PODIATRIST

## 2024-06-07 PROCEDURE — 250N000011 HC RX IP 250 OP 636: Mod: JZ | Performed by: STUDENT IN AN ORGANIZED HEALTH CARE EDUCATION/TRAINING PROGRAM

## 2024-06-07 PROCEDURE — 360N000075 HC SURGERY LEVEL 2, PER MIN: Performed by: PODIATRIST

## 2024-06-07 PROCEDURE — 28820 AMPUTATION OF TOE: CPT | Mod: TA | Performed by: PODIATRIST

## 2024-06-07 PROCEDURE — 87075 CULTR BACTERIA EXCEPT BLOOD: CPT | Performed by: PODIATRIST

## 2024-06-07 PROCEDURE — 250N000009 HC RX 250: Performed by: PODIATRIST

## 2024-06-07 PROCEDURE — 85610 PROTHROMBIN TIME: CPT | Performed by: STUDENT IN AN ORGANIZED HEALTH CARE EDUCATION/TRAINING PROGRAM

## 2024-06-07 PROCEDURE — 250N000013 HC RX MED GY IP 250 OP 250 PS 637: Performed by: STUDENT IN AN ORGANIZED HEALTH CARE EDUCATION/TRAINING PROGRAM

## 2024-06-07 PROCEDURE — 99233 SBSQ HOSP IP/OBS HIGH 50: CPT | Performed by: STUDENT IN AN ORGANIZED HEALTH CARE EDUCATION/TRAINING PROGRAM

## 2024-06-07 PROCEDURE — 370N000017 HC ANESTHESIA TECHNICAL FEE, PER MIN: Performed by: PODIATRIST

## 2024-06-07 PROCEDURE — 87205 SMEAR GRAM STAIN: CPT | Performed by: PODIATRIST

## 2024-06-07 PROCEDURE — 85027 COMPLETE CBC AUTOMATED: CPT | Performed by: SURGERY

## 2024-06-07 PROCEDURE — 272N000001 HC OR GENERAL SUPPLY STERILE: Performed by: PODIATRIST

## 2024-06-07 PROCEDURE — 120N000013 HC R&B IMCU

## 2024-06-07 PROCEDURE — 999N000141 HC STATISTIC PRE-PROCEDURE NURSING ASSESSMENT: Performed by: PODIATRIST

## 2024-06-07 PROCEDURE — 88305 TISSUE EXAM BY PATHOLOGIST: CPT | Mod: 26 | Performed by: PATHOLOGY

## 2024-06-07 PROCEDURE — 258N000001 HC RX 258: Performed by: PODIATRIST

## 2024-06-07 PROCEDURE — 80048 BASIC METABOLIC PNL TOTAL CA: CPT | Performed by: STUDENT IN AN ORGANIZED HEALTH CARE EDUCATION/TRAINING PROGRAM

## 2024-06-07 PROCEDURE — 0Y6Q0Z0 DETACHMENT AT LEFT 1ST TOE, COMPLETE, OPEN APPROACH: ICD-10-PCS | Performed by: PODIATRIST

## 2024-06-07 PROCEDURE — 250N000011 HC RX IP 250 OP 636: Performed by: NURSE ANESTHETIST, CERTIFIED REGISTERED

## 2024-06-07 PROCEDURE — 85014 HEMATOCRIT: CPT | Performed by: PODIATRIST

## 2024-06-07 PROCEDURE — 250N000009 HC RX 250: Performed by: NURSE ANESTHETIST, CERTIFIED REGISTERED

## 2024-06-07 PROCEDURE — 250N000011 HC RX IP 250 OP 636: Performed by: PODIATRIST

## 2024-06-07 PROCEDURE — 250N000011 HC RX IP 250 OP 636: Performed by: SURGERY

## 2024-06-07 PROCEDURE — P9016 RBC LEUKOCYTES REDUCED: HCPCS | Performed by: INTERNAL MEDICINE

## 2024-06-07 PROCEDURE — 85730 THROMBOPLASTIN TIME PARTIAL: CPT | Performed by: SURGERY

## 2024-06-07 PROCEDURE — 99223 1ST HOSP IP/OBS HIGH 75: CPT | Performed by: PAIN MEDICINE

## 2024-06-07 PROCEDURE — 99222 1ST HOSP IP/OBS MODERATE 55: CPT | Performed by: INTERNAL MEDICINE

## 2024-06-07 PROCEDURE — 88311 DECALCIFY TISSUE: CPT | Mod: 26 | Performed by: PATHOLOGY

## 2024-06-07 PROCEDURE — 88311 DECALCIFY TISSUE: CPT | Mod: TC | Performed by: PODIATRIST

## 2024-06-07 PROCEDURE — 250N000011 HC RX IP 250 OP 636: Mod: JZ | Performed by: PODIATRIST

## 2024-06-07 RX ORDER — OXYCODONE HYDROCHLORIDE 5 MG/1
5 TABLET ORAL EVERY 4 HOURS PRN
Status: DISCONTINUED | OUTPATIENT
Start: 2024-06-07 | End: 2024-06-07

## 2024-06-07 RX ORDER — CLINDAMYCIN PHOSPHATE 900 MG/50ML
900 INJECTION, SOLUTION INTRAVENOUS
Status: DISCONTINUED | OUTPATIENT
Start: 2024-06-07 | End: 2024-06-07

## 2024-06-07 RX ORDER — LIDOCAINE 40 MG/G
CREAM TOPICAL
Status: DISCONTINUED | OUTPATIENT
Start: 2024-06-07 | End: 2024-06-11 | Stop reason: HOSPADM

## 2024-06-07 RX ORDER — BUPIVACAINE HYDROCHLORIDE 5 MG/ML
INJECTION, SOLUTION PERINEURAL PRN
Status: DISCONTINUED | OUTPATIENT
Start: 2024-06-07 | End: 2024-06-07 | Stop reason: HOSPADM

## 2024-06-07 RX ORDER — POLYETHYLENE GLYCOL 3350 17 G/17G
17 POWDER, FOR SOLUTION ORAL DAILY
Status: DISCONTINUED | OUTPATIENT
Start: 2024-06-08 | End: 2024-06-07

## 2024-06-07 RX ORDER — CEFAZOLIN SODIUM 2 G/100ML
2 INJECTION, SOLUTION INTRAVENOUS EVERY 8 HOURS
Status: DISCONTINUED | OUTPATIENT
Start: 2024-06-07 | End: 2024-06-08

## 2024-06-07 RX ORDER — AMOXICILLIN 250 MG
1 CAPSULE ORAL 2 TIMES DAILY
Status: DISCONTINUED | OUTPATIENT
Start: 2024-06-07 | End: 2024-06-07

## 2024-06-07 RX ORDER — BISACODYL 10 MG
10 SUPPOSITORY, RECTAL RECTAL DAILY PRN
Status: DISCONTINUED | OUTPATIENT
Start: 2024-06-10 | End: 2024-06-11 | Stop reason: HOSPADM

## 2024-06-07 RX ORDER — CLINDAMYCIN PHOSPHATE 900 MG/50ML
900 INJECTION, SOLUTION INTRAVENOUS SEE ADMIN INSTRUCTIONS
Status: DISCONTINUED | OUTPATIENT
Start: 2024-06-07 | End: 2024-06-07

## 2024-06-07 RX ORDER — ACETAMINOPHEN 325 MG/1
975 TABLET ORAL EVERY 8 HOURS
Status: DISCONTINUED | OUTPATIENT
Start: 2024-06-07 | End: 2024-06-07

## 2024-06-07 RX ORDER — FLUCONAZOLE 150 MG/1
150 TABLET ORAL DAILY
Qty: 2 TABLET | Refills: 0 | Status: COMPLETED | OUTPATIENT
Start: 2024-06-07 | End: 2024-06-08

## 2024-06-07 RX ORDER — PROPOFOL 10 MG/ML
INJECTION, EMULSION INTRAVENOUS PRN
Status: DISCONTINUED | OUTPATIENT
Start: 2024-06-07 | End: 2024-06-07

## 2024-06-07 RX ORDER — PROPOFOL 10 MG/ML
INJECTION, EMULSION INTRAVENOUS CONTINUOUS PRN
Status: DISCONTINUED | OUTPATIENT
Start: 2024-06-07 | End: 2024-06-07

## 2024-06-07 RX ORDER — WARFARIN SODIUM 3 MG/1
3 TABLET ORAL
Status: COMPLETED | OUTPATIENT
Start: 2024-06-07 | End: 2024-06-07

## 2024-06-07 RX ORDER — ONDANSETRON 4 MG/1
4 TABLET, ORALLY DISINTEGRATING ORAL EVERY 6 HOURS PRN
Status: DISCONTINUED | OUTPATIENT
Start: 2024-06-07 | End: 2024-06-11 | Stop reason: HOSPADM

## 2024-06-07 RX ORDER — PROCHLORPERAZINE MALEATE 5 MG
5 TABLET ORAL EVERY 6 HOURS PRN
Status: DISCONTINUED | OUTPATIENT
Start: 2024-06-07 | End: 2024-06-11 | Stop reason: HOSPADM

## 2024-06-07 RX ORDER — MAGNESIUM HYDROXIDE 1200 MG/15ML
LIQUID ORAL PRN
Status: DISCONTINUED | OUTPATIENT
Start: 2024-06-07 | End: 2024-06-07 | Stop reason: HOSPADM

## 2024-06-07 RX ORDER — LIDOCAINE HYDROCHLORIDE 10 MG/ML
INJECTION, SOLUTION INFILTRATION; PERINEURAL PRN
Status: DISCONTINUED | OUTPATIENT
Start: 2024-06-07 | End: 2024-06-07

## 2024-06-07 RX ORDER — OXYCODONE HYDROCHLORIDE 5 MG/1
10 TABLET ORAL EVERY 4 HOURS PRN
Status: DISCONTINUED | OUTPATIENT
Start: 2024-06-07 | End: 2024-06-07

## 2024-06-07 RX ORDER — CEFAZOLIN SODIUM/WATER 2 G/20 ML
2 SYRINGE (ML) INTRAVENOUS
Status: COMPLETED | OUTPATIENT
Start: 2024-06-07 | End: 2024-06-07

## 2024-06-07 RX ORDER — ONDANSETRON 2 MG/ML
4 INJECTION INTRAMUSCULAR; INTRAVENOUS EVERY 6 HOURS PRN
Status: DISCONTINUED | OUTPATIENT
Start: 2024-06-07 | End: 2024-06-11 | Stop reason: HOSPADM

## 2024-06-07 RX ORDER — LACTOBACILLUS RHAMNOSUS GG 10B CELL
1 CAPSULE ORAL 2 TIMES DAILY
Status: DISCONTINUED | OUTPATIENT
Start: 2024-06-07 | End: 2024-06-11 | Stop reason: HOSPADM

## 2024-06-07 RX ORDER — METHOCARBAMOL 100 MG/ML
500 INJECTION, SOLUTION INTRAMUSCULAR; INTRAVENOUS EVERY 8 HOURS
Qty: 25 ML | Refills: 0 | Status: DISCONTINUED | OUTPATIENT
Start: 2024-06-07 | End: 2024-06-07

## 2024-06-07 RX ORDER — FENTANYL CITRATE 50 UG/ML
INJECTION, SOLUTION INTRAMUSCULAR; INTRAVENOUS PRN
Status: DISCONTINUED | OUTPATIENT
Start: 2024-06-07 | End: 2024-06-07

## 2024-06-07 RX ORDER — FUROSEMIDE 10 MG/ML
20 INJECTION INTRAMUSCULAR; INTRAVENOUS ONCE
Status: COMPLETED | OUTPATIENT
Start: 2024-06-07 | End: 2024-06-07

## 2024-06-07 RX ORDER — ACETAMINOPHEN 325 MG/1
650 TABLET ORAL EVERY 4 HOURS PRN
Status: DISCONTINUED | OUTPATIENT
Start: 2024-06-10 | End: 2024-06-11 | Stop reason: HOSPADM

## 2024-06-07 RX ORDER — METHOCARBAMOL 500 MG/1
500 TABLET, FILM COATED ORAL 4 TIMES DAILY
Status: DISCONTINUED | OUTPATIENT
Start: 2024-06-07 | End: 2024-06-10

## 2024-06-07 RX ORDER — CEFAZOLIN SODIUM/WATER 2 G/20 ML
2 SYRINGE (ML) INTRAVENOUS SEE ADMIN INSTRUCTIONS
Status: DISCONTINUED | OUTPATIENT
Start: 2024-06-07 | End: 2024-06-07

## 2024-06-07 RX ADMIN — GABAPENTIN 300 MG: 300 CAPSULE ORAL at 15:24

## 2024-06-07 RX ADMIN — LIDOCAINE HYDROCHLORIDE 5 ML: 10 INJECTION, SOLUTION INFILTRATION; PERINEURAL at 07:32

## 2024-06-07 RX ADMIN — MIDAZOLAM 2 MG: 1 INJECTION INTRAMUSCULAR; INTRAVENOUS at 07:32

## 2024-06-07 RX ADMIN — ACETAMINOPHEN 975 MG: 325 TABLET ORAL at 01:02

## 2024-06-07 RX ADMIN — Medication 1 CAPSULE: at 15:24

## 2024-06-07 RX ADMIN — INSULIN ASPART 1 UNITS: 100 INJECTION, SOLUTION INTRAVENOUS; SUBCUTANEOUS at 18:09

## 2024-06-07 RX ADMIN — Medication 1 CAPSULE: at 21:40

## 2024-06-07 RX ADMIN — HYDROMORPHONE HYDROCHLORIDE 0.4 MG: 1 INJECTION, SOLUTION INTRAMUSCULAR; INTRAVENOUS; SUBCUTANEOUS at 20:03

## 2024-06-07 RX ADMIN — ENOXAPARIN SODIUM 70 MG: 80 INJECTION SUBCUTANEOUS at 21:38

## 2024-06-07 RX ADMIN — FENTANYL CITRATE 100 MCG: 50 INJECTION INTRAMUSCULAR; INTRAVENOUS at 07:32

## 2024-06-07 RX ADMIN — GABAPENTIN 300 MG: 300 CAPSULE ORAL at 09:31

## 2024-06-07 RX ADMIN — Medication 2 G: at 07:35

## 2024-06-07 RX ADMIN — SENNOSIDES AND DOCUSATE SODIUM 1 TABLET: 8.6; 5 TABLET ORAL at 20:03

## 2024-06-07 RX ADMIN — ENOXAPARIN SODIUM 70 MG: 80 INJECTION SUBCUTANEOUS at 09:32

## 2024-06-07 RX ADMIN — METHOCARBAMOL 500 MG: 500 TABLET ORAL at 18:35

## 2024-06-07 RX ADMIN — FUROSEMIDE 20 MG: 10 INJECTION, SOLUTION INTRAMUSCULAR; INTRAVENOUS at 11:48

## 2024-06-07 RX ADMIN — HYDROMORPHONE HYDROCHLORIDE 4 MG: 2 TABLET ORAL at 17:50

## 2024-06-07 RX ADMIN — FLUCONAZOLE 150 MG: 150 TABLET ORAL at 13:06

## 2024-06-07 RX ADMIN — METHOCARBAMOL 500 MG: 100 INJECTION INTRAMUSCULAR; INTRAVENOUS at 13:05

## 2024-06-07 RX ADMIN — SENNOSIDES AND DOCUSATE SODIUM 2 TABLET: 8.6; 5 TABLET ORAL at 09:32

## 2024-06-07 RX ADMIN — ACETAMINOPHEN 975 MG: 325 TABLET ORAL at 17:50

## 2024-06-07 RX ADMIN — CEFAZOLIN SODIUM 2 G: 2 INJECTION, SOLUTION INTRAVENOUS at 17:49

## 2024-06-07 RX ADMIN — HYDROMORPHONE HYDROCHLORIDE 0.4 MG: 1 INJECTION, SOLUTION INTRAMUSCULAR; INTRAVENOUS; SUBCUTANEOUS at 11:48

## 2024-06-07 RX ADMIN — WARFARIN SODIUM 3 MG: 3 TABLET ORAL at 18:35

## 2024-06-07 RX ADMIN — PROPOFOL 50 MG: 10 INJECTION, EMULSION INTRAVENOUS at 07:32

## 2024-06-07 RX ADMIN — INSULIN ASPART 1 UNITS: 100 INJECTION, SOLUTION INTRAVENOUS; SUBCUTANEOUS at 13:05

## 2024-06-07 RX ADMIN — GABAPENTIN 300 MG: 300 CAPSULE ORAL at 20:03

## 2024-06-07 RX ADMIN — POLYETHYLENE GLYCOL 3350 17 G: 17 POWDER, FOR SOLUTION ORAL at 09:30

## 2024-06-07 RX ADMIN — ACETAMINOPHEN 975 MG: 325 TABLET ORAL at 11:49

## 2024-06-07 RX ADMIN — HYDROMORPHONE HYDROCHLORIDE 2 MG: 2 TABLET ORAL at 11:15

## 2024-06-07 RX ADMIN — PROPOFOL 75 MCG/KG/MIN: 10 INJECTION, EMULSION INTRAVENOUS at 07:32

## 2024-06-07 RX ADMIN — ASPIRIN 81 MG: 81 TABLET, COATED ORAL at 09:32

## 2024-06-07 ASSESSMENT — ACTIVITIES OF DAILY LIVING (ADL)
ADLS_ACUITY_SCORE: 32
ADLS_ACUITY_SCORE: 36
ADLS_ACUITY_SCORE: 32
ADLS_ACUITY_SCORE: 31
ADLS_ACUITY_SCORE: 36
ADLS_ACUITY_SCORE: 36
ADLS_ACUITY_SCORE: 31
ADLS_ACUITY_SCORE: 32
ADLS_ACUITY_SCORE: 36
ADLS_ACUITY_SCORE: 32
ADLS_ACUITY_SCORE: 31
ADLS_ACUITY_SCORE: 36
ADLS_ACUITY_SCORE: 36
ADLS_ACUITY_SCORE: 32
ADLS_ACUITY_SCORE: 32
ADLS_ACUITY_SCORE: 31
ADLS_ACUITY_SCORE: 36

## 2024-06-07 NOTE — PROGRESS NOTES
[facility] - ICU    RN Progress Note:            Pertinent Assessments:      Please refer to flowsheet rows for full assessment     Patient lungs clear and diminished, bowel sounds hypoactive. NPO at midnight for amputation tomorrow. Pulses on left leg found via doppler. Foot is warmish to the touch.           Key Events - This Shift:       Patient was having breakthrough pain, but stated PCA pump was managing pain adequately.                   Barriers to Discharge / Downgrade:     Patient cardiac tele status, has surgery tomorrow AM.

## 2024-06-07 NOTE — PLAN OF CARE
Problem: Adult Inpatient Plan of Care  Goal: Optimal Comfort and Wellbeing  Outcome: Progressing     Problem: Pain Acute  Goal: Optimal Pain Control and Function  Outcome: Progressing   Goal Outcome Evaluation:       Pt A&Ox4; able to make needs known appropriately. Doppler pulses heard L dorsalis pedal and post-tib. VSS overnight. Remains on RA. Cedeno patent. NPO since midnight for procedure today. Hgb 7.4 this am, notified provider, x1 unit RBCs ordered. Pt tolerating transfusion well.    Report given to AMISHA at 0600, pt brought to AMISHA at 0620.

## 2024-06-07 NOTE — ANESTHESIA POSTPROCEDURE EVALUATION
Patient: Ashanti Aviles    Procedure: Procedure(s):  AMPUTATION LEFT HALLUX       Anesthesia Type:  MAC    Note:  Disposition: Outpatient   Postop Pain Control: Uneventful            Sign Out: Well controlled pain   PONV: No   Neuro/Psych: Uneventful            Sign Out: Acceptable/Baseline neuro status   Airway/Respiratory: Uneventful            Sign Out: Acceptable/Baseline resp. status   CV/Hemodynamics: Uneventful            Sign Out: Acceptable CV status; No obvious hypovolemia; No obvious fluid overload   Other NRE: NONE   DID A NON-ROUTINE EVENT OCCUR? No           Last vitals:  Vitals Value Taken Time   /60 06/07/24 1400   Temp 36.8  C (98.3  F) 06/07/24 1300   Pulse 108 06/07/24 1430   Resp 18 06/07/24 1300   SpO2 97 % 06/07/24 1430   Vitals shown include unfiled device data.    Electronically Signed By: Jada Howell MD  June 7, 2024  2:32 PM

## 2024-06-07 NOTE — PROGRESS NOTES
VASCULAR SURGERY PROGRESS NOTE    Subjective:  Patient was seen and evaluated at the bedside for surgical follow-up.  Pain is controlled.  Underwent surgery with podiatry this morning. Transfusing 1 unit PRBCs for hemoglobin 7.4. No acute events overnight or new concerns.    Objective:  Intake/Output Summary (Last 24 hours) at 6/7/2024 1013  Last data filed at 6/7/2024 0930  Gross per 24 hour   Intake 1808.65 ml   Output 3581 ml   Net -1772.35 ml     PHYSICAL EXAM:  BP (!) 143/65   Pulse 109   Temp 98.3  F (36.8  C) (Oral)   Resp 16   Wt 65.1 kg (143 lb 9.6 oz)   SpO2 97%   BMI 27.13 kg/m    General: The patient is alert and oriented. Appropriate. No acute distress  Psych: Pleasant affect, answers questions appropriately  Skin: Color appropriate for race, warm, dry  Respiratory: Normal respiratory effort  Extremities: AT and PT signal present via doppler, incisional wound VAC in place to left lower extremity and left foot wrapped with clean and dry podiatry dressing      Imaging:   Pertinent imaging reviewed     ASSESSMENT:  69 yo female with CLTI LLE with hx of left fem distal bypass x2 failure due to early thrombosis now with third redo bypass left external iliac to distal PT on 6/4 and left hallux amputation with podiatry on 6/7       PLAN:  Continue ASA  Therapeutic lovenox, will need to transition to warfarin prior to discharge  Transfusing 1 unit PRBCs, follow hemoglobin  Pain control PRN  Continue incisional wound vac to left lower extremity  Wound care to L calf wound per WOC recommendations  Wound care and weight bearing to L foot per podiatry  Activity as tolerated, PT and OT consulted  Appreciate hospitalist input      Tammy Ramirez PA-C  VASCULAR SURGERY

## 2024-06-07 NOTE — OP NOTE
Date: 6/7/2024    Surgeon: LINSEY Ordaz DPM    Preoperative diagnosis: Acute osteomyelitis left hallux    Postoperative diagnosis: Same    Procedure: Amputation left hallux    Anesthesia: Local with IV-sedation    Hemostasis: None    Pathology: Left hallux, Aerobic/anaerobic culture     Injectables: None    Materials: 3-0 Vicryl, 3-0 Nylon    Complications: None    Blood loss: 6 cc      Findings: Patient presents for operative intervention for osteomyelitis of the left hallux.  I discussed today surgical procedure with the patient and her daughter in detail including amputation of the left hallux with primary closure.  Risks are significant and not limited to need for additional surgical invention including more proximal foot amputation, loss of limb in the presence of severe PAD.  Patient has been optimized by vascular surgery after recent lower extremity bypass procedure.  She consents to today's surgery.  Patient questions invited and answered, including appropriate risk, benefits and complications. No guarantees given or implied. Patient has been NPO.    Description: Patient was brought to the operating room and placed on the table in supine position. IV-sedation was administered by the anesthesia department.  Injection of 0.5% Marcaine plain was administered to surgical site left foot. The foot was then prepped and draped in usual aseptic manner. The extremity was elevated and exsanguinated. Well-padded ankle pneumatic tourniquet was inflated to 250mmHg and the following procedure was then performed: Attention was directed to the hallux of the left foot where two semi-elliptical incisions were made at the base of the digit. The incisions were carried full thickness into the 1st MPJ where the digit was disarticulated. The digit was removed from the surgical site in toto and sent to pathology. Deep aerobic and anaerobic cultures taken. Next, a 1000cc pulse lavage was used to irrigate the surgical site. The  subcutaneous tissue was reapproximated with 3-0 vicryl in a simple fashion and the skin was closed with 3-0 nylon in a simple suture fashion.     Dressings consisted of adaptic, 4x4's, kerlix/dany roll.     The patient appeared to tolerate all the procedures and anesthesia well without apparent complications. Patient was transported from the operating room to the recovery room with vital signs stable and neurovascular status as it was pre-operatively to the left foot. Patient to be returned to her in-house room for continued medical management per hospitalist/vascular surgery.  She should remain nonweightbearing on the left foot.  Surgical dressing to remain intact.  PRAFO boot left foot at all times including overnight.

## 2024-06-07 NOTE — PHARMACY-ANTICOAGULATION SERVICE
Clinical Pharmacy - Warfarin Dosing Consult     Pharmacy has been consulted to manage this patient s warfarin therapy.  Indication: Other - specify in comments (Multiple indications: PVD with CLTI LLE with hx of left fem distal bypass x2 failure due to early thrombosis now with third redo bypass left external iliac to distal PT)  Therapy Goal: INR 2.5-3.5  Provider/Team: Dr. Harmon (Vascular)  Warfarin Prior to Admission: No  Significant drug interactions: Fluconazole, Lovenox, aspirin  Recent documented change in oral intake/nutrition: No  Dose Comments: Since the patient was started on fluconazole today for 2 days, i expect a higher sensitivity to warfarin, so will order 3mg dose tonight    INR   Date Value Ref Range Status   06/07/2024 1.07 0.85 - 1.15 Final   06/04/2024 1.40 (H) 0.85 - 1.15 Final       Recommend warfarin 3 mg today.  Pharmacy will monitor Ashanti J Quilling daily and order warfarin doses to achieve specified goal.      Please contact pharmacy as soon as possible if the warfarin needs to be held for a procedure or if the warfarin goals change.

## 2024-06-07 NOTE — PROGRESS NOTES
S: Ashanti was seen at [facility] room 355 for fit of Left PRAFO  O: Ashanti was seen S/P left foot surgery.  A: The PRAFO was fit, the pad and straps were trimmed.  P: Patient to wear the orthosis at all times unless specified by the physician. Patient will be seen PRN.

## 2024-06-07 NOTE — CONSULTS
Infectious Disease Consultation:  Requesting MD:  Reason for consult:      HISTORY:  Pt has had recent arterial bypass c/b thrombus occlusion and then shock, so is in ICU.  Plan was to save distal toe, but not able to.      Vascular pasted note June 4:  68-year-old female with a history of left iliofemoral endarterectomy with retrograde iliac intervention followed by left femoral to below-knee popliteal bypass which had failed. Patient underwent left femoral to posterior tibial artery bypass with cryo artery which also had failed. Today she is scheduled for redo bypass. Risk and benefits of this operation were explained. I discussed a significant chance of potential failure of bypass graft which would inevitably lead to an amputation. Patient understood risk and benefits and agreed to proceed.         See pasted podiatry note here:    our original plan was attempted salvage of the left hallux which would include surgical debridement with resection of bone for aerobic/anaerobic culture, antibiotics per ID, use of wound VAC postoperatively and then local wound cares to try to heal the ulceration along the dorsal left hallux. However, after discussing further with vascular surgery we are concerned that because previous bypass procedures failed she may not have additional opportunities to heal the surgical site and thus recommend amputation of the left hallux at this time. We reviewed the surgical procedure to include disarticulation at the first MPJ with primary closure     She has tolerated amputation well, path is pending.  Is on ancef    Pertinent past history, past infectious disease history:    Past Medical History        Past Medical History:   Diagnosis Date    Anal dysplasia       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/2020    Benign gastric polyp      Chronic toe ulcer (H)      Chronic ulcer of great toe of left foot with necrosis of muscle (H)      Diabetes mellitus (H)      Gastroesophageal reflux disease       Hard to intubate 02/15/2024    History of colonic polyps      HLD (hyperlipidemia)      Hypertension      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/29/2013    Nicotine addiction      OAG (open angle glaucoma)      PVD (peripheral vascular disease) (H24)      Reflux esophagitis      Retinopathy      Toe ulcer (H)      Tubular adenoma      Yeast vaginitis              Social History            Tobacco Use    Smoking status: Former       Current packs/day: 0.00       Types: Cigarettes       Quit date: 2023       Years since quittin.3       Passive exposure: Never    Smokeless tobacco: Never   Substance Use Topics    Alcohol use: Not Currently       Comment: Alcoholic Drinks/MONTH: 2x         Family History         Family History   Problem Relation Age of Onset    Hypertension Mother      Colon Cancer Mother      Diabetes Type 2  Father      Hypertension Father      Kidney failure Father      Hyperthyroidism Sister      Breast Cancer Paternal Aunt                Medications:  Reviewed prior to admission meds as applicable in chart review.  Current meds are reviewed in the EMR listed MAR.     ANTIBIOTICS:    Current:ancef, fluc   Prior:   Allergy to:    SH/FH and  travel history(if applicable to consult):  above  REVIEW OF SYSTEMS:  All other systems negative    EXAMINATION:  /60   Pulse 107   Temp 98.3  F (36.8  C) (Oral)   Resp 18   Wt 65.1 kg (143 lb 9.6 oz)   SpO2 97%   BMI 27.13 kg/m    Alert, awake  Vitals tabulated above, reviewed  HEENT:nl  Neck supple without lymphadenopathy  Sclera clear  CARDIOVASCULAR regular rate and rhythm, no murmur  Lungs CLEAR TO AUSCULTATION   Abdomen soft, NT/ND, absent HEPATOSPLENOMEGALY  Skin normal  Joints normal  Neurologic exam non focal  Wound:                CLINICAL DATABASE FOR---LAB/MICRO/CULTURES/IMAGING STUDIES:  Lab Results   Component Value Date    WBC 25.0 2024     Lab Results   Component Value Date    RBC 3.27 2024     Lab  "Results   Component Value Date    HGB 9.3 06/07/2024     Lab Results   Component Value Date    HCT 27.2 06/07/2024     No components found for: \"MCT\"  Lab Results   Component Value Date    MCV 83 06/07/2024     Lab Results   Component Value Date    MCH 28.4 06/07/2024     Lab Results   Component Value Date    MCHC 34.2 06/07/2024     Lab Results   Component Value Date    RDW 15.8 06/07/2024     Lab Results   Component Value Date     06/07/2024       Last Comprehensive Metabolic Panel:  Sodium   Date Value Ref Range Status   06/07/2024 132 (L) 135 - 145 mmol/L Final     Comment:     Reference intervals for this test were updated on 09/26/2023 to more accurately reflect our healthy population. There may be differences in the flagging of prior results with similar values performed with this method. Interpretation of those prior results can be made in the context of the updated reference intervals.      Potassium   Date Value Ref Range Status   06/07/2024 3.2 (L) 3.4 - 5.3 mmol/L Final     Chloride   Date Value Ref Range Status   06/07/2024 96 (L) 98 - 107 mmol/L Final     Carbon Dioxide (CO2)   Date Value Ref Range Status   06/07/2024 23 22 - 29 mmol/L Final     Anion Gap   Date Value Ref Range Status   06/07/2024 13 7 - 15 mmol/L Final     Glucose   Date Value Ref Range Status   09/14/2021 126 (A) 70 - 99 mg/dL Final     Comment:     Lot [id number]  Exp 3/9/22     GLUCOSE BY METER POCT   Date Value Ref Range Status   06/07/2024 149 (H) 70 - 99 mg/dL Final     Urea Nitrogen   Date Value Ref Range Status   06/07/2024 11.4 8.0 - 23.0 mg/dL Final     Creatinine   Date Value Ref Range Status   06/07/2024 0.59 0.51 - 0.95 mg/dL Final     GFR Estimate   Date Value Ref Range Status   06/07/2024 >90 >60 mL/min/1.73m2 Final     GFR, ESTIMATED POCT   Date Value Ref Range Status   03/26/2024 45 (L) >60 mL/min/1.73m2 Final     Calcium   Date Value Ref Range Status   06/07/2024 7.8 (L) 8.8 - 10.2 mg/dL Final       Liver " "Function Studies -   Recent Labs   Lab Test 06/05/24  0417   PROTTOTAL 5.0*   ALBUMIN 3.4*   BILITOTAL 1.0   ALKPHOS 45   AST 59*   ALT 48       No results found for: \"SED\"    No results found for: \"CRP\"        Mixed gram + organisms from leg in march 2024    IMPRESSION:  PVD post:    Left external iliac artery to posterior tibial artery bypass with spliced cryo artery graft  Reduction and repair of the left femoral hernia  Thrombectomy of the posterior tibial artery and bypass graft  Left lower extremity angiogram    Left hallux osteo post:  Amputation left hallux     Fluconazole being given for vaginitis sxs    PLAN:  Ok with ancef awaiting pathology and micro  Will follow        NIKOLAY RAYO MD  [facility]  Office [phone]      "

## 2024-06-07 NOTE — PLAN OF CARE
Goal Outcome Evaluation:      Plan of Care Reviewed With: patient    Overall Patient Progress: improvingOverall Patient Progress: improving       [facility] - ICU    RN Progress Note:            Pertinent Assessments:      Please refer to flowsheet rows for full assessment     Afebrile. Has been tachycardic most of the day- pain and movement seems to drive heart rate up.  Tolerating regular diet.  Sugars 116, 149.  Lungs clear and remains on room air.            Key Events - This Shift:       Surgery this morning completed without complications.  Tolerated one unit of PRBC.  New brace applied to left lower extremity.  Issues with pain- oral dilaudid x1, IV dilaudid x1 and robaxin/tylenol scheduled.  Left lower extremity elevated on pillows.  Cedeno removed and purewick in place.  Lasix x1 with great output.  Good appetite.  ID consulted and on IV ancef.                 Barriers to Discharge / Downgrade:     Cardiac/tele status.         Point of Contact Update YES-OR-NO: Yes  If No, reason:   Name:Daughter- Zee at bedside  Phone Number:see chart  Summary of Conversation: updated on plan of care.

## 2024-06-07 NOTE — PROGRESS NOTES
[facility]    Medicine Progress Note - Hospitalist Service    Date of Admission:  6/4/2024    Assessment & Plan   Ashanti is a 69 YO female admitted in ICU from OR for the management of hemorrhagic shock. Patient has a h/o hypertension, DM-II, severe PAD, left hallux osteomyelitis. Patient presented for left external iliac artery to post tibial artery bypass. Surgery was complicated by loss of peripheral pulse requiring re-opening of incision with thrombectomy from the graft, and hemorrhagic shock requiring ICU admission on pressor and blood transfusion    Severe PAD  -- Left external andree artery to posterior tibial artery bypass requiring reopening and revision with thrombectomy form graft on 06/04.  -- High risk for bypass failure, no additional options for revascularization. If bypass fails, pateint will need an amputation per vascular team.  -- C/w Aspirin  -- S/p Argatroban   -- On Lovenox bridging. Warfarin managed by pharmacist, appreciate help.    Hemorrhagic shock-resolved  Acute blood loss anemia  -- Weaned off of phenylephrine gtt.   -- Resuscitation with IVF and pRBCs.   -- S/p 5 PRBCs (3 units 06/04, 1 unit 06/05, 1 unit 06/07)  -- Coag profiles not suggestive of consumptive DIC    Left hallux osteomyelitis  -- S/p amputation of left hallux on 06/07 by podiatry  -- ID consult appreciated: C/w Ancef for now. Awaiting path and culture    Thrombocytopenia  -- Low risk of HIT score 0-3. HIT screen negative.   -- Monitor CBC    Lactic acidosis-resolved  -- Due to poor perfusion.   -- Monitor Q6    Vaginal candidiasis  -- On Fluconazole    Leukocytosis- stable  low grade fever-resolved  -- Likely reactive post op  -- Encouarge to use IS  -- Monitor wbc and temp curve  -- Discussed with Vascular- will moniotr off of Abx.    DM-II  -- On Jardiance, Trajenta, Metfromin and Tresiba 30 units BID at home.   -- Accu-checks, cont with moderate sliding scale insulin. I:C 1:15    Essential  HTN  -- PTA meds (Norvasc, Lisinopril, HCTZ) held due to shock. Will resume when appropriate.  -- Monitor vital signs per protocol  -- hydralazine iv prn            Diet: Regular Diet Adult    DVT Prophylaxis: Lovenox  Cedeno Catheter: PRESENT, indication: Non-ICU: q2 hour intake/output with documentation  Lines: PRESENT      PICC 06/04/24 Triple Lumen Left Brachial vein medial-Site Assessment: WDL  [REMOVED] Arterial Line 06/04/24 Brachial-Site Assessment: WDL Except;Positional      Cardiac Monitoring: None  Code Status: Full Code      Clinically Significant Risk Factors        # Hypokalemia: Lowest K = 3.2 mmol/L in last 2 days, will replace as needed       # Hypoalbuminemia: Lowest albumin = 3.4 g/dL at 6/5/2024  4:17 AM, will monitor as appropriate     # Thrombocytopenia: Lowest platelets = 88 in last 2 days, will monitor for bleeding   # Hypertension: Noted on problem list           #Precipitous drop in Hgb/Hct: Lowest Hgb this hospitalization: 7.2 g/dL. Will continue to monitor and treat/transfuse as appropriate.    # DMII: A1C = 6.7 % (Ref range: <5.7 %) within past 6 months, PRESENT ON ADMISSION             Disposition Plan     Medically Ready for Discharge: Anticipated in 2-4 Days             Elida Espino MD  Hospitalist Service  [facility]  Securely message with [facility] (more info)  Text page via [facility] Paging/Directory   ______________________________________________________________________    Interval History   Patient is seen and examined at bedside.   Pt had left hallux amputation this morning. Pain is well controlled.  Plan of care discussed with patient. All questions answered.     Physical Exam   Vital Signs: Temp: 98.3  F (36.8  C) Temp src: Oral BP: 130/60 Pulse: 107   Resp: 18 SpO2: 97 % O2 Device: None (Room air)    Weight: 143 lbs 9.6 oz    GEN: Alert and oriented. Not in acute distress.  HEENT: Atraumatic, mucous membrane- moist and pink.  Chest: Bilateral air  entry.  CVS: S1S2 regular.   Abdomen: Soft. Non-tender, non-distended. No organomegaly. No guarding or rigidity. Bowel sounds active.   Extremities: left LE wound vac. Left LE swollen but no deep tenderness. Surgical dressing over left foot.  CNS: No involuntary movements.  Skin: no cyanosis or clubbing.     Medical Decision Making       50 MINUTES SPENT BY ME on the date of service doing chart review, history, exam, documentation & further activities per the note.      Data

## 2024-06-07 NOTE — ANESTHESIA PREPROCEDURE EVALUATION
Anesthesia Pre-Procedure Evaluation    Patient: Ashanti Aviles   MRN: [id number] : 1955        Procedure : Procedure(s):  AMPUTATION LEFT HALLUX          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/29/2013    Nicotine addiction     OAG (open angle glaucoma)     PVD (peripheral vascular disease) (H24)     Reflux esophagitis     Retinopathy     Toe ulcer (H)     Tubular adenoma     Yeast vaginitis       Past Surgical History:   Procedure Laterality Date    AMPUTATE TOE(S) Right 2023    Procedure: Amputation fourth toe right foot;  Surgeon: Benjamin Diez DPM;  Location: [facility] OR    ANGIOGRAM Left 2/15/2024    Procedure: COMPLETION OF ANGIOGRAM, LEFT COMMON ILIAC ARTERY STENT PLACEMENT;  Surgeon: Dyan Geller MD;  Location: [facility] OR    ANUS SURGERY      BIOPSY CERVICAL, LOCAL EXCISION, SINGLE/MULTIPLE      COLONOSCOPY N/A 2020    Procedure: EXAM UNDER ANESTHESIA, HIGH RESOLUTION ANOSCOPY INTRA OP;  Surgeon: Preeti Sheehan MD;  Location: [facility] OR;  Service: Gastroenterology    COLONOSCOPY N/A 2020    Procedure: EXAM UNDER ANESTHESIA WITH HIGH RESOLUTION ANOSCOPY;  Surgeon: Preeti Sheehan MD;  Location: [facility] OR;  Service: General    COLONOSCOPY N/A 06/15/2021    Procedure: EXAM UNDER ANESTHESIA WITH HIGH RESOLUTION ANOSCOPY, BIOPSY, FULGURATION;  Surgeon: Preeti Sheehan MD;  Location: [facility] OR;  Service: Gastroenterology    COLONOSCOPY N/A 4/15/2024    Procedure: COLONOSCOPY, WITH POLYPECTOMY;  Surgeon: Dmitry  Shen Holly MD;  Location: [facility] OR    ENDARTERECTOMY FEMORAL Left 2023    Procedure: LEFT FEMORAL ENDARTERECTOMY WITH RETROGRADE ILIAC STENT;  Surgeon: Dyan Geller MD;  Location: [facility] OR    EXAM UNDER ANESTHESIA ANUS N/A 2021    Procedure: EXAM UNDER ANESTHESIA WITH HIGH RESOLUTION ANOSCOPY;  Surgeon: Preeti Sheehan MD;  Location: [facility] OR    FEMORAL ARTERY - TIBIAL ARTERY BYPASS GRAFT Left 2023    Procedure: CREATION, BYPASS, ARTERIAL, FEMORAL TO TIBIAL, LEFT;  Surgeon: Dyan Geller MD;  Location: [facility] OR    FEMORAL ARTERY - TIBIAL ARTERY BYPASS GRAFT Left 2/15/2024    Procedure: CREATION, BYPASS, ARTERIAL, FEMORAL TO TIBIAL,;  Surgeon: Dyan Geller MD;  Location: [facility] OR    FEMORAL ARTERY - TIBIAL ARTERY BYPASS GRAFT Left 2024    Procedure: LEFT EXTERNAL ILIAC ARTERY TO POSTERIOR TIBIAL ARTERY BYPASS WITH CRYO ARTERY WITH A FEMORAL HERNIA REPAIR;  Surgeon: Dyan Geller MD;  Location: [facility] OR    INCISION AND DRAINAGE FOOT, COMBINED Left 2023    Procedure: INCISION AND DRAINAGE, LEFT HALLUX;  Surgeon: Rene Ordaz DPM;  Location: [facility] OR    IR LOWER EXTREMITY ANGIOGRAM LEFT  2023    IR LOWER EXTREMITY ANGIOGRAM RIGHT  2023    IR THROMBOLYSIS ARTERIAL INFUSION INITIAL DAY  10/09/2023    LEEP TX, CERVICAL          TUBAL LIGATION        Allergies   Allergen Reactions    Simvastatin Muscle Pain (Myalgia)     Muscle pain    Victoza [Liraglutide] Other (See Comments)     Binds bowels    Penicillins Unknown     Childhood rxn      Social History     Tobacco Use    Smoking status: Former     Current packs/day: 0.00     Types: Cigarettes     Quit date: 2023     Years since quittin.3     Passive exposure: Never    Smokeless tobacco: Never   Substance Use Topics    Alcohol use: Not Currently     Comment: Alcoholic Drinks/MONTH: 2x      Wt  "Readings from Last 1 Encounters:   06/06/24 65.1 kg (143 lb 9.6 oz)        Anesthesia Evaluation   Pt has had prior anesthetic.         ROS/MED HX  ENT/Pulmonary:       Neurologic:       Cardiovascular:     (+)  hypertension- -   -  - -                                      METS/Exercise Tolerance:     Hematologic:       Musculoskeletal:       GI/Hepatic:     (+) GERD,                   Renal/Genitourinary:     (+) renal disease,             Endo:     (+) type I DM,                     Psychiatric/Substance Use:       Infectious Disease:       Malignancy:       Other:            Physical Exam    Airway        Mallampati: II    Neck ROM: full     Respiratory Devices and Support         Dental       (+) Minor Abnormalities - some fillings, tiny chips      Cardiovascular   cardiovascular exam normal          Pulmonary   pulmonary exam normal                OUTSIDE LABS:  CBC:   Lab Results   Component Value Date    WBC 24.9 (H) 06/07/2024    WBC 26.3 (H) 06/06/2024    HGB 7.4 (L) 06/07/2024    HGB 7.8 (L) 06/06/2024    HCT 21.4 (L) 06/07/2024    HCT 22.1 (L) 06/06/2024     (L) 06/07/2024    PLT 91 (L) 06/06/2024     BMP:   Lab Results   Component Value Date     (L) 06/07/2024     06/06/2024    POTASSIUM 3.2 (L) 06/07/2024    POTASSIUM 4.0 06/06/2024    CHLORIDE 96 (L) 06/07/2024    CHLORIDE 102 06/06/2024    CO2 23 06/07/2024    CO2 25 06/06/2024    BUN 11.4 06/07/2024    BUN 14.7 06/06/2024    CR 0.59 06/07/2024    CR 0.73 06/06/2024     (H) 06/07/2024     (H) 06/07/2024     COAGS:   Lab Results   Component Value Date    PTT 42 (H) 06/07/2024    INR 1.40 (H) 06/04/2024    FIBR 164 (L) 06/04/2024     POC: No results found for: \"BGM\", \"HCG\", \"HCGS\"  HEPATIC:   Lab Results   Component Value Date    ALBUMIN 3.4 (L) 06/05/2024    PROTTOTAL 5.0 (L) 06/05/2024    ALT 48 06/05/2024    AST 59 (H) 06/05/2024    ALKPHOS 45 06/05/2024    BILITOTAL 1.0 06/05/2024     OTHER:   Lab Results   Component " Value Date    PH 7.37 06/04/2024    LACT 1.0 06/06/2024    A1C 6.7 (H) 06/05/2024    SANTINO 7.8 (L) 06/07/2024    PHOS 3.5 02/16/2024    MAG 1.9 02/16/2024       Anesthesia Plan    ASA Status:  2       Anesthesia Type: MAC.              Consents    Anesthesia Plan(s) and associated risks, benefits, and realistic alternatives discussed. Questions answered and patient/representative(s) expressed understanding.     - Discussed: Risks, Benefits and Alternatives for the PROCEDURE were discussed     - Discussed with:  Patient      - Extended Intubation/Ventilatory Support Discussed: No.      - Patient is DNR/DNI Status: No     Use of blood products discussed: No .     Postoperative Care            Comments:               Jada Howell MD    I have reviewed the pertinent notes and labs in the chart from the past 30 days and (re)examined the patient.  Any updates or changes from those notes are reflected in this note.

## 2024-06-07 NOTE — CONSULTS
"[facility] ACUTE INPATIENT PAIN SERVICE    [facility], [facility], [facility], [facility], [place]   PAIN Consult     Assessment/Plan:  Ashanti Aviles is a 68 year old female who was admitted on 6/4/2024.  I was asked by Dr. Espino to see the patient for Left LE post op pain.  History of osteomyelitis of left hallux, severe peripheral vascular disease who presented for left external iliac artery to posterior tib artery bypass requiring reopening of invision with thrombectomy from graft which was complicated by hemorrhagic shock and poor reperfusion.    shows that she was previously in tramadol, then transitioned to oxycodone (1-2 times per week) in November. Describes pain as 0/10 and foot is numb..     Pain team will sign off. Please reconsult if pain is not controlled.      PLAN:  Acute pain secondary to amputation of the left hallux today, although still with block in place & had third redo bypass left external iliac to distal PT on 6/4.  Mildly opioid tolerant at baseline.  PCA discontinued today and continue on with orals.  Multimodal Medication Therapy:   Adjuvants: Tylenol and avoid NSAIDs due to anemia.  Agree with Robaxin although would stop if patient becomes lethargic.  Opioids: Hydromorphone/Dilaudid2-4 mg every 4 as needed  Dilaudid 0.4 mg as needed  Non-medication interventions-  elevation and brace per vascular surgery  Constipation Prophylaxis-on senna and MiraLAX  Follow up /Discharge Recommendations - We recommend prescribing the following at the time of discharge: Likely Dilaudid p.o. with taper          Subjective:    Today met with friend at the bedside.  She reports no pain in her foot.  She notes some hyperalgesia and feeling like her upper legs and they are going to, \"off\".  She is pleased with pain control plan.  Pain in the knee and upper part of the leg is burning in nature.  Pleased with resuming gabapentin per her home dose.  Denies nausea.  Denies chest pain.  During my visit " "patient is tachycardic heart rate in the 130s.  And she states when she swallows she feels like she, \"has a hole in her esophagus\".  Discussed with attending physician and nurse.  Prior to surgery patient was taking oxycodone about once or twice per week.  Very compliant.     Discussed with pain pharmacist who notes plan was to discontinue PCA today.    History   Drug Use Unknown         Tobacco Use      Smoking status: Former        Packs/day: 0.00        Types: Cigarettes        Quit date: 2023        Years since quittin.3        Passive exposure: Never      Smokeless tobacco: Never        Objective:  Vital signs in last 24 hours:  B/P: 147/63, T: 98.3, P: 115, R: 18   Blood pressure (!) 147/63, pulse 115, temperature 98.3  F (36.8  C), temperature source Oral, resp. rate 18, weight 65.1 kg (143 lb 9.6 oz), SpO2 100%.        Review of Systems:   As per subjective, all others negative.    Physical Exam  Vital Signs: Temp: 98.5  F (36.9  C) Temp src: Oral BP: 102/69 Pulse: 65   Resp: 18 SpO2: 100 % O2 Device: None (Room air) Oxygen Delivery: 2 LPM  Weight: 187 lbs 14.4 oz  General: in no apparent distress and non-toxic    HEENT: Head normocephalic atraumatic, oral mucosa moist. Sclerae anicteric  CV: Regular rhythm, normal rate, no murmurs  Resp: No wheezes, no rales or rhonchi, no focal consolidations  GI: No complaints  Skin: Leg incisions are covered on the left with wound vacs and dressings and has a left leg brace in place  Extremities: Left leg edema +2  Psych: Normal affect, mood euthymic  Neuro: Grossly normal          Imaging:  Personally Reviewed.    Results for orders placed or performed during the hospital encounter of 24   XR Chest Port 1 View    Impression    IMPRESSION: Left upper extremity PICC catheter has tip overlying the superior cavoatrial junction.    Lungs are clear. No pleural effusion or pneumothorax.    Cardiomediastinal silhouette is normal. Atherosclerotic calcifications of " the thoracic aorta.        Lab Results:  Personally Reviewed.   Last Comprehensive Metabolic Panel:  Sodium   Date Value Ref Range Status   06/07/2024 132 (L) 135 - 145 mmol/L Final     Comment:     Reference intervals for this test were updated on 09/26/2023 to more accurately reflect our healthy population. There may be differences in the flagging of prior results with similar values performed with this method. Interpretation of those prior results can be made in the context of the updated reference intervals.      Potassium   Date Value Ref Range Status   06/07/2024 3.2 (L) 3.4 - 5.3 mmol/L Final     Chloride   Date Value Ref Range Status   06/07/2024 96 (L) 98 - 107 mmol/L Final     Carbon Dioxide (CO2)   Date Value Ref Range Status   06/07/2024 23 22 - 29 mmol/L Final     Anion Gap   Date Value Ref Range Status   06/07/2024 13 7 - 15 mmol/L Final     Glucose   Date Value Ref Range Status   09/14/2021 126 (A) 70 - 99 mg/dL Final     Comment:     Lot [id number]  Exp 3/9/22     GLUCOSE BY METER POCT   Date Value Ref Range Status   06/07/2024 149 (H) 70 - 99 mg/dL Final     Urea Nitrogen   Date Value Ref Range Status   06/07/2024 11.4 8.0 - 23.0 mg/dL Final     Creatinine   Date Value Ref Range Status   06/07/2024 0.59 0.51 - 0.95 mg/dL Final     GFR Estimate   Date Value Ref Range Status   06/07/2024 >90 >60 mL/min/1.73m2 Final     GFR, ESTIMATED POCT   Date Value Ref Range Status   03/26/2024 45 (L) >60 mL/min/1.73m2 Final     Calcium   Date Value Ref Range Status   06/07/2024 7.8 (L) 8.8 - 10.2 mg/dL Final        UA:   Cannabinoids Urine   Date Value Ref Range Status   04/26/2024 Screen Negative Screen Negative Final     Comment:     Cutoff for a negative cannabinoid is less than 50 ng/mL.              Please see A&P for additional details of medical decision making.  MANAGEMENT DISCUSSED with the following over the past 24 hours: Discussed with nurse, attending physician, patient  NOTE(S)/MEDICAL RECORDS REVIEWED  over the past 24 hours: Reviewed notes from vascular, intensivist, wall, nursing team  Tests personally interpreted in the past 24 hours:  - X-ray showing lungs are clear  - Hemoglobin  SUPPLEMENTAL HISTORY, in addition to the patient's history, over the past 24 hours obtained from:   - MD  Medical complexity over the past 24 hours:  -------------------------- HIGH RISK FOR MORBIDITY -------------------------------------------------------------  - Parenteral (IV) CONTROLLED SUBSTANCES ordered           Sheela RAYA, CNS-BC, [facility]  Acute Care Pain Management Program   Hours of pain coverage Mon-Fri 8-1600, afterhours please call the house officer    [employer] (KALYN, MELISSAs, SD, RH)   Page via [employer] web console -Click for [employer]

## 2024-06-07 NOTE — ANESTHESIA CARE TRANSFER NOTE
Patient: Ashanti Aviles    Procedure: Procedure(s):  AMPUTATION LEFT HALLUX       Diagnosis: Acute osteomyelitis of left foot (H) [M86.172]  Diagnosis Additional Information: No value filed.    Anesthesia Type:   No value filed.     Note:    Oropharynx: oropharynx clear of all foreign objects and spontaneously breathing  Level of Consciousness: awake  Oxygen Supplementation: face mask  Level of Supplemental Oxygen (L/min / FiO2): 6  Independent Airway: airway patency satisfactory and stable  Dentition: dentition unchanged  Vital Signs Stable: post-procedure vital signs reviewed and stable  Report to RN Given: handoff report given  Patient transferred to: PACU    Handoff Report: Identifed the Patient, Identified the Reponsible Provider, Reviewed the pertinent medical history, Discussed the surgical course, Reviewed Intra-OP anesthesia mangement and issues during anesthesia, Set expectations for post-procedure period and Allowed opportunity for questions and acknowledgement of understanding      Vitals:  Vitals Value Taken Time   /57 0758   Temp 97.9 0758   Pulse 103 0758   Resp 17 0758   SpO2 100 0758       Electronically Signed By: DOROTA Smith CRNA  June 7, 2024  7:58 AM

## 2024-06-08 ENCOUNTER — APPOINTMENT (OUTPATIENT)
Dept: PHYSICAL THERAPY | Facility: HOSPITAL | Age: 69
DRG: 252 | End: 2024-06-08
Attending: PODIATRIST
Payer: COMMERCIAL

## 2024-06-08 LAB
ANION GAP SERPL CALCULATED.3IONS-SCNC: 10 MMOL/L (ref 7–15)
BUN SERPL-MCNC: 10 MG/DL (ref 8–23)
CALCIUM SERPL-MCNC: 7.8 MG/DL (ref 8.8–10.2)
CHLORIDE SERPL-SCNC: 97 MMOL/L (ref 98–107)
CREAT SERPL-MCNC: 0.51 MG/DL (ref 0.51–0.95)
DEPRECATED HCO3 PLAS-SCNC: 27 MMOL/L (ref 22–29)
EGFRCR SERPLBLD CKD-EPI 2021: >90 ML/MIN/1.73M2
ERYTHROCYTE [DISTWIDTH] IN BLOOD BY AUTOMATED COUNT: 15.6 % (ref 10–15)
ERYTHROCYTE [DISTWIDTH] IN BLOOD BY AUTOMATED COUNT: 15.7 % (ref 10–15)
GLUCOSE BLDC GLUCOMTR-MCNC: 141 MG/DL (ref 70–99)
GLUCOSE BLDC GLUCOMTR-MCNC: 157 MG/DL (ref 70–99)
GLUCOSE BLDC GLUCOMTR-MCNC: 174 MG/DL (ref 70–99)
GLUCOSE BLDC GLUCOMTR-MCNC: 217 MG/DL (ref 70–99)
GLUCOSE SERPL-MCNC: 201 MG/DL (ref 70–99)
HCT VFR BLD AUTO: 24 % (ref 35–47)
HCT VFR BLD AUTO: 24.3 % (ref 35–47)
HCT VFR BLD AUTO: 24.4 % (ref 35–47)
HCT VFR BLD AUTO: 24.5 % (ref 35–47)
HGB BLD-MCNC: 8.2 G/DL (ref 11.7–15.7)
HGB BLD-MCNC: 8.2 G/DL (ref 11.7–15.7)
HGB BLD-MCNC: 8.3 G/DL (ref 11.7–15.7)
HGB BLD-MCNC: 8.5 G/DL (ref 11.7–15.7)
INR PPP: 0.94 (ref 0.85–1.15)
MCH RBC QN AUTO: 28.4 PG (ref 26.5–33)
MCH RBC QN AUTO: 28.5 PG (ref 26.5–33)
MCH RBC QN AUTO: 28.7 PG (ref 26.5–33)
MCH RBC QN AUTO: 28.7 PG (ref 26.5–33)
MCHC RBC AUTO-ENTMCNC: 33.7 G/DL (ref 31.5–36.5)
MCHC RBC AUTO-ENTMCNC: 34 G/DL (ref 31.5–36.5)
MCHC RBC AUTO-ENTMCNC: 34.2 G/DL (ref 31.5–36.5)
MCHC RBC AUTO-ENTMCNC: 34.7 G/DL (ref 31.5–36.5)
MCV RBC AUTO: 83 FL (ref 78–100)
MCV RBC AUTO: 84 FL (ref 78–100)
PLATELET # BLD AUTO: 126 10E3/UL (ref 150–450)
PLATELET # BLD AUTO: 128 10E3/UL (ref 150–450)
PLATELET # BLD AUTO: 145 10E3/UL (ref 150–450)
PLATELET # BLD AUTO: 155 10E3/UL (ref 150–450)
POTASSIUM SERPL-SCNC: 3.1 MMOL/L (ref 3.4–5.3)
POTASSIUM SERPL-SCNC: 3.2 MMOL/L (ref 3.4–5.3)
RBC # BLD AUTO: 2.86 10E6/UL (ref 3.8–5.2)
RBC # BLD AUTO: 2.89 10E6/UL (ref 3.8–5.2)
RBC # BLD AUTO: 2.91 10E6/UL (ref 3.8–5.2)
RBC # BLD AUTO: 2.96 10E6/UL (ref 3.8–5.2)
SODIUM SERPL-SCNC: 134 MMOL/L (ref 135–145)
WBC # BLD AUTO: 17.9 10E3/UL (ref 4–11)
WBC # BLD AUTO: 18.1 10E3/UL (ref 4–11)
WBC # BLD AUTO: 21.2 10E3/UL (ref 4–11)
WBC # BLD AUTO: 21.4 10E3/UL (ref 4–11)

## 2024-06-08 PROCEDURE — 99233 SBSQ HOSP IP/OBS HIGH 50: CPT | Performed by: INTERNAL MEDICINE

## 2024-06-08 PROCEDURE — 250N000013 HC RX MED GY IP 250 OP 250 PS 637: Performed by: SURGERY

## 2024-06-08 PROCEDURE — 250N000013 HC RX MED GY IP 250 OP 250 PS 637: Performed by: STUDENT IN AN ORGANIZED HEALTH CARE EDUCATION/TRAINING PROGRAM

## 2024-06-08 PROCEDURE — 87040 BLOOD CULTURE FOR BACTERIA: CPT | Performed by: INTERNAL MEDICINE

## 2024-06-08 PROCEDURE — 250N000013 HC RX MED GY IP 250 OP 250 PS 637: Performed by: PODIATRIST

## 2024-06-08 PROCEDURE — 80048 BASIC METABOLIC PNL TOTAL CA: CPT | Performed by: STUDENT IN AN ORGANIZED HEALTH CARE EDUCATION/TRAINING PROGRAM

## 2024-06-08 PROCEDURE — 250N000011 HC RX IP 250 OP 636: Mod: JZ | Performed by: STUDENT IN AN ORGANIZED HEALTH CARE EDUCATION/TRAINING PROGRAM

## 2024-06-08 PROCEDURE — 250N000011 HC RX IP 250 OP 636: Mod: JZ | Performed by: PODIATRIST

## 2024-06-08 PROCEDURE — 120N000013 HC R&B IMCU

## 2024-06-08 PROCEDURE — 97530 THERAPEUTIC ACTIVITIES: CPT | Mod: GP

## 2024-06-08 PROCEDURE — 85014 HEMATOCRIT: CPT | Performed by: PODIATRIST

## 2024-06-08 PROCEDURE — 36415 COLL VENOUS BLD VENIPUNCTURE: CPT | Performed by: INTERNAL MEDICINE

## 2024-06-08 PROCEDURE — 250N000011 HC RX IP 250 OP 636: Mod: JZ | Performed by: INTERNAL MEDICINE

## 2024-06-08 PROCEDURE — 84132 ASSAY OF SERUM POTASSIUM: CPT | Performed by: SURGERY

## 2024-06-08 PROCEDURE — 85027 COMPLETE CBC AUTOMATED: CPT | Performed by: PODIATRIST

## 2024-06-08 PROCEDURE — 85610 PROTHROMBIN TIME: CPT | Performed by: STUDENT IN AN ORGANIZED HEALTH CARE EDUCATION/TRAINING PROGRAM

## 2024-06-08 PROCEDURE — 250N000013 HC RX MED GY IP 250 OP 250 PS 637: Performed by: PAIN MEDICINE

## 2024-06-08 PROCEDURE — 99233 SBSQ HOSP IP/OBS HIGH 50: CPT | Performed by: STUDENT IN AN ORGANIZED HEALTH CARE EDUCATION/TRAINING PROGRAM

## 2024-06-08 RX ORDER — CLOPIDOGREL BISULFATE 75 MG/1
75 TABLET ORAL DAILY
Status: DISCONTINUED | OUTPATIENT
Start: 2024-06-08 | End: 2024-06-11 | Stop reason: HOSPADM

## 2024-06-08 RX ORDER — WARFARIN SODIUM 3 MG/1
3 TABLET ORAL
Status: COMPLETED | OUTPATIENT
Start: 2024-06-08 | End: 2024-06-08

## 2024-06-08 RX ORDER — AMLODIPINE BESYLATE 5 MG/1
5 TABLET ORAL DAILY
Status: DISCONTINUED | OUTPATIENT
Start: 2024-06-08 | End: 2024-06-11 | Stop reason: HOSPADM

## 2024-06-08 RX ORDER — MINERAL OIL/HYDROPHIL PETROLAT
OINTMENT (GRAM) TOPICAL DAILY
Status: DISCONTINUED | OUTPATIENT
Start: 2024-06-08 | End: 2024-06-11 | Stop reason: HOSPADM

## 2024-06-08 RX ORDER — POTASSIUM CHLORIDE 1500 MG/1
40 TABLET, EXTENDED RELEASE ORAL ONCE
Status: COMPLETED | OUTPATIENT
Start: 2024-06-08 | End: 2024-06-08

## 2024-06-08 RX ORDER — LISINOPRIL 20 MG/1
40 TABLET ORAL DAILY
Status: DISCONTINUED | OUTPATIENT
Start: 2024-06-08 | End: 2024-06-11 | Stop reason: HOSPADM

## 2024-06-08 RX ORDER — MEROPENEM 1 G/1
1 INJECTION, POWDER, FOR SOLUTION INTRAVENOUS EVERY 8 HOURS
Status: COMPLETED | OUTPATIENT
Start: 2024-06-08 | End: 2024-06-11

## 2024-06-08 RX ADMIN — GABAPENTIN 300 MG: 300 CAPSULE ORAL at 13:07

## 2024-06-08 RX ADMIN — FLUCONAZOLE 150 MG: 150 TABLET ORAL at 11:51

## 2024-06-08 RX ADMIN — ACETAMINOPHEN 975 MG: 325 TABLET ORAL at 11:51

## 2024-06-08 RX ADMIN — AMLODIPINE BESYLATE 5 MG: 5 TABLET ORAL at 09:22

## 2024-06-08 RX ADMIN — ACETAMINOPHEN 975 MG: 325 TABLET ORAL at 18:22

## 2024-06-08 RX ADMIN — CEFAZOLIN SODIUM 2 G: 2 INJECTION, SOLUTION INTRAVENOUS at 09:12

## 2024-06-08 RX ADMIN — INSULIN ASPART 1 UNITS: 100 INJECTION, SOLUTION INTRAVENOUS; SUBCUTANEOUS at 13:05

## 2024-06-08 RX ADMIN — LISINOPRIL 40 MG: 20 TABLET ORAL at 09:12

## 2024-06-08 RX ADMIN — GABAPENTIN 300 MG: 300 CAPSULE ORAL at 09:13

## 2024-06-08 RX ADMIN — WHITE PETROLATUM: 1.75 OINTMENT TOPICAL at 14:28

## 2024-06-08 RX ADMIN — SENNOSIDES AND DOCUSATE SODIUM 1 TABLET: 8.6; 5 TABLET ORAL at 20:02

## 2024-06-08 RX ADMIN — ACETAMINOPHEN 975 MG: 325 TABLET ORAL at 00:24

## 2024-06-08 RX ADMIN — POLYETHYLENE GLYCOL 3350 17 G: 17 POWDER, FOR SOLUTION ORAL at 09:12

## 2024-06-08 RX ADMIN — PANTOPRAZOLE SODIUM 40 MG: 40 TABLET, DELAYED RELEASE ORAL at 06:09

## 2024-06-08 RX ADMIN — METHOCARBAMOL 500 MG: 500 TABLET ORAL at 20:03

## 2024-06-08 RX ADMIN — HYDROMORPHONE HYDROCHLORIDE 4 MG: 2 TABLET ORAL at 06:13

## 2024-06-08 RX ADMIN — Medication 1 CAPSULE: at 20:04

## 2024-06-08 RX ADMIN — Medication 1 CAPSULE: at 09:18

## 2024-06-08 RX ADMIN — INSULIN ASPART 1 UNITS: 100 INJECTION, SOLUTION INTRAVENOUS; SUBCUTANEOUS at 17:05

## 2024-06-08 RX ADMIN — HYDROMORPHONE HYDROCHLORIDE 0.4 MG: 1 INJECTION, SOLUTION INTRAMUSCULAR; INTRAVENOUS; SUBCUTANEOUS at 11:47

## 2024-06-08 RX ADMIN — METHOCARBAMOL 500 MG: 500 TABLET ORAL at 09:13

## 2024-06-08 RX ADMIN — MEROPENEM 1 G: 1 INJECTION, POWDER, FOR SOLUTION INTRAVENOUS at 21:09

## 2024-06-08 RX ADMIN — SENNOSIDES AND DOCUSATE SODIUM 2 TABLET: 8.6; 5 TABLET ORAL at 09:12

## 2024-06-08 RX ADMIN — WARFARIN SODIUM 3 MG: 3 TABLET ORAL at 17:08

## 2024-06-08 RX ADMIN — INSULIN ASPART 4 UNITS: 100 INJECTION, SOLUTION INTRAVENOUS; SUBCUTANEOUS at 09:10

## 2024-06-08 RX ADMIN — METHOCARBAMOL 500 MG: 500 TABLET ORAL at 13:07

## 2024-06-08 RX ADMIN — MEROPENEM 1 G: 1 INJECTION, POWDER, FOR SOLUTION INTRAVENOUS at 14:27

## 2024-06-08 RX ADMIN — CEFAZOLIN SODIUM 2 G: 2 INJECTION, SOLUTION INTRAVENOUS at 00:36

## 2024-06-08 RX ADMIN — POTASSIUM CHLORIDE 40 MEQ: 1500 TABLET, EXTENDED RELEASE ORAL at 21:09

## 2024-06-08 RX ADMIN — GABAPENTIN 300 MG: 300 CAPSULE ORAL at 20:02

## 2024-06-08 RX ADMIN — ENOXAPARIN SODIUM 70 MG: 80 INJECTION SUBCUTANEOUS at 21:09

## 2024-06-08 RX ADMIN — HYDROMORPHONE HYDROCHLORIDE 4 MG: 2 TABLET ORAL at 13:06

## 2024-06-08 RX ADMIN — METHOCARBAMOL 500 MG: 500 TABLET ORAL at 17:06

## 2024-06-08 RX ADMIN — CLOPIDOGREL BISULFATE 75 MG: 75 TABLET ORAL at 09:21

## 2024-06-08 RX ADMIN — ASPIRIN 81 MG: 81 TABLET, COATED ORAL at 09:13

## 2024-06-08 RX ADMIN — HYDROMORPHONE HYDROCHLORIDE 0.4 MG: 1 INJECTION, SOLUTION INTRAMUSCULAR; INTRAVENOUS; SUBCUTANEOUS at 03:55

## 2024-06-08 RX ADMIN — HYDROMORPHONE HYDROCHLORIDE 4 MG: 2 TABLET ORAL at 09:27

## 2024-06-08 RX ADMIN — ENOXAPARIN SODIUM 70 MG: 80 INJECTION SUBCUTANEOUS at 09:28

## 2024-06-08 RX ADMIN — HYDROMORPHONE HYDROCHLORIDE 4 MG: 2 TABLET ORAL at 01:17

## 2024-06-08 RX ADMIN — HYDROMORPHONE HYDROCHLORIDE 2 MG: 2 TABLET ORAL at 19:52

## 2024-06-08 RX ADMIN — ACETAMINOPHEN 975 MG: 325 TABLET ORAL at 06:08

## 2024-06-08 ASSESSMENT — ACTIVITIES OF DAILY LIVING (ADL)
ADLS_ACUITY_SCORE: 31

## 2024-06-08 NOTE — PLAN OF CARE
[facility] - ICU    RN Progress Note:            Pertinent Assessments:      Please refer to flowsheet rows for full assessment     Alert and oriented, pleasant. Pain control better per patient, slept in between cares. VS stable. Wound vac dressing on left leg, intact. + doppler on left femoral, left popliteal pulse, non dopplerable, left pedal and post tibial not accessible. Left leg warm to touch and with sensation above ankle and up. Patient on purewick.          Key Events - This Shift:   Medicated with scheduled tylenol, dilaudid po x 2 and dilaudid IV in between for break through pain,   For cardiology consult today.  Will text page hospitalist regarding low potassium this am.        Barriers to Discharge / Downgrade:   Med tele status, possible discharge Monday.      Problem: Pain Acute  Goal: Optimal Pain Control and Function  Outcome: Progressing  Intervention: Develop Pain Management Plan  Recent Flowsheet Documentation  Taken 6/8/2024 0355 by Morgan Marrero, RN  Pain Management Interventions:   medication (see MAR)   repositioned  Intervention: Prevent or Manage Pain  Recent Flowsheet Documentation  Taken 6/8/2024 0400 by Morgan Marrero, RN  Medication Review/Management: medications reviewed  Taken 6/8/2024 0000 by Morgan Marrero, RN  Medication Review/Management: medications reviewed  Intervention: Optimize Psychosocial Wellbeing  Recent Flowsheet Documentation  Taken 6/8/2024 0400 by Morgan Marrero, RN  Supportive Measures: active listening utilized  Taken 6/8/2024 0000 by Morgan Marrero, RN  Supportive Measures: active listening utilized

## 2024-06-08 NOTE — PROGRESS NOTES
[facility]    Medicine Progress Note - Hospitalist Service    Date of Admission:  6/4/2024    Assessment & Plan   Ashanti is a 67 YO female admitted in ICU from OR for the management of hemorrhagic shock. Patient has a h/o hypertension, DM-II, severe PAD, left hallux osteomyelitis. Patient presented for left external iliac artery to post tibial artery bypass. Surgery was complicated by loss of peripheral pulse requiring re-opening of incision with thrombectomy from the graft, and hemorrhagic shock requiring ICU admission on pressor and blood transfusion    Severe PAD  -- Left external andree artery to posterior tibial artery bypass requiring reopening and revision with thrombectomy form graft on 06/04.  -- High risk for bypass failure, no additional options for revascularization. If bypass fails, pateint will need an amputation per vascular team.  -- C/w Aspirin  -- S/p Argatroban   -- On Lovenox bridging. Warfarin managed by pharmacist, appreciate help.    Hemorrhagic shock-resolved  Acute blood loss anemia  -- Weaned off of phenylephrine gtt.   -- Resuscitation with IVF and pRBCs.   -- S/p 5 PRBCs (3 units 06/04, 1 unit 06/05, 1 unit 06/07)  -- Coag profiles not suggestive of consumptive DIC    Left hallux osteomyelitis  -- S/p amputation of left hallux on 06/07 by podiatry  -- ID consult appreciated: Switched Ancef to Merrem on 06/08. Intra-op culture: pseudomonas sensitivity pending. Awaiting path.    Thrombocytopenia- improving  -- post op  -- Low risk of HIT score 0-3. HIT screen negative.   -- Monitor CBC    Hypokalemia  -- replace per RN protocol    Hyponatremia, mild  -- likely 2/2 decreased intake. Encourage fluid intake  -- Improving  --monitor BMP    Lactic acidosis-resolved  -- Due to poor perfusion.   -- Monitor Q6    Vaginal candidiasis  -- On Fluconazole    Leukocytosis- stable  low grade fever-resolved  -- Likely reactive post op, osteomyelitis  -- Encouarge to use IS  --  Monitor wbc and temp curve    DM-II  -- On Jardiance, Trajenta, Metfromin and Tresiba 30 units BID at home.   -- Accu-checks, cont with moderate sliding scale insulin. I:C 1:15    Essential HTN  -- Norvasc and lisinopril resumed. Hydrochlorothiazide held for now.  -- Monitor vital signs per protocol  -- hydralazine iv prn            Diet: High Consistent Carb (75 g CHO per Meal) Diet    DVT Prophylaxis: Lovenox  Cedeno Catheter: Not present  Lines: PRESENT      PICC 06/04/24 Triple Lumen Left Brachial vein medial-Site Assessment: WDL      Cardiac Monitoring: None  Code Status: Full Code      Clinically Significant Risk Factors        # Hypokalemia: Lowest K = 3.1 mmol/L in last 2 days, will replace as needed       # Hypoalbuminemia: Lowest albumin = 3.4 g/dL at 6/5/2024  4:17 AM, will monitor as appropriate     # Thrombocytopenia: Lowest platelets = 91 in last 2 days, will monitor for bleeding   # Hypertension: Noted on problem list           #Precipitous drop in Hgb/Hct: Lowest Hgb this hospitalization: 7.2 g/dL. Will continue to monitor and treat/transfuse as appropriate.    # DMII: A1C = 6.7 % (Ref range: <5.7 %) within past 6 months              Disposition Plan     Medically Ready for Discharge: Anticipated in 2-4 Days             Elida Espino MD  Hospitalist Service  [facility]  Securely message with [employer] (more info)  Text page via [employer] Paging/Directory   ______________________________________________________________________    Interval History   Patient is seen and examined at bedside.   No acute complaint.  Plan of care discussed with patient. All questions answered.     Physical Exam   Vital Signs: Temp: 98.4  F (36.9  C) Temp src: Oral BP: 116/59 Pulse: 90   Resp: 20 SpO2: 100 % O2 Device: None (Room air)    Weight: 160 lbs 11.2 oz    GEN: Alert and oriented. Not in acute distress.  HEENT: Atraumatic, mucous membrane- moist and pink.  Chest: Bilateral air entry.  CVS: S1S2  regular.   Abdomen: Soft. Non-tender, non-distended. No organomegaly. No guarding or rigidity. Bowel sounds active.   Extremities: left LE wound vac. Left LE swollen but no deep tenderness. Surgical dressing over left foot.  CNS: No involuntary movements.  Skin: no cyanosis or clubbing.     Medical Decision Making       51 MINUTES SPENT BY ME on the date of service doing chart review, history, exam, documentation & further activities per the note.      Data

## 2024-06-08 NOTE — PROGRESS NOTES
POD# 1: Amputation left great toe    The patient was seen at bedside in no apparent distress.  The patient relates a good appetite with no nausea or vomiting.  The patient denies any fever or chills.  The patient relates the pain is under control.  The patient relates being able to go the the bathroom.     The patient's vitals are stable and is affebrile    Operative Cultures:     Gram stain: no growth  Aerobic: 1+ Gram negative bacilli   Anaerobic: pending    Labs:   WBC: 21.2   RBC: 2.91   Hgb: 8.3   Hematocrit: 24.4          Exam:  Neurovascular status remains intact with positive epicritic sensation and capillary filling to the digits on the left.  Motor is intact to the left.       The dressing appears clean dry and intact.    Assessment: Acute osteomyelitis left great toe status post amputation, left great toe.    Plan:     Wound: keep dressings clean, dry and intact.  Reinforce if needed.  Infection: per ID recommendations  Disposition:  remain non weight bearing on the left foot.      I appreciate the assistance in the care of this patient from the Hospitalist Service.      NIKOLAY Diez DPM, FACFAS

## 2024-06-08 NOTE — PROGRESS NOTES
VASCULAR SURGERY PROGRESS NOTE    Subjective:  Patient was seen and evaluated at the bedside for surgical follow-up.  Pain continues to improve.  Voiding spontaneously.  Heart rate improved to low 100s, no complaints this morning.  Left leg edema continues to improve.    Objective:  Intake/Output Summary (Last 24 hours) at 6/7/2024 1013  Last data filed at 6/7/2024 0930  Gross per 24 hour   Intake 1808.65 ml   Output 3581 ml   Net -1772.35 ml     PHYSICAL EXAM:  /66 (BP Location: Right arm)   Pulse 88   Temp 98  F (36.7  C) (Oral)   Resp 20   Wt 72.9 kg (160 lb 11.2 oz)   SpO2 97%   BMI 30.36 kg/m    General: The patient is alert and oriented. Appropriate. No acute distress  Psych: Pleasant affect, answers questions appropriately  Skin: Color appropriate for race, warm, dry  Respiratory: Normal respiratory effort  Extremities: PT signal present via doppler, incisional wound VAC in place to left lower extremity and left foot wrapped with clean and dry podiatry dressing, significant edema to left calf but this is improving, compartments soft and easily compressible      Imaging:   Pertinent imaging reviewed     ASSESSMENT:  67 yo female with CLTI LLE with hx of left fem distal bypass x2 failure due to early thrombosis now with third redo bypass left external iliac to distal PT on 6/4 and left hallux amputation with podiatry on 6/7       PLAN:  Continue ASA  Add plavix daily  Therapeutic lovenox, coumadin started on 6/7  Follow up labs this AM  Cont ancef pending final cultures  Home HTN meds restarted  Cardiology consult for persistent tachycardia and consideration of BB initiation  Pain control PRN  Continue incisional wound vac to left lower extremity  Wound care to L calf wound per WOC recommendations  Wound care and weight bearing to L foot per podiatry  Activity as tolerated, PT and OT consulted  Ambulate daily  Goal discharge on 6/10  Appreciate hospitalist input      Belkys Harmon  DO  Fellow  VASCULAR SURGERY

## 2024-06-08 NOTE — PLAN OF CARE
Goal Outcome Evaluation:      Plan of Care Reviewed With: patient    Overall Patient Progress: improvingOverall Patient Progress: improving    Outcome Evaluation: patient still with pain issues but overall stable throughout the day.    [facility] - ICU    RN Progress Note:            Pertinent Assessments:      Please refer to flowsheet rows for full assessment     See charting.           Key Events - This Shift:       Able to pivot/dangle to bedside commode.  Voiding well on commode or while purewick is in place.  Afebrile.  Remains tachy in the low 100's.  Patient moves self well in bed but definitely is painful with movement/touching on left leg.  Leg still remains swollen and painful.  Some numbness/tingling on the left.  Able to get pedal and post tib pulses with doppler.                 Barriers to Discharge / Downgrade:     Cardiac/tele status-no need fo ICU bed at this time.          Point of Contact Update YES-OR-NO: Yes  If No, reason:   Name:Shruthi  Phone Number:see chart  Summary of Conversation: 2 daughters visited tonight-very supportive and interactive with patient.  Updated on plan of care.

## 2024-06-08 NOTE — PROGRESS NOTES
[facility]    Infectious Disease Progress Note    Date of Service : 06/08/2024     Assessment & Plan   Ashanti Aviles is a 68 year old female who was admitted on 6/4/2024.     ASSESSMENT:  Status post left iliofemoral endarterectomy, redo bypass  Recent arterial bypass, thrombus occlusion shock, ICU  Status post amputation left hallux for osteomyelitis on 6/7/2024  Wound cultures with Pseudomonas aeruginosa  Marked leukocytosis  Peripheral vascular disease  Left external iliac artery to posterior tibial artery bypass with spliced cryo artery graft  Reduction and repair of the left femoral hernia  Thrombectomy of the posterior tibial artery and bypass graft  Left lower extremity angiogram     Left hallux osteo post:  Amputation left hallux     Susceptibility data from last 90 days.  Collected Specimen Info Organism Clindamycin Daptomycin Doxycycline Erythromycin Gentamicin Oxacillin Tetracycline Trimethoprim/Sulfamethoxazole  Vancomycin   06/07/24 Swab from Foot, Left Pseudomonas aeruginosa            03/20/24 Wound from Leg, left Staphylococcus aureus  S  S  S  S  S  S  S  S  S   Photos from 6/5/2024                RECOMMENDATIONS:    Antibiotics-meropenem  Monitor and modify antibiotics based on final culture results  Follow culture results   Fluconazole for vaginitis  Monitor CBC, CMP--monitor white count closely  Await pathology result  Discussed with patient   ID will follow on Monday, 6/10/2024.  Appreciate input from pharmacist regarding culture results  Please call with change in clinical status or questions  Patient new to me.  Please see previous note by Dr. Chava Catalan MD  [facility]  [phone]      Interval History   First visit by me.  Updated patient  Pain control  Patient seen in ICU    Physical Exam   Temp: 98  F (36.7  C) Temp src: Oral BP: 132/66 Pulse: 97   Resp: 20 SpO2: 98 % O2 Device: None (Room air)    Vitals:    06/05/24  0000 06/06/24 0429 06/08/24 0330   Weight: 64.8 kg (142 lb 13.7 oz) 65.1 kg (143 lb 9.6 oz) 72.9 kg (160 lb 11.2 oz)     Vital Signs with Ranges  Temp:  [98  F (36.7  C)-98.9  F (37.2  C)] 98  F (36.7  C)  Pulse:  [] 97  Resp:  [14-22] 20  BP: (126-147)/(59-96) 132/66  SpO2:  [96 %-100 %] 98 %    Constitutional: Awake, no apparent distress  Lungs: normal breathing pattern, no crackles or wheezing  Cardiovascular: Tachycardia  Abdomen: non distended  Skin: warm, dressing on lower extremity  Neuro: deconditioned  Psych: able to answer questions    Medications   Current Facility-Administered Medications   Medication Dose Route Frequency Provider Last Rate Last Admin    dextrose 10% infusion   Intravenous Continuous PRN Rene Ordaz DPM         Current Facility-Administered Medications   Medication Dose Route Frequency Provider Last Rate Last Admin    acetaminophen (TYLENOL) tablet 975 mg  975 mg Oral Q6H Rene Ordaz DPM   975 mg at 06/08/24 1151    amLODIPine (NORVASC) tablet 5 mg  5 mg Oral Daily Belkys Harmon DO   5 mg at 06/08/24 0922    aspirin EC tablet 81 mg  81 mg Oral Daily Rene Ordaz DPM   81 mg at 06/08/24 0913    ceFAZolin (ANCEF) 2 g in 100 mL D5W intermittent infusion  2 g Intravenous Q8H Elida Espino  mL/hr at 06/08/24 0912 2 g at 06/08/24 0912    clopidogrel (PLAVIX) tablet 75 mg  75 mg Oral Daily Belkys Harmon DO   75 mg at 06/08/24 0921    enoxaparin ANTICOAGULANT (LOVENOX) injection 70 mg  1 mg/kg Subcutaneous Q12H [facility] (10/22) Rene Ordaz DPM   70 mg at 06/08/24 0928    gabapentin (NEURONTIN) capsule 300 mg  300 mg Oral TID Rene Ordaz DPM   300 mg at 06/08/24 0913    insulin aspart (NovoLOG) injection (RAPID ACTING)   Subcutaneous TID AC Rene Ordaz DPM   Given at 06/08/24 0909    insulin aspart (NovoLOG) injection (RAPID ACTING)  1-7 Units Subcutaneous TID Rene Fuentes DPM   4 Units at  "06/08/24 0910    insulin aspart (NovoLOG) injection (RAPID ACTING)  1-5 Units Subcutaneous At Bedtime Rene Ordaz DPM   1 Units at 06/07/24 2142    lactobacillus rhamnosus (GG) (CULTURELL) capsule 1 capsule  1 capsule Oral BID Belkys Harmon DO   1 capsule at 06/08/24 0918    lisinopril (ZESTRIL) tablet 40 mg  40 mg Oral Daily Belkys Harmon DO   40 mg at 06/08/24 0912    methocarbamol (ROBAXIN) tablet 500 mg  500 mg Oral 4x Daily Sheela Champion APRN CNP   500 mg at 06/08/24 0913    mineral oil-hydrophilic petrolatum (AQUAPHOR)   Topical Daily Nereyda Joel MD        pantoprazole (PROTONIX) EC tablet 40 mg  40 mg Oral QAM AC Rene Ordaz DPM   40 mg at 06/08/24 0609    polyethylene glycol (MIRALAX) Packet 17 g  17 g Oral Daily Rene Ordaz DPM   17 g at 06/08/24 0912    senna-docusate (SENOKOT-S/PERICOLACE) 8.6-50 MG per tablet 1 tablet  1 tablet Oral BID Rene Ordaz DPM   1 tablet at 06/07/24 2003    Or    senna-docusate (SENOKOT-S/PERICOLACE) 8.6-50 MG per tablet 2 tablet  2 tablet Oral BID Rene Ordaz DPM   2 tablet at 06/08/24 0912    sodium chloride (PF) 0.9% PF flush 3 mL  3 mL Intracatheter Q8H Rene Ordaz DPM   3 mL at 06/08/24 0036    sodium chloride (PF) 0.9% PF flush 3 mL  3 mL Intracatheter Q8H Rene Ordaz DPM   3 mL at 06/08/24 0124    Warfarin Dose Required Daily - Pharmacist Managed  1 each Oral See Admin Instructions Belkys Harmon DO           Data   All microbiology laboratory data reviewed.  Recent Labs   Lab Test 06/08/24  0619 06/08/24  0123 06/07/24 2010   WBC 21.2* 21.4* 22.2*   HGB 8.3* 8.5* 8.7*   HCT 24.4* 24.5* 24.6*   MCV 84 83 82   * 126* 107*     Recent Labs   Lab Test 06/08/24  0619 06/07/24  0526 06/06/24  0439   CR 0.51 0.59 0.73     No lab results found.  No lab results found.    Invalid input(s): \"UC\"    MICROBIOLOGY:    Reviewed    7-Day Micro Results       Collected " "Updated Procedure Result Status      06/07/2024 0804 06/07/2024 0823 Anaerobic Bacterial Culture Routine [[id number]]   Swab from Foot, Left    In process Component Value   No component results               06/07/2024 0804 06/07/2024 1343 Gram Stain [[id number]]   Swab from Foot, Left    Final result Component Value   GS Culture See corresponding culture for results   Gram Stain Result No organisms seen   Gram Stain Result 4+ WBC seen   Predominantly PMNs            06/07/2024 0804 06/08/2024 1143 Swab Aerobic Bacterial Culture Routine [[id number]]   (Abnormal)   Swab from Foot, Left    Preliminary result Component Value   Culture Culture in progress  [P]     1+ Pseudomonas aeruginosa  [P]                         RADIOLOGY:    Reviewed  XR Chest Port 1 View    Result Date: 6/4/2024  EXAM: XR CHEST PORT 1 VIEW LOCATION: [facility] DATE: 6/4/2024 INDICATION: PICC catheter placement. COMPARISON: 2/17/2024.     IMPRESSION: Left upper extremity PICC catheter has tip overlying the superior cavoatrial junction. Lungs are clear. No pleural effusion or pneumothorax. Cardiomediastinal silhouette is normal. Atherosclerotic calcifications of the thoracic aorta.    XR Surgery HARRIET Fluoro L/T 5 Min    Result Date: 6/4/2024  This exam was marked as non-reportable because it will not be read by a radiologist or a [place] non-radiologist provider.     POC US Guided Vascular Access    Result Date: 6/4/2024  Ultrasound was performed as guidance to an anesthesia procedure.  Click \"PACS images\" hyperlink below to view any stored images.  For specific procedure details, view procedure note authored by anesthesia.     Attestation:  Total time on the floor involved in the patient's care: minutes. Chart reviewed, reviewed cultures, external notes, labs, antiinfective monitoring, evaluation, counseling, management    Medical Decision Making       MANAGEMENT DISCUSSED with the following over the past 24 hours: " Patient, pharmacist and ICU   NOTE(S)/MEDICAL RECORDS REVIEWED over the past 24 hours: Reviewed  Tests ORDERED & REVIEWED in the past 24 hours:  - See lab/imaging results included in the data section of the note  Medical complexity over the past 24 hours:  - Intensive monitoring for MEDICATION TOXICITY

## 2024-06-09 LAB
ANION GAP SERPL CALCULATED.3IONS-SCNC: 7 MMOL/L (ref 7–15)
BUN SERPL-MCNC: 9.4 MG/DL (ref 8–23)
CALCIUM SERPL-MCNC: 7.8 MG/DL (ref 8.8–10.2)
CHLORIDE SERPL-SCNC: 103 MMOL/L (ref 98–107)
CREAT SERPL-MCNC: 0.52 MG/DL (ref 0.51–0.95)
DEPRECATED HCO3 PLAS-SCNC: 28 MMOL/L (ref 22–29)
EGFRCR SERPLBLD CKD-EPI 2021: >90 ML/MIN/1.73M2
ERYTHROCYTE [DISTWIDTH] IN BLOOD BY AUTOMATED COUNT: 15.6 % (ref 10–15)
ERYTHROCYTE [DISTWIDTH] IN BLOOD BY AUTOMATED COUNT: 15.6 % (ref 10–15)
ERYTHROCYTE [DISTWIDTH] IN BLOOD BY AUTOMATED COUNT: 15.9 % (ref 10–15)
ERYTHROCYTE [DISTWIDTH] IN BLOOD BY AUTOMATED COUNT: 15.9 % (ref 10–15)
GLUCOSE BLDC GLUCOMTR-MCNC: 139 MG/DL (ref 70–99)
GLUCOSE BLDC GLUCOMTR-MCNC: 154 MG/DL (ref 70–99)
GLUCOSE BLDC GLUCOMTR-MCNC: 181 MG/DL (ref 70–99)
GLUCOSE BLDC GLUCOMTR-MCNC: 199 MG/DL (ref 70–99)
GLUCOSE BLDC GLUCOMTR-MCNC: 223 MG/DL (ref 70–99)
GLUCOSE SERPL-MCNC: 172 MG/DL (ref 70–99)
HCT VFR BLD AUTO: 23.2 % (ref 35–47)
HCT VFR BLD AUTO: 23.9 % (ref 35–47)
HCT VFR BLD AUTO: 24 % (ref 35–47)
HCT VFR BLD AUTO: 24.2 % (ref 35–47)
HGB BLD-MCNC: 7.8 G/DL (ref 11.7–15.7)
HGB BLD-MCNC: 7.9 G/DL (ref 11.7–15.7)
HGB BLD-MCNC: 8.1 G/DL (ref 11.7–15.7)
HGB BLD-MCNC: 8.2 G/DL (ref 11.7–15.7)
INR PPP: 1.16 (ref 0.85–1.15)
MCH RBC QN AUTO: 28.2 PG (ref 26.5–33)
MCH RBC QN AUTO: 28.5 PG (ref 26.5–33)
MCH RBC QN AUTO: 28.7 PG (ref 26.5–33)
MCH RBC QN AUTO: 28.8 PG (ref 26.5–33)
MCHC RBC AUTO-ENTMCNC: 32.9 G/DL (ref 31.5–36.5)
MCHC RBC AUTO-ENTMCNC: 33.5 G/DL (ref 31.5–36.5)
MCHC RBC AUTO-ENTMCNC: 33.6 G/DL (ref 31.5–36.5)
MCHC RBC AUTO-ENTMCNC: 34.3 G/DL (ref 31.5–36.5)
MCV RBC AUTO: 84 FL (ref 78–100)
MCV RBC AUTO: 85 FL (ref 78–100)
MCV RBC AUTO: 86 FL (ref 78–100)
MCV RBC AUTO: 86 FL (ref 78–100)
PLATELET # BLD AUTO: 156 10E3/UL (ref 150–450)
PLATELET # BLD AUTO: 173 10E3/UL (ref 150–450)
PLATELET # BLD AUTO: 200 10E3/UL (ref 150–450)
PLATELET # BLD AUTO: 224 10E3/UL (ref 150–450)
POTASSIUM SERPL-SCNC: 3.5 MMOL/L (ref 3.4–5.3)
POTASSIUM SERPL-SCNC: 3.6 MMOL/L (ref 3.4–5.3)
RBC # BLD AUTO: 2.74 10E6/UL (ref 3.8–5.2)
RBC # BLD AUTO: 2.8 10E6/UL (ref 3.8–5.2)
RBC # BLD AUTO: 2.82 10E6/UL (ref 3.8–5.2)
RBC # BLD AUTO: 2.85 10E6/UL (ref 3.8–5.2)
SODIUM SERPL-SCNC: 138 MMOL/L (ref 135–145)
WBC # BLD AUTO: 16 10E3/UL (ref 4–11)
WBC # BLD AUTO: 16.9 10E3/UL (ref 4–11)
WBC # BLD AUTO: 16.9 10E3/UL (ref 4–11)
WBC # BLD AUTO: 17.7 10E3/UL (ref 4–11)

## 2024-06-09 PROCEDURE — 250N000011 HC RX IP 250 OP 636: Mod: JZ | Performed by: PODIATRIST

## 2024-06-09 PROCEDURE — 99233 SBSQ HOSP IP/OBS HIGH 50: CPT | Performed by: STUDENT IN AN ORGANIZED HEALTH CARE EDUCATION/TRAINING PROGRAM

## 2024-06-09 PROCEDURE — 250N000013 HC RX MED GY IP 250 OP 250 PS 637: Performed by: SURGERY

## 2024-06-09 PROCEDURE — 250N000013 HC RX MED GY IP 250 OP 250 PS 637: Performed by: PAIN MEDICINE

## 2024-06-09 PROCEDURE — 250N000013 HC RX MED GY IP 250 OP 250 PS 637: Performed by: PODIATRIST

## 2024-06-09 PROCEDURE — 85610 PROTHROMBIN TIME: CPT | Performed by: STUDENT IN AN ORGANIZED HEALTH CARE EDUCATION/TRAINING PROGRAM

## 2024-06-09 PROCEDURE — 250N000013 HC RX MED GY IP 250 OP 250 PS 637: Performed by: STUDENT IN AN ORGANIZED HEALTH CARE EDUCATION/TRAINING PROGRAM

## 2024-06-09 PROCEDURE — 120N000013 HC R&B IMCU

## 2024-06-09 PROCEDURE — 80048 BASIC METABOLIC PNL TOTAL CA: CPT | Performed by: SURGERY

## 2024-06-09 PROCEDURE — 84132 ASSAY OF SERUM POTASSIUM: CPT | Performed by: STUDENT IN AN ORGANIZED HEALTH CARE EDUCATION/TRAINING PROGRAM

## 2024-06-09 PROCEDURE — 85027 COMPLETE CBC AUTOMATED: CPT | Performed by: PODIATRIST

## 2024-06-09 PROCEDURE — 250N000011 HC RX IP 250 OP 636: Mod: JZ | Performed by: INTERNAL MEDICINE

## 2024-06-09 PROCEDURE — 99232 SBSQ HOSP IP/OBS MODERATE 35: CPT | Performed by: INTERNAL MEDICINE

## 2024-06-09 RX ADMIN — POLYETHYLENE GLYCOL 3350 17 G: 17 POWDER, FOR SOLUTION ORAL at 08:00

## 2024-06-09 RX ADMIN — METHOCARBAMOL 500 MG: 500 TABLET ORAL at 13:13

## 2024-06-09 RX ADMIN — INSULIN ASPART 1 UNITS: 100 INJECTION, SOLUTION INTRAVENOUS; SUBCUTANEOUS at 07:58

## 2024-06-09 RX ADMIN — HYDROMORPHONE HYDROCHLORIDE 0.4 MG: 1 INJECTION, SOLUTION INTRAMUSCULAR; INTRAVENOUS; SUBCUTANEOUS at 21:12

## 2024-06-09 RX ADMIN — Medication 1 CAPSULE: at 08:04

## 2024-06-09 RX ADMIN — ACETAMINOPHEN 975 MG: 325 TABLET ORAL at 17:44

## 2024-06-09 RX ADMIN — GABAPENTIN 300 MG: 300 CAPSULE ORAL at 13:13

## 2024-06-09 RX ADMIN — GABAPENTIN 300 MG: 300 CAPSULE ORAL at 08:00

## 2024-06-09 RX ADMIN — HYDROMORPHONE HYDROCHLORIDE 4 MG: 2 TABLET ORAL at 04:32

## 2024-06-09 RX ADMIN — HYDROMORPHONE HYDROCHLORIDE 4 MG: 2 TABLET ORAL at 08:03

## 2024-06-09 RX ADMIN — ACETAMINOPHEN 975 MG: 325 TABLET ORAL at 00:32

## 2024-06-09 RX ADMIN — ACETAMINOPHEN 975 MG: 325 TABLET ORAL at 13:13

## 2024-06-09 RX ADMIN — HYDROMORPHONE HYDROCHLORIDE 4 MG: 2 TABLET ORAL at 19:19

## 2024-06-09 RX ADMIN — ACETAMINOPHEN 975 MG: 325 TABLET ORAL at 05:53

## 2024-06-09 RX ADMIN — WHITE PETROLATUM: 1.75 OINTMENT TOPICAL at 09:33

## 2024-06-09 RX ADMIN — WARFARIN SODIUM 2.25 MG: 2.5 TABLET ORAL at 17:40

## 2024-06-09 RX ADMIN — ENOXAPARIN SODIUM 70 MG: 80 INJECTION SUBCUTANEOUS at 21:07

## 2024-06-09 RX ADMIN — ASPIRIN 81 MG: 81 TABLET, COATED ORAL at 08:00

## 2024-06-09 RX ADMIN — CLOPIDOGREL BISULFATE 75 MG: 75 TABLET ORAL at 08:00

## 2024-06-09 RX ADMIN — LISINOPRIL 40 MG: 20 TABLET ORAL at 08:00

## 2024-06-09 RX ADMIN — AMLODIPINE BESYLATE 5 MG: 5 TABLET ORAL at 08:00

## 2024-06-09 RX ADMIN — MEROPENEM 1 G: 1 INJECTION, POWDER, FOR SOLUTION INTRAVENOUS at 13:13

## 2024-06-09 RX ADMIN — HYDROMORPHONE HYDROCHLORIDE 4 MG: 2 TABLET ORAL at 00:36

## 2024-06-09 RX ADMIN — MEROPENEM 1 G: 1 INJECTION, POWDER, FOR SOLUTION INTRAVENOUS at 05:56

## 2024-06-09 RX ADMIN — Medication 1 CAPSULE: at 20:48

## 2024-06-09 RX ADMIN — SENNOSIDES AND DOCUSATE SODIUM 1 TABLET: 8.6; 5 TABLET ORAL at 08:00

## 2024-06-09 RX ADMIN — HYDROMORPHONE HYDROCHLORIDE 4 MG: 2 TABLET ORAL at 11:18

## 2024-06-09 RX ADMIN — PANTOPRAZOLE SODIUM 40 MG: 40 TABLET, DELAYED RELEASE ORAL at 07:59

## 2024-06-09 RX ADMIN — METHOCARBAMOL 500 MG: 500 TABLET ORAL at 08:00

## 2024-06-09 RX ADMIN — INSULIN ASPART 2 UNITS: 100 INJECTION, SOLUTION INTRAVENOUS; SUBCUTANEOUS at 17:42

## 2024-06-09 RX ADMIN — METHOCARBAMOL 500 MG: 500 TABLET ORAL at 20:39

## 2024-06-09 RX ADMIN — MEROPENEM 1 G: 1 INJECTION, POWDER, FOR SOLUTION INTRAVENOUS at 21:17

## 2024-06-09 RX ADMIN — ENOXAPARIN SODIUM 70 MG: 80 INJECTION SUBCUTANEOUS at 09:34

## 2024-06-09 RX ADMIN — GABAPENTIN 300 MG: 300 CAPSULE ORAL at 20:39

## 2024-06-09 RX ADMIN — METHOCARBAMOL 500 MG: 500 TABLET ORAL at 17:38

## 2024-06-09 ASSESSMENT — ACTIVITIES OF DAILY LIVING (ADL)
ADLS_ACUITY_SCORE: 31
ADLS_ACUITY_SCORE: 27
ADLS_ACUITY_SCORE: 31

## 2024-06-09 NOTE — PLAN OF CARE
[facility] - ICU    RN Progress Note:            Pertinent Assessments:      Please refer to flowsheet rows for full assessment     PRN dilaudid given for pain control.  Doppler pulse at top of foot.  Wound vac and dressing appropriate.             Key Events - This Shift:       Up to commode with assist x1.   Pain control                 Barriers to Discharge / Downgrade:     Iv antibiotics

## 2024-06-09 NOTE — PROGRESS NOTES
VASCULAR SURGERY PROGRESS NOTE    Subjective:  No acute events overnight, no complaints this morning.  Left lower extremity edema continues to improve.  Out of bed and voiding, positive BM yesterday.  Able to self administer Lovenox shots without issue.  Heart rate improving to less than 100.    Objective:  Intake/Output Summary (Last 24 hours) at 6/7/2024 1013  Last data filed at 6/7/2024 0930  Gross per 24 hour   Intake 1808.65 ml   Output 3581 ml   Net -1772.35 ml     PHYSICAL EXAM:  /61 (BP Location: Right arm)   Pulse 99   Temp 97.8  F (36.6  C) (Tympanic)   Resp 20   Wt 73.1 kg (161 lb 2.5 oz)   SpO2 100%   BMI 30.45 kg/m    General: The patient is alert and oriented. Appropriate. No acute distress  Psych: Pleasant affect, answers questions appropriately  Skin: Color appropriate for race, warm, dry  Respiratory: Normal respiratory effort  Extremities: PT signal present via doppler, strong and, incisional wound VAC in place to left lower extremity and left foot wrapped with clean and dry podiatry dressing, significant edema to left calf but this is improving, compartments soft and easily compressible    Labs:  WBC trending down    Imaging:   Pertinent imaging reviewed     ASSESSMENT:  67 yo female with CLTI LLE with hx of left fem distal bypass x2 failure due to early thrombosis now with third redo bypass left external iliac to distal PT on 6/4 and left hallux amputation with podiatry on 6/7       PLAN:  Continue ASA and plavix daily  Therapeutic lovenox, coumadin started on 6/7  Cont ancef pending final cultures  Home HTN meds restarted  Cont meropenem pending final Pseudomonas culture from left toe  No intervention or official consult from cardiology, HR <100  Pain control PRN  Continue incisional wound vac to left lower extremity- will remove prior to discharge home  Wound care to L calf wound per WOC recommendations  Non weight bearing left foot  Activity as tolerated, PT and OT  consulted  Ambulate daily  Goal discharge on 6/10 with follow up in coumadin clinic for INR monitor with lovenox bridge- RN for lovenox teaching today  Appreciate hospitalist input      Belkys Harmon DO  Fellow  VASCULAR SURGERY

## 2024-06-09 NOTE — PROGRESS NOTES
[facility]    Medicine Progress Note - Hospitalist Service    Date of Admission:  6/4/2024    Assessment & Plan   Ashanti is a 67 YO female admitted in ICU from OR for the management of hemorrhagic shock. Patient has a h/o hypertension, DM-II, severe PAD, left hallux osteomyelitis. Patient presented for left external iliac artery to post tibial artery bypass. Surgery was complicated by loss of peripheral pulse requiring re-opening of incision with thrombectomy from the graft, and hemorrhagic shock requiring ICU admission on pressor and blood transfusion  -- Shock resolved  -- S/p left hallux amputation for osteomyelitis. On merrem. ID following. Awaiting path and final culture  -- Dispo: Per Vascular.    Severe PAD  -- Left external andree artery to posterior tibial artery bypass requiring reopening and revision with thrombectomy form graft on 06/04.  -- High risk for bypass failure, no additional options for revascularization. If bypass fails, pateint will need an amputation per vascular team.  -- C/w Aspirin and Plavix  -- S/p Argatroban   -- On Lovenox bridging. Warfarin managed by pharmacist, appreciate help.    Hemorrhagic shock-resolved  Acute blood loss anemia  -- Weaned off of phenylephrine gtt.   -- Resuscitation with IVF and pRBCs.   -- S/p 5 PRBCs (3 units 06/04, 1 unit 06/05, 1 unit 06/07)  -- Coag profiles not suggestive of consumptive DIC    Left hallux osteomyelitis  -- S/p amputation of left hallux on 06/07 by podiatry  -- ID consult appreciated: Switched Ancef to Merrem on 06/08. Intra-op culture: pseudomonas sensitivity pending. Awaiting path.    Thrombocytopenia- improving  -- post op  -- Low risk of HIT score 0-3. HIT screen negative.   -- Monitor CBC    Hypokalemia-resolved  -- replace per RN protocol    Hyponatremia, mild-resolved  -- likely 2/2 decreased intake. Encourage fluid intake  -- Improving  --monitor BMP    Lactic acidosis-resolved  -- Due to poor perfusion.   --  Monitor Q6    Vaginal candidiasis  -- S/p Fluconazole    Leukocytosis- improving  low grade fever-resolved  -- Likely reactive post op, osteomyelitis  -- Encouarge to use IS  -- Monitor wbc and temp curve    DM-II  -- On Jardiance, Trajenta, Metfromin and Tresiba 30 units BID at home.   -- Accu-checks, cont with moderate sliding scale insulin. I:C 1:15    Essential HTN  -- Norvasc and lisinopril resumed. Hydrochlorothiazide held for now.  -- Monitor vital signs per protocol  -- hydralazine iv prn            Diet: Regular Diet Adult    DVT Prophylaxis: Lovenox  Cedeno Catheter: Not present  Lines: PRESENT      PICC 06/04/24 Triple Lumen Left Brachial vein medial-Site Assessment: WDL      Cardiac Monitoring: None  Code Status: Full Code      Clinically Significant Risk Factors        # Hypokalemia: Lowest K = 3.1 mmol/L in last 2 days, will replace as needed       # Hypoalbuminemia: Lowest albumin = 3.4 g/dL at 6/5/2024  4:17 AM, will monitor as appropriate     # Thrombocytopenia: Lowest platelets = 107 in last 2 days, will monitor for bleeding   # Hypertension: Noted on problem list           #Precipitous drop in Hgb/Hct: Lowest Hgb this hospitalization: 7.2 g/dL. Will continue to monitor and treat/transfuse as appropriate.    # DMII: A1C = 6.7 % (Ref range: <5.7 %) within past 6 months              Disposition Plan     Medically Ready for Discharge: Anticipated 1-2 days per vascular              Elida Espino MD  Hospitalist Service  [facility]  Securely message with [employer] (more info)  Text page via [employer] Paging/Directory   ______________________________________________________________________    Interval History   Patient is seen and examined at bedside.   Feeling better. No acute issues.  Plan of care discussed with patient. All questions answered.     Physical Exam   Vital Signs: Temp: 98.3  F (36.8  C) Temp src: Oral BP: 111/56 Pulse: 115   Resp: 20 SpO2: 100 % O2 Device: None (Room  air)    Weight: 161 lbs 2.5 oz    GEN: Alert and oriented. Not in acute distress.  HEENT: Atraumatic, mucous membrane- moist and pink.  Chest: Bilateral air entry.  CVS: S1S2 regular.   Abdomen: Soft. Non-tender, non-distended. No organomegaly. No guarding or rigidity. Bowel sounds active.   Extremities: left LE wound vac. Left LE swollen but no deep tenderness. Surgical dressing over left foot.  CNS: No involuntary movements.  Skin: no cyanosis or clubbing.     Medical Decision Making       50 MINUTES SPENT BY ME on the date of service doing chart review, history, exam, documentation & further activities per the note.      Data

## 2024-06-09 NOTE — PROGRESS NOTES
Podiatry service chart review    Osteomyelitis s/p 2 days hallux amputation, left foot.     Recent notes were reviewed.    Vital signs appear stable    Labs:   WBC: 16.1   RBC: 2.74   Hgb: 7.8   Hematocrit: 23.2          Cultures:   Gram: no organisms    Aerobic: Pseudomonas aeruginosa   Anaerobic: no growth    Recommendations:    Wound: keep dressing clean, dry and intact  Infection: sensitivities pending  Disposition: non weight bearing left foot    Appreciate medical management per Hospitalist service.       NIKOLAY Diez DPM

## 2024-06-09 NOTE — PROGRESS NOTES
[facility]    Infectious Disease Progress Note    Date of Service : 06/09/2024     Assessment & Plan   Ashanti Aviles is a 68 year old female who was admitted on 6/4/2024.     ASSESSMENT:  Status post left iliofemoral endarterectomy, redo bypass  Recent arterial bypass, thrombus occlusion shock, ICU  Status post amputation left hallux for osteomyelitis on 6/7/2024  Wound cultures with Pseudomonas aeruginosa  Marked leukocytosis  Peripheral vascular disease  Left external iliac artery to posterior tibial artery bypass with spliced cryo artery graft  Reduction and repair of the left femoral hernia  Thrombectomy of the posterior tibial artery and bypass graft  Left lower extremity angiogram     Left hallux osteo post:  Amputation left hallux     Susceptibility data from last 90 days.  Collected Specimen Info Organism Clindamycin Daptomycin Doxycycline Erythromycin Gentamicin Oxacillin Tetracycline Trimethoprim/Sulfamethoxazole  Vancomycin   06/07/24 Swab from Foot, Left Pseudomonas aeruginosa            03/20/24 Wound from Leg, left Staphylococcus aureus  S  S  S  S  S  S  S  S  S   Photos from 6/5/2024                RECOMMENDATIONS:    Antibiotics-meropenem  Monitor and modify antibiotics based on final culture results  Follow culture results   Fluconazole for vaginitis  Monitor CBC, CMP--monitor white count closely  Await pathology result  Discussed with patient and ICU nurse on 6/9/2024   Patient new to me on 6/8/2024.  Please see previous note by Dr. Chava Catalan MD  [facility]  [phone]      Interval History   Pain controlled  No new symptoms    6/8/24: First visit by me.  Updated patient  Pain control  Patient seen in ICU    Physical Exam   Temp: 98.3  F (36.8  C) Temp src: Oral BP: 111/56 Pulse: 115   Resp: 20 SpO2: 100 % O2 Device: None (Room air)    Vitals:    06/06/24 0429 06/08/24 0330 06/09/24 0517   Weight: 65.1 kg (143 lb 9.6 oz)  72.9 kg (160 lb 11.2 oz) 73.1 kg (161 lb 2.5 oz)     Vital Signs with Ranges  Temp:  [97.8  F (36.6  C)-98.3  F (36.8  C)] 98.3  F (36.8  C)  Pulse:  [] 115  Resp:  [20] 20  BP: (100-132)/(56-61) 111/56  SpO2:  [99 %-100 %] 100 %    Constitutional: Awake, no apparent distress  Lungs: normal breathing pattern, no crackles or wheezing  Cardiovascular: Tachycardia  Abdomen: non distended  Skin: warm, dressing on lower extremity  Neuro: deconditioned  Psych: able to answer questions    Medications   Current Facility-Administered Medications   Medication Dose Route Frequency Provider Last Rate Last Admin    dextrose 10% infusion   Intravenous Continuous PRN Rene Ordaz DPM         Current Facility-Administered Medications   Medication Dose Route Frequency Provider Last Rate Last Admin    acetaminophen (TYLENOL) tablet 975 mg  975 mg Oral Q6H Rene Ordaz DPM   975 mg at 06/09/24 1313    amLODIPine (NORVASC) tablet 5 mg  5 mg Oral Daily Belkys Harmon DO   5 mg at 06/09/24 0800    aspirin EC tablet 81 mg  81 mg Oral Daily Rene Ordaz DPM   81 mg at 06/09/24 0800    clopidogrel (PLAVIX) tablet 75 mg  75 mg Oral Daily Belkys Harmon DO   75 mg at 06/09/24 0800    enoxaparin ANTICOAGULANT (LOVENOX) injection 70 mg  1 mg/kg Subcutaneous Q12H [facility] (10/22) Rene Ordaz DPM   70 mg at 06/09/24 0934    gabapentin (NEURONTIN) capsule 300 mg  300 mg Oral TID Rene Ordaz DPM   300 mg at 06/09/24 1313    insulin aspart (NovoLOG) injection (RAPID ACTING)   Subcutaneous TID AC Rene Ordaz DPM   6 Units at 06/09/24 0757    insulin aspart (NovoLOG) injection (RAPID ACTING)  1-7 Units Subcutaneous TID AC Rene Ordaz DPM   1 Units at 06/09/24 0758    insulin aspart (NovoLOG) injection (RAPID ACTING)  1-5 Units Subcutaneous At Bedtime Rene Ordaz DPM   1 Units at 06/08/24 2116    lactobacillus rhamnosus (GG) (CULTURELL) capsule 1 capsule  1  "capsule Oral BID Belkys Harmon DO   1 capsule at 06/09/24 0804    lisinopril (ZESTRIL) tablet 40 mg  40 mg Oral Daily Belkys Harmon DO   40 mg at 06/09/24 0800    meropenem (MERREM) 1 g vial to attach to  mL bag  1 g Intravenous Q8H Maria Guadalupe Catalan MD   1 g at 06/09/24 1313    methocarbamol (ROBAXIN) tablet 500 mg  500 mg Oral 4x Daily Sheela Champion APRN CNP   500 mg at 06/09/24 1313    mineral oil-hydrophilic petrolatum (AQUAPHOR)   Topical Daily Nereyda Joel MD   Given at 06/09/24 0933    pantoprazole (PROTONIX) EC tablet 40 mg  40 mg Oral QAM AC Rene Ordaz DPM   40 mg at 06/09/24 0759    polyethylene glycol (MIRALAX) Packet 17 g  17 g Oral Daily Rene Ordaz DPM   17 g at 06/09/24 0800    senna-docusate (SENOKOT-S/PERICOLACE) 8.6-50 MG per tablet 1 tablet  1 tablet Oral BID Rene Ordaz DPM   1 tablet at 06/09/24 0800    Or    senna-docusate (SENOKOT-S/PERICOLACE) 8.6-50 MG per tablet 2 tablet  2 tablet Oral BID Rene Ordaz DPM   2 tablet at 06/08/24 0912    warfarin ANTICOAGULANT (COUMADIN) tablet 2.25 mg  2.25 mg Oral ONCE at 18:00 Dyan Geller MD        Warfarin Dose Required Daily - Pharmacist Managed  1 each Oral See Admin Instructions Belkys Harmon DO           Data   All microbiology laboratory data reviewed.  Recent Labs   Lab Test 06/09/24  1416 06/09/24  0551 06/09/24  0041   WBC 16.9* 16.0* 17.7*   HGB 7.9* 7.8* 8.2*   HCT 24.0* 23.2* 23.9*   MCV 86 85 84    173 156     Recent Labs   Lab Test 06/09/24  0551 06/08/24  0619 06/07/24  0526   CR 0.52 0.51 0.59     No lab results found.  No lab results found.    Invalid input(s): \"UC\"    MICROBIOLOGY:    Reviewed    7-Day Micro Results       Collected Updated Procedure Result Status      06/08/2024 1424 06/09/2024 0331 Blood Culture Line, venous [[id number]]   Blood from Line, venous    Preliminary result Component Value   Culture No growth after " "12 hours  [P]                06/08/2024 1423 06/09/2024 0606 Blood Culture Arm, Right [[id number]]   Blood from Arm, Right    Preliminary result Component Value   Culture No growth after 12 hours  [P]                06/07/2024 0804 06/09/2024 1301 Anaerobic Bacterial Culture Routine [[id number]]   Swab from Foot, Left    Preliminary result Component Value   Culture No anaerobic organisms isolated after 2 days  [P]                06/07/2024 0804 06/07/2024 1343 Gram Stain [[id number]]   Swab from Foot, Left    Final result Component Value   GS Culture See corresponding culture for results   Gram Stain Result No organisms seen   Gram Stain Result 4+ WBC seen   Predominantly PMNs            06/07/2024 0804 06/09/2024 0929 Swab Aerobic Bacterial Culture Routine [[id number]]   (Abnormal)   Swab from Foot, Left    Preliminary result Component Value   Culture 1+ Pseudomonas aeruginosa  [P]                         RADIOLOGY:    Reviewed  XR Chest Port 1 View    Result Date: 6/4/2024  EXAM: XR CHEST PORT 1 VIEW LOCATION: [facility] DATE: 6/4/2024 INDICATION: PICC catheter placement. COMPARISON: 2/17/2024.     IMPRESSION: Left upper extremity PICC catheter has tip overlying the superior cavoatrial junction. Lungs are clear. No pleural effusion or pneumothorax. Cardiomediastinal silhouette is normal. Atherosclerotic calcifications of the thoracic aorta.    XR Surgery HARRIET Fluoro L/T 5 Min    Result Date: 6/4/2024  This exam was marked as non-reportable because it will not be read by a radiologist or a [place] non-radiologist provider.     POC US Guided Vascular Access    Result Date: 6/4/2024  Ultrasound was performed as guidance to an anesthesia procedure.  Click \"PACS images\" hyperlink below to view any stored images.  For specific procedure details, view procedure note authored by anesthesia.            "

## 2024-06-09 NOTE — PROGRESS NOTES
Care Management Follow Up    Length of Stay (days): 5    Expected Discharge Date: 06/10/2024    Anticipated Discharge Plan:   Home with home therapy and skilled nursing;     Transportation: Anticipate Family/friend    PT Recommendations: home with assist, home with home care physical therapy  OT Recommendations:        Barriers to Discharge: IV abx, diagnostic workup    Prior Living Situation: house with spouse    Advanced Directive on File:  (no HCD)     Patient/Spokesperson Updated: Yes. Who? patient    Additional Information:  Per therapy, current recommendation is home with home care but would recommend transitional care (TCU) if unable to perform stairs. Writer met with patient who is adamant that she will not go to TCU and plans to return home at discharge. She states that she has been to TCU in the past and will not go back again. Discussed that she will need to follow up with her MD for INR checks/INR clinic (office closed today).   RN CM to watch plan for antibiotics (ID consulted, currently receiving Merrem). Patient has a Prevena wound VAC in place. Patient's goal is home with home care. Patient lives with spouse in their house and is independent with all activities of daily living at baseline. She ambulates without devices and has no in-home services. She would like a cane at discharge. Patient's daughter Zee is primary family contact. Family planning to transport patient home at discharge.     Jenifer Rodriguez RN

## 2024-06-09 NOTE — PLAN OF CARE
"[facility] - ICU    RN Progress Note:      Problem: Surgery Nonspecified  Goal: Optimal Pain Control and Function  Outcome: Progressing    Problem: Risk for Delirium  Goal: Improved Behavioral Control  Intervention: Minimize Safety Risk  Recent Flowsheet Documentation  Taken 6/9/2024 0000 by Kandi Navarrete, RN  Enhanced Safety Measures: pain management  Trust Relationship/Rapport:   care explained   questions answered   reassurance provided    Problem: Infection  Goal: Absence of Infection Signs and Symptoms  Outcome: Progressing                     Pertinent Assessments:      Please refer to flowsheet rows for full assessment     Patient complains of pain consistently at a level 6/10. PRN Dilaudid given for pain. Patient requests 2 pills (2mg tabs) each time as \"one pill just isnt quite enough.\" Patient up to bedside commode with supervision and is able to move pretty well with wound vac x2 sites. Urine output this shift adequate, last BM 6/8/34.           Key Events - This Shift:     No overnight events.                     Barriers to Discharge / Downgrade:     None.              Point of Contact Update YES-OR-NO: NO  If No, reason:   Name:  Phone Number:  Summary of Conversation:                                "

## 2024-06-10 ENCOUNTER — APPOINTMENT (OUTPATIENT)
Dept: PHYSICAL THERAPY | Facility: HOSPITAL | Age: 69
DRG: 252 | End: 2024-06-10
Attending: SURGERY
Payer: COMMERCIAL

## 2024-06-10 LAB
ANION GAP SERPL CALCULATED.3IONS-SCNC: 9 MMOL/L (ref 7–15)
BACTERIA SPEC CULT: ABNORMAL
BUN SERPL-MCNC: 8.4 MG/DL (ref 8–23)
CALCIUM SERPL-MCNC: 7.8 MG/DL (ref 8.8–10.2)
CHLORIDE SERPL-SCNC: 102 MMOL/L (ref 98–107)
CREAT SERPL-MCNC: 0.55 MG/DL (ref 0.51–0.95)
DEPRECATED HCO3 PLAS-SCNC: 26 MMOL/L (ref 22–29)
EGFRCR SERPLBLD CKD-EPI 2021: >90 ML/MIN/1.73M2
ERYTHROCYTE [DISTWIDTH] IN BLOOD BY AUTOMATED COUNT: 16 % (ref 10–15)
ERYTHROCYTE [DISTWIDTH] IN BLOOD BY AUTOMATED COUNT: 16 % (ref 10–15)
FERRITIN SERPL-MCNC: 371 NG/ML (ref 11–328)
GLUCOSE BLDC GLUCOMTR-MCNC: 180 MG/DL (ref 70–99)
GLUCOSE BLDC GLUCOMTR-MCNC: 186 MG/DL (ref 70–99)
GLUCOSE BLDC GLUCOMTR-MCNC: 206 MG/DL (ref 70–99)
GLUCOSE BLDC GLUCOMTR-MCNC: 212 MG/DL (ref 70–99)
GLUCOSE SERPL-MCNC: 236 MG/DL (ref 70–99)
HCT VFR BLD AUTO: 23.4 % (ref 35–47)
HCT VFR BLD AUTO: 24.9 % (ref 35–47)
HGB BLD-MCNC: 7.8 G/DL (ref 11.7–15.7)
HGB BLD-MCNC: 8.2 G/DL (ref 11.7–15.7)
INR PPP: 1.26 (ref 0.85–1.15)
IRON BINDING CAPACITY (ROCHE): 159 UG/DL (ref 240–430)
IRON SATN MFR SERPL: 24 % (ref 15–46)
IRON SERPL-MCNC: 38 UG/DL (ref 37–145)
MCH RBC QN AUTO: 28.6 PG (ref 26.5–33)
MCH RBC QN AUTO: 28.7 PG (ref 26.5–33)
MCHC RBC AUTO-ENTMCNC: 32.9 G/DL (ref 31.5–36.5)
MCHC RBC AUTO-ENTMCNC: 33.3 G/DL (ref 31.5–36.5)
MCV RBC AUTO: 86 FL (ref 78–100)
MCV RBC AUTO: 87 FL (ref 78–100)
PLATELET # BLD AUTO: 240 10E3/UL (ref 150–450)
PLATELET # BLD AUTO: 244 10E3/UL (ref 150–450)
POTASSIUM SERPL-SCNC: 3.5 MMOL/L (ref 3.4–5.3)
RBC # BLD AUTO: 2.72 10E6/UL (ref 3.8–5.2)
RBC # BLD AUTO: 2.87 10E6/UL (ref 3.8–5.2)
SODIUM SERPL-SCNC: 137 MMOL/L (ref 135–145)
WBC # BLD AUTO: 15.3 10E3/UL (ref 4–11)
WBC # BLD AUTO: 16.6 10E3/UL (ref 4–11)

## 2024-06-10 PROCEDURE — 99232 SBSQ HOSP IP/OBS MODERATE 35: CPT | Performed by: PAIN MEDICINE

## 2024-06-10 PROCEDURE — 250N000013 HC RX MED GY IP 250 OP 250 PS 637: Performed by: STUDENT IN AN ORGANIZED HEALTH CARE EDUCATION/TRAINING PROGRAM

## 2024-06-10 PROCEDURE — 97530 THERAPEUTIC ACTIVITIES: CPT | Mod: GP

## 2024-06-10 PROCEDURE — 250N000013 HC RX MED GY IP 250 OP 250 PS 637: Performed by: SURGERY

## 2024-06-10 PROCEDURE — 83540 ASSAY OF IRON: CPT | Performed by: INTERNAL MEDICINE

## 2024-06-10 PROCEDURE — 85610 PROTHROMBIN TIME: CPT | Performed by: STUDENT IN AN ORGANIZED HEALTH CARE EDUCATION/TRAINING PROGRAM

## 2024-06-10 PROCEDURE — 250N000011 HC RX IP 250 OP 636: Mod: JZ | Performed by: PODIATRIST

## 2024-06-10 PROCEDURE — 250N000013 HC RX MED GY IP 250 OP 250 PS 637: Performed by: PODIATRIST

## 2024-06-10 PROCEDURE — 99233 SBSQ HOSP IP/OBS HIGH 50: CPT | Performed by: INTERNAL MEDICINE

## 2024-06-10 PROCEDURE — 97110 THERAPEUTIC EXERCISES: CPT | Mod: GP

## 2024-06-10 PROCEDURE — 99232 SBSQ HOSP IP/OBS MODERATE 35: CPT | Performed by: INTERNAL MEDICINE

## 2024-06-10 PROCEDURE — 250N000011 HC RX IP 250 OP 636: Mod: JZ | Performed by: INTERNAL MEDICINE

## 2024-06-10 PROCEDURE — 97116 GAIT TRAINING THERAPY: CPT | Mod: GP

## 2024-06-10 PROCEDURE — 82728 ASSAY OF FERRITIN: CPT | Performed by: INTERNAL MEDICINE

## 2024-06-10 PROCEDURE — 210N000001 HC R&B IMCU HEART CARE

## 2024-06-10 PROCEDURE — 85014 HEMATOCRIT: CPT | Performed by: PODIATRIST

## 2024-06-10 PROCEDURE — 85027 COMPLETE CBC AUTOMATED: CPT | Performed by: PODIATRIST

## 2024-06-10 PROCEDURE — 250N000013 HC RX MED GY IP 250 OP 250 PS 637: Performed by: PAIN MEDICINE

## 2024-06-10 PROCEDURE — 120N000013 HC R&B IMCU

## 2024-06-10 PROCEDURE — 250N000012 HC RX MED GY IP 250 OP 636 PS 637: Performed by: INTERNAL MEDICINE

## 2024-06-10 PROCEDURE — 80048 BASIC METABOLIC PNL TOTAL CA: CPT | Performed by: STUDENT IN AN ORGANIZED HEALTH CARE EDUCATION/TRAINING PROGRAM

## 2024-06-10 PROCEDURE — 83550 IRON BINDING TEST: CPT | Performed by: INTERNAL MEDICINE

## 2024-06-10 RX ORDER — HYDROMORPHONE HYDROCHLORIDE 4 MG/1
4 TABLET ORAL
Status: DISCONTINUED | OUTPATIENT
Start: 2024-06-10 | End: 2024-06-11 | Stop reason: HOSPADM

## 2024-06-10 RX ORDER — METHOCARBAMOL 500 MG/1
500 TABLET, FILM COATED ORAL AT BEDTIME
Status: DISCONTINUED | OUTPATIENT
Start: 2024-06-10 | End: 2024-06-11 | Stop reason: HOSPADM

## 2024-06-10 RX ORDER — WARFARIN SODIUM 2 MG/1
4 TABLET ORAL
Status: COMPLETED | OUTPATIENT
Start: 2024-06-10 | End: 2024-06-10

## 2024-06-10 RX ADMIN — MEROPENEM 1 G: 1 INJECTION, POWDER, FOR SOLUTION INTRAVENOUS at 05:09

## 2024-06-10 RX ADMIN — CLOPIDOGREL BISULFATE 75 MG: 75 TABLET ORAL at 08:34

## 2024-06-10 RX ADMIN — ACETAMINOPHEN 975 MG: 325 TABLET ORAL at 12:56

## 2024-06-10 RX ADMIN — SENNOSIDES AND DOCUSATE SODIUM 1 TABLET: 8.6; 5 TABLET ORAL at 20:40

## 2024-06-10 RX ADMIN — ENOXAPARIN SODIUM 70 MG: 80 INJECTION SUBCUTANEOUS at 09:16

## 2024-06-10 RX ADMIN — MEROPENEM 1 G: 1 INJECTION, POWDER, FOR SOLUTION INTRAVENOUS at 13:56

## 2024-06-10 RX ADMIN — METHOCARBAMOL 500 MG: 500 TABLET ORAL at 20:40

## 2024-06-10 RX ADMIN — DICLOFENAC 2 G: 10 GEL TOPICAL at 17:12

## 2024-06-10 RX ADMIN — HYDROMORPHONE HYDROCHLORIDE 4 MG: 4 TABLET ORAL at 12:57

## 2024-06-10 RX ADMIN — GABAPENTIN 400 MG: 300 CAPSULE ORAL at 13:55

## 2024-06-10 RX ADMIN — INSULIN ASPART 1 UNITS: 100 INJECTION, SOLUTION INTRAVENOUS; SUBCUTANEOUS at 08:49

## 2024-06-10 RX ADMIN — HYDROMORPHONE HYDROCHLORIDE 4 MG: 2 TABLET ORAL at 00:22

## 2024-06-10 RX ADMIN — HYDROMORPHONE HYDROCHLORIDE 4 MG: 4 TABLET ORAL at 20:08

## 2024-06-10 RX ADMIN — GABAPENTIN 400 MG: 300 CAPSULE ORAL at 08:36

## 2024-06-10 RX ADMIN — HYDROMORPHONE HYDROCHLORIDE 4 MG: 4 TABLET ORAL at 23:23

## 2024-06-10 RX ADMIN — DICLOFENAC 2 G: 10 GEL TOPICAL at 09:16

## 2024-06-10 RX ADMIN — HYDROMORPHONE HYDROCHLORIDE 4 MG: 4 TABLET ORAL at 17:08

## 2024-06-10 RX ADMIN — ACETAMINOPHEN 975 MG: 325 TABLET ORAL at 06:30

## 2024-06-10 RX ADMIN — LISINOPRIL 40 MG: 20 TABLET ORAL at 08:35

## 2024-06-10 RX ADMIN — PANTOPRAZOLE SODIUM 40 MG: 40 TABLET, DELAYED RELEASE ORAL at 06:30

## 2024-06-10 RX ADMIN — HYDROMORPHONE HYDROCHLORIDE 0.4 MG: 1 INJECTION, SOLUTION INTRAMUSCULAR; INTRAVENOUS; SUBCUTANEOUS at 03:11

## 2024-06-10 RX ADMIN — Medication 1 CAPSULE: at 20:44

## 2024-06-10 RX ADMIN — ASPIRIN 81 MG: 81 TABLET, COATED ORAL at 08:37

## 2024-06-10 RX ADMIN — WHITE PETROLATUM: 1.75 OINTMENT TOPICAL at 09:16

## 2024-06-10 RX ADMIN — ACETAMINOPHEN 975 MG: 325 TABLET ORAL at 18:30

## 2024-06-10 RX ADMIN — AMLODIPINE BESYLATE 5 MG: 5 TABLET ORAL at 08:36

## 2024-06-10 RX ADMIN — INSULIN GLARGINE 10 UNITS: 100 INJECTION, SOLUTION SUBCUTANEOUS at 21:22

## 2024-06-10 RX ADMIN — HYDROMORPHONE HYDROCHLORIDE 4 MG: 4 TABLET ORAL at 09:15

## 2024-06-10 RX ADMIN — Medication 1 CAPSULE: at 08:41

## 2024-06-10 RX ADMIN — INSULIN ASPART 2 UNITS: 100 INJECTION, SOLUTION INTRAVENOUS; SUBCUTANEOUS at 13:26

## 2024-06-10 RX ADMIN — HYDROMORPHONE HYDROCHLORIDE 4 MG: 2 TABLET ORAL at 05:08

## 2024-06-10 RX ADMIN — WARFARIN SODIUM 4 MG: 2 TABLET ORAL at 18:30

## 2024-06-10 RX ADMIN — DICLOFENAC 2 G: 10 GEL TOPICAL at 12:59

## 2024-06-10 RX ADMIN — INSULIN ASPART 1 UNITS: 100 INJECTION, SOLUTION INTRAVENOUS; SUBCUTANEOUS at 17:08

## 2024-06-10 RX ADMIN — MEROPENEM 1 G: 1 INJECTION, POWDER, FOR SOLUTION INTRAVENOUS at 21:26

## 2024-06-10 RX ADMIN — SENNOSIDES AND DOCUSATE SODIUM 1 TABLET: 8.6; 5 TABLET ORAL at 08:38

## 2024-06-10 RX ADMIN — ENOXAPARIN SODIUM 70 MG: 80 INJECTION SUBCUTANEOUS at 21:25

## 2024-06-10 RX ADMIN — ACETAMINOPHEN 975 MG: 325 TABLET ORAL at 00:19

## 2024-06-10 RX ADMIN — GABAPENTIN 400 MG: 300 CAPSULE ORAL at 20:39

## 2024-06-10 RX ADMIN — DICLOFENAC 2 G: 10 GEL TOPICAL at 20:42

## 2024-06-10 ASSESSMENT — ACTIVITIES OF DAILY LIVING (ADL)
ADLS_ACUITY_SCORE: 28
ADLS_ACUITY_SCORE: 27
ADLS_ACUITY_SCORE: 28

## 2024-06-10 NOTE — CONSULTS
[facility] ACUTE INPATIENT PAIN SERVICE    [facility], [facility], [facility], [facility], [place]   PAIN follow up    Assessment/Plan:  Ashanti Aviles is a 68 year old female who was admitted on 6/4/2024.  I was asked by Dr. Espino to see the patient for Left LE post op pain.  History of osteomyelitis of left hallux, severe peripheral vascular disease who presented for left external iliac artery to posterior tib artery bypass requiring reopening of invision with thrombectomy from graft which was complicated by hemorrhagic shock and poor reperfusion.    shows that she was previously in tramadol, then transitioned to oxycodone (1-2 times per week) in November. Describes pain as 0/10 and foot is numb..      Pain team will sign off. Please reconsult if pain is not controlled.       PLAN: Acute pain secondary to amputation of the left great toe and also underwent left Bird distal bypass x 2 failure due to early thrombosis and subsequently underwent a third redo bypass and left external iliac to distal PT on 6/4.  Multimodal Medication Therapy:   Adjuvants: Tylenol and avoid NSAIDs due to anemia.  Change Robaxin to at bedtime, increase gabapentin to 400 mg 3 times daily  Opioids: Hydromorphone/Dilaudid - change to 4 mg every 3 hours as needed  Dilaudid-stop IV Dilaudid today  Non-medication interventions-  elevation and brace per vascular surgery  Constipation Prophylaxis-on senna and MiraLAX  Follow up /Discharge Recommendations - We recommend prescribing the following at the time of discharge: Likely Dilaudid p.o. with taper         Subjective:  Met with Ashanti at the bedside.  She is disappointed as pain persists in many areas.  She reports pain in the left toe.  The left toe feels like an itching sensation.  She also reports pain in the entire left leg and incision and the wound VAC.     Denies constipation.  She does endorse benefit with Dilaudid although it only lasts for short amount of time.  No spasms.   We talked about pain management tapering off of IV and focusing on orals.  We talked about increasing gabapentin.      History   Drug Use Unknown         Tobacco Use      Smoking status: Former        Packs/day: 0.00        Types: Cigarettes        Quit date: 2023        Years since quittin.3        Passive exposure: Never      Smokeless tobacco: Never        Objective:  Vital signs in last 24 hours:  B/P: 136/74, T: 98, P: 89, R: 22   Blood pressure 136/74, pulse 89, temperature 98  F (36.7  C), temperature source Oral, resp. rate 22, weight 72.9 kg (160 lb 12.8 oz), SpO2 100%.        Review of Systems:   As per subjective, all others negative.    Physical Exam    General: in no apparent distress and non-toxic    HEENT: Head normocephalic atraumatic, oral mucosa moist. Sclerae anicteric  CV: Regular rhythm, normal rate, no murmurs  Resp: No wheezes, no rales or rhonchi, no focal consolidations  GI: incisional pain  Skin: VAC  Extremities: LLE edema and VAC  Psych: Normal affect, mood euthymic  Neuro: LLE boot           Imaging:  Personally Reviewed.    Results for orders placed or performed during the hospital encounter of 24   XR Chest Port 1 View    Impression    IMPRESSION: Left upper extremity PICC catheter has tip overlying the superior cavoatrial junction.    Lungs are clear. No pleural effusion or pneumothorax.    Cardiomediastinal silhouette is normal. Atherosclerotic calcifications of the thoracic aorta.        Lab Results:  Personally Reviewed.   Last Comprehensive Metabolic Panel:  Sodium   Date Value Ref Range Status   06/10/2024 137 135 - 145 mmol/L Final     Comment:     Reference intervals for this test were updated on 2023 to more accurately reflect our healthy population. There may be differences in the flagging of prior results with similar values performed with this method. Interpretation of those prior results can be made in the context of the updated reference intervals.       Potassium   Date Value Ref Range Status   06/10/2024 3.5 3.4 - 5.3 mmol/L Final     Chloride   Date Value Ref Range Status   06/10/2024 102 98 - 107 mmol/L Final     Carbon Dioxide (CO2)   Date Value Ref Range Status   06/10/2024 26 22 - 29 mmol/L Final     Anion Gap   Date Value Ref Range Status   06/10/2024 9 7 - 15 mmol/L Final     Glucose   Date Value Ref Range Status   09/14/2021 126 (A) 70 - 99 mg/dL Final     Comment:     Lot [id number]  Exp 3/9/22     GLUCOSE BY METER POCT   Date Value Ref Range Status   06/10/2024 180 (H) 70 - 99 mg/dL Final     Urea Nitrogen   Date Value Ref Range Status   06/10/2024 8.4 8.0 - 23.0 mg/dL Final     Creatinine   Date Value Ref Range Status   06/10/2024 0.55 0.51 - 0.95 mg/dL Final     GFR Estimate   Date Value Ref Range Status   06/10/2024 >90 >60 mL/min/1.73m2 Final     GFR, ESTIMATED POCT   Date Value Ref Range Status   03/26/2024 45 (L) >60 mL/min/1.73m2 Final     Calcium   Date Value Ref Range Status   06/10/2024 7.8 (L) 8.8 - 10.2 mg/dL Final        UA:   Cannabinoids Urine   Date Value Ref Range Status   04/26/2024 Screen Negative Screen Negative Final     Comment:     Cutoff for a negative cannabinoid is less than 50 ng/mL.              Please see A&P for additional details of medical decision making.  MANAGEMENT DISCUSSED with the following over the past 24 hours:  Pt and vascular NP    NOTE(S)/MEDICAL RECORDS REVIEWED over the past 24 hours: Reviewed vascular notes, surgery notes, infectious disease notes  Tests personally interpreted in the past 24 hours:  - 6/4 Xray showing PICC present  Tests REVIEWED in the past 24 hours:  - Crt  SUPPLEMENTAL HISTORY, in addition to the patient's history, over the past 24 hours obtained from:   - Vascular NP   Medical complexity over the past 24 hours:  -------------------------- HIGH RISK FOR MORBIDITY -------------------------------------------------------------  - Parenteral (IV) CONTROLLED SUBSTANCES ordered            Sheela RAYA, CNS-BC, CNP  Acute Care Pain Management Program   Hours of pain coverage Mon-Fri 8-1600, afterhours please call the house officer   [facility] (KALYN, Lillie, SD, RH)   Page via Vocera text web console -Click for [facility]

## 2024-06-10 NOTE — PROGRESS NOTES
VASCULAR SURGERY PROGRESS NOTE    Subjective:  Patient was seen and evaluated at the bedside for surgical follow-up. Increased pain today, meeting with the pain team.  Plans to work with therapies again  Was recommended TCU but insists on home with home care. No other concerns.    Objective:  Intake/Output Summary (Last 24 hours) at 6/10/2024 0846  Last data filed at 6/10/2024 0500  Gross per 24 hour   Intake 1220 ml   Output 1575 ml   Net -355 ml     PHYSICAL EXAM:  /74   Pulse 89   Temp 98  F (36.7  C) (Oral)   Resp 22   Wt 72.9 kg (160 lb 12.8 oz)   SpO2 100%   BMI 30.38 kg/m    General: patient is alert and oriented, appropriate, no acute distress  Psych: pleasant affect, answers questions appropriately  Skin: color appropriate for race, warm, dry.  Respiratory: normal respiratory effort   Extremities: PT signal present via doppler, incisional wound VAC in place to left lower extremity and left foot wrapped with clean and dry podiatry dressing, significant edema to left calf but this is improving, compartments soft and easily compressible       Imaging:   Pertinent imaging reviewed.     ASSESSMENT:  67 yo female with CLTI LLE with hx of left fem distal bypass x2 failure due to early thrombosis now with third redo bypass left external iliac to distal PT on 6/4 and left hallux amputation with podiatry on 6/7       PLAN:  Continue ASA and plavix  Therapeutic lovenox, coumadin started on 6/7, INR subtherapeutic at 1.26  Antibiotics per ID  Home HTN meds restarted  Pain control PRN, pain team consult  Continue incisional wound vac to left lower extremity. Will likely remove prior to discharge  Wound care to L calf wound per WOC recommendations  Wound care and weight bearing to L foot per podiatry  Activity as tolerated, PT and OT consulted  Ambulate daily  Appreciate hospitalist input  Possible discharge as early as tomorrow    Discussed pt history, exam, assessment and plan with Dr. Harmon of the  vascular surgery service, who is in agreement with the above.    Tammy Ramirez PA-C  VASCULAR SURGERY

## 2024-06-10 NOTE — PROGRESS NOTES
[facility]    Infectious Disease Progress Note    Date of Service : 06/10/2024     Assessment & Plan   Ashanti Aviles is a 68 year old female who was admitted on 6/4/2024.     ASSESSMENT:  Status post left iliofemoral endarterectomy, redo bypass  Recent arterial bypass, thrombus occlusion shock, ICU  Status post amputation left hallux for osteomyelitis on 6/7/2024. Amputation in toto down to MPJ. Previous MRI in March with osteomyelitis at the distal phalanx  Wound cultures with Pseudomonas aeruginosa, pan sensitive  Marked leukocytosis, improving  Peripheral vascular disease  Left external iliac artery to posterior tibial artery bypass with spliced cryo artery graft  Reduction and repair of the left femoral hernia  Thrombectomy of the posterior tibial artery and bypass graft  Left lower extremity angiogram  Left hallux osteo post:  Amputation left hallux       RECOMMENDATIONS:    -plan for pseudomonal coverage x 3 days following amputation. Continue meropenem through tomorrow  -wound cares per surgical services    ID will sign-off    Franck Jerry MD  [facility]  Direct messaging: Join The Wellness Team Paging   On-Call ID provider: [phone], option: 9     ===============================    Interval History   First visit by me. Still with pain. Some drainage from foot wound.    Physical Exam   Temp: 98.4  F (36.9  C) Temp src: Oral BP: 132/64 Pulse: 92   Resp: 20 SpO2: (P) 100 % O2 Device: (P) None (Room air)    Vitals:    06/08/24 0330 06/09/24 0517 06/10/24 0600   Weight: 72.9 kg (160 lb 11.2 oz) 73.1 kg (161 lb 2.5 oz) 72.9 kg (160 lb 12.8 oz)     Vital Signs with Ranges  Temp:  [97.9  F (36.6  C)-98.4  F (36.9  C)] 98.4  F (36.9  C)  Pulse:  [] 92  Resp:  [18-22] 20  BP: (132-145)/(63-74) 132/64  SpO2:  [97 %-100 %] (P) 100 %    Constitutional: Awake, no apparent distress  Lungs: normal breathing pattern, no crackles or wheezing  Cardiovascular:  Tachycardia  Abdomen: non distended  Skin: warm, dressing on lower extremity  Neuro: deconditioned  Psych: able to answer questions    Medications   Current Facility-Administered Medications   Medication Dose Route Frequency Provider Last Rate Last Admin    dextrose 10% infusion   Intravenous Continuous PRN Rene Ordaz DPM         Current Facility-Administered Medications   Medication Dose Route Frequency Provider Last Rate Last Admin    acetaminophen (TYLENOL) tablet 975 mg  975 mg Oral Q6H Rene Ordaz DPM   975 mg at 06/10/24 1256    amLODIPine (NORVASC) tablet 5 mg  5 mg Oral Daily Belkys Harmon,    5 mg at 06/10/24 0836    aspirin EC tablet 81 mg  81 mg Oral Daily Rene Ordaz DPM   81 mg at 06/10/24 0837    clopidogrel (PLAVIX) tablet 75 mg  75 mg Oral Daily Belkys Harmon,    75 mg at 06/10/24 0834    diclofenac (VOLTAREN) 1 % topical gel 2 g  2 g Topical 4x Daily Sheela Champion APRN CNP   2 g at 06/10/24 1259    enoxaparin ANTICOAGULANT (LOVENOX) injection 70 mg  1 mg/kg Subcutaneous Q12H [facility] (10/22) Rene Ordaz DPM   70 mg at 06/10/24 0916    gabapentin (NEURONTIN) capsule 400 mg  400 mg Oral TID Sheela Champion APRN CNP   400 mg at 06/10/24 1355    insulin aspart (NovoLOG) injection (RAPID ACTING)   Subcutaneous TID AC Rene Ordaz DPM   2 Units at 06/10/24 1326    insulin aspart (NovoLOG) injection (RAPID ACTING)  1-7 Units Subcutaneous TID AC Rene Ordaz DPM   2 Units at 06/10/24 1326    insulin aspart (NovoLOG) injection (RAPID ACTING)  1-5 Units Subcutaneous At Bedtime Rene Ordaz DPM   1 Units at 06/09/24 2108    lactobacillus rhamnosus (GG) (CULTURELL) capsule 1 capsule  1 capsule Oral BID Belkys Harmon, DO   1 capsule at 06/10/24 0841    lisinopril (ZESTRIL) tablet 40 mg  40 mg Oral Daily Belkys Harmon, DO   40 mg at 06/10/24 0835    meropenem (MERREM) 1 g vial to attach to  mL bag  " 1 g Intravenous Q8H Maria Guadalupe Catalan MD   1 g at 06/10/24 1356    methocarbamol (ROBAXIN) tablet 500 mg  500 mg Oral At Bedtime Sheela Champion APRN CNP        mineral oil-hydrophilic petrolatum (AQUAPHOR)   Topical Daily Nereyda Joel MD   Given at 06/10/24 0916    pantoprazole (PROTONIX) EC tablet 40 mg  40 mg Oral QAM AC Rene Ordaz DPM   40 mg at 06/10/24 0630    polyethylene glycol (MIRALAX) Packet 17 g  17 g Oral Daily Rene Ordaz DPM   17 g at 06/09/24 0800    senna-docusate (SENOKOT-S/PERICOLACE) 8.6-50 MG per tablet 1 tablet  1 tablet Oral BID Rene Ordaz DPM   1 tablet at 06/10/24 0838    Or    senna-docusate (SENOKOT-S/PERICOLACE) 8.6-50 MG per tablet 2 tablet  2 tablet Oral BID Rene Ordaz DPM   2 tablet at 06/08/24 0912    warfarin ANTICOAGULANT (COUMADIN) tablet 4 mg  4 mg Oral ONCE at 18:00 Dyan Geller MD        Warfarin Dose Required Daily - Pharmacist Managed  1 each Oral See Admin Instructions Belkys Harmon DO           Data   All microbiology laboratory data reviewed.  Recent Labs   Lab Test 06/10/24  0831 06/10/24  0317 06/09/24 2038   WBC 15.3* 16.6* 16.9*   HGB 8.2* 7.8* 8.1*   HCT 24.9* 23.4* 24.2*   MCV 87 86 86    240 224     Recent Labs   Lab Test 06/10/24  0317 06/09/24  0551 06/08/24  0619   CR 0.55 0.52 0.51     No lab results found.  No lab results found.    Invalid input(s): \"UC\"    MICROBIOLOGY:    Reviewed    7-Day Micro Results       Collected Updated Procedure Result Status      06/08/2024 1424 06/09/2024 1531 Blood Culture Line, venous [[id number]]   Blood from Line, venous    Preliminary result Component Value   Culture No growth after 1 day  [P]                06/08/2024 1423 06/09/2024 1805 Blood Culture Arm, Right [[id number]]   Blood from Arm, Right    Preliminary result Component Value   Culture No growth after 1 day  [P]                06/07/2024 0804 06/10/2024 1301 Anaerobic " "Bacterial Culture Routine [[id number]]   Swab from Foot, Left    Preliminary result Component Value   Culture No anaerobic organisms isolated after 3 days  [P]                06/07/2024 0804 06/07/2024 1343 Gram Stain [[id number]]   Swab from Foot, Left    Final result Component Value   GS Culture See corresponding culture for results   Gram Stain Result No organisms seen   Gram Stain Result 4+ WBC seen   Predominantly PMNs            06/07/2024 0804 06/10/2024 0756 Swab Aerobic Bacterial Culture Routine [[id number]]    (Abnormal)   Swab from Foot, Left    Final result Component Value   Culture 1+ Pseudomonas aeruginosa        Susceptibility        Pseudomonas aeruginosa      SARAH      Cefepime 2 ug/mL Susceptible      Ceftazidime 2 ug/mL Susceptible      Ciprofloxacin <=0.25 ug/mL Susceptible      Levofloxacin 0.5 ug/mL Susceptible      Meropenem <=0.25 ug/mL Susceptible      Piperacillin/Tazobactam 8 ug/mL Susceptible      Tobramycin <=1 ug/mL Susceptible                                    RADIOLOGY:    Reviewed  XR Chest Port 1 View    Result Date: 6/4/2024  EXAM: XR CHEST PORT 1 VIEW LOCATION: [facility] DATE: 6/4/2024 INDICATION: PICC catheter placement. COMPARISON: 2/17/2024.     IMPRESSION: Left upper extremity PICC catheter has tip overlying the superior cavoatrial junction. Lungs are clear. No pleural effusion or pneumothorax. Cardiomediastinal silhouette is normal. Atherosclerotic calcifications of the thoracic aorta.    XR Surgery HARRIET Fluoro L/T 5 Min    Result Date: 6/4/2024  This exam was marked as non-reportable because it will not be read by a radiologist or a [facility] non-radiologist provider.     POC US Guided Vascular Access    Result Date: 6/4/2024  Ultrasound was performed as guidance to an anesthesia procedure.  Click \"PACS images\" hyperlink below to view any stored images.  For specific procedure details, view procedure note authored by anesthesia.            "

## 2024-06-10 NOTE — PROGRESS NOTES
[facility]    Hospitalist Progress Note    Date of Service (when I saw the patient): 06/10/2024    Assessment & Plan   Ashanti Aviles is a pleasant 68 year old woman admitted to the ICU from OR for the management of hemorrhagic shock.  She has a h/o hypertension, DM type II, severe PAD, left hallux osteomyelitis.  She initially presented for left external iliac artery to post tibial artery bypass. Surgery was complicated by loss of peripheral pulse requiring re-opening of incision with thrombectomy from the graft, and hemorrhagic shock requiring ICU admission on pressor and blood transfusion.  Current problems:    Severe PAD  - Left external andree artery to posterior tibial artery bypass requiring reopening and revision with thrombectomy form graft on 06/04.  - per prior: is high risk for bypass failure, no additional options for revascularization. If bypass fails, pateint will need an amputation per vascular team.  - continue Aspirin and Plavix  - S/p Argatroban   - on Lovenox bridging. Warfarin dosing per [facility]; appreciate their help.     Hemorrhagic shock - resolved  Acute blood loss anemia  - earlier was weaned off of phenylephrine gtt.   - resuscitated with IVF and pRBCs.   - S/p 5 PRBCs (3 units 06/04, 1 unit 06/05, 1 unit 06/07)  - Coag profiles not suggestive of consumptive DIC     Left hallux osteomyelitis  - S/p amputation of left hallux on 06/07 by Podiatry  - ID consult appreciated: changed from Ancef to Merrem on 06/08; per ID -> continue through 6/11, then stop  - intra-op culture: Pseudomonas sensitivity pending. Awaiting path.  - awaiting path and final culture    Thrombocytopenia - improving  - post-op  - low risk of HIT score 0-3. HIT screen negative.   - daily CBC/platelets     Hypokalemia - resolved  - replace per protocol     Hyponatremia, mild - resolved  - likely 2/2 decreased intake.  - encourage fluid intake  - monitor BMP    Lactic acidosis - resolved  - due to  poor perfusion.      Vaginal candidiasis  - S/p Fluconazole     Leukocytosis - improving  low grade fever - resolved  - likely reactive post-op, osteomyelitis  - encourage use of IS  - monitor wbc and temp curve     DM type II; glucose variable, higher recently  - PTA was on: Jardiance, Trajenta, Metfromin and Tresiba 30 units BID  - Accu-checks; cont with moderate sliding scale insulin. I:C 1:15  - add Lantus 10 U BID; review 6/11     Essential HTN  - Norvasc and lisinopril resumed  - holding hydrochlorothiazide for now.  - hydralazine iv prn        Diet: Regular Diet Adult    DVT Prophylaxis: Lovenox  Cedeno Catheter: Not present  Lines: PRESENT      PICC 06/04/24 Triple Lumen Left Brachial vein medial-Site Assessment: WDL      Cardiac Monitoring: None  Code Status: Full Code          Clinically Significant Risk Factors    Hypoalbuminemia: Lowest albumin = 3.4 g/dL at 6/5/2024  4:17 AM, will monitor as appropriate     Thrombocytopenia: Lowest platelets = 107 in last 2 days, will monitor for bleeding               Disposition Plan     Medically Ready for Discharge: Anticipated 1-2 days per vascular    Disposition: Expected discharge in 1-3 days.      ANDREY Perez MD, [facility] Hospitalist  Text Page (7am - 6pm)    Interval History       Data reviewed today: I reviewed all new labs and imaging results over the last 24 hours.    Physical Exam   Temp: 98.2  F (36.8  C) Temp src: Oral BP: 112/57 Pulse: 80   Resp: 21 SpO2: 100 % O2 Device: None (Room air)    Vitals:    06/08/24 0330 06/09/24 0517 06/10/24 0600   Weight: 72.9 kg (160 lb 11.2 oz) 73.1 kg (161 lb 2.5 oz) 72.9 kg (160 lb 12.8 oz)     Vital Signs with Ranges  Temp:  [97.9  F (36.6  C)-98.4  F (36.9  C)] 98.2  F (36.8  C)  Pulse:  [] 80  Resp:  [18-22] 21  BP: (112-145)/(57-74) 112/57  SpO2:  [97 %-100 %] 100 %  I/O last 3 completed shifts:  In: 710 [P.O.:480; I.V.:230]  Out: 1575 [Urine:1575]    Constitutional: awake, no apparent  distress; sitting up in bed  HEENT: sclerae clear; MM's moist  Respiratory: good a/e bilaterally, no wheezing or rhonchi  Cardiovascular: regular rate and rhythm, S1, S2 noted; no m/r/g  GI: abdomen mod. obese;  + bowel sounds; soft, non-tender, non-distended  Skin/Integumen: Left lower/medial le wound VAC's in place; Left foot covered by dressing; Left lower abdominal incision covered by clear dressing - no drainage or erythema; no rashes, no cyanosis, no jaundice  Musculoskeletal: 1-2+ edema Left lower leg (Vasc. Surgery aware)  Neurologic: follows directions well; no focal deficits      Medications   Current Facility-Administered Medications   Medication Dose Route Frequency Provider Last Rate Last Admin    dextrose 10% infusion   Intravenous Continuous PRN Rene Ordaz DPM         Current Facility-Administered Medications   Medication Dose Route Frequency Provider Last Rate Last Admin    acetaminophen (TYLENOL) tablet 975 mg  975 mg Oral Q6H Rene Ordaz DPM   975 mg at 06/10/24 1256    amLODIPine (NORVASC) tablet 5 mg  5 mg Oral Daily Belkys Harmon DO   5 mg at 06/10/24 0836    aspirin EC tablet 81 mg  81 mg Oral Daily Rene Ordaz DPM   81 mg at 06/10/24 0837    clopidogrel (PLAVIX) tablet 75 mg  75 mg Oral Daily Belkys Harmon DO   75 mg at 06/10/24 0834    diclofenac (VOLTAREN) 1 % topical gel 2 g  2 g Topical 4x Daily Sheela Champion APRN CNP   2 g at 06/10/24 1712    enoxaparin ANTICOAGULANT (LOVENOX) injection 70 mg  1 mg/kg Subcutaneous Q12H [facility] (10/22) Rene Ordaz DPM   70 mg at 06/10/24 0916    gabapentin (NEURONTIN) capsule 400 mg  400 mg Oral TID Sheela Champion APRN CNP   400 mg at 06/10/24 1355    insulin aspart (NovoLOG) injection (RAPID ACTING)   Subcutaneous TID Rene Fuentes DPM   2 Units at 06/10/24 1326    insulin aspart (NovoLOG) injection (RAPID ACTING)  1-7 Units Subcutaneous TID Rene Fuentes  SKIPM   1 Units at 06/10/24 1708    insulin aspart (NovoLOG) injection (RAPID ACTING)  1-5 Units Subcutaneous At Bedtime Rene Ordaz DPM   1 Units at 06/09/24 2108    lactobacillus rhamnosus (GG) (CULTURELL) capsule 1 capsule  1 capsule Oral BID Belkys Harmon DO   1 capsule at 06/10/24 0841    lisinopril (ZESTRIL) tablet 40 mg  40 mg Oral Daily Belkys Harmon DO   40 mg at 06/10/24 0835    meropenem (MERREM) 1 g vial to attach to  mL bag  1 g Intravenous Q8H Franck Jerry MD   1 g at 06/10/24 1356    methocarbamol (ROBAXIN) tablet 500 mg  500 mg Oral At Bedtime Sheela Champion APRN CNP        mineral oil-hydrophilic petrolatum (AQUAPHOR)   Topical Daily Nereyda Joel MD   Given at 06/10/24 0916    pantoprazole (PROTONIX) EC tablet 40 mg  40 mg Oral QAM AC Rene Ordaz DPM   40 mg at 06/10/24 0630    polyethylene glycol (MIRALAX) Packet 17 g  17 g Oral Daily Rene Ordaz DPM   17 g at 06/09/24 0800    senna-docusate (SENOKOT-S/PERICOLACE) 8.6-50 MG per tablet 1 tablet  1 tablet Oral BID Rene Ordaz DPM   1 tablet at 06/10/24 0838    Or    senna-docusate (SENOKOT-S/PERICOLACE) 8.6-50 MG per tablet 2 tablet  2 tablet Oral BID Rene Ordaz DPM   2 tablet at 06/08/24 0912    warfarin ANTICOAGULANT (COUMADIN) tablet 4 mg  4 mg Oral ONCE at 18:00 Dyan Geller MD        Warfarin Dose Required Daily - Pharmacist Managed  1 each Oral See Admin Instructions Belksy Harmon DO           Data   Recent Labs   Lab 06/10/24  1706 06/10/24  1253 06/10/24  0831 06/10/24  0826 06/10/24  0317 06/09/24  2055 06/09/24  2038 06/09/24  0753 06/09/24  0551 06/09/24  0550 06/09/24  0041 06/08/24  0810 06/08/24  0619 06/05/24  0548 06/05/24  0417   WBC  --   --  15.3*  --  16.6*  --  16.9*   < > 16.0*  --  17.7*   < > 21.2*   < >  --    HGB  --   --  8.2*  --  7.8*  --  8.1*   < > 7.8*  --  8.2*   < > 8.3*   < >  --    MCV  --   --  87   --  86  --  86   < > 85  --  84   < > 84   < >  --    PLT  --   --  244  --  240  --  224   < > 173  --  156   < > 128*   < >  --    INR  --   --   --   --  1.26*  --   --   --  1.16*  --   --   --  0.94   < >  --    NA  --   --   --   --  137  --   --   --  138  --   --   --  134*   < > 135   POTASSIUM  --   --   --   --  3.5  --   --   --  3.5  --  3.6   < > 3.1*   < > 4.5   CHLORIDE  --   --   --   --  102  --   --   --  103  --   --   --  97*   < > 105   CO2  --   --   --   --  26  --   --   --  28  --   --   --  27   < > 18*   BUN  --   --   --   --  8.4  --   --   --  9.4  --   --   --  10.0   < > 26.7*   CR  --   --   --   --  0.55  --   --   --  0.52  --   --   --  0.51   < > 1.07*   ANIONGAP  --   --   --   --  9  --   --   --  7  --   --   --  10   < > 12   SANTINO  --   --   --   --  7.8*  --   --   --  7.8*  --   --   --  7.8*   < > 8.5*   * 206*  --  180* 236*   < >  --    < > 172*   < >  --    < > 201*   < > 275*   ALBUMIN  --   --   --   --   --   --   --   --   --   --   --   --   --   --  3.4*   PROTTOTAL  --   --   --   --   --   --   --   --   --   --   --   --   --   --  5.0*   BILITOTAL  --   --   --   --   --   --   --   --   --   --   --   --   --   --  1.0   ALKPHOS  --   --   --   --   --   --   --   --   --   --   --   --   --   --  45   ALT  --   --   --   --   --   --   --   --   --   --   --   --   --   --  48   AST  --   --   --   --   --   --   --   --   --   --   --   --   --   --  59*    < > = values in this interval not displayed.

## 2024-06-10 NOTE — PROGRESS NOTES
Care Management Follow Up    Length of Stay (days): 6    Expected Discharge Date: 06/13/2024    Anticipated Discharge Plan:   home    Transportation: Anticipate Family/friend    PT Recommendations: home with assist, home with home care physical therapy, Per plan established by the PT  OT Recommendations: not ordered     Barriers to Discharge: medical stability, pain-utilizing IV Dilaudid, infection-on IV antibiotic    Prior Living Situation:  Patient reports she lives in her house with spouse. She is independent with ADLs/IADLs, ambulates without devices and is on medical leave from work. Patient requesting a cane at discharge. Daughter Zee is primary family contact and family willing to transport at discharge.     Advanced Directive on File:  no HCD     Patient/Spokesperson Updated: No    Additional Information:  Medical: Patient has a h/o hypertension, DM-II, severe PAD, left hallux osteomyelitis. Patient presented for left external iliac artery to post tibial artery bypass. Surgery was complicated by loss of peripheral pulse requiring re-opening of incision with thrombectomy from the graft, and hemorrhagic shock requiring ICU admission on pressor and blood transfusion.  Vascular Surgery, Ortho and ID following.    6/10/24:  As noted from previous CM, patient declines TCU. She is agreeable to home care. Patient has a Prevena wound VAC in place and on IV antibiotic. Her primary clinic is [facility] and she sees Dr. Mccracken. Referral sent to [facility] for RN/PT.         Kenia Romero RN

## 2024-06-10 NOTE — PLAN OF CARE
Problem: Pain Acute  Goal: Optimal Pain Control and Function  Outcome: Progressing  Intervention: Prevent or Manage Pain  Recent Flowsheet Documentation  Taken 6/10/2024 1200 by Ewelina Magana  Bowel Elimination Promotion:   adequate fluid intake promoted   ambulation promoted  Medication Review/Management: medications reviewed  Taken 6/10/2024 0800 by Ewelina Magana  Bowel Elimination Promotion:   adequate fluid intake promoted   ambulation promoted  Medication Review/Management: medications reviewed  Pain is controlled with change in medication and topical cream. Pt rated pain 2/10 following medication. Surgical site wound vac had no drainage. Ulcer cleaned and dressing changed. Toe site dressing change, large serosanguinous drainage seeped through through old dressing. Slight edema around sutures of the foot.

## 2024-06-10 NOTE — PLAN OF CARE
Goal Outcome Evaluation:      Plan of Care Reviewed With: patient    Overall Patient Progress: no change    Problem: Adult Inpatient Plan of Care  Goal: Optimal Comfort and Wellbeing  Outcome: Not Progressing  Intervention: Monitor Pain and Promote Comfort  Recent Flowsheet Documentation  Taken 6/10/2024 0508 by Lashon Jones RN  Pain Management Interventions: medication (see MAR)  Taken 6/10/2024 0400 by Lashon Jones RN  Pain Management Interventions:   repositioned   rest  Taken 6/10/2024 0326 by Lashon Jones RN  Pain Management Interventions:   rest   repositioned  Taken 6/10/2024 0311 by Lashon Jones RN  Pain Management Interventions: medication (see MAR)  Taken 6/10/2024 0022 by Lashon Jones RN  Pain Management Interventions: medication (see MAR)  Taken 6/10/2024 0019 by Lashon Jones RN  Pain Management Interventions: medication (see MAR)  Taken 6/9/2024 2142 by Lashon Jones RN  Pain Management Interventions:   rest   repositioned  Taken 6/9/2024 2112 by Lahson Jones RN  Pain Management Interventions: medication (see MAR)  Taken 6/9/2024 2000 by Lashon Jones RN  Pain Management Interventions:   repositioned   rest  Taken 6/9/2024 1919 by Lashon Jones RN  Pain Management Interventions: medication (see MAR)  Intervention: Provide Person-Centered Care  Recent Flowsheet Documentation  Taken 6/10/2024 0400 by Lashon Jones RN  Trust Relationship/Rapport:   care explained   reassurance provided   emotional support provided   empathic listening provided  Taken 6/10/2024 0000 by Lashon Jones RN  Trust Relationship/Rapport:   care explained   reassurance provided   emotional support provided   empathic listening provided  Taken 6/9/2024 2000 by Lashon Jones RN  Trust Relationship/Rapport:   care explained   reassurance provided   emotional support provided   empathic listening provided     Problem: Pain Acute  Goal: Optimal Pain Control and  Function  Outcome: Not Progressing  Intervention: Develop Pain Management Plan  Recent Flowsheet Documentation  Taken 6/10/2024 0508 by Lashon Jones RN  Pain Management Interventions: medication (see MAR)  Taken 6/10/2024 0400 by Lashon Jones RN  Pain Management Interventions:   repositioned   rest  Taken 6/10/2024 0326 by Lashon Jones RN  Pain Management Interventions:   rest   repositioned  Taken 6/10/2024 0311 by Lashon Jones RN  Pain Management Interventions: medication (see MAR)  Taken 6/10/2024 0022 by Lashon Jones RN  Pain Management Interventions: medication (see MAR)  Taken 6/10/2024 0019 by Lashon Jones RN  Pain Management Interventions: medication (see MAR)  Taken 6/9/2024 2142 by Lashon Jones RN  Pain Management Interventions:   rest   repositioned  Taken 6/9/2024 2112 by Lashon Jones RN  Pain Management Interventions: medication (see MAR)  Taken 6/9/2024 2000 by Lashon Jones RN  Pain Management Interventions:   repositioned   rest  Taken 6/9/2024 1919 by Lashon Jones RN  Pain Management Interventions: medication (see MAR)  Intervention: Prevent or Manage Pain  Recent Flowsheet Documentation  Taken 6/10/2024 0400 by Lashon Jones RN  Bowel Elimination Promotion:   adequate fluid intake promoted   ambulation promoted   commode/bedpan at bedside  Medication Review/Management: medications reviewed  Taken 6/10/2024 0000 by Lashon Jones RN  Bowel Elimination Promotion:   adequate fluid intake promoted   ambulation promoted   commode/bedpan at bedside  Medication Review/Management: medications reviewed  Taken 6/9/2024 2000 by Lashon Jones RN  Bowel Elimination Promotion:   adequate fluid intake promoted   ambulation promoted   commode/bedpan at bedside  Medication Review/Management: medications reviewed  Intervention: Optimize Psychosocial Wellbeing  Recent Flowsheet Documentation  Taken 6/10/2024 0400 by Lashon Jones  RN  Supportive Measures: active listening utilized  Taken 6/10/2024 0000 by Lashon Jones RN  Supportive Measures: active listening utilized  Taken 6/9/2024 2000 by Lashon Jones RN  Supportive Measures: active listening utilized

## 2024-06-11 ENCOUNTER — DOCUMENTATION ONLY (OUTPATIENT)
Dept: ANTICOAGULATION | Facility: CLINIC | Age: 69
End: 2024-06-11
Payer: COMMERCIAL

## 2024-06-11 VITALS
WEIGHT: 150.13 LBS | RESPIRATION RATE: 16 BRPM | HEART RATE: 91 BPM | DIASTOLIC BLOOD PRESSURE: 64 MMHG | TEMPERATURE: 98.6 F | BODY MASS INDEX: 28.37 KG/M2 | OXYGEN SATURATION: 100 % | SYSTOLIC BLOOD PRESSURE: 124 MMHG

## 2024-06-11 DIAGNOSIS — I74.3 FEMORAL ARTERY THROMBOSIS, LEFT (H): ICD-10-CM

## 2024-06-11 DIAGNOSIS — I73.9 PAD (PERIPHERAL ARTERY DISEASE) (H): Primary | ICD-10-CM

## 2024-06-11 PROBLEM — S81.802A OPEN WOUND OF LOWER LIMB, LEFT, INITIAL ENCOUNTER: Status: ACTIVE | Noted: 2024-06-11

## 2024-06-11 LAB
ERYTHROCYTE [DISTWIDTH] IN BLOOD BY AUTOMATED COUNT: 16.9 % (ref 10–15)
GLUCOSE BLDC GLUCOMTR-MCNC: 185 MG/DL (ref 70–99)
GLUCOSE BLDC GLUCOMTR-MCNC: 208 MG/DL (ref 70–99)
HCT VFR BLD AUTO: 23.7 % (ref 35–47)
HGB BLD-MCNC: 7.7 G/DL (ref 11.7–15.7)
INR PPP: 1.4 (ref 0.85–1.15)
MAGNESIUM SERPL-MCNC: 2.2 MG/DL (ref 1.7–2.3)
MCH RBC QN AUTO: 28.6 PG (ref 26.5–33)
MCHC RBC AUTO-ENTMCNC: 32.5 G/DL (ref 31.5–36.5)
MCV RBC AUTO: 88 FL (ref 78–100)
PATH REPORT.COMMENTS IMP SPEC: NORMAL
PATH REPORT.COMMENTS IMP SPEC: NORMAL
PATH REPORT.FINAL DX SPEC: NORMAL
PATH REPORT.GROSS SPEC: NORMAL
PATH REPORT.MICROSCOPIC SPEC OTHER STN: NORMAL
PATH REPORT.RELEVANT HX SPEC: NORMAL
PHOTO IMAGE: NORMAL
PLATELET # BLD AUTO: 306 10E3/UL (ref 150–450)
POTASSIUM SERPL-SCNC: 3.5 MMOL/L (ref 3.4–5.3)
RBC # BLD AUTO: 2.69 10E6/UL (ref 3.8–5.2)
WBC # BLD AUTO: 13.3 10E3/UL (ref 4–11)

## 2024-06-11 PROCEDURE — 250N000011 HC RX IP 250 OP 636: Mod: JZ | Performed by: INTERNAL MEDICINE

## 2024-06-11 PROCEDURE — 250N000013 HC RX MED GY IP 250 OP 250 PS 637: Performed by: PODIATRIST

## 2024-06-11 PROCEDURE — 99231 SBSQ HOSP IP/OBS SF/LOW 25: CPT | Performed by: PODIATRIST

## 2024-06-11 PROCEDURE — 83735 ASSAY OF MAGNESIUM: CPT | Performed by: INTERNAL MEDICINE

## 2024-06-11 PROCEDURE — 99232 SBSQ HOSP IP/OBS MODERATE 35: CPT | Performed by: INTERNAL MEDICINE

## 2024-06-11 PROCEDURE — 84132 ASSAY OF SERUM POTASSIUM: CPT | Performed by: SURGERY

## 2024-06-11 PROCEDURE — 85027 COMPLETE CBC AUTOMATED: CPT | Performed by: PODIATRIST

## 2024-06-11 PROCEDURE — 250N000011 HC RX IP 250 OP 636: Mod: JZ | Performed by: PODIATRIST

## 2024-06-11 PROCEDURE — 250N000013 HC RX MED GY IP 250 OP 250 PS 637: Performed by: STUDENT IN AN ORGANIZED HEALTH CARE EDUCATION/TRAINING PROGRAM

## 2024-06-11 PROCEDURE — 250N000013 HC RX MED GY IP 250 OP 250 PS 637: Performed by: PAIN MEDICINE

## 2024-06-11 PROCEDURE — 85610 PROTHROMBIN TIME: CPT | Performed by: STUDENT IN AN ORGANIZED HEALTH CARE EDUCATION/TRAINING PROGRAM

## 2024-06-11 RX ORDER — METHOCARBAMOL 500 MG/1
500 TABLET, FILM COATED ORAL AT BEDTIME
Qty: 20 TABLET | Refills: 0 | Status: SHIPPED | OUTPATIENT
Start: 2024-06-11 | End: 2024-08-22

## 2024-06-11 RX ORDER — WARFARIN SODIUM 3 MG/1
6 TABLET ORAL
Status: DISCONTINUED | OUTPATIENT
Start: 2024-06-11 | End: 2024-06-11 | Stop reason: HOSPADM

## 2024-06-11 RX ORDER — ASPIRIN 81 MG/1
81 TABLET ORAL DAILY
Qty: 90 TABLET | Refills: 3 | Status: SHIPPED | OUTPATIENT
Start: 2024-06-12

## 2024-06-11 RX ORDER — ENOXAPARIN SODIUM 100 MG/ML
1 INJECTION SUBCUTANEOUS EVERY 12 HOURS
Qty: 11.2 ML | Refills: 0 | Status: SHIPPED | OUTPATIENT
Start: 2024-06-11 | End: 2024-06-12

## 2024-06-11 RX ORDER — WARFARIN SODIUM 2 MG/1
6 TABLET ORAL DAILY
Qty: 30 TABLET | Refills: 2 | Status: SHIPPED | OUTPATIENT
Start: 2024-06-11 | End: 2024-06-20

## 2024-06-11 RX ORDER — HYDROMORPHONE HYDROCHLORIDE 4 MG/1
4 TABLET ORAL
Qty: 20 TABLET | Refills: 0 | Status: SHIPPED | OUTPATIENT
Start: 2024-06-11 | End: 2024-06-20

## 2024-06-11 RX ADMIN — ACETAMINOPHEN 975 MG: 325 TABLET ORAL at 06:12

## 2024-06-11 RX ADMIN — DICLOFENAC 2 G: 10 GEL TOPICAL at 09:38

## 2024-06-11 RX ADMIN — GABAPENTIN 400 MG: 300 CAPSULE ORAL at 14:35

## 2024-06-11 RX ADMIN — INSULIN ASPART 1 UNITS: 100 INJECTION, SOLUTION INTRAVENOUS; SUBCUTANEOUS at 09:39

## 2024-06-11 RX ADMIN — SENNOSIDES AND DOCUSATE SODIUM 1 TABLET: 8.6; 5 TABLET ORAL at 09:35

## 2024-06-11 RX ADMIN — CLOPIDOGREL BISULFATE 75 MG: 75 TABLET ORAL at 09:35

## 2024-06-11 RX ADMIN — LISINOPRIL 40 MG: 20 TABLET ORAL at 09:35

## 2024-06-11 RX ADMIN — PANTOPRAZOLE SODIUM 40 MG: 40 TABLET, DELAYED RELEASE ORAL at 09:35

## 2024-06-11 RX ADMIN — ASPIRIN 81 MG: 81 TABLET, COATED ORAL at 09:35

## 2024-06-11 RX ADMIN — GABAPENTIN 400 MG: 300 CAPSULE ORAL at 09:35

## 2024-06-11 RX ADMIN — INSULIN GLARGINE 10 UNITS: 100 INJECTION, SOLUTION SUBCUTANEOUS at 09:43

## 2024-06-11 RX ADMIN — AMLODIPINE BESYLATE 5 MG: 5 TABLET ORAL at 09:35

## 2024-06-11 RX ADMIN — Medication 1 CAPSULE: at 10:46

## 2024-06-11 RX ADMIN — HYDROMORPHONE HYDROCHLORIDE 4 MG: 4 TABLET ORAL at 05:36

## 2024-06-11 RX ADMIN — DICLOFENAC 2 G: 10 GEL TOPICAL at 13:31

## 2024-06-11 RX ADMIN — HYDROMORPHONE HYDROCHLORIDE 4 MG: 4 TABLET ORAL at 09:35

## 2024-06-11 RX ADMIN — HYDROMORPHONE HYDROCHLORIDE 4 MG: 4 TABLET ORAL at 15:33

## 2024-06-11 RX ADMIN — INSULIN ASPART 2 UNITS: 100 INJECTION, SOLUTION INTRAVENOUS; SUBCUTANEOUS at 13:30

## 2024-06-11 RX ADMIN — MEROPENEM 1 G: 1 INJECTION, POWDER, FOR SOLUTION INTRAVENOUS at 05:37

## 2024-06-11 RX ADMIN — HYDROMORPHONE HYDROCHLORIDE 4 MG: 4 TABLET ORAL at 12:36

## 2024-06-11 RX ADMIN — ACETAMINOPHEN 975 MG: 325 TABLET ORAL at 00:23

## 2024-06-11 RX ADMIN — ACETAMINOPHEN 975 MG: 325 TABLET ORAL at 12:36

## 2024-06-11 RX ADMIN — ENOXAPARIN SODIUM 70 MG: 80 INJECTION SUBCUTANEOUS at 10:36

## 2024-06-11 RX ADMIN — HYDROMORPHONE HYDROCHLORIDE 4 MG: 4 TABLET ORAL at 02:28

## 2024-06-11 ASSESSMENT — ACTIVITIES OF DAILY LIVING (ADL)
ADLS_ACUITY_SCORE: 28
ADLS_ACUITY_SCORE: 27
ADLS_ACUITY_SCORE: 28
ADLS_ACUITY_SCORE: 27

## 2024-06-11 NOTE — PROGRESS NOTES
Pt A/Ox4. Pain controlled with PRN and scheduled medications per pt. Up independently to bedside commode, follows LLE precautions w/o difficulty. LLE elevated per orders. Pt refused for dressing between incision sites be changed this shift as pt stated it was changed around 0300 already today. Pts staple sites on LLE noted to be minimally oozing, per Dr. Harmon, ok to put primipore dressing over sites. Wound vac removed early this AM per vascular.    Discharge orders per MD's. Discharge education completed with pt and family at the bedside, no further questions noted. Pt educated on administration of Lovenox injection, pt verbalized and demonstrated an understanding to writer. Pt supplied with extra wound care supplies per vascular surgery request. Pt had all prescription medications at discharge, along with belongings, and left unit via wheelchair at 1600 with family to transport home.

## 2024-06-11 NOTE — PROGRESS NOTES
Will not see today:  PAIN MANAGEMENT SERVICE CHART CHECK  This patient's chart has been reviewed by the Pain Service. It has been determined that no change is necessary to the pain management regimen at this time. The pain team will sign off. If you would like the patient to be seen, please place a new consult or page via [employer].     Thank you!        Sheela RAYA, CNS-BC, CNP   Acute Care Pain Management Program Mon-Fri 7a-1700  [facility] (Acute Pain)  Page via [employer] - Click here

## 2024-06-11 NOTE — PROGRESS NOTES
VASCULAR SURGERY PROGRESS NOTE    Subjective:  No complaints this AM, pain much better controlled. Cleared for home by PT, [place] completed this AM.    Objective:  Intake/Output Summary (Last 24 hours) at 6/10/2024 0846  Last data filed at 6/10/2024 0500  Gross per 24 hour   Intake 1220 ml   Output 1575 ml   Net -355 ml     PHYSICAL EXAM:  /67 (BP Location: Right arm)   Pulse 85   Temp 98.4  F (36.9  C) (Oral)   Resp 20   Wt 68.1 kg (150 lb 2.1 oz)   SpO2 99%   BMI 28.37 kg/m    General: patient is alert and oriented, appropriate, no acute distress  Psych: pleasant affect, answers questions appropriately  Skin: color appropriate for race, warm, dry.  Respiratory: normal respiratory effort   Extremities: PT signal present via doppler, incisional wound VAC in place to left lower extremity and left foot wrapped with clean and dry podiatry dressing, edema improving, prevenas removed with intact staples healing well no signs of infection, strong palpable PT at ankle      Imaging:   Pertinent imaging reviewed.     ASSESSMENT:  69 yo female with CLTI LLE with hx of left fem distal bypass x2 failure due to early thrombosis now with third redo bypass left external iliac to distal PT on 6/4 and left hallux amputation with podiatry on 6/7       PLAN:  Continue ASA and plavix  Therapeutic lovenox, coumadin started on 6/7, INR subtherapeutic at 1.26  Antibiotics per ID  Home HTN meds restarted  Pain control PRN, pain team consult  Ok to leave staples open to air  Wound care to L calf wound  Wound care and weight bearing to L foot per podiatry  Activity as tolerated, PT and OT consulted  Ambulate daily  Appreciate hospitalist input  Possible discharge this afternoon after coumadin clinic confirmed      Belkys Harmon DO  Fellow  VASCULAR SURGERY

## 2024-06-11 NOTE — PROGRESS NOTES
FOOT AND ANKLE SURGERY/PODIATRY Progress Note        ASSESSMENT:   S/p amputation left hallux  DM2  PAD      TREATMENT:  -Doing well today.  Mild to moderate left foot pain. Pathology and cultures pending. Afebrile.    -I discussed with the patient importance of continued nonweightbearing in the left foot.  Gauze dressing to remain intact until her next visit with me early next week.    -Medical management per hospitalist/vascular surgery.  I will asked my office to coordinate outpatient follow-up with me early next week at the [facility] wound and vascular Center.    Rene Ordaz DPM  [facility] Podiatry/Foot & Ankle Surgery      HPI: Ashanti Aviles was seen again today s/p amputation of the left hallux.  Patient states she is doing well today and admits to mild to moderate pain in her left foot.  She is remain nonweightbearing on her left foot.  Patient reports that the dressing on the left foot was changed approximate 1 hour prior to my visit.    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/29/2013    Nicotine addiction     OAG (open angle glaucoma)     PVD (peripheral vascular disease) (H24)     Reflux esophagitis     Retinopathy     Toe ulcer (H)     Tubular adenoma     Yeast vaginitis        Past Surgical History:   Procedure Laterality Date    AMPUTATE TOE(S) Right 02/18/2023    Procedure: Amputation fourth toe right foot;  Surgeon: Benjamin Diez DPM;  Location: [facility] OR    ANGIOGRAM Left 2/15/2024    Procedure: COMPLETION OF ANGIOGRAM, LEFT COMMON ILIAC ARTERY STENT PLACEMENT;   Surgeon: Dyan Geller MD;  Location: [facility] OR    ANUS SURGERY      BIOPSY CERVICAL, LOCAL EXCISION, SINGLE/MULTIPLE      COLONOSCOPY N/A 09/11/2020    Procedure: EXAM UNDER ANESTHESIA, HIGH RESOLUTION ANOSCOPY INTRA OP;  Surgeon: Preeti Sheehan MD;  Location: [facility];  Service: Gastroenterology    COLONOSCOPY N/A 12/14/2020    Procedure: EXAM UNDER ANESTHESIA WITH HIGH RESOLUTION ANOSCOPY;  Surgeon: Preeti Sheehan MD;  Location: [facility];  Service: General    COLONOSCOPY N/A 06/15/2021    Procedure: EXAM UNDER ANESTHESIA WITH HIGH RESOLUTION ANOSCOPY, BIOPSY, FULGURATION;  Surgeon: Preeti Sheehan MD;  Location: [facility];  Service: Gastroenterology    COLONOSCOPY N/A 4/15/2024    Procedure: COLONOSCOPY, WITH POLYPECTOMY;  Surgeon: Shen Andres MD;  Location: [facility] OR    ENDARTERECTOMY FEMORAL Left 05/17/2023    Procedure: LEFT FEMORAL ENDARTERECTOMY WITH RETROGRADE ILIAC STENT;  Surgeon: Dyan Geller MD;  Location: [facility] OR    EXAM UNDER ANESTHESIA ANUS N/A 09/14/2021    Procedure: EXAM UNDER ANESTHESIA WITH HIGH RESOLUTION ANOSCOPY;  Surgeon: Preeti Sheehan MD;  Location: [facility] OR    FEMORAL ARTERY - TIBIAL ARTERY BYPASS GRAFT Left 09/08/2023    Procedure: CREATION, BYPASS, ARTERIAL, FEMORAL TO TIBIAL, LEFT;  Surgeon: Dyan Geller MD;  Location: [facility] OR    FEMORAL ARTERY - TIBIAL ARTERY BYPASS GRAFT Left 2/15/2024    Procedure: CREATION, BYPASS, ARTERIAL, FEMORAL TO TIBIAL,;  Surgeon: Dyan Geller MD;  Location: [facility] OR    FEMORAL ARTERY - TIBIAL ARTERY BYPASS GRAFT Left 6/4/2024    Procedure: LEFT EXTERNAL ILIAC ARTERY TO POSTERIOR TIBIAL ARTERY BYPASS WITH CRYO ARTERY WITH A FEMORAL HERNIA REPAIR;  Surgeon: Dyan Geller MD;  Location: [facility] OR    INCISION AND DRAINAGE FOOT, COMBINED Left 11/27/2023    Procedure: INCISION AND  DRAINAGE, LEFT HALLUX;  Surgeon: Rene Ordaz DPM;  Location: [facility] OR    INCISION AND DRAINAGE FOOT, COMBINED Left 6/7/2024    Procedure: AMPUTATION LEFT HALLUX;  Surgeon: Rene Ordaz DPM;  Location: [facility] OR    IR LOWER EXTREMITY ANGIOGRAM LEFT  03/23/2023    IR LOWER EXTREMITY ANGIOGRAM RIGHT  02/16/2023    IR THROMBOLYSIS ARTERIAL INFUSION INITIAL DAY  10/09/2023    LEEP TX, CERVICAL      2005    TUBAL LIGATION         Allergies   Allergen Reactions    Simvastatin Muscle Pain (Myalgia)     Muscle pain    Victoza [Liraglutide] Other (See Comments)     Binds bowels    Penicillins Unknown     Childhood rxn         Current Facility-Administered Medications:     acetaminophen (TYLENOL) tablet 650 mg, 650 mg, Oral, Q4H PRN, Rene Ordaz DPM    acetaminophen (TYLENOL) tablet 975 mg, 975 mg, Oral, Q6H, Rene Ordaz DPM, 975 mg at 06/11/24 0612    amLODIPine (NORVASC) tablet 5 mg, 5 mg, Oral, Daily, Belkys Harmon DO, 5 mg at 06/11/24 0935    aspirin EC tablet 81 mg, 81 mg, Oral, Daily, Rene Ordaz DPM, 81 mg at 06/11/24 0935    bisacodyl (DULCOLAX) suppository 10 mg, 10 mg, Rectal, Daily PRN, Rene Ordaz DPM    clopidogrel (PLAVIX) tablet 75 mg, 75 mg, Oral, Daily, Belkys Harmon DO, 75 mg at 06/11/24 0935    dextrose 10% infusion, , Intravenous, Continuous PRN, Rene Ordaz DPM    glucose gel 15-30 g, 15-30 g, Oral, Q15 Min PRN **OR** dextrose 50 % injection 25-50 mL, 25-50 mL, Intravenous, Q15 Min PRN **OR** glucagon injection 1 mg, 1 mg, Subcutaneous, Q15 Min PRN, Rene Ordaz DPM    diclofenac (VOLTAREN) 1 % topical gel 2 g, 2 g, Topical, 4x Daily, Sheela Champion APRN CNP, 2 g at 06/11/24 0938    enoxaparin ANTICOAGULANT (LOVENOX) injection 70 mg, 1 mg/kg, Subcutaneous, Q12H [facility] (10/22), Rene Ordaz, SILVINA, 70 mg at 06/11/24 1036    gabapentin (NEURONTIN) capsule 400 mg, 400 mg, Oral, TID,  Sheela Champion APRN CNP, 400 mg at 06/11/24 0935    HYDROmorphone (DILAUDID) tablet 4 mg, 4 mg, Oral, Q3H PRN, Sheela Champion APRN CNP, 4 mg at 06/11/24 0935    insulin aspart (NovoLOG) injection (RAPID ACTING), , Subcutaneous, TID AC, Rene Ordaz DPM, 3 Units at 06/11/24 0940    insulin aspart (NovoLOG) injection (RAPID ACTING), 1-7 Units, Subcutaneous, TID AC, Rene Ordaz DPM, 1 Units at 06/11/24 0939    insulin aspart (NovoLOG) injection (RAPID ACTING), 1-5 Units, Subcutaneous, At Bedtime, Rene Ordaz DPM, 1 Units at 06/10/24 2110    insulin glargine (LANTUS PEN) injection 10 Units, 10 Units, Subcutaneous, BID, Benedict Perez MD, 10 Units at 06/11/24 0943    lactobacillus rhamnosus (GG) (CULTURELL) capsule 1 capsule, 1 capsule, Oral, BID, Belkys Harmon, DO, 1 capsule at 06/11/24 1046    lidocaine (LMX4) cream, , Topical, Q1H PRN, Rene Ordaz DPM    lidocaine 1 % 0.1-1 mL, 0.1-1 mL, Other, Q1H PRN, Rene Ordaz DPM    lisinopril (ZESTRIL) tablet 40 mg, 40 mg, Oral, Daily, Belkys Harmon, DO, 40 mg at 06/11/24 0935    magnesium hydroxide (MILK OF MAGNESIA) suspension 30 mL, 30 mL, Oral, Daily PRN, Rene Ordaz DPM    methocarbamol (ROBAXIN) tablet 500 mg, 500 mg, Oral, At Bedtime, Sheela Champion APRN CNP, 500 mg at 06/10/24 2040    mineral oil-hydrophilic petrolatum (AQUAPHOR), , Topical, Daily, Nereyda Joel MD, Given at 06/10/24 0916    naloxone (NARCAN) injection 0.2 mg, 0.2 mg, Intravenous, Q2 Min PRN **OR** naloxone (NARCAN) injection 0.4 mg, 0.4 mg, Intravenous, Q2 Min PRN **OR** naloxone (NARCAN) injection 0.2 mg, 0.2 mg, Intramuscular, Q2 Min PRN **OR** naloxone (NARCAN) injection 0.4 mg, 0.4 mg, Intramuscular, Q2 Min PRN, Rene Ordaz, SILVINA    ondansetron (ZOFRAN ODT) ODT tab 4 mg, 4 mg, Oral, Q6H PRN **OR** ondansetron (ZOFRAN) injection 4 mg, 4 mg, Intravenous, Q6H PRN, Orlin  Rene Machado DPM    pantoprazole (PROTONIX) EC tablet 40 mg, 40 mg, Oral, QAM AC, Rene Ordaz DPM, 40 mg at 24 0935    polyethylene glycol (MIRALAX) Packet 17 g, 17 g, Oral, Daily, Rene Ordaz DPM, 17 g at 24 0800    prochlorperazine (COMPAZINE) injection 5 mg, 5 mg, Intravenous, Q6H PRN **OR** prochlorperazine (COMPAZINE) tablet 5 mg, 5 mg, Oral, Q6H PRN, Rene Ordaz DPM    senna-docusate (SENOKOT-S/PERICOLACE) 8.6-50 MG per tablet 1 tablet, 1 tablet, Oral, BID, 1 tablet at 24 0935 **OR** senna-docusate (SENOKOT-S/PERICOLACE) 8.6-50 MG per tablet 2 tablet, 2 tablet, Oral, BID, Rene Ordaz DPM, 2 tablet at 24 0912    sodium chloride (PF) 0.9% PF flush 3 mL, 3 mL, Intracatheter, q1 min prn, Rene Ordaz DPM, 3 mL at 24 1149    warfarin ANTICOAGULANT (COUMADIN) tablet 6 mg, 6 mg, Oral, ONCE at 18:00, Dyan Geller MD    Warfarin Dose Required Daily - Pharmacist Managed, 1 each, Oral, See Admin Instructions, Belkys Harmon DO    Family History   Problem Relation Age of Onset    Hypertension Mother     Colon Cancer Mother     Diabetes Type 2  Father     Hypertension Father     Kidney failure Father     Hyperthyroidism Sister     Breast Cancer Paternal Aunt        Social History     Socioeconomic History    Marital status:      Spouse name: Not on file    Number of children: Not on file    Years of education: Not on file    Highest education level: Not on file   Occupational History    Not on file   Tobacco Use    Smoking status: Former     Current packs/day: 0.00     Types: Cigarettes     Quit date: 2023     Years since quittin.3     Passive exposure: Never    Smokeless tobacco: Never   Vaping Use    Vaping status: Never Used   Substance and Sexual Activity    Alcohol use: Not Currently     Comment: Alcoholic Drinks/MONTH: 2x    Drug use: Not Currently    Sexual activity: Yes     Partners: Male   Other  "Topics Concern    Not on file   Social History Narrative    Not on file     Social Determinants of Health     Financial Resource Strain: Not on file   Food Insecurity: Not on file   Transportation Needs: Not on file   Physical Activity: Not on file   Stress: Not on file   Social Connections: Not on file   Interpersonal Safety: Not on file   Housing Stability: Not on file       10 point Review of Systems is negative except for osteomyelitis left foot which is noted in HPI.     BP (!) 141/67 (BP Location: Right arm)   Pulse 84   Temp 98.6  F (37  C) (Oral)   Resp 18   Wt 68.1 kg (150 lb 2.1 oz)   SpO2 100%   BMI 28.37 kg/m      BMI= Body mass index is 28.37 kg/m .    OBJECTIVE:  General appearance: Patient is alert and fully cooperative with history & exam.  No sign of distress is noted during the visit.  Gauze dressing intact left foot.    Imaging:     XR Chest Port 1 View    Result Date: 6/4/2024  EXAM: XR CHEST PORT 1 VIEW LOCATION: [facility] DATE: 6/4/2024 INDICATION: PICC catheter placement. COMPARISON: 2/17/2024.     IMPRESSION: Left upper extremity PICC catheter has tip overlying the superior cavoatrial junction. Lungs are clear. No pleural effusion or pneumothorax. Cardiomediastinal silhouette is normal. Atherosclerotic calcifications of the thoracic aorta.    XR Surgery HARRIET Fluoro L/T 5 Min    Result Date: 6/4/2024  This exam was marked as non-reportable because it will not be read by a radiologist or a [place] non-radiologist provider.     POC US Guided Vascular Access    Result Date: 6/4/2024  Ultrasound was performed as guidance to an anesthesia procedure.  Click \"PACS images\" hyperlink below to view any stored images.  For specific procedure details, view procedure note authored by anesthesia.       "

## 2024-06-11 NOTE — PROGRESS NOTES
Pt. Transferred  from ICU.  Pt. Alert and oriented.   Pt. Voided in bathroom and  then dressing came off toe.  Redressed her foot and she is in bed.  Refuses bed alarm and knows she has to call for help.   Wound vac attached to leg.

## 2024-06-11 NOTE — PROGRESS NOTES
CLINICAL NUTRITION SERVICES - ASSESSMENT NOTE     Nutrition Prescription    RECOMMENDATIONS FOR MDs/PROVIDERS TO ORDER:  Continue to adjust insulin regimen with hyperglycemia     Malnutrition Status:    Pt does not meet malnutrition criteria     Recommendations already ordered by Registered Dietitian (RD):  Trial expedite prior to discharge     Future/Additional Recommendations:  Monitor po intake, wound healing, labs, I/Os.      REASON FOR ASSESSMENT  Ashanti Aviles is a/an 68 year old female assessed by the dietitian for LOS    HPI: Pt with h/o hypertension, DM type II, severe PAD, left hallux osteomyelitis.  She initially presented for left external iliac artery to post tibial artery bypass. Surgery was complicated by loss of peripheral pulse requiring re-opening of incision with thrombectomy from the graft, and hemorrhagic shock requiring ICU admission on pressor and blood transfusion.     NUTRITION HISTORY  Met with pt at bedside this AM. Pt reports good po intakes and appetite. Pt with no known weight loss. Pt denies nausea, vomiting, abd pain. Pt reporting plan to discharge this afternoon.     s/p amputation of the left hallux     CURRENT NUTRITION ORDERS  Diet: Regular  Intake/Tolerance:   Pt consuming % of 2-3 meals/day- estimated 5946-6396 kcals and 34-90 g protein 6/8-6/10.     LABS  Reviewed  -121 last 24 hrs, insulin regimen adjusted 6/10    MEDICATIONS  Reviewed   Scheduled norvasc, plavix, lovenox, neurontin, novolog, lantus, culturell, zestril, robaxin, protonix, miralax, senna, warfarin    ANTHROPOMETRICS  Height: 0 cm (Data Unavailable)  Most Recent Weight: 68.1 kg (150 lb 2.1 oz)    IBW: 47.7 kg  BMI: Overweight BMI 25-29.9  Weight History: Current weight is up from baseline  Wt Readings from Last 10 Encounters:   06/11/24 68.1 kg (150 lb 2.1 oz)   05/29/24 68.1 kg (150 lb 1.6 oz)   04/17/24 68.5 kg (151 lb)   04/17/24 66.7 kg (147 lb)   04/15/24 66.7 kg (147 lb)   03/20/24 66.7 kg  (147 lb)   03/20/24 65.8 kg (145 lb)   03/06/24 65.8 kg (145 lb)   03/05/24 66.7 kg (147 lb)   02/15/24 66.2 kg (145 lb 14.4 oz)      Dosing Weight: 52.8 kg adjusted wt    ASSESSED NUTRITION NEEDS  Estimated Energy Needs: 4069-0882+ kcals/day (25-30+ kcals/kg)  Justification: Maintenance  Estimated Protein Needs: 64+ grams protein/day (1.2 + grams of pro/kg)  Justification: Increased needs and Wound healing  Estimated Fluid Needs: 0885-9783 mL/day (25 - 30 mL/kg)   Justification: Maintenance    PHYSICAL FINDINGS/GI CONCERNS  See malnutrition section below.  Per Flowsheets:   BM: last noted x1 6/10  Skin: surgical site- abd, LLE  Negative pressure wound therapy- LLE  Edema: Trace/Mild LLE  I/Os: -2.6 L this admit     MALNUTRITION:  % Weight Loss:  None noted  % Intake:  No decreased intake noted  Subcutaneous Fat Loss:  None observed  Muscle Loss:  None observed  Fluid Retention: Trace/Mild LLE    Malnutrition Diagnosis: Patient does not meet two of the above criteria necessary for diagnosing malnutrition    NUTRITION DIAGNOSIS  Increased nutrient needs related to wound healing as evidenced by s/p amputation of left hallux.       INTERVENTIONS  Implementation  Emphasized adequate protein and calories to meet nutrition needs, wound healing   Trial expedite prior to discharge     Goals  Pt to meet nutritional needs   Wound healing      Monitoring/Evaluation  Progress toward goals will be monitored and evaluated per protocol.

## 2024-06-11 NOTE — PLAN OF CARE
Physical Therapy Discharge Summary    Reason for therapy discharge:    Discharged to home with home therapy.    Progress towards therapy goal(s). See goals on Care Plan in [facility] electronic health record for goal details.  Goals partially met.  Barriers to achieving goals:   discharge from facility.    Therapy recommendation(s):    Continued therapy is recommended.  Rationale/Recommendations:  PT goals not fully met.

## 2024-06-11 NOTE — PROGRESS NOTES
"Care Management Follow Up    Length of Stay (days): 7    Expected Discharge Date: 06/13/2024     Concerns to be Addressed:     Care progression  Patient plan of care discussed at interdisciplinary rounds: Yes    Anticipated Discharge Disposition:  Home with home care - RN/PT/OT      Anticipated Discharge Services:   Home with home care - RN/PT/OT  Anticipated Discharge DME:  NA    Patient/family educated on Medicare website which has current facility and service quality ratings:  NA  Education Provided on the Discharge Plan:  Yes per team  Patient/Family in Agreement with the Plan:  Yes    Referrals Placed by CM/SW:  NA  Private pay costs discussed: Not applicable    Additional Information:  Rec'd IP SW/CM order, \"Will need outpt inr monitoring in the Coumadin clinic.\"     Met patient at bedside to discuss the above. Patient said she prefer to see her primary for the lab, but her primary, Shona Mccracken, is on leave right now.    RNCM called [facility] and per , Shona will return next week. [facility], her regular clinic does not have an opening tomorrow. Patient can see Matilde Donnelly tomorrow 6/12/24 at 0810 for a hospital follow up and INR lab at the [facility].    Met with patient at bedside to update and she agreed  Written appointment reminder given to patient with clinic address and contact info  6/12/24 0820 Matilde Donnelly  [address] [place] [address]    Discharge orders faxed to Matilde Oviedo [phone]    Social Hx: \"Live w/spouse in house. Independent with ADLs/IADLs, amb without devices and no services. She would like a cane at discharge. Yaakov Koch is primary contact. Family will transport.\"     RNCM to follow for medical progression, recommendations, and final discharge plan.     Nicol Huerta RN     Per provider, Dr. Perez, patient ready for discharge today.  Informed Dr. Perez, no accepting home care agency yet    Per Dr. Perez, no wound vac. No " outpatient home IV.   Home care RN for new warfarin teaching and wound assessment/teaching and complex medication teaching and PT.  Patient can follow up with PMD if needs home care agency    Informed Dr. Perez, [employer] outsource home care referrals with 13 declined, but still looking.    RNCM met with patient at bedside to discuss the home care.   Patient states she wants to discharge without home care set up. If she needs home care, then she will follow up with her PMD.    Rec'd page from bedside nurseStacie, regarding IV antibiotic and patient needs an appt to follow up with the wound clinic.    Informed Stacie no home IV antibiotic.  Called [facility] [place] [phone] and per  patient already have hospital follow up appt, vascular appt and wound clinic appt scheduled.    Paged bedside nurseStacie, to update.    [phone] rec'd a call from Keshia with [employer],  [facility] has questions regarding the SN orders.  Informed Keshia, patient being discharge and does have home care orders

## 2024-06-11 NOTE — PLAN OF CARE
Problem: Adult Inpatient Plan of Care  Goal: Plan of Care Review  Description: The Plan of Care Review/Shift note should be completed every shift.  The Outcome Evaluation is a brief statement about your assessment that the patient is improving, declining, or no change.  This information will be displayed automatically on your shift  note.  Outcome: Progressing     Problem: Pain Acute  Goal: Optimal Pain Control and Function  Outcome: Progressing  Intervention: Develop Pain Management Plan  Recent Flowsheet Documentation  Taken 6/10/2024 2008 by Adrienne Ann RN  Pain Management Interventions: medication (see MAR)  Intervention: Prevent or Manage Pain  Recent Flowsheet Documentation  Taken 6/10/2024 2000 by Adrienne Ann RN  Medication Review/Management: medications reviewed  Intervention: Optimize Psychosocial Wellbeing  Recent Flowsheet Documentation  Taken 6/10/2024 2000 by Adrienne Ann RN  Supportive Measures: active listening utilized   Goal Outcome Evaluation:         [facility] - ICU    RN Progress Note:            Pertinent Assessments:      Please refer to flowsheet rows for full assessment     Alert and oriented. Vitals remained stable.            Key Events - This Shift:       C/o left foot/leg pain medicated with PRN dilaudid.                     Barriers to Discharge / Downgrade:     Patient transferred to  report given to Kacey CAMPBELL. All belonging sent with patient. Pt will updated family.

## 2024-06-11 NOTE — PROGRESS NOTES
ANTICOAGULATION  MANAGEMENT: Discharge Review    Ashanti Aviles chart reviewed for anticoagulation continuity of care    Hospital Admission on 6/4-11/24 for severe peripheral vascular disease, for for left external iliac artery to posterior tib artery bypass requiring reopening of invision with thrombectomy from graft which was complicated by hemorrhagic shock and poor reperfusion    - 6/7/24 s/p amputation of left hallux   - 6/4/24 s/p left external iliac artery to posterior tibial artery bypass w/ cryo artery with a femoral hernia repair.    Discharge disposition: Home   - check to see if patient is enrolled to have home care with [facility] - [phone]    Results:    Recent labs: (last 7 days)     06/04/24  2035 06/05/24  0417 06/07/24  1153 06/08/24  0619 06/09/24  0551 06/10/24  0317 06/11/24  0510   INR 1.40*  --  1.07 0.94 1.16* 1.26* 1.40*   AAUFH 1.09 0.83  --   --   --   --   --      Anticoagulation inpatient management:     Bridged with Enoxaparin 70mg q12hrs from 6/6 - 11   Bridged with Heparin from 6/4 - 5.    Anticoagulation discharge instructions:     Warfarin dosing:  initiated on 6/11/24 with Warfarin 2mg to take 3 tabs (6mg) daily   Then decreased on 6/12/24 to 2 tabs (4mg) daily, until next INR check   Bridging: bridging with enoxaparin (Lovenox)  [id number]   INR goal change: Yes: 2.5 - 3.5      Medication changes affecting anticoagulation: Yes:    New meds: Aspirin 81mg daily     Enoxaparin 70mg q 12 hrs until INR is at goal range of 2.5 - 3.5     Hydromorphone (Dilaudid) 4mg q hrs PRN  Warfarin 2mg = instructed on 6/11/24 take 6mg daily, then thereafter on 6/12 take 4mg daily.    Stopped: Hydrochlorothiazide.         Additional factors affecting anticoagulation: No     PLAN     Agree with discharge plan for follow up:   1. Follow up as previously scheduled with vascular team on 6/20/24  2. Follow up with Dr. Ordaz (Podiatry) in next 7-10 days  3. Follow up with Anti-coagulation  clinic in next 2-4 days  4. Daily INR checks until therapeutic, and until further instructions from Anti-coag. Clinic  5. Labs in 2 weeks: iron panel  6. Referral to Hematology for eval. Of chronic (and recent acute, post-op) anemia  7. Follow up with [facility] in next 7-10 days.      Patient not contacted - ACN will call patient to enroll with [facility].    No adjustment to Anticoagulation Calendar was required    Lorraine Shannon RN

## 2024-06-11 NOTE — PROGRESS NOTES
Care Management Discharge Note    Discharge Date: 06/11/2024       Discharge Disposition: Home, Home Care    Discharge Services: Home Care    Discharge DME: None    Discharge Transportation:  Family/friends    Private pay costs discussed: Not applicable    Does the patient's insurance plan have a 3 day qualifying hospital stay waiver?  No    PAS Confirmation Code:  NA  Patient/family educated on Medicare website which has current facility and service quality ratings: NA    Education Provided on the Discharge Plan: Yes per team  Persons Notified of Discharge Plans: Patient  Patient/Family in Agreement with the Plan: yes    Handoff Referral Completed: Yes    Additional Information:  Patient discharge to home. Family will transport.    Nicol Huerta RN

## 2024-06-12 ENCOUNTER — LAB (OUTPATIENT)
Dept: LAB | Facility: CLINIC | Age: 69
End: 2024-06-12
Payer: COMMERCIAL

## 2024-06-12 ENCOUNTER — DOCUMENTATION ONLY (OUTPATIENT)
Dept: ANTICOAGULATION | Facility: CLINIC | Age: 69
End: 2024-06-12

## 2024-06-12 ENCOUNTER — TELEPHONE (OUTPATIENT)
Dept: SURGERY | Facility: CLINIC | Age: 69
End: 2024-06-12

## 2024-06-12 ENCOUNTER — ANTICOAGULATION THERAPY VISIT (OUTPATIENT)
Dept: ANTICOAGULATION | Facility: CLINIC | Age: 69
End: 2024-06-12

## 2024-06-12 DIAGNOSIS — I74.3 FEMORAL ARTERY THROMBOSIS, LEFT (H): ICD-10-CM

## 2024-06-12 DIAGNOSIS — I73.9 PAD (PERIPHERAL ARTERY DISEASE) (H): Primary | ICD-10-CM

## 2024-06-12 DIAGNOSIS — I73.9 PAD (PERIPHERAL ARTERY DISEASE) (H): ICD-10-CM

## 2024-06-12 DIAGNOSIS — T82.898A: ICD-10-CM

## 2024-06-12 LAB — INR BLD: 1.6 (ref 0.9–1.1)

## 2024-06-12 PROCEDURE — 36416 COLLJ CAPILLARY BLOOD SPEC: CPT

## 2024-06-12 PROCEDURE — 85610 PROTHROMBIN TIME: CPT

## 2024-06-12 RX ORDER — ENOXAPARIN SODIUM 100 MG/ML
1 INJECTION SUBCUTANEOUS EVERY 12 HOURS
Qty: 11.2 ML | Refills: 0 | Status: SHIPPED | OUTPATIENT
Start: 2024-06-12 | End: 2024-06-24

## 2024-06-12 NOTE — PROGRESS NOTES
"ANTICOAGULATION  MANAGEMENT: NEW REFERRAL      SUBJECTIVE/OBJECTIVE     Ashanti Aviles, a 68 year old female  is newly referred to [facility].    Anticoagulation:    Previously on warfarin: No, new to anticoagulation  Warfarin initiation date (approximate): 24 (ONLY TOOK 4 MG INSTEAD OF 6 MG)   Indication(s):  PAD, iliac to tibial artery bypass, bypass occlusions x2 (10/2023 and 3/2024)   Goal Range: 2.5-3.5 - Message to Dr. Geller with referral to indicate appropriate INR goal range.   Anticoagulation Bridge/Overlap: Yes, bridging with Enoxaparin until INR >= 2.3 or INR >= 2.0 x 2 ([facility] protocol goal INR 2-3 new start) 70 mg q 12 hr   Referring provider: from inpatient provider; ambulatory responsible provider from primary or specialty care needed.  Request sent to Dr. Geller to oversee management.    General Dietary/Social Hx:    Typical vitamin K intake: low to moderate; variable     Other dietary considerations: None     Social History:   Social History     Tobacco Use     Smoking status: Former     Current packs/day: 0.00     Types: Cigarettes     Quit date: 2023     Years since quittin.3     Passive exposure: Never     Smokeless tobacco: Never   Vaping Use     Vaping status: Never Used   Substance Use Topics     Alcohol use: Not Currently     Comment: Alcoholic Drinks/MONTH: 2x     Drug use: Not Currently         Results:        Recent labs: (last 7 days)     24  1318   INR 1.6*       Wt Readings from Last 2 Encounters:   24 68.1 kg (150 lb 2.1 oz)   24 68.1 kg (150 lb 1.6 oz)      Estimated body mass index is 28.37 kg/m  as calculated from the following:    Height as of 24: 1.549 m (5' 1\").    Weight as of 24: 68.1 kg (150 lb 2.1 oz).  Lab Results   Component Value Date    AST 59 (H) 2024    ALT 48 2024    ALBUMIN 3.4 (L) 2024     Lab Results   Component Value Date    CR 0.55 06/10/2024     Estimated Creatinine " Clearance: 88.8 mL/min (based on SCr of 0.55 mg/dL).    ASSESSMENT     Request provider review of goal range due to other documentation indicating goal range of 2.0-3.0.  Visit with vascular on 3/25/24 noted: recommend warfarin with goal INR 2-3 bridged with enoxaparin 1 mg/kg twice daily.  This can be coupled with aspirin 81 mg or clopidogrel 75 mg. No evidence of antiphospholipid syndrome.  No severe homocystinemia. Then visit with vascular surgery on 4/1/24 noted: Continue optimal medical management therapy with antiplatelet therapy and statin. Patient can be switched to baby aspirin and a statin from now on. After the bypass we will initiate therapeutic Lovenox and Coumadin.   Establishing initial warfarin maintenance dose (on warfarin < 30 days)   Factors that may increase sensitivity to warfarin: Baseline INR >=1.3 and Female gender  Factors that may reduce sensitivity to warfarin: No factors  Potential significant drug interactions with home medications: None noted  Starting warfarin dose is appropriate for patient's anticipated sensitivity to warfarin    PLAN     Dosing Instructions: Continue your current warfarin dose Continue bridging with Enoxaparin with INR in 2 days (per discharge directions: Daily INR checks until therapeutic, and until further instructions from Anticoagulation Clinic.)    Summary  As of 6/12/2024      Full warfarin instructions:  4 mg every day   Next INR check:  6/13/2024               Education provided:   Taking warfarin: take warfarin at same time each day; preferably in the evening and prescribed tablet strength and color  Goal range and lab monitoring: goal range and significance of current result and Importance of following up at instructed interval  Lovenox/Heparin education provided: role of enoxaparin/heparin in bridge therapy and prescribed dose and frequency   Written instructions provided  Contact [phone]  with any changes, questions or concerns.     Education still  needed:   Dietary considerations: importance of consistent vitamin K intake, impact of vitamin K foods on INR, vitamin K content of foods, Impact of protein intake on INR , and importance of notifying [facility] to changes in diet  Healthy lifestyle considerations: potential interaction between warfarin and alcohol, impact of smoking or tobacco on INR, impact of exercise/activity level on INR, and impact of CBD, marijuana, or medical marijuana on INR  Symptom monitoring: monitoring for bleeding signs and symptoms, monitoring for clotting signs and symptoms, monitoring for stroke signs and symptoms, when to seek medical attention/emergency care, and if you hit your head or have a bad fall seek emergency care  Importance of notifying anticoagulation clinic for: changes in medications; a sooner lab recheck maybe needed, diarrhea, nausea/vomiting, reduced intake, cold/flu, and/or infections; a sooner lab recheck maybe needed, upcoming surgeries and procedures 2 weeks in advance, and if you did not receive dosing instructions on the same day as your labs were checked      Telephone call with Ashanti who verbalizes understanding and agrees to plan  Sent [employer] message with dosing and follow up instructions    Lab visit scheduled-moved from 6/13 to 6/14.    Rx for Lovenox sent to preferred pharmacy as she did not get full amount from pharmacy yesterday because they were out of stock.    She declined needing any information faxed to her from [facility]. Says she has a lot of information from hospital.    Standing orders placed in Epic: Point of Care INR (Lab 5000)    Plan made per [facility] anticoagulation protocol    Philomena Dennison RN  Anticoagulation Clinic  6/12/2024

## 2024-06-12 NOTE — PROGRESS NOTES
Anticoagulation referral was received for Ashanti with goal range of 2.5-3.5. This is not the typical range for INR with the diagnosis that she has.    Also, visit notes in March and April 2024 stated:    Visit with vascular on 3/25/24 noted: recommend warfarin with goal INR 2-3 bridged with enoxaparin 1 mg/kg twice daily.  This can be coupled with aspirin 81 mg or clopidogrel 75 mg. No evidence of antiphospholipid syndrome.  No severe homocystinemia. Then visit with vascular surgery on 4/1/24 noted: Continue optimal medical management therapy with antiplatelet therapy and statin. Patient can be switched to baby aspirin and a statin from now on. After the bypass we will initiate therapeutic Lovenox and Coumadin.     Please indicate which goal range is appropriate on the attached referral and sign.    Thank you,    Philomena Dennison RN   Anticoagulation Clinic

## 2024-06-12 NOTE — TELEPHONE ENCOUNTER
Patient Contacted for the patient to call back and schedule the following:    Appointment type: Microscopy  Provider: Kirstie Reich  Return date: Next available  Specialty phone number: [phone]  Additional appointment(s) needed: n/a  Additonal Notes: per message from Leanne Hagen    Pt stated she will call back to schedule. Writer sent [employer] with # to call

## 2024-06-12 NOTE — OR NURSING
Post Operative Phone Call     Surgery: LEFT EXTERNAL ILIAC ARTERY TO POSTERIOR TIBIAL ARTERY BYPASS WITH CRYO ARTERY WITH A FEMORAL HERNIA REPAIR 6/4 and L Hallux Amp 6/7    Date of Surgery:see above    Surgeon: Dr. Dyan Geller and Dr. Ordaz    Patient Discharged from Hospital: 6/11      Spoke with: Patient      Discussed patient's status since discharging home after surgery.     Pt reports their pain is currently 5/10 and up to 10/10 at its worst and they are using Opiods: Hydromorphone (Dilaudid, Exalgo) and Tylenol to control their pain.     The incisions from the Bypass graft have no s/sx of infection (she is covering them but changing the bandage daily; one is draining a small amount). L hallux is covered and to remain covered until follow up with Dr. Ordaz.   Pt reports they are eating and drinking normally.    Pt is voiding normal and had a bowel movement yesterday.    Pt is not having any stroke like symptoms or new onset of headaches.         VQI measures: Pt has been prescribed Coumadin; INR will be checked today, Lovenox, and ASA and reports they are taking it as prescribed.    Confirmed follow up appointments: YES    Callback number provided for further questions/concerns.    Majo Yoon RN, CWOCN

## 2024-06-13 LAB
BACTERIA BLD CULT: NO GROWTH
BACTERIA BLD CULT: NO GROWTH

## 2024-06-14 ENCOUNTER — ANTICOAGULATION THERAPY VISIT (OUTPATIENT)
Dept: ANTICOAGULATION | Facility: CLINIC | Age: 69
End: 2024-06-14

## 2024-06-14 ENCOUNTER — LAB (OUTPATIENT)
Dept: LAB | Facility: CLINIC | Age: 69
End: 2024-06-14
Payer: COMMERCIAL

## 2024-06-14 DIAGNOSIS — I74.3 FEMORAL ARTERY THROMBOSIS, LEFT (H): ICD-10-CM

## 2024-06-14 DIAGNOSIS — I73.9 PAD (PERIPHERAL ARTERY DISEASE) (H): ICD-10-CM

## 2024-06-14 DIAGNOSIS — I73.9 PAD (PERIPHERAL ARTERY DISEASE) (H): Primary | ICD-10-CM

## 2024-06-14 LAB
BACTERIA SPEC CULT: NORMAL
INR BLD: 2.6 (ref 0.9–1.1)

## 2024-06-14 PROCEDURE — 85610 PROTHROMBIN TIME: CPT

## 2024-06-14 PROCEDURE — 36416 COLLJ CAPILLARY BLOOD SPEC: CPT

## 2024-06-14 NOTE — PROGRESS NOTES
Rosibel Carroll RN  You21 hours ago (11:21 AM)     INDY Quach, I asked Tammy GUERIN about the goal range and she said it needs to be 2.5-3.5

## 2024-06-14 NOTE — PROGRESS NOTES
ANTICOAGULATION MANAGEMENT     Ashanti Aviles 68 year old female is on warfarin with therapeutic INR result. (Goal INR 2.5-3.5)    Recent labs: (last 7 days)     06/14/24  1412   INR 2.6*       ASSESSMENT     Warfarin Lab Questionnaire    Warfarin Doses Last 7 Days      6/14/2024     2:07 PM   Dose in Tablet or Mg   TAB or MG? tablet (tab)     Pt Rptd Dose MONDAY TUESDAY WED THURS FRIDAY SATURDAY 6/14/2024   2:07 PM 0.07 3 2 2 2 2         6/14/2024   Warfarin Lab Questionnaire   Missed doses within past 14 days? No   Changes in diet or alcohol within past 14 days? No   Medication changes since last result? No   Injuries or illness since last result? No   New shortness of breath, severe headaches or sudden changes in vision since last result? No   Abnormal bleeding since last result? No   Upcoming surgery, procedure? No     Previous result: Subtherapeutic  Additional findings: New start on day 8 of warfarin and Bridging with Enoxaparin until INR >= 2.5  Rapid rise from 1.6 on Wednesday       PLAN     Recommended plan for temporary change(s) affecting INR     Dosing Instructions: decrease your warfarin dose (3.6% change) Stop bridging with Enoxaparin with next INR in 3 days       Summary  As of 6/14/2024      Full warfarin instructions:  3 mg every Sun; 4 mg all other days   Next INR check:  6/17/2024               Telephone call with Ashanti who agrees to plan and repeated back plan correctly    Check at provider office visit    Education provided:   Goal range and lab monitoring: goal range and significance of current result and Importance of therapeutic range    Plan made with [facility] Pharmacist Keren Carter RN  Anticoagulation Clinic  6/14/2024    _______________________________________________________________________     Anticoagulation Episode Summary       Current INR goal:  2.5-3.5   TTR:  --   Target end date:  Indefinite   Send INR reminders to:  ALTAGRACIA SMITH    Indications     PAD (peripheral artery disease) (H24) [I73.9]  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

## 2024-06-17 ENCOUNTER — LAB (OUTPATIENT)
Dept: LAB | Facility: CLINIC | Age: 69
End: 2024-06-17
Payer: COMMERCIAL

## 2024-06-17 ENCOUNTER — ANTICOAGULATION THERAPY VISIT (OUTPATIENT)
Dept: ANTICOAGULATION | Facility: CLINIC | Age: 69
End: 2024-06-17

## 2024-06-17 DIAGNOSIS — I73.9 PAD (PERIPHERAL ARTERY DISEASE) (H): Primary | ICD-10-CM

## 2024-06-17 DIAGNOSIS — I74.3 FEMORAL ARTERY THROMBOSIS, LEFT (H): ICD-10-CM

## 2024-06-17 DIAGNOSIS — I73.9 PAD (PERIPHERAL ARTERY DISEASE) (H): ICD-10-CM

## 2024-06-17 LAB — INR BLD: 1.7 (ref 0.9–1.1)

## 2024-06-17 PROCEDURE — 36416 COLLJ CAPILLARY BLOOD SPEC: CPT

## 2024-06-17 PROCEDURE — 85610 PROTHROMBIN TIME: CPT

## 2024-06-17 NOTE — PATIENT INSTRUCTIONS
Nba Burrell,    Thank you for entrusting your care with us today. After your visit today with APOORVA Ramirez this is the plan that was discussed at your appointment.    Follow with wound care recommendations/orders per Dr. Oliva and Dr. Ordaz. We will plan to take your staples out on 7/1. Please apply xeroform to the lower left leg incision daily and cover with gauze or foam. Top left leg incision cover with gauze, foam or ABD pad.    Follow up in 3-4 weeks for ultrasounds and follow up with Dr. Geller    We will need to check on where we can get you in to see Dr. Geller so will give you a call.     I am including additional information on these things and our contact information if you have any questions or concerns.   Please do not hesitate to reach out to us if you felt we did not answer your questions or you are unsure of the treatment plan after your visit today. Our number is [phone].Thank you for trusting us with your care.         Again thank you for your time.           Ankle-Brachial Index (TRACIE) or Physiologic Test    Description  An ankle-brachial index test is relatively pain free. Blood pressure cuffs of various sizes are placed on your thigh, calf, foot and toes.  Similar to having your blood pressure checked with an arm cuff, as the technician inflates the cuffs, they progressively tighten and are then quickly released.  You may feel some discomfort, but generally for less than 60 seconds for each measurement. You will be asked to remove your socks and shoes and possibly your pants or shorts. Gowns will be provided. It usually takes about 30-60 minutes.   Depending on the initial readings and patient symptoms, you may be asked to perform a light walk on a treadmill.  The technician will apply ultrasound gel, usually warmed for your comfort, to your ankles and wrists. Through the gel, the technician will use a small hand-held device that emits sound waves.  Risks  There are typically  no side effects or complications associated with a physiologic study.  How to Prepare  Eat and take medications as usual.  There is no preparation required for an ankle-brachial index (TRACIE) or physiologic exam.  What Can I Expect After the Test?  The technician will send the ultrasound images to your vascular surgeon for evaluation. Typically, a report is available in 2-3 days. If anything critical is found, it is standard practice to notify the vascular surgeon immediately.  Reference: [email or web address]/patient-resources/vascular-tests     Lower Extremity Arterial Ultrasound    Description  Ultrasound examinations are painless and easy for the patient. The vascular laboratory will contain a bed and just two or three pieces of equipment. You will be asked to remove pants or shorts and gowns will be provided. It usually takes about 30 minutes.  The technician will tuck a towel under your underpants in the groin. The gel is water-soluble and will not stain your skin or clothes.  Ultrasound gel, usually warmed for your comfort, will be placed on the inner side of your legs.    Through the gel, the technician will apply to your legs a small hand-held device that emits sound waves.  When the test is completed, the technician will remove excess gel from your legs.    Risks  There are typically no side effects or complications associated with a lower extremity arterial duplex ultrasound.  How to Prepare  Eat and take medications as usual.  There is no preparation required for a lower extremity arterial duplex ultrasound.  What Can I Expect After the Test?  The technician will send the ultrasound images to your vascular surgeon for evaluation. Typically, a report is available in 2-3 days. If anything critical is found, it is standard practice to notify the vascular surgeon immediately.  Reference: https://vascular.org/patient-resources/vascular-tests

## 2024-06-17 NOTE — PROGRESS NOTES
ANTICOAGULATION MANAGEMENT     Ashanti Aviles 68 year old female is on warfarin with subtherapeutic INR result. (Goal INR 2.5-3.5)    Recent labs: (last 7 days)     06/17/24  1219   INR 1.7*       ASSESSMENT     Source(s): Chart Review and Patient/Caregiver Call     Warfarin doses taken: Warfarin taken as instructed - confirmed patient did not miss any doses over the weekend.   Diet: No new diet changes identified  Medication/supplement changes: None noted  New illness, injury, or hospitalization: No  Signs or symptoms of bleeding or clotting: No  Previous result: Therapeutic last visit; previously outside of goal range  Additional findings: New start on day 11 of warfarin        PLAN     Recommended plan for no diet, medication or health factor changes affecting INR     Dosing Instructions: booster dose then Increase your warfarin dose (18.5% change) Start bridging with Enoxaparin with next INR in 3-4 days       Summary  As of 6/17/2024      Full warfarin instructions:  6/17: 8 mg; Otherwise 6 mg every Mon, Fri; 4 mg all other days   Next INR check:  6/21/2024               Telephone call with Ashanti who agrees to plan and repeated back plan correctly    Lab visit scheduled    Education provided:   Goal range and lab monitoring: goal range and significance of current result and Importance of following up at instructed interval  Lovenox/Heparin education provided: role of enoxaparin/heparin in bridge therapy and prescribed dose and frequency     Plan made with [facility] Pharmacist Ese Carter, RN  Anticoagulation Clinic  6/17/2024    _______________________________________________________________________     Anticoagulation Episode Summary       Current INR goal:  2.5-3.5   TTR:  --   Target end date:  Indefinite   Send INR reminders to:  ALTAGRACIA SMITH    Indications    PAD (peripheral artery disease) (H24) [I73.9]  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

## 2024-06-17 NOTE — PROGRESS NOTES
[facility]        Patient is here for a 2 week post-op to S/P Bypass with femoral hernia repair.    Pt is currently taking Aspirin, Plavix, Lovenox and Zetia. Out of Warfarin now    BP (!) 150/85   Pulse 74   Temp 97.8  F (36.6  C)   Resp 18     The provider has been notified that the patient has no concerns.     Questions patient would like addressed today are: N/A.    Refills are needed: No    Has homecare services and agency name:  No

## 2024-06-20 ENCOUNTER — OFFICE VISIT (OUTPATIENT)
Dept: VASCULAR SURGERY | Facility: CLINIC | Age: 69
End: 2024-06-20
Attending: PHYSICIAN ASSISTANT
Payer: COMMERCIAL

## 2024-06-20 VITALS
HEART RATE: 74 BPM | DIASTOLIC BLOOD PRESSURE: 85 MMHG | SYSTOLIC BLOOD PRESSURE: 150 MMHG | RESPIRATION RATE: 18 BRPM | TEMPERATURE: 97.8 F

## 2024-06-20 DIAGNOSIS — I73.9 PAD (PERIPHERAL ARTERY DISEASE) (H): ICD-10-CM

## 2024-06-20 DIAGNOSIS — I73.9 PERIPHERAL ARTERIAL DISEASE (H): Primary | ICD-10-CM

## 2024-06-20 PROCEDURE — 99213 OFFICE O/P EST LOW 20 MIN: CPT | Performed by: PHYSICIAN ASSISTANT

## 2024-06-20 PROCEDURE — 99024 POSTOP FOLLOW-UP VISIT: CPT | Performed by: PHYSICIAN ASSISTANT

## 2024-06-20 RX ORDER — HYDROMORPHONE HYDROCHLORIDE 4 MG/1
4 TABLET ORAL
Qty: 15 TABLET | Refills: 0 | Status: SHIPPED | OUTPATIENT
Start: 2024-06-20 | End: 2024-07-16

## 2024-06-20 RX ORDER — WARFARIN SODIUM 2 MG/1
6 TABLET ORAL DAILY
Qty: 90 TABLET | Refills: 3 | Status: SHIPPED | OUTPATIENT
Start: 2024-06-20 | End: 2024-07-11

## 2024-06-20 ASSESSMENT — PAIN SCALES - GENERAL: PAINLEVEL: SEVERE PAIN (6)

## 2024-06-20 NOTE — PROGRESS NOTES
VASCULAR SURGERY PROGRESS NOTE    LOCATION:  [facility]     Ashanti Aviles  Medical Record #: [id number]  YOB: 1955  Age: 68 year old     Date of Service: 6/20/2024    PRIMARY CARE PROVIDER: Dyan Geller    Reason for visit: Follow-up status post left lower extremity bypass     IMPRESSION: 68-year-old female with chronic limb threatening ischemia of the left lower extremity status post third redo bypass from left external iliac to distal PT followed by left hallux amputation with podiatry. Incision sites with mild drainage but no signs of active. Continues to have pain and swelling to the left leg. Following wound care and weightbearing restrictions  as recommended by podiatry.  Medically optimized on aspirin, Plavix, and warfarin.  Patient did have to go back on Lovenox injections due to subtherapeutic INR despite medication compliance.  Following with the Coumadin clinic.     RECOMMENDATION/RISKS: Continue best medical management with aspirin, plavix, and coumadin.  Wound care and weightbearing to the left foot per podiatry recommendations.  Scheduled for evaluation in the wound care clinic in 2 weeks for evaluation of her left calf wound. Will leave staples in place at this time and re-evaluate for removal when she comes in for wound care visit. Follow-up with vascular team in 3 to 4 weeks with lower extremity ultrasound studies.    HPI:  Ashanti Aviles is a 68 year old female with past medical history significant for hypertension, hyperlipidemia, type 2 diabetes mellitus, and peripheral arterial disease with chronic limb threatening ischemia of the left lower extremity.  She has a history of left femoral to distal bypass x 2 with early failure due to thrombosis.  Presently status post third redo bypass from left external iliac to distal PT on 6/4 followed by left hallux amputation with podiatry.     Patient presents today for follow up. She is accompanied by  her daughter. Mrs. Aviles is doing well following her discharge from the hospital. She continues to have left lower extremity pain and swelling that is slowly improving with time. She has been doing daily dressing changes to her leg incisions and following wound care and weightbearing restrictions to the left foot as recommended by podiatry. Patient has noticed a small amount of bloody drainage from her left leg incision and mild skin breakdown at the ankle incision.  Denies any fevers, chills, or sweats. Patient is compliant with her aspirin, plavix, and coumadin.  She has been following in the Coumadin clinic and was actually put back on Lovenox due to subtherapeutic INR.    No other concerns today.    REVIEW OF SYSTEMS:    A 12 point ROS was reviewed and is negative except for what is listed above in HPI.    PHH:    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/29/2013    Nicotine addiction     OAG (open angle glaucoma)     PVD (peripheral vascular disease) (H24)     Reflux esophagitis     Retinopathy     Toe ulcer (H)     Tubular adenoma     Yeast vaginitis       Past Surgical History:   Procedure Laterality Date    AMPUTATE TOE(S) Right 02/18/2023    Procedure: Amputation fourth toe right foot;  Surgeon: Benjamin Diez DPM;  Location: [facility] [place] OR    ANGIOGRAM Left 2/15/2024    Procedure: COMPLETION OF ANGIOGRAM, LEFT COMMON ILIAC ARTERY STENT PLACEMENT;  Surgeon: Dyan Geller MD;  Location: [facility] [place] OR    ANUS SURGERY      BIOPSY CERVICAL, LOCAL EXCISION, SINGLE/MULTIPLE      COLONOSCOPY  N/A 09/11/2020    Procedure: EXAM UNDER ANESTHESIA, HIGH RESOLUTION ANOSCOPY INTRA OP;  Surgeon: Preeti Sheehan MD;  Location: [facility];  Service: Gastroenterology    COLONOSCOPY N/A 12/14/2020    Procedure: EXAM UNDER ANESTHESIA WITH HIGH RESOLUTION ANOSCOPY;  Surgeon: Preeti Sheehan MD;  Location: [facility];  Service: General    COLONOSCOPY N/A 06/15/2021    Procedure: EXAM UNDER ANESTHESIA WITH HIGH RESOLUTION ANOSCOPY, BIOPSY, FULGURATION;  Surgeon: Preeti Sheehan MD;  Location: [facility];  Service: Gastroenterology    COLONOSCOPY N/A 4/15/2024    Procedure: COLONOSCOPY, WITH POLYPECTOMY;  Surgeon: Shen Andres MD;  Location: [facility] OR    ENDARTERECTOMY FEMORAL Left 05/17/2023    Procedure: LEFT FEMORAL ENDARTERECTOMY WITH RETROGRADE ILIAC STENT;  Surgeon: Dyan Geller MD;  Location: [facility] [place] [facility]    EXAM UNDER ANESTHESIA ANUS N/A 09/14/2021    Procedure: EXAM UNDER ANESTHESIA WITH HIGH RESOLUTION ANOSCOPY;  Surgeon: Preeti Sheehan MD;  Location: [facility]    FEMORAL ARTERY - TIBIAL ARTERY BYPASS GRAFT Left 09/08/2023    Procedure: CREATION, BYPASS, ARTERIAL, FEMORAL TO TIBIAL, LEFT;  Surgeon: Dyan Geller MD;  Location: [facility]    FEMORAL ARTERY - TIBIAL ARTERY BYPASS GRAFT Left 2/15/2024    Procedure: CREATION, BYPASS, ARTERIAL, FEMORAL TO TIBIAL,;  Surgeon: Dyan Geller MD;  Location: [facility] OR    FEMORAL ARTERY - TIBIAL ARTERY BYPASS GRAFT Left 6/4/2024    Procedure: LEFT EXTERNAL ILIAC ARTERY TO POSTERIOR TIBIAL ARTERY BYPASS WITH CRYO ARTERY WITH A FEMORAL HERNIA REPAIR;  Surgeon: Dyan Geller MD;  Location: [facility] [place] [facility]    INCISION AND DRAINAGE FOOT, COMBINED Left 11/27/2023    Procedure: INCISION AND DRAINAGE, LEFT HALLUX;  Surgeon: Rene Ordaz DPM;  Location: [facility] [place] [facility]    INCISION AND DRAINAGE FOOT, COMBINED Left 6/7/2024    Procedure:  AMPUTATION LEFT HALLUX;  Surgeon: Rene Ordaz DPM;  Location: [facility] OR    IR LOWER EXTREMITY ANGIOGRAM LEFT  03/23/2023    IR LOWER EXTREMITY ANGIOGRAM RIGHT  02/16/2023    IR THROMBOLYSIS ARTERIAL INFUSION INITIAL DAY  10/09/2023    LEEP TX, CERVICAL      2005    TUBAL LIGATION       ALLERGIES:  Simvastatin, Victoza [liraglutide], and Penicillins    MEDS:    Current Outpatient Medications:     acetaminophen (ACETAMINOPHEN 8 HOUR) 650 MG CR tablet, Take 1,300 mg by mouth every 8 hours as needed for mild pain or fever, Disp: , Rfl:     amLODIPine (NORVASC) 5 MG tablet, Take 5 mg by mouth daily, Disp: , Rfl:     aspirin 81 MG EC tablet, Take 1 tablet (81 mg) by mouth daily, Disp: 90 tablet, Rfl: 3    clopidogrel (PLAVIX) 75 MG tablet, Take 75 mg by mouth daily, Disp: , Rfl:     Continuous Blood Gluc Sensor (FREESTYLE PRINCESS 14 DAY SENSOR) MISC, , Disp: , Rfl:     empagliflozin (JARDIANCE) 25 MG TABS tablet, Take 1 tablet (25 mg) by mouth daily, Disp: 90 tablet, Rfl: 1    enoxaparin ANTICOAGULANT (LOVENOX) 80 MG/0.8ML syringe, Inject 0.7 mLs (70 mg) Subcutaneous every 12 hours Until INR at goal range, 2.5-3.5, Disp: 11.2 mL, Rfl: 0    ezetimibe (ZETIA) 10 MG tablet, Take 10 mg by mouth daily, Disp: , Rfl:     folic acid (FOLVITE) 1 MG tablet, Take 1 tablet (1 mg) by mouth daily, Disp: 90 tablet, Rfl: 0    gabapentin (NEURONTIN) 300 MG capsule, Take 1 capsule (300 mg) by mouth 3 times daily Start with one capsule at bedtime and increase dose as instructed, Disp: 90 capsule, Rfl: 0    HYDROmorphone (DILAUDID) 4 MG tablet, Take 1 tablet (4 mg) by mouth every 3 hours as needed for moderate pain or severe pain, Disp: 20 tablet, Rfl: 0    insulin degludec (TRESIBA) 100 UNIT/ML pen, Inject 30 Units Subcutaneous 2 times daily, Disp: , Rfl:     insulin glargine (LANTUS PEN) 100 UNIT/ML pen, Inject 32 Units Subcutaneous 2 times daily, Disp: , Rfl:     linagliptin (TRADJENTA) 5 MG TABS tablet, Take 5 mg by mouth  daily, Disp: , Rfl:     lisinopril (ZESTRIL) 40 MG tablet, Take 40 mg by mouth daily, Disp: , Rfl:     metFORMIN (GLUCOPHAGE XR) 500 MG 24 hr tablet, Take 1,000 mg by mouth daily, Disp: , Rfl:     methocarbamol (ROBAXIN) 500 MG tablet, Take 1 tablet (500 mg) by mouth at bedtime, Disp: 20 tablet, Rfl: 0    omeprazole (PRILOSEC) 20 MG DR capsule, Take 20 mg by mouth daily, Disp: , Rfl:     UNIFINE PENTIPS 31G X 5 MM miscellaneous, , Disp: , Rfl:     vitamin B complex with vitamin C (STRESS TAB) tablet, Take 1 tablet by mouth daily, Disp: 90 tablet, Rfl: 0    warfarin ANTICOAGULANT (COUMADIN) 2 MG tablet, Take 3 tablets (6 mg) by mouth daily On 6/11, then 4 mg (2 tablets) daily starting 6/12 until INR rechecked, Disp: 30 tablet, Rfl: 2    Current Facility-Administered Medications:     lidocaine (XYLOCAINE) 5 % ointment, , Topical, Daily PRN, Robert Oliva MD, Given at 05/23/24 1118    SOCIAL HABITS:    History   Smoking Status    Former    Types: Cigarettes   Smokeless Tobacco    Never     Social History    Substance and Sexual Activity      Alcohol use: Not Currently        Comment: Alcoholic Drinks/MONTH: 2x      History   Drug Use Unknown     FAMILY HISTORY:    Family History   Problem Relation Age of Onset    Hypertension Mother     Colon Cancer Mother     Diabetes Type 2  Father     Hypertension Father     Kidney failure Father     Hyperthyroidism Sister     Breast Cancer Paternal Aunt      PE:  There were no vitals taken for this visit.  Wt Readings from Last 1 Encounters:   06/11/24 68.1 kg (150 lb 2.1 oz)     There is no height or weight on file to calculate BMI.    EXAM:  GENERAL: well-developed 68 year old female who appears her stated age  CARDIAC: normal   CHEST/LUNG: normal respiratory effort   MUSCULOSKELETAL: grossly normal and both lower extremities are intact, no lower extremity edema  NEUROLOGIC: focally intact, alert and oriented x 3  PSYCH: appropriate affect  VASCULAR: Continued left leg  edema, compartments soft and non tender, foot is warm with PT and AT signals present via doppler  INCISIONS: LLQ incision clean and intact, left lower extremities incisions intact with staples, minimal bloody drainage from middle incision, superficial skin breakdown at most lateral incision, wound with healthy tissue at wound base and kathi-wound skin. Images uploaded to chart     DIAGNOSTIC STUDIES:     Images:  Pertinent imaging reviewed.     LABS:      Sodium   Date Value Ref Range Status   06/10/2024 137 135 - 145 mmol/L Final     Comment:     Reference intervals for this test were updated on 09/26/2023 to more accurately reflect our healthy population. There may be differences in the flagging of prior results with similar values performed with this method. Interpretation of those prior results can be made in the context of the updated reference intervals.    06/09/2024 138 135 - 145 mmol/L Final     Comment:     Reference intervals for this test were updated on 09/26/2023 to more accurately reflect our healthy population. There may be differences in the flagging of prior results with similar values performed with this method. Interpretation of those prior results can be made in the context of the updated reference intervals.    06/08/2024 134 (L) 135 - 145 mmol/L Final     Comment:     Reference intervals for this test were updated on 09/26/2023 to more accurately reflect our healthy population. There may be differences in the flagging of prior results with similar values performed with this method. Interpretation of those prior results can be made in the context of the updated reference intervals.      Urea Nitrogen   Date Value Ref Range Status   06/10/2024 8.4 8.0 - 23.0 mg/dL Final   06/09/2024 9.4 8.0 - 23.0 mg/dL Final   06/08/2024 10.0 8.0 - 23.0 mg/dL Final     Hemoglobin   Date Value Ref Range Status   06/11/2024 7.7 (L) 11.7 - 15.7 g/dL Final   06/10/2024 8.2 (L) 11.7 - 15.7 g/dL Final   06/10/2024 7.8  (L) 11.7 - 15.7 g/dL Final     Platelet Count   Date Value Ref Range Status   06/11/2024 306 150 - 450 10e3/uL Final   06/10/2024 244 150 - 450 10e3/uL Final   06/10/2024 240 150 - 450 10e3/uL Final     INR   Date Value Ref Range Status   06/17/2024 1.7 (H) 0.9 - 1.1 Final   06/14/2024 2.6 (H) 0.9 - 1.1 Final   06/12/2024 1.6 (H) 0.9 - 1.1 Final   06/11/2024 1.40 (H) 0.85 - 1.15 Final   06/10/2024 1.26 (H) 0.85 - 1.15 Final   06/09/2024 1.16 (H) 0.85 - 1.15 Final     30 minutes spent on the day of encounter doing chart review, history and exam, documentation, and further activities as noted.     APOORVA Zamarripa-C  VASCULAR SURGERY

## 2024-06-20 NOTE — Clinical Note
Tammy Solomon saw Ashanti today and her yvonne were not ready to come out. She continues to have some swelling and bleeding from the left upper thigh incision. She is wondering if they can be taken out on July 1st at the appointment with Dr. Oliva? I was not sure if you would like a NV slot held or not for this based on how your schedule is looking. Thanks, Mary

## 2024-06-20 NOTE — Clinical Note
Please call Ashanti to get her scheduled with either Dr. Geller or the Fellow in 3-4 weeks with ultrasounds prior. It is looking like this will need to be double booked. Thanks, Mary

## 2024-06-21 ENCOUNTER — OFFICE VISIT (OUTPATIENT)
Dept: INTERNAL MEDICINE | Facility: CLINIC | Age: 69
End: 2024-06-21
Payer: COMMERCIAL

## 2024-06-21 ENCOUNTER — ANTICOAGULATION THERAPY VISIT (OUTPATIENT)
Dept: ANTICOAGULATION | Facility: CLINIC | Age: 69
End: 2024-06-21

## 2024-06-21 ENCOUNTER — OFFICE VISIT (OUTPATIENT)
Dept: VASCULAR SURGERY | Facility: CLINIC | Age: 69
End: 2024-06-21
Attending: PODIATRIST
Payer: COMMERCIAL

## 2024-06-21 VITALS
SYSTOLIC BLOOD PRESSURE: 124 MMHG | OXYGEN SATURATION: 100 % | DIASTOLIC BLOOD PRESSURE: 81 MMHG | TEMPERATURE: 98.5 F | HEART RATE: 97 BPM | RESPIRATION RATE: 18 BRPM

## 2024-06-21 VITALS
OXYGEN SATURATION: 99 % | SYSTOLIC BLOOD PRESSURE: 111 MMHG | HEART RATE: 98 BPM | BODY MASS INDEX: 29.07 KG/M2 | HEIGHT: 61 IN | DIASTOLIC BLOOD PRESSURE: 58 MMHG | WEIGHT: 154 LBS | TEMPERATURE: 99 F

## 2024-06-21 DIAGNOSIS — I74.3 FEMORAL ARTERY THROMBOSIS, LEFT (H): ICD-10-CM

## 2024-06-21 DIAGNOSIS — T82.898A: ICD-10-CM

## 2024-06-21 DIAGNOSIS — I73.9 PVD (PERIPHERAL VASCULAR DISEASE) (H): Primary | ICD-10-CM

## 2024-06-21 DIAGNOSIS — D50.9 IRON DEFICIENCY ANEMIA, UNSPECIFIED IRON DEFICIENCY ANEMIA TYPE: ICD-10-CM

## 2024-06-21 DIAGNOSIS — I73.9 PAD (PERIPHERAL ARTERY DISEASE) (H): ICD-10-CM

## 2024-06-21 DIAGNOSIS — S98.112A AMPUTATION OF LEFT GREAT TOE (H): ICD-10-CM

## 2024-06-21 DIAGNOSIS — Z79.4 TYPE 2 DIABETES MELLITUS WITH DIABETIC PERIPHERAL ANGIOPATHY AND GANGRENE, WITH LONG-TERM CURRENT USE OF INSULIN (H): ICD-10-CM

## 2024-06-21 DIAGNOSIS — E11.52 TYPE 2 DIABETES MELLITUS WITH DIABETIC PERIPHERAL ANGIOPATHY AND GANGRENE, WITH LONG-TERM CURRENT USE OF INSULIN (H): ICD-10-CM

## 2024-06-21 DIAGNOSIS — I10 PRIMARY HYPERTENSION: ICD-10-CM

## 2024-06-21 DIAGNOSIS — I73.9 PAD (PERIPHERAL ARTERY DISEASE) (H): Primary | ICD-10-CM

## 2024-06-21 DIAGNOSIS — M86.172 ACUTE OSTEOMYELITIS OF LEFT FOOT (H): Primary | ICD-10-CM

## 2024-06-21 PROBLEM — Z89.421 ACQUIRED ABSENCE OF OTHER RIGHT TOE(S) (H): Status: ACTIVE | Noted: 2024-06-21

## 2024-06-21 LAB
ANION GAP SERPL CALCULATED.3IONS-SCNC: 11 MMOL/L (ref 7–15)
BASOPHILS # BLD AUTO: 0.1 10E3/UL (ref 0–0.2)
BASOPHILS NFR BLD AUTO: 1 %
BUN SERPL-MCNC: 12.3 MG/DL (ref 8–23)
CALCIUM SERPL-MCNC: 10.2 MG/DL (ref 8.8–10.2)
CHLORIDE SERPL-SCNC: 98 MMOL/L (ref 98–107)
CREAT SERPL-MCNC: 0.64 MG/DL (ref 0.51–0.95)
DEPRECATED HCO3 PLAS-SCNC: 25 MMOL/L (ref 22–29)
EGFRCR SERPLBLD CKD-EPI 2021: >90 ML/MIN/1.73M2
EOSINOPHIL # BLD AUTO: 0.1 10E3/UL (ref 0–0.7)
EOSINOPHIL NFR BLD AUTO: 1 %
ERYTHROCYTE [DISTWIDTH] IN BLOOD BY AUTOMATED COUNT: 17.8 % (ref 10–15)
GLUCOSE SERPL-MCNC: 189 MG/DL (ref 70–99)
HCT VFR BLD AUTO: 38.1 % (ref 35–47)
HGB BLD-MCNC: 12 G/DL (ref 11.7–15.7)
IMM GRANULOCYTES # BLD: 0.1 10E3/UL
IMM GRANULOCYTES NFR BLD: 0 %
INR BLD: 1.5 (ref 0.9–1.1)
LYMPHOCYTES # BLD AUTO: 2.6 10E3/UL (ref 0.8–5.3)
LYMPHOCYTES NFR BLD AUTO: 21 %
MCH RBC QN AUTO: 29.1 PG (ref 26.5–33)
MCHC RBC AUTO-ENTMCNC: 31.5 G/DL (ref 31.5–36.5)
MCV RBC AUTO: 93 FL (ref 78–100)
MONOCYTES # BLD AUTO: 1 10E3/UL (ref 0–1.3)
MONOCYTES NFR BLD AUTO: 8 %
NEUTROPHILS # BLD AUTO: 8.5 10E3/UL (ref 1.6–8.3)
NEUTROPHILS NFR BLD AUTO: 69 %
NRBC # BLD AUTO: 0 10E3/UL
NRBC BLD AUTO-RTO: 0 /100
PLATELET # BLD AUTO: 770 10E3/UL (ref 150–450)
POTASSIUM SERPL-SCNC: 4.9 MMOL/L (ref 3.4–5.3)
RBC # BLD AUTO: 4.12 10E6/UL (ref 3.8–5.2)
SODIUM SERPL-SCNC: 134 MMOL/L (ref 135–145)
WBC # BLD AUTO: 12.3 10E3/UL (ref 4–11)

## 2024-06-21 PROCEDURE — 36415 COLL VENOUS BLD VENIPUNCTURE: CPT | Performed by: INTERNAL MEDICINE

## 2024-06-21 PROCEDURE — G2211 COMPLEX E/M VISIT ADD ON: HCPCS | Performed by: PODIATRIST

## 2024-06-21 PROCEDURE — 80048 BASIC METABOLIC PNL TOTAL CA: CPT | Performed by: INTERNAL MEDICINE

## 2024-06-21 PROCEDURE — 99214 OFFICE O/P EST MOD 30 MIN: CPT | Performed by: INTERNAL MEDICINE

## 2024-06-21 PROCEDURE — 36416 COLLJ CAPILLARY BLOOD SPEC: CPT | Performed by: INTERNAL MEDICINE

## 2024-06-21 PROCEDURE — 99212 OFFICE O/P EST SF 10 MIN: CPT | Performed by: PODIATRIST

## 2024-06-21 PROCEDURE — 85610 PROTHROMBIN TIME: CPT | Performed by: INTERNAL MEDICINE

## 2024-06-21 PROCEDURE — 85025 COMPLETE CBC W/AUTO DIFF WBC: CPT | Performed by: INTERNAL MEDICINE

## 2024-06-21 PROCEDURE — 99215 OFFICE O/P EST HI 40 MIN: CPT | Performed by: PODIATRIST

## 2024-06-21 ASSESSMENT — PAIN SCALES - GENERAL
PAINLEVEL: NO PAIN (0)
PAINLEVEL: SEVERE PAIN (6)

## 2024-06-21 NOTE — LETTER
June 21, 2024      Ashanti Aviles  [address] [place] [address]        To Whom It May Concern:    Ashanti Aviles  was seen on 6/21/2024.  Please excuse her until her next follow up on 6/28/2024 due to illness.        Sincerely,        Rene Ordaz DPM

## 2024-06-21 NOTE — PROGRESS NOTES
Podiatry Progress Note        ASSESSMENT: S/P amputated left hallux      TREATMENT:  -Surgical site on the left foot is progressing well.  I discussed with the patient and her daughter today that there is a small area of ischemic tissue along the proximal incision but otherwise she appears to be progressing well.    -Clean proximal margin per pathology report.     -Anticoagulation per vascular surgery    -Gauze dressing applied today which she will keep intact. Continue non-weight bearing on the left foot.     -She will follow-up with me in 7 to 10 days for suture removal.     Rene Ordaz DPM  [facility] Podiatry/Foot & Ankle Surgery      HPI: Ashanti Aviles was seen again today s/p amputation left hallux.  Doing well today.  She is remain nonweightbearing on the left foot.    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/29/2013    Nicotine addiction     OAG (open angle glaucoma)     PVD (peripheral vascular disease) (H24)     Reflux esophagitis     Retinopathy     Toe ulcer (H)     Tubular adenoma     Yeast vaginitis        Allergies   Allergen Reactions    Simvastatin Muscle Pain (Myalgia)     Muscle pain    Victoza [Liraglutide] Other (See Comments)     Binds bowels    Penicillins Unknown     Childhood rxn         Current Outpatient Medications:     acetaminophen (ACETAMINOPHEN 8 HOUR) 650 MG CR tablet, Take 1,300 mg by mouth every 8 hours as needed for mild pain or fever, Disp: , Rfl:     amLODIPine (NORVASC) 5 MG tablet, Take 5 mg by mouth daily, Disp: , Rfl:     aspirin 81 MG EC tablet, Take 1 tablet (81  mg) by mouth daily, Disp: 90 tablet, Rfl: 3    clopidogrel (PLAVIX) 75 MG tablet, Take 75 mg by mouth daily, Disp: , Rfl:     Continuous Blood Gluc Sensor (FREESTYLE PRINCESS 14 DAY SENSOR) MISC, , Disp: , Rfl:     empagliflozin (JARDIANCE) 25 MG TABS tablet, Take 1 tablet (25 mg) by mouth daily, Disp: 90 tablet, Rfl: 1    enoxaparin ANTICOAGULANT (LOVENOX) 80 MG/0.8ML syringe, Inject 0.7 mLs (70 mg) Subcutaneous every 12 hours Until INR at goal range, 2.5-3.5, Disp: 11.2 mL, Rfl: 0    ezetimibe (ZETIA) 10 MG tablet, Take 10 mg by mouth daily, Disp: , Rfl:     folic acid (FOLVITE) 1 MG tablet, Take 1 tablet (1 mg) by mouth daily, Disp: 90 tablet, Rfl: 0    gabapentin (NEURONTIN) 300 MG capsule, Take 1 capsule (300 mg) by mouth 3 times daily Start with one capsule at bedtime and increase dose as instructed, Disp: 90 capsule, Rfl: 0    HYDROmorphone (DILAUDID) 4 MG tablet, Take 1 tablet (4 mg) by mouth every 3 hours as needed for moderate pain or severe pain, Disp: 15 tablet, Rfl: 0    insulin degludec (TRESIBA) 100 UNIT/ML pen, Inject 30 Units Subcutaneous 2 times daily, Disp: , Rfl:     insulin glargine (LANTUS PEN) 100 UNIT/ML pen, Inject 32 Units Subcutaneous 2 times daily, Disp: , Rfl:     linagliptin (TRADJENTA) 5 MG TABS tablet, Take 5 mg by mouth daily, Disp: , Rfl:     lisinopril (ZESTRIL) 40 MG tablet, Take 40 mg by mouth daily, Disp: , Rfl:     metFORMIN (GLUCOPHAGE XR) 500 MG 24 hr tablet, Take 1,000 mg by mouth daily, Disp: , Rfl:     methocarbamol (ROBAXIN) 500 MG tablet, Take 1 tablet (500 mg) by mouth at bedtime, Disp: 20 tablet, Rfl: 0    omeprazole (PRILOSEC) 20 MG DR capsule, Take 20 mg by mouth daily, Disp: , Rfl:     UNIFINE PENTIPS 31G X 5 MM miscellaneous, , Disp: , Rfl:     vitamin B complex with vitamin C (STRESS TAB) tablet, Take 1 tablet by mouth daily, Disp: 90 tablet, Rfl: 0    warfarin ANTICOAGULANT (COUMADIN) 2 MG tablet, Take 3 tablets (6 mg) by mouth daily Following dosing instructions  from coumadin clinic., Disp: 90 tablet, Rfl: 3    Current Facility-Administered Medications:     lidocaine (XYLOCAINE) 5 % ointment, , Topical, Daily PRN, Robert Oliva MD, Given at 05/23/24 1118    Review of Systems - Negative       OBJECTIVE:  Appearance: alert, well appearing, and in no distress.    /81   Pulse 97   Temp 98.5  F (36.9  C)   Resp 18   SpO2 100%     @LDAVASC(10,16,17)@         Surgical site on the amputated left hallux has intact sutures with skin edges well approximated, no gapping noted.  Small area of ischemic tissue along the proximal incision.  No erythema left foot. Neurovascular status unchanged left foot.

## 2024-06-21 NOTE — PROGRESS NOTES
ANTICOAGULATION MANAGEMENT     Ashanti Aviles 68 year old female is on warfarin with subtherapeutic INR result. (Goal INR 2.5-3.5)    Recent labs: (last 7 days)     06/21/24  1218   INR 1.5*       ASSESSMENT     Source(s): Chart Review and Patient/Caregiver Call     Warfarin doses taken: Less warfarin taken than planned which may be affecting INR - Ashanti was low on Warfarin. Was only able to take 1 mg yesterday. She has now picked up new Rx.   Diet: No new diet changes identified - at baseline, does not eat large amount of greens. Does plan to add in some spinach for iron as recommended today, discussed importance of consistency with this.    Medication/supplement changes: None noted  New illness, injury, or hospitalization: No  Signs or symptoms of bleeding or clotting: No  Previous result: Subtherapeutic  Additional findings: None and Bridging with Enoxaparin until INR >= 2.0         PLANc     Recommended plan for temporary change(s) affecting INR     Dosing Instructions: Increase your warfarin dose (6% change) Continue bridging with Enoxaparin with next INR in 3-4 days       Summary  As of 6/21/2024      Full warfarin instructions:  6 mg every Sun, Tue, Fri; 4 mg all other days   Next INR check:  6/25/2024               Telephone call with Ashanti who agrees to plan and repeated back plan correctly  Will also send [facility]    Lab visit scheduled    Education provided:   Taking warfarin: Importance of taking warfarin as instructed  Goal range and lab monitoring: goal range and significance of current result, Importance of therapeutic range, and Importance of following up at instructed interval  Dietary considerations: importance of consistent vitamin K intake and impact of vitamin K foods on INR    Plan made with [facility] Pharmacist Keren Carter, RN  Anticoagulation Clinic  6/21/2024    _______________________________________________________________________     Anticoagulation Episode Summary        Current INR goal:  2.5-3.5   TTR:  --   Target end date:  Indefinite   Send INR reminders to:  ALTAGRACIA SMITH    Indications    PAD (peripheral artery disease) (H24) [I73.9]  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

## 2024-06-21 NOTE — LETTER
July 1, 2024      Ashanti ROSIE Aviles  [address] [place] [address]        Dear ,    We are writing to inform you of your test results.    Ashanti,  Potassium and kidney function are good.  Hemoglobin level is back to normal!  White cell count is slightly above normal, but has been trending down.  Dr CARLOS Callahan Orders   Basic metabolic panel  (Ca, Cl, CO2, Creat, Gluc, K, Na, BUN)   Result Value Ref Range    Sodium 134 (L) 135 - 145 mmol/L      Comment:      Reference intervals for this test were updated on 09/26/2023 to more accurately reflect our healthy population. There may be differences in the flagging of prior results with similar values performed with this method. Interpretation of those prior results can be made in the context of the updated reference intervals.     Potassium 4.9 3.4 - 5.3 mmol/L    Chloride 98 98 - 107 mmol/L    Carbon Dioxide (CO2) 25 22 - 29 mmol/L    Anion Gap 11 7 - 15 mmol/L    Urea Nitrogen 12.3 8.0 - 23.0 mg/dL    Creatinine 0.64 0.51 - 0.95 mg/dL    GFR Estimate >90 >60 mL/min/1.73m2      Comment:      eGFR calculated using 2021 CKD-EPI equation.    Calcium 10.2 8.8 - 10.2 mg/dL    Glucose 189 (H) 70 - 99 mg/dL   CBC with platelets and differential   Result Value Ref Range    WBC Count 12.3 (H) 4.0 - 11.0 10e3/uL    RBC Count 4.12 3.80 - 5.20 10e6/uL    Hemoglobin 12.0 11.7 - 15.7 g/dL    Hematocrit 38.1 35.0 - 47.0 %    MCV 93 78 - 100 fL    MCH 29.1 26.5 - 33.0 pg    MCHC 31.5 31.5 - 36.5 g/dL    RDW 17.8 (H) 10.0 - 15.0 %    Platelet Count 770 (H) 150 - 450 10e3/uL    % Neutrophils 69 %    % Lymphocytes 21 %    % Monocytes 8 %    % Eosinophils 1 %    % Basophils 1 %    % Immature Granulocytes 0 %    NRBCs per 100 WBC 0 <1 /100    Absolute Neutrophils 8.5 (H) 1.6 - 8.3 10e3/uL    Absolute Lymphocytes 2.6 0.8 - 5.3 10e3/uL    Absolute Monocytes 1.0 0.0 - 1.3 10e3/uL    Absolute Eosinophils 0.1 0.0 - 0.7 10e3/uL    Absolute Basophils 0.1 0.0 - 0.2 10e3/uL     Absolute Immature Granulocytes 0.1 <=0.4 10e3/uL    Absolute NRBCs 0.0 10e3/uL       If you have any questions or concerns, please call the clinic at the number listed above.       Sincerely,      Shalonda Macario MD

## 2024-06-21 NOTE — H&P (VIEW-ONLY)
"  Assessment & Plan     PVD (peripheral vascular disease) (H24)  Significant peripheral vascular disease .  She quit smoking in February.  Currently on Coumadin Plavix and aspirin.  - CBC with platelets and differential    Femoral-tibial bypass graft occlusion, left, initial encounter (H24)  She has had bypass graft surgery 3 times due to recurrent occlusion.  Most recently she suffered left toe amputation due to ischemia.  Since Coumadin has been added to her aspirin and Plavix she has been doing well.  She is followed by anticoagulation clinic.  She does have mild bleeding at the site of surgery but no other places.  - INR point of care    Amputation of left great toe (H24)  As above    Type 2 diabetes mellitus with diabetic peripheral angiopathy and gangrene, with long-term current use of insulin (H)  A1c was 6.7 in the hospital, she is on Jardiance, Tresiba, metformin and Tradjenta.  She denies hypoglycemia.  - Basic metabolic panel  (Ca, Cl, CO2, Creat, Gluc, K, Na, BUN)    Primary hypertension  Hydrochlorothiazide was stopped during hospitalization due to low potassium, blood pressure is well-controlled off of it.  She is currently on lisinopril  - Basic metabolic panel  (Ca, Cl, CO2, Creat, Gluc, K, Na, BUN)    Iron deficiency anemia, unspecified iron deficiency anemia type  Due to acute blood loss, discharge hemoglobin was 7.7, she has ongoing blood loss through wounds with dressings and is on aspirin Plavix and Coumadin.  Will check her hemoglobin today, discussed to start oral iron but I would have low threshold of offering her IV iron.  Will see what CBC shows.  - CBC with platelets and differential    :[id number]}  Post Medication Reconciliation Status: Medications list reviewed and reconciled  BMI  Estimated body mass index is 29.1 kg/m  as calculated from the following:    Height as of this encounter: 1.549 m (5' 1\").    Weight as of this encounter: 69.9 kg (154 lb).       Vinnie Burrell is a 68 year " old, presenting for the following health issues:  Hospital F/U and Recheck Medication (Pt reports that she is here for hospital follow up from 6/4/24 and discharged on 6/10/24 from [facility], INR check.)      6/21/2024    11:23 AM   Additional Questions   Roomed by isauro   Accompanied by daughter         6/21/2024    11:23 AM   Patient Reported Additional Medications   Patient reports taking the following new medications none     HPI   Ashanti is a very pleasant 68-year-old female with complicated past medical history, [facility] and currently here just for a hospitalization follow-up.  She has history of insulin-dependent diabetes, hypertension, peripheral vascular disease, hyperlipidemia, smoking retinopathy and acid reflux.    She has had several surgeries earlier this year for femoral artery bypass but developed clotting twice and most recently had a third attempt at bypass surgery.  Due to her problems with clotting she did start on Coumadin with the recent surgery and continues on aspirin and Plavix.  She is followed by anticoagulation clinic.  Currently she has been doing well.  During the surgery due to decreased perfusion her left toe did become necrotic and had to be amputated.  Currently she has follow-up set up with her podiatrist wound care and vascular specialists.  She did have some difficulty maintaining higher potassium levels in the hospital and was replaced.  Prior to hospitalization she was on hydrochlorothiazide and it was stopped.  She also had moderate anemia with hemoglobin of 7.3 at discharge, currently not on any iron supplement, she continues to have mild bleeding from her wounds.    For diabetes she is on Jardiance, Tresiba, Tradjenta, metformin, A1c in the hospital was 6.7.    She has not been smoking since February.    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     ROS: As above, she denies any shortness of breath,  "palpitations dizziness or lightheadedness.  No diarrhea.      Objective    /58 (BP Location: Left arm, Patient Position: Sitting, Cuff Size: Adult Regular)   Pulse 98   Temp 99  F (37.2  C) (Oral)   Ht 1.549 m (5' 1\")   Wt 69.9 kg (154 lb)   LMP  (LMP Unknown)   SpO2 99%   Breastfeeding No   BMI 29.10 kg/m    Body mass index is 29.1 kg/m .  Physical Exam   General: well appearing female in a wheelchair, alert and oriented x3  EYES: Eyelids, conjunctiva, and sclera were normal. Pupils were normal.   HEAD, EARS, NOSE, MOUTH, AND THROAT: no cervical LAD, no thyromegaly or nodules appreciated.  oropharynx is clear.  RESPIRATORY: respirations non labored, CTA bl, no wheezes, rales, no forced expiratory wheezing.  CARDIOVASCULAR: Heart rate and rhythm were normal. No murmurs, rubs,gallops. There was no peripheral edema. No carotid bruits.  GASTROINTESTINAL: Positive bowel sounds, abdomen is soft, non tender, non distended.     MUSCULOSKELETAL: Muscle mass was normal for age. No joint synovitis or deformity.  Right foot with good DP pulses, she does have one of the toes amputated no open lesions.  Left foot is wrapped, she is in a boot, I did not unwrap it today since she has appointment scheduled with podiatry coming up.  LYMPHATIC: There were no enlarged nodes palpable.  SKIN/HAIR/NAILS: Skin color was normal.  No rashes.  NEUROLOGIC: The patient was alert and oriented.  Speech was normal.  There is no facial asymmetry.   PSYCHIATRIC:  Mood and affect were normal.            Signed Electronically by: Shalonda Macario MD    "

## 2024-06-21 NOTE — PROGRESS NOTES
"  Assessment & Plan     PVD (peripheral vascular disease) (H24)  Significant peripheral vascular disease .  She quit smoking in February.  Currently on Coumadin Plavix and aspirin.  - CBC with platelets and differential    Femoral-tibial bypass graft occlusion, left, initial encounter (H24)  She has had bypass graft surgery 3 times due to recurrent occlusion.  Most recently she suffered left toe amputation due to ischemia.  Since Coumadin has been added to her aspirin and Plavix she has been doing well.  She is followed by anticoagulation clinic.  She does have mild bleeding at the site of surgery but no other places.  - INR point of care    Amputation of left great toe (H24)  As above    Type 2 diabetes mellitus with diabetic peripheral angiopathy and gangrene, with long-term current use of insulin (H)  A1c was 6.7 in the hospital, she is on Jardiance, Tresiba, metformin and Tradjenta.  She denies hypoglycemia.  - Basic metabolic panel  (Ca, Cl, CO2, Creat, Gluc, K, Na, BUN)    Primary hypertension  Hydrochlorothiazide was stopped during hospitalization due to low potassium, blood pressure is well-controlled off of it.  She is currently on lisinopril  - Basic metabolic panel  (Ca, Cl, CO2, Creat, Gluc, K, Na, BUN)    Iron deficiency anemia, unspecified iron deficiency anemia type  Due to acute blood loss, discharge hemoglobin was 7.7, she has ongoing blood loss through wounds with dressings and is on aspirin Plavix and Coumadin.  Will check her hemoglobin today, discussed to start oral iron but I would have low threshold of offering her IV iron.  Will see what CBC shows.  - CBC with platelets and differential    :[id number]}  Post Medication Reconciliation Status: Medications list reviewed and reconciled  BMI  Estimated body mass index is 29.1 kg/m  as calculated from the following:    Height as of this encounter: 1.549 m (5' 1\").    Weight as of this encounter: 69.9 kg (154 lb).       Vinnie Burrell is a 68 year " old, presenting for the following health issues:  Hospital F/U and Recheck Medication (Pt reports that she is here for hospital follow up from 6/4/24 and discharged on 6/10/24 from [facility], INR check.)      6/21/2024    11:23 AM   Additional Questions   Roomed by isauro   Accompanied by daughter         6/21/2024    11:23 AM   Patient Reported Additional Medications   Patient reports taking the following new medications none     HPI   Ashanti is a very pleasant 68-year-old female with complicated past medical history, [facility] and currently here just for a hospitalization follow-up.  She has history of insulin-dependent diabetes, hypertension, peripheral vascular disease, hyperlipidemia, smoking retinopathy and acid reflux.    She has had several surgeries earlier this year for femoral artery bypass but developed clotting twice and most recently had a third attempt at bypass surgery.  Due to her problems with clotting she did start on Coumadin with the recent surgery and continues on aspirin and Plavix.  She is followed by anticoagulation clinic.  Currently she has been doing well.  During the surgery due to decreased perfusion her left toe did become necrotic and had to be amputated.  Currently she has follow-up set up with her podiatrist wound care and vascular specialists.  She did have some difficulty maintaining higher potassium levels in the hospital and was replaced.  Prior to hospitalization she was on hydrochlorothiazide and it was stopped.  She also had moderate anemia with hemoglobin of 7.3 at discharge, currently not on any iron supplement, she continues to have mild bleeding from her wounds.    For diabetes she is on Jardiance, Tresiba, Tradjenta, metformin, A1c in the hospital was 6.7.    She has not been smoking since February.    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     ROS: As above, she denies any shortness of breath,  "palpitations dizziness or lightheadedness.  No diarrhea.      Objective    /58 (BP Location: Left arm, Patient Position: Sitting, Cuff Size: Adult Regular)   Pulse 98   Temp 99  F (37.2  C) (Oral)   Ht 1.549 m (5' 1\")   Wt 69.9 kg (154 lb)   LMP  (LMP Unknown)   SpO2 99%   Breastfeeding No   BMI 29.10 kg/m    Body mass index is 29.1 kg/m .  Physical Exam   General: well appearing female in a wheelchair, alert and oriented x3  EYES: Eyelids, conjunctiva, and sclera were normal. Pupils were normal.   HEAD, EARS, NOSE, MOUTH, AND THROAT: no cervical LAD, no thyromegaly or nodules appreciated.  oropharynx is clear.  RESPIRATORY: respirations non labored, CTA bl, no wheezes, rales, no forced expiratory wheezing.  CARDIOVASCULAR: Heart rate and rhythm were normal. No murmurs, rubs,gallops. There was no peripheral edema. No carotid bruits.  GASTROINTESTINAL: Positive bowel sounds, abdomen is soft, non tender, non distended.     MUSCULOSKELETAL: Muscle mass was normal for age. No joint synovitis or deformity.  Right foot with good DP pulses, she does have one of the toes amputated no open lesions.  Left foot is wrapped, she is in a boot, I did not unwrap it today since she has appointment scheduled with podiatry coming up.  LYMPHATIC: There were no enlarged nodes palpable.  SKIN/HAIR/NAILS: Skin color was normal.  No rashes.  NEUROLOGIC: The patient was alert and oriented.  Speech was normal.  There is no facial asymmetry.   PSYCHIATRIC:  Mood and affect were normal.            Signed Electronically by: Shalonda Macario MD    "

## 2024-06-21 NOTE — PATIENT INSTRUCTIONS
*Wheelchairs are never guaranteed at the front door, if you have your own wheel chair or knee walker, please bring that with you to your appointment. Thank you for your understanding!*    Important lnstructions      WEIGHT BEARING STATUS: You are to remain NON WEIGHT BEARING on your left foot. NON WEIGHT BEARING MEANS NO PRESSURE ON YOUR FOOT OR HEEL AT ANY TIME FOR ANY REASON!    2. OFFLOADING DEVICE: Must use a A ROLLING KNEE WALKER at all times! (do not use affected foot to push wheelchair)    3. STABILIZATION DEVICE: Use a CAM BOOT . You will need to WEAR THIS ANYTIME YOU ARE UP AND OUT OF BED, IT IS OKAY TO REMOVE WHEN YOU ARE SLEEPING..     4. ELEVATE: Elevating your leg means laying with your head on a pillow and your foot ABOVE YOUR WAIST.     5. DO NOT MOVE YOUR FOOT.  There is a risk of worsening the wound or incision. To give yourself a higher chance of healing, please DO NOT swing foot back and forth and wiggle foot/toes especially when inside a stabilization device.        Dressing Change lnstructions          LEAVE DRESSINGS INTACT UNTIL YOUR NEXT APPOINTMENT    It IS NOT ok to get your wound wet in the bath or shower    SEEK MEDICAL CARE IF:  You have an increase in swelling, pain, or redness around the wound.  You have an increase in the amount of pus coming from the wound.  There is a bad smell coming from the wound.  The wound appears to be worsening/enlarging  You have a fever greater than 101.5 F      It is ok to continue current wound care treatment/products for the next 2-3 days until new wound care supplies are ordered and arrive. If longer than this please contact our office at [phone].        We want to hear from you!   In the next few weeks, you should receive a call or email to complete a survey about your visit at [facility]. Please help us improve your appointment experience by letting us know how we did today. We strive to make your experience good and value any  ways in which we could do better.      We value your input and suggestions.    Thank you for choosing the [facility]!

## 2024-06-24 ENCOUNTER — TELEPHONE (OUTPATIENT)
Dept: ANTICOAGULATION | Facility: CLINIC | Age: 69
End: 2024-06-24
Payer: COMMERCIAL

## 2024-06-24 DIAGNOSIS — I73.9 PAD (PERIPHERAL ARTERY DISEASE) (H): ICD-10-CM

## 2024-06-24 RX ORDER — ENOXAPARIN SODIUM 100 MG/ML
1 INJECTION SUBCUTANEOUS EVERY 12 HOURS
Qty: 22.4 ML | Refills: 1 | Status: ON HOLD | OUTPATIENT
Start: 2024-06-24 | End: 2024-07-17

## 2024-06-24 NOTE — TELEPHONE ENCOUNTER
Rosibel Carroll RN  P [facility],  Patient called in requesting a refill of lovenox, it looks like this is being managed by the anticoagulation clinic?  Can you please contact her?  Thanks,  Rosibel CAMPBELL

## 2024-06-24 NOTE — DISCHARGE SUMMARY
[facility]    Discharge Summary  Hospitalist    Date of Admission:  6/4/2024  Date of Discharge:  6/11/2024  3:53 PM  Discharging Provider:  ANDREY Perez MD, FACP     Date of Service (when I saw the patient): 06/11/24    Discharge Diagnoses      Severe PAD  Left calf wound  Hemorrhagic shock  Acute blood loss anemia  Left hallux osteomyelitis  Thrombocytopenia  Hypokalemia  Hyponatremia  Lactic acidosis  Vaginal candidiasis  Leukocytosis  Low grade fever  DM type II  Essential HTN      History of Present Illness   Per 6/4 H & P:  Ashanti Aviles is a pleasant 68 year old woman admitted to the ICU from OR for the management of hemorrhagic shock.  She has a h/o hypertension, DM type II, severe PAD, left hallux osteomyelitis.  She initially presented for left external iliac artery to post tibial artery bypass. Surgery was complicated by loss of peripheral pulse requiring re-opening of incision with thrombectomy from the graft, and hemorrhagic shock requiring ICU admission on pressor and blood transfusion.    Per Vascular Surgery notes: did ok during initial phase of the case but after closing there was no pulse so distal incision reopened, thrombectomy with removal of fresh thrombus performed. Angiogram performed afterward with sluggish flow through the bypass graft with concern that there will be poor perfusion.  She was extubated and was started on heparin drip.  She was started on a small dose of phenylephrine due to hypotension.      Hospital Course   Ashanti Aviles was admitted on 6/4/2024.  The following problems were addressed during her hospitalization:    Severe PAD  Left calf wound; healing  - Left external iliac artery to posterior tibial artery bypass requiring reopening and revision with thrombectomy form graft on 06/04.  - per prior: is high risk for bypass failure, no additional options for revascularization. If bypass fails, pateint will need an amputation per vascular  team.  - continued Aspirin and Plavix when Ok with Vascular surgery  - on Lovenox bridging. Warfarin dosing per [facility]     Hemorrhagic shock - resolved  Acute blood loss anemia  - earlier was weaned off of phenylephrine gtt.   - resuscitated with IVF and pRBCs.   - S/p 5 PRBCs (3 units 06/04, 1 unit 06/05, 1 unit 06/07)  - Coag profiles not suggestive of consumptive DIC     Left hallux osteomyelitis  - S/p amputation of left hallux on 06/07 by Podiatry  - ID consulted;initially treated with Ancef - then changed to Merrem on 06/08; per ID -> continued through 6/11, then discontinued  - intra-op culture: Pseudomonas sensitivity pending. Awaiting path.  - needs follow up with Dr. Ordaz in next week     Thrombocytopenia - improving  - post-op  - low risk of HIT score 0-3. HIT screen negative.      Hypokalemia - resolved  - replaced per protocol     Hyponatremia, mild - resolved  - likely due to decreased intake.  - encouraged fluid intake     Lactic acidosis - resolved  - due to poor perfusion.      Vaginal candidiasis  - treated with fluconazole     Leukocytosis - improving  low grade fever - resolved  - likely reactive post-op, osteomyelitis  - encouraged use of IS     DM type II; glucose variable, higher recently  - PTA was on: Jardiance, Trajenta, Metfromin and Tresiba 30 units BID  - Accu-checks; continued with moderate sliding scale insulin. I:C 1:15  - added Lantus 10 U BID;      Essential HTN  - resumed PTA Norvasc and lisinopril  - held hydrochlorothiazide         DMaki Perez MD, [facility]      Significant Results and Procedures   See below and in EHR    Pending Results   None    Code Status   Full Code       Primary Care Physician   Dyan Geller    Physical Exam     Vitals:    06/09/24 0517 06/10/24 0600 06/11/24 0518   Weight: 73.1 kg (161 lb 2.5 oz) 72.9 kg (160 lb 12.8 oz) 68.1 kg (150 lb 2.1 oz)     98.6  F (37  C) 91 -- -- 124/64 18 100 % -- None (Room air)      Constitutional: awake, no apparent distress; sitting up in bed  HEENT: sclerae clear; MM's moist  Respiratory: good a/e bilaterally, no wheezing or rhonchi  Cardiovascular: regular rate and rhythm, S1, S2 noted; no m/r/g  GI: abdomen mod. obese;  + bowel sounds; soft, non-tender, non-distended  Skin/Integumen: Left lower/medial le wounds covered by dressings; Left foot covered by dressing; Left lower abdominal incision covered by clear dressing - no drainage or erythema; no rashes, no cyanosis, no jaundice  Musculoskeletal: 1-2+ edema Left lower leg (Vasc. Surgery aware)  Neurologic: follows directions well; no focal deficits     Discharge Disposition   Discharged to home  Condition at discharge: Stable    Consultations This Hospital Stay   PHARMACY IP CONSULT  HOSPITALIST IP CONSULT  VASCULAR ACCESS ADULT IP CONSULT  PHYSICAL THERAPY ADULT IP CONSULT  PHARMACY IP CONSULT  PHARMACY IP CONSULT  WOUND OSTOMY CONTINENCE NURSE  IP CONSULT  CARE MANAGEMENT / SOCIAL WORK IP CONSULT  PHARMACY IP CONSULT  PAIN MANAGEMENT ADULT IP CONSULT  PHARMACY LIAISON FOR MEDICATION COVERAGE CONSULT  PHYSICAL THERAPY ADULT IP CONSULT  PHARMACY TO DOSE WARFARIN  INFECTIOUS DISEASES IP CONSULT  CARDIOLOGY IP CONSULT  PAIN MANAGEMENT ADULT IP CONSULT  SOCIAL WORK IP CONSULT  PODIATRY IP CONSULT  PHYSICAL THERAPY ADULT IP CONSULT    Time Spent on this Encounter   I, Benedict Perez MD, personally saw the patient today and spent greater than 30 minutes discharging this patient.    Discharge Orders      ANTICOAGULATION CLINIC REFERRAL      Home Care Referral      Reason for your hospital stay    PAD s/p redo bypass left external iliac to distal PT and left hallux amputation     Activity    Your activity upon discharge: No strenuous activity including no pulling, pushing, or lifting >10lbs until cleared by vascular surgery. Walking (within reasonable limits) is encouraged to prevent muscle deconditioning and blood clots    Non weight  bearing to left foot per podiatry     Wound care and dressings    Instructions to care for your wound at home:   - Left leg incisions OK to remain open to air unless any drainage is present.     -LLE wound (between incision dressings) - Daily, Cleanse with saline, gently dry. Apply thin layer Aquaphor to wound bed, cover with Mepilex 3 x 3    - L foot - per podiatry/Dr. Ordaz     Follow-up and recommended labs and tests     1. Follow up as previously scheduled with vascular team on 6/20/24  2. Follow up with Dr. Ordaz (Podiatry) in next 7-10 days  3. Follow up with Anti-coagulation clinic in next 2-4 days  4. Daily INR checks until therapeutic, and until further instructions from Anti-coag. Clinic  5. Labs in 2 weeks: iron panel  6. Referral to Hematology for eval. Of chronic (and recent acute, post-op) anemia  7. Follow up with [facility] in next 7-10 days.     Diet    Follow this diet upon discharge: Regular; should be moderate-consistent CHO as able  -> Review optimal diet options with RD at your primary clinic as soon as able.     Discharge Medications   Discharge Medication List as of 6/11/2024  3:26 PM        START taking these medications    Details   aspirin 81 MG EC tablet Take 1 tablet (81 mg) by mouth daily, Disp-90 tablet, R-3, E-Prescribe      methocarbamol (ROBAXIN) 500 MG tablet Take 1 tablet (500 mg) by mouth at bedtime, Disp-20 tablet, R-0, E-Prescribe      enoxaparin ANTICOAGULANT (LOVENOX) 80 MG/0.8ML syringe Inject 0.7 mLs (70 mg) Subcutaneous every 12 hours Until INR at goal range, 2.5-3.5, Disp-11.2 mL, R-0, E-Prescribe      HYDROmorphone (DILAUDID) 4 MG tablet Take 1 tablet (4 mg) by mouth every 3 hours as needed for moderate pain or severe pain, Disp-20 tablet, R-0, E-Prescribe      warfarin ANTICOAGULANT (COUMADIN) 2 MG tablet Take 3 tablets (6 mg) by mouth daily On 6/11, then 4 mg (2 tablets) daily starting 6/12 until INR rechecked, Disp-30 tablet, R-2, E-Prescribe            CONTINUE these medications which have NOT CHANGED    Details   acetaminophen (ACETAMINOPHEN 8 HOUR) 650 MG CR tablet Take 1,300 mg by mouth every 8 hours as needed for mild pain or fever, Historical      amLODIPine (NORVASC) 5 MG tablet Take 5 mg by mouth daily, Historical      clopidogrel (PLAVIX) 75 MG tablet Take 75 mg by mouth daily, Historical      Continuous Blood Gluc Sensor (FREESTYLE PRINCESS 14 DAY SENSOR) MISC Historical      empagliflozin (JARDIANCE) 25 MG TABS tablet Take 1 tablet (25 mg) by mouth daily, Disp-90 tablet, R-1, No Print Out      ezetimibe (ZETIA) 10 MG tablet Take 10 mg by mouth daily, Historical      folic acid (FOLVITE) 1 MG tablet Take 1 tablet (1 mg) by mouth daily, Disp-90 tablet, R-0, E-Prescribe      gabapentin (NEURONTIN) 300 MG capsule Take 1 capsule (300 mg) by mouth 3 times daily Start with one capsule at bedtime and increase dose as instructed, Disp-90 capsule, R-0, E-Prescribe      insulin degludec (TRESIBA) 100 UNIT/ML pen Inject 30 Units Subcutaneous 2 times daily, Historical      insulin glargine (LANTUS PEN) 100 UNIT/ML pen Inject 32 Units Subcutaneous 2 times daily, Historical      linagliptin (TRADJENTA) 5 MG TABS tablet Take 5 mg by mouth daily, Historical      lisinopril (ZESTRIL) 40 MG tablet Take 40 mg by mouth daily, Historical      metFORMIN (GLUCOPHAGE XR) 500 MG 24 hr tablet Take 1,000 mg by mouth daily, Historical      omeprazole (PRILOSEC) 20 MG DR capsule Take 20 mg by mouth daily, Historical      UNIFINE PENTIPS 31G X 5 MM miscellaneous SHAINA, Historical      vitamin B complex with vitamin C (STRESS TAB) tablet Take 1 tablet by mouth daily, Disp-90 tablet, R-0, E-Prescribe           STOP taking these medications       hydrochlorothiazide (HYDRODIURIL) 25 MG tablet Comments:   Reason for Stopping:             Allergies   Allergies   Allergen Reactions    Simvastatin Muscle Pain (Myalgia)     Muscle pain    Victoza [Liraglutide] Other (See Comments)     Binds  "bowels    Penicillins Unknown     Childhood rxn     Data   Most Recent 3 CBC's:  Recent Labs   Lab Test 06/21/24  1218 06/11/24  0510 06/10/24  0831   WBC 12.3* 13.3* 15.3*   HGB 12.0 7.7* 8.2*   MCV 93 88 87   * 306 244      Most Recent 3 BMP's:  Recent Labs   Lab Test 06/21/24  1218 06/11/24  1320 06/11/24  0831 06/11/24  0510 06/10/24  0826 06/10/24  0317 06/09/24  0753 06/09/24  0551   *  --   --   --   --  137  --  138   POTASSIUM 4.9  --   --  3.5  --  3.5  --  3.5   CHLORIDE 98  --   --   --   --  102  --  103   CO2 25  --   --   --   --  26  --  28   BUN 12.3  --   --   --   --  8.4  --  9.4   CR 0.64  --   --   --   --  0.55  --  0.52   ANIONGAP 11  --   --   --   --  9  --  7   SANTINO 10.2  --   --   --   --  7.8*  --  7.8*   * 208* 185*  --    < > 236*   < > 172*    < > = values in this interval not displayed.     Most Recent 2 LFT's:  Recent Labs   Lab Test 06/05/24  0417 02/15/23  0814   AST 59* 20   ALT 48 25   ALKPHOS 45 75   BILITOTAL 1.0 <0.2     Most Recent INR's and Anticoagulation Dosing History:  Anticoagulation Dose History  More data exists         Latest Ref Rng & Units 6/9/2024 6/10/2024 6/11/2024 6/12/2024 6/14/2024 6/17/2024 6/21/2024   Recent Dosing and Labs   warfarin ANTICOAGULANT (COUMADIN) 1 MG tablet - 2.25 mg, $Given - - - - - -   warfarin ANTICOAGULANT (COUMADIN) 2 MG tablet - - 4 mg, $Given - - - - -   INR 0.9 - 1.1 1.16  1.26  1.40  1.6  2.6  1.7  1.5            Most Recent Cholesterol Panel:  Recent Labs   Lab Test 04/21/22  1509   CHOL 237*   *   HDL 68   TRIG 120     Most Recent TSH, T4 and A1c Labs:  Recent Labs   Lab Test 06/05/24  0548   A1C 6.7*     Results for orders placed or performed during the hospital encounter of 06/04/24     POC US Guided Vascular Access    Narrative    Ultrasound was performed as guidance to an anesthesia procedure.  Click   \"PACS images\" hyperlink below to view any stored images.  For specific   procedure details, view " procedure note authored by anesthesia.     XR Surgery HARRIET Fluoro L/T 5 Min    Narrative    This exam was marked as non-reportable because it will not be read by a   radiologist or a [place] non-radiologist provider.         XR Chest Port 1 View    Narrative    EXAM: XR CHEST PORT 1 VIEW  LOCATION: [facility]  DATE: 6/4/2024    INDICATION: PICC catheter placement.  COMPARISON: 2/17/2024.      Impression    IMPRESSION: Left upper extremity PICC catheter has tip overlying the superior cavoatrial junction.    Lungs are clear. No pleural effusion or pneumothorax.    Cardiomediastinal silhouette is normal. Atherosclerotic calcifications of the thoracic aorta.

## 2024-06-25 ENCOUNTER — LAB (OUTPATIENT)
Dept: LAB | Facility: CLINIC | Age: 69
End: 2024-06-25
Payer: COMMERCIAL

## 2024-06-25 ENCOUNTER — ANTICOAGULATION THERAPY VISIT (OUTPATIENT)
Dept: ANTICOAGULATION | Facility: CLINIC | Age: 69
End: 2024-06-25

## 2024-06-25 DIAGNOSIS — I73.9 PAD (PERIPHERAL ARTERY DISEASE) (H): Primary | ICD-10-CM

## 2024-06-25 DIAGNOSIS — I73.9 PAD (PERIPHERAL ARTERY DISEASE) (H): ICD-10-CM

## 2024-06-25 DIAGNOSIS — I74.3 FEMORAL ARTERY THROMBOSIS, LEFT (H): ICD-10-CM

## 2024-06-25 LAB — INR BLD: 1.2 (ref 0.9–1.1)

## 2024-06-25 PROCEDURE — 85610 PROTHROMBIN TIME: CPT

## 2024-06-25 PROCEDURE — 36416 COLLJ CAPILLARY BLOOD SPEC: CPT

## 2024-06-25 NOTE — PROGRESS NOTES
ANTICOAGULATION MANAGEMENT     Ashanti Aviles 68 year old female is on warfarin with subtherapeutic INR result. (Goal INR 2.5-3.5)    Recent labs: (last 7 days)     06/25/24  1034   INR 1.2*       ASSESSMENT     Warfarin Lab Questionnaire    Warfarin Doses Last 7 Days      6/25/2024    10:33 AM   Dose in Tablet or Mg   TAB or MG? tablet (tab)     Pt Rptd Dose SUNDAY MONDAY TUESDAY WED THURS FRIDAY SATURDAY 6/25/2024  10:33 AM 2 -3  3 - 2 2 3 2 3 2         6/25/2024   Warfarin Lab Questionnaire   Missed doses within past 14 days? Yes   If yes; please list when: Monday - missed lovenox dose, has not missed any warfarin.    Changes in diet or alcohol within past 14 days? No   Medication changes since last result? No   Injuries or illness since last result? No   New shortness of breath, severe headaches or sudden changes in vision since last result? No   Abnormal bleeding since last result? No   Upcoming surgery, procedure? No        Previous result: Subtherapeutic  Additional findings: None and Bridging with Enoxaparin until INR >= 2.5       PLAN     Recommended plan for temporary change(s) affecting INR     Dosing Instructions: booster dose then Increase your warfarin dose (23% change) Continue bridging with Enoxaparin with next INR in 3 days       Summary  As of 6/25/2024      Full warfarin instructions:  6/25: 12 mg; Otherwise 6 mg every day   Next INR check:  6/28/2024               Telephone call with Ashanti who agrees to plan and repeated back plan correctly    Lab visit scheduled    Education provided:   Goal range and lab monitoring: goal range and significance of current result, Importance of therapeutic range, and Importance of following up at instructed interval  Lovenox/Heparin education provided: recommended injection site and site rotation     Plan made with [facility] Pharmacist Ese Carter, ADRIAN  Anticoagulation  Clinic  6/25/2024    _______________________________________________________________________     Anticoagulation Episode Summary       Current INR goal:  2.5-3.5   TTR:  0.0% (4 d)   Target end date:  Indefinite   Send INR reminders to:  ALTAGRACIA SMITH    Indications    PAD (peripheral artery disease) (H24) [I73.9]  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

## 2024-06-27 NOTE — PATIENT INSTRUCTIONS
Wound care supplies were ordered today through [employer] and if you are not receiving your supplies or have a question on your bill please contact Kennedi Larkin at [phone]. Please allow 2-5 business days for delivery of supplies. You may get a call from a [phone] if there are additional information [employer] needs. It is important to  or return their call. PLEASE NOTE: If you need to return your supplies, you MUST call customer service within 15 days of delivery date.         Wound Care Instructions    DAILY and as needed, Cleanse your left leg wound(s) with Normal saline.    Pat Dry with non-sterile gauze    Primary Dressing: Apply aquacel AG into/onto the wounds    Secondary dressing: Cover with ABD pad, secure with rolled gauze    Ok to elevate your legs. If you start to have pain in the legs while elevated, please put your legs back down    It is not ok to get your wound wet in the bath or shower    If for some reason you are not able to get your dressing(s) changed as outlined above (due to illness, lack of supplies, lack of help) please do the following: remove old, soiled dressings; wash the wounds with saline; pat dry; apply ABD pad or other absorbant pad and secure with rolled gauze; avoid tape directly on your skin; Call the clinic as soon as possible to let us know what the current issues are in receiving wound care [phone].      SEEK MEDICAL CARE IF:  You have an increase in swelling, pain, or redness around the wound.  You have an increase in the amount of pus coming from the wound.  There is a bad smell coming from the wound.  The wound appears to be worsening/enlarging  You have a fever greater than 101.5 F      It is ok to continue current wound care treatment/products for the next 2-3 days until new wound care supplies are ordered and arrive. If longer than this please contact our office at [phone].    If you have a 2 layer or 4 layer compression wrap on these are safe to have on  for ONLY 7 days. If for some reason you are not able to get the wrap(s) changed (due to illness; lack of supplies, lack of help, lack of transportation) please do the following: unwrap the old 2 or 4 layer compression wrap; avoid using scissors as you could cut your skin and cause wounds; use tubular compression when available. Call to reschedule your home care or clinic visit appointment as soon as possible.    Please NOTE: if you are 15 minutes late to your clinic appointment you will have to be rescheduled. Please call our clinic as soon as possible if you know you will not be able to get to your appointment at [phone].    If you fail to show up to 3 scheduled clinic appointments you will be dismissed from our clinic.              We want to hear from you!  In the next few weeks, you should receive a call or email to complete a survey about your visit at [facility]. Please help us improve your appointment experience by letting us know how we did today. We strive to make your experience good and value any ways in which we could do better.      We value your input and suggestions.    Thank you for choosing the [facility]!

## 2024-06-28 ENCOUNTER — LAB (OUTPATIENT)
Dept: LAB | Facility: CLINIC | Age: 69
End: 2024-06-28
Payer: COMMERCIAL

## 2024-06-28 ENCOUNTER — ANTICOAGULATION THERAPY VISIT (OUTPATIENT)
Dept: ANTICOAGULATION | Facility: CLINIC | Age: 69
End: 2024-06-28

## 2024-06-28 ENCOUNTER — OFFICE VISIT (OUTPATIENT)
Dept: VASCULAR SURGERY | Facility: CLINIC | Age: 69
End: 2024-06-28
Attending: PODIATRIST
Payer: COMMERCIAL

## 2024-06-28 VITALS
OXYGEN SATURATION: 100 % | HEART RATE: 90 BPM | RESPIRATION RATE: 16 BRPM | DIASTOLIC BLOOD PRESSURE: 69 MMHG | SYSTOLIC BLOOD PRESSURE: 109 MMHG

## 2024-06-28 DIAGNOSIS — M86.172 ACUTE OSTEOMYELITIS OF LEFT FOOT (H): Primary | ICD-10-CM

## 2024-06-28 DIAGNOSIS — I74.3 FEMORAL ARTERY THROMBOSIS, LEFT (H): ICD-10-CM

## 2024-06-28 DIAGNOSIS — I73.9 PAD (PERIPHERAL ARTERY DISEASE) (H): ICD-10-CM

## 2024-06-28 DIAGNOSIS — I73.9 PAD (PERIPHERAL ARTERY DISEASE) (H): Primary | ICD-10-CM

## 2024-06-28 LAB — INR BLD: 2 (ref 0.9–1.1)

## 2024-06-28 PROCEDURE — 99213 OFFICE O/P EST LOW 20 MIN: CPT | Performed by: PODIATRIST

## 2024-06-28 PROCEDURE — G2211 COMPLEX E/M VISIT ADD ON: HCPCS | Performed by: PODIATRIST

## 2024-06-28 PROCEDURE — 36416 COLLJ CAPILLARY BLOOD SPEC: CPT

## 2024-06-28 PROCEDURE — 85610 PROTHROMBIN TIME: CPT

## 2024-06-28 ASSESSMENT — PAIN SCALES - GENERAL: PAINLEVEL: MODERATE PAIN (5)

## 2024-06-28 NOTE — LETTER
June 28, 2024      Ashanti Aviles  [address] [place] [address]        To Whom It May Concern:    Ashanti Aviles was seen in our clinic today 6/28/24. Please excuse patient from work until about 8/9/24.      Sincerely,      Rene Ordaz

## 2024-06-28 NOTE — LETTER
June 28, 2024      Ashanti Aviles  [address] [place] [address]        To Whom It May Concern:    Ashanti Aviles  was seen on 6/28/2024. Please excuse her from work for the next 6 weeks due to illness.         Sincerely,        Rene Ordaz DPM

## 2024-06-28 NOTE — PROGRESS NOTES
Podiatry Progress Note        ASSESSMENT: S/P amputated left hallux      TREATMENT:  -I discussed with the patient and her daughter today that majority of the left foot incision is progressing well.  She does have slight amount of excessive motion along the proximal incision.  Sutures removed today, Steri-Strips applied.    -We discussed that I would like her to continue to remain nonweightbearing on the left foot for additional 3 weeks to allow for skin consolidation.    -Gauze dressing applied today which she will change once per week until her follow-up with me.    -She will follow-up with me in 3 weeks    Rene Ordaz DPM  [facility] Podiatry/Foot & Ankle Surgery      HPI: Ashanti Aviles was seen again today s/p amputation left hallux.  Doing well today.  She has remained nonweightbearing on the left foot.    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/29/2013    Nicotine addiction     OAG (open angle glaucoma)     PVD (peripheral vascular disease) (H24)     Reflux esophagitis     Retinopathy     Toe ulcer (H)     Tubular adenoma     Yeast vaginitis        Allergies   Allergen Reactions    Simvastatin Muscle Pain (Myalgia)     Muscle pain    Victoza [Liraglutide] Other (See Comments)     Binds bowels    Penicillins Unknown     Childhood rxn         Current Outpatient Medications:     acetaminophen (ACETAMINOPHEN 8 HOUR) 650 MG CR tablet, Take 1,300 mg by mouth every 8 hours as needed for mild pain or fever, Disp: , Rfl:     amLODIPine (NORVASC) 5 MG tablet, Take 5 mg by mouth daily, Disp: , Rfl:     aspirin  81 MG EC tablet, Take 1 tablet (81 mg) by mouth daily, Disp: 90 tablet, Rfl: 3    clopidogrel (PLAVIX) 75 MG tablet, Take 75 mg by mouth daily, Disp: , Rfl:     Continuous Blood Gluc Sensor (FREESTYLE PRINCESS 14 DAY SENSOR) MISC, , Disp: , Rfl:     empagliflozin (JARDIANCE) 25 MG TABS tablet, Take 1 tablet (25 mg) by mouth daily, Disp: 90 tablet, Rfl: 1    enoxaparin ANTICOAGULANT (LOVENOX) 80 MG/0.8ML syringe, Inject 0.7 mLs (70 mg) Subcutaneous every 12 hours Until INR at goal range, 2.5-3.5, Disp: 22.4 mL, Rfl: 1    ezetimibe (ZETIA) 10 MG tablet, Take 10 mg by mouth daily, Disp: , Rfl:     folic acid (FOLVITE) 1 MG tablet, Take 1 tablet (1 mg) by mouth daily, Disp: 90 tablet, Rfl: 0    gabapentin (NEURONTIN) 300 MG capsule, Take 1 capsule (300 mg) by mouth 3 times daily Start with one capsule at bedtime and increase dose as instructed, Disp: 90 capsule, Rfl: 0    HYDROmorphone (DILAUDID) 4 MG tablet, Take 1 tablet (4 mg) by mouth every 3 hours as needed for moderate pain or severe pain, Disp: 15 tablet, Rfl: 0    insulin degludec (TRESIBA) 100 UNIT/ML pen, Inject 30 Units Subcutaneous 2 times daily, Disp: , Rfl:     insulin glargine (LANTUS PEN) 100 UNIT/ML pen, Inject 32 Units Subcutaneous 2 times daily, Disp: , Rfl:     linagliptin (TRADJENTA) 5 MG TABS tablet, Take 5 mg by mouth daily, Disp: , Rfl:     lisinopril (ZESTRIL) 40 MG tablet, Take 40 mg by mouth daily, Disp: , Rfl:     metFORMIN (GLUCOPHAGE XR) 500 MG 24 hr tablet, Take 1,000 mg by mouth daily, Disp: , Rfl:     methocarbamol (ROBAXIN) 500 MG tablet, Take 1 tablet (500 mg) by mouth at bedtime, Disp: 20 tablet, Rfl: 0    omeprazole (PRILOSEC) 20 MG DR capsule, Take 20 mg by mouth daily, Disp: , Rfl:     UNIFINE PENTIPS 31G X 5 MM miscellaneous, , Disp: , Rfl:     vitamin B complex with vitamin C (STRESS TAB) tablet, Take 1 tablet by mouth daily, Disp: 90 tablet, Rfl: 0    warfarin ANTICOAGULANT (COUMADIN) 2 MG tablet, Take 3 tablets (6 mg) by mouth  daily Following dosing instructions from coumadin clinic., Disp: 90 tablet, Rfl: 3    Current Facility-Administered Medications:     lidocaine (XYLOCAINE) 5 % ointment, , Topical, Daily PRN, Robert Oliva MD, Given at 05/23/24 1118    Review of Systems - Negative       OBJECTIVE:  Appearance: alert, well appearing, and in no distress.    /69   Pulse 90   Resp 16   LMP  (LMP Unknown)   SpO2 100%     Surgical site on the amputated left hallux has intact sutures with skin edges well coapted distally with small amount of excessive motion along proximal incision.  Small area of ischemic tissue along the proximal incision.  No erythema left foot. Neurovascular status unchanged left foot.

## 2024-06-28 NOTE — PROGRESS NOTES
ANTICOAGULATION MANAGEMENT     Ashanti Aviles 68 year old female is on warfarin with subtherapeutic INR result. (Goal INR 2.5-3.5)    Recent labs: (last 7 days)     06/28/24  1233   INR 2.0*       ASSESSMENT     Source(s): Chart Review and Patient/Caregiver Call     Warfarin doses taken: Booster dose(s) recently taken which may be affecting INR  Diet: No new diet changes identified  Medication/supplement changes: None noted  New illness, injury, or hospitalization: No  Signs or symptoms of bleeding or clotting: No  Previous result: Subtherapeutic  Additional findings: None and Bridging with Enoxaparin until INR >= 2.5      PLAN     Recommended plan for temporary change(s) affecting INR     Dosing Instructions: Increase your warfarin dose (9.5% change) Continue bridging with Enoxaparin with next INR in 3 days    *Only given dosing through weekend       Summary  As of 6/28/2024      Full warfarin instructions:  8 mg every Tue, Fri; 6 mg all other days   Next INR check:  7/1/2024               Telephone call with Ashanti who agrees to plan and repeated back plan correctly    Lab visit scheduled    Education provided: Goal range and lab monitoring: goal range and significance of current result, Importance of therapeutic range, and Importance of following up at instructed interval  Lovenox/Heparin education provided: role of enoxaparin/heparin in bridge therapy     Plan made with [facility] Pharmacist Ese Carter RN  Anticoagulation Clinic  6/28/2024    _______________________________________________________________________     Anticoagulation Episode Summary       Current INR goal:  2.5-3.5   TTR:  0.0% (1 wk)   Target end date:  Indefinite   Send INR reminders to:  ALTAGRACIA SMITH    Indications    PAD (peripheral artery disease) (H24) [I73.9]  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

## 2024-06-28 NOTE — LETTER
June 28, 2024      Ashanti Aviles  [address] [place] [address]        To Whom It May Concern:    Ashanti Aviles was seen in our clinic today 6/28/24. Please excuse her from work until 8/9/2024 due to illness.     Sincerely,      Rene Ordaz

## 2024-06-28 NOTE — PATIENT INSTRUCTIONS
"*Wheelchairs are never guaranteed at the front door, if you have your own wheel chair or knee walker, please bring that with you to your appointment. Thank you for your understanding!*     Important lnstructions        WEIGHT BEARING STATUS: You are to remain NON WEIGHT BEARING on your left foot. NON WEIGHT BEARING MEANS NO PRESSURE ON YOUR FOOT OR HEEL AT ANY TIME FOR ANY REASON!     2. OFFLOADING DEVICE: Must use a A ROLLING KNEE WALKER at all times! (do not use affected foot to push wheelchair)     3. STABILIZATION DEVICE: Use a PRAFO. You will need to WEAR THIS AT ALL TIMES     4. ELEVATE: Elevating your leg means laying with your head on a pillow and your foot ABOVE YOUR WAIST.      5. DO NOT MOVE YOUR FOOT.  There is a risk of worsening the wound or incision. To give yourself a higher chance of healing, please DO NOT swing foot back and forth and wiggle foot/toes especially when inside a stabilization device.        WOUND CARE INSTRUCTIONS:    Change 1x weekly to your Left foot     Primary Dressing: Apply dry gauze into/onto the wounds    Secure with non-sterile roll gauze (4\" x 75\" roll) and tape (1\" roll tape) as needed; avoid adhesive directly on the skin          It IS NOT ok to get your wound wet in the bath or shower     SEEK MEDICAL CARE IF:  You have an increase in swelling, pain, or redness around the wound.  You have an increase in the amount of pus coming from the wound.  There is a bad smell coming from the wound.  The wound appears to be worsening/enlarging  You have a fever greater than 101.5 F        It is ok to continue current wound care treatment/products for the next 2-3 days until new wound care supplies are ordered and arrive. If longer than this please contact our office at [phone].         We want to hear from you!   In the next few weeks, you should receive a call or email to complete a survey about your visit at [facility]. Please help us improve your appointment " experience by letting us know how we did today. We strive to make your experience good and value any ways in which we could do better.       We value your input and suggestions.     Thank you for choosing the [facility]!

## 2024-07-01 ENCOUNTER — ANTICOAGULATION THERAPY VISIT (OUTPATIENT)
Dept: ANTICOAGULATION | Facility: CLINIC | Age: 69
End: 2024-07-01

## 2024-07-01 ENCOUNTER — OFFICE VISIT (OUTPATIENT)
Dept: VASCULAR SURGERY | Facility: CLINIC | Age: 69
End: 2024-07-01
Attending: FAMILY MEDICINE
Payer: COMMERCIAL

## 2024-07-01 ENCOUNTER — LAB (OUTPATIENT)
Dept: LAB | Facility: CLINIC | Age: 69
End: 2024-07-01
Payer: COMMERCIAL

## 2024-07-01 VITALS — OXYGEN SATURATION: 99 % | HEART RATE: 91 BPM | SYSTOLIC BLOOD PRESSURE: 130 MMHG | DIASTOLIC BLOOD PRESSURE: 78 MMHG

## 2024-07-01 DIAGNOSIS — R60.0 LOWER EXTREMITY EDEMA: ICD-10-CM

## 2024-07-01 DIAGNOSIS — I73.9 PAD (PERIPHERAL ARTERY DISEASE) (H): Primary | ICD-10-CM

## 2024-07-01 DIAGNOSIS — I70.222 CRITICAL LIMB ISCHEMIA OF LEFT LOWER EXTREMITY (H): ICD-10-CM

## 2024-07-01 DIAGNOSIS — I74.3 FEMORAL ARTERY THROMBOSIS, LEFT (H): ICD-10-CM

## 2024-07-01 DIAGNOSIS — I73.9 PAD (PERIPHERAL ARTERY DISEASE) (H): ICD-10-CM

## 2024-07-01 DIAGNOSIS — L97.922 ULCER OF LEFT LOWER EXTREMITY WITH FAT LAYER EXPOSED (H): Primary | ICD-10-CM

## 2024-07-01 DIAGNOSIS — T82.868A THROMBOSIS OF ARTERIAL GRAFT, INITIAL ENCOUNTER (H): ICD-10-CM

## 2024-07-01 LAB — INR BLD: 2.9 (ref 0.9–1.1)

## 2024-07-01 PROCEDURE — 36416 COLLJ CAPILLARY BLOOD SPEC: CPT

## 2024-07-01 PROCEDURE — 99214 OFFICE O/P EST MOD 30 MIN: CPT | Performed by: FAMILY MEDICINE

## 2024-07-01 PROCEDURE — 99215 OFFICE O/P EST HI 40 MIN: CPT | Performed by: FAMILY MEDICINE

## 2024-07-01 PROCEDURE — 85610 PROTHROMBIN TIME: CPT

## 2024-07-01 RX ORDER — SULFAMETHOXAZOLE AND TRIMETHOPRIM 400; 80 MG/1; MG/1
1 TABLET ORAL 2 TIMES DAILY
Qty: 28 TABLET | Refills: 0 | Status: SHIPPED | OUTPATIENT
Start: 2024-07-01 | End: 2024-07-15

## 2024-07-01 RX ORDER — ACETAMINOPHEN 325 MG/1
975 TABLET ORAL ONCE
Status: CANCELLED | OUTPATIENT
Start: 2024-07-08 | End: 2024-07-01

## 2024-07-01 RX ADMIN — LIDOCAINE: 50 OINTMENT TOPICAL at 10:58

## 2024-07-01 ASSESSMENT — PAIN SCALES - GENERAL: PAINLEVEL: EXTREME PAIN (9)

## 2024-07-01 NOTE — PROGRESS NOTES
ANTICOAGULATION MANAGEMENT     Ashanti Aviles 68 year old female is on warfarin with therapeutic INR result. (Goal INR 2.5-3.5)    Recent labs: (last 7 days)     07/01/24  1041   INR 2.9*       ASSESSMENT     Source(s): Chart Review and Patient/Caregiver Call     Warfarin doses taken: Warfarin taken as instructed  Diet: No new diet changes identified  Medication/supplement changes:  bactrim 14 day course (dates: 7/1-7/15) subsequent INRs may be increased. Closer INR monitoring recommended.  New illness, injury, or hospitalization: Yes: wound infection  Signs or symptoms of bleeding or clotting: No  Previous result: Subtherapeutic  Additional findings: None and Bridging with Enoxaparin until INR >= 2.5       PLAN     Recommended plan for temporary change(s) affecting INR     Dosing Instructions: Adjust warfarin dose during interaction (11% change). Maintenance dose modified on anticoagulation calendar for interaction > 7 days; maintenance dose to be readjusted post interaction. Stop bridging with Enoxaparin with next INR in 5 days       Summary  As of 7/1/2024      Full warfarin instructions:  5 mg every Wed; 6 mg all other days   Next INR check:  7/5/2024               Telephone call with Ashanti who agrees to plan and repeated back plan correctly  Sent [employer] message with dosing and follow up instructions    Lab visit scheduled    Education provided: Taking warfarin: Importance of taking warfarin as instructed  Goal range and lab monitoring: goal range and significance of current result, Importance of therapeutic range, and Importance of following up at instructed interval  Interaction IS anticipated between warfarin and bactrim    Plan made with [facility] Pharmacist Ese Carter, RN  Anticoagulation Clinic  7/1/2024    _______________________________________________________________________     Anticoagulation Episode Summary       Current INR goal:  2.5-3.5   TTR:  12.4% (1.4 wk)   Target end date:   Indefinite   Send INR reminders to:  [facility]    Indications    PAD (peripheral artery disease) (H24) [I73.9]  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

## 2024-07-01 NOTE — PROGRESS NOTES
Wound Clinic Note         Visit date: 07/01/2024       Cheif Complaint:     Ashanti Aviles is a 68 year old  female had concerns including Wound Check.  The patient has lower extremity edema and left leg ulcers         HISTORY OF PRESENT ILLNESS:    Ashanti Aviles reports the wound has been present since mid February 2024.  The wound began after a recent surgery and the incision did not heal normally.   She has critical limb ischemia and has undergone left leg artery bypass surgeries.      I last saw this patient in the wound clinic on May 23, 2024 at which time she had a relatively small left leg wound which was healing well.  Since then she underwent a left leg arterial bypass surgery performed by Dr. Geller on June 4, 2024.  Postoperatively she also underwent foot debridement performed by podiatry on June 7, 2024.  Since being discharged from the hospital she followed up with the vascular surgery PA on June 20, 2024 at that time they did not feel that the staples are ready to come out so the patient was scheduled to follow-up with me to further evaluate the surgical wounds.  The patient continues to follow with podiatry regarding the foot wound.    She notes just in the last couple of days the left leg wounds have been much more painful.  She reports this happened previously when she developed an infection and she feels that there could be some early infection here.      The left leg surgical incisions have been bandaged with  Aquacel and a silicone adhesive changed once a day.  There has been Moderate drainage from the wound.       The pateint denies fevers or chills.  They report the pain from the wound has been 9/10 and has increased recently.        Today the patient reports maintaining a high protein diet, but has not been taking protein supplements lately.        The patient denies a history of smoking or chronic steroid use.  The patient confirms having diabetes and reports the blood sugars  are well controlled.         The patient has not had any symptoms of infection relating to the wound recently and is not currently on antibiotics.       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/29/2013    Nicotine addiction     OAG (open angle glaucoma)     PVD (peripheral vascular disease) (H24)     Reflux esophagitis     Retinopathy     Toe ulcer (H)     Tubular adenoma     Yeast vaginitis              Family Hx: family history includes Breast Cancer in her paternal aunt; Colon Cancer in her mother; Diabetes Type 2  in her father; Hypertension in her father and mother; Hyperthyroidism in her sister; Kidney failure in her father.       Surgical Hx:   Past Surgical History:   Procedure Laterality Date    AMPUTATE TOE(S) Right 02/18/2023    Procedure: Amputation fourth toe right foot;  Surgeon: Benjamin Diez DPM;  Location: [facility] OR    ANGIOGRAM Left 2/15/2024    Procedure: COMPLETION OF ANGIOGRAM, LEFT COMMON ILIAC ARTERY STENT PLACEMENT;  Surgeon: Dyan Geller MD;  Location: [facility] OR    ANUS SURGERY      BIOPSY CERVICAL, LOCAL EXCISION, SINGLE/MULTIPLE      COLONOSCOPY N/A 09/11/2020    Procedure: EXAM UNDER ANESTHESIA, HIGH RESOLUTION ANOSCOPY INTRA OP;  Surgeon: Preeti Sheehan MD;  Location: [facility];  Service: Gastroenterology    COLONOSCOPY N/A 12/14/2020    Procedure: EXAM UNDER ANESTHESIA WITH HIGH RESOLUTION ANOSCOPY;  Surgeon: Preeti Sheehan MD;  Location: [facility];  Service: General    COLONOSCOPY N/A 06/15/2021    Procedure:  EXAM UNDER ANESTHESIA WITH HIGH RESOLUTION ANOSCOPY, BIOPSY, FULGURATION;  Surgeon: Preeti Sheehan MD;  Location: [facility] OR;  Service: Gastroenterology    COLONOSCOPY N/A 4/15/2024    Procedure: COLONOSCOPY, WITH POLYPECTOMY;  Surgeon: Shen Andres MD;  Location: [facility] OR    ENDARTERECTOMY FEMORAL Left 05/17/2023    Procedure: LEFT FEMORAL ENDARTERECTOMY WITH RETROGRADE ILIAC STENT;  Surgeon: Dyan Geller MD;  Location: [facility] OR    EXAM UNDER ANESTHESIA ANUS N/A 09/14/2021    Procedure: EXAM UNDER ANESTHESIA WITH HIGH RESOLUTION ANOSCOPY;  Surgeon: Preeti Sheehan MD;  Location: [facility] OR    FEMORAL ARTERY - TIBIAL ARTERY BYPASS GRAFT Left 09/08/2023    Procedure: CREATION, BYPASS, ARTERIAL, FEMORAL TO TIBIAL, LEFT;  Surgeon: Dyan Geller MD;  Location: [facility] OR    FEMORAL ARTERY - TIBIAL ARTERY BYPASS GRAFT Left 2/15/2024    Procedure: CREATION, BYPASS, ARTERIAL, FEMORAL TO TIBIAL,;  Surgeon: Dyan Geller MD;  Location: [facility] OR    FEMORAL ARTERY - TIBIAL ARTERY BYPASS GRAFT Left 6/4/2024    Procedure: LEFT EXTERNAL ILIAC ARTERY TO POSTERIOR TIBIAL ARTERY BYPASS WITH CRYO ARTERY WITH A FEMORAL HERNIA REPAIR;  Surgeon: Dyan Geller MD;  Location: [facility] OR    INCISION AND DRAINAGE FOOT, COMBINED Left 11/27/2023    Procedure: INCISION AND DRAINAGE, LEFT HALLUX;  Surgeon: Rene Ordaz DPM;  Location: [facility] OR    INCISION AND DRAINAGE FOOT, COMBINED Left 6/7/2024    Procedure: AMPUTATION LEFT HALLUX;  Surgeon: Rene Ordaz DPM;  Location: [facility] OR    IR LOWER EXTREMITY ANGIOGRAM LEFT  03/23/2023    IR LOWER EXTREMITY ANGIOGRAM RIGHT  02/16/2023    IR THROMBOLYSIS ARTERIAL INFUSION INITIAL DAY  10/09/2023    LEEP TX, CERVICAL      2005    TUBAL LIGATION            Allergies:    Allergies   Allergen Reactions    Simvastatin Muscle Pain (Myalgia)     Muscle  pain    Victoza [Liraglutide] Other (See Comments)     Binds bowels    Penicillins Unknown     Childhood rxn              Medication History:    Current Outpatient Medications   Medication Sig Dispense Refill    acetaminophen (ACETAMINOPHEN 8 HOUR) 650 MG CR tablet Take 1,300 mg by mouth every 8 hours as needed for mild pain or fever      amLODIPine (NORVASC) 5 MG tablet Take 5 mg by mouth daily      aspirin 81 MG EC tablet Take 1 tablet (81 mg) by mouth daily 90 tablet 3    clopidogrel (PLAVIX) 75 MG tablet Take 75 mg by mouth daily      Continuous Blood Gluc Sensor (FREESTYLE PRINCESS 14 DAY SENSOR) MISC       empagliflozin (JARDIANCE) 25 MG TABS tablet Take 1 tablet (25 mg) by mouth daily 90 tablet 1    enoxaparin ANTICOAGULANT (LOVENOX) 80 MG/0.8ML syringe Inject 0.7 mLs (70 mg) Subcutaneous every 12 hours Until INR at goal range, 2.5-3.5 22.4 mL 1    ezetimibe (ZETIA) 10 MG tablet Take 10 mg by mouth daily      folic acid (FOLVITE) 1 MG tablet Take 1 tablet (1 mg) by mouth daily 90 tablet 0    gabapentin (NEURONTIN) 300 MG capsule Take 1 capsule (300 mg) by mouth 3 times daily Start with one capsule at bedtime and increase dose as instructed 90 capsule 0    HYDROmorphone (DILAUDID) 4 MG tablet Take 1 tablet (4 mg) by mouth every 3 hours as needed for moderate pain or severe pain 15 tablet 0    insulin degludec (TRESIBA) 100 UNIT/ML pen Inject 30 Units Subcutaneous 2 times daily      insulin glargine (LANTUS PEN) 100 UNIT/ML pen Inject 32 Units Subcutaneous 2 times daily      linagliptin (TRADJENTA) 5 MG TABS tablet Take 5 mg by mouth daily      lisinopril (ZESTRIL) 40 MG tablet Take 40 mg by mouth daily      metFORMIN (GLUCOPHAGE XR) 500 MG 24 hr tablet Take 1,000 mg by mouth daily      methocarbamol (ROBAXIN) 500 MG tablet Take 1 tablet (500 mg) by mouth at bedtime 20 tablet 0    omeprazole (PRILOSEC) 20 MG DR capsule Take 20 mg by mouth daily      sulfamethoxazole-trimethoprim (BACTRIM) 400-80 MG tablet Take 1  tablet by mouth 2 times daily for 14 days 28 tablet 0    UNIFINE PENTIPS 31G X 5 MM miscellaneous       vitamin B complex with vitamin C (STRESS TAB) tablet Take 1 tablet by mouth daily 90 tablet 0    warfarin ANTICOAGULANT (COUMADIN) 2 MG tablet Take 3 tablets (6 mg) by mouth daily Following dosing instructions from coumadin clinic. 90 tablet 3     Current Facility-Administered Medications   Medication Dose Route Frequency Provider Last Rate Last Admin    lidocaine (XYLOCAINE) 5 % ointment   Topical Daily PRN Robert Oliva MD   Given at 07/01/24 1058         Tobacco History:  reports that she quit smoking about 16 months ago. Her smoking use included cigarettes. She has never been exposed to tobacco smoke. She has never used smokeless tobacco.       REVIEW OF SYMPTOMS:   The review of systems was negative except as noted in the HPI.           PHYSICAL EXAMINATION:     /78   Pulse 91   LMP  (LMP Unknown)   SpO2 99%            GENERAL: The patient overall appears well and is no acute distress.   HEAD: normocephalic   EYES: Sclera and conjunctiva clear   NECK: no obvious masses   LUNGS: breathing is unlabored.   EXTREMITIES: No clubbing, cyanosis or edema   SKIN: No rashes or other abnormalities except as noted under the Wound section below.   NEUROLOGICAL: normal motor and sensory function   EDEMA: Mild    Vascular: Her left foot is fairly warm today and she has intact motor function in the foot.  She still does not have palpable pedal pulses on the left.    WOUND: The wound appears healthy with no sign of infection.   Wound bed: necrotic material at the inferior surgical wound  Periwound: healthy intact skin  The wound that she had seen me for previously is healing well and is nearly healed.  She has 2 new surgical incisions on the left calf, 1 proximally 1 distally.  There are staples in both areas.  The most inferior surgical wound bed is covered with necrotic material.  As noted above there are  no signs of infection on physical exam.    Also see below for wound details:       Circumferential volume measures:             No data to display                Ulceration(s)/Wound(s):   Please see the media tab under the chart review for pictures of the wounds.  Nursing staff removed dressings and cleansed wound.    VASC Wound Left lower leg wound (Active)   Pre Size Length 7 07/01/24 1000   Pre Size Width 3 07/01/24 1000   Pre Size Depth 0.3 07/01/24 1000   Pre Total Sq cm 21 07/01/24 1000       VASC Wound left mid leg (Active)   Pre Size Length 1.5 07/01/24 1000   Pre Size Width 1 07/01/24 1000   Pre Size Depth 0.2 07/01/24 1000   Pre Total Sq cm 1.5 07/01/24 1000       VASC Wound left upper leg (Active)   Pre Size Length 3 07/01/24 1000   Pre Size Width 0.3 07/01/24 1000   Pre Size Depth 0.3 07/01/24 1000   Pre Total Sq cm 0.9 07/01/24 1000                 Recent Labs   Lab Test 02/15/24  0612 09/05/23  1157 02/14/23  1744 06/26/19  0000 04/03/19  0000 02/26/19  0000   HGBA1C  --   --   --  8.0* 8.8* 10.5*   A1C 8.7* 8.3* 8.5*  --   --   --           Recent Labs   Lab Test 02/15/23  0814   ALBUMIN 3.7         WBC Count   Date Value Ref Range Status   06/21/2024 12.3 (H) 4.0 - 11.0 10e3/uL Final     Albumin   Date Value Ref Range Status   06/05/2024 3.4 (L) 3.5 - 5.2 g/dL Final           No sharp debridement performed today.   Please see the plan portion of the note regarding debridement.              ASSESSMENT:   This is a 68 year old  female with a left leg ulcer, the patient also has lower extremity edema which was also managed during today's clinic visit.          PLAN:   We'll bandage the area with Aquacel and an abdominal pad bandage changed once a day.  At the request of Dr. Burleson we will not apply any compression.  I discussed the case with Tammy, the vascular surgery PA.  Tammy came in and evaluated the patient and she thinks the patient needs to go back to the operating room with Dr. Geller.  She  will help arrange this.    Because of her increased pain over just the last few days I have prescribed her Bactrim which she is tolerated well in the recent past.    Separate from the wound care instructions we then discussed management strategies for lower extremity edema.  I explained the keys for managing lower extremity edema are compression and elevation.  I have again explained to the patient today that controlling the edema is probably the most important thing we can do to help heal the wound.  I have specifically recommended that they lay down with their legs above the level of the heart for 30 minutes at least twice a day.  I emphasized that if we can not control the edema we will likely not be able to get this wound to heal.   I advised her that if she starts having pain in her foot with leg elevation she should put her leg down.    I have encouraged the patient to continue on their high protein diet to aid in wound healing.   The patient will return to the wound clinic 1 to 2 weeks after her surgery.        30 minutes spent on the date of the encounter doing chart review, history and exam, documentation and further activities per the note, this time excludes any procedure time      Robert Oliva MD  07/01/2024   11:18 AM   [facility] Vascular/Wound  [phone]    This note was electronically signed by Robert Oliva MD

## 2024-07-01 NOTE — LETTER
[facility]  [address] [place] [address] [address], [place] [address]  [phone]      Fax [phone]    Formerly [facility]           Fax: [phone]            Customer Service: [phone]        Account #: [id number]    Wound Dressing Rx and Order Form  Order Status: refill  Verbal: Jenifer  Date: 2024     Ashanti Aviles  Gender: female  : 1955  [address] [place] [address]  [phone] (home)     Medical Record: [id number]  Primary Care Provider: Dyan Geller      ICD-10-CM    1. Ulcer of left lower extremity with fat layer exposed (H)  L97.922 sulfamethoxazole-trimethoprim (BACTRIM) 400-80 MG tablet      2. Lower extremity edema  R60.0             Insurance Info:  INSURER: Payor: [employer] / Plan: [employer] OPEN ACCESS / Product Type: HMO /   Policy ID#:  [id number]  SECONDARY INSURANCE:    Secondary Policy ID#:  N/A        Physician Info:   Name:  KLAUS MARCIAL     Dept Address/Phones:   [address] [place] [address], [address] [address] [phone]  Fax: [phone]    Lymphedema circumferential measurements (in cm):       No data to display                  Wound info:  Negative Pressure Wound Therapy Leg Anterior;Left;Lower (Active)   Wound Type Surgical 06/10/24 2000   Unit Type Prevena 06/10/24 1600   Dressing Pieces Removed (# of Each Type) Black foam 06/10/24 2000   Dressing Pieces Applied (# of Each Type) Black foam 06/10/24 2000   Cycle Continuous 06/10/24 2000   Target Pressure (mmHg) 125 06/10/24 2000   Drainage Color/Characteristics Serosanguineous 06/10/24 2000   Cannister changed? No 06/10/24 2000   Output (ml) 0 ml 06/10/24 0800   Number of days: 27       Wound Leg Ulceration (Active)   Wound Bed Other (Comment) 06/10/24 1600   Loan-wound Assessment Warm;Edema 06/10/24 0800   Drainage Amount Scant 06/10/24 0800   Drainage Color/Characteristics Serous 06/10/24 0800   Wound Care/Cleansing Normal  saline;Barrier applied  06/10/24 0800   Dressing Dry gauze;Foam;Moist gauze 06/10/24 0800   Dressing Status Changed;Clean, dry, intact 06/10/24 0800   Dressing Change Due 06/11/24 06/10/24 0800   Number of days: 26       VASC Wound left hallux (Active)   Pre Size Length 2 06/28/24 1200   Pre Size Width 0.2 06/28/24 1200   Pre Size Depth 0.5 05/22/24 0700   Pre Total Sq cm 1.4 05/22/24 0700   Number of days: 208       VASC Wound Left lower leg wound (Active)   Pre Size Length 7 07/01/24 1000   Pre Size Width 3 07/01/24 1000   Pre Size Depth 0.3 07/01/24 1000   Pre Total Sq cm 21 07/01/24 1000   Post Size Length 3.2 04/04/24 0800   Post Size Width 2 04/04/24 0800   Post Size Depth 0.8 04/04/24 0800   Post Total Sq cm 6.4 04/04/24 0800   Undermined 0.5cm 4 to 12 oclock 03/25/24 1208   Description sutures implaced 06/21/24 1000   Number of days: 112       VASC Wound left mid leg (Active)   Pre Size Length 1.5 07/01/24 1000   Pre Size Width 1 07/01/24 1000   Pre Size Depth 0.2 07/01/24 1000   Pre Total Sq cm 1.5 07/01/24 1000   Number of days: 0       VASC Wound left upper leg (Active)   Pre Size Length 3 07/01/24 1000   Pre Size Width 0.3 07/01/24 1000   Pre Size Depth 0.3 07/01/24 1000   Pre Total Sq cm 0.9 07/01/24 1000   Number of days: 0       Incision/Surgical Site 02/15/24 Anterior;Left Groin (Active)   Number of days: 137       Incision/Surgical Site 02/15/24 Left;Medial Calf (Active)   Number of days: 137       Incision/Surgical Site 06/04/24 Left;Lower Abdomen (Active)   Incision Assessment WDL except 06/11/24 0400   Dressing Transparent film (Opsite, Tegaderm) 06/10/24 2000   Loan-Incision Assessment Erythema;Edema 06/10/24 2000   Closure Liquid bandage 06/11/24 0400   Incision Drainage Amount None 06/11/24 0400   Incision Care Other (Comment) 06/07/24 2000   Dressing Intervention Clean, dry, intact 06/11/24 0400   Number of days: 27       Incision/Surgical Site 06/04/24 Anterior;Left;Upper Calf (Active)  "  Incision Assessment WDL 06/11/24 1328   Dressing Other (Comment) 06/11/24 0942   Loan-Incision Assessment Edema;Warm 06/10/24 2000   Closure Staples 06/11/24 1328   Incision Drainage Amount Small 06/11/24 1328   Drainage Description Serosanguinous 06/11/24 1328   Incision Care Normal saline 06/11/24 0942   Dressing Intervention Clean, dry, intact;Dried drainage 06/11/24 1328   Number of days: 27       Incision/Surgical Site 06/04/24 Anterior;Left;Lower Calf (Active)   Incision Assessment WDL 06/11/24 1328   Dressing Other (Comment) 06/11/24 0942   Loan-Incision Assessment Edema;Warm;Erythema 06/10/24 2000   Closure Staples 06/11/24 1328   Incision Drainage Amount Small 06/11/24 1328   Drainage Description Serosanguinous 06/11/24 1328   Incision Care Normal saline 06/11/24 0942   Dressing Intervention Clean, dry, intact;Dried drainage 06/11/24 1328   Number of days: 27       Incision/Surgical Site 06/07/24 Anterior;Left Toe (Comment  which one) (Active)   Incision Assessment UTV 06/11/24 1328   Dressing Dry gauze 06/11/24 0942   Loan-Incision Assessment Edema 06/10/24 1335   Closure TEJ 06/11/24 1328   Incision Drainage Amount Scant 06/11/24 1328   Drainage Description Serosanguinous 06/11/24 1328   Incision Care Other (Comment) 06/09/24 0000   Dressing Intervention Clean, dry, intact 06/11/24 1328   Number of days: 24        Drainage: moderate  Thickness:  full  Duration of Need: 30 DAYS  Days Supply: 30 DAYS  Start Date: 7/1/2024  Starter Kit, Ancillary Kit (saline, gloves, gauze): Yes   Qualifying wound/Debridement: Yes     NO SUBSTITUTIONS. Call [phone].      Dressing Type Brand Size Frequency of change  Quantity   Primary Aquacel AG  4\"x5\" DAILY and as needed     ABD pads  5\"x9\" DAILY and as needed 60- needs 2 per dressing change    Sof form roll gauze  4\"x75\" DAILY and as needed     Paper tape  2\" rolls DAILY and as needed      NO SUBSTITUTIONS. Call [phone] with questions.       OK to forward to " covered supplier.    Electronically Signed Physician:  KLAUS MARCIAL             Date: July 1, 2024

## 2024-07-03 ENCOUNTER — DOCUMENTATION ONLY (OUTPATIENT)
Dept: VASCULAR SURGERY | Facility: CLINIC | Age: 69
End: 2024-07-03
Payer: COMMERCIAL

## 2024-07-03 ENCOUNTER — ANCILLARY PROCEDURE (OUTPATIENT)
Dept: VASCULAR ULTRASOUND | Facility: CLINIC | Age: 69
End: 2024-07-03
Attending: PHYSICIAN ASSISTANT
Payer: COMMERCIAL

## 2024-07-03 ENCOUNTER — HOSPITAL ENCOUNTER (INPATIENT)
Facility: HOSPITAL | Age: 69
Setting detail: SURGERY ADMIT
End: 2024-07-03
Attending: SURGERY | Admitting: SURGERY
Payer: COMMERCIAL

## 2024-07-03 DIAGNOSIS — I73.9 PERIPHERAL ARTERIAL DISEASE (H): ICD-10-CM

## 2024-07-03 PROCEDURE — 93926 LOWER EXTREMITY STUDY: CPT | Mod: 26 | Performed by: SURGERY

## 2024-07-03 PROCEDURE — 93926 LOWER EXTREMITY STUDY: CPT | Mod: LT

## 2024-07-03 PROCEDURE — 93923 UPR/LXTR ART STDY 3+ LVLS: CPT | Mod: 26 | Performed by: SURGERY

## 2024-07-03 PROCEDURE — 93923 UPR/LXTR ART STDY 3+ LVLS: CPT

## 2024-07-03 NOTE — PROGRESS NOTES
Procedure: Left  Leg  Angiogram     Procedure Date :  7/8/2024    Procedure Time :  10:00 AM    Arrival Time: 9:00 AM    Admission Type: Outpatient    Surgeon:     Procedure Location: [facility]:  [address] [place] [address] (Phone: [phone], Fax: [phone])    Consent Completed:No, Scanned: No    Scheduled with: Nicol in IR Scheduling    Anesthesia Needed: no IF Yes Please clarify that the CRNA, anesthesia and AMISHA/PACU resources are being added at scheduling    Blood Thinners Address: [facility] addressing with pt.    2 week Post Procedure Appointment: [facility]

## 2024-07-03 NOTE — PROGRESS NOTES
Patient in need of urgent left leg angiogram per Dr. Harmon. Will need to hold Warfarin 5 days prior and lovenox bridging. Reviewed instructions with patient. She still has enough lovenox at home. On insulin as well and she knows to do half the dose the evening before. She verbalized understanding of all instructions.

## 2024-07-05 ENCOUNTER — TELEPHONE (OUTPATIENT)
Dept: ANTICOAGULATION | Facility: CLINIC | Age: 69
End: 2024-07-05

## 2024-07-05 ENCOUNTER — TELEPHONE (OUTPATIENT)
Dept: VASCULAR SURGERY | Facility: CLINIC | Age: 69
End: 2024-07-05

## 2024-07-05 ENCOUNTER — ANTICOAGULATION THERAPY VISIT (OUTPATIENT)
Dept: ANTICOAGULATION | Facility: CLINIC | Age: 69
End: 2024-07-05

## 2024-07-05 ENCOUNTER — TELEPHONE (OUTPATIENT)
Dept: INTERVENTIONAL RADIOLOGY/VASCULAR | Facility: CLINIC | Age: 69
End: 2024-07-05

## 2024-07-05 ENCOUNTER — LAB (OUTPATIENT)
Dept: LAB | Facility: CLINIC | Age: 69
End: 2024-07-05
Payer: COMMERCIAL

## 2024-07-05 ENCOUNTER — OFFICE VISIT (OUTPATIENT)
Dept: VASCULAR SURGERY | Facility: CLINIC | Age: 69
End: 2024-07-05
Attending: SURGERY
Payer: COMMERCIAL

## 2024-07-05 VITALS
DIASTOLIC BLOOD PRESSURE: 78 MMHG | HEART RATE: 92 BPM | SYSTOLIC BLOOD PRESSURE: 138 MMHG | TEMPERATURE: 98.3 F | RESPIRATION RATE: 18 BRPM

## 2024-07-05 DIAGNOSIS — I73.9 PAD (PERIPHERAL ARTERY DISEASE) (H): ICD-10-CM

## 2024-07-05 DIAGNOSIS — I74.3 FEMORAL ARTERY THROMBOSIS, LEFT (H): ICD-10-CM

## 2024-07-05 DIAGNOSIS — I73.9 PAD (PERIPHERAL ARTERY DISEASE) (H): Primary | ICD-10-CM

## 2024-07-05 DIAGNOSIS — L97.922 ULCER OF LEFT LOWER EXTREMITY WITH FAT LAYER EXPOSED (H): ICD-10-CM

## 2024-07-05 LAB — INR BLD: 2 (ref 0.9–1.1)

## 2024-07-05 PROCEDURE — G0463 HOSPITAL OUTPT CLINIC VISIT: HCPCS

## 2024-07-05 PROCEDURE — 85610 PROTHROMBIN TIME: CPT

## 2024-07-05 PROCEDURE — 36416 COLLJ CAPILLARY BLOOD SPEC: CPT

## 2024-07-05 PROCEDURE — 99213 OFFICE O/P EST LOW 20 MIN: CPT

## 2024-07-05 RX ORDER — CEFAZOLIN SODIUM/WATER 2 G/20 ML
2 SYRINGE (ML) INTRAVENOUS SEE ADMIN INSTRUCTIONS
Status: CANCELLED | OUTPATIENT
Start: 2024-07-08

## 2024-07-05 RX ORDER — ENOXAPARIN SODIUM 100 MG/ML
1 INJECTION SUBCUTANEOUS EVERY 12 HOURS
Qty: 16 ML | Refills: 0 | Status: CANCELLED | OUTPATIENT
Start: 2024-07-05

## 2024-07-05 RX ORDER — CEFAZOLIN SODIUM/WATER 2 G/20 ML
2 SYRINGE (ML) INTRAVENOUS
Status: CANCELLED | OUTPATIENT
Start: 2024-07-08

## 2024-07-05 ASSESSMENT — PAIN SCALES - GENERAL: PAINLEVEL: WORST PAIN (10)

## 2024-07-05 NOTE — PROGRESS NOTES
ANTICOAGULATION MANAGEMENT     Ashanti Aviles 68 year old female is on warfarin with subtherapeutic INR result. (Goal INR 2.5-3.5)    Recent labs: (last 7 days)     07/05/24  1414   INR 2.0*       ASSESSMENT     Source(s): Chart Review and Patient/Caregiver Call     Warfarin doses taken:  5 day hold started on 7/3  Diet: No new diet changes identified  Medication/supplement changes:  Still taking Bactrim 7/1-7/15  New illness, injury, or hospitalization: No  Signs or symptoms of bleeding or clotting: No  Previous result: Therapeutic last visit; previously outside of goal range  Additional findings: Upcoming surgery/procedure 7/8 LE angiogram, hold warfarin for 5 days then bridge with lovenox until INR 2.5 or above, she currently has a supply of lovenox at home and script shows 1 refill        PLAN     Recommended plan for temporary change(s) affecting INR     Dosing Instructions:  Continue Warfarin hold.  Resume Warfarin on 7/8 with booster dose x 2 days.  Continue Lovenox bridge.  Last Lovenox on 7/7 AM before procedure then resume 24 hours post procedure and continue until INR 2.5 or above. Recheck INR on 7/12.     Summary  As of 7/5/2024      Full warfarin instructions:  7/5: Hold; 7/6: Hold; 7/7: Hold; 7/8: 9 mg; 7/9: 9 mg; Otherwise 5 mg every Wed; 6 mg all other days   Next INR check:  7/12/2024               Telephone call with Ashanti who verbalizes understanding and agrees to plan    Lab visit scheduled 7/12    Education provided: Goal range and lab monitoring: goal range and significance of current result  Contact [phone] with any changes, questions or concerns.   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

## 2024-07-05 NOTE — TELEPHONE ENCOUNTER
Pt states there's a suture in one of her incisions that is poking her and causing bleeding. She states it seemed like it was healing and then she noticed the suture and the incision is opening up/suture is causing bleeding. She will send a photo.

## 2024-07-05 NOTE — TELEPHONE ENCOUNTER
"[facility]-PROCEDURAL ANTICOAGULATION  MANAGEMENT    ASSESSMENT     Warfarin interruption plan for Lower Extremity Angiogram on 7/8/24.    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 The decision to bridging is based on the risk of bleeding weighed against the risk of clotting.      PLAN     Per 7/1/24 vascular orders only encounter: Will need to hold Warfarin 5 days prior and lovenox bridging.     Pre-Procedure:  Start/Continue holding warfarin until after procedure  Enoxaparin (Lovenox) 70 mg subq Q 12 hrs (1 mg/kg Q 12 hrs for CrCl >= 60 ml/min and BMI <= 40 kg/m2)   Start/continue enoxaparin: 7/5 PM  Last dose of enoxaparin prior to procedure: 7/7 AM  (~24 hours prior to procedure)    Post-Procedure:  Resume warfarin dose if okay with provider doing procedure on night of procedure, 7/8 PM: 9 mg daily x 2 then resume current maintenance dose  Resume enoxaparin (Lovenox) ~ 24 hrs post procedure when okay with provider doing procedure. Continue until INR >= 2.5  Recheck INR 5-7 days after resuming warfarin   ?   Ese Pérez, [facility]    SUBJECTIVE/OBJECTIVE     Ashanti Aviles, a 68 year old female    Wt Readings from Last 3 Encounters:   06/21/24 69.9 kg (154 lb)   06/11/24 68.1 kg (150 lb 2.1 oz)   05/29/24 68.1 kg (150 lb 1.6 oz)      Ideal body weight: 47.8 kg (105 lb 6.1 oz)  Adjusted ideal body weight: 56.6 kg (124 lb 13.2 oz)     Estimated body mass index is 29.1 kg/m  as calculated from the following:    Height as of 6/21/24: 1.549 m (5' 1\").    Weight as of 6/21/24: 69.9 kg (154 lb).    Lab Results   Component Value Date    INR 2.0 (H) 07/05/2024    INR 2.9 (H) 07/01/2024    INR 2.0 (H) 06/28/2024     Lab Results   Component Value Date    HGB 12.0 06/21/2024    HCT 38.1 06/21/2024     (H) 06/21/2024     Lab Results   Component Value Date    CR 0.64 06/21/2024    CR " 0.55 06/10/2024    CR 0.52 06/09/2024     Estimated Creatinine Clearance: 77.3 mL/min (based on SCr of 0.64 mg/dL).

## 2024-07-05 NOTE — Clinical Note
FYI, the upper leg incision site is dehiscing with 2.8cm depth and 4.1 cm tunneling at 12 oclock.  Pictures are in media.  Looks like you are planning on I&D soon for distal incision site, may need to include this proximal incision.  Took 5 staples out that were poking into the wound causing her pain. Having angio Monday and I&D is not scheduled yet.

## 2024-07-08 ENCOUNTER — HOSPITAL ENCOUNTER (OUTPATIENT)
Dept: INTERVENTIONAL RADIOLOGY/VASCULAR | Facility: CLINIC | Age: 69
Discharge: HOME | End: 2024-07-08
Attending: SURGERY | Admitting: SURGERY
Payer: COMMERCIAL

## 2024-07-08 ENCOUNTER — PREP FOR PROCEDURE (OUTPATIENT)
Dept: VASCULAR SURGERY | Facility: CLINIC | Age: 69
End: 2024-07-08

## 2024-07-08 VITALS
HEART RATE: 85 BPM | TEMPERATURE: 98.5 F | DIASTOLIC BLOOD PRESSURE: 63 MMHG | RESPIRATION RATE: 22 BRPM | SYSTOLIC BLOOD PRESSURE: 136 MMHG | OXYGEN SATURATION: 100 %

## 2024-07-08 DIAGNOSIS — I73.9 PAD (PERIPHERAL ARTERY DISEASE) (H): ICD-10-CM

## 2024-07-08 DIAGNOSIS — I70.222 CRITICAL LIMB ISCHEMIA OF LEFT LOWER EXTREMITY (H): ICD-10-CM

## 2024-07-08 DIAGNOSIS — T82.868A THROMBOSIS OF ARTERIAL GRAFT, INITIAL ENCOUNTER (H): ICD-10-CM

## 2024-07-08 LAB
ACT BLD: 123 SECONDS (ref 74–150)
APTT PPP: 28 SECONDS (ref 22–38)
INR PPP: 0.91 (ref 0.85–1.15)

## 2024-07-08 PROCEDURE — 272N000566 HC SHEATH CR3

## 2024-07-08 PROCEDURE — 36415 COLL VENOUS BLD VENIPUNCTURE: CPT | Performed by: SURGERY

## 2024-07-08 PROCEDURE — C1887 CATHETER, GUIDING: HCPCS

## 2024-07-08 PROCEDURE — 99152 MOD SED SAME PHYS/QHP 5/>YRS: CPT

## 2024-07-08 PROCEDURE — 75710 ARTERY X-RAYS ARM/LEG: CPT | Mod: 26 | Performed by: SURGERY

## 2024-07-08 PROCEDURE — 75625 CONTRAST EXAM ABDOMINL AORTA: CPT

## 2024-07-08 PROCEDURE — 250N000011 HC RX IP 250 OP 636: Performed by: SURGERY

## 2024-07-08 PROCEDURE — 85730 THROMBOPLASTIN TIME PARTIAL: CPT | Performed by: SURGERY

## 2024-07-08 PROCEDURE — 272N000121 HC CATH CR6

## 2024-07-08 PROCEDURE — 272N000500 HC NEEDLE CR2

## 2024-07-08 PROCEDURE — 272N000147 HC KIT CR7

## 2024-07-08 PROCEDURE — 255N000002 HC RX 255 OP 636: Performed by: SURGERY

## 2024-07-08 PROCEDURE — C1769 GUIDE WIRE: HCPCS

## 2024-07-08 PROCEDURE — 36246 INS CATH ABD/L-EXT ART 2ND: CPT

## 2024-07-08 PROCEDURE — 85347 COAGULATION TIME ACTIVATED: CPT

## 2024-07-08 PROCEDURE — 85610 PROTHROMBIN TIME: CPT | Performed by: SURGERY

## 2024-07-08 PROCEDURE — 250N000009 HC RX 250: Performed by: SURGERY

## 2024-07-08 PROCEDURE — 250N000011 HC RX IP 250 OP 636: Performed by: STUDENT IN AN ORGANIZED HEALTH CARE EDUCATION/TRAINING PROGRAM

## 2024-07-08 RX ORDER — IODIXANOL 320 MG/ML
50 INJECTION, SOLUTION INTRAVASCULAR ONCE
Status: COMPLETED | OUTPATIENT
Start: 2024-07-08 | End: 2024-07-08

## 2024-07-08 RX ORDER — OXYCODONE HYDROCHLORIDE 5 MG/1
5 TABLET ORAL EVERY 4 HOURS PRN
Status: DISCONTINUED | OUTPATIENT
Start: 2024-07-08 | End: 2024-07-09 | Stop reason: HOSPADM

## 2024-07-08 RX ORDER — FENTANYL CITRATE 50 UG/ML
25-50 INJECTION, SOLUTION INTRAMUSCULAR; INTRAVENOUS EVERY 5 MIN PRN
Status: DISCONTINUED | OUTPATIENT
Start: 2024-07-08 | End: 2024-07-09 | Stop reason: HOSPADM

## 2024-07-08 RX ORDER — HEPARIN SODIUM 200 [USP'U]/100ML
4 INJECTION, SOLUTION INTRAVENOUS EVERY 5 MIN PRN
Status: DISCONTINUED | OUTPATIENT
Start: 2024-07-08 | End: 2024-07-09 | Stop reason: HOSPADM

## 2024-07-08 RX ORDER — FLUMAZENIL 0.1 MG/ML
0.2 INJECTION, SOLUTION INTRAVENOUS
Status: DISCONTINUED | OUTPATIENT
Start: 2024-07-08 | End: 2024-07-09 | Stop reason: HOSPADM

## 2024-07-08 RX ORDER — ACETAMINOPHEN 325 MG/1
650 TABLET ORAL EVERY 6 HOURS PRN
Status: DISCONTINUED | OUTPATIENT
Start: 2024-07-08 | End: 2024-07-09 | Stop reason: HOSPADM

## 2024-07-08 RX ORDER — NALOXONE HYDROCHLORIDE 0.4 MG/ML
0.4 INJECTION, SOLUTION INTRAMUSCULAR; INTRAVENOUS; SUBCUTANEOUS
Status: DISCONTINUED | OUTPATIENT
Start: 2024-07-08 | End: 2024-07-09 | Stop reason: HOSPADM

## 2024-07-08 RX ORDER — NALOXONE HYDROCHLORIDE 0.4 MG/ML
0.2 INJECTION, SOLUTION INTRAMUSCULAR; INTRAVENOUS; SUBCUTANEOUS
Status: DISCONTINUED | OUTPATIENT
Start: 2024-07-08 | End: 2024-07-09 | Stop reason: HOSPADM

## 2024-07-08 RX ORDER — LIDOCAINE 40 MG/G
CREAM TOPICAL
Status: DISCONTINUED | OUTPATIENT
Start: 2024-07-08 | End: 2024-07-09 | Stop reason: HOSPADM

## 2024-07-08 RX ORDER — PROTAMINE SULFATE 10 MG/ML
35 INJECTION, SOLUTION INTRAVENOUS ONCE
Status: COMPLETED | OUTPATIENT
Start: 2024-07-08 | End: 2024-07-08

## 2024-07-08 RX ORDER — SODIUM CHLORIDE 9 MG/ML
INJECTION, SOLUTION INTRAVENOUS CONTINUOUS
Status: DISCONTINUED | OUTPATIENT
Start: 2024-07-08 | End: 2024-07-09 | Stop reason: HOSPADM

## 2024-07-08 RX ORDER — ONDANSETRON 2 MG/ML
4 INJECTION INTRAMUSCULAR; INTRAVENOUS
Status: DISCONTINUED | OUTPATIENT
Start: 2024-07-08 | End: 2024-07-09 | Stop reason: HOSPADM

## 2024-07-08 RX ORDER — METHOCARBAMOL 100 MG/ML
500 INJECTION, SOLUTION INTRAMUSCULAR; INTRAVENOUS ONCE
Status: COMPLETED | OUTPATIENT
Start: 2024-07-08 | End: 2024-07-08

## 2024-07-08 RX ORDER — HEPARIN SODIUM 1000 [USP'U]/ML
7000 INJECTION, SOLUTION INTRAVENOUS; SUBCUTANEOUS ONCE
Status: COMPLETED | OUTPATIENT
Start: 2024-07-08 | End: 2024-07-08

## 2024-07-08 RX ORDER — HEPARIN SODIUM 200 [USP'U]/100ML
1 INJECTION, SOLUTION INTRAVENOUS EVERY 5 MIN PRN
Status: DISCONTINUED | OUTPATIENT
Start: 2024-07-08 | End: 2024-07-09 | Stop reason: HOSPADM

## 2024-07-08 RX ADMIN — HEPARIN SODIUM 4 BAG: 200 INJECTION, SOLUTION INTRAVENOUS at 10:01

## 2024-07-08 RX ADMIN — FENTANYL CITRATE 50 MCG: 50 INJECTION, SOLUTION INTRAMUSCULAR; INTRAVENOUS at 10:58

## 2024-07-08 RX ADMIN — PROTAMINE SULFATE 35 MG: 10 INJECTION, SOLUTION INTRAVENOUS at 10:49

## 2024-07-08 RX ADMIN — FENTANYL CITRATE 50 MCG: 50 INJECTION, SOLUTION INTRAMUSCULAR; INTRAVENOUS at 10:00

## 2024-07-08 RX ADMIN — FENTANYL CITRATE 25 MCG: 50 INJECTION, SOLUTION INTRAMUSCULAR; INTRAVENOUS at 10:09

## 2024-07-08 RX ADMIN — HEPARIN SODIUM 7000 UNITS: 1000 INJECTION INTRAVENOUS; SUBCUTANEOUS at 10:35

## 2024-07-08 RX ADMIN — FENTANYL CITRATE 50 MCG: 50 INJECTION, SOLUTION INTRAMUSCULAR; INTRAVENOUS at 10:31

## 2024-07-08 RX ADMIN — IODIXANOL 30 ML: 320 INJECTION, SOLUTION INTRAVASCULAR at 10:48

## 2024-07-08 RX ADMIN — MIDAZOLAM HYDROCHLORIDE 1 MG: 1 INJECTION, SOLUTION INTRAMUSCULAR; INTRAVENOUS at 10:01

## 2024-07-08 RX ADMIN — METHOCARBAMOL 500 MG: 100 INJECTION INTRAMUSCULAR; INTRAVENOUS at 11:20

## 2024-07-08 RX ADMIN — LIDOCAINE HYDROCHLORIDE 10 ML: 10 INJECTION, SOLUTION INFILTRATION; PERINEURAL at 10:03

## 2024-07-08 RX ADMIN — MIDAZOLAM HYDROCHLORIDE 0.5 MG: 1 INJECTION, SOLUTION INTRAMUSCULAR; INTRAVENOUS at 10:09

## 2024-07-08 RX ADMIN — MIDAZOLAM HYDROCHLORIDE 1 MG: 1 INJECTION, SOLUTION INTRAMUSCULAR; INTRAVENOUS at 10:57

## 2024-07-08 RX ADMIN — MIDAZOLAM HYDROCHLORIDE 1 MG: 1 INJECTION, SOLUTION INTRAMUSCULAR; INTRAVENOUS at 10:31

## 2024-07-08 NOTE — DISCHARGE INSTRUCTIONS
Angiogram Discharge Instructions:    You had an angiogram procedure. An angiogram is a procedure thatuses x-rays to take pictures of your blood vessels. A long, flexible tube or catheter is inserted through the blood stream (through the procedure site) to help deliver contrast (dye) into the arteries so they can be visibleon the x-ray. Angiograms are used to evaluate and treat possible blockages or other disease in the arterial system. Please follow the below instructions after your angiogram, including monitoring of your procedure site.    Care instructions after angiogram procedure:    - If you received sedation for your procedure, do not drive or operate heavy machinery for the rest of the day.    - Do not lift objects greater than 10 poundsfor 2 days following angiogram procedure.    - Avoid excessive exercise and straining for 2 days.    - Avoid tub baths, pools, hot tubs and Jacuzzis for 3 days or until procedure site is well healed.    - Youmay shower beginning tomorrow. Do not scrub procedure site until well healed; pat dry.    - Return to your normal activities as you tolerate after the 2 day restriction.    - You can expect to return to work 1-2days after your procedure - depending on the nature of your profession.    - It is normal to have some tenderness and minimal swelling at procedure puncture site. A small area of discoloration may be present.Tenderness typically subsides in 1-2 days. A small knot may also be present at puncture site for 6-8 weeks. This can be a normal part of the healing process.    Medications and other post-procedure care:    -If you had a blockage opened please make sure to fill your prescription for any new medication, such as Plavix, that you may have been prescribed and take it everyday    - If you have kidney function issues, please makesure to hydrate yourself well for the next two days by drinking lots of fluids to help clear your body of the dye used. If you have heart  problems such as heart failure, this may have to be moderated.    - If you are onMetformin, please do not resume it for 48hrs.    Follow up:    - Follow up with your vascular surgery team at the [facility] vascular center A follow up appointment should already be arranged for you.If you are unsure of your appointment or do not have a follow up appointment in the next 2-3 weeks, please call our office at [phone]. You will need to have an ultrasound 2-3 weeks after your angiogram and should bescheduled at the time of your follow up appt.    Further follow up will be based on ultrasound results. Typical follow up is every 3 months for the first year, then every 6 months to one year thereafter.    seek medical evaluation for:    - If you develop fevers (greater than 101 F (38.3C)).    - If you develop increasing pain, redness, purulent drainage, tenderness, or swelling at procedure site.    If you experience any bleeding from procedure/puncture site: lie down, firmly apply pressure to puncture site and call 911.    - Seek emergent evaluation if you experience any new leg/arm pain, discoloration ornumbness.    Call the vascular center at [phone] with questions/concerns or if you have any of the above symptoms.          * Recovery After Conscious Sedation (Adult)  We gave you medicine by vein to make you sleepy or relaxed during your procedure. This may have included both a pain medicine and sleeping medicine. Most of the effects have worn off. But you may still feel sleepy for the next 6 to 8 hours.  Home care  Follow these guidelines when you get home:  You may feel sleepy and clumsy and have poor balance for the next few hours.  A responsible adult should stay with you for the next 8 hours. This person should make sure your condition doesn t get worse.  Don't drink any alcohol for the next 24 hours.  Don't drive, operate dangerous machinery, make important business or personal decisions or sign legal  documents during the next 24 hours.  You may vomit (throw up) if you eat too soon after the procedure. If this happens, drink small amounts of water, juice or clear broth. Wait to try solid food until you no longer have nausea (upset stomach).  Note: Your care team may tell you not to take any medicine by mouth for pain or sleep in the next 4 hours. These medicines may react with the medicines you had in the hospital. This could cause a much stronger response than usual.  Follow-up care  Follow up with your care team if you are not alert and back to your usual level of activity within 12 hours.  When to seek medical advice  Call your care team right away if any of these occur:  You still feel sleepy or clumsy after 12 hours, or your sleepiness gets worse  Weakness or dizziness gets worse  Repeated vomiting  If you can't be woken up and someone is staying with you, they should call 911.  For informational purposes only. Not to replace the advice of your health care provider.  Copyright   2018 [employer]. All rights reserved.

## 2024-07-08 NOTE — PROCEDURES
Procedures  VASCULAR SURGERY ANGIOGRAM REPORT     [facility]    DATE: 7/8/24    PROCEDURES PERFORMED:     1.  Ultrasound-guided access of right common femoral artery  2.  Aortogram  3.  Selective cannulation of the left common iliac artery, external iliac artery, and common femoral artery with nonselective left lower extremity angiogram  4.  Moderate sedation  5.  Arteriotomy closure with manual pressure    PROVIDER:     Yovani Geller MD, RPVI  Belkys Harmon DO    INDICATION:     Chronic limb threatening ischemia and poor wound healing of left leg with history of multiple failed revascularization attempt to left leg. Now with non healing surgical wounds and surveillance US of left external iliac to distal posterior tibial artery bypass with high risk findings of  cm/s at proximal anastomosis and PSV 39cm/s in distal bypass with concern for high risk failure.     SEDATION:     The procedure was performed with administration of intravenous conscious sedation with appropriate preoperative, intraoperative, and postoperative evaluation.   40 minutes of supervised face to face intraservice time was provided by a radiology nurse under my direct supervision.     ANTIBIOTICS: none indicated  FLUOROSCOPIC TIME: 5.1 min  RADIATION DOSE: 12.6 mGy  CONTRAST: 30cc       PROCEDURE:     Ultrasound was used to evaluate the right common femoral artery. The selected vessel was patent. Ultrasound was then used for real-time ultrasound guided needle entry of the  right common femoral artery. Permanent images were recorded and saved to the patient's medical record.  Micropuncture sheath was inserted.  A glide advantage wire was advanced into the aorta.  5 Congolese sheath was placed.  An Omni Flush catheter was advanced over the wire and positioned just below bilateral renal arteries at the level of L2.  Aortogram was performed.  Then using up and over maneuver I was able to advance the wire into the left common iliac  artery.  Over the wire the Omni Flush catheter was advanced and positioned in the  left common iliac artery.  The omni flush catheter was exchanged over a wire for angled glide catheter with attempt to cannulate the bypass graft. This was not cannulated. Left lower extremity angiogram was performed.  At that point the procedure was concluded.  The arteriotomy closure was performed using an Angio-Seal device.  Patient tolerated procedure well and was transferred to recovery area in stable condition.  ?   FINDINGS     Patent left external iliac to distal posterior tibial bypass with no evidence of stenosis or narrowing. Patent proximal and distal anastomosis with runoff to foot via posterior tibial artery. Improved flow through bypass graft compared to immediate post op period.     IMPRESSION   Patent bypass graft. Pt with worsening wounds to medial left leg with large eschar over distal portion with concern for high risk of bypass graft exposure. Will refer to plastic surgery for evaluation of tissue coverage and plan for combined irrigation and debridement with possible tissue coverage in near future. Continue best medical therapy with asa, plavix, and coumadin with lovenox bridge.    Belkys Harmon, DO  FELLOW  VASCULAR SURGERY

## 2024-07-08 NOTE — PROGRESS NOTES
Patient Name: Ashanti Aviles  Medical Record Number: [id number]  Today's Date: 7/8/2024    Procedure: Image Guided Left Lower Extremity Angiogram with possible intervention and moderate sedation  Proceduralist: Dr. Dyan Geller  Pathology present: n/a    Procedure Start: 1005  Procedure end: 1050  Sedation medications administered: Fentanyl 175 mcg, Versed 3.5 mg     Report given to: ADRIAN [facility] IR  : n/a    Other Notes: Pt arrived to IR room #1 from IR Pre / Post 1. Consent reviewed. Pt denies any questions or concerns regarding procedure. Pt positioned supine and monitored per protocol. Pt tolerated procedure without any noted complications. Pt transferred back to IR Pre / Post 1.    Right groin access to femoral artery with 5 fr sheath.  Heparin IV 7000 Units administered per Dr. Geller's order.    Right femoral artery sheath removed at 1115, and held manual pressure at puncture site for 15 mins post-removal. Pt not a closure device candidate.  Flat bedrest x 4 hours post-procedure until 1515.

## 2024-07-09 ENCOUNTER — OFFICE VISIT (OUTPATIENT)
Dept: PLASTIC SURGERY | Facility: AMBULATORY SURGERY CENTER | Age: 69
End: 2024-07-09
Payer: COMMERCIAL

## 2024-07-09 ENCOUNTER — TELEPHONE (OUTPATIENT)
Dept: VASCULAR SURGERY | Facility: CLINIC | Age: 69
End: 2024-07-09

## 2024-07-09 VITALS — HEIGHT: 62 IN | BODY MASS INDEX: 27.97 KG/M2 | WEIGHT: 152 LBS

## 2024-07-09 DIAGNOSIS — I73.9 PAD (PERIPHERAL ARTERY DISEASE) (H): ICD-10-CM

## 2024-07-09 DIAGNOSIS — I70.248: Primary | ICD-10-CM

## 2024-07-09 DIAGNOSIS — L97.922 ULCER OF LEFT LOWER EXTREMITY WITH FAT LAYER EXPOSED (H): ICD-10-CM

## 2024-07-09 DIAGNOSIS — S91.102A OPEN WOUND OF LEFT GREAT TOE, INITIAL ENCOUNTER: ICD-10-CM

## 2024-07-09 PROCEDURE — 99205 OFFICE O/P NEW HI 60 MIN: CPT | Performed by: PLASTIC SURGERY

## 2024-07-09 RX ORDER — HYDROCODONE BITARTRATE AND ACETAMINOPHEN 5; 325 MG/1; MG/1
1 TABLET ORAL 2 TIMES DAILY PRN
Qty: 60 TABLET | Refills: 0 | Status: ON HOLD | OUTPATIENT
Start: 2024-07-09 | End: 2024-07-22

## 2024-07-09 NOTE — TELEPHONE ENCOUNTER
*Follow-up needs scheduled  *Hydrocodone last filled 5/13/24 by Kendy  *aspirin not filled by Kendy     Pending Prescriptions:                       Disp   Refills    HYDROcodone-acetaminophen (NORCO) 5-325 M*60 tab*0            Sig: Take 1 tablet by mouth 2 times daily as needed           for severe pain (max of 2 tablets per day)    [facility] - [place], [place] - [address] [place] [address]

## 2024-07-09 NOTE — TELEPHONE ENCOUNTER
Patient is in the hospital looks like she had surgery and left message to call to schedule follow up

## 2024-07-09 NOTE — TELEPHONE ENCOUNTER
Patient request for a refill(s) of HYDROmorphone (DILAUDID) 4 MG tablet   Last dispensed from pharmacy on 7/8/24     aspirin 81 MG EC tablet   Last dispensed from pharmacy on 6/11/24    Patient's last office/virtual visit by prescribing provider on 5/29/24  Next office/virtual appointment scheduled for None     Last urine drug screen date 4/26/24  Current opioid agreement on file (completed within the last year) Yes Date of opioid agreement: 4/26/24    E-prescribe to   [facility] [place], [place]       Will route to nursing [place] for review and preparation of prescription(s).

## 2024-07-09 NOTE — TELEPHONE ENCOUNTER
Reason for Call:  INR follow up    Detailed comments: Patient would like for Ritu to call her back regarding Novalox and Coumadin dosing. Will be having an upcoming surgery does not know the date yet but wants a call back asap    Phone Number Patient can be reached at: Cell number on file:    Telephone Information:   Mobile [phone]       Best Time: any    Can we leave a detailed message on this number? YES    Call taken on 7/9/2024 at 1:40 PM by Mayte Delgado

## 2024-07-09 NOTE — TELEPHONE ENCOUNTER
Spoke with Ashanti. She had a plastic surgery consult today and they are considering amputating her leg. She does not know timeline for this yet. For now, advised that she continue with her current warfarin and lovenox plan and to call [facility] if the procedure gets scheduled.     Ashanti verbalized understanding and had no further questions. INR is scheduled for Friday and we will discuss more at that time.     Ritu Carter RN

## 2024-07-09 NOTE — LETTER
7/9/2024      Ashanti Aviles  [address] [place] [address]      Dear Colleague,    Thank you for referring your patient, Ashanti Aviles, to the [facility] [place]. Please see a copy of my visit note below.    Chief complaint:  Left lower extremity ischemia with necrosis    History of present illness:  This is a 68 year old lady who presents with clear evidence of left lower extremity ischemia and soft tissue necrosis.  She has been referred to me for consideration of wound coverage of this left lower extremity necrosis.    This lady has a well-known history of peripheral vascular occlusive disease (PVOD) as well as insulin-dependent diabetes and a 50 years old history of tobacco use.    She has undergone at least 4 bypasses in her left leg, the most recent one, about a month ago from the left external iliac artery to the posterior tibialis artery utilizing a cryo artery graft.    Although her most recent left lower extremity angiogram shows a patent bypass, she has developed soft tissue necrosis in the lower third, medial aspect of the left leg at the level of the distal anastomosis.  Moreover, she underwent amputation of the left first hallux and the stump is not healing.    The vascular surgery service has referred this patient to me for possible soft tissue coverage of the necrotic area of the distal third of the leg at the level of the distal anastomosis.      Past medical history:  Past Medical History:   Diagnosis Date     Anal dysplasia     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/2020     Benign gastric polyp      Chronic toe ulcer (H)      Chronic ulcer of great toe of left foot with necrosis of muscle (H)      Diabetes mellitus (H)      Gastroesophageal reflux disease      Hard to intubate 02/15/2024     History of colonic polyps      HLD (hyperlipidemia)      Hypertension      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/29/2013     Nicotine addiction       OAG (open angle glaucoma)      PVD (peripheral vascular disease) (H24)      Reflux esophagitis      Retinopathy      Toe ulcer (H)      Tubular adenoma      Yeast vaginitis        Past surgical history:    Multiple left lower extremity arterial bypasses, bilateral feet toe amputations, colonoscopies    Allergies:  Penicillin    Medications:    Current Outpatient Medications:      acetaminophen (ACETAMINOPHEN 8 HOUR) 650 MG CR tablet, Take 1,300 mg by mouth every 8 hours as needed for mild pain or fever, Disp: , Rfl:      amLODIPine (NORVASC) 5 MG tablet, Take 5 mg by mouth daily, Disp: , Rfl:      aspirin 81 MG EC tablet, Take 1 tablet (81 mg) by mouth daily, Disp: 90 tablet, Rfl: 3     clopidogrel (PLAVIX) 75 MG tablet, Take 75 mg by mouth daily, Disp: , Rfl:      Continuous Blood Gluc Sensor (The Bully TrackerSTYLE PRINCESS 14 DAY SENSOR) MISC, , Disp: , Rfl:      empagliflozin (JARDIANCE) 25 MG TABS tablet, Take 1 tablet (25 mg) by mouth daily, Disp: 90 tablet, Rfl: 1     enoxaparin ANTICOAGULANT (LOVENOX) 80 MG/0.8ML syringe, Inject 0.7 mLs (70 mg) Subcutaneous every 12 hours Until INR at goal range, 2.5-3.5, Disp: 22.4 mL, Rfl: 1     ezetimibe (ZETIA) 10 MG tablet, Take 10 mg by mouth daily, Disp: , Rfl:      folic acid (FOLVITE) 1 MG tablet, Take 1 tablet (1 mg) by mouth daily, Disp: 90 tablet, Rfl: 0     gabapentin (NEURONTIN) 300 MG capsule, Take 1 capsule (300 mg) by mouth 3 times daily Start with one capsule at bedtime and increase dose as instructed, Disp: 90 capsule, Rfl: 0     HYDROcodone-acetaminophen (NORCO) 5-325 MG tablet, Take 1 tablet by mouth 2 times daily as needed for severe pain (max of 2 tablets per day), Disp: 60 tablet, Rfl: 0     HYDROmorphone (DILAUDID) 4 MG tablet, Take 1 tablet (4 mg) by mouth every 3 hours as needed for moderate pain or severe pain, Disp: 15 tablet, Rfl: 0     insulin degludec (TRESIBA) 100 UNIT/ML pen, Inject 30 Units Subcutaneous 2 times daily, Disp: , Rfl:      insulin glargine  (LANTUS PEN) 100 UNIT/ML pen, Inject 32 Units Subcutaneous 2 times daily, Disp: , Rfl:      linagliptin (TRADJENTA) 5 MG TABS tablet, Take 5 mg by mouth daily, Disp: , Rfl:      lisinopril (ZESTRIL) 40 MG tablet, Take 40 mg by mouth daily, Disp: , Rfl:      metFORMIN (GLUCOPHAGE XR) 500 MG 24 hr tablet, Take 1,000 mg by mouth daily, Disp: , Rfl:      methocarbamol (ROBAXIN) 500 MG tablet, Take 1 tablet (500 mg) by mouth at bedtime, Disp: 20 tablet, Rfl: 0     omeprazole (PRILOSEC) 20 MG DR capsule, Take 20 mg by mouth daily, Disp: , Rfl:      sulfamethoxazole-trimethoprim (BACTRIM) 400-80 MG tablet, Take 1 tablet by mouth 2 times daily for 14 days, Disp: 28 tablet, Rfl: 0     UNIFINE PENTIPS 31G X 5 MM miscellaneous, , Disp: , Rfl:      vitamin B complex with vitamin C (STRESS TAB) tablet, Take 1 tablet by mouth daily, Disp: 90 tablet, Rfl: 0     warfarin ANTICOAGULANT (COUMADIN) 2 MG tablet, Take 3 tablets (6 mg) by mouth daily Following dosing instructions from coumadin clinic., Disp: 90 tablet, Rfl: 3    Current Facility-Administered Medications:      lidocaine (XYLOCAINE) 5 % ointment, , Topical, Daily PRN, Robert Oliva MD, Given at 07/01/24 1058    Family history:  Mother with colon cancer    Social History:  Tobacco use for 50 years, patient quit 3 months ago.  Drinks alcohol occasionally.    Review of systems:  General ROS: No complaints or constitutional symptoms  Skin: No complaints or symptoms   Hematologic/Lymphatic: No symptoms or complaints  Psychiatric: No symptoms or complaints  Endocrine: No excessive fatigue, no hypermetabolic symptoms reported  Respiratory ROS: No cough, shortness of breath, or wheezing  Cardiovascular ROS: Peripheral vascular occlusive disease bilateral lower extremity, with chronic resting pain and threatening limb ischemia on her left leg.  Breast ROS: Denies palpable breast masses, denies nipple discharge, denies peau d'orange  Gastrointestinal ROS: No abdominal  "pain, nausea, diarrhea, or constipation  Musculoskeletal ROS: No recent injuries reported  Neurological ROS: No focal neurologic defects reported.      Physical exam:  Ht 1.575 m (5' 2\")   Wt 68.9 kg (152 lb)   LMP  (LMP Unknown)   BMI 27.80 kg/m    General: Alert, cooperative, appears stated age   Skin: Skin color, texture, turgor normal, no rashes or lesions   Lymphatic: No obvious adenopathy, no swelling   Eyes: No scleral icterus, pupils equal  HENT: No traumatic injury to the head or face, no gross abnormalities  Lungs: Normal respiratory effort, breath sounds equal bilaterally  Heart: Regular rate and rhythm  Breasts: Not examined  Abdomen: Soft, non-distended and non-tender to palpation.  Well-healed left lower quadrant incision where the proximal anastomosis to the external iliac artery took place.  Neurologic: Grossly intact  Left lower extremity: Presents with 3 open wounds on the medial aspect of her left leg.  There is an open wound on the upper third of the leg, medial to the knee, with some staples in place, and a foul-smelling discharge and evidence of wet necrosis deep into the wound.  There is a second superficial wound on the middle third of the leg, with a dry scar on it.  Finally, there is a third wound on the distal third of the leg, just above the ankle, with multiple staples in place and evidence of full-thickness dry necrosis affecting the skin and subcutaneous tissue.  This is the area of the distal anastomosis of the external iliac to the popliteal artery bypass.  There is trophic changes on the skin of this left leg compatible with chronic ischemia.  At the level of her foot, there is a nonhealing wound, covered with Steri-Strips at the level of the stump of her first hallux amputation.  There were no palpable pulses on the popliteal, dorsalis pedis or posterior tibialis arteries.  Patient was complaining of severe resting pain on her left lower extremity during my " examination.                      ASSESSMENT:    This is a 68 year old lady with history of peripheral vascular occlusive disease, insulin-dependent diabetes mellitus, chronic tobacco use and chronic left lower extremity ischemia now presenting with clear evidence of irreversible necrosis and a threatened left leg below the knee despite multiple bypasses.     Patient is not a candidate for soft tissue coverage of the distal third of the left leg.     PLAN:      I had a lengthy discussion with the patient explaining the poor prognosis of her left leg.  I explained to her that this is an unsalvageable condition that her left leg is experiencing secondary to the severity of her peripheral vascular occlusive disease.    I advised to her that she must consider life over limb and the next step will have to be an amputation by her vascular surgeon.  Patient expressed understanding and realization that this is the most logical next step.    I also discussed this case with Dr. Moran who will contact her in the near future for the amputation.    Time spent with the patient, reviewing her chart and discussing case with her referring doctor, 60 minutes.      Oscar Palmer MD, FACS   Diplomate American Board of Plastic Surgery  Diplomate American Board of Surgery  [facility]  Division of Plastic & Reconstructive Surgery  Office: [phone]   7/9/2024 at 7:33 PM        Again, thank you for allowing me to participate in the care of your patient.        Sincerely,        Oscar Palmer MD

## 2024-07-09 NOTE — TELEPHONE ENCOUNTER
[facility] Call Center    Phone Message    May a detailed message be left on voicemail: yes     Reason for Call: Medication Refill Request    Has the patient contacted the pharmacy for the refill? Yes     Name of medication being requested: Hydrocodone Aspirin 5-325mg    Provider who prescribed the medication: Kenyd Zapata    Pharmacy: [facility] [place]    Date medication is needed: ASAP

## 2024-07-10 NOTE — PROGRESS NOTES
Chief complaint:  Left lower extremity ischemia with necrosis    History of present illness:  This is a 68 year old lady who presents with clear evidence of left lower extremity ischemia and soft tissue necrosis.  She has been referred to me for consideration of wound coverage of this left lower extremity necrosis.    This lady has a well-known history of peripheral vascular occlusive disease (PVOD) as well as insulin-dependent diabetes and a 50 years old history of tobacco use.    She has undergone at least 4 bypasses in her left leg, the most recent one, about a month ago from the left external iliac artery to the posterior tibialis artery utilizing a cryo artery graft.    Although her most recent left lower extremity angiogram shows a patent bypass, she has developed soft tissue necrosis in the lower third, medial aspect of the left leg at the level of the distal anastomosis.  Moreover, she underwent amputation of the left first hallux and the stump is not healing.    The vascular surgery service has referred this patient to me for possible soft tissue coverage of the necrotic area of the distal third of the leg at the level of the distal anastomosis.      Past medical history:  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/29/2013    Nicotine addiction     OAG (open angle glaucoma)     PVD (peripheral vascular disease) (H24)     Reflux esophagitis     Retinopathy     Toe ulcer (H)     Tubular adenoma     Yeast vaginitis        Past surgical history:    Multiple left lower extremity arterial bypasses, bilateral feet toe  amputations, colonoscopies    Allergies:  Penicillin    Medications:    Current Outpatient Medications:     acetaminophen (ACETAMINOPHEN 8 HOUR) 650 MG CR tablet, Take 1,300 mg by mouth every 8 hours as needed for mild pain or fever, Disp: , Rfl:     amLODIPine (NORVASC) 5 MG tablet, Take 5 mg by mouth daily, Disp: , Rfl:     aspirin 81 MG EC tablet, Take 1 tablet (81 mg) by mouth daily, Disp: 90 tablet, Rfl: 3    clopidogrel (PLAVIX) 75 MG tablet, Take 75 mg by mouth daily, Disp: , Rfl:     Continuous Blood Gluc Sensor (FREESTYLE PRINCESS 14 DAY SENSOR) MISC, , Disp: , Rfl:     empagliflozin (JARDIANCE) 25 MG TABS tablet, Take 1 tablet (25 mg) by mouth daily, Disp: 90 tablet, Rfl: 1    enoxaparin ANTICOAGULANT (LOVENOX) 80 MG/0.8ML syringe, Inject 0.7 mLs (70 mg) Subcutaneous every 12 hours Until INR at goal range, 2.5-3.5, Disp: 22.4 mL, Rfl: 1    ezetimibe (ZETIA) 10 MG tablet, Take 10 mg by mouth daily, Disp: , Rfl:     folic acid (FOLVITE) 1 MG tablet, Take 1 tablet (1 mg) by mouth daily, Disp: 90 tablet, Rfl: 0    gabapentin (NEURONTIN) 300 MG capsule, Take 1 capsule (300 mg) by mouth 3 times daily Start with one capsule at bedtime and increase dose as instructed, Disp: 90 capsule, Rfl: 0    HYDROcodone-acetaminophen (NORCO) 5-325 MG tablet, Take 1 tablet by mouth 2 times daily as needed for severe pain (max of 2 tablets per day), Disp: 60 tablet, Rfl: 0    HYDROmorphone (DILAUDID) 4 MG tablet, Take 1 tablet (4 mg) by mouth every 3 hours as needed for moderate pain or severe pain, Disp: 15 tablet, Rfl: 0    insulin degludec (TRESIBA) 100 UNIT/ML pen, Inject 30 Units Subcutaneous 2 times daily, Disp: , Rfl:     insulin glargine (LANTUS PEN) 100 UNIT/ML pen, Inject 32 Units Subcutaneous 2 times daily, Disp: , Rfl:     linagliptin (TRADJENTA) 5 MG TABS tablet, Take 5 mg by mouth daily, Disp: , Rfl:     lisinopril (ZESTRIL) 40 MG tablet, Take 40 mg by mouth daily, Disp: , Rfl:     metFORMIN (GLUCOPHAGE XR) 500 MG 24  "hr tablet, Take 1,000 mg by mouth daily, Disp: , Rfl:     methocarbamol (ROBAXIN) 500 MG tablet, Take 1 tablet (500 mg) by mouth at bedtime, Disp: 20 tablet, Rfl: 0    omeprazole (PRILOSEC) 20 MG DR capsule, Take 20 mg by mouth daily, Disp: , Rfl:     sulfamethoxazole-trimethoprim (BACTRIM) 400-80 MG tablet, Take 1 tablet by mouth 2 times daily for 14 days, Disp: 28 tablet, Rfl: 0    UNIFINE PENTIPS 31G X 5 MM miscellaneous, , Disp: , Rfl:     vitamin B complex with vitamin C (STRESS TAB) tablet, Take 1 tablet by mouth daily, Disp: 90 tablet, Rfl: 0    warfarin ANTICOAGULANT (COUMADIN) 2 MG tablet, Take 3 tablets (6 mg) by mouth daily Following dosing instructions from coumadin clinic., Disp: 90 tablet, Rfl: 3    Current Facility-Administered Medications:     lidocaine (XYLOCAINE) 5 % ointment, , Topical, Daily PRN, Robert Oliva MD, Given at 07/01/24 1058    Family history:  Mother with colon cancer    Social History:  Tobacco use for 50 years, patient quit 3 months ago.  Drinks alcohol occasionally.    Review of systems:  General ROS: No complaints or constitutional symptoms  Skin: No complaints or symptoms   Hematologic/Lymphatic: No symptoms or complaints  Psychiatric: No symptoms or complaints  Endocrine: No excessive fatigue, no hypermetabolic symptoms reported  Respiratory ROS: No cough, shortness of breath, or wheezing  Cardiovascular ROS: Peripheral vascular occlusive disease bilateral lower extremity, with chronic resting pain and threatening limb ischemia on her left leg.  Breast ROS: Denies palpable breast masses, denies nipple discharge, denies peau d'orange  Gastrointestinal ROS: No abdominal pain, nausea, diarrhea, or constipation  Musculoskeletal ROS: No recent injuries reported  Neurological ROS: No focal neurologic defects reported.      Physical exam:  Ht 1.575 m (5' 2\")   Wt 68.9 kg (152 lb)   LMP  (LMP Unknown)   BMI 27.80 kg/m    General: Alert, cooperative, appears stated age "   Skin: Skin color, texture, turgor normal, no rashes or lesions   Lymphatic: No obvious adenopathy, no swelling   Eyes: No scleral icterus, pupils equal  HENT: No traumatic injury to the head or face, no gross abnormalities  Lungs: Normal respiratory effort, breath sounds equal bilaterally  Heart: Regular rate and rhythm  Breasts: Not examined  Abdomen: Soft, non-distended and non-tender to palpation.  Well-healed left lower quadrant incision where the proximal anastomosis to the external iliac artery took place.  Neurologic: Grossly intact  Left lower extremity: Presents with 3 open wounds on the medial aspect of her left leg.  There is an open wound on the upper third of the leg, medial to the knee, with some staples in place, and a foul-smelling discharge and evidence of wet necrosis deep into the wound.  There is a second superficial wound on the middle third of the leg, with a dry scar on it.  Finally, there is a third wound on the distal third of the leg, just above the ankle, with multiple staples in place and evidence of full-thickness dry necrosis affecting the skin and subcutaneous tissue.  This is the area of the distal anastomosis of the external iliac to the popliteal artery bypass.  There is trophic changes on the skin of this left leg compatible with chronic ischemia.  At the level of her foot, there is a nonhealing wound, covered with Steri-Strips at the level of the stump of her first hallux amputation.  There were no palpable pulses on the popliteal, dorsalis pedis or posterior tibialis arteries.  Patient was complaining of severe resting pain on her left lower extremity during my examination.                      ASSESSMENT:    This is a 68 year old lady with history of peripheral vascular occlusive disease, insulin-dependent diabetes mellitus, chronic tobacco use and chronic left lower extremity ischemia now presenting with clear evidence of irreversible necrosis and a threatened left leg below  the knee despite multiple bypasses.     Patient is not a candidate for soft tissue coverage of the distal third of the left leg.     PLAN:      I had a lengthy discussion with the patient explaining the poor prognosis of her left leg.  I explained to her that this is an unsalvageable condition that her left leg is experiencing secondary to the severity of her peripheral vascular occlusive disease.    I advised to her that she must consider life over limb and the next step will have to be an amputation by her vascular surgeon.  Patient expressed understanding and realization that this is the most logical next step.    I also discussed this case with Dr. Moran who will contact her in the near future for the amputation.    Time spent with the patient, reviewing her chart and discussing case with her referring doctor, 60 minutes.      Oscar Palmer MD, FACS   Diplomate American Board of Plastic Surgery  Diplomate American Board of Surgery  [facility]  Division of Plastic & Reconstructive Surgery  Office: [phone]   7/9/2024 at 7:33 PM

## 2024-07-11 ENCOUNTER — VIRTUAL VISIT (OUTPATIENT)
Dept: VASCULAR SURGERY | Facility: CLINIC | Age: 69
End: 2024-07-11
Attending: SURGERY
Payer: COMMERCIAL

## 2024-07-11 ENCOUNTER — DOCUMENTATION ONLY (OUTPATIENT)
Dept: VASCULAR SURGERY | Facility: CLINIC | Age: 69
End: 2024-07-11

## 2024-07-11 VITALS — HEIGHT: 62 IN | WEIGHT: 152 LBS | BODY MASS INDEX: 27.97 KG/M2

## 2024-07-11 DIAGNOSIS — I73.9 PAD (PERIPHERAL ARTERY DISEASE) (H): Primary | ICD-10-CM

## 2024-07-11 PROCEDURE — 99024 POSTOP FOLLOW-UP VISIT: CPT | Mod: 93 | Performed by: SURGERY

## 2024-07-11 RX ORDER — CEFAZOLIN SODIUM/WATER 2 G/20 ML
2 SYRINGE (ML) INTRAVENOUS
Status: CANCELLED | OUTPATIENT
Start: 2024-07-17

## 2024-07-11 RX ORDER — CEFAZOLIN SODIUM/WATER 2 G/20 ML
2 SYRINGE (ML) INTRAVENOUS SEE ADMIN INSTRUCTIONS
Status: CANCELLED | OUTPATIENT
Start: 2024-07-17

## 2024-07-11 ASSESSMENT — PAIN SCALES - GENERAL: PAINLEVEL: SEVERE PAIN (7)

## 2024-07-11 NOTE — PROGRESS NOTES
Surgery Scheduled    Discussed with pt surgery plan/instruction. Per case request no per-op physical is needed. Letter to be sent via [employer] from Rosibel BENITEZ RN.     Surgery/Procedure: AMPUTATION, ABOVE KNEE (Left)     Special Equipment: None noted on case request.    Location: [facility]:  [address] [place] [address] (phone: [phone], Fax: [phone])    Please park in Lot A. Enter through the main entrance. Check in at the Welcome Desk and you will be directed to the surgery unit.     Date: 7/17/2024    Time: 1:00pm    Admission Type: Inpatient    Surgeon: Dr. Geller    OR Confirmed & :  Yes with Ronda on 7/11/2024    Entered on provider calendar:  Yes    Post Op: See appt desk.     Wound Vac Needed:  TBD    Home Care Needed: TBD    Reps Contacted: Not needed    Blood Thinners Addressed:  Will be addressed by Rosibel CAMPBELL when sending surgery packet to the patient.    Stress Test Clearance: NO

## 2024-07-11 NOTE — PATIENT INSTRUCTIONS
This is the plan that was discussed at your appointment.    Surgery instructions discussed on phone and sent through [employer]        I am including additional information on these things and our contact information if you have any questions or concerns.   Please do not hesitate to reach out to us if you felt we did not answer your questions or you are unsure of the treatment plan after your visit today. Our number is [phone].  Thank you for trusting us with your care.         Again thank you for your time.

## 2024-07-11 NOTE — PROGRESS NOTES
Virtual Visit Details:    Type of service:  Telephone visit     Length of call: 12 minutes    Originating Location (Pt. Location): Home     Distant Location (Provider location):  [facility]     Platform used for visit:  Delfino     Received verbal consent for virtual visit.        68-year-old female with progressive left lower extremity chronic limb threatening ischemia despite working bypass graft.  Recent angiogram did show patency of the bypass without any issues.  Unfortunate patient developed a wound over the distal anastomosis, and I am concerned for potential blowout.  Patient was seen by plastic surgery who recommended amputation as well.  Will proceed with above-knee amputation as soon as possible.

## 2024-07-12 ENCOUNTER — TELEPHONE (OUTPATIENT)
Dept: PALLIATIVE MEDICINE | Facility: OTHER | Age: 69
End: 2024-07-12

## 2024-07-12 NOTE — TELEPHONE ENCOUNTER
Prior authorization requested for:   Disp Refills Start End SHAINA   HYDROcodone-acetaminophen (NORCO) 5-325 MG tablet 60 tablet 0 7/9/2024 -- No   Sig - Route: Take 1 tablet by mouth 2 times daily as needed for severe pain (max of 2 tablets per day) - Oral   Sent to pharmacy as: HYDROcodone-Acetaminophen 5-325 MG Oral Tablet   Class: E-Prescribe   Earliest Fill Date: 7/9/2024   Order: [id number]   E-Prescribing Status: Receipt confirmed by pharmacy (7/9/2024 10:35 AM CDT)     [facility] - [place], [place] - [address] [place] [address]

## 2024-07-16 NOTE — TELEPHONE ENCOUNTER
Central Prior Authorization Team   Phone: [phone]    PA Initiation    Medication: HYDROcodone-acetaminophen (NORCO) 5-325 MG tablet  Insurance Company: [employer] - Phone [phone] Fax [phone]  Pharmacy Filling the Rx: [facility] - [place], [place] - [address] [place] [address]  Filling Pharmacy Phone: [phone]  Filling Pharmacy Fax:    Start Date: 7/16/2024

## 2024-07-16 NOTE — TELEPHONE ENCOUNTER
Prior Authorization Approval    Authorization Effective Date: 6/16/2024  Authorization Expiration Date: 7/16/2025  Medication: HYDROcodone-acetaminophen (NORCO) 5-325 MG tablet-PA APPROVED   Approved Dose/Quantity:   Reference #:     Insurance Company: [employer] - Phone [phone] Fax [phone]  Expected CoPay:       CoPay Card Available:      Foundation Assistance Needed:    Which Pharmacy is filling the prescription (Not needed for infusion/clinic administered): [facility] - [place], [place] - [address] [place] [address]  Pharmacy Notified:  Yes- **Instructed pharmacy to notify patient when script is ready to /ship.**  Patient Notified:  Yes

## 2024-07-17 ENCOUNTER — ANESTHESIA EVENT (OUTPATIENT)
Dept: SURGERY | Facility: HOSPITAL | Age: 69
DRG: 240 | End: 2024-07-17
Payer: COMMERCIAL

## 2024-07-17 ENCOUNTER — HOSPITAL ENCOUNTER (INPATIENT)
Facility: HOSPITAL | Age: 69
LOS: 5 days | Discharge: HOME HEALTH | DRG: 240 | End: 2024-07-22
Attending: SURGERY | Admitting: SURGERY
Payer: COMMERCIAL

## 2024-07-17 ENCOUNTER — ANESTHESIA (OUTPATIENT)
Dept: SURGERY | Facility: HOSPITAL | Age: 69
DRG: 240 | End: 2024-07-17
Payer: COMMERCIAL

## 2024-07-17 DIAGNOSIS — M62.838 MUSCLE SPASM: ICD-10-CM

## 2024-07-17 DIAGNOSIS — I73.9 PAD (PERIPHERAL ARTERY DISEASE) (H): Primary | ICD-10-CM

## 2024-07-17 DIAGNOSIS — D50.8 OTHER IRON DEFICIENCY ANEMIA: ICD-10-CM

## 2024-07-17 LAB
ANION GAP SERPL CALCULATED.3IONS-SCNC: 11 MMOL/L (ref 7–15)
BASOPHILS # BLD AUTO: 0.1 10E3/UL (ref 0–0.2)
BASOPHILS NFR BLD AUTO: 1 %
BUN SERPL-MCNC: 11.5 MG/DL (ref 8–23)
CALCIUM SERPL-MCNC: 9 MG/DL (ref 8.8–10.4)
CHLORIDE SERPL-SCNC: 102 MMOL/L (ref 98–107)
CREAT SERPL-MCNC: 0.62 MG/DL (ref 0.51–0.95)
EGFRCR SERPLBLD CKD-EPI 2021: >90 ML/MIN/1.73M2
EOSINOPHIL # BLD AUTO: 0.1 10E3/UL (ref 0–0.7)
EOSINOPHIL NFR BLD AUTO: 1 %
ERYTHROCYTE [DISTWIDTH] IN BLOOD BY AUTOMATED COUNT: 15.6 % (ref 10–15)
GLUCOSE BLDC GLUCOMTR-MCNC: 119 MG/DL (ref 70–99)
GLUCOSE BLDC GLUCOMTR-MCNC: 136 MG/DL (ref 70–99)
GLUCOSE BLDC GLUCOMTR-MCNC: 137 MG/DL (ref 70–99)
GLUCOSE BLDC GLUCOMTR-MCNC: 99 MG/DL (ref 70–99)
GLUCOSE SERPL-MCNC: 133 MG/DL (ref 70–99)
HBA1C MFR BLD: 6.9 %
HCO3 SERPL-SCNC: 25 MMOL/L (ref 22–29)
HCT VFR BLD AUTO: 35.1 % (ref 35–47)
HGB BLD-MCNC: 11 G/DL (ref 11.7–15.7)
IMM GRANULOCYTES # BLD: 0.1 10E3/UL
IMM GRANULOCYTES NFR BLD: 1 %
IRON BINDING CAPACITY (ROCHE): 222 UG/DL (ref 240–430)
IRON SATN MFR SERPL: 13 % (ref 15–46)
IRON SERPL-MCNC: 28 UG/DL (ref 37–145)
LYMPHOCYTES # BLD AUTO: 3 10E3/UL (ref 0.8–5.3)
LYMPHOCYTES NFR BLD AUTO: 25 %
MCH RBC QN AUTO: 27.9 PG (ref 26.5–33)
MCHC RBC AUTO-ENTMCNC: 31.3 G/DL (ref 31.5–36.5)
MCV RBC AUTO: 89 FL (ref 78–100)
MONOCYTES # BLD AUTO: 1.1 10E3/UL (ref 0–1.3)
MONOCYTES NFR BLD AUTO: 9 %
NEUTROPHILS # BLD AUTO: 7.5 10E3/UL (ref 1.6–8.3)
NEUTROPHILS NFR BLD AUTO: 63 %
NRBC # BLD AUTO: 0 10E3/UL
NRBC BLD AUTO-RTO: 0 /100
PLATELET # BLD AUTO: 405 10E3/UL (ref 150–450)
POTASSIUM SERPL-SCNC: 3.5 MMOL/L (ref 3.4–5.3)
RBC # BLD AUTO: 3.94 10E6/UL (ref 3.8–5.2)
RETICS # AUTO: 0.08 10E6/UL (ref 0.03–0.1)
RETICS/RBC NFR AUTO: 2 % (ref 0.5–2)
SODIUM SERPL-SCNC: 138 MMOL/L (ref 135–145)
WBC # BLD AUTO: 11.9 10E3/UL (ref 4–11)

## 2024-07-17 PROCEDURE — 88307 TISSUE EXAM BY PATHOLOGIST: CPT | Mod: 26 | Performed by: PATHOLOGY

## 2024-07-17 PROCEDURE — 250N000013 HC RX MED GY IP 250 OP 250 PS 637: Performed by: ANESTHESIOLOGY

## 2024-07-17 PROCEDURE — 250N000009 HC RX 250: Performed by: STUDENT IN AN ORGANIZED HEALTH CARE EDUCATION/TRAINING PROGRAM

## 2024-07-17 PROCEDURE — 250N000011 HC RX IP 250 OP 636: Performed by: SURGERY

## 2024-07-17 PROCEDURE — 250N000011 HC RX IP 250 OP 636: Performed by: ANESTHESIOLOGY

## 2024-07-17 PROCEDURE — 83036 HEMOGLOBIN GLYCOSYLATED A1C: CPT | Performed by: STUDENT IN AN ORGANIZED HEALTH CARE EDUCATION/TRAINING PROGRAM

## 2024-07-17 PROCEDURE — 88311 DECALCIFY TISSUE: CPT | Mod: TC | Performed by: SURGERY

## 2024-07-17 PROCEDURE — 82728 ASSAY OF FERRITIN: CPT | Performed by: STUDENT IN AN ORGANIZED HEALTH CARE EDUCATION/TRAINING PROGRAM

## 2024-07-17 PROCEDURE — 82607 VITAMIN B-12: CPT | Performed by: STUDENT IN AN ORGANIZED HEALTH CARE EDUCATION/TRAINING PROGRAM

## 2024-07-17 PROCEDURE — 272N000001 HC OR GENERAL SUPPLY STERILE: Performed by: SURGERY

## 2024-07-17 PROCEDURE — 85025 COMPLETE CBC W/AUTO DIFF WBC: CPT | Performed by: SURGERY

## 2024-07-17 PROCEDURE — 258N000003 HC RX IP 258 OP 636: Performed by: ANESTHESIOLOGY

## 2024-07-17 PROCEDURE — 710N000010 HC RECOVERY PHASE 1, LEVEL 2, PER MIN: Performed by: SURGERY

## 2024-07-17 PROCEDURE — 27590 AMPUTATE LEG AT THIGH: CPT | Performed by: STUDENT IN AN ORGANIZED HEALTH CARE EDUCATION/TRAINING PROGRAM

## 2024-07-17 PROCEDURE — 83550 IRON BINDING TEST: CPT | Performed by: STUDENT IN AN ORGANIZED HEALTH CARE EDUCATION/TRAINING PROGRAM

## 2024-07-17 PROCEDURE — 250N000011 HC RX IP 250 OP 636

## 2024-07-17 PROCEDURE — 250N000011 HC RX IP 250 OP 636: Performed by: STUDENT IN AN ORGANIZED HEALTH CARE EDUCATION/TRAINING PROGRAM

## 2024-07-17 PROCEDURE — 258N000003 HC RX IP 258 OP 636: Performed by: SURGERY

## 2024-07-17 PROCEDURE — 370N000017 HC ANESTHESIA TECHNICAL FEE, PER MIN: Performed by: SURGERY

## 2024-07-17 PROCEDURE — 250N000013 HC RX MED GY IP 250 OP 250 PS 637: Performed by: SURGERY

## 2024-07-17 PROCEDURE — 258N000003 HC RX IP 258 OP 636: Performed by: STUDENT IN AN ORGANIZED HEALTH CARE EDUCATION/TRAINING PROGRAM

## 2024-07-17 PROCEDURE — 85045 AUTOMATED RETICULOCYTE COUNT: CPT | Performed by: STUDENT IN AN ORGANIZED HEALTH CARE EDUCATION/TRAINING PROGRAM

## 2024-07-17 PROCEDURE — 27590 AMPUTATE LEG AT THIGH: CPT | Performed by: ANESTHESIOLOGY

## 2024-07-17 PROCEDURE — 27590 AMPUTATE LEG AT THIGH: CPT | Mod: 79 | Performed by: SURGERY

## 2024-07-17 PROCEDURE — 250N000009 HC RX 250: Performed by: SURGERY

## 2024-07-17 PROCEDURE — 999N000141 HC STATISTIC PRE-PROCEDURE NURSING ASSESSMENT: Performed by: SURGERY

## 2024-07-17 PROCEDURE — 250N000025 HC SEVOFLURANE, PER MIN: Performed by: SURGERY

## 2024-07-17 PROCEDURE — 0Y6D0Z2 DETACHMENT AT LEFT UPPER LEG, MID, OPEN APPROACH: ICD-10-PCS | Performed by: SURGERY

## 2024-07-17 PROCEDURE — 82374 ASSAY BLOOD CARBON DIOXIDE: CPT | Performed by: SURGERY

## 2024-07-17 PROCEDURE — 360N000076 HC SURGERY LEVEL 3, PER MIN: Performed by: SURGERY

## 2024-07-17 PROCEDURE — 99223 1ST HOSP IP/OBS HIGH 75: CPT | Performed by: STUDENT IN AN ORGANIZED HEALTH CARE EDUCATION/TRAINING PROGRAM

## 2024-07-17 PROCEDURE — 250N000009 HC RX 250

## 2024-07-17 PROCEDURE — 88311 DECALCIFY TISSUE: CPT | Mod: 26 | Performed by: PATHOLOGY

## 2024-07-17 PROCEDURE — 36415 COLL VENOUS BLD VENIPUNCTURE: CPT | Performed by: SURGERY

## 2024-07-17 PROCEDURE — 120N000001 HC R&B MED SURG/OB

## 2024-07-17 RX ORDER — AMLODIPINE BESYLATE 5 MG/1
5 TABLET ORAL DAILY
Status: DISCONTINUED | OUTPATIENT
Start: 2024-07-17 | End: 2024-07-22 | Stop reason: HOSPADM

## 2024-07-17 RX ORDER — NICOTINE POLACRILEX 4 MG
15-30 LOZENGE BUCCAL
Status: DISCONTINUED | OUTPATIENT
Start: 2024-07-17 | End: 2024-07-22 | Stop reason: HOSPADM

## 2024-07-17 RX ORDER — PANTOPRAZOLE SODIUM 40 MG/1
40 TABLET, DELAYED RELEASE ORAL DAILY PRN
Status: DISCONTINUED | OUTPATIENT
Start: 2024-07-17 | End: 2024-07-22 | Stop reason: HOSPADM

## 2024-07-17 RX ORDER — NALOXONE HYDROCHLORIDE 0.4 MG/ML
0.4 INJECTION, SOLUTION INTRAMUSCULAR; INTRAVENOUS; SUBCUTANEOUS
Status: DISCONTINUED | OUTPATIENT
Start: 2024-07-17 | End: 2024-07-22 | Stop reason: HOSPADM

## 2024-07-17 RX ORDER — EPHEDRINE SULFATE 50 MG/ML
INJECTION, SOLUTION INTRAMUSCULAR; INTRAVENOUS; SUBCUTANEOUS PRN
Status: DISCONTINUED | OUTPATIENT
Start: 2024-07-17 | End: 2024-07-17

## 2024-07-17 RX ORDER — ONDANSETRON 4 MG/1
4 TABLET, ORALLY DISINTEGRATING ORAL EVERY 30 MIN PRN
Status: DISCONTINUED | OUTPATIENT
Start: 2024-07-17 | End: 2024-07-17 | Stop reason: HOSPADM

## 2024-07-17 RX ORDER — ONDANSETRON 2 MG/ML
4 INJECTION INTRAMUSCULAR; INTRAVENOUS EVERY 6 HOURS PRN
Status: DISCONTINUED | OUTPATIENT
Start: 2024-07-17 | End: 2024-07-22 | Stop reason: HOSPADM

## 2024-07-17 RX ORDER — SODIUM CHLORIDE, SODIUM LACTATE, POTASSIUM CHLORIDE, CALCIUM CHLORIDE 600; 310; 30; 20 MG/100ML; MG/100ML; MG/100ML; MG/100ML
INJECTION, SOLUTION INTRAVENOUS CONTINUOUS
Status: DISCONTINUED | OUTPATIENT
Start: 2024-07-17 | End: 2024-07-17 | Stop reason: HOSPADM

## 2024-07-17 RX ORDER — METFORMIN HCL 500 MG
1000 TABLET, EXTENDED RELEASE 24 HR ORAL DAILY
Status: DISCONTINUED | OUTPATIENT
Start: 2024-07-18 | End: 2024-07-22 | Stop reason: HOSPADM

## 2024-07-17 RX ORDER — OXYCODONE HYDROCHLORIDE 5 MG/1
10 TABLET ORAL EVERY 4 HOURS PRN
Status: DISCONTINUED | OUTPATIENT
Start: 2024-07-17 | End: 2024-07-18

## 2024-07-17 RX ORDER — NALOXONE HYDROCHLORIDE 0.4 MG/ML
0.4 INJECTION, SOLUTION INTRAMUSCULAR; INTRAVENOUS; SUBCUTANEOUS
Status: DISCONTINUED | OUTPATIENT
Start: 2024-07-17 | End: 2024-07-17

## 2024-07-17 RX ORDER — AMOXICILLIN 250 MG
1 CAPSULE ORAL 2 TIMES DAILY
Status: DISCONTINUED | OUTPATIENT
Start: 2024-07-17 | End: 2024-07-22 | Stop reason: HOSPADM

## 2024-07-17 RX ORDER — BISACODYL 10 MG
10 SUPPOSITORY, RECTAL RECTAL DAILY PRN
Status: DISCONTINUED | OUTPATIENT
Start: 2024-07-20 | End: 2024-07-22 | Stop reason: HOSPADM

## 2024-07-17 RX ORDER — ONDANSETRON 2 MG/ML
4 INJECTION INTRAMUSCULAR; INTRAVENOUS EVERY 30 MIN PRN
Status: DISCONTINUED | OUTPATIENT
Start: 2024-07-17 | End: 2024-07-17 | Stop reason: HOSPADM

## 2024-07-17 RX ORDER — PROPOFOL 10 MG/ML
INJECTION, EMULSION INTRAVENOUS PRN
Status: DISCONTINUED | OUTPATIENT
Start: 2024-07-17 | End: 2024-07-17

## 2024-07-17 RX ORDER — CEFAZOLIN SODIUM/WATER 2 G/20 ML
2 SYRINGE (ML) INTRAVENOUS
Status: COMPLETED | OUTPATIENT
Start: 2024-07-17 | End: 2024-07-17

## 2024-07-17 RX ORDER — NALOXONE HYDROCHLORIDE 0.4 MG/ML
0.1 INJECTION, SOLUTION INTRAMUSCULAR; INTRAVENOUS; SUBCUTANEOUS
Status: DISCONTINUED | OUTPATIENT
Start: 2024-07-17 | End: 2024-07-17 | Stop reason: HOSPADM

## 2024-07-17 RX ORDER — BUPIVACAINE HYDROCHLORIDE 5 MG/ML
INJECTION, SOLUTION EPIDURAL; INTRACAUDAL PRN
Status: DISCONTINUED | OUTPATIENT
Start: 2024-07-17 | End: 2024-07-17

## 2024-07-17 RX ORDER — ACETAMINOPHEN 325 MG/1
975 TABLET ORAL EVERY 8 HOURS
Status: DISCONTINUED | OUTPATIENT
Start: 2024-07-17 | End: 2024-07-18

## 2024-07-17 RX ORDER — HYDROMORPHONE HYDROCHLORIDE 1 MG/ML
0.5 INJECTION, SOLUTION INTRAMUSCULAR; INTRAVENOUS; SUBCUTANEOUS
Status: DISCONTINUED | OUTPATIENT
Start: 2024-07-17 | End: 2024-07-17 | Stop reason: HOSPADM

## 2024-07-17 RX ORDER — NALOXONE HYDROCHLORIDE 0.4 MG/ML
0.2 INJECTION, SOLUTION INTRAMUSCULAR; INTRAVENOUS; SUBCUTANEOUS
Status: DISCONTINUED | OUTPATIENT
Start: 2024-07-17 | End: 2024-07-22 | Stop reason: HOSPADM

## 2024-07-17 RX ORDER — DEXTROSE MONOHYDRATE 25 G/50ML
25-50 INJECTION, SOLUTION INTRAVENOUS
Status: DISCONTINUED | OUTPATIENT
Start: 2024-07-17 | End: 2024-07-22 | Stop reason: HOSPADM

## 2024-07-17 RX ORDER — LIDOCAINE 40 MG/G
CREAM TOPICAL
Status: DISCONTINUED | OUTPATIENT
Start: 2024-07-17 | End: 2024-07-22 | Stop reason: HOSPADM

## 2024-07-17 RX ORDER — CEFAZOLIN SODIUM/WATER 2 G/20 ML
2 SYRINGE (ML) INTRAVENOUS SEE ADMIN INSTRUCTIONS
Status: DISCONTINUED | OUTPATIENT
Start: 2024-07-17 | End: 2024-07-17 | Stop reason: HOSPADM

## 2024-07-17 RX ORDER — GABAPENTIN 300 MG/1
300 CAPSULE ORAL 3 TIMES DAILY
Status: DISCONTINUED | OUTPATIENT
Start: 2024-07-17 | End: 2024-07-22 | Stop reason: HOSPADM

## 2024-07-17 RX ORDER — EZETIMIBE 10 MG/1
10 TABLET ORAL DAILY
Status: DISCONTINUED | OUTPATIENT
Start: 2024-07-18 | End: 2024-07-22 | Stop reason: HOSPADM

## 2024-07-17 RX ORDER — METHOCARBAMOL 500 MG/1
500 TABLET, FILM COATED ORAL AT BEDTIME
Status: DISCONTINUED | OUTPATIENT
Start: 2024-07-17 | End: 2024-07-18

## 2024-07-17 RX ORDER — SODIUM CHLORIDE 9 MG/ML
INJECTION, SOLUTION INTRAVENOUS CONTINUOUS
Status: DISCONTINUED | OUTPATIENT
Start: 2024-07-17 | End: 2024-07-18

## 2024-07-17 RX ORDER — LISINOPRIL 20 MG/1
40 TABLET ORAL DAILY
Status: DISCONTINUED | OUTPATIENT
Start: 2024-07-18 | End: 2024-07-22 | Stop reason: HOSPADM

## 2024-07-17 RX ORDER — MAGNESIUM HYDROXIDE 1200 MG/15ML
LIQUID ORAL PRN
Status: DISCONTINUED | OUTPATIENT
Start: 2024-07-17 | End: 2024-07-17 | Stop reason: HOSPADM

## 2024-07-17 RX ORDER — OXYCODONE HYDROCHLORIDE 5 MG/1
5 TABLET ORAL EVERY 4 HOURS PRN
Status: DISCONTINUED | OUTPATIENT
Start: 2024-07-17 | End: 2024-07-18

## 2024-07-17 RX ORDER — PROPOFOL 10 MG/ML
INJECTION, EMULSION INTRAVENOUS CONTINUOUS PRN
Status: DISCONTINUED | OUTPATIENT
Start: 2024-07-17 | End: 2024-07-17

## 2024-07-17 RX ORDER — HYDROMORPHONE HCL IN WATER/PF 6 MG/30 ML
0.4 PATIENT CONTROLLED ANALGESIA SYRINGE INTRAVENOUS
Status: DISCONTINUED | OUTPATIENT
Start: 2024-07-17 | End: 2024-07-18

## 2024-07-17 RX ORDER — ONDANSETRON 4 MG/1
4 TABLET, ORALLY DISINTEGRATING ORAL EVERY 6 HOURS PRN
Status: DISCONTINUED | OUTPATIENT
Start: 2024-07-17 | End: 2024-07-22 | Stop reason: HOSPADM

## 2024-07-17 RX ORDER — LABETALOL HYDROCHLORIDE 5 MG/ML
5 INJECTION, SOLUTION INTRAVENOUS EVERY 10 MIN PRN
Status: DISCONTINUED | OUTPATIENT
Start: 2024-07-17 | End: 2024-07-17 | Stop reason: HOSPADM

## 2024-07-17 RX ORDER — FENTANYL CITRATE 50 UG/ML
25-100 INJECTION, SOLUTION INTRAMUSCULAR; INTRAVENOUS
Status: DISCONTINUED | OUTPATIENT
Start: 2024-07-17 | End: 2024-07-17 | Stop reason: HOSPADM

## 2024-07-17 RX ORDER — KETAMINE HYDROCHLORIDE 10 MG/ML
INJECTION INTRAMUSCULAR; INTRAVENOUS PRN
Status: DISCONTINUED | OUTPATIENT
Start: 2024-07-17 | End: 2024-07-17

## 2024-07-17 RX ORDER — NALOXONE HYDROCHLORIDE 0.4 MG/ML
0.2 INJECTION, SOLUTION INTRAMUSCULAR; INTRAVENOUS; SUBCUTANEOUS
Status: DISCONTINUED | OUTPATIENT
Start: 2024-07-17 | End: 2024-07-17

## 2024-07-17 RX ORDER — DEXAMETHASONE SODIUM PHOSPHATE 4 MG/ML
4 INJECTION, SOLUTION INTRA-ARTICULAR; INTRALESIONAL; INTRAMUSCULAR; INTRAVENOUS; SOFT TISSUE
Status: DISCONTINUED | OUTPATIENT
Start: 2024-07-17 | End: 2024-07-17 | Stop reason: HOSPADM

## 2024-07-17 RX ORDER — HEPARIN SODIUM 5000 [USP'U]/.5ML
5000 INJECTION, SOLUTION INTRAVENOUS; SUBCUTANEOUS EVERY 8 HOURS
Status: DISCONTINUED | OUTPATIENT
Start: 2024-07-18 | End: 2024-07-22 | Stop reason: HOSPADM

## 2024-07-17 RX ORDER — HYDROMORPHONE HCL IN WATER/PF 6 MG/30 ML
0.2 PATIENT CONTROLLED ANALGESIA SYRINGE INTRAVENOUS EVERY 5 MIN PRN
Status: DISCONTINUED | OUTPATIENT
Start: 2024-07-17 | End: 2024-07-17 | Stop reason: HOSPADM

## 2024-07-17 RX ORDER — LIDOCAINE HYDROCHLORIDE 10 MG/ML
INJECTION, SOLUTION INFILTRATION; PERINEURAL PRN
Status: DISCONTINUED | OUTPATIENT
Start: 2024-07-17 | End: 2024-07-17

## 2024-07-17 RX ORDER — ASPIRIN 81 MG/1
81 TABLET ORAL DAILY
Status: DISCONTINUED | OUTPATIENT
Start: 2024-07-17 | End: 2024-07-17

## 2024-07-17 RX ORDER — ASPIRIN 81 MG/1
81 TABLET ORAL DAILY
Status: DISCONTINUED | OUTPATIENT
Start: 2024-07-18 | End: 2024-07-22 | Stop reason: HOSPADM

## 2024-07-17 RX ORDER — HYDROMORPHONE HCL IN WATER/PF 6 MG/30 ML
0.2 PATIENT CONTROLLED ANALGESIA SYRINGE INTRAVENOUS
Status: DISCONTINUED | OUTPATIENT
Start: 2024-07-17 | End: 2024-07-18

## 2024-07-17 RX ORDER — LIDOCAINE 40 MG/G
CREAM TOPICAL
Status: DISCONTINUED | OUTPATIENT
Start: 2024-07-17 | End: 2024-07-17 | Stop reason: HOSPADM

## 2024-07-17 RX ORDER — CEFAZOLIN SODIUM 1 G/3ML
1 INJECTION, POWDER, FOR SOLUTION INTRAMUSCULAR; INTRAVENOUS EVERY 8 HOURS
Status: COMPLETED | OUTPATIENT
Start: 2024-07-17 | End: 2024-07-18

## 2024-07-17 RX ORDER — ACETAMINOPHEN 325 MG/1
650 TABLET ORAL EVERY 4 HOURS PRN
Status: DISCONTINUED | OUTPATIENT
Start: 2024-07-20 | End: 2024-07-18

## 2024-07-17 RX ORDER — ACETAMINOPHEN 325 MG/1
975 TABLET ORAL ONCE
Status: COMPLETED | OUTPATIENT
Start: 2024-07-17 | End: 2024-07-17

## 2024-07-17 RX ORDER — ONDANSETRON 2 MG/ML
INJECTION INTRAMUSCULAR; INTRAVENOUS PRN
Status: DISCONTINUED | OUTPATIENT
Start: 2024-07-17 | End: 2024-07-17

## 2024-07-17 RX ORDER — HYDROMORPHONE HYDROCHLORIDE 1 MG/ML
0.5 INJECTION, SOLUTION INTRAMUSCULAR; INTRAVENOUS; SUBCUTANEOUS EVERY 5 MIN PRN
Status: DISCONTINUED | OUTPATIENT
Start: 2024-07-17 | End: 2024-07-17 | Stop reason: HOSPADM

## 2024-07-17 RX ORDER — PROCHLORPERAZINE MALEATE 5 MG
5 TABLET ORAL EVERY 6 HOURS PRN
Status: DISCONTINUED | OUTPATIENT
Start: 2024-07-17 | End: 2024-07-22 | Stop reason: HOSPADM

## 2024-07-17 RX ORDER — POLYETHYLENE GLYCOL 3350 17 G/17G
17 POWDER, FOR SOLUTION ORAL DAILY
Status: DISCONTINUED | OUTPATIENT
Start: 2024-07-18 | End: 2024-07-22 | Stop reason: HOSPADM

## 2024-07-17 RX ADMIN — PROCHLORPERAZINE EDISYLATE 5 MG: 5 INJECTION INTRAMUSCULAR; INTRAVENOUS at 16:26

## 2024-07-17 RX ADMIN — PROPOFOL 100 MCG/KG/MIN: 10 INJECTION, EMULSION INTRAVENOUS at 13:55

## 2024-07-17 RX ADMIN — PROPOFOL 120 MG: 10 INJECTION, EMULSION INTRAVENOUS at 13:37

## 2024-07-17 RX ADMIN — INSULIN DEGLUDEC 30 UNITS: 100 INJECTION, SOLUTION SUBCUTANEOUS at 22:36

## 2024-07-17 RX ADMIN — Medication 5 MG: at 14:07

## 2024-07-17 RX ADMIN — HYDROMORPHONE HYDROCHLORIDE 0.5 MG: 1 INJECTION, SOLUTION INTRAMUSCULAR; INTRAVENOUS; SUBCUTANEOUS at 15:33

## 2024-07-17 RX ADMIN — Medication 2 G: at 13:59

## 2024-07-17 RX ADMIN — Medication 10 MG: at 14:52

## 2024-07-17 RX ADMIN — FENTANYL CITRATE 50 MCG: 50 INJECTION, SOLUTION INTRAMUSCULAR; INTRAVENOUS at 13:06

## 2024-07-17 RX ADMIN — AMLODIPINE BESYLATE 5 MG: 5 TABLET ORAL at 21:29

## 2024-07-17 RX ADMIN — Medication 5 MG: at 15:12

## 2024-07-17 RX ADMIN — HYDROMORPHONE HYDROCHLORIDE 1 MG: 1 INJECTION, SOLUTION INTRAMUSCULAR; INTRAVENOUS; SUBCUTANEOUS at 16:17

## 2024-07-17 RX ADMIN — HYDROMORPHONE HYDROCHLORIDE 0.5 MG: 1 INJECTION, SOLUTION INTRAMUSCULAR; INTRAVENOUS; SUBCUTANEOUS at 15:44

## 2024-07-17 RX ADMIN — ONDANSETRON 4 MG: 2 INJECTION INTRAMUSCULAR; INTRAVENOUS at 15:27

## 2024-07-17 RX ADMIN — MIDAZOLAM HYDROCHLORIDE 2 MG: 1 INJECTION, SOLUTION INTRAMUSCULAR; INTRAVENOUS at 13:05

## 2024-07-17 RX ADMIN — SODIUM CHLORIDE, POTASSIUM CHLORIDE, SODIUM LACTATE AND CALCIUM CHLORIDE: 600; 310; 30; 20 INJECTION, SOLUTION INTRAVENOUS at 15:11

## 2024-07-17 RX ADMIN — METHOCARBAMOL 500 MG: 500 TABLET ORAL at 21:29

## 2024-07-17 RX ADMIN — PHENYLEPHRINE HYDROCHLORIDE 100 MCG: 10 INJECTION INTRAVENOUS at 13:47

## 2024-07-17 RX ADMIN — HYDROMORPHONE HYDROCHLORIDE 0.5 MG: 1 INJECTION, SOLUTION INTRAMUSCULAR; INTRAVENOUS; SUBCUTANEOUS at 15:58

## 2024-07-17 RX ADMIN — ACETAMINOPHEN 975 MG: 325 TABLET ORAL at 12:36

## 2024-07-17 RX ADMIN — BUPIVACAINE HYDROCHLORIDE 30 ML: 5 INJECTION, SOLUTION EPIDURAL; INTRACAUDAL; PERINEURAL at 13:18

## 2024-07-17 RX ADMIN — HYDROMORPHONE HYDROCHLORIDE 1 MG: 1 INJECTION, SOLUTION INTRAMUSCULAR; INTRAVENOUS; SUBCUTANEOUS at 14:27

## 2024-07-17 RX ADMIN — HYDROMORPHONE HYDROCHLORIDE 0.5 MG: 1 INJECTION, SOLUTION INTRAMUSCULAR; INTRAVENOUS; SUBCUTANEOUS at 16:53

## 2024-07-17 RX ADMIN — HYDROMORPHONE HYDROCHLORIDE 0.5 MG: 1 INJECTION, SOLUTION INTRAMUSCULAR; INTRAVENOUS; SUBCUTANEOUS at 16:05

## 2024-07-17 RX ADMIN — OXYCODONE HYDROCHLORIDE 5 MG: 5 TABLET ORAL at 23:08

## 2024-07-17 RX ADMIN — FENTANYL CITRATE 50 MCG: 50 INJECTION, SOLUTION INTRAMUSCULAR; INTRAVENOUS at 13:13

## 2024-07-17 RX ADMIN — SODIUM CHLORIDE, POTASSIUM CHLORIDE, SODIUM LACTATE AND CALCIUM CHLORIDE: 600; 310; 30; 20 INJECTION, SOLUTION INTRAVENOUS at 12:37

## 2024-07-17 RX ADMIN — SENNOSIDES AND DOCUSATE SODIUM 1 TABLET: 50; 8.6 TABLET ORAL at 21:31

## 2024-07-17 RX ADMIN — HYDROMORPHONE HYDROCHLORIDE 0.5 MG: 1 INJECTION, SOLUTION INTRAMUSCULAR; INTRAVENOUS; SUBCUTANEOUS at 17:26

## 2024-07-17 RX ADMIN — SODIUM CHLORIDE: 9 INJECTION, SOLUTION INTRAVENOUS at 18:20

## 2024-07-17 RX ADMIN — PHENYLEPHRINE HYDROCHLORIDE 100 MCG: 10 INJECTION INTRAVENOUS at 14:07

## 2024-07-17 RX ADMIN — PROPOFOL 50 MG: 10 INJECTION, EMULSION INTRAVENOUS at 14:23

## 2024-07-17 RX ADMIN — CEFAZOLIN 1 G: 1 INJECTION, POWDER, FOR SOLUTION INTRAMUSCULAR; INTRAVENOUS at 21:30

## 2024-07-17 RX ADMIN — Medication 5 MG: at 13:56

## 2024-07-17 RX ADMIN — ACETAMINOPHEN 975 MG: 325 TABLET ORAL at 21:28

## 2024-07-17 RX ADMIN — PHENYLEPHRINE HYDROCHLORIDE 100 MCG: 10 INJECTION INTRAVENOUS at 13:49

## 2024-07-17 RX ADMIN — HYDROMORPHONE HYDROCHLORIDE 0.5 MG: 1 INJECTION, SOLUTION INTRAMUSCULAR; INTRAVENOUS; SUBCUTANEOUS at 16:34

## 2024-07-17 RX ADMIN — KETAMINE HYDROCHLORIDE 20 MG: 10 INJECTION INTRAMUSCULAR; INTRAVENOUS at 14:48

## 2024-07-17 RX ADMIN — LIDOCAINE HYDROCHLORIDE 30 ML: 10 INJECTION, SOLUTION INFILTRATION; PERINEURAL at 13:37

## 2024-07-17 RX ADMIN — GABAPENTIN 300 MG: 300 CAPSULE ORAL at 21:29

## 2024-07-17 ASSESSMENT — ACTIVITIES OF DAILY LIVING (ADL)
ADLS_ACUITY_SCORE: 28
ADLS_ACUITY_SCORE: 22
ADLS_ACUITY_SCORE: 28
ADLS_ACUITY_SCORE: 28
ADLS_ACUITY_SCORE: 26
ADLS_ACUITY_SCORE: 28
ADLS_ACUITY_SCORE: 28
ADLS_ACUITY_SCORE: 22
ADLS_ACUITY_SCORE: 26
ADLS_ACUITY_SCORE: 22
ADLS_ACUITY_SCORE: 28
ADLS_ACUITY_SCORE: 35
ADLS_ACUITY_SCORE: 22

## 2024-07-17 ASSESSMENT — LIFESTYLE VARIABLES: TOBACCO_USE: 1

## 2024-07-17 NOTE — INTERVAL H&P NOTE
"I have reviewed the surgical (or preoperative) H&P that is linked to this encounter, and examined the patient. There are no significant changes      Will proceed with left above knee amputation today   Clinical Conditions Present on Arrival:  Clinically Significant Risk Factors Present on Admission         # Hyponatremia: Lowest Na = 134 mmol/L in last 30 days, will monitor as appropriate  # Hypercalcemia: Highest Ca = 10.2 mg/dL in last 30 days, will monitor as appropriate        # Drug Induced Platelet Defect: home medication list includes an antiplatelet medication      # DMII: A1C = 6.7 % (Ref range: <5.7 %) within past 6 months  # Overweight: Estimated body mass index is 26.67 kg/m  as calculated from the following:    Height as of 7/11/24: 1.575 m (5' 2\").    Weight as of this encounter: 66.1 kg (145 lb 12.8 oz).       "

## 2024-07-17 NOTE — ANESTHESIA POSTPROCEDURE EVALUATION
Patient: Ashanti Aviles    Procedure: Procedure(s):  AMPUTATION, ABOVE KNEE       Anesthesia Type:  General    Note:  Disposition: Inpatient   Postop Pain Control: Uneventful            Sign Out: Pain at Preoperative BASELINE   PONV: No   Neuro/Psych: Uneventful            Sign Out: Acceptable/Baseline neuro status   Airway/Respiratory: Uneventful            Sign Out: Acceptable/Baseline resp. status   CV/Hemodynamics: Uneventful            Sign Out: Acceptable CV status; No obvious hypovolemia; No obvious fluid overload   Other NRE: NONE   DID A NON-ROUTINE EVENT OCCUR? No           Last vitals:  Vitals Value Taken Time   BP 90/51 07/17/24 1716   Temp 36.1  C (96.9  F) 07/17/24 1525   Pulse 78 07/17/24 1720   Resp 10 07/17/24 1720   SpO2 92 % 07/17/24 1720   Vitals shown include unfiled device data.    Electronically Signed By: Naila Portillo MD  July 17, 2024  5:22 PM

## 2024-07-17 NOTE — ANESTHESIA PROCEDURE NOTES
"Sciatic Procedure Note    Pre-Procedure   Staff -        Anesthesiologist:  Naila Portillo MD       Performed By: anesthesiologist       Location: pre-op       Pre-Anesthestic Checklist: patient identified, IV checked, site marked, risks and benefits discussed, informed consent, monitors and equipment checked, pre-op evaluation, at physician/surgeon's request and post-op pain management  Timeout:       Correct Patient: Yes        Correct Procedure: Yes        Correct Site: Yes        Correct Position: Yes        Correct Laterality: Yes        Site Marked: Yes  Procedure Documentation  Procedure: Sciatic       Laterality: left       Patient Position: supine       Patient Prep/Sterile Barriers: sterile gloves, mask       Skin prep: Chloraprep (popliteal (high lateral sciatic) approach).       Needle Type: short bevel       Needle Gauge: 20.        Needle Length (Inches): 4        Ultrasound guided       1. Ultrasound was used to identify targeted nerve, plexus, vascular marker, or fascial plane and place a needle adjacent to it in real-time.       2. Ultrasound was used to visualize the spread of anesthetic in close proximity to the above referenced structure.       3. A permanent image is entered into the patient's record.       4. The visualized anatomic structures appeared normal.       5. There were no apparent abnormal pathologic findings.    Assessment/Narrative         The placement was negative for: blood aspirated and painful injection       Paresthesias: Resolved.       Bolus given via needle..        Secured via.        Insertion/Infusion Method: Single Shot       Complications: none      FOR [facility] ([facility]) ONLY:   Pain Team Contact information: please page the Pain Team Via [employer]. Search \"Pain\". During daytime hours, please page the attending first. At night please page the resident first.      "

## 2024-07-17 NOTE — PHARMACY-ADMISSION MEDICATION HISTORY
Pharmacist Admission Medication History    Admission medication history is complete. The information provided in this note is only as accurate as the sources available at the time of the update.    Information Source(s): Patient and CareEverywhere/SureScripts via in-person    Pertinent Information: Patient states lovenox therapy was completed about a week an a half ago and was instructed to discontinue warfarin about a week ago, patient unaware of anticoagulation plans post procedure. Patient was also unsure about hydrochlorothiazide, reviewed it was stopped at previous admission due to low potassium. Patient also completed bactrim course about 2 weeks ago. Lantus more recently filled than Tresiba, patient states she has excess supply and is still using Tresiba.     Changes made to PTA medication list:  Added: none  Deleted: folic acid, hydromorphone, lantus, vitamin b complex   Changed: none    Allergies reviewed with patient and updates made in EHR: yes    Medication History Completed By: MARI VENEGAS [facility] 7/17/2024 12:21 PM    PTA Med List   Medication Sig Last Dose    acetaminophen (ACETAMINOPHEN 8 HOUR) 650 MG CR tablet Take 1,300 mg by mouth every 8 hours as needed for mild pain or fever 7/16/2024 at 1200    amLODIPine (NORVASC) 5 MG tablet Take 5 mg by mouth daily 7/16/2024 at AM    aspirin 81 MG EC tablet Take 1 tablet (81 mg) by mouth daily 7/16/2024 at AM    clopidogrel (PLAVIX) 75 MG tablet Take 75 mg by mouth daily 7/12/2024 at AM    empagliflozin (JARDIANCE) 25 MG TABS tablet Take 1 tablet (25 mg) by mouth daily 7/12/2024 at AM    ezetimibe (ZETIA) 10 MG tablet Take 10 mg by mouth daily 7/16/2024 at 0800    gabapentin (NEURONTIN) 300 MG capsule Take 1 capsule (300 mg) by mouth 3 times daily Start with one capsule at bedtime and increase dose as instructed Past Week    HYDROcodone-acetaminophen (NORCO) 5-325 MG tablet Take 1 tablet by mouth 2 times daily as needed for severe pain (max of 2 tablets per  day) 7/16/2024 at 1200    insulin degludec (TRESIBA) 100 UNIT/ML pen Inject 30 Units Subcutaneous 2 times daily 7/16/2024 at PM    linagliptin (TRADJENTA) 5 MG TABS tablet Take 5 mg by mouth daily 7/12/2024 at AM    lisinopril (ZESTRIL) 40 MG tablet Take 40 mg by mouth daily 7/16/2024 at AM    metFORMIN (GLUCOPHAGE XR) 500 MG 24 hr tablet Take 1,000 mg by mouth daily 7/15/2024 at AM    methocarbamol (ROBAXIN) 500 MG tablet Take 1 tablet (500 mg) by mouth at bedtime Past Month at ran out    omeprazole (PRILOSEC) 20 MG DR capsule Take 20 mg by mouth daily as needed (heartburn) Past Week

## 2024-07-17 NOTE — ANESTHESIA PREPROCEDURE EVALUATION
Anesthesia Pre-Procedure Evaluation    Patient: Ashanti Aviles   MRN: [id number] : 1955        Procedure : Procedure(s):  AMPUTATION, ABOVE KNEE          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    Benign gastric polyp     Chronic toe ulcer (H)     Chronic ulcer of great toe of left foot with necrosis of muscle (H)     Diabetes mellitus (H)     Gastroesophageal reflux disease     Hard to intubate 02/15/2024    History of blood transfusion     History of colonic polyps     HLD (hyperlipidemia)     Hypertension     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/29/2013    Nicotine addiction     OAG (open angle glaucoma)     PVD (peripheral vascular disease) (H24)     Reflux esophagitis     Retinopathy     Toe ulcer (H)     Tubular adenoma     Yeast vaginitis       Past Surgical History:   Procedure Laterality Date    AMPUTATE TOE(S) Right 2023    Procedure: Amputation fourth toe right foot;  Surgeon: Benjamin Diez DPM;  Location: [facility] OR    ANGIOGRAM Left 2/15/2024    Procedure: COMPLETION OF ANGIOGRAM, LEFT COMMON ILIAC ARTERY STENT PLACEMENT;  Surgeon: Dyan Geller MD;  Location: [facility] OR    ANUS SURGERY      BIOPSY CERVICAL, LOCAL EXCISION, SINGLE/MULTIPLE      COLONOSCOPY N/A 2020    Procedure: EXAM UNDER ANESTHESIA, HIGH RESOLUTION ANOSCOPY INTRA OP;  Surgeon: Preeti Sheehan MD;  Location: [facility];  Service: Gastroenterology    COLONOSCOPY N/A 2020    Procedure: EXAM UNDER ANESTHESIA WITH HIGH RESOLUTION ANOSCOPY;  Surgeon: Preeti Sheehan MD;  Location: [facility] OR;  Service: General    COLONOSCOPY N/A 06/15/2021    Procedure: EXAM UNDER ANESTHESIA WITH HIGH RESOLUTION ANOSCOPY, BIOPSY, FULGURATION;  Surgeon: Preeti Sheehan MD;  Location: [facility] OR;  Service: Gastroenterology    COLONOSCOPY N/A 4/15/2024    Procedure: COLONOSCOPY, WITH  POLYPECTOMY;  Surgeon: Shen Andres MD;  Location: [facility] OR    ENDARTERECTOMY FEMORAL Left 05/17/2023    Procedure: LEFT FEMORAL ENDARTERECTOMY WITH RETROGRADE ILIAC STENT;  Surgeon: Dyan Geller MD;  Location: [facility] OR    EXAM UNDER ANESTHESIA ANUS N/A 09/14/2021    Procedure: EXAM UNDER ANESTHESIA WITH HIGH RESOLUTION ANOSCOPY;  Surgeon: Preeti Sheehan MD;  Location: [facility] OR    FEMORAL ARTERY - TIBIAL ARTERY BYPASS GRAFT Left 09/08/2023    Procedure: CREATION, BYPASS, ARTERIAL, FEMORAL TO TIBIAL, LEFT;  Surgeon: Dyan Geller MD;  Location: [facility] OR    FEMORAL ARTERY - TIBIAL ARTERY BYPASS GRAFT Left 2/15/2024    Procedure: CREATION, BYPASS, ARTERIAL, FEMORAL TO TIBIAL,;  Surgeon: Dyan Geller MD;  Location: [facility] OR    FEMORAL ARTERY - TIBIAL ARTERY BYPASS GRAFT Left 6/4/2024    Procedure: LEFT EXTERNAL ILIAC ARTERY TO POSTERIOR TIBIAL ARTERY BYPASS WITH CRYO ARTERY WITH A FEMORAL HERNIA REPAIR;  Surgeon: Dyan Geller MD;  Location: [facility] OR    INCISION AND DRAINAGE FOOT, COMBINED Left 11/27/2023    Procedure: INCISION AND DRAINAGE, LEFT HALLUX;  Surgeon: Rene Ordaz DPM;  Location: [facility] OR    INCISION AND DRAINAGE FOOT, COMBINED Left 6/7/2024    Procedure: AMPUTATION LEFT HALLUX;  Surgeon: Rene Ordaz DPM;  Location: [facility] OR    IR LOWER EXTREMITY ANGIOGRAM LEFT  03/23/2023    IR LOWER EXTREMITY ANGIOGRAM LEFT  7/8/2024    IR LOWER EXTREMITY ANGIOGRAM RIGHT  02/16/2023    IR THROMBOLYSIS ARTERIAL INFUSION INITIAL DAY  10/09/2023    LEEP TX, CERVICAL      2005    TUBAL LIGATION        Allergies   Allergen Reactions    Simvastatin Muscle Pain (Myalgia)     Muscle pain    Victoza [Liraglutide] Other (See Comments)     Binds bowels    Penicillins Unknown     Childhood rxn      Social History     Tobacco Use    Smoking status: Former     Current packs/day: 0.00      Types: Cigarettes     Quit date: 2023     Years since quittin.4     Passive exposure: Never    Smokeless tobacco: Never   Substance Use Topics    Alcohol use: Not Currently     Comment: Alcoholic Drinks/MONTH: 2x      Wt Readings from Last 1 Encounters:   24 68.9 kg (152 lb)        Anesthesia Evaluation   Pt has had prior anesthetic.     History of anesthetic complications (Difficult intubation noted in chart - easy direct intubation 2024.)       ROS/MED HX  ENT/Pulmonary:     (+)                tobacco use, Past use,                       Neurologic:     (+)    peripheral neuropathy,                            Cardiovascular:     (+) Dyslipidemia hypertension- Peripheral Vascular Disease-- Symptomatic.   -  - -                                      METS/Exercise Tolerance:     Hematologic:     (+)       history of blood transfusion,         Musculoskeletal:       GI/Hepatic:     (+) GERD,                   Renal/Genitourinary:     (+) renal disease, type: CRI,            Endo:     (+)  type II DM,       Diabetic complications: nephropathy neuropathy retinopathy.             Psychiatric/Substance Use:  - neg psychiatric ROS     Infectious Disease:       Malignancy:   (+) Malignancy,     Other:      (+)  , H/O Chronic Pain,         Physical Exam    Airway        Mallampati: III   TM distance: > 3 FB   Neck ROM: full   Mouth opening: > 3 cm    Respiratory Devices and Support         Dental       (+) Modest Abnormalities - crowns, retainers, 1 or 2 missing teeth      Cardiovascular          Rhythm and rate: regular     Pulmonary           breath sounds clear to auscultation           OUTSIDE LABS:  CBC:   Lab Results   Component Value Date    WBC 12.3 (H) 2024    WBC 13.3 (H) 2024    HGB 12.0 2024    HGB 7.7 (L) 2024    HCT 38.1 2024    HCT 23.7 (L) 2024     (H) 2024     2024     BMP:   Lab Results   Component Value Date     (L)  "06/21/2024     06/10/2024    POTASSIUM 4.9 06/21/2024    POTASSIUM 3.5 06/11/2024    CHLORIDE 98 06/21/2024    CHLORIDE 102 06/10/2024    CO2 25 06/21/2024    CO2 26 06/10/2024    BUN 12.3 06/21/2024    BUN 8.4 06/10/2024    CR 0.64 06/21/2024    CR 0.55 06/10/2024     (H) 06/21/2024     (H) 06/11/2024     COAGS:   Lab Results   Component Value Date    PTT 28 07/08/2024    INR 0.91 07/08/2024    FIBR 164 (L) 06/04/2024     POC: No results found for: \"BGM\", \"HCG\", \"HCGS\"  HEPATIC:   Lab Results   Component Value Date    ALBUMIN 3.4 (L) 06/05/2024    PROTTOTAL 5.0 (L) 06/05/2024    ALT 48 06/05/2024    AST 59 (H) 06/05/2024    ALKPHOS 45 06/05/2024    BILITOTAL 1.0 06/05/2024     OTHER:   Lab Results   Component Value Date    PH 7.37 06/04/2024    LACT 1.0 06/06/2024    A1C 6.7 (H) 06/05/2024    SANTINO 10.2 06/21/2024    PHOS 3.5 02/16/2024    MAG 2.2 06/11/2024       Anesthesia Plan    ASA Status:  3    NPO Status:  NPO Appropriate    Anesthesia Type: General.     - Airway: LMA      Maintenance: Balanced.        Consents    Anesthesia Plan(s) and associated risks, benefits, and realistic alternatives discussed. Questions answered and patient/representative(s) expressed understanding.     - Discussed:     - Discussed with:  Patient            Postoperative Care    Pain management: Peripheral nerve block (Single Shot), Multi-modal analgesia.   PONV prophylaxis: Ondansetron (or other 5HT-3), Dexamethasone or Solumedrol, Background Propofol Infusion     Comments:               Naila Portillo MD       # Hyponatremia: Lowest Na = 134 mmol/L in last 30 days, will monitor as appropriate  # Hypercalcemia: Highest Ca = 10.2 mg/dL in last 30 days, will monitor as appropriate         # DMII: A1C = 6.7 % (Ref range: <5.7 %) within past 6 months  # Overweight: Estimated body mass index is 27.8 kg/m  as calculated from the following:    Height as of 7/11/24: 1.575 m (5' 2\").    Weight as of 7/11/24: 68.9 " kg (152 lb).

## 2024-07-17 NOTE — ANESTHESIA CARE TRANSFER NOTE
Patient: Ashanti Aviles    Procedure: Procedure(s):  AMPUTATION, ABOVE KNEE       Diagnosis: PAD (peripheral artery disease) (H24) [I73.9]  Diagnosis Additional Information: No value filed.    Anesthesia Type:   General     Note:    Oropharynx: oropharynx clear of all foreign objects and spontaneously breathing  Level of Consciousness: awake  Oxygen Supplementation: face mask  Level of Supplemental Oxygen (L/min / FiO2): 8  Independent Airway: airway patency satisfactory and stable  Dentition: dentition unchanged  Vital Signs Stable: post-procedure vital signs reviewed and stable  Report to RN Given: handoff report given  Patient transferred to: PACU  Comments: Patient spontaneously breathing.  Tidal volumes > 300ml.  Following commands, suctioned and extubated with [place].  O2 via mask at 8L.  To PACU, VSS, SBAR report to RN per institutional handoff policy and procedure.  Transfer of care.  Handoff Report: Identifed the Patient, Identified the Reponsible Provider, Reviewed the pertinent medical history, Discussed the surgical course, Reviewed Intra-OP anesthesia mangement and issues during anesthesia, Set expectations for post-procedure period and Allowed opportunity for questions and acknowledgement of understanding      Vitals:  Vitals Value Taken Time   /64 07/17/24 1525   Temp     Pulse 101 07/17/24 1529   Resp 21 07/17/24 1529   SpO2 100 % 07/17/24 1529   Vitals shown include unfiled device data.    Electronically Signed By: DOROTA MORSE CRNA  July 17, 2024  3:30 PM

## 2024-07-17 NOTE — OP NOTE
VASCULAR SURGERY OPERATIVE REPORT        LOCATION:    [facility]    Ashanti Aviles   Medical Record #:  [id number]  YOB: 1955  Age:  68 year old     Date of Service: 7/17/2024    PRIMARY CARE PROVIDER: Dyan Geller    Preoperative diagnosis    CLTI left lower extremity  Failure of multiple bypasses  Non salvageable lower extremity     Postoperative diagnosis    Same      Surgeon: Dyan Geller MD, RPVI   Assistant: Kezia                          Name of the procedure    left above-the-knee amputation    Anesthesia:    General    Indication for procedure:    69 yo F with post MD of DM, HTN, and PAD who has  non salvageable left lower extremity     Description of procedure:    The procedure was performed under general anesthesia. The patient was placed supine. The left lower extremity was prepped and draped in the usual sterile fashion. An occlusive dressing was applied to the leg tip to the level of the knee.Anterior and posterior skin flaps were outlined with a marking pen 5-10 cm proximal to the knee joint. The skin incision was then performed and deepened through the subcutaneous tissue until the muscular fascia was identified. The greater saphenous vein was identified, ligated with 2-0 silk ties, and divided. The muscle groups over the anterior and medial thighs were divided with electrocautery at the same level of the skin incision. The neurovascular bundle was identified on the medial aspect of the thigh. The popliteal artery and veins were isolated and suture ligated with a 2-0 silk ligature. The sciatic nerve was pulled, ligated with a 2-0 silk tie, and divided. The posterior thigh muscles were then divided with electrocautery. Once the muscle groups were circumferentially divided, the periosteum was incised and elevated approximately 5 cm proximally off the femur. The femur was divided with an electric saw. The proximal end of the transected femur was smoothed with  a file. The amputation stump was irrigated copiously with antibiotic solution. Hemostasis was secured.  The fascia of the thigh muscles was closed with interrupted 3-0 Vicryl sutures. The skin was approximated with skin staples and dressed with 4×4 gauze, Kerlix, and an Ace bandage.  The patient tolerated the procedure well and was taken to the postanesthesia care unit in stable condition.    Estimated blood loss: 300 cc    Specimens: left leg     Complications: None      Dyan Geller MD, [facility]  VASCULAR SURGERY

## 2024-07-17 NOTE — ANESTHESIA PROCEDURE NOTES
"Femoral Procedure Note    Pre-Procedure   Staff -        Anesthesiologist:  Naila Portillo MD       Performed By: anesthesiologist       Location: pre-op       Pre-Anesthestic Checklist: patient identified, IV checked, site marked, risks and benefits discussed, informed consent, monitors and equipment checked, pre-op evaluation, at physician/surgeon's request and post-op pain management  Timeout:       Correct Patient: Yes        Correct Procedure: Yes        Correct Site: Yes        Correct Position: Yes        Correct Laterality: Yes        Site Marked: Yes  Procedure Documentation  Procedure: Femoral       Laterality: left       Patient Position: supine       Patient Prep/Sterile Barriers: sterile gloves, mask       Skin prep: Chloraprep       Needle Type: short bevel       Needle Gauge: 20.        Needle Length (Inches): 4        Ultrasound guided       1. Ultrasound was used to identify targeted nerve, plexus, vascular marker, or fascial plane and place a needle adjacent to it in real-time.       2. Ultrasound was used to visualize the spread of anesthetic in close proximity to the above referenced structure.       3. A permanent image is entered into the patient's record.       4. The visualized anatomic structures appeared normal.       5. There were no apparent abnormal pathologic findings.    Assessment/Narrative         The placement was negative for: blood aspirated and painful injection       Paresthesias: No.       Bolus given via needle..        Secured via.        Insertion/Infusion Method: Single Shot       Complications: none      FOR [facility] ONLY:   Pain Team Contact information: please page the Pain Team Via [employer]. Search \"Pain\". During daytime hours, please page the attending first. At night please page the resident first.      "

## 2024-07-17 NOTE — ANESTHESIA PROCEDURE NOTES
Airway       Patient location during procedure: OR  Staff -        CRNA: Justin Briggs APRN CRNA       Performed By: CRNA  Consent for Airway        Urgency: elective  Indications and Patient Condition       Indications for airway management: kathi-procedural       Induction type:intravenous       Mask difficulty assessment: 1 - vent by mask    Final Airway Details       Final airway type: supraglottic airway    Supraglottic Airway Details        Type: LMA       Brand: Ambu AuraGain       LMA size: 4    Post intubation assessment        Placement verified by: capnometry, equal breath sounds and chest rise        Number of attempts at approach: 1       Number of other approaches attempted: 0       Secured with: tape       Ease of procedure: easy       Dentition: Intact

## 2024-07-18 ENCOUNTER — APPOINTMENT (OUTPATIENT)
Dept: PHYSICAL THERAPY | Facility: HOSPITAL | Age: 69
DRG: 240 | End: 2024-07-18
Attending: SURGERY
Payer: COMMERCIAL

## 2024-07-18 ENCOUNTER — APPOINTMENT (OUTPATIENT)
Dept: OCCUPATIONAL THERAPY | Facility: HOSPITAL | Age: 69
DRG: 240 | End: 2024-07-18
Attending: PODIATRIST
Payer: COMMERCIAL

## 2024-07-18 LAB
AMPHETAMINES UR QL SCN: ABNORMAL
ANION GAP SERPL CALCULATED.3IONS-SCNC: 8 MMOL/L (ref 7–15)
BARBITURATES UR QL SCN: ABNORMAL
BASOPHILS # BLD AUTO: 0 10E3/UL (ref 0–0.2)
BASOPHILS NFR BLD AUTO: 0 %
BENZODIAZ UR QL SCN: ABNORMAL
BUN SERPL-MCNC: 7.3 MG/DL (ref 8–23)
BUPRENORPHINE UR QL: NORMAL
BZE UR QL SCN: ABNORMAL
CALCIUM SERPL-MCNC: 8.1 MG/DL (ref 8.8–10.4)
CANNABINOIDS UR QL SCN: ABNORMAL
CHLORIDE SERPL-SCNC: 102 MMOL/L (ref 98–107)
CREAT SERPL-MCNC: 0.64 MG/DL (ref 0.51–0.95)
CREAT UR-MCNC: 28.6 MG/DL
EGFRCR SERPLBLD CKD-EPI 2021: >90 ML/MIN/1.73M2
EOSINOPHIL # BLD AUTO: 0.1 10E3/UL (ref 0–0.7)
EOSINOPHIL NFR BLD AUTO: 0 %
ERYTHROCYTE [DISTWIDTH] IN BLOOD BY AUTOMATED COUNT: 15.6 % (ref 10–15)
ETHANOL UR QL SCN: NORMAL
FENTANYL UR QL: ABNORMAL
FERRITIN SERPL-MCNC: 121 NG/ML (ref 11–328)
GLUCOSE BLDC GLUCOMTR-MCNC: 125 MG/DL (ref 70–99)
GLUCOSE BLDC GLUCOMTR-MCNC: 144 MG/DL (ref 70–99)
GLUCOSE BLDC GLUCOMTR-MCNC: 160 MG/DL (ref 70–99)
GLUCOSE BLDC GLUCOMTR-MCNC: 209 MG/DL (ref 70–99)
GLUCOSE SERPL-MCNC: 151 MG/DL (ref 70–99)
HCO3 SERPL-SCNC: 25 MMOL/L (ref 22–29)
HCT VFR BLD AUTO: 27.1 % (ref 35–47)
HGB BLD-MCNC: 8.4 G/DL (ref 11.7–15.7)
IMM GRANULOCYTES # BLD: 0.1 10E3/UL
IMM GRANULOCYTES NFR BLD: 1 %
INR PPP: 1.08 (ref 0.85–1.15)
LACTATE SERPL-SCNC: 1.1 MMOL/L (ref 0.7–2)
LYMPHOCYTES # BLD AUTO: 2.6 10E3/UL (ref 0.8–5.3)
LYMPHOCYTES NFR BLD AUTO: 15 %
MAGNESIUM SERPL-MCNC: 1.7 MG/DL (ref 1.7–2.3)
MCH RBC QN AUTO: 28.3 PG (ref 26.5–33)
MCHC RBC AUTO-ENTMCNC: 31 G/DL (ref 31.5–36.5)
MCV RBC AUTO: 91 FL (ref 78–100)
METHADONE UR QL SCN: NORMAL
MONOCYTES # BLD AUTO: 1.5 10E3/UL (ref 0–1.3)
MONOCYTES NFR BLD AUTO: 8 %
NEUTROPHILS # BLD AUTO: 13.6 10E3/UL (ref 1.6–8.3)
NEUTROPHILS NFR BLD AUTO: 76 %
NRBC # BLD AUTO: 0 10E3/UL
NRBC BLD AUTO-RTO: 0 /100
OPIATES UR QL SCN: ABNORMAL
OXYCODONE UR QL: ABNORMAL
PCP QUAL URINE (ROCHE): ABNORMAL
PHOSPHATE SERPL-MCNC: 3 MG/DL (ref 2.5–4.5)
PLATELET # BLD AUTO: 358 10E3/UL (ref 150–450)
POTASSIUM SERPL-SCNC: 4 MMOL/L (ref 3.4–5.3)
RBC # BLD AUTO: 2.97 10E6/UL (ref 3.8–5.2)
SODIUM SERPL-SCNC: 135 MMOL/L (ref 135–145)
VIT B12 SERPL-MCNC: 599 PG/ML (ref 232–1245)
WBC # BLD AUTO: 17.8 10E3/UL (ref 4–11)

## 2024-07-18 PROCEDURE — 82570 ASSAY OF URINE CREATININE: CPT | Performed by: PAIN MEDICINE

## 2024-07-18 PROCEDURE — 97535 SELF CARE MNGMENT TRAINING: CPT | Mod: GO

## 2024-07-18 PROCEDURE — 80307 DRUG TEST PRSMV CHEM ANLYZR: CPT | Performed by: PAIN MEDICINE

## 2024-07-18 PROCEDURE — 250N000013 HC RX MED GY IP 250 OP 250 PS 637: Performed by: SURGERY

## 2024-07-18 PROCEDURE — 36415 COLL VENOUS BLD VENIPUNCTURE: CPT | Performed by: SURGERY

## 2024-07-18 PROCEDURE — 85610 PROTHROMBIN TIME: CPT | Performed by: SURGERY

## 2024-07-18 PROCEDURE — 84100 ASSAY OF PHOSPHORUS: CPT | Performed by: STUDENT IN AN ORGANIZED HEALTH CARE EDUCATION/TRAINING PROGRAM

## 2024-07-18 PROCEDURE — 83605 ASSAY OF LACTIC ACID: CPT | Performed by: SURGERY

## 2024-07-18 PROCEDURE — 250N000012 HC RX MED GY IP 250 OP 636 PS 637: Performed by: STUDENT IN AN ORGANIZED HEALTH CARE EDUCATION/TRAINING PROGRAM

## 2024-07-18 PROCEDURE — L5999 LOWR EXTREMITY PROSTHES NOS: HCPCS

## 2024-07-18 PROCEDURE — 80048 BASIC METABOLIC PNL TOTAL CA: CPT | Performed by: STUDENT IN AN ORGANIZED HEALTH CARE EDUCATION/TRAINING PROGRAM

## 2024-07-18 PROCEDURE — 80365 DRUG SCREENING OXYCODONE: CPT | Performed by: PAIN MEDICINE

## 2024-07-18 PROCEDURE — 99223 1ST HOSP IP/OBS HIGH 75: CPT | Performed by: PAIN MEDICINE

## 2024-07-18 PROCEDURE — 97165 OT EVAL LOW COMPLEX 30 MIN: CPT | Mod: GO

## 2024-07-18 PROCEDURE — 85025 COMPLETE CBC W/AUTO DIFF WBC: CPT | Performed by: SURGERY

## 2024-07-18 PROCEDURE — L8420 PROSTHETIC SOCK MULTI PLY BK: HCPCS

## 2024-07-18 PROCEDURE — 250N000011 HC RX IP 250 OP 636: Performed by: SURGERY

## 2024-07-18 PROCEDURE — 120N000001 HC R&B MED SURG/OB

## 2024-07-18 PROCEDURE — 97530 THERAPEUTIC ACTIVITIES: CPT | Mod: GP

## 2024-07-18 PROCEDURE — 83735 ASSAY OF MAGNESIUM: CPT | Performed by: STUDENT IN AN ORGANIZED HEALTH CARE EDUCATION/TRAINING PROGRAM

## 2024-07-18 PROCEDURE — 80354 DRUG SCREENING FENTANYL: CPT | Performed by: PAIN MEDICINE

## 2024-07-18 PROCEDURE — 250N000013 HC RX MED GY IP 250 OP 250 PS 637: Performed by: PAIN MEDICINE

## 2024-07-18 PROCEDURE — 99233 SBSQ HOSP IP/OBS HIGH 50: CPT | Performed by: STUDENT IN AN ORGANIZED HEALTH CARE EDUCATION/TRAINING PROGRAM

## 2024-07-18 PROCEDURE — 97161 PT EVAL LOW COMPLEX 20 MIN: CPT | Mod: GP

## 2024-07-18 PROCEDURE — 250N000013 HC RX MED GY IP 250 OP 250 PS 637: Performed by: STUDENT IN AN ORGANIZED HEALTH CARE EDUCATION/TRAINING PROGRAM

## 2024-07-18 RX ORDER — OXYCODONE HYDROCHLORIDE 5 MG/1
10 TABLET ORAL
Status: DISCONTINUED | OUTPATIENT
Start: 2024-07-18 | End: 2024-07-22 | Stop reason: HOSPADM

## 2024-07-18 RX ORDER — METHOCARBAMOL 500 MG/1
500 TABLET, FILM COATED ORAL 3 TIMES DAILY PRN
Status: DISCONTINUED | OUTPATIENT
Start: 2024-07-18 | End: 2024-07-22 | Stop reason: HOSPADM

## 2024-07-18 RX ORDER — ACETAMINOPHEN 325 MG/1
975 TABLET ORAL EVERY 6 HOURS
Status: DISCONTINUED | OUTPATIENT
Start: 2024-07-18 | End: 2024-07-22 | Stop reason: HOSPADM

## 2024-07-18 RX ORDER — METHOCARBAMOL 500 MG/1
500 TABLET, FILM COATED ORAL EVERY 6 HOURS
Status: DISCONTINUED | OUTPATIENT
Start: 2024-07-18 | End: 2024-07-22 | Stop reason: HOSPADM

## 2024-07-18 RX ORDER — FERROUS SULFATE 325(65) MG
325 TABLET ORAL DAILY
Status: DISCONTINUED | OUTPATIENT
Start: 2024-07-18 | End: 2024-07-22 | Stop reason: HOSPADM

## 2024-07-18 RX ORDER — HYDROMORPHONE HCL IN WATER/PF 6 MG/30 ML
0.2 PATIENT CONTROLLED ANALGESIA SYRINGE INTRAVENOUS EVERY 4 HOURS PRN
Status: DISCONTINUED | OUTPATIENT
Start: 2024-07-18 | End: 2024-07-19

## 2024-07-18 RX ADMIN — OXYCODONE HYDROCHLORIDE 10 MG: 5 TABLET ORAL at 13:39

## 2024-07-18 RX ADMIN — ACETAMINOPHEN 975 MG: 325 TABLET ORAL at 20:24

## 2024-07-18 RX ADMIN — GABAPENTIN 300 MG: 300 CAPSULE ORAL at 13:36

## 2024-07-18 RX ADMIN — HEPARIN SODIUM 5000 UNITS: 5000 INJECTION, SOLUTION INTRAVENOUS; SUBCUTANEOUS at 21:29

## 2024-07-18 RX ADMIN — GABAPENTIN 300 MG: 300 CAPSULE ORAL at 20:25

## 2024-07-18 RX ADMIN — INSULIN DEGLUDEC 30 UNITS: 100 INJECTION, SOLUTION SUBCUTANEOUS at 21:28

## 2024-07-18 RX ADMIN — INSULIN ASPART 1 UNITS: 100 INJECTION, SOLUTION INTRAVENOUS; SUBCUTANEOUS at 11:45

## 2024-07-18 RX ADMIN — INSULIN ASPART 1 UNITS: 100 INJECTION, SOLUTION INTRAVENOUS; SUBCUTANEOUS at 08:19

## 2024-07-18 RX ADMIN — OXYCODONE HYDROCHLORIDE 10 MG: 5 TABLET ORAL at 19:24

## 2024-07-18 RX ADMIN — PANTOPRAZOLE SODIUM 40 MG: 40 TABLET, DELAYED RELEASE ORAL at 11:39

## 2024-07-18 RX ADMIN — ASPIRIN 81 MG: 81 TABLET, COATED ORAL at 08:18

## 2024-07-18 RX ADMIN — EZETIMIBE 10 MG: 10 TABLET ORAL at 08:18

## 2024-07-18 RX ADMIN — OXYCODONE HYDROCHLORIDE 10 MG: 5 TABLET ORAL at 09:37

## 2024-07-18 RX ADMIN — METHOCARBAMOL 500 MG: 500 TABLET ORAL at 16:10

## 2024-07-18 RX ADMIN — LISINOPRIL 40 MG: 20 TABLET ORAL at 08:19

## 2024-07-18 RX ADMIN — INSULIN DEGLUDEC 30 UNITS: 100 INJECTION, SOLUTION SUBCUTANEOUS at 08:19

## 2024-07-18 RX ADMIN — ACETAMINOPHEN 975 MG: 325 TABLET ORAL at 05:15

## 2024-07-18 RX ADMIN — ACETAMINOPHEN 975 MG: 325 TABLET ORAL at 13:35

## 2024-07-18 RX ADMIN — METHOCARBAMOL 500 MG: 500 TABLET ORAL at 14:11

## 2024-07-18 RX ADMIN — GABAPENTIN 300 MG: 300 CAPSULE ORAL at 08:18

## 2024-07-18 RX ADMIN — SENNOSIDES AND DOCUSATE SODIUM 1 TABLET: 50; 8.6 TABLET ORAL at 08:18

## 2024-07-18 RX ADMIN — POLYETHYLENE GLYCOL 3350 17 G: 17 POWDER, FOR SOLUTION ORAL at 08:18

## 2024-07-18 RX ADMIN — CEFAZOLIN 1 G: 1 INJECTION, POWDER, FOR SOLUTION INTRAMUSCULAR; INTRAVENOUS at 05:18

## 2024-07-18 RX ADMIN — FERROUS SULFATE TAB 325 MG (65 MG ELEMENTAL FE) 325 MG: 325 (65 FE) TAB at 16:10

## 2024-07-18 RX ADMIN — SITAGLIPTIN 50 MG: 25 TABLET, FILM COATED ORAL at 08:18

## 2024-07-18 RX ADMIN — AMLODIPINE BESYLATE 5 MG: 5 TABLET ORAL at 08:18

## 2024-07-18 RX ADMIN — SENNOSIDES AND DOCUSATE SODIUM 1 TABLET: 50; 8.6 TABLET ORAL at 20:25

## 2024-07-18 RX ADMIN — HYDROMORPHONE HYDROCHLORIDE 0.2 MG: 0.2 INJECTION, SOLUTION INTRAMUSCULAR; INTRAVENOUS; SUBCUTANEOUS at 03:58

## 2024-07-18 RX ADMIN — METHOCARBAMOL 500 MG: 500 TABLET ORAL at 20:24

## 2024-07-18 ASSESSMENT — ACTIVITIES OF DAILY LIVING (ADL)
ADLS_ACUITY_SCORE: 28
ADLS_ACUITY_SCORE: 24
ADLS_ACUITY_SCORE: 28
DEPENDENT_IADLS:: MEAL PREPARATION
ADLS_ACUITY_SCORE: 24
ADLS_ACUITY_SCORE: 28
ADLS_ACUITY_SCORE: 26
ADLS_ACUITY_SCORE: 28
ADLS_ACUITY_SCORE: 26
ADLS_ACUITY_SCORE: 28
ADLS_ACUITY_SCORE: 28
ADLS_ACUITY_SCORE: 26
ADLS_ACUITY_SCORE: 26
ADLS_ACUITY_SCORE: 24
ADLS_ACUITY_SCORE: 28
ADLS_ACUITY_SCORE: 24
ADLS_ACUITY_SCORE: 24
ADLS_ACUITY_SCORE: 26
ADLS_ACUITY_SCORE: 24
ADLS_ACUITY_SCORE: 26
ADLS_ACUITY_SCORE: 24
ADLS_ACUITY_SCORE: 26

## 2024-07-18 NOTE — PROGRESS NOTES
07/18/24 0915   Appointment Info   Signing Clinician's Name / Credentials (PT) Missy Hinkle,PT   Living Environment   People in Home spouse   Current Living Arrangements house  (can stay on 1 level)   Home Accessibility no concerns   Living Environment Comments I ADLs,  IADLS, kids transportation   Self-Care   Equipment Currently Used at Home shower chair;walker, rolling   Fall history within last six months no   General Information   Onset of Illness/Injury or Date of Surgery 07/17/24   Referring Physician Dr. Monster Upton   Patient/Family Therapy Goals Statement (PT) rehab to get home again   Pertinent History of Current Problem (include personal factors and/or comorbidities that impact the POC) Ashanti Aviles is a 68 year old female with a past medical history documented below presented to the hospital for amputation above the knee on the left leg   Weight-Bearing Status - LLE nonweight-bearing   General Observations pt in bed, wants to work with PT   Cognition   Affect/Mental Status (Cognition) WNL   Pain Assessment   Patient Currently in Pain Yes, see Vital Sign flowsheet  (3/10 L leg tightness and phantom pain)   Range of Motion (ROM)   ROM Comment RLE WFL, LLE difficulty with hip extension   Strength (Manual Muscle Testing)   Strength Comments RLE WFL, pain with LLE movement   Bed Mobility   Bed Mobility Limitations decreased ability to use legs for bridging/pushing   Impairments Contributing to Impaired Bed Mobility pain;decreased strength   Assistive Device (Bed Mobility) bed rails;other (see comments)  (HOB elevated)   Comment, (Bed Mobility) supine > sit mod I with bed rail, pt reporting pain L surgical area   Transfers   Maintains Weight-bearing Status (Transfers) able to maintain   Impairments Contributing to Impaired Transfers impaired balance;pain   Comment, (Transfers) sit<>stand CGA w/ FWW   Gait/Stairs (Locomotion)   [place] Level (Gait) contact guard   Assistive Device  (Gait) walker, front-wheeled   Distance in Feet (Gait) 10'   Pattern (Gait) other (see comments)  (hopping on RLE)   Deviations/Abnormal Patterns (Gait) antalgic   Maintains Weight-bearing Status (Gait) able to maintain   Balance   Balance Comments fair w/ FWW   Clinical Impression   Criteria for Skilled Therapeutic Intervention Yes, treatment indicated   PT Diagnosis (PT) decreased functional mobility   Influenced by the following impairments pain, decreased strength, decreased balance   Functional limitations due to impairments transfers, gait   Clinical Presentation (PT Evaluation Complexity) stable   Clinical Presentation Rationale presents as medically diagnosed   Clinical Decision Making (Complexity) moderate complexity   Planned Therapy Interventions (PT) bed mobility training;gait training;transfer training;progressive activity/exercise;balance training   Risk & Benefits of therapy have been explained evaluation/treatment results reviewed   PT Total Evaluation Time   PT Dannielle, Low Complexity Minutes (19528) 14   Physical Therapy Goals   PT Frequency Daily   PT Predicted Duration/Target Date for Goal Attainment 07/25/24   PT Goals Bed Mobility;Transfers;Gait   PT: Bed Mobility Independent  (flat bed mobility)   PT: Transfers Modified independent;Sit to/from stand;Bed to/from chair;Assistive device;Within precautions   PT: Gait Modified independent;Rolling walker;50 feet;Within precautions   Interventions   Interventions Quick Adds Therapeutic Activity   Therapeutic Activity   Therapeutic Activities: dynamic activities to improve functional performance Minutes (40942) 12   Symptoms Noted During/After Treatment Fatigue;Increased pain   Treatment Detail/Skilled Intervention toilet transfer CGA w/ FWW, able to stand at sink pt leaning elbows on sink to wash hands, gait 15' w/ FWW CGA, pt educated on L hip ext in bed   PT Discharge Planning   PT Plan flat bed mob, transfers and gait w/ FWW , LLE ex and ROM  especially L hip  extension, standing balance   PT Discharge Recommendation (DC Rec) Acute Rehab Center-Motivated patient will benefit from intensive, interdisciplinary therapy.  Anticipate will be able to tolerate 3 hours of therapy per day   PT Rationale for DC Rec pt very motivated, wants to get better , become independent to return home and work in the future   PT Brief overview of current status mod I bed mob, CGA transfers, and CGA short gait/hopping w/ FW   PT Equipment Needed at Discharge wheelchair

## 2024-07-18 NOTE — PROGRESS NOTES
"[facility]    Medicine Progress Note - Hospitalist Service    Date of Admission:  7/17/2024    Assessment & Plan   Assessment:  Ashanti Aviles is a 68 year old female with a past medical history documented below presented to the hospital for amputation above the knee on the left leg and is s/p procedure, postprocedure medicine was consulted for medical comanagement    Severe PVD, Failure of multiple bypasses, Non salvageable LE  -- S/p Left AKA on 07/17  -- Vascular surgery following  -- C/w Aspirin, Plavix, Zetia  -- PT evaluation  -- Prosthetic supplies have been ordered  -- Pain management: Tylenol, oxycodone prn, dilaudid iv prn, robaxin, gabapentin    Leukocytosis  -- Most likely reactive  -- received ancef periop. Last dose on 0718  -- No fever. Monitor temp curve and cbc    HLD  -- C/w PTA Zetia    DM-II  -- Holding metformin and Jardiance while hospitalized  -- Accu-checks, Tresiba 30 unit bid, medium sliding scale tid-ac, Hypoglycemia protocol, tradjenta  -- Hemoglobin A1c 6.9    NADIRA, acute blood loss anemia  -- Iron panel reviewed  -- Ferrous sulfate  -- Monitor cbc    Essential Hypertension  -- C/w PTA Amlodipine,Lisinopril  -- Monitor vital signs as per protocol               Diet: Regular Diet Adult    DVT Prophylaxis: Heparin SQ  Cedeno Catheter: Not present  Lines: None     Cardiac Monitoring: None  Code Status: Full Code      Clinically Significant Risk Factors                  # Hypertension: Noted on problem list           #Precipitous drop in Hgb/Hct: Lowest Hgb this hospitalization: 8.4 g/dL. Will continue to monitor and treat/transfuse as appropriate.    # DMII: A1C = 6.9 % (Ref range: <5.7 %) within past 6 months, PRESENT ON ADMISSION  # Overweight: Estimated body mass index is 26.67 kg/m  as calculated from the following:    Height as of 7/11/24: 1.575 m (5' 2\").    Weight as of this encounter: 66.1 kg (145 lb 12.8 oz)., PRESENT ON ADMISSION            Disposition " Plan     Medically Ready for Discharge: Anticipated in 2-4 Days             Elida Espino MD  Hospitalist Service  [facility]  Securely message with Deborah (more info)  Text page via [employer] Paging/Directory   ______________________________________________________________________    Interval History   Chart reviewed.  Patient is seen and examined at bedside.   No complaints. Pain is well managed.  Plan of care discussed with patient. All questions answered. Pt verbalized understanding.     Physical Exam   Vital Signs: Temp: 98.9  F (37.2  C) Temp src: Oral BP: 113/50 Pulse: 93   Resp: 20 SpO2: 99 % O2 Device: None (Room air) Oxygen Delivery: 1 LPM  Weight: 145 lbs 12.8 oz    GEN: Alert and oriented. Not in acute distress.  HEENT: Atraumatic, mucous membrane- moist and pink.  Chest: Bilateral air entry.  CVS: S1S2 regular.   Abdomen: Soft. Non-tender, non-distended. No organomegaly. No guarding or rigidity. Bowel sounds active.   Extremities: No pedal edema. Left AKA  CNS: No involuntary movements.  Skin: no cyanosis or clubbing.     Medical Decision Making       53 MINUTES SPENT BY ME on the date of service doing chart review, history, exam, documentation & further activities per the note.      Data

## 2024-07-18 NOTE — PROGRESS NOTES
Occupational Therapy      07/18/24 1100   Appointment Info   Signing Clinician's Name / Credentials (OT) Mayelin Sosa OTR/L   Living Environment   People in Home spouse   Current Living Arrangements house   Home Accessibility no concerns   Transportation Anticipated family or friend will provide   Living Environment Comments Able to live on main level in home   Self-Care   Usual Activity Tolerance good   Current Activity Tolerance moderate   Regular Exercise No   Equipment Currently Used at Home shower chair;walker, standard   Fall history within last six months no   Activity/Exercise/Self-Care Comment Ind. w/ ADL routine at baseline   Instrumental Activities of Daily Living (IADL)   IADL Comments Pt normally works full time but has been off for a while anticipates going back to work when able, Pt and spouse share IADL routine   General Information   Onset of Illness/Injury or Date of Surgery 07/17/24   Referring Physician Dyan Geller MD   Additional Occupational Profile Info/Pertinent History of Current Problem 68 year old female with a past medical history documented below presented to the hospital for amputation above the knee on the left leg and is s/p procedure, postprocedure medicine was consulted for medical comanagement   Existing Precautions/Restrictions fall   Left Lower Extremity (Weight-bearing Status) non weight-bearing (NWB)  (AKA)   Cognitive Status Examination   Orientation Status not oriented to person, place or time   Visual Perception   Visual Impairment/Limitations corrective lenses for distance   Range of Motion Comprehensive   General Range of Motion no range of motion deficits identified   Strength Comprehensive (MMT)   General Manual Muscle Testing (MMT) Assessment no strength deficits identified   Coordination   Upper Extremity Coordination No deficits were identified   Bed Mobility   Bed Mobility No deficits identified   Transfers   Transfers sit-stand transfer   Sit-Stand  Transfer   Sit-Stand [place] (Transfers) contact guard   Assistive Device (Sit-Stand Transfers) walker, standard   Balance   Balance Assessment sit to stand dynamic balance   Sit-to-Stand Balance contact guard   Balance Comments fww   Activities of Daily Living   BADL Assessment/Intervention lower body dressing   Lower Body Dressing Assessment/Training   Position (Lower Body Dressing) supported standing   [place] Level (Lower Body Dressing) contact guard assist   Clinical Impression   Criteria for Skilled Therapeutic Interventions Met (OT) Yes, treatment indicated   OT Diagnosis decreased ADL ind.   OT Problem List-Impairments impacting ADL problems related to;activity tolerance impaired;balance;strength;mobility   Assessment of Occupational Performance 1-3 Performance Deficits   Identified Performance Deficits dressing, toileting, trnf safety   Planned Therapy Interventions (OT) ADL retraining;IADL retraining;balance training;transfer training;strengthening;home program guidelines;progressive activity/exercise   Clinical Decision Making Complexity (OT) problem focused assessment/low complexity   Risk & Benefits of therapy have been explained evaluation/treatment results reviewed;risks/benefits reviewed;patient   OT Total Evaluation Time   OT Eval, Low Complexity Minutes (37382) 10   OT Goals   Therapy Frequency (OT) Daily   OT Predicted Duration/Target Date for Goal Attainment 07/25/24   OT Goals Hygiene/Grooming;Lower Body Dressing;Transfers;Toilet Transfer/Toileting;Home Management   OT: Hygiene/Grooming supervision/stand-by assist;using adaptive equipment;within precautions;while standing   OT: Lower Body Dressing Supervision/stand-by assist;using adaptive equipment;within precautions;including set-up/clothing retrieval   OT: Transfer Modified independent;with assistive device;within precautions   OT: Toilet Transfer/Toileting Modified independent;using adaptive equipment;within precautions   OT: Home  Management Supervision/stand-by assist;using adaptive equipment;with light demand household tasks;from wheelchair   Self-Care/Home Management   Self-Care/Home Mgmt/ADL, Compensatory, Meal Prep Minutes (26568) 15   Symptoms Noted During/After Treatment (Meal Preparation/Planning Training) fatigue   Treatment Detail/Skilled Intervention Pt up in bathroom upon OT arrival w/ RN pt agreeable to therapy session, pt ambulated ~15/20ft w/ CGA w/ fww no LOB while maintaining NWB w/ LLE after L. AKA. Pt completed STS toileting CGA w/ fixed GB, g/h at sink CGA no LOB noted, pt reported pain well controlled. Pt instructed in LB dressing in supine to aid in ind. w/ LB dressing/safety. Pt completed additional STS from reclinerx3 w/ CGA cues for tech/safety no LOB w/ fww. Pt up in recliner at end.   OT Discharge Planning   OT Plan g/h at sink, shower/car trnf, functional mobility/ADL routine   OT Discharge Recommendation (DC Rec) Acute Rehab Center-Motivated patient will benefit from intensive, interdisciplinary therapy.  Anticipate will be able to tolerate 3 hours of therapy per day   OT Rationale for DC Rec Pt moving well w/ fww w/ assistx1- pt may benefit from Acute rehab stay to further enhance overall ADL ind./trnf safety prior to return home.  Pt is normally very Ind. and works at baseline. Pt does have good family support at home, and pending progress w/ trnfs/ADL ind may be able to d/c home vs. Acute rehab. Pt doing well maintainging NWB w/ LLE w/o LOB noted.   OT Brief overview of current status CGA trnfs fww- L. AKA on 7/17   Total Session Time   Timed Code Treatment Minutes 15   Total Session Time (sum of timed and untimed services) 25

## 2024-07-18 NOTE — CONSULTS
[facility]  Consult Note - Hospitalist Service  Date of Admission:  7/17/2024  Consult Requested by: Vascular surgery  Reason for Consult: Medical management    Assessment & Plan   Assessment:  Ashanti Aviles is a 68 year old female with a past medical history documented below presented to the hospital for amputation above the knee on the left leg and is s/p procedure, postprocedure medicine was consulted for medical comanagement    Problem list and plan:  Peripheral vascular disease  History of hyperlipidemia  Mild leukocytosis most likely reactive  As per patient she had poor circulation in the left lower extremity and had previously had surgery for revascularization but as her circulation did not improve she was scheduled for above-knee amputation and is s/p procedure  Vascular surgery following  Continue with pain management as per protocol  Continue with aspirin, Plavix, Zetia  Continue with gabapentin  Monitor fever and WBC curve  On gentle IV hydration  PT evaluation  Prosthetic supplies have been ordered    History of insulin-dependent type 2 diabetes mellitus  Continue with 30 units of degludec subcutaneous twice daily  Will continue with Tradjenta, will be holding metformin and Jardiance while hospitalized  Monitor blood sugar level  Hypoglycemia protocol  Follow-up hemoglobin A1c, recent hemoglobin A1c 6.7  Continue sliding scale  Blood sugar appears to be running in the stable range    Mild normocytic anemia  Monitor CBC, follow-up iron panel    History of hypertension  Continue with amlodipine, lisinopril  Monitor vital signs as per protocol   Blood pressure currently appears to be running in the stable range    DVT PPx  Heparin SQ every 8       Clinically Significant Risk Factors Present on Admission                # Drug Induced Platelet Defect: home medication list includes an antiplatelet medication   # Hypertension: Noted on problem list            # DMII: A1C = 6.7 %  "(Ref range: <5.7 %) within past 6 months    # Overweight: Estimated body mass index is 26.67 kg/m  as calculated from the following:    Height as of 7/11/24: 1.575 m (5' 2\").    Weight as of this encounter: 66.1 kg (145 lb 12.8 oz).              Saad J. Gondal, MD  Hospitalist Service  Securely message with Voc[employer] (more info)  Text page via [employer] Paging/Directory   ______________________________________________________________________    Chief Complaint   Peripheral vascular disease, poor circulation    History is obtained from the patient    History of Present Illness   Ashanti Aviles is a 68 year old female with a past medical history documented below presented to the hospital for amputation above the knee on the left leg and is s/p procedure, postprocedure medicine was consulted for medical comanagement      Past Medical History    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    Benign gastric polyp     Chronic toe ulcer (H)     Chronic ulcer of great toe of left foot with necrosis of muscle (H)     Diabetes mellitus (H)     Gastroesophageal reflux disease     Hard to intubate 02/15/2024    History of blood transfusion     History of colonic polyps     HLD (hyperlipidemia)     Hypertension     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/29/2013    Nicotine addiction     OAG (open angle glaucoma)     PVD (peripheral vascular disease) (H24)     Reflux esophagitis     Retinopathy     Toe ulcer (H)     Tubular adenoma     Yeast vaginitis        Past Surgical History   Past Surgical History:   Procedure Laterality Date    AMPUTATE TOE(S) Right 02/18/2023    Procedure: Amputation fourth toe right foot;  Surgeon: Benjamin Diez DPM;  Location: [facility] [place] [facility]    ANGIOGRAM Left 2/15/2024    Procedure: COMPLETION OF ANGIOGRAM, LEFT COMMON ILIAC ARTERY STENT PLACEMENT;  Surgeon: Dyan Geller MD;  Location: [facility] [place] [facility]    ANUS " SURGERY      BIOPSY CERVICAL, LOCAL EXCISION, SINGLE/MULTIPLE      COLONOSCOPY N/A 09/11/2020    Procedure: EXAM UNDER ANESTHESIA, HIGH RESOLUTION ANOSCOPY INTRA OP;  Surgeon: Preeti Sheehan MD;  Location: [facility];  Service: Gastroenterology    COLONOSCOPY N/A 12/14/2020    Procedure: EXAM UNDER ANESTHESIA WITH HIGH RESOLUTION ANOSCOPY;  Surgeon: Preeti Sheehan MD;  Location: [facility];  Service: General    COLONOSCOPY N/A 06/15/2021    Procedure: EXAM UNDER ANESTHESIA WITH HIGH RESOLUTION ANOSCOPY, BIOPSY, FULGURATION;  Surgeon: Preeti Sheehan MD;  Location: [facility];  Service: Gastroenterology    COLONOSCOPY N/A 4/15/2024    Procedure: COLONOSCOPY, WITH POLYPECTOMY;  Surgeon: Shen Andres MD;  Location: [facility] OR    ENDARTERECTOMY FEMORAL Left 05/17/2023    Procedure: LEFT FEMORAL ENDARTERECTOMY WITH RETROGRADE ILIAC STENT;  Surgeon: Dyan Geller MD;  Location: [facility]    EXAM UNDER ANESTHESIA ANUS N/A 09/14/2021    Procedure: EXAM UNDER ANESTHESIA WITH HIGH RESOLUTION ANOSCOPY;  Surgeon: Preeti Sheehan MD;  Location: [facility]    FEMORAL ARTERY - TIBIAL ARTERY BYPASS GRAFT Left 09/08/2023    Procedure: CREATION, BYPASS, ARTERIAL, FEMORAL TO TIBIAL, LEFT;  Surgeon: Dyan Geller MD;  Location: [facility]    FEMORAL ARTERY - TIBIAL ARTERY BYPASS GRAFT Left 2/15/2024    Procedure: CREATION, BYPASS, ARTERIAL, FEMORAL TO TIBIAL,;  Surgeon: Dyan Geller MD;  Location: [facility]    FEMORAL ARTERY - TIBIAL ARTERY BYPASS GRAFT Left 6/4/2024    Procedure: LEFT EXTERNAL ILIAC ARTERY TO POSTERIOR TIBIAL ARTERY BYPASS WITH CRYO ARTERY WITH A FEMORAL HERNIA REPAIR;  Surgeon: Dyan Geller MD;  Location: [facility]    INCISION AND DRAINAGE FOOT, COMBINED Left 11/27/2023    Procedure: INCISION AND DRAINAGE, LEFT HALLUX;  Surgeon: Rene Ordaz DPM;  Location:   "[facility]    INCISION AND DRAINAGE FOOT, COMBINED Left 6/7/2024    Procedure: AMPUTATION LEFT HALLUX;  Surgeon: Rene Ordaz DPM;  Location: [facility]    IR LOWER EXTREMITY ANGIOGRAM LEFT  03/23/2023    IR LOWER EXTREMITY ANGIOGRAM LEFT  7/8/2024    IR LOWER EXTREMITY ANGIOGRAM RIGHT  02/16/2023    IR THROMBOLYSIS ARTERIAL INFUSION INITIAL DAY  10/09/2023    LEEP TX, CERVICAL      2005    TUBAL LIGATION         Medications   I have reviewed this patient's current medications       Review of Systems    The 10 point Review of Systems is negative other than noted in the HPI or here.  Poor circulation    Physical Exam   Vital Signs: Temp: 97.9  F (36.6  C) Temp src: Oral BP: 117/52 Pulse: 85   Resp: 14 SpO2: 97 % O2 Device: Nasal cannula Oxygen Delivery: 1 LPM  Weight: 145 lbs 12.8 oz    GENERAL: Patient was seen and examined at bedside with no acute distress  HENT:  Head is normocephalic, atraumatic.   EYES:  Eyes have anicteric sclerae without conjunctival injection   LUNGS: Bilateral air entry, clear to auscultation bilaterally  CARDIOVASCULAR:  Regular rate and rhythm with no murmurs, gallops or rubs.  ABDOMEN:  Normal bowel sounds. Soft; nontender   EXT: no edema, above-knee amputation left lower extremity  SKIN:  No acute rashes.   NEUROLOGIC:  Grossly nonfocal.      Medical Decision Making       80 MINUTES SPENT BY ME on the date of service doing chart review, history, exam, documentation & further activities per the note.      Data     I have personally reviewed the following data over the past 24 hrs:    11.9 (H)  \   11.0 (L)   / 405     138 102 11.5 /  119 (H)   3.5 25 0.62 \       Imaging results reviewed over the past 24 hrs:   Recent Results (from the past 24 hour(s))   POC US Guidance Needle Placement    Narrative    Ultrasound was performed as guidance to an anesthesia procedure.  Click   \"PACS images\" hyperlink below to view any stored images.  For specific   procedure details, view " procedure note authored by anesthesia.

## 2024-07-18 NOTE — PROGRESS NOTES
VASCULAR SURGERY PROGRESS NOTE    Subjective:  Pt seen status post left AKA, recovering well and no complaints this morning.    Objective:  Intake/Output Summary (Last 24 hours) at 7/18/2024 1710  Last data filed at 7/18/2024 1400  Gross per 24 hour   Intake 680 ml   Output --   Net 680 ml     PHYSICAL EXAM:  /53 (BP Location: Right arm)   Pulse 84   Temp 98.4  F (36.9  C) (Oral)   Resp 18   Wt 66.1 kg (145 lb 12.8 oz)   LMP  (LMP Unknown)   SpO2 96%   BMI 26.67 kg/m    General: The patient is alert and oriented. Appropriate. No acute distress  Psych: pleasant affect, answers questions appropriately  Skin: Color appropriate for race, warm, dry.  Respiratory: The patient does not require supplemental oxygen. Breathing unlabored  GI:  Abdomen soft, nontender to light palpation.  Extremities: Left lower extremity above-knee amputation dressing clean dry and intact      ASSESSMENT:  68-year-old female with history of chronic limb threatening ischemia left lower extremity status post multiple failed bypass to left leg due to hypercoagulable state and continued thrombosis with poor wound healing and infection, now status post left above-knee amputation 7/17, recovering well      PLAN:  Multimodal pain control  Dressing changed by vascular surgery team on 7/19  Physical therapy consult today  Case management for discharge planning  Home meds restarted  ASA 81mg and DVT ppx with [facility] medical team for assistance with care  Anticipate discharge one discharge plan in place, possibly tomorrow after dressing change by vascular team    Discussed pt history, exam, assessment and plan with Dr. Geller of the vascular surgery service, who is in agreement with the above.    Belkys Harmon DO  Fellow  VASCULAR SURGERY

## 2024-07-18 NOTE — CONSULTS
Care Management Initial Consult    General Information  Assessment completed with: Ashanti Mcarthur  Type of CM/SW Visit: Initial Assessment    Primary Care Provider verified and updated as needed: Yes   Readmission within the last 30 days: no previous admission in last 30 days         Advance Care Planning: Advance Care Planning Reviewed: other (see comments) (No ACP documents)          Communication Assessment  Patient's communication style: spoken language (English or Bilingual)    Hearing Difficulty or Deaf: no   Wear Glasses or Blind: yes    Cognitive  Cognitive/Neuro/Behavioral: WDL  Level of Consciousness: lethargic  Arousal Level: opens eyes spontaneously  Orientation: disoriented x 4  Mood/Behavior: calm, cooperative  Best Language: 0 - No aphasia  Speech: clear    Living Environment:   People in home: spouse  Rodrick  Current living Arrangements: house      Able to return to prior arrangements: yes       Family/Social Support:  Care provided by: self, spouse/significant other  Provides care for: no one  Marital Status:     Rodrick       Description of Support System: Supportive         Current Resources:   Patient receiving home care services: No     Community Resources: None  Equipment currently used at home: shower chair, walker, standard  Supplies currently used at home:      Employment/Financial:  Employment Status:          Financial Concerns:             Does the patient's insurance plan have a 3 day qualifying hospital stay waiver?  No    Lifestyle & Psychosocial Needs:  Social Determinants of Health     Food Insecurity: Not on file   Depression: Not at risk (7/11/2024)    PHQ-2     PHQ-2 Score: 1   Housing Stability: Not on file   Tobacco Use: Medium Risk (7/11/2024)    Patient History     Smoking Tobacco Use: Former     Smokeless Tobacco Use: Never     Passive Exposure: Never   Financial Resource Strain: Not on file   Alcohol Use: Not on file   Transportation Needs: Not on file   Physical  Activity: Not on file   Interpersonal Safety: Low Risk  (6/21/2024)    Interpersonal Safety     Do you feel physically and emotionally safe where you currently live?: Yes     Within the past 12 months, have you been hit, slapped, kicked or otherwise physically hurt by someone?: No     Within the past 12 months, have you been humiliated or emotionally abused in other ways by your partner or ex-partner?: No   Stress: Not on file   Social Connections: Not on file   Health Literacy: Not on file       Functional Status:  Prior to admission patient needed assistance:   Dependent ADLs:: Ambulation-walker  Dependent IADLs:: Meal Preparation       Mental Health Status:  Mental Health Status: No Current Concerns       Chemical Dependency Status:  Chemical Dependency Status: No Current Concerns             Values/Beliefs:  Spiritual, Cultural Beliefs, Judaism Practices, Values that affect care:                 Additional Information:  Assessed. RNCM introduced self and CM role. Pt lives with spouse Rodrick in a house. Pt Ind with ADLs using walker. Pt has AKA of left leg. Pt Ind wth most IADLS, pt does get assist with meals by spouse.  No home care svcs prior. Pt agreeable to having home care at discharge, therapy recs ARU, pt rather go home with home care. Referral sent for home PT/OT/RN (pending). Pt has established PCP. Very supportive children as well. Daughter Zee to transport at discharge.         Cm will continue to follow plan of care,review recommendations, and assist with any discharge needs anticipated.       Shara Pavon RN

## 2024-07-18 NOTE — PLAN OF CARE
Problem: Pain Acute  Goal: Optimal Pain Control and Function  Intervention: Develop Pain Management Plan  Recent Flowsheet Documentation  Taken 7/18/2024 0358 by Gisele Ochoa RN  Pain Management Interventions: medication (see MAR)     Problem: Adult Inpatient Plan of Care  Goal: Optimal Comfort and Wellbeing  Intervention: Monitor Pain and Promote Comfort  Recent Flowsheet Documentation  Taken 7/18/2024 0358 by Gisele Ochoa RN  Pain Management Interventions: medication (see MAR)     Problem: Adult Inpatient Plan of Care  Goal: Absence of Hospital-Acquired Illness or Injury  Intervention: Prevent Skin Injury  Recent Flowsheet Documentation  Taken 7/18/2024 0358 by Gisele Ochoa RN  Body Position: position changed independently  Taken 7/18/2024 0032 by Gisele Ochoa RN  Body Position: supine   Goal Outcome Evaluation:        Pt is alert and oriented X 4, post OP day 1 with above the left knee amputation.      Pt complained headache and pain 6/10 on the incision side PRN dilaudid given.      Pt use bedpan X 3 this shift with good output, had NS running at 70ml/hr. Bladder scan done and PVR was 368 ml.      0515 Am pt has Temp of 100.3 975 Tylenol given.    0537 Temp rechecked and pt it is 100.7 house notified.    0604 Am sepsis protocol initiated Temp last Temp 99.8, BP stable

## 2024-07-18 NOTE — CONSULTS
"[facility] ACUTE INPATIENT PAIN SERVICE    [facility], [facility], [facility], [facility], [place]   PAIN Consult       Assessment/Plan:  Ashanti Aviles is a 68 year old female who was admitted on 7/17/2024.  I was asked by Dr. Espino to see the patient for s/p left AKA. Post op pain. Admitted for planned LLE amputation due to chronic ischemia- despite bypass graft. History of  DM2, anemia, HTN, tobacco use, PVD.    shows 15 opioid scripts (tramadol, norco, oxycodone, gabapentin, dilaudid on 6/11 & 6/12). Describes pain as 3/10 and well-controlled with plan as current.  Utilized 20mg oxycodone in total today.     PLAN:  Left lower extremity pain, postop amputation this is in the setting of known opioid tolerance.   Multimodal Medication Therapy:   Adjuvants: increase tylenol, gabapentin 300mg TID, robaxin 500mg -1 scheduled every 6 hours rather than as needed only, avoid NSAIDs as hgb is 8.4  Opioids: Continue oxycodone 10 mg every 3 as needed  Continue IV Dilaudid for breakthrough pain 0.2 mg as needed  Non-medication interventions- Ice  Constipation Prophylaxis- senna and miralax   Follow up /Discharge Recommendations - We recommend prescribing the following at the time of discharge:  2 week supply of pain medication & follow up with Kendy Zapata after 6 weeks of pain control with surgical team          Subjective:    Today I met with Ashanti at the bedside.  She is known to me from previous admission.  Interestingly Dilaudid has worked well for her in the past.  However she states her pain is very minimal.  And oxycodone has been effective so far.  Surgery was yesterday at 1 PM.  She states that most of the pain is in the posterior left thigh and feels like muscle spasms.  She denies any phantom pain.  But she does have phantom sensation and feels like she is, \"crossing her legs\".  Prior to admission she was taking oxycodone per her report.  However per [place]  she most recently filled hydrocodone.  She " was also taking Aleve prior to admission.  Advised against NSAIDs for now given anemia.  No problem with constipation lately despite having been on opioids for the last few months.  She quit smoking 4 months ago and is extremely pleased with her smoking cessation plan.  Congratulated her on this success.       2024 4 Hydrocodone-Acetamin 5-325 Mg 60.00 30   2024 3 Hydromorphone 4 Mg Tablet 15.00 3   2024 1 Hydromorphone 4 Mg Tablet 20.00 4   2024 3 Gabapentin 300 Mg Capsule 90.00 90   2024 3 Hydrocodone-Acetamin 5-325 Mg 60.00 30   2024 3 Gabapentin 300 Mg Capsule 90.00 30   2024 1 Oxycodone Hcl (Ir) 5 Mg Tablet 12.00 3   2024 1 Oxycodone Hcl (Ir) 5 Mg Tablet 6.00 1   2023 10/11/2023 1 Gabapentin 300 Mg Capsule 60.00      History   Drug Use Unknown         Tobacco Use      Smoking status: Former        Packs/day: 0.00        Types: Cigarettes        Quit date: 2023        Years since quittin.4        Passive exposure: Never      Smokeless tobacco: Never        Objective:  Vital signs in last 24 hours:  B/P: 113/50, T: 98.9, P: 93, R: 20   Blood pressure 113/50, pulse 93, temperature 98.9  F (37.2  C), temperature source Oral, resp. rate 20, weight 66.1 kg (145 lb 12.8 oz), SpO2 99%, not currently breastfeeding.        Review of Systems:   As per subjective, all others negative.    Physical Exam    General: in no apparent distress and non-toxic laying flat in bed and watching TV  HEENT: Head normocephalic atraumatic, oral mucosa moist. Sclerae anicteric  CV: Regular rhythm, normal rate, no murmurs  Resp: No wheezes, no rales or rhonchi, no focal consolidations  GI: Active  Skin: No rashes or lesions  Extremities: Left lower extremity is with strength wrap in place  Psych: Normal affect, mood euthymic  Neuro: Left lower extremity amputation is covered           Imaging:  Personally Reviewed.    Reviewed IR angio on 7/8 patent but worsening eschar    Lab Results:  Personally Reviewed.   Last Comprehensive Metabolic Panel:  Sodium   Date Value Ref Range Status   07/18/2024 135 135 - 145 mmol/L Final     Potassium   Date Value Ref Range Status   07/18/2024 4.0 3.4 - 5.3 mmol/L Final     Chloride   Date Value Ref Range Status   07/18/2024 102 98 - 107 mmol/L Final     Carbon Dioxide (CO2)   Date Value Ref Range Status   07/18/2024 25 22 - 29 mmol/L Final     Anion Gap   Date Value Ref Range Status   07/18/2024 8 7 - 15 mmol/L Final     Glucose   Date Value Ref Range Status   09/14/2021 126 (A) 70 - 99 mg/dL Final     Comment:     Lot [id number]  Exp 3/9/22     GLUCOSE BY METER POCT   Date Value Ref Range Status   07/18/2024 160 (H) 70 - 99 mg/dL Final     Urea Nitrogen   Date Value Ref Range Status   07/18/2024 7.3 (L) 8.0 - 23.0 mg/dL Final     Creatinine   Date Value Ref Range Status   07/18/2024 0.64 0.51 - 0.95 mg/dL Final     GFR Estimate   Date Value Ref Range Status   07/18/2024 >90 >60 mL/min/1.73m2 Final     Comment:     eGFR calculated using 2021 CKD-EPI equation.     GFR, ESTIMATED POCT   Date Value Ref Range Status   03/26/2024 45 (L) >60 mL/min/1.73m2 Final     Calcium   Date Value Ref Range Status   07/18/2024 8.1 (L) 8.8 - 10.4 mg/dL Final     Comment:     Reference intervals for this test were updated on 7/16/2024 to reflect our healthy population more accurately. There may be differences in the flagging of prior results with similar values performed with this method. Those prior results can be interpreted in the context of the updated reference intervals.        UA:   Cannabinoids Urine   Date Value Ref Range Status   04/26/2024 Screen Negative Screen Negative Final     Comment:     Cutoff for a negative cannabinoid is less than 50 ng/mL.              Please see A&P for additional details of medical decision making.  MANAGEMENT DISCUSSED with the following  over the past 24 hours: Discussed with patient  NOTE(S)/MEDICAL RECORDS REVIEWED over the past 24 hours: Reviewed previous notes from me, vascular, medicine  Tests personally interpreted in the past 24 hours:  - IR procedure showing noted above  Tests ORDERED & REVIEWED in the past 24 hours:  - Ordered UDS and reviewed creatinine clearance and hemoglobin  SUPPLEMENTAL HISTORY, in addition to the patient's history, over the past 24 hours obtained from:   - pt  Medical complexity over the past 24 hours:  -------------------------- HIGH RISK FOR MORBIDITY -------------------------------------------------------------  - Parenteral (IV) CONTROLLED SUBSTANCES ordered           Sheela RAYA, CNS-BC, CNP  Acute Care Pain Management Program   Hours of pain coverage Mon-Fri 8-1600, afterhours please call the house officer   [facility] (KALYN, MELISSAs, [place], RH)   Page via [employer] web console -Click for [employer]

## 2024-07-18 NOTE — PROGRESS NOTES
Prosthetics:  S: Patient seen at [facility] with an order for a Left AK consult and supplies ordered by Dyan Geller MD.  Patient states: That her leg is feeling good considering everything that has happened. She reported being familiar with prosthetics as she has had several family members who have been prosthetic users.  Patient reported functional abilities prior to amputation: Ambulating in the community, standing for long periods of time and walking for work.  O:  Visual inspection: The limb is in good condition, conical in shape. No areas of drainage were noted The patient's limb dressing was noted to be falling off of the patient's limb. She tried repositioning it several times during our conversation and it continued to fall off.  Products dispensed: The patient was delivered four interface socks and an AK donning tube. The sock (not a compression sock) fit well and helped keep the patient's dressing in place. Patient was shown how to don/doff the sock and ensure it did not have any wrinkles or rolls in it.  Prosthetic packet and business card left with patient.    A: Patient is a good candidate for: A K3 above knee prosthesis  The goal of the new prosthesis is to: Return to work, and ambulating in the community  Potential K level: K3  Motivation for prosthetic device: The patient is highly motivated and was asking good questions as to how best to prepare for her prosthesis.  P: Anticipated DC is 7/20.  Will contact patient 1 week from today to check on status.    KHAI Ferrell LO, Board Eligible Prosthetist.

## 2024-07-18 NOTE — PLAN OF CARE
Problem: Adult Inpatient Plan of Care  Goal: Plan of Care Review  Description: The Plan of Care Review/Shift note should be completed every shift.  The Outcome Evaluation is a brief statement about your assessment that the patient is improving, declining, or no change.  This information will be displayed automatically on your shift  note.  Outcome: Progressing   Goal Outcome Evaluation:       Pt getting oxycodone for pain, also new order for prn muscle relaxer. Pt has been up to bathroom as well as up in chair. Orthotics was here and gave pt sock for stump.

## 2024-07-18 NOTE — PLAN OF CARE
Problem: Pain Acute  Goal: Optimal Pain Control and Function  Outcome: Progressing     Problem: Lower Extremity Amputation  Goal: Optimal Adjustment to Amputation  Outcome: Progressing     Problem: Diabetes  Goal: Optimal Coping  Outcome: Progressing  Goal: Optimal Functional Ability  Intervention: Optimize Functional Ability  Recent Flowsheet Documentation  Taken 7/17/2024 2193 by Anna Dobbins RN  Activity Management: bedrest  Activity Assistance Provided: (Transfered with hover mat.) lift team assistance  Goal: Blood Glucose Level Within Target Range  Outcome: Progressing   Goal Outcome Evaluation:  Patient spent a good shift. Alert and oriented x 4.   Blood sugars 119 and 99. Pain and discomfort managed with scheduled tylenol, gabapentin, Robaxin and prn Oxycodone. Right BKA site intact. No bleeding noted. Patient voided on the bedpan x 1. PVR was 126 ml. I.V Normal saline infusing at 70 ml/hour.

## 2024-07-19 ENCOUNTER — APPOINTMENT (OUTPATIENT)
Dept: PHYSICAL THERAPY | Facility: HOSPITAL | Age: 69
DRG: 240 | End: 2024-07-19
Attending: SURGERY
Payer: COMMERCIAL

## 2024-07-19 LAB
ANION GAP SERPL CALCULATED.3IONS-SCNC: 6 MMOL/L (ref 7–15)
BASOPHILS # BLD MANUAL: 0 10E3/UL (ref 0–0.2)
BASOPHILS NFR BLD MANUAL: 0 %
BUN SERPL-MCNC: 6.7 MG/DL (ref 8–23)
CALCIUM SERPL-MCNC: 8.2 MG/DL (ref 8.8–10.4)
CHLORIDE SERPL-SCNC: 101 MMOL/L (ref 98–107)
CREAT SERPL-MCNC: 0.66 MG/DL (ref 0.51–0.95)
EGFRCR SERPLBLD CKD-EPI 2021: >90 ML/MIN/1.73M2
EOSINOPHIL # BLD MANUAL: 0.3 10E3/UL (ref 0–0.7)
EOSINOPHIL NFR BLD MANUAL: 2 %
ERYTHROCYTE [DISTWIDTH] IN BLOOD BY AUTOMATED COUNT: 15.5 % (ref 10–15)
GLUCOSE BLDC GLUCOMTR-MCNC: 107 MG/DL (ref 70–99)
GLUCOSE BLDC GLUCOMTR-MCNC: 109 MG/DL (ref 70–99)
GLUCOSE BLDC GLUCOMTR-MCNC: 139 MG/DL (ref 70–99)
GLUCOSE BLDC GLUCOMTR-MCNC: 142 MG/DL (ref 70–99)
GLUCOSE BLDC GLUCOMTR-MCNC: 148 MG/DL (ref 70–99)
GLUCOSE SERPL-MCNC: 70 MG/DL (ref 70–99)
HCO3 SERPL-SCNC: 25 MMOL/L (ref 22–29)
HCT VFR BLD AUTO: 23.4 % (ref 35–47)
HGB BLD-MCNC: 7.5 G/DL (ref 11.7–15.7)
LYMPHOCYTES # BLD MANUAL: 2 10E3/UL (ref 0.8–5.3)
LYMPHOCYTES NFR BLD MANUAL: 14 %
MCH RBC QN AUTO: 29 PG (ref 26.5–33)
MCHC RBC AUTO-ENTMCNC: 32.1 G/DL (ref 31.5–36.5)
MCV RBC AUTO: 90 FL (ref 78–100)
MONOCYTES # BLD MANUAL: 1 10E3/UL (ref 0–1.3)
MONOCYTES NFR BLD MANUAL: 7 %
NEUTROPHILS # BLD MANUAL: 11 10E3/UL (ref 1.6–8.3)
NEUTROPHILS NFR BLD MANUAL: 77 %
NRBC # BLD AUTO: 0 10E3/UL
NRBC BLD AUTO-RTO: 0 /100
PLAT MORPH BLD: ABNORMAL
PLATELET # BLD AUTO: 329 10E3/UL (ref 150–450)
POTASSIUM SERPL-SCNC: 3.3 MMOL/L (ref 3.4–5.3)
RBC # BLD AUTO: 2.59 10E6/UL (ref 3.8–5.2)
RBC MORPH BLD: ABNORMAL
SODIUM SERPL-SCNC: 132 MMOL/L (ref 135–145)
WBC # BLD AUTO: 14.3 10E3/UL (ref 4–11)

## 2024-07-19 PROCEDURE — 250N000011 HC RX IP 250 OP 636: Performed by: SURGERY

## 2024-07-19 PROCEDURE — 250N000013 HC RX MED GY IP 250 OP 250 PS 637: Performed by: STUDENT IN AN ORGANIZED HEALTH CARE EDUCATION/TRAINING PROGRAM

## 2024-07-19 PROCEDURE — 97530 THERAPEUTIC ACTIVITIES: CPT | Mod: GP

## 2024-07-19 PROCEDURE — 99232 SBSQ HOSP IP/OBS MODERATE 35: CPT | Performed by: STUDENT IN AN ORGANIZED HEALTH CARE EDUCATION/TRAINING PROGRAM

## 2024-07-19 PROCEDURE — 99233 SBSQ HOSP IP/OBS HIGH 50: CPT | Performed by: PAIN MEDICINE

## 2024-07-19 PROCEDURE — 250N000013 HC RX MED GY IP 250 OP 250 PS 637: Performed by: PAIN MEDICINE

## 2024-07-19 PROCEDURE — 85007 BL SMEAR W/DIFF WBC COUNT: CPT | Performed by: STUDENT IN AN ORGANIZED HEALTH CARE EDUCATION/TRAINING PROGRAM

## 2024-07-19 PROCEDURE — 36415 COLL VENOUS BLD VENIPUNCTURE: CPT | Performed by: STUDENT IN AN ORGANIZED HEALTH CARE EDUCATION/TRAINING PROGRAM

## 2024-07-19 PROCEDURE — 80048 BASIC METABOLIC PNL TOTAL CA: CPT | Performed by: STUDENT IN AN ORGANIZED HEALTH CARE EDUCATION/TRAINING PROGRAM

## 2024-07-19 PROCEDURE — 97110 THERAPEUTIC EXERCISES: CPT | Mod: GP

## 2024-07-19 PROCEDURE — 85014 HEMATOCRIT: CPT | Performed by: STUDENT IN AN ORGANIZED HEALTH CARE EDUCATION/TRAINING PROGRAM

## 2024-07-19 PROCEDURE — 250N000013 HC RX MED GY IP 250 OP 250 PS 637: Performed by: SURGERY

## 2024-07-19 PROCEDURE — 120N000001 HC R&B MED SURG/OB

## 2024-07-19 RX ORDER — POTASSIUM CHLORIDE 1500 MG/1
40 TABLET, EXTENDED RELEASE ORAL ONCE
Status: COMPLETED | OUTPATIENT
Start: 2024-07-19 | End: 2024-07-19

## 2024-07-19 RX ORDER — HYDROMORPHONE HCL IN WATER/PF 6 MG/30 ML
0.2 PATIENT CONTROLLED ANALGESIA SYRINGE INTRAVENOUS EVERY 8 HOURS PRN
Status: DISCONTINUED | OUTPATIENT
Start: 2024-07-19 | End: 2024-07-22 | Stop reason: HOSPADM

## 2024-07-19 RX ORDER — TRAZODONE HYDROCHLORIDE 50 MG/1
50 TABLET, FILM COATED ORAL
Status: DISCONTINUED | OUTPATIENT
Start: 2024-07-19 | End: 2024-07-22 | Stop reason: HOSPADM

## 2024-07-19 RX ADMIN — HEPARIN SODIUM 5000 UNITS: 5000 INJECTION, SOLUTION INTRAVENOUS; SUBCUTANEOUS at 20:52

## 2024-07-19 RX ADMIN — SENNOSIDES AND DOCUSATE SODIUM 1 TABLET: 50; 8.6 TABLET ORAL at 20:49

## 2024-07-19 RX ADMIN — INSULIN ASPART 1 UNITS: 100 INJECTION, SOLUTION INTRAVENOUS; SUBCUTANEOUS at 13:08

## 2024-07-19 RX ADMIN — INSULIN DEGLUDEC 30 UNITS: 100 INJECTION, SOLUTION SUBCUTANEOUS at 20:50

## 2024-07-19 RX ADMIN — TRAZODONE HYDROCHLORIDE 50 MG: 50 TABLET ORAL at 21:36

## 2024-07-19 RX ADMIN — ACETAMINOPHEN 975 MG: 325 TABLET ORAL at 02:47

## 2024-07-19 RX ADMIN — OXYCODONE HYDROCHLORIDE 10 MG: 5 TABLET ORAL at 22:45

## 2024-07-19 RX ADMIN — METHOCARBAMOL 500 MG: 500 TABLET ORAL at 02:47

## 2024-07-19 RX ADMIN — OXYCODONE HYDROCHLORIDE 10 MG: 5 TABLET ORAL at 19:30

## 2024-07-19 RX ADMIN — GABAPENTIN 300 MG: 300 CAPSULE ORAL at 09:16

## 2024-07-19 RX ADMIN — POTASSIUM CHLORIDE 40 MEQ: 1500 TABLET, EXTENDED RELEASE ORAL at 15:01

## 2024-07-19 RX ADMIN — GABAPENTIN 300 MG: 300 CAPSULE ORAL at 13:26

## 2024-07-19 RX ADMIN — METHOCARBAMOL 500 MG: 500 TABLET ORAL at 09:06

## 2024-07-19 RX ADMIN — SITAGLIPTIN 50 MG: 25 TABLET, FILM COATED ORAL at 09:16

## 2024-07-19 RX ADMIN — OXYCODONE HYDROCHLORIDE 10 MG: 5 TABLET ORAL at 09:13

## 2024-07-19 RX ADMIN — LISINOPRIL 40 MG: 20 TABLET ORAL at 09:16

## 2024-07-19 RX ADMIN — FERROUS SULFATE TAB 325 MG (65 MG ELEMENTAL FE) 325 MG: 325 (65 FE) TAB at 09:16

## 2024-07-19 RX ADMIN — GABAPENTIN 300 MG: 300 CAPSULE ORAL at 20:49

## 2024-07-19 RX ADMIN — SENNOSIDES AND DOCUSATE SODIUM 1 TABLET: 50; 8.6 TABLET ORAL at 09:16

## 2024-07-19 RX ADMIN — INSULIN DEGLUDEC 30 UNITS: 100 INJECTION, SOLUTION SUBCUTANEOUS at 09:46

## 2024-07-19 RX ADMIN — ACETAMINOPHEN 975 MG: 325 TABLET ORAL at 09:07

## 2024-07-19 RX ADMIN — EZETIMIBE 10 MG: 10 TABLET ORAL at 09:05

## 2024-07-19 RX ADMIN — METHOCARBAMOL 500 MG: 500 TABLET ORAL at 15:01

## 2024-07-19 RX ADMIN — AMLODIPINE BESYLATE 5 MG: 5 TABLET ORAL at 09:16

## 2024-07-19 RX ADMIN — HEPARIN SODIUM 5000 UNITS: 5000 INJECTION, SOLUTION INTRAVENOUS; SUBCUTANEOUS at 13:25

## 2024-07-19 RX ADMIN — METHOCARBAMOL 500 MG: 500 TABLET ORAL at 19:31

## 2024-07-19 RX ADMIN — HEPARIN SODIUM 5000 UNITS: 5000 INJECTION, SOLUTION INTRAVENOUS; SUBCUTANEOUS at 06:29

## 2024-07-19 RX ADMIN — ASPIRIN 81 MG: 81 TABLET, COATED ORAL at 09:16

## 2024-07-19 RX ADMIN — ACETAMINOPHEN 975 MG: 325 TABLET ORAL at 13:26

## 2024-07-19 RX ADMIN — POLYETHYLENE GLYCOL 3350 17 G: 17 POWDER, FOR SOLUTION ORAL at 09:14

## 2024-07-19 RX ADMIN — OXYCODONE HYDROCHLORIDE 10 MG: 5 TABLET ORAL at 06:28

## 2024-07-19 RX ADMIN — OXYCODONE HYDROCHLORIDE 10 MG: 5 TABLET ORAL at 00:40

## 2024-07-19 ASSESSMENT — ACTIVITIES OF DAILY LIVING (ADL)
ADLS_ACUITY_SCORE: 24

## 2024-07-19 NOTE — PROGRESS NOTES
[facility]    Medicine Progress Note - Hospitalist Service    Date of Admission:  7/17/2024    Assessment & Plan   Assessment:  Ashanti Aviles is a 68 year old female with a past medical history documented below presented to the hospital for amputation above the knee on the left leg and is s/p procedure, postprocedure medicine was consulted for medical comanagement    Severe PVD, Failure of multiple bypasses, Non salvageable LE  -- S/p Left AKA on 07/17  -- Vascular surgery following  -- C/w Aspirin, Zetia  -- PT evaluation-Acute rehab  -- Pain management: Tylenol, oxycodone prn, dilaudid iv prn, robaxin, gabapentin    Leukocytosis  -- Most likely reactive  -- received ancef periop. Last dose on 0718  -- No fever. Monitor temp curve and cbc    Hypokalemia  -- Replaced    Mild hyponatremia  -- likely 2/2 decreased intake  -- Monitor BMP, liberalize diet    HLD  -- C/w PTA Zetia    DM-II  -- Holding metformin and Jardiance while hospitalized  -- Accu-checks, Tresiba 30 unit bid, medium sliding scale tid-ac, Hypoglycemia protocol, tradjenta  -- Hemoglobin A1c 6.9    NADIRA, acute blood loss anemia  -- Iron panel reviewed  -- Ferrous sulfate  -- Monitor cbc    Essential Hypertension  -- C/w PTA Amlodipine,Lisinopril  -- Monitor vital signs as per protocol               Diet: Regular Diet Adult    DVT Prophylaxis: Heparin SQ  Cedeno Catheter: Not present  Lines: None     Cardiac Monitoring: None  Code Status: Full Code      Clinically Significant Risk Factors        # Hypokalemia: Lowest K = 3.3 mmol/L in last 2 days, will replace as needed           # Hypertension: Noted on problem list           #Precipitous drop in Hgb/Hct: Lowest Hgb this hospitalization: 7.5 g/dL. Will continue to monitor and treat/transfuse as appropriate.    # DMII: A1C = 6.9 % (Ref range: <5.7 %) within past 6 months, PRESENT ON ADMISSION  # Overweight: Estimated body mass index is 26.67 kg/m  as calculated from the  "following:    Height as of 7/11/24: 1.575 m (5' 2\").    Weight as of this encounter: 66.1 kg (145 lb 12.8 oz)., PRESENT ON ADMISSION            Disposition Plan     Medically Ready for Discharge: Anticipated in 2-4 Days             Elida Espino MD  Hospitalist Service  [facility]  Securely message with [employer] (more info)  Text page via [employer] Paging/Directory   ______________________________________________________________________    Interval History   Patient is seen and examined at bedside.   Had some painful muscle spasms  Plan of care discussed with patient. All questions answered. Pt verbalized understanding.     Physical Exam   Vital Signs: Temp: 98.6  F (37  C) Temp src: Oral BP: 104/55 Pulse: 93   Resp: 17 SpO2: 99 % O2 Device: None (Room air)    Weight: 145 lbs 12.8 oz    GEN: Alert and oriented. Not in acute distress.  HEENT: Atraumatic, mucous membrane- moist and pink.  Chest: Bilateral air entry.  CVS: S1S2 regular.   Abdomen: Soft. Non-tender, non-distended. No organomegaly. No guarding or rigidity. Bowel sounds active.   Extremities: No pedal edema. Left AKA  CNS: No involuntary movements.  Skin: no cyanosis or clubbing.     Medical Decision Making       48 MINUTES SPENT BY ME on the date of service doing chart review, history, exam, documentation & further activities per the note.      Data     "

## 2024-07-19 NOTE — PROGRESS NOTES
Care Management Follow Up    Length of Stay (days): 2    Expected Discharge Date: 07/20/2024    Anticipated Discharge Plan:   Home with home care and family support    Transportation: Anticipate Family/friend    PT Recommendations: Acute Rehab Center-Motivated patient will benefit from intensive, interdisciplinary therapy.  Anticipate will be able to tolerate 3 hours of therapy per day  OT Recommendations:  Acute Rehab Center-Motivated patient will benefit from intensive, interdisciplinary therapy.  Anticipate will be able to tolerate 3 hours of therapy per day     Barriers to Discharge: medical stability    Prior Living Situation: house with spouse     Patient/Spokesperson Updated: Yes. Who? patient    Additional Information:  Patient lives at home with her  Rodrick and is independent with activities of daily living at baseline. She uses a walker for mobility. Acute rehab is recommended but patient prefers to go home with family support and home care (confirmed with patient today). Per nursing, patient has an order for a wheelchair. Met with patient who states she would need a wheelchair and has no agency preferences. Referral made to [facility]. Called [phone] to discuss availability of a wheelchair at [facility] in [place]. She states that they do not and recommended [facility].  1:05 PM:  Spoke with [facility] who has not yet received the fax (as it goes to their corporate). However, they do not currently have any wheelchairs in patient's size (needs 18 inch per them). Referral made to [facility].   1:25 PM: Spoke with [facility] who states it can take up to 3 hours for them to receive referrals. MD Face to Face faxed to them as well.    Jenifer Rodriguez RN

## 2024-07-19 NOTE — PROGRESS NOTES
Per Dr Belkys Harmon phone call request, I called, SW, PT, and OT. I let SW know that  put in new order for wheelchair. I let both PT and OT know that  wants pt assessed today and that Dr will read their Notes afterward.

## 2024-07-19 NOTE — PROGRESS NOTES
[facility] ACUTE INPATIENT PAIN SERVICE    [facility], [facility], [facility], [facility], [place]   PAIN follow up       Assessment/Plan:  Ashanti Aviles is a 68 year old female who was admitted on 2024.  I was asked by Dr. Espino to see the patient for s/p left AKA. Post op pain. Admitted for planned LLE amputation due to chronic ischemia- despite bypass graft. History of  DM2, anemia, HTN, tobacco use, PVD.    shows 15 opioid scripts (tramadol, norco, oxycodone, gabapentin, dilaudid on  & ). Describes pain as well controlled.     Pain team will sign off.      PLAN:  Left lower extremity pain, postop amputation this is in the setting of known opioid tolerance.   Multimodal Medication Therapy:   Adjuvants: increase tylenol, gabapentin 300mg TID, robaxin 500mg -scheduled, avoid NSAIDs as hgb is  7.5  Opioids: Continue oxycodone 10 mg every 3 as needed  decrease IV  Dilaudid 0.2mg q8h prn   Non-medication interventions- Ice  Constipation Prophylaxis- senna and miralax   Follow up /Discharge Recommendations - We recommend prescribing the following at the time of discharge:  2 week supply of pain medication & follow up with Kendy Zapata after 6 weeks of pain control with surgical team            Subjective:    Pain controlled. No BM yet.           History   Drug Use Unknown         Tobacco Use      Smoking status: Former        Packs/day: 0.00        Types: Cigarettes        Quit date: 2023        Years since quittin.4        Passive exposure: Never      Smokeless tobacco: Never        Objective:  Vital signs in last 24 hours:  B/P: 110/55, T: 98.2, P: 76, R: 18   Blood pressure 110/55, pulse 76, temperature 98.2  F (36.8  C), temperature source Oral, resp. rate 18, weight 66.1 kg (145 lb 12.8 oz), SpO2 98%, not currently breastfeeding.        Review of Systems:   As per subjective, all others negative.    Physical Exam    General: in no apparent distress and non-toxic - sitting up in  bed   HEENT: Head normocephalic atraumatic, oral mucosa moist. Sclerae anicteric  CV: reports hc of a murmur   Resp: No wheezes   GI:  no complaints   Skin: No rashes or lesions  Extremities: amputation noted   Psych: Normal affect, mood euthymic  Neuro: Grossly normal          Imaging:  Personally Reviewed.    No results found for this visit on 07/17/24.     Lab Results:  Personally Reviewed.   Last Comprehensive Metabolic Panel:  Sodium   Date Value Ref Range Status   07/19/2024 132 (L) 135 - 145 mmol/L Final     Potassium   Date Value Ref Range Status   07/19/2024 3.3 (L) 3.4 - 5.3 mmol/L Final     Chloride   Date Value Ref Range Status   07/19/2024 101 98 - 107 mmol/L Final     Carbon Dioxide (CO2)   Date Value Ref Range Status   07/19/2024 25 22 - 29 mmol/L Final     Anion Gap   Date Value Ref Range Status   07/19/2024 6 (L) 7 - 15 mmol/L Final     Glucose   Date Value Ref Range Status   07/19/2024 70 70 - 99 mg/dL Final   09/14/2021 126 (A) 70 - 99 mg/dL Final     Comment:     Lot [id number]  Exp 3/9/22     GLUCOSE BY METER POCT   Date Value Ref Range Status   07/19/2024 139 (H) 70 - 99 mg/dL Final     Urea Nitrogen   Date Value Ref Range Status   07/19/2024 6.7 (L) 8.0 - 23.0 mg/dL Final     Creatinine   Date Value Ref Range Status   07/19/2024 0.66 0.51 - 0.95 mg/dL Final     GFR Estimate   Date Value Ref Range Status   07/19/2024 >90 >60 mL/min/1.73m2 Final     Comment:     eGFR calculated using 2021 CKD-EPI equation.     GFR, ESTIMATED POCT   Date Value Ref Range Status   03/26/2024 45 (L) >60 mL/min/1.73m2 Final     Calcium   Date Value Ref Range Status   07/19/2024 8.2 (L) 8.8 - 10.4 mg/dL Final     Comment:     Reference intervals for this test were updated on 7/16/2024 to reflect our healthy population more accurately. There may be differences in the flagging of prior results with similar values performed with this method. Those prior results can be interpreted in the context of the updated reference  intervals.        UA:   Amphetamines Urine   Date Value Ref Range Status   07/18/2024 Screen Negative Screen Negative Final     Comment:     Cutoff for a negative amphetamine is less than 500 ng/mL.     Barbituates Urine   Date Value Ref Range Status   07/18/2024 Screen Negative Screen Negative Final     Comment:     Cutoff for a negative barbiturate is less than 200 ng/mL.     Cannabinoids Urine   Date Value Ref Range Status   07/18/2024 Screen Negative Screen Negative Final     Comment:     Cutoff for a negative cannabinoid is less than 50 ng/mL.     Cocaine Urine   Date Value Ref Range Status   07/18/2024 Screen Negative Screen Negative Final     Comment:     Cutoff for a negative cocaine is less than 300 ng/mL.     Opiates Urine   Date Value Ref Range Status   07/18/2024 Screen Negative Screen Negative Final     Comment:     Cutoff for a negative opiate is less than 300 ng/mL.     PCP Urine   Date Value Ref Range Status   07/18/2024 Screen Negative Screen Negative Final     Comment:     Cutoff for a negative PCP is less than 25 ng/mL.              Please see A&P for additional details of medical decision making.  MANAGEMENT DISCUSSED with the following over the past 24 hours: patient    NOTE(S)/MEDICAL RECORDS REVIEWED over the past 24 hours: care coordination, nursing, medicine and orthotist   Tests personally interpreted in the past 24 hours:  - CTA showing occulasion as per previously noted  Tests REVIEWED in the past 24 hours:  - UDs and Crtcl ang hfb  SUPPLEMENTAL HISTORY, in addition to the patient's history, over the past 24 hours obtained from:   - pt  Medical complexity over the past 24 hours:  -------------------------- HIGH RISK FOR MORBIDITY -------------------------------------------------------------  - Parenteral (IV) CONTROLLED SUBSTANCES ordered           Sheela RAYA, CNS-BC, CNP  Acute Care Pain Management Program   Hours of pain coverage Mon-Fri 8-1600, afterhours please call the  house officer   [facility] (KALYN, Lillie, [place], RH)   Page via Mobile [employer] web console -Click for [employer]

## 2024-07-19 NOTE — PLAN OF CARE
Problem: Adult Inpatient Plan of Care  Goal: Plan of Care Review  Description: The Plan of Care Review/Shift note should be completed every shift.  The Outcome Evaluation is a brief statement about your assessment that the patient is improving, declining, or no change.  This information will be displayed automatically on your shift  note.  Outcome: Progressing  Goal: Absence of Hospital-Acquired Illness or Injury  Intervention: Identify and Manage Fall Risk  Recent Flowsheet Documentation  Taken 7/19/2024 0100 by Dom Ledezma RN  Safety Promotion/Fall Prevention:   activity supervised   assistive device/personal items within reach   clutter free environment maintained   mobility aid in reach   nonskid shoes/slippers when out of bed   safety round/check completed   supervised activity  Intervention: Prevent Skin Injury  Recent Flowsheet Documentation  Taken 7/19/2024 0133 by Dom Ledezma RN  Body Position: position changed independently  Taken 7/19/2024 0100 by Dom Ledezma RN  Body Position: supine, head elevated  Intervention: Prevent Infection  Recent Flowsheet Documentation  Taken 7/19/2024 0100 by Dom Ledezma RN  Infection Prevention:   environmental surveillance performed   equipment surfaces disinfected   hand hygiene promoted  Goal: Optimal Comfort and Wellbeing  Intervention: Monitor Pain and Promote Comfort  Recent Flowsheet Documentation  Taken 7/19/2024 0247 by Dom Ledezma RN  Pain Management Interventions: medication (see MAR)  Taken 7/19/2024 0040 by Dom Ledezma RN  Pain Management Interventions: medication (see MAR)     Problem: Pain Acute  Goal: Optimal Pain Control and Function  Outcome: Progressing  Intervention: Develop Pain Management Plan  Recent Flowsheet Documentation  Taken 7/19/2024 0247 by Dom Ledezma RN  Pain Management Interventions: medication (see MAR)  Taken 7/19/2024 0040 by Dom Ledezma  ROSIE RN  Pain Management Interventions: medication (see MAR)  Intervention: Prevent or Manage Pain  Recent Flowsheet Documentation  Taken 7/19/2024 0100 by Dom Ledezma RN  Medication Review/Management: medications reviewed     Problem: Lower Extremity Amputation  Goal: Optimal Mobility [place] and Safety  Outcome: Progressing     Problem: Comorbidity Management  Goal: Blood Pressure in Desired Range  Outcome: Progressing  Intervention: Maintain Blood Pressure Management  Recent Flowsheet Documentation  Taken 7/19/2024 0100 by Dom Ledezma RN  Medication Review/Management: medications reviewed   Goal Outcome Evaluation:       Pt alert & oriented. Pain managed by prn oxycodone 10mg and scheduled tylenol and robaxin with relief. Ambulated to bathroom using walker with standby assist. On room air, Vital Signs stable. Will continue to monitor.

## 2024-07-19 NOTE — PROGRESS NOTES
VASCULAR SURGERY PROGRESS NOTE    Subjective:  Pt seen status post left AKA, recovering well and no complaints this morning. Would like to be discharged home but understand need for rehab if that is safest discharge plan.     Objective:  Intake/Output Summary (Last 24 hours) at 7/18/2024 1710  Last data filed at 7/18/2024 1400  Gross per 24 hour   Intake 680 ml   Output --   Net 680 ml     PHYSICAL EXAM:  /55 (BP Location: Left arm)   Pulse 76   Temp 98.2  F (36.8  C) (Oral)   Resp 18   Wt 66.1 kg (145 lb 12.8 oz)   LMP  (LMP Unknown)   SpO2 98%   BMI 26.67 kg/m    General: The patient is alert and oriented. Appropriate. No acute distress  Psych: pleasant affect, answers questions appropriately  Skin: Color appropriate for race, warm, dry.  Respiratory: The patient does not require supplemental oxygen. Breathing unlabored  GI:  Abdomen soft, nontender to light palpation.  Extremities: Left lower extremity above-knee amputation dressing clean dry and intact. Wound healing very well, staples in place, moderate edema, no necrosis or bruising noted.       ASSESSMENT:  68-year-old female with history of chronic limb threatening ischemia left lower extremity status post multiple failed bypass to left leg due to hypercoagulable state and continued thrombosis with poor wound healing and infection, now status post left above-knee amputation 7/17, recovering well      PLAN:  Multimodal pain control  Dressing changed by vascular surgery team on 7/19  Physical therapy consult today  Case management for discharge planning  Home meds restarted  ASA 81mg and DVT ppx with [facility] medical team for assistance with care  Could be discharged today from vascular surgery perspective- will need re evaluation by PT to determine needs for home vs rehab    Discussed pt history, exam, assessment and plan with Dr. Geller of the vascular surgery service, who is in agreement with the above.    Belkys Harmon,  DO  Fellow  VASCULAR SURGERY

## 2024-07-19 NOTE — PROGRESS NOTES
Face to face note:    Pt will require wheelchair on discharge. She is status post left above knee amputation and is non weight bearing to left amputation site. She has not previously had an amputation and is unable to use a walker or can as she only has one leg that can hold weight. A wheelchair is required for her safety and continued recovery.     Belkys Harmon, DO

## 2024-07-19 NOTE — PLAN OF CARE
Problem: Adult Inpatient Plan of Care  Goal: Optimal Comfort and Wellbeing  Intervention: Monitor Pain and Promote Comfort  Recent Flowsheet Documentation  Taken 7/18/2024 2024 by Anna Dobbins RN  Pain Management Interventions: medication (see MAR)  Taken 7/18/2024 1924 by Anna Dobbins RN  Pain Management Interventions: medication (see MAR)     Problem: Pain Acute  Goal: Optimal Pain Control and Function  Intervention: Prevent or Manage Pain  Recent Flowsheet Documentation  Taken 7/18/2024 1615 by Anna Dobbins RN  Sensory Stimulation Regulation: television on  Bowel Elimination Promotion:   adequate fluid intake promoted   ambulation promoted  Medication Review/Management: medications reviewed     Problem: Lower Extremity Amputation  Goal: Optimal Adjustment to Amputation  Outcome: Progressing  Intervention: Promote Patient and Family Adjustment to Amputation  Recent Flowsheet Documentation  Taken 7/18/2024 1615 by Anna Dobbins RN  Supportive Measures:   active listening utilized   verbalization of feelings encouraged     Problem: Diabetes  Goal: Optimal Coping  Intervention: Support Wellbeing and Self-Management Success  Recent Flowsheet Documentation  Taken 7/18/2024 1615 by Anna Dobbins RN  Supportive Measures:   active listening utilized   verbalization of feelings encouraged   Goal Outcome Evaluation:  Patient spent a fair shift. Pain and discomfort managed with scheduled and prn pain medications and muscle relaxants including prn Oxycodone 10 mg for left BKA site pain rated 7/10. Patient received scheduled tylenol and Robaxin with good effect. Blood sugars 125 and 209. Insulin coverage per sliding scale at dinner time and scheduled insulin at bedtime. Patient ambulated to and from the bathroom x 2 using a walker and SBA of staff. Urine sample sent this shift for drug screen.

## 2024-07-20 ENCOUNTER — APPOINTMENT (OUTPATIENT)
Dept: OCCUPATIONAL THERAPY | Facility: HOSPITAL | Age: 69
DRG: 240 | End: 2024-07-20
Attending: SURGERY
Payer: COMMERCIAL

## 2024-07-20 LAB
ANION GAP SERPL CALCULATED.3IONS-SCNC: 7 MMOL/L (ref 7–15)
BASOPHILS # BLD AUTO: 0.1 10E3/UL (ref 0–0.2)
BASOPHILS NFR BLD AUTO: 0 %
BUN SERPL-MCNC: 7.1 MG/DL (ref 8–23)
CALCIUM SERPL-MCNC: 8.6 MG/DL (ref 8.8–10.4)
CHLORIDE SERPL-SCNC: 105 MMOL/L (ref 98–107)
CREAT SERPL-MCNC: 0.68 MG/DL (ref 0.51–0.95)
EGFRCR SERPLBLD CKD-EPI 2021: >90 ML/MIN/1.73M2
EOSINOPHIL # BLD AUTO: 0.1 10E3/UL (ref 0–0.7)
EOSINOPHIL NFR BLD AUTO: 1 %
ERYTHROCYTE [DISTWIDTH] IN BLOOD BY AUTOMATED COUNT: 15.5 % (ref 10–15)
GLUCOSE BLDC GLUCOMTR-MCNC: 131 MG/DL (ref 70–99)
GLUCOSE BLDC GLUCOMTR-MCNC: 141 MG/DL (ref 70–99)
GLUCOSE BLDC GLUCOMTR-MCNC: 179 MG/DL (ref 70–99)
GLUCOSE BLDC GLUCOMTR-MCNC: 211 MG/DL (ref 70–99)
GLUCOSE BLDC GLUCOMTR-MCNC: 56 MG/DL (ref 70–99)
GLUCOSE BLDC GLUCOMTR-MCNC: 65 MG/DL (ref 70–99)
GLUCOSE BLDC GLUCOMTR-MCNC: 98 MG/DL (ref 70–99)
GLUCOSE SERPL-MCNC: 90 MG/DL (ref 70–99)
HCO3 SERPL-SCNC: 25 MMOL/L (ref 22–29)
HCT VFR BLD AUTO: 24 % (ref 35–47)
HGB BLD-MCNC: 7.5 G/DL (ref 11.7–15.7)
IMM GRANULOCYTES # BLD: 0.1 10E3/UL
IMM GRANULOCYTES NFR BLD: 1 %
LYMPHOCYTES # BLD AUTO: 3.7 10E3/UL (ref 0.8–5.3)
LYMPHOCYTES NFR BLD AUTO: 28 %
MCH RBC QN AUTO: 27.9 PG (ref 26.5–33)
MCHC RBC AUTO-ENTMCNC: 31.3 G/DL (ref 31.5–36.5)
MCV RBC AUTO: 89 FL (ref 78–100)
MONOCYTES # BLD AUTO: 1.5 10E3/UL (ref 0–1.3)
MONOCYTES NFR BLD AUTO: 11 %
NEUTROPHILS # BLD AUTO: 8.1 10E3/UL (ref 1.6–8.3)
NEUTROPHILS NFR BLD AUTO: 60 %
NRBC # BLD AUTO: 0 10E3/UL
NRBC BLD AUTO-RTO: 0 /100
PLATELET # BLD AUTO: 371 10E3/UL (ref 150–450)
POTASSIUM SERPL-SCNC: 3.9 MMOL/L (ref 3.4–5.3)
RBC # BLD AUTO: 2.69 10E6/UL (ref 3.8–5.2)
SODIUM SERPL-SCNC: 137 MMOL/L (ref 135–145)
WBC # BLD AUTO: 13.6 10E3/UL (ref 4–11)

## 2024-07-20 PROCEDURE — 85004 AUTOMATED DIFF WBC COUNT: CPT | Performed by: STUDENT IN AN ORGANIZED HEALTH CARE EDUCATION/TRAINING PROGRAM

## 2024-07-20 PROCEDURE — 97530 THERAPEUTIC ACTIVITIES: CPT | Mod: GO

## 2024-07-20 PROCEDURE — 250N000013 HC RX MED GY IP 250 OP 250 PS 637: Performed by: PAIN MEDICINE

## 2024-07-20 PROCEDURE — 97535 SELF CARE MNGMENT TRAINING: CPT | Mod: GO

## 2024-07-20 PROCEDURE — 36415 COLL VENOUS BLD VENIPUNCTURE: CPT | Performed by: STUDENT IN AN ORGANIZED HEALTH CARE EDUCATION/TRAINING PROGRAM

## 2024-07-20 PROCEDURE — 80048 BASIC METABOLIC PNL TOTAL CA: CPT | Performed by: STUDENT IN AN ORGANIZED HEALTH CARE EDUCATION/TRAINING PROGRAM

## 2024-07-20 PROCEDURE — 99232 SBSQ HOSP IP/OBS MODERATE 35: CPT | Performed by: STUDENT IN AN ORGANIZED HEALTH CARE EDUCATION/TRAINING PROGRAM

## 2024-07-20 PROCEDURE — 250N000013 HC RX MED GY IP 250 OP 250 PS 637: Performed by: SURGERY

## 2024-07-20 PROCEDURE — 120N000001 HC R&B MED SURG/OB

## 2024-07-20 PROCEDURE — 250N000013 HC RX MED GY IP 250 OP 250 PS 637: Performed by: STUDENT IN AN ORGANIZED HEALTH CARE EDUCATION/TRAINING PROGRAM

## 2024-07-20 PROCEDURE — 250N000011 HC RX IP 250 OP 636: Performed by: SURGERY

## 2024-07-20 RX ORDER — POLYETHYLENE GLYCOL 3350 17 G/17G
17 POWDER, FOR SOLUTION ORAL DAILY
Qty: 510 G | Refills: 0 | Status: SHIPPED | OUTPATIENT
Start: 2024-07-20

## 2024-07-20 RX ORDER — OXYCODONE HYDROCHLORIDE 10 MG/1
10 TABLET ORAL
Qty: 12 TABLET | Refills: 0 | Status: SHIPPED | OUTPATIENT
Start: 2024-07-20

## 2024-07-20 RX ADMIN — INSULIN DEGLUDEC 30 UNITS: 100 INJECTION, SOLUTION SUBCUTANEOUS at 08:18

## 2024-07-20 RX ADMIN — AMLODIPINE BESYLATE 5 MG: 5 TABLET ORAL at 08:17

## 2024-07-20 RX ADMIN — OXYCODONE HYDROCHLORIDE 10 MG: 5 TABLET ORAL at 20:42

## 2024-07-20 RX ADMIN — METHOCARBAMOL 500 MG: 500 TABLET ORAL at 20:42

## 2024-07-20 RX ADMIN — ACETAMINOPHEN 975 MG: 325 TABLET ORAL at 20:42

## 2024-07-20 RX ADMIN — METHOCARBAMOL 500 MG: 500 TABLET ORAL at 14:15

## 2024-07-20 RX ADMIN — GABAPENTIN 300 MG: 300 CAPSULE ORAL at 20:41

## 2024-07-20 RX ADMIN — HEPARIN SODIUM 5000 UNITS: 5000 INJECTION, SOLUTION INTRAVENOUS; SUBCUTANEOUS at 05:42

## 2024-07-20 RX ADMIN — GABAPENTIN 300 MG: 300 CAPSULE ORAL at 08:17

## 2024-07-20 RX ADMIN — SENNOSIDES AND DOCUSATE SODIUM 1 TABLET: 50; 8.6 TABLET ORAL at 08:17

## 2024-07-20 RX ADMIN — LISINOPRIL 40 MG: 20 TABLET ORAL at 08:18

## 2024-07-20 RX ADMIN — HEPARIN SODIUM 5000 UNITS: 5000 INJECTION, SOLUTION INTRAVENOUS; SUBCUTANEOUS at 13:49

## 2024-07-20 RX ADMIN — ACETAMINOPHEN 975 MG: 325 TABLET ORAL at 08:17

## 2024-07-20 RX ADMIN — ACETAMINOPHEN 975 MG: 325 TABLET ORAL at 13:49

## 2024-07-20 RX ADMIN — TRAZODONE HYDROCHLORIDE 50 MG: 50 TABLET ORAL at 20:43

## 2024-07-20 RX ADMIN — METHOCARBAMOL 500 MG: 500 TABLET ORAL at 08:17

## 2024-07-20 RX ADMIN — OXYCODONE HYDROCHLORIDE 10 MG: 5 TABLET ORAL at 03:41

## 2024-07-20 RX ADMIN — METHOCARBAMOL 500 MG: 500 TABLET ORAL at 02:33

## 2024-07-20 RX ADMIN — HEPARIN SODIUM 5000 UNITS: 5000 INJECTION, SOLUTION INTRAVENOUS; SUBCUTANEOUS at 20:53

## 2024-07-20 RX ADMIN — GABAPENTIN 300 MG: 300 CAPSULE ORAL at 13:49

## 2024-07-20 RX ADMIN — SENNOSIDES AND DOCUSATE SODIUM 1 TABLET: 50; 8.6 TABLET ORAL at 20:42

## 2024-07-20 RX ADMIN — FERROUS SULFATE TAB 325 MG (65 MG ELEMENTAL FE) 325 MG: 325 (65 FE) TAB at 08:17

## 2024-07-20 RX ADMIN — SITAGLIPTIN 50 MG: 25 TABLET, FILM COATED ORAL at 08:17

## 2024-07-20 RX ADMIN — ACETAMINOPHEN 975 MG: 325 TABLET ORAL at 02:33

## 2024-07-20 RX ADMIN — EZETIMIBE 10 MG: 10 TABLET ORAL at 08:17

## 2024-07-20 RX ADMIN — POLYETHYLENE GLYCOL 3350 17 G: 17 POWDER, FOR SOLUTION ORAL at 08:18

## 2024-07-20 RX ADMIN — INSULIN ASPART 1 UNITS: 100 INJECTION, SOLUTION INTRAVENOUS; SUBCUTANEOUS at 12:04

## 2024-07-20 RX ADMIN — ASPIRIN 81 MG: 81 TABLET, COATED ORAL at 08:18

## 2024-07-20 ASSESSMENT — ACTIVITIES OF DAILY LIVING (ADL)
ADLS_ACUITY_SCORE: 24
ADLS_ACUITY_SCORE: 26
ADLS_ACUITY_SCORE: 24

## 2024-07-20 NOTE — PROGRESS NOTES
[facility]    Medicine Progress Note - Hospitalist Service    Date of Admission:  7/17/2024    Assessment & Plan   Assessment:  Ashanti Aviles is a 68 year old female with a past medical history documented below presented to the hospital for amputation above the knee on the left leg and is s/p procedure, postprocedure medicine was consulted for medical comanagement    Severe PVD, Failure of multiple bypasses, Non salvageable LE  -- S/p Left AKA on 07/17  -- Vascular surgery following  -- C/w Aspirin, Zetia  -- PT evaluation-Acute rehab  -- Pain management: Tylenol, oxycodone prn, dilaudid iv prn, robaxin, gabapentin    Leukocytosis  -- Most likely reactive  -- received ancef periop. Last dose on 0718  -- No fever. Monitor temp curve and cbc    Hypokalemia  -- Replaced    Mild hyponatremia  -- likely 2/2 decreased intake  -- Monitor BMP, liberalize diet    HLD  -- C/w PTA Zetia    DM-II  -- Holding metformin and Jardiance while hospitalized  -- Accu-checks, Tresiba 30 unit bid (reduced to 25 bid due to low blood sugar- willmonitor), medium sliding scale tid-ac, Hypoglycemia protocol, tradjenta  -- Hemoglobin A1c 6.9    NADIRA, acute blood loss anemia  -- Iron panel reviewed  -- Ferrous sulfate  -- Monitor cbc    Essential Hypertension  -- C/w PTA Amlodipine,Lisinopril  -- Monitor vital signs as per protocol               Diet: Regular Diet Adult  Diet    DVT Prophylaxis: Heparin SQ  Cedeno Catheter: Not present  Lines: None     Cardiac Monitoring: None  Code Status: Full Code      Clinically Significant Risk Factors        # Hypokalemia: Lowest K = 3.3 mmol/L in last 2 days, will replace as needed           # Hypertension: Noted on problem list           #Precipitous drop in Hgb/Hct: Lowest Hgb this hospitalization: 7.5 g/dL. Will continue to monitor and treat/transfuse as appropriate.    # DMII: A1C = 6.9 % (Ref range: <5.7 %) within past 6 months, PRESENT ON ADMISSION  # Overweight:  "Estimated body mass index is 26.67 kg/m  as calculated from the following:    Height as of 7/11/24: 1.575 m (5' 2\").    Weight as of this encounter: 66.1 kg (145 lb 12.8 oz)., PRESENT ON ADMISSION            Disposition Plan     Medically Ready for Discharge: Pending Wheelchair delivery             Elida Espino MD  Hospitalist Service  [facility]  Securely message with MC[employer] (more info)  Text page via Smallable Paging/Directory   ______________________________________________________________________    Interval History   Patient is seen and examined at bedside.   Feeling better today.   Plan of care discussed with patient. All questions answered. Pt verbalized understanding.     Physical Exam   Vital Signs: Temp: 98.2  F (36.8  C) Temp src: Oral BP: 110/56 Pulse: 84   Resp: 18 SpO2: 98 % O2 Device: None (Room air)    Weight: 145 lbs 12.8 oz    GEN: Alert and oriented. Not in acute distress.  HEENT: Atraumatic, mucous membrane- moist and pink.  Chest: Bilateral air entry.  CVS: S1S2 regular.   Abdomen: Soft. Non-tender, non-distended. No organomegaly. No guarding or rigidity. Bowel sounds active.   Extremities: No pedal edema. Left AKA  CNS: No involuntary movements.  Skin: no cyanosis or clubbing.     Medical Decision Making       45 MINUTES SPENT BY ME on the date of service doing chart review, history, exam, documentation & further activities per the note.      Data     "

## 2024-07-20 NOTE — PLAN OF CARE
Problem: Diabetes  Goal: Optimal Functional Ability  Intervention: Optimize Functional Ability  Recent Flowsheet Documentation  Taken 7/20/2024 0039 by Gisele Ochoa RN  Activity Management: activity adjusted per tolerance     Problem: Pain Acute  Goal: Optimal Pain Control and Function  Intervention: Develop Pain Management Plan  Recent Flowsheet Documentation  Taken 7/20/2024 0426 by Gisele Ochoa RN  Pain Management Interventions: medication offered but refused  Taken 7/20/2024 0341 by Gisele Ochoa RN  Pain Management Interventions: medication offered but refused  Taken 7/20/2024 0233 by Gisele Ochoa RN  Pain Management Interventions: medication offered but refused   Goal Outcome Evaluation:        Pt is alert and oriented X 4, complained pain 5/10 and /10 in the LLE. Schedule Tylenol and robaxin and PRN oxycodone given and was effective with pain 3/10. Pt is independent up to the bathroom, incision dressing is clean, dry, and intact. Pt makes her needs known, plan of care ongoing

## 2024-07-20 NOTE — PROGRESS NOTES
VASCULAR SURGERY PROGRESS NOTE    Subjective:  No complaints of pain or discomfort this morning, only on PO pain medications. Looking forward to going home.    Objective:    Intake/Output Summary (Last 24 hours) at 7/20/2024 1625  Last data filed at 7/20/2024 1345  Gross per 24 hour   Intake 400 ml   Output --   Net 400 ml       PHYSICAL EXAM:  /75 (BP Location: Left arm)   Pulse 93   Temp 98.3  F (36.8  C) (Oral)   Resp 18   Wt 66.1 kg (145 lb 12.8 oz)   LMP  (LMP Unknown)   SpO2 99%   BMI 26.67 kg/m    General: The patient is alert and oriented. Appropriate. No acute distress  Psych: pleasant affect, answers questions appropriately  Skin: Color appropriate for race, warm, dry.  Respiratory: The patient does not require supplemental oxygen. Breathing unlabored  GI:  Abdomen soft, nontender to light palpation.  Extremities: Left lower extremity above-knee amputation dressing clean dry and intact. Wound healing very well, staples in place, moderate edema, no necrosis or bruising noted.       ASSESSMENT:  68-year-old female with history of chronic limb threatening ischemia left lower extremity status post multiple failed bypass to left leg due to hypercoagulable state and continued thrombosis with poor wound healing and infection, now status post left above-knee amputation 7/17, recovering well. Discharging soon but waiting for wheelchair to arrive.      PLAN:  Multimodal pain control  Dressing changed by vascular surgery team on 7/19, please change daily  Physical therapy consulted, recommending home vs acute rehab (patient not interesting in acute rehab)  Case management for discharge planning  Home meds restarted  ASA 81mg and DVT ppx with [facility] medical team for assistance with care  Medically ready for discharge today but waiting for wheelchair to arrive. Home nursing and PT/OT ordered    Discussed pt history, exam, assessment and plan with Dr. Atwood of the vascular surgery service, who is  in agreement with the above.    Khalif Gonzalez MD  VASCULAR SURGERY

## 2024-07-20 NOTE — PLAN OF CARE
Problem: Adult Inpatient Plan of Care  Goal: Plan of Care Review  Description: The Plan of Care Review/Shift note should be completed every shift.  The Outcome Evaluation is a brief statement about your assessment that the patient is improving, declining, or no change.  This information will be displayed automatically on your shift  note.  Outcome: Progressing     Problem: Adult Inpatient Plan of Care  Goal: Optimal Comfort and Wellbeing  Outcome: Progressing  Intervention: Monitor Pain and Promote Comfort  Recent Flowsheet Documentation  Taken 7/19/2024 1545 by Tej Rankin RN  Pain Management Interventions: medication offered but refused     Problem: Lower Extremity Amputation  Goal: Effective Lower Limb Care  Outcome: Progressing     Problem: Diabetes  Goal: Blood Glucose Level Within Target Range  Outcome: Progressing  Intervention: Optimize Glycemic Control  Recent Flowsheet Documentation  Taken 7/19/2024 1545 by Tej Rankin RN  Hyperglycemia Management: blood glucose monitored     Problem: Comorbidity Management  Goal: Blood Pressure in Desired Range  Outcome: Progressing    Patient is alert and orientated, able to make needs known. Report 7/10 phantom pain to LLE. Administered PRN oxycodone and methocarbamol, with desired effect. Incision is well approximated, clean, dry and intact. Cleaned with saline, covered with vasoline gauze and primapore dressing. Blood sugars were 107, 148. Anticipated discharge tomorrow to TCU.     Tej Rankin RN

## 2024-07-20 NOTE — PLAN OF CARE
"  Problem: Adult Inpatient Plan of Care  Goal: Plan of Care Review  Description: The Plan of Care Review/Shift note should be completed every shift.  The Outcome Evaluation is a brief statement about your assessment that the patient is improving, declining, or no change.  This information will be displayed automatically on your shift  note.  Outcome: Progressing  Flowsheets (Taken 7/20/2024 1036)  Plan of Care Reviewed With: patient  Goal: Patient-Specific Goal (Individualized)  Description: You can add care plan individualizations to a care plan. Examples of Individualization might be:  \"Parent requests to be called daily at 9am for status\", \"I have a hard time hearing out of my right ear\", or \"Do not touch me to wake me up as it startles  me\".  Outcome: Progressing  Goal: Absence of Hospital-Acquired Illness or Injury  Outcome: Progressing  Intervention: Identify and Manage Fall Risk  Recent Flowsheet Documentation  Taken 7/20/2024 0800 by Ashleigh Nieto RN  Safety Promotion/Fall Prevention:   activity supervised   increased rounding and observation   increase visualization of patient   nonskid shoes/slippers when out of bed   mobility aid in reach   patient and family education  Intervention: Prevent Skin Injury  Recent Flowsheet Documentation  Taken 7/20/2024 0800 by Ashleigh Nieto RN  Body Position: position changed independently  Intervention: Prevent and Manage VTE (Venous Thromboembolism) Risk  Recent Flowsheet Documentation  Taken 7/20/2024 0800 by Ashleigh Nieto RN  VTE Prevention/Management: compression stockings off  Intervention: Prevent Infection  Recent Flowsheet Documentation  Taken 7/20/2024 0800 by Ashleigh Nieto RN  Infection Prevention:   single patient room provided   rest/sleep promoted   personal protective equipment utilized   hand hygiene promoted  Goal: Optimal Comfort and Wellbeing  Outcome: Progressing  Goal: Readiness for Transition of Care  Outcome: Progressing     Problem: Pain " Acute  Goal: Optimal Pain Control and Function  Outcome: Progressing  Intervention: Prevent or Manage Pain  Recent Flowsheet Documentation  Taken 7/20/2024 0800 by Ashleigh Nieto RN  Sensory Stimulation Regulation:   care clustered   television on  Bowel Elimination Promotion:   adequate fluid intake promoted   commode/bedpan at bedside  Medication Review/Management: medications reviewed  Intervention: Optimize Psychosocial Wellbeing  Recent Flowsheet Documentation  Taken 7/20/2024 0800 by Ashleigh Nieto RN  Supportive Measures:   active listening utilized   self-care encouraged     Problem: Lower Extremity Amputation  Goal: Optimal Adjustment to Amputation  Outcome: Progressing  Intervention: Promote Patient and Family Adjustment to Amputation  Recent Flowsheet Documentation  Taken 7/20/2024 0800 by Ashleigh Nieto RN  Supportive Measures:   active listening utilized   self-care encouraged  Goal: Optimal Safe BADL Performance  Outcome: Progressing  Goal: Optimal Safe IADL Performance  Outcome: Progressing  Goal: Effective Lower Limb Care  Outcome: Progressing  Goal: Optimal Mobility [place] and Safety  Outcome: Progressing  Goal: Acceptable Pain/Hypersensitivity Control  Outcome: Progressing  Goal: Effective Prosthesis Use and Management  Outcome: Progressing     Problem: Diabetes  Goal: Optimal Coping  Outcome: Progressing  Intervention: Support Wellbeing and Self-Management Success  Recent Flowsheet Documentation  Taken 7/20/2024 0800 by Ashleigh Nieto RN  Supportive Measures:   active listening utilized   self-care encouraged  Goal: Optimal Functional Ability  Outcome: Progressing  Intervention: Optimize Functional Ability  Recent Flowsheet Documentation  Taken 7/20/2024 0800 by Ashleigh Nieto RN  Assistive Device Utilized: walker  Activity Management: activity adjusted per tolerance  Activity Assistance Provided: assistance, stand-by  Goal: Blood Glucose Level Within Target Range  Outcome:  Progressing  Intervention: Optimize Glycemic Control  Recent Flowsheet Documentation  Taken 7/20/2024 0800 by Ashleigh Nieto RN  Hyperglycemia Management: blood glucose monitored  Goal: Minimize Risk of Hypoglycemia  Outcome: Progressing  Intervention: Management and Risk Reduction of Hypoglycemia  Recent Flowsheet Documentation  Taken 7/20/2024 0800 by Ashleigh Nieto RN  Hypoglycemia Management: blood glucose monitored     Problem: Anemia  Goal: Anemia Symptom Improvement  Outcome: Progressing  Intervention: Monitor and Manage Anemia  Recent Flowsheet Documentation  Taken 7/20/2024 0800 by Ashleigh Nieto RN  Safety Promotion/Fall Prevention:   activity supervised   increased rounding and observation   increase visualization of patient   nonskid shoes/slippers when out of bed   mobility aid in reach   patient and family education     Problem: Comorbidity Management  Goal: Blood Pressure in Desired Range  Outcome: Progressing  Intervention: Maintain Blood Pressure Management  Recent Flowsheet Documentation  Taken 7/20/2024 0800 by Ashleigh Nieto RN  Medication Review/Management: medications reviewed   Goal Outcome Evaluation:      Plan of Care Reviewed With: patient           Pt is alert and oriented x4. Pt is med complaint. Pt's v/s is stable. Pt had a low blood sugar this morning. Pt received Juice and breakfast and her BS went up to 96. Pt denies pain. Pt is up with asst of 1. Continue to monitor pt.

## 2024-07-21 ENCOUNTER — HEALTH MAINTENANCE LETTER (OUTPATIENT)
Age: 69
End: 2024-07-21

## 2024-07-21 ENCOUNTER — APPOINTMENT (OUTPATIENT)
Dept: OCCUPATIONAL THERAPY | Facility: HOSPITAL | Age: 69
DRG: 240 | End: 2024-07-21
Attending: SURGERY
Payer: COMMERCIAL

## 2024-07-21 LAB
BASOPHILS # BLD AUTO: 0 10E3/UL (ref 0–0.2)
BASOPHILS NFR BLD AUTO: 0 %
EOSINOPHIL # BLD AUTO: 0.2 10E3/UL (ref 0–0.7)
EOSINOPHIL NFR BLD AUTO: 2 %
ERYTHROCYTE [DISTWIDTH] IN BLOOD BY AUTOMATED COUNT: 15.5 % (ref 10–15)
FENTANYL UR-MCNC: <1 NG/ML
GLUCOSE BLDC GLUCOMTR-MCNC: 148 MG/DL (ref 70–99)
GLUCOSE BLDC GLUCOMTR-MCNC: 165 MG/DL (ref 70–99)
GLUCOSE BLDC GLUCOMTR-MCNC: 188 MG/DL (ref 70–99)
GLUCOSE BLDC GLUCOMTR-MCNC: 189 MG/DL (ref 70–99)
GLUCOSE BLDC GLUCOMTR-MCNC: 63 MG/DL (ref 70–99)
GLUCOSE BLDC GLUCOMTR-MCNC: 84 MG/DL (ref 70–99)
HCT VFR BLD AUTO: 23.7 % (ref 35–47)
HGB BLD-MCNC: 7.4 G/DL (ref 11.7–15.7)
IMM GRANULOCYTES # BLD: 0 10E3/UL
IMM GRANULOCYTES NFR BLD: 0 %
LYMPHOCYTES # BLD AUTO: 3.1 10E3/UL (ref 0.8–5.3)
LYMPHOCYTES NFR BLD AUTO: 33 %
MCH RBC QN AUTO: 28.5 PG (ref 26.5–33)
MCHC RBC AUTO-ENTMCNC: 31.2 G/DL (ref 31.5–36.5)
MCV RBC AUTO: 91 FL (ref 78–100)
MONOCYTES # BLD AUTO: 1 10E3/UL (ref 0–1.3)
MONOCYTES NFR BLD AUTO: 10 %
NEUTROPHILS # BLD AUTO: 5.1 10E3/UL (ref 1.6–8.3)
NEUTROPHILS NFR BLD AUTO: 55 %
NORFENTANYL UR-MCNC: NORMAL NG/ML
NRBC # BLD AUTO: 0 10E3/UL
NRBC BLD AUTO-RTO: 0 /100
PLATELET # BLD AUTO: 417 10E3/UL (ref 150–450)
RBC # BLD AUTO: 2.6 10E6/UL (ref 3.8–5.2)
WBC # BLD AUTO: 9.4 10E3/UL (ref 4–11)

## 2024-07-21 PROCEDURE — 250N000013 HC RX MED GY IP 250 OP 250 PS 637: Performed by: STUDENT IN AN ORGANIZED HEALTH CARE EDUCATION/TRAINING PROGRAM

## 2024-07-21 PROCEDURE — 120N000001 HC R&B MED SURG/OB

## 2024-07-21 PROCEDURE — 250N000011 HC RX IP 250 OP 636: Performed by: SURGERY

## 2024-07-21 PROCEDURE — 250N000013 HC RX MED GY IP 250 OP 250 PS 637: Performed by: PAIN MEDICINE

## 2024-07-21 PROCEDURE — 85025 COMPLETE CBC W/AUTO DIFF WBC: CPT | Performed by: STUDENT IN AN ORGANIZED HEALTH CARE EDUCATION/TRAINING PROGRAM

## 2024-07-21 PROCEDURE — 250N000013 HC RX MED GY IP 250 OP 250 PS 637: Performed by: SURGERY

## 2024-07-21 PROCEDURE — 99232 SBSQ HOSP IP/OBS MODERATE 35: CPT | Performed by: STUDENT IN AN ORGANIZED HEALTH CARE EDUCATION/TRAINING PROGRAM

## 2024-07-21 PROCEDURE — 36415 COLL VENOUS BLD VENIPUNCTURE: CPT | Performed by: STUDENT IN AN ORGANIZED HEALTH CARE EDUCATION/TRAINING PROGRAM

## 2024-07-21 PROCEDURE — 97535 SELF CARE MNGMENT TRAINING: CPT | Mod: GO

## 2024-07-21 RX ADMIN — TRAZODONE HYDROCHLORIDE 50 MG: 50 TABLET ORAL at 20:51

## 2024-07-21 RX ADMIN — ACETAMINOPHEN 975 MG: 325 TABLET ORAL at 13:20

## 2024-07-21 RX ADMIN — METHOCARBAMOL 500 MG: 500 TABLET ORAL at 20:52

## 2024-07-21 RX ADMIN — GABAPENTIN 300 MG: 300 CAPSULE ORAL at 20:51

## 2024-07-21 RX ADMIN — INSULIN ASPART 1 UNITS: 100 INJECTION, SOLUTION INTRAVENOUS; SUBCUTANEOUS at 16:58

## 2024-07-21 RX ADMIN — LISINOPRIL 40 MG: 20 TABLET ORAL at 08:26

## 2024-07-21 RX ADMIN — METHOCARBAMOL 500 MG: 500 TABLET ORAL at 02:12

## 2024-07-21 RX ADMIN — ACETAMINOPHEN 975 MG: 325 TABLET ORAL at 20:51

## 2024-07-21 RX ADMIN — ACETAMINOPHEN 975 MG: 325 TABLET ORAL at 02:12

## 2024-07-21 RX ADMIN — SENNOSIDES AND DOCUSATE SODIUM 1 TABLET: 50; 8.6 TABLET ORAL at 20:51

## 2024-07-21 RX ADMIN — SENNOSIDES AND DOCUSATE SODIUM 1 TABLET: 50; 8.6 TABLET ORAL at 08:27

## 2024-07-21 RX ADMIN — HEPARIN SODIUM 5000 UNITS: 5000 INJECTION, SOLUTION INTRAVENOUS; SUBCUTANEOUS at 06:13

## 2024-07-21 RX ADMIN — FERROUS SULFATE TAB 325 MG (65 MG ELEMENTAL FE) 325 MG: 325 (65 FE) TAB at 08:27

## 2024-07-21 RX ADMIN — HEPARIN SODIUM 5000 UNITS: 5000 INJECTION, SOLUTION INTRAVENOUS; SUBCUTANEOUS at 13:20

## 2024-07-21 RX ADMIN — HEPARIN SODIUM 5000 UNITS: 5000 INJECTION, SOLUTION INTRAVENOUS; SUBCUTANEOUS at 20:58

## 2024-07-21 RX ADMIN — ACETAMINOPHEN 975 MG: 325 TABLET ORAL at 08:26

## 2024-07-21 RX ADMIN — POLYETHYLENE GLYCOL 3350 17 G: 17 POWDER, FOR SOLUTION ORAL at 08:27

## 2024-07-21 RX ADMIN — GABAPENTIN 300 MG: 300 CAPSULE ORAL at 13:20

## 2024-07-21 RX ADMIN — METHOCARBAMOL 500 MG: 500 TABLET ORAL at 14:02

## 2024-07-21 RX ADMIN — ASPIRIN 81 MG: 81 TABLET, COATED ORAL at 08:27

## 2024-07-21 RX ADMIN — METHOCARBAMOL 500 MG: 500 TABLET ORAL at 08:26

## 2024-07-21 RX ADMIN — AMLODIPINE BESYLATE 5 MG: 5 TABLET ORAL at 08:26

## 2024-07-21 RX ADMIN — GABAPENTIN 300 MG: 300 CAPSULE ORAL at 08:27

## 2024-07-21 RX ADMIN — SITAGLIPTIN 50 MG: 25 TABLET, FILM COATED ORAL at 08:26

## 2024-07-21 RX ADMIN — EZETIMIBE 10 MG: 10 TABLET ORAL at 08:26

## 2024-07-21 RX ADMIN — OXYCODONE HYDROCHLORIDE 10 MG: 5 TABLET ORAL at 20:51

## 2024-07-21 ASSESSMENT — ACTIVITIES OF DAILY LIVING (ADL)
ADLS_ACUITY_SCORE: 24
ADLS_ACUITY_SCORE: 24
ADLS_ACUITY_SCORE: 23
ADLS_ACUITY_SCORE: 24
ADLS_ACUITY_SCORE: 23
ADLS_ACUITY_SCORE: 24
ADLS_ACUITY_SCORE: 23
ADLS_ACUITY_SCORE: 23
ADLS_ACUITY_SCORE: 24
ADLS_ACUITY_SCORE: 23

## 2024-07-21 NOTE — PLAN OF CARE
Problem: Adult Inpatient Plan of Care  Goal: Plan of Care Review  Description: The Plan of Care Review/Shift note should be completed every shift.  The Outcome Evaluation is a brief statement about your assessment that the patient is improving, declining, or no change.  This information will be displayed automatically on your shift  note.  Outcome: Progressing  Flowsheets (Taken 7/21/2024 0527)  Plan of Care Reviewed With: patient  Overall Patient Progress: improving     Problem: Pain Acute  Goal: Optimal Pain Control and Function  Outcome: Progressing  Intervention: Develop Pain Management Plan  Recent Flowsheet Documentation  Taken 7/21/2024 0212 by Mayte Michael, RN  Pain Management Interventions: (says ice does not help) medication (see MAR)  Intervention: Prevent or Manage Pain  Recent Flowsheet Documentation  Taken 7/21/2024 0200 by Mayte Michael, RN  Medication Review/Management: medications reviewed     Problem: Lower Extremity Amputation  Goal: Effective Lower Limb Care  Outcome: Progressing     Problem: Diabetes  Goal: Minimize Risk of Hypoglycemia  Outcome: Progressing     Problem: Comorbidity Management  Goal: Blood Pressure in Desired Range  Outcome: Progressing  Intervention: Maintain Blood Pressure Management  Recent Flowsheet Documentation  Taken 7/21/2024 0200 by Mayte Michael, RN  Medication Review/Management: medications reviewed   Goal Outcome Evaluation:      Plan of Care Reviewed With: patient    Overall Patient Progress: improvingOverall Patient Progress: improving     Pt A/Ox3 patient was medicated as scheduled had episode of spasms that was very painful. AFTER medication it was resolved pt says comes and goes. Left thigh swollen, elevated, did not want ice. Right pedal pulses per doppler no swelling. Pt waiting for a wheel chair before discharge.

## 2024-07-21 NOTE — PLAN OF CARE
Problem: Adult Inpatient Plan of Care  Goal: Plan of Care Review  Description: The Plan of Care Review/Shift note should be completed every shift.  The Outcome Evaluation is a brief statement about your assessment that the patient is improving, declining, or no change.  This information will be displayed automatically on your shift  note.  Outcome: Progressing     Problem: Adult Inpatient Plan of Care  Goal: Optimal Comfort and Wellbeing  Outcome: Progressing     Problem: Adult Inpatient Plan of Care  Goal: Readiness for Transition of Care  Outcome: Progressing    Patient is alert and orientated, able to make needs known. Report moderate pain to surgical extremity, administered PRN oxycodone once this shift with desired effect. Patient ready to discharge, waiting for wheelchair to be delivered on Monday.     Tej Rankin RN

## 2024-07-21 NOTE — PLAN OF CARE
Occupational Therapy Discharge Summary    Reason for therapy discharge:    Discharged to discharge OT    Progress towards therapy goal(s). See goals on Care Plan in [facility] electronic health record for goal details.  Goals adequately met for discharge.      Therapy recommendation(s):    Continued therapy is recommended.  Rationale/Recommendations:  decreased ADLs.    Goal Outcome Evaluation:       Mary Reyes, OTR/L  7/21/2024

## 2024-07-21 NOTE — PLAN OF CARE
"  Problem: Adult Inpatient Plan of Care  Goal: Plan of Care Review  Description: The Plan of Care Review/Shift note should be completed every shift.  The Outcome Evaluation is a brief statement about your assessment that the patient is improving, declining, or no change.  This information will be displayed automatically on your shift  note.  Outcome: Progressing  Flowsheets (Taken 7/21/2024 1051)  Plan of Care Reviewed With: patient  Goal: Patient-Specific Goal (Individualized)  Description: You can add care plan individualizations to a care plan. Examples of Individualization might be:  \"Parent requests to be called daily at 9am for status\", \"I have a hard time hearing out of my right ear\", or \"Do not touch me to wake me up as it startles  me\".  Outcome: Progressing  Goal: Absence of Hospital-Acquired Illness or Injury  Outcome: Progressing  Intervention: Identify and Manage Fall Risk  Recent Flowsheet Documentation  Taken 7/21/2024 0800 by Ashleigh Nieto RN  Safety Promotion/Fall Prevention:   activity supervised   assistive device/personal items within reach  Intervention: Prevent Skin Injury  Recent Flowsheet Documentation  Taken 7/21/2024 0800 by Ashleigh Nieto RN  Body Position: position changed independently  Intervention: Prevent and Manage VTE (Venous Thromboembolism) Risk  Recent Flowsheet Documentation  Taken 7/21/2024 0800 by Ashleigh Nieto RN  VTE Prevention/Management: compression stockings off  Intervention: Prevent Infection  Recent Flowsheet Documentation  Taken 7/21/2024 0800 by Ashleigh Nieto RN  Infection Prevention:   cohorting utilized   environmental surveillance performed   equipment surfaces disinfected   hand hygiene promoted   personal protective equipment utilized   rest/sleep promoted  Goal: Optimal Comfort and Wellbeing  Outcome: Progressing  Goal: Readiness for Transition of Care  Outcome: Progressing     Problem: Pain Acute  Goal: Optimal Pain Control and Function  Outcome: " Progressing  Intervention: Prevent or Manage Pain  Recent Flowsheet Documentation  Taken 7/21/2024 0800 by Ashleigh Nieto RN  Medication Review/Management: medications reviewed     Problem: Lower Extremity Amputation  Goal: Optimal Adjustment to Amputation  Outcome: Progressing  Goal: Optimal Safe BADL Performance  Outcome: Progressing  Goal: Optimal Safe IADL Performance  Outcome: Progressing  Goal: Effective Lower Limb Care  Outcome: Progressing  Goal: Optimal Mobility [place] and Safety  Outcome: Progressing  Goal: Acceptable Pain/Hypersensitivity Control  Outcome: Progressing  Goal: Effective Prosthesis Use and Management  Outcome: Progressing     Problem: Diabetes  Goal: Optimal Coping  Outcome: Progressing  Goal: Optimal Functional Ability  Outcome: Progressing  Intervention: Optimize Functional Ability  Recent Flowsheet Documentation  Taken 7/21/2024 0800 by Ashleigh Nieto RN  Activity Management:   activity adjusted per tolerance   activity encouraged  Goal: Blood Glucose Level Within Target Range  Outcome: Progressing  Goal: Minimize Risk of Hypoglycemia  Outcome: Progressing     Problem: Anemia  Goal: Anemia Symptom Improvement  Outcome: Progressing  Intervention: Monitor and Manage Anemia  Recent Flowsheet Documentation  Taken 7/21/2024 0800 by Ashleigh Nieto RN  Safety Promotion/Fall Prevention:   activity supervised   assistive device/personal items within reach     Problem: Comorbidity Management  Goal: Blood Pressure in Desired Range  Outcome: Progressing  Intervention: Maintain Blood Pressure Management  Recent Flowsheet Documentation  Taken 7/21/2024 0800 by Ashleigh Nieto RN  Medication Review/Management: medications reviewed   Goal Outcome Evaluation:      Plan of Care Reviewed With: patient        Pt is alert and oriented x4. Pt is med complaint. Pt denies pain. Pt had low blood sugar  this morning. Juice given and pt ate breakfast. Insulin was adjusted per Md. Continue to monitor  pt.

## 2024-07-21 NOTE — DISCHARGE INSTRUCTIONS
A referral has been made for you to [facility].  They should call you to arrange the first visit but, if you have questions or concerns, you can call them at [phone]. Thank you.

## 2024-07-21 NOTE — PLAN OF CARE
Physical Therapy Discharge Summary    Reason for therapy discharge:    All goals and outcomes met, no further needs identified.    Progress towards therapy goal(s). See goals on Care Plan in [facility] electronic health record for goal details.  Goals met    Therapy recommendation(s):    Continued therapy is recommended.  Rationale/Recommendations:  continue PT at home for preparation .for prosthesis

## 2024-07-21 NOTE — PROGRESS NOTES
Care Management Follow Up    Length of Stay (days): 4    Expected Discharge Date: 07/22/2024     Concerns to be Addressed:    pending WC delivery   Patient plan of care discussed at interdisciplinary rounds: Yes    Anticipated Discharge Disposition:       Anticipated Discharge Services:    Anticipated Discharge DME:      Patient/family educated on Medicare website which has current facility and service quality ratings:    Education Provided on the Discharge Plan:    Patient/Family in Agreement with the Plan:      Referrals Placed by CM/SW:    Private pay costs discussed: Not applicable    Additional Information:  Chart reviewed:     Social History:  Pt lives with spouse Rodrick in a house. Pt Ind with ADLs using walker. Pt has AKA of left leg. Pt Ind wth most IADLS, pt does get assist with meals by spouse. No home care svcs prior. Pt agreeable to having home care at discharge. Very supportive children. Daughter Zee to transport at discharge.    Therapy recommendation has changed from ARU to home with home care. Pt has been accepted with [facility] for PT/OT/RN.    Per notes pt will need WC delivery prior to discharge. The order has been sent to [facility] - [phone], writer called to check on status and they are closed for the weekend. CM to call first thing on Monday 7/22 morning.        Cherri Mao, RN

## 2024-07-21 NOTE — PROGRESS NOTES
[facility]    Medicine Progress Note - Hospitalist Service    Date of Admission:  7/17/2024    Assessment & Plan   Assessment:  Ashanti Aviles is a 68 year old female with a past medical history documented below presented to the hospital for amputation above the knee on the left leg and is s/p procedure, postprocedure medicine was consulted for medical comanagement    Severe PVD, Failure of multiple bypasses, Non salvageable LE  -- S/p Left AKA on 07/17  -- Vascular surgery following  -- C/w Aspirin, Zetia  -- PT evaluation-Acute rehab  -- Pain management: Tylenol, oxycodone prn, dilaudid iv prn, robaxin, gabapentin    Leukocytosis-resolved  -- Most likely reactive  -- received ancef periop. Last dose on 0718  -- No fever.     Hypokalemia-resolved  -- Replaced    Mild hyponatremia-resolved  -- likely 2/2 decreased intake  -- Monitor BMP, liberalize diet    HLD  -- C/w PTA Zetia    DM-II  -- Holding metformin and Jardiance while hospitalized  -- Accu-checks, Tresiba 30 unit bid (reduced to 15 bid due to low blood sugar- will monitor), medium sliding scale tid-ac, Hypoglycemia protocol, tradjenta  -- Hemoglobin A1c 6.9    NADIRA, acute blood loss anemia  -- Iron panel reviewed  -- Ferrous sulfate  -- Monitor cbc    Essential Hypertension  -- C/w PTA Amlodipine,Lisinopril  -- Monitor vital signs as per protocol               Diet: Regular Diet Adult  Diet    DVT Prophylaxis: Heparin SQ  Cedeno Catheter: Not present  Lines: None     Cardiac Monitoring: None  Code Status: Full Code      Clinically Significant Risk Factors                  # Hypertension: Noted on problem list           #Precipitous drop in Hgb/Hct: Lowest Hgb this hospitalization: 7.4 g/dL. Will continue to monitor and treat/transfuse as appropriate.    # DMII: A1C = 6.9 % (Ref range: <5.7 %) within past 6 months   # Overweight: Estimated body mass index is 26.67 kg/m  as calculated from the following:    Height as of 7/11/24:  "1.575 m (5' 2\").    Weight as of this encounter: 66.1 kg (145 lb 12.8 oz).             Disposition Plan     Medically Ready for Discharge: Pending Wheelchair delivery             Elida Espino MD  Hospitalist Service  [facility]  Securely message with [employer] (more info)  Text page via [employer] Paging/Directory   ______________________________________________________________________    Interval History   Patient is seen and examined at bedside.   No complaints.  Plan of care discussed with patient. All questions answered. Pt verbalized understanding.     Physical Exam   Vital Signs: Temp: 98.6  F (37  C) Temp src: Oral BP: 133/62 Pulse: 71   Resp: 18 SpO2: 100 % O2 Device: None (Room air)    Weight: 145 lbs 12.8 oz    GEN: Alert and oriented. Not in acute distress.  HEENT: Atraumatic, mucous membrane- moist and pink.  Chest: Bilateral air entry.  CVS: S1S2 regular.   Abdomen: Soft. Non-tender, non-distended. No organomegaly. No guarding or rigidity. Bowel sounds active.   Extremities: No pedal edema. Left AKA  CNS: No involuntary movements.  Skin: no cyanosis or clubbing.     Medical Decision Making       46 MINUTES SPENT BY ME on the date of service doing chart review, history, exam, documentation & further activities per the note.      Data     "

## 2024-07-22 VITALS
BODY MASS INDEX: 26.67 KG/M2 | RESPIRATION RATE: 18 BRPM | TEMPERATURE: 98.4 F | WEIGHT: 145.8 LBS | OXYGEN SATURATION: 100 % | SYSTOLIC BLOOD PRESSURE: 132 MMHG | HEART RATE: 68 BPM | DIASTOLIC BLOOD PRESSURE: 62 MMHG

## 2024-07-22 LAB
GLUCOSE BLDC GLUCOMTR-MCNC: 134 MG/DL (ref 70–99)
GLUCOSE BLDC GLUCOMTR-MCNC: 204 MG/DL (ref 70–99)
HGB BLD-MCNC: 7.8 G/DL (ref 11.7–15.7)

## 2024-07-22 PROCEDURE — 36415 COLL VENOUS BLD VENIPUNCTURE: CPT | Performed by: STUDENT IN AN ORGANIZED HEALTH CARE EDUCATION/TRAINING PROGRAM

## 2024-07-22 PROCEDURE — 99232 SBSQ HOSP IP/OBS MODERATE 35: CPT | Performed by: STUDENT IN AN ORGANIZED HEALTH CARE EDUCATION/TRAINING PROGRAM

## 2024-07-22 PROCEDURE — 250N000011 HC RX IP 250 OP 636: Performed by: SURGERY

## 2024-07-22 PROCEDURE — 250N000013 HC RX MED GY IP 250 OP 250 PS 637: Performed by: STUDENT IN AN ORGANIZED HEALTH CARE EDUCATION/TRAINING PROGRAM

## 2024-07-22 PROCEDURE — 85018 HEMOGLOBIN: CPT | Performed by: STUDENT IN AN ORGANIZED HEALTH CARE EDUCATION/TRAINING PROGRAM

## 2024-07-22 PROCEDURE — 250N000013 HC RX MED GY IP 250 OP 250 PS 637: Performed by: PAIN MEDICINE

## 2024-07-22 PROCEDURE — 250N000013 HC RX MED GY IP 250 OP 250 PS 637: Performed by: SURGERY

## 2024-07-22 RX ORDER — FERROUS SULFATE 325(65) MG
325 TABLET ORAL DAILY
Qty: 30 TABLET | Refills: 1 | Status: SHIPPED | OUTPATIENT
Start: 2024-07-23

## 2024-07-22 RX ORDER — METHOCARBAMOL 500 MG/1
500 TABLET, FILM COATED ORAL 3 TIMES DAILY PRN
Qty: 15 TABLET | Refills: 0 | Status: SHIPPED | OUTPATIENT
Start: 2024-07-22

## 2024-07-22 RX ADMIN — SENNOSIDES AND DOCUSATE SODIUM 1 TABLET: 50; 8.6 TABLET ORAL at 09:20

## 2024-07-22 RX ADMIN — METHOCARBAMOL 500 MG: 500 TABLET ORAL at 14:03

## 2024-07-22 RX ADMIN — ACETAMINOPHEN 975 MG: 325 TABLET ORAL at 09:21

## 2024-07-22 RX ADMIN — METHOCARBAMOL 500 MG: 500 TABLET ORAL at 02:38

## 2024-07-22 RX ADMIN — GABAPENTIN 300 MG: 300 CAPSULE ORAL at 14:03

## 2024-07-22 RX ADMIN — ACETAMINOPHEN 975 MG: 325 TABLET ORAL at 14:03

## 2024-07-22 RX ADMIN — HEPARIN SODIUM 5000 UNITS: 5000 INJECTION, SOLUTION INTRAVENOUS; SUBCUTANEOUS at 06:34

## 2024-07-22 RX ADMIN — SITAGLIPTIN 50 MG: 25 TABLET, FILM COATED ORAL at 09:21

## 2024-07-22 RX ADMIN — EZETIMIBE 10 MG: 10 TABLET ORAL at 09:20

## 2024-07-22 RX ADMIN — METHOCARBAMOL 500 MG: 500 TABLET ORAL at 09:21

## 2024-07-22 RX ADMIN — ACETAMINOPHEN 975 MG: 325 TABLET ORAL at 02:38

## 2024-07-22 RX ADMIN — FERROUS SULFATE TAB 325 MG (65 MG ELEMENTAL FE) 325 MG: 325 (65 FE) TAB at 09:21

## 2024-07-22 RX ADMIN — GABAPENTIN 300 MG: 300 CAPSULE ORAL at 09:21

## 2024-07-22 RX ADMIN — AMLODIPINE BESYLATE 5 MG: 5 TABLET ORAL at 09:21

## 2024-07-22 RX ADMIN — INSULIN ASPART 2 UNITS: 100 INJECTION, SOLUTION INTRAVENOUS; SUBCUTANEOUS at 13:04

## 2024-07-22 RX ADMIN — LISINOPRIL 40 MG: 20 TABLET ORAL at 09:22

## 2024-07-22 RX ADMIN — ASPIRIN 81 MG: 81 TABLET, COATED ORAL at 09:21

## 2024-07-22 ASSESSMENT — ACTIVITIES OF DAILY LIVING (ADL)
ADLS_ACUITY_SCORE: 23

## 2024-07-22 NOTE — PROGRESS NOTES
VASCULAR SURGERY PROGRESS NOTE    Subjective:  No issues, No other compaints, very anxious for discharge home. Pain well controlled.     Objective:    Intake/Output Summary (Last 24 hours) at 7/20/2024 1625  Last data filed at 7/20/2024 1345  Gross per 24 hour   Intake 400 ml   Output --   Net 400 ml       PHYSICAL EXAM:  /62 (BP Location: Left arm)   Pulse 68   Temp 98.4  F (36.9  C) (Oral)   Resp 18   Wt 66.1 kg (145 lb 12.8 oz)   LMP  (LMP Unknown)   SpO2 100%   BMI 26.67 kg/m    General: The patient is alert and oriented. Appropriate. No acute distress  Psych: pleasant affect, answers questions appropriately  Skin: Color appropriate for race, warm, dry.  Respiratory: The patient does not require supplemental oxygen. Breathing unlabored  GI:  Abdomen soft, nontender to light palpation.  Extremities: Left lower extremity above-knee amputation dressing clean dry and intact. Wound healing very well, staples in place, moderate edema, no necrosis or bruising noted.       ASSESSMENT:  68-year-old female with history of chronic limb threatening ischemia left lower extremity status post multiple failed bypass to left leg due to hypercoagulable state and continued thrombosis with poor wound healing and infection, now status post left above-knee amputation 7/17, recovering well. Discharging soon but waiting for wheelchair to arrive.    The patient has mobility limitation due to Above knee amputation that impairs her ability to accomplish MRADLs. Pt is able to be independent with dressing, hygiene, grooming, toileting and mobility with use of wheelchair. Pt has a mobility limitation that cannot be resolved by use of cane or walker due to above knee amputation (Left). Pt's home provides access and space and patient will use the wheelchair on a daily basis and will significantly improve her ability to participate independently in MRADLs. Patient is capable of self propelling a lightweight wheelchair without  injury.     PLAN:  Multimodal pain control  Daily dressing changes and loose wrap to left leg  PT recs for home with home PT  Case management for discharge planning  Home meds restarted  ASA 81mg and DVT ppx with [facility] medical team for assistance with care  Will discharge, planning for wheelchair delivery to home    Discussed pt history, exam, assessment and plan with Dr. Atwodo of the vascular surgery service, who is in agreement with the above.    Belkys Harmon DO  Fellow  VASCULAR SURGERY

## 2024-07-22 NOTE — PLAN OF CARE
Problem: Adult Inpatient Plan of Care  Goal: Plan of Care Review  Description: The Plan of Care Review/Shift note should be completed every shift.  The Outcome Evaluation is a brief statement about your assessment that the patient is improving, declining, or no change.  This information will be displayed automatically on your shift  note.  Outcome: Progressing     Problem: Pain Acute  Goal: Optimal Pain Control and Function  Outcome: Progressing  Intervention: Develop Pain Management Plan  Recent Flowsheet Documentation  Taken 7/21/2024 2051 by Tej Rankin RN  Pain Management Interventions: medication (see MAR)  Intervention: Prevent or Manage Pain  Recent Flowsheet Documentation  Taken 7/21/2024 1630 by Tej Rankin RN  Sensory Stimulation Regulation:   lighting decreased   television on   care clustered  Bowel Elimination Promotion: adequate fluid intake promoted  Medication Review/Management: medications reviewed  Intervention: Optimize Psychosocial Wellbeing  Recent Flowsheet Documentation  Taken 7/21/2024 1630 by Tej Rankin RN  Supportive Measures:   active listening utilized   self-care encouraged     Problem: Diabetes  Goal: Blood Glucose Level Within Target Range  Outcome: Progressing  Intervention: Optimize Glycemic Control  Recent Flowsheet Documentation  Taken 7/21/2024 1630 by Tej Rankin RN  Hyperglycemia Management: blood glucose monitored    Patient is alert and orientated, able to make needs known. Reported 7/10 pain to surgical extremity. Anticipated discharge tomorrow, once wheelchair arrive.     Tej Rankin RN

## 2024-07-22 NOTE — PROGRESS NOTES
[facility] assisted care manager with wheelchair order. Patient to discharge with recommendation for a lightweight wheelchair.   Medical criteria discussed with vascular provider. Information needs to be included in a progress note.  [facility] will process order and patient to have wheelchair delivered to home.     Estefany Painting, KSENIA  7/22/2024

## 2024-07-22 NOTE — PLAN OF CARE
"Pt A&Ox4, calm, cooperative.   - vitals stable  - refused physical assessment until 0630, d/t pt wanting a full night of sleep  - pain managed with robaxin and tylenol   - resting in bed now.   - pt awaiting discharge             Goal Outcome Evaluation:               Problem: Adult Inpatient Plan of Care  Goal: Plan of Care Review  Description: The Plan of Care Review/Shift note should be completed every shift.  The Outcome Evaluation is a brief statement about your assessment that the patient is improving, declining, or no change.  This information will be displayed automatically on your shift  note.  7/22/2024 0656 by Xuan Hull RN  Outcome: Progressing  Flowsheets (Taken 7/22/2024 0656)  Outcome Evaluation: pt anticipating discharge  Plan of Care Reviewed With: patient  Overall Patient Progress: improving  7/22/2024 0655 by Xuan Hull RN  Outcome: Progressing  Goal: Patient-Specific Goal (Individualized)  Description: You can add care plan individualizations to a care plan. Examples of Individualization might be:  \"Parent requests to be called daily at 9am for status\", \"I have a hard time hearing out of my right ear\", or \"Do not touch me to wake me up as it startles  me\".  7/22/2024 0656 by Xuan Hull RN  Outcome: Progressing  7/22/2024 0655 by Xuan Hull RN  Outcome: Progressing  Goal: Absence of Hospital-Acquired Illness or Injury  7/22/2024 0656 by Xuan Hull RN  Outcome: Progressing  7/22/2024 0655 by Xuan Hull RN  Outcome: Progressing  Intervention: Identify and Manage Fall Risk  Recent Flowsheet Documentation  Taken 7/22/2024 0630 by Xuan Hull RN  Safety Promotion/Fall Prevention:   activity supervised   assistive device/personal items within reach   safety round/check completed   nonskid shoes/slippers when out of bed  Intervention: Prevent Skin Injury  Recent Flowsheet Documentation  Taken 7/22/2024 0630 by Xuan Hull RN  Body Position: " refuses positioning  Taken 7/22/2024 0239 by Xuan Hull RN  Body Position: refuses positioning  Taken 7/22/2024 0039 by Xuan Hull RN  Body Position: refuses positioning  Intervention: Prevent Infection  Recent Flowsheet Documentation  Taken 7/22/2024 0630 by Xuan Hull RN  Infection Prevention:   rest/sleep promoted   hand hygiene promoted   single patient room provided  Goal: Optimal Comfort and Wellbeing  7/22/2024 0656 by Xuan Hull RN  Outcome: Progressing  7/22/2024 0655 by Xuan Hull RN  Outcome: Progressing  Intervention: Monitor Pain and Promote Comfort  Recent Flowsheet Documentation  Taken 7/22/2024 0630 by Xuan Hull RN  Pain Management Interventions: medication (see MAR)  Goal: Readiness for Transition of Care  7/22/2024 0656 by Xuan Hull RN  Outcome: Progressing  7/22/2024 0655 by Xuan Hull RN  Outcome: Progressing     Problem: Pain Acute  Goal: Optimal Pain Control and Function  7/22/2024 0656 by Xuan Hull RN  Outcome: Progressing  7/22/2024 0655 by Xuan Hull RN  Outcome: Progressing  Intervention: Develop Pain Management Plan  Recent Flowsheet Documentation  Taken 7/22/2024 0630 by Xuan Hull RN  Pain Management Interventions: medication (see MAR)  Intervention: Prevent or Manage Pain  Recent Flowsheet Documentation  Taken 7/22/2024 0630 by Xuan Hull RN  Medication Review/Management: medications reviewed     Problem: Lower Extremity Amputation  Goal: Optimal Adjustment to Amputation  7/22/2024 0656 by Xuan Hull RN  Outcome: Progressing  7/22/2024 0655 by Xuan Hull RN  Outcome: Progressing  Goal: Optimal Safe BADL Performance  7/22/2024 0656 by Xuan Hull RN  Outcome: Progressing  7/22/2024 0655 by Xuan Hull RN  Outcome: Progressing  Goal: Optimal Safe IADL Performance  7/22/2024 0656 by Xuan Hull RN  Outcome: Progressing  7/22/2024 0655 by Xuan Hull  RN  Outcome: Progressing  Goal: Effective Lower Limb Care  7/22/2024 0656 by Xuan Hull RN  Outcome: Progressing  7/22/2024 0655 by Xuan Hull RN  Outcome: Progressing  Goal: Optimal Mobility Marshall and Safety  7/22/2024 0656 by Xuan Hull RN  Outcome: Progressing  7/22/2024 0655 by Xuan Hull RN  Outcome: Progressing  Goal: Acceptable Pain/Hypersensitivity Control  7/22/2024 0656 by Xuan Hull RN  Outcome: Progressing  7/22/2024 0655 by Xuan Hull RN  Outcome: Progressing  Goal: Effective Prosthesis Use and Management  7/22/2024 0656 by Xuan Hull RN  Outcome: Progressing  7/22/2024 0655 by Xuan Hull RN  Outcome: Progressing     Problem: Diabetes  Goal: Optimal Coping  7/22/2024 0656 by Xuan Hull RN  Outcome: Progressing  7/22/2024 0655 by Xuan Hull RN  Outcome: Progressing  Goal: Optimal Functional Ability  7/22/2024 0656 by Xuan Hull RN  Outcome: Progressing  7/22/2024 0655 by Xuan Hull RN  Outcome: Progressing  Goal: Blood Glucose Level Within Target Range  7/22/2024 0656 by Xuan Hull RN  Outcome: Progressing  7/22/2024 0655 by Xuan Hull RN  Outcome: Progressing  Goal: Minimize Risk of Hypoglycemia  7/22/2024 0656 by Xuan Hull RN  Outcome: Progressing  7/22/2024 0655 by Xuan Hull RN  Outcome: Progressing     Problem: Anemia  Goal: Anemia Symptom Improvement  7/22/2024 0656 by Xuan Hull RN  Outcome: Progressing  7/22/2024 0655 by Xuan Hull RN  Outcome: Progressing  Intervention: Monitor and Manage Anemia  Recent Flowsheet Documentation  Taken 7/22/2024 0630 by Xuan Hull RN  Safety Promotion/Fall Prevention:   activity supervised   assistive device/personal items within reach   safety round/check completed   nonskid shoes/slippers when out of bed     Problem: Comorbidity Management  Goal: Blood Pressure in Desired Range  7/22/2024 0656 by  Xuan Hull, RN  Outcome: Progressing  7/22/2024 0655 by Xuan Hull, RN  Outcome: Progressing  Intervention: Maintain Blood Pressure Management  Recent Flowsheet Documentation  Taken 7/22/2024 0630 by Xuan Hull, RN  Medication Review/Management: medications reviewed

## 2024-07-22 NOTE — CONSULTS
Care Management Discharge Note    Discharge Date: 07/22/2024       Discharge Disposition: Home, Home Care    Discharge Services:  RN/PT/OT w/[facility]    Discharge DME:  none, working on wheelchair post discharge    Discharge Transportation: family or friend will provide    Private pay costs discussed: Not applicable    Does the patient's insurance plan have a 3 day qualifying hospital stay waiver?  No    PAS Confirmation Code:    Patient/family educated on Medicare website which has current facility and service quality ratings:      Education Provided on the Discharge Plan:    Persons Notified of Discharge Plans: pt  Patient/Family in Agreement with the Plan:      Handoff Referral Completed: Yes    Additional Information:  Pt will have family transport her at discharge.  [facility] to [facility]. CM will continue to work on wheelchair post discharge to have it delivered to home.    Clyde Buitrago RN

## 2024-07-22 NOTE — PROGRESS NOTES
Care Management Follow Up    Length of Stay (days): 5    Expected Discharge Date: 07/22/2024    Anticipated Discharge Plan:   home w/[facility]    Transportation: Anticipate Family/friend    PT Recommendations: home with assist, home with home care physical therapy  OT Recommendations:  home with home care occupational therapy, home with assist     Barriers to Discharge:  wheelchair deliver from [facility] [employer]    Prior Living Situation: house with spouse     Patient/Spokesperson Updated: Yes. Who? pt    Additional Information:  Pt wants to leave today with or without wheelchair. [employer] can deliver it anywhere within 50 miles of their office. CM needs to fill out [facility] form and needs MD signature. Deborah paged him.    Clyde Buitrago, RN

## 2024-07-22 NOTE — PROGRESS NOTES
VASCULAR SURGERY PROGRESS NOTE    Subjective:  NAEON. Patient feeling well today without any significant pain. Looking forward to getting wheelchair tomorrow and going home.    Objective:    Intake/Output Summary (Last 24 hours) at 7/20/2024 1625  Last data filed at 7/20/2024 1345  Gross per 24 hour   Intake 400 ml   Output --   Net 400 ml       PHYSICAL EXAM:  BP 96/51 (BP Location: Left arm)   Pulse 85   Temp 98.4  F (36.9  C) (Oral)   Resp 18   Wt 66.1 kg (145 lb 12.8 oz)   LMP  (LMP Unknown)   SpO2 100%   BMI 26.67 kg/m    General: The patient is alert and oriented. Appropriate. No acute distress  Psych: pleasant affect, answers questions appropriately  Skin: Color appropriate for race, warm, dry.  Respiratory: The patient does not require supplemental oxygen. Breathing unlabored  GI:  Abdomen soft, nontender to light palpation.  Extremities: Left lower extremity above-knee amputation dressing clean dry and intact. Wound healing very well, staples in place, moderate edema, no necrosis or bruising noted.       ASSESSMENT:  68-year-old female with history of chronic limb threatening ischemia left lower extremity status post multiple failed bypass to left leg due to hypercoagulable state and continued thrombosis with poor wound healing and infection, now status post left above-knee amputation 7/17, recovering well. Discharging soon but waiting for wheelchair to arrive.      PLAN:  Multimodal pain control  Please changedressing daily  Physical therapy consulted, recommending home vs acute rehab (patient not interesting in acute rehab)  Case management for discharge planning  Home meds restarted  ASA 81mg and DVT ppx with [facility]  Appreciate medical team for assistance with care  Medically ready for discharge today but waiting for wheelchair to arrive (will arrive tomorrow). Home nursing and PT/OT ordered    Discussed pt history, exam, assessment and plan with Dr. Atwood of the vascular surgery service, who is in  agreement with the above.    Khalif Gonzalez MD  VASCULAR SURGERY

## 2024-07-22 NOTE — PLAN OF CARE
Problem: Adult Inpatient Plan of Care  Goal: Plan of Care Review  Description: The Plan of Care Review/Shift note should be completed every shift.  The Outcome Evaluation is a brief statement about your assessment that the patient is improving, declining, or no change.  This information will be displayed automatically on your shift  note.  Outcome: Progressing  Flowsheets (Taken 7/22/2024 0957)  Plan of Care Reviewed With: patient   Goal Outcome Evaluation:plan for home when ready  Problem: Pain Acute  Goal: Optimal Pain Control and Function  Outcome: Progressing  Intervention: Prevent or Manage Pain  Recent Flowsheet Documentation  Taken 7/22/2024 0900 by Jessika Bansal, RN  Medication Review/Management: medications reviewed-undertands pain scale (0-10) and medicate as per mar  Problem: Diabetes  Goal: Optimal Coping  Outcome: Progressing=checking sugars 3 times daily before meals and bedtime  Problem: Comorbidity Management  Goal: Blood Pressure in Desired Range  Outcome: Progressing  Intervention: Maintain Blood Pressure Management  Recent Flowsheet Documentation  Taken 7/22/2024 0900 by Jessika Bansal, RN  Medication Review/Management: medications reviewed-monitorinbg every 8 hours as needed    Plan of Care Reviewed With: patient alert and oriented and able to make needs known.  Up with walker - maintainging none weight bear on left.  Regular diet.  Voiding.  Waiting for wheelchair delivery and plans to go home

## 2024-07-22 NOTE — PROGRESS NOTES
[facility]    Medicine Progress Note - Hospitalist Service    Date of Admission:  7/17/2024    Assessment & Plan   Assessment:  Ashanti Aviles is a 68 year old female with a past medical history documented below presented to the hospital for amputation above the knee on the left leg and is s/p procedure, postprocedure medicine was consulted for medical comanagement    Severe PVD, Failure of multiple bypasses, Non salvageable LE  -- S/p Left AKA on 07/17  -- Vascular surgery following  -- C/w Aspirin, Zetia  -- PT evaluation-Acute rehab  -- Pain management: Tylenol, oxycodone prn, dilaudid iv prn, robaxin, gabapentin    Leukocytosis-resolved  -- Most likely reactive  -- received ancef periop. Last dose on 0718  -- No fever.     Hypokalemia-resolved  -- Replaced    Mild hyponatremia-resolved  -- likely 2/2 decreased intake  -- Monitor BMP, liberalize diet    HLD  -- C/w PTA Zetia    DM-II  -- Holding metformin and Jardiance while hospitalized  -- Accu-checks, Tresiba 30 unit bid (reduced to 15 bid due to low blood sugar- will monitor), medium sliding scale tid-ac, Hypoglycemia protocol, tradjenta  -- Hemoglobin A1c 6.9    NADIRA, acute blood loss anemia  -- Iron panel reviewed  -- Ferrous sulfate  -- Monitor cbc    Essential Hypertension  -- C/w PTA Amlodipine,Lisinopril  -- Monitor vital signs as per protocol               Diet: Regular Diet Adult  Diet    DVT Prophylaxis: Heparin SQ  Cedeno Catheter: Not present  Lines: None     Cardiac Monitoring: None  Code Status: Full Code      Clinically Significant Risk Factors                  # Hypertension: Noted on problem list           #Precipitous drop in Hgb/Hct: Lowest Hgb this hospitalization: 7.4 g/dL. Will continue to monitor and treat/transfuse as appropriate.    # DMII: A1C = 6.9 % (Ref range: <5.7 %) within past 6 months   # Overweight: Estimated body mass index is 26.67 kg/m  as calculated from the following:    Height as of 7/11/24:  "1.575 m (5' 2\").    Weight as of this encounter: 66.1 kg (145 lb 12.8 oz).             Disposition Plan     Medically Ready for Discharge: Pending Wheelchair delivery             Elida Espino MD  Hospitalist Service  [facility]  Securely message with [employer] (more info)  Text page via [employer] Paging/Directory   ______________________________________________________________________    Interval History   Patient is seen and examined at bedside.   No complaints. Awaiting wheelchair  Plan of care discussed with patient. All questions answered. Pt verbalized understanding.     Physical Exam   Vital Signs: Temp: 98.4  F (36.9  C) Temp src: Oral BP: 132/62 Pulse: 68   Resp: 18 SpO2: 100 % O2 Device: None (Room air)    Weight: 145 lbs 12.8 oz    GEN: Alert and oriented. Not in acute distress.  HEENT: Atraumatic, mucous membrane- moist and pink.  Chest: Bilateral air entry.  CVS: S1S2 regular.   Abdomen: Soft. Non-tender, non-distended. No organomegaly. No guarding or rigidity. Bowel sounds active.   Extremities: No pedal edema. Left AKA  CNS: No involuntary movements.  Skin: no cyanosis or clubbing.     Medical Decision Making       45 MINUTES SPENT BY ME on the date of service doing chart review, history, exam, documentation & further activities per the note.      Data     "

## 2024-07-23 ENCOUNTER — TELEPHONE (OUTPATIENT)
Dept: ANTICOAGULATION | Facility: CLINIC | Age: 69
End: 2024-07-23
Payer: COMMERCIAL

## 2024-07-23 DIAGNOSIS — I73.9 PAD (PERIPHERAL ARTERY DISEASE) (H): Primary | ICD-10-CM

## 2024-07-23 DIAGNOSIS — I74.3 FEMORAL ARTERY THROMBOSIS, LEFT (H): ICD-10-CM

## 2024-07-23 LAB
NOROXYCODONE UR CFM-MCNC: 3833 NG/MG CREAT
NOROXYMORPHONE/CREAT UR CFM: 413 NG/MG CREAT
OXYCODONE UR CFM-MCNC: 3533 NG/MG CREAT
OXYCODONE UR QL CFM: NORMAL
OXYMORPHONE UR CFM-MCNC: 1647 NG/MG CREAT

## 2024-07-23 NOTE — TELEPHONE ENCOUNTER
ANTICOAGULATION  MANAGEMENT: Discharge Review    Ashanti Aviles chart reviewed for anticoagulation continuity of care    Hospital Admission on 7/17 for amputation, above left knee.    Discharge disposition: Home with Home Care    Results:    Recent labs: (last 7 days)     07/18/24  0525   INR 1.08     Anticoagulation inpatient management:     discontinued        PLAN     Agree with dosing adjustment on discharge    Spoke with Ashanti and she was happy the stopped the warfarin.     Anticoagulation Calendar updated    Shruti Ross RN

## 2024-07-23 NOTE — OR NURSING
Post Operative Phone Call     Surgery: left above-the-knee amputation     Date of Surgery:7/17/24    Surgeon: Dr. Dyan Geller    Patient Discharged from Hospital: 7/22/24      Spoke with: Patient      Discussed patients status since discharging home after surgery.     Pt reports their pain is  Currently 5/10 and they are using Opiods: Oxycodone (Percocet, Oxycontin) and has not needed oxycodone yet)  Other medicines for pain: tylenol  to control their pain.     The incision is without signs of infection, no drainage, and sutures in place. Pt reports they are eating and drinking normal.     Pt is voiding normal and had a bowel movement on yesterday.    Pt is not having any stroke like symptoms or new onset of headaches.         VQI measures: Pt has been prescribed ASA  Plavix and reports they are taking it as prescribed.    Confirmed follow up appointments: YES    Callback number provided for further questions/concerns.    Evelyne Mccracken RN, CWOCN

## 2024-07-23 NOTE — TELEPHONE ENCOUNTER
ANTICOAGULATION  MANAGEMENT    Ashanti Aviles is being discharged from the [facility] Anticoagulation Management Program ([facility]).    Reason for discharge: warfarin therapy completed    Anticoagulation episode resolved, ACC referral closed, and Standing order discontinued    If patient needs warfarin management in the future, please send a new referral    Shruti Ross RN

## 2024-07-24 ENCOUNTER — TELEPHONE (OUTPATIENT)
Dept: VASCULAR SURGERY | Facility: CLINIC | Age: 69
End: 2024-07-24
Payer: COMMERCIAL

## 2024-07-24 ENCOUNTER — PATIENT OUTREACH (OUTPATIENT)
Dept: CARE COORDINATION | Facility: CLINIC | Age: 69
End: 2024-07-24
Payer: COMMERCIAL

## 2024-07-24 NOTE — PROGRESS NOTES
Connected Care Resource Center: [facility]    Background: Transitional Care Management program identified per system criteria and reviewed by [facility] team for possible outreach.    Assessment: Upon chart review, CCR Team member will not proceed with patient outreach related to this episode of Transitional Care Management program due to reason below:    Patient has active communication with a nurse, provider or care team for reason of post-hospital follow up plan.  Outreach call by CCRC team not indicated to minimize duplicative efforts.     Plan: Transitional Care Management episode addressed appropriately per reason noted above.      NIDHI Lundberg  [facility] [place]    *Connected Care Resource Team does NOT follow patient ongoing. Referrals are identified based on internal discharge reports and the outreach is to ensure patient has an understanding of their discharge instructions.

## 2024-07-24 NOTE — TELEPHONE ENCOUNTER
Caller: Savi with [facility]    Provider: MD Dyan Geller    Detailed reason for call: asking for a call back with verbal orders:    PT: 2x/wk for 5 weeks plus 1x/wk for 1 week  OT: 1x/wk for 5 weeks  SN: this was declined by the patient    Best phone number to contact: [phone]    Best time to contact: any    Ok to leave a detailed message: Yes- secure voicemail

## 2024-07-29 ENCOUNTER — TELEPHONE (OUTPATIENT)
Dept: VASCULAR SURGERY | Facility: CLINIC | Age: 69
End: 2024-07-29
Payer: COMMERCIAL

## 2024-07-29 NOTE — TELEPHONE ENCOUNTER
Dr. Harmon addend her note from 7/22- faxed to [phone]    The patient has mobility limitation due to Above knee amputation that impairs her ability to accomplish MRADLs. Pt is able to be independent with dressing, hygiene, grooming, toileting and mobility with use of wheelchair. Pt has a mobility limitation that cannot be resolved by use of cane or walker due to above knee amputation (Left). Pt's home provides access and space and patient will use the wheelchair on a daily basis and will significantly improve her ability to participate independently in MRADLs. Patient is capable of self propelling a lightweight wheelchair without injury.

## 2024-07-29 NOTE — TELEPHONE ENCOUNTER
Caller: Erica with [facility]    Provider: MD Dyan Geller    Detailed reason for call: This patient has been trying to get a wheelchair but the notes need to be addended.  She states that [employer] has been faxing and reaching out to us without response.  I told her I didn't see any notes to that effect.  She faxed us the paperwork that [employer] needs completed and I transferred it into Dr. Geller's mailbox.  She asks that we take care of this and call her ASAP so she can follow up accordingly with the patient.     Best phone number to contact: [phone]    Best time to contact: any    Ok to leave a detailed message: Yes    Ok to speak to authorized person if needed: No      (Noted to patient if reason is related to wound or incision, to please send a photo via email or [employer].)      Faxing for wheelchair

## 2024-07-31 NOTE — TELEPHONE ENCOUNTER
Dorcas calling again from [facility].  She states that [employer] is telling her they have not received anything from us yet.  She is asking that we refax everything and also fax to her as well so she has copies and can follow up as needed as well to help expedite this.      [employer] fax: [phone]   Dorcas's fax: [phone]

## 2024-07-31 NOTE — TELEPHONE ENCOUNTER
Spoke with [facility]- they confirmed that they do not have the updated note. Writer confirmed with her that the note has been updated and was faxed on 7/29. Re-faxed the note only to [facility] at [phone]

## 2024-08-07 NOTE — DISCHARGE SUMMARY
Vascular Surgery Discharge Summary    NAME: Ashanti Aviles   MRN: [id number]   : 1955     DATE OF ADMISSION: 2024     PRE/POSTOPERATIVE DIAGNOSES:    CLTI left lower extremity  Failure of multiple bypasses  Non salvageable lower extremity        PROCEDURES PERFORMED, 2024:   left above-the-knee amputation       INTRAOPERATIVE FINDINGS: See op note for details    POSTOPERATIVE COMPLICATIONS: None     DATE OF DISCHARGE: 2024     HOSPITAL COURSE: Ashanti Aviles is a 68 year old female who on 2024  underwent the above-named procedures.  she tolerated the procedure well and postoperatively was transferred to the general post-surgical unit.  The remainder of her course was essentiallly uncomplicated.  Prior to discharge, her pain was controlled well with po pain medication.  she was able to perform ADLs and ambulate independently without difficulty maintaining non weight bearing to left leg, and had full return of bowel and bladder function.  On 2024, she was discharged to home in stable condition.    DISCHARGE INSTRUCTIONS:  AFTER VISIT SUMMARY  Ashanti Aviles MRN: [id number]     2024 - 2024   [facility]   Your Next Steps   Do       these medications from [facility] [address]  ferrous sulfate  methocarbamol  polyethylene glycol     these medications from any pharmacy with your printed prescription  oxyCODONE IR   Go    AUG    19 Post Op 1:45 PM  Dr Dyan Geller  [facility] [address]  [facility] [address]  [phone]     MyChart  View your After Visit Summary and more online at [email or web address]    Reason for your hospital stay  Above knee amputation    Your medications have changed   START taking:  ferrous sulfate (FEROSUL)   oxyCODONE IR (ROXICODONE)   polyethylene glycol (MIRALAX)    CHANGE how  you take:  methocarbamol (ROBAXIN)    STOP taking:  HYDROcodone-acetaminophen 5-325 MG tablet (Norco)   Review your updated medication list below.  When to contact your care team   Call your primary doctor if you have any of the following: temperature greater than 100 or less than 96,  increased shortness of breath, increased drainage, increased swelling, or increased pain.       Activity instructions  Your activity upon discharge: activity as tolerated    Diet instructions  Follow this diet upon discharge: Orders Placed This Encounter      Regular Diet Adult  Follow-up Appointments  Follow-up Appointments  Follow-up and recommended labs and tests   Follow up with your primary care provider within a week. Labs recommended: BMP, CBC in 3-5 days. Follow up with Dr. Geller's team for wound check     Additional Services  Additional Services  Occupational Therapy  Referral   Please be aware that coverage of these services is subject to the terms and limitations of your health insurance plan.  Call member services at your health plan with any benefit or coverage questions.  [employer] will call you to coordinate your care as prescribed by your provider. If you don't hear from a representative within 2 business days, please call [phone].   Course of Action: Evaluation and Treatment   Specialty Services: Per Associated Diagnosis   Scheduling Instructions: [employer] will call you to coordinate your care as prescribed by your provider. If you don't hear from a representative within 2 business days, please call [phone].   Physical Therapy  Referral   Please be aware that coverage of these services is subject to the terms and limitations of your health insurance plan.  Call member services at your health plan with any benefit or coverage questions.  [employer] will call you to coordinate your care as prescribed by your provider. If you don't hear from a representative  within 2 business days, please call [phone].   Course of Action: Evaluation and Treatment   Specialty Services: Per Associated Diagnosis   Scheduling Instructions: [facility] will call you to coordinate your care as prescribed by your provider. If you don't hear from a representative within 2 business days, please call [phone].   Home Care Referral   Your provider has ordered home health services. If you have not been contacted within 2 days of your discharge please call the selected Home Care agency listed on your Discharge document.  If a Home Care agency is NOT listed, please call [phone].   Reason for Referral: Skilled Nursing   Skilled Nursing Eval and Treat for: Wound assessment and care   Is the patient homebound?: No   I attest that I saw or will see the patient on this date: 7/20/2024   Provider to follow patient: DYAN GELLER   Additional Information: Requiring daily dressing changes     Medical Equipment (DME) Order Instructions  Medical Equipment (DME) Order Instructions  Wheelchair As directed   PATIENT INSTRUCTIONS: This item cannot be fullfilled by [facility]. Please contact your insurance provider for additional support.   Start Date: 7/20/2024   Wheelchair Type: Standard   Length of Need: Lifetime   Leg Rests: Standard   Arm Rests: Standard   The face to face evaluation was performed on: 7/20/2024     What's Next  What's Next  AUG    19 Post Op with Dr Dyan Geller  Monday Aug 19, 2024 1:45 PM [facility] [address] [address] [phone]     Continuing Care    Home Medical Care  [facility]  Services: Home Health Services   Address: [address] [place] [address] E, [facility] [address]   Phone: [phone]     Pending Results  No orders found from 7/15/2024 to 7/18/2024.            Pending Results Instructions  If you had any lab results that were not  finalized at the time of your discharge and have MyChart, they will release to your chart when they are completed. Reach out to your primary physician if you have questions about these results. Someone will contact you if any new results are abnormal or indicate that there should be a change in your treatment.  Statement of Approval   (From admission, onward)  Ordered     07/22/24 1545  I have reviewed and agree with all the recommendations and orders detailed in this document.  EFFECTIVE NOW        Approved and electronically signed by Belkys Harmon DO           Admission Information  Date & Time  7/17/2024 [facility] P[facility] Dept. Phone  [phone]     Your Vitals Were    Blood Pressure 132/62       BMI 26.67       Weight 66.1 kg (145 lb 12.8 oz)       Temperature (Oral) 36.9  C (98.4  F)       Pulse 68       Respiration 18       Oxygen Saturation 100%    DISCHARGE MEDICATIONS:     Review of your medicines        START taking        Dose / Directions   ferrous sulfate 325 (65 Fe) MG tablet  Commonly known as: FEROSUL  Used for: Other iron deficiency anemia      Dose: 325 mg  Take 1 tablet (325 mg) by mouth daily  Quantity: 30 tablet  Refills: 1     oxyCODONE IR 10 MG tablet  Commonly known as: ROXICODONE  Used for: PAD (peripheral artery disease) (H24)      Dose: 10 mg  Take 1 tablet (10 mg) by mouth every 3 hours as needed for severe pain  Quantity: 12 tablet  Refills: 0     polyethylene glycol 17 GM/Dose powder  Commonly known as: MIRALAX  Used for: PAD (peripheral artery disease) (H24)      Dose: 17 g  Take 17 g by mouth daily  Quantity: 510 g  Refills: 0            CONTINUE these medicines which may have CHANGED, or have new prescriptions. If we are uncertain of the size of tablets/capsules you have at home, strength may be listed as something that might have changed.        Dose / Directions   * methocarbamol 500 MG tablet  Commonly known as: ROBAXIN  This may have  changed: Another medication with the same name was added. Make sure you understand how and when to take each.  Used for: PAD (peripheral artery disease) (H24)      Dose: 500 mg  Take 1 tablet (500 mg) by mouth at bedtime  Quantity: 20 tablet  Refills: 0     * methocarbamol 500 MG tablet  Commonly known as: ROBAXIN  This may have changed: You were already taking a medication with the same name, and this prescription was added. Make sure you understand how and when to take each.  Used for: Muscle spasm      Dose: 500 mg  Take 1 tablet (500 mg) by mouth 3 times daily as needed for muscle spasms  Quantity: 15 tablet  Refills: 0           * This list has 2 medication(s) that are the same as other medications prescribed for you. Read the directions carefully, and ask your doctor or other care provider to review them with you.                CONTINUE these medicines which have NOT CHANGED        Dose / Directions   Acetaminophen 8 Hour 650 MG CR tablet  Generic drug: acetaminophen      Dose: 1,300 mg  Take 1,300 mg by mouth every 8 hours as needed for mild pain or fever  Refills: 0     amLODIPine 5 MG tablet  Commonly known as: NORVASC      Dose: 5 mg  Take 5 mg by mouth daily  Refills: 0     aspirin 81 MG EC tablet  Used for: PAD (peripheral artery disease) (H24)      Dose: 81 mg  Take 1 tablet (81 mg) by mouth daily  Quantity: 90 tablet  Refills: 3     clopidogrel 75 MG tablet  Commonly known as: PLAVIX      Dose: 75 mg  Take 75 mg by mouth daily  Refills: 0     empagliflozin 25 MG Tabs tablet  Commonly known as: JARDIANCE  Used for: Type 2 diabetes mellitus with diabetic nephropathy, unspecified whether long term insulin use (H)      Dose: 25 mg  Take 1 tablet (25 mg) by mouth daily  Quantity: 90 tablet  Refills: 1     ezetimibe 10 MG tablet  Commonly known as: ZETIA      Dose: 10 mg  Take 10 mg by mouth daily  Refills: 0     FreeStyle Leif 14 Day Sensor Misc      Refills: 0     gabapentin 300 MG capsule  Commonly  known as: NEURONTIN  Used for: Open wound of left great toe, initial encounter, Ulcer of left lower extremity with fat layer exposed (H), Chronic intractable pain      Dose: 300 mg  Take 1 capsule (300 mg) by mouth 3 times daily Start with one capsule at bedtime and increase dose as instructed  Quantity: 90 capsule  Refills: 0     insulin degludec 100 UNIT/ML pen  Commonly known as: TRESIBA      Dose: 30 Units  Inject 30 Units Subcutaneous 2 times daily  Refills: 0     lisinopril 40 MG tablet  Commonly known as: ZESTRIL      Dose: 40 mg  Take 40 mg by mouth daily  Refills: 0     metFORMIN 500 MG 24 hr tablet  Commonly known as: GLUCOPHAGE XR      Dose: 1,000 mg  Take 1,000 mg by mouth daily  Refills: 0     omeprazole 20 MG DR capsule  Commonly known as: PriLOSEC      Dose: 20 mg  Take 20 mg by mouth daily as needed (heartburn)  Refills: 0     Tradjenta 5 MG Tabs tablet  Generic drug: linagliptin      Dose: 5 mg  Take 5 mg by mouth daily  Refills: 0     Unifine Pentips 31G X 5 MM miscellaneous  Generic drug: insulin pen needle      Refills: 0            STOP taking      HYDROcodone-acetaminophen 5-325 MG tablet  Commonly known as: Norco                  Where to get your medicines        These medications were sent to [facility] [address] [address]      Phone: [phone]   ferrous sulfate 325 (65 Fe) MG tablet  methocarbamol 500 MG tablet  polyethylene glycol 17 GM/Dose powder       Some of these will need a paper prescription and others can be bought over the counter. Ask your nurse if you have questions.    Bring a paper prescription for each of these medications  oxyCODONE IR 10 MG tablet

## 2024-08-09 ENCOUNTER — TELEPHONE (OUTPATIENT)
Dept: VASCULAR SURGERY | Facility: CLINIC | Age: 69
End: 2024-08-09
Payer: COMMERCIAL

## 2024-08-09 NOTE — TELEPHONE ENCOUNTER
Caller: MICHAEL Murray with [facility]    Provider: MD Dyan Geller    Detailed reason for call: Requesting verbal orders to discharge from OT per patient request.     Best phone number to contact: [phone]    Best time to contact: Any time    Ok to leave a detailed message: Yes    Ok to speak to authorized person if needed: No      (Noted to patient if reason is related to wound or incision, to please send a photo via email or [employer].)

## 2024-08-12 NOTE — TELEPHONE ENCOUNTER
Home care PT asking for a call back with verbal orders for early discharge due to meeting all her goals. [phone]

## 2024-08-19 ENCOUNTER — OFFICE VISIT (OUTPATIENT)
Dept: VASCULAR SURGERY | Facility: CLINIC | Age: 69
End: 2024-08-19
Attending: SURGERY
Payer: COMMERCIAL

## 2024-08-19 VITALS
TEMPERATURE: 97.7 F | HEART RATE: 70 BPM | SYSTOLIC BLOOD PRESSURE: 146 MMHG | RESPIRATION RATE: 12 BRPM | OXYGEN SATURATION: 99 % | DIASTOLIC BLOOD PRESSURE: 80 MMHG

## 2024-08-19 DIAGNOSIS — I73.9 PAD (PERIPHERAL ARTERY DISEASE) (H): Primary | ICD-10-CM

## 2024-08-19 PROCEDURE — 99213 OFFICE O/P EST LOW 20 MIN: CPT | Performed by: SURGERY

## 2024-08-19 PROCEDURE — 99024 POSTOP FOLLOW-UP VISIT: CPT | Performed by: SURGERY

## 2024-08-19 ASSESSMENT — PAIN SCALES - GENERAL: PAINLEVEL: MODERATE PAIN (5)

## 2024-08-19 NOTE — PROGRESS NOTES
VASCULAR SURGERY PROGRESS NOTE   VASCULAR SURGEON: Dyan Geller MD, RPVI     LOCATION:  [facility]     Ashanti Aviles   Medical Record #:  [id number]  YOB: 1955  Age:  68 year old     Date of Service: 8/19/2024    PRIMARY CARE PROVIDER: Dyan Geller      Reason for visit: Follow-up    IMPRESSION: 68-year-old female with a history of left lower extremity chronic extensive ischemia status post 3 failed bypasses, left femoral endarterectomy and retrograde intervention.  Patient underwent about amputation and has recovered well.    RECOMMENDATION/RISKS: Staples will be removed today.  Follow-up in 3 months for shunt check.    HPI:  Ashanti Avlies is a 68 year old female who was seen today for follow-up.  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    Nicotine addiction     OAG (open angle glaucoma)     PVD (peripheral vascular disease) (H24)     Reflux esophagitis     Retinopathy     Toe ulcer (H)     Tubular adenoma     Yeast vaginitis           Past Surgical History:   Procedure Laterality Date    AMPUTATE LEG ABOVE KNEE Left 7/17/2024    Procedure: AMPUTATION, ABOVE LEFT KNEE;  Surgeon: Dyan Geller MD;  Location: [facility] OR    AMPUTATE TOE(S) Right 02/18/2023    Procedure: Amputation fourth toe right foot;  Surgeon: Benjamin Diez DPM;  Location: [facility] OR    ANGIOGRAM Left 2/15/2024    Procedure: COMPLETION OF  ANGIOGRAM, LEFT COMMON ILIAC ARTERY STENT PLACEMENT;  Surgeon: Dyan Geller MD;  Location: [facility]    ANUS SURGERY      BIOPSY CERVICAL, LOCAL EXCISION, SINGLE/MULTIPLE      COLONOSCOPY N/A 09/11/2020    Procedure: EXAM UNDER ANESTHESIA, HIGH RESOLUTION ANOSCOPY INTRA OP;  Surgeon: Preeti Sheehan MD;  Location: [facility] OR;  Service: Gastroenterology    COLONOSCOPY N/A 12/14/2020    Procedure: EXAM UNDER ANESTHESIA WITH HIGH RESOLUTION ANOSCOPY;  Surgeon: Preeti Sheehan MD;  Location: [facility] OR;  Service: General    COLONOSCOPY N/A 06/15/2021    Procedure: EXAM UNDER ANESTHESIA WITH HIGH RESOLUTION ANOSCOPY, BIOPSY, FULGURATION;  Surgeon: Preeti Sheehan MD;  Location: [facility];  Service: Gastroenterology    COLONOSCOPY N/A 4/15/2024    Procedure: COLONOSCOPY, WITH POLYPECTOMY;  Surgeon: Shen Andres MD;  Location: [facility] OR    ENDARTERECTOMY FEMORAL Left 05/17/2023    Procedure: LEFT FEMORAL ENDARTERECTOMY WITH RETROGRADE ILIAC STENT;  Surgeon: Dyan Geller MD;  Location: [facility]    EXAM UNDER ANESTHESIA ANUS N/A 09/14/2021    Procedure: EXAM UNDER ANESTHESIA WITH HIGH RESOLUTION ANOSCOPY;  Surgeon: Preeti Sheehan MD;  Location: [facility]    FEMORAL ARTERY - TIBIAL ARTERY BYPASS GRAFT Left 09/08/2023    Procedure: CREATION, BYPASS, ARTERIAL, FEMORAL TO TIBIAL, LEFT;  Surgeon: Dyan Geller MD;  Location: [facility]    FEMORAL ARTERY - TIBIAL ARTERY BYPASS GRAFT Left 2/15/2024    Procedure: CREATION, BYPASS, ARTERIAL, FEMORAL TO TIBIAL,;  Surgeon: Dyan Geller MD;  Location: [facility]    FEMORAL ARTERY - TIBIAL ARTERY BYPASS GRAFT Left 6/4/2024    Procedure: LEFT EXTERNAL ILIAC ARTERY TO POSTERIOR TIBIAL ARTERY BYPASS WITH CRYO ARTERY WITH A FEMORAL HERNIA REPAIR;  Surgeon: Dyan Geller MD;  Location: [facility]    INCISION AND DRAINAGE FOOT,  COMBINED Left 11/27/2023    Procedure: INCISION AND DRAINAGE, LEFT HALLUX;  Surgeon: Rene Ordaz DPM;  Location: [facility]    INCISION AND DRAINAGE FOOT, COMBINED Left 6/7/2024    Procedure: AMPUTATION LEFT HALLUX;  Surgeon: Rene Ordaz DPM;  Location: [facility]    IR LOWER EXTREMITY ANGIOGRAM LEFT  03/23/2023    IR LOWER EXTREMITY ANGIOGRAM LEFT  7/8/2024    IR LOWER EXTREMITY ANGIOGRAM RIGHT  02/16/2023    IR THROMBOLYSIS ARTERIAL INFUSION INITIAL DAY  10/09/2023    LEEP TX, CERVICAL      2005    TUBAL LIGATION         ALLERGIES:  Simvastatin, Victoza [liraglutide], and Penicillins    MEDS:    Current Outpatient Medications:     acetaminophen (ACETAMINOPHEN 8 HOUR) 650 MG CR tablet, Take 1,300 mg by mouth every 8 hours as needed for mild pain or fever, Disp: , Rfl:     amLODIPine (NORVASC) 5 MG tablet, Take 5 mg by mouth daily, Disp: , Rfl:     aspirin 81 MG EC tablet, Take 1 tablet (81 mg) by mouth daily, Disp: 90 tablet, Rfl: 3    clopidogrel (PLAVIX) 75 MG tablet, Take 75 mg by mouth daily, Disp: , Rfl:     Continuous Blood Gluc Sensor (FREESTYLE PRINCESS 14 DAY SENSOR) MISC, , Disp: , Rfl:     empagliflozin (JARDIANCE) 25 MG TABS tablet, Take 1 tablet (25 mg) by mouth daily, Disp: 90 tablet, Rfl: 1    ezetimibe (ZETIA) 10 MG tablet, Take 10 mg by mouth daily, Disp: , Rfl:     ferrous sulfate (FEROSUL) 325 (65 Fe) MG tablet, Take 1 tablet (325 mg) by mouth daily, Disp: 30 tablet, Rfl: 1    insulin degludec (TRESIBA) 100 UNIT/ML pen, Inject 30 Units Subcutaneous 2 times daily, Disp: , Rfl:     linagliptin (TRADJENTA) 5 MG TABS tablet, Take 5 mg by mouth daily, Disp: , Rfl:     lisinopril (ZESTRIL) 40 MG tablet, Take 40 mg by mouth daily, Disp: , Rfl:     metFORMIN (GLUCOPHAGE XR) 500 MG 24 hr tablet, Take 1,000 mg by mouth daily, Disp: , Rfl:     methocarbamol (ROBAXIN) 500 MG tablet, Take 1 tablet (500 mg) by mouth 3 times daily as needed for muscle spasms, Disp: 15 tablet, Rfl:  0    omeprazole (PRILOSEC) 20 MG DR capsule, Take 20 mg by mouth daily as needed (heartburn), Disp: , Rfl:     oxyCODONE (ROXICODONE) 10 MG tablet, Take 1 tablet (10 mg) by mouth every 3 hours as needed for severe pain, Disp: 12 tablet, Rfl: 0    polyethylene glycol (MIRALAX) 17 GM/Dose powder, Take 17 g by mouth daily, Disp: 510 g, Rfl: 0    UNIFINE PENTIPS 31G X 5 MM miscellaneous, , Disp: , Rfl:     gabapentin (NEURONTIN) 300 MG capsule, Take 1 capsule (300 mg) by mouth 3 times daily Start with one capsule at bedtime and increase dose as instructed (Patient not taking: Reported on 8/19/2024), Disp: 90 capsule, Rfl: 0    methocarbamol (ROBAXIN) 500 MG tablet, Take 1 tablet (500 mg) by mouth at bedtime (Patient not taking: Reported on 8/19/2024), Disp: 20 tablet, Rfl: 0    SOCIAL HABITS:    History   Smoking Status    Former    Types: Cigarettes   Smokeless Tobacco    Never     Social History    Substance and Sexual Activity      Alcohol use: Not Currently        Comment: Alcoholic Drinks/MONTH: 2x      History   Drug Use Unknown       FAMILY HISTORY:    Family History   Problem Relation Age of Onset    Hypertension Mother     Colon Cancer Mother     Diabetes Type 2  Father     Hypertension Father     Kidney failure Father     Hyperthyroidism Sister     Breast Cancer Paternal Aunt        PE:  BP (!) 146/80   Pulse 70   Temp 97.7  F (36.5  C)   Resp 12   LMP  (LMP Unknown)   SpO2 99%   Wt Readings from Last 1 Encounters:   07/17/24 66.1 kg (145 lb 12.8 oz)     There is no height or weight on file to calculate BMI.    EXAM:  GENERAL: This is a well-developed 68 year old female who appears her stated age  EYES: Grossly normal.  MOUTH: Buccal mucosa normal   CARDIAC: Normal   CHEST/LUNG: Clear to auscultation bilaterally  GASTROINTESINAL soft nontender nondistended  MUSCULOSKELETAL: Grossly normal and both lower extremities are intact.  HEME/LYMPH: No lymphedema  NEUROLOGIC: Focally intact, Alert and oriented x 3.    PSYCH: appropriate affect  INTEGUMENT: No open lesions or ulcers  Pulse Exam:           DIAGNOSTIC STUDIES:     Images:  No results found.      LABS:      Sodium   Date Value Ref Range Status   07/20/2024 137 135 - 145 mmol/L Final   07/19/2024 132 (L) 135 - 145 mmol/L Final   07/18/2024 135 135 - 145 mmol/L Final     Urea Nitrogen   Date Value Ref Range Status   07/20/2024 7.1 (L) 8.0 - 23.0 mg/dL Final   07/19/2024 6.7 (L) 8.0 - 23.0 mg/dL Final   07/18/2024 7.3 (L) 8.0 - 23.0 mg/dL Final     Hemoglobin   Date Value Ref Range Status   07/22/2024 7.8 (L) 11.7 - 15.7 g/dL Final   07/21/2024 7.4 (L) 11.7 - 15.7 g/dL Final   07/20/2024 7.5 (L) 11.7 - 15.7 g/dL Final     Platelet Count   Date Value Ref Range Status   07/21/2024 417 150 - 450 10e3/uL Final   07/20/2024 371 150 - 450 10e3/uL Final   07/19/2024 329 150 - 450 10e3/uL Final     INR   Date Value Ref Range Status   07/18/2024 1.08 0.85 - 1.15 Final   07/08/2024 0.91 0.85 - 1.15 Final   07/05/2024 2.0 (H) 0.9 - 1.1 Final   07/01/2024 2.9 (H) 0.9 - 1.1 Final   06/28/2024 2.0 (H) 0.9 - 1.1 Final   06/11/2024 1.40 (H) 0.85 - 1.15 Final       30 minutes spent on the day of encounter doing chart review, history and exam, documentation, and further activities as noted.         Dyan Geller MD, [facility]  VASCULAR SURGERY

## 2024-08-19 NOTE — PROGRESS NOTES
[facility]        Patient is here for a post-op to discuss Left AKA 07/17    Pt is currently taking Aspirin.    BP (!) 146/80   Pulse 70   Temp 97.7  F (36.5  C)   Resp 12   LMP  (LMP Unknown)   SpO2 99%     The provider has been notified that the patient has no concerns.     Questions patient would like addressed today are: N/A.    Refills are needed: Yes:  methocarbamol 500 mg and gabapentin  300 mg    Has homecare services and agency name:  No

## 2024-08-19 NOTE — PATIENT INSTRUCTIONS
This is the plan that was discussed at your appointment.    Follow up in 3 months as scheduled. Staples were removed, leave steri strips on until they fall off.   2. May return to work as able      I am including additional information on these things and our contact information if you have any questions or concerns.   Please do not hesitate to reach out to us if you felt we did not answer your questions or you are unsure of the treatment plan after your visit today. Our number is [phone].  Thank you for trusting us with your care.         Again thank you for your time.

## 2024-08-21 ENCOUNTER — TELEPHONE (OUTPATIENT)
Dept: PHYSICAL MEDICINE AND REHAB | Facility: CLINIC | Age: 69
End: 2024-08-21
Payer: COMMERCIAL

## 2024-08-21 NOTE — TELEPHONE ENCOUNTER
Pre-visit phone call-     LPN called pt and reminded them of their upcoming appointment on Thursday 8/22/24 at 1000 am with Dr. Wetzel in the [facility] clinic.   Pt stated that the appointment still worked for them.   Pt was reminded to bring additional ambulation equipment with them to the appointment.  Pt was reminded of where the clinic was located:     We are located in the [facility] at [address] [place] [address], [address].  Our building is located across the street from [facility] and offers free parking in our on-site lot.  Please take the stairs or elevator to the second floor of the [facility] and check in at the  located in the Specialty Clinic, [place] [address].   From Sign In Desk:     Department Address: [address] [place] [address], [address] [address]   Department Phone: [phone]     Maggie Landers LPN

## 2024-08-22 ENCOUNTER — OFFICE VISIT (OUTPATIENT)
Dept: PHYSICAL MEDICINE AND REHAB | Facility: CLINIC | Age: 69
End: 2024-08-22
Payer: COMMERCIAL

## 2024-08-22 VITALS — SYSTOLIC BLOOD PRESSURE: 132 MMHG | DIASTOLIC BLOOD PRESSURE: 70 MMHG

## 2024-08-22 DIAGNOSIS — I73.9 PAD (PERIPHERAL ARTERY DISEASE) (H): ICD-10-CM

## 2024-08-22 DIAGNOSIS — G89.29 CHRONIC INTRACTABLE PAIN: ICD-10-CM

## 2024-08-22 DIAGNOSIS — E78.5 HYPERLIPIDEMIA LDL GOAL <100: ICD-10-CM

## 2024-08-22 DIAGNOSIS — Z89.612 ACQUIRED ABSENCE OF LEFT LOWER EXTREMITY ABOVE KNEE (H): Primary | ICD-10-CM

## 2024-08-22 DIAGNOSIS — M79.2 NEUROPATHIC PAIN: ICD-10-CM

## 2024-08-22 PROCEDURE — 99215 OFFICE O/P EST HI 40 MIN: CPT | Performed by: STUDENT IN AN ORGANIZED HEALTH CARE EDUCATION/TRAINING PROGRAM

## 2024-08-22 PROCEDURE — 99417 PROLNG OP E/M EACH 15 MIN: CPT | Performed by: STUDENT IN AN ORGANIZED HEALTH CARE EDUCATION/TRAINING PROGRAM

## 2024-08-22 RX ORDER — GABAPENTIN 300 MG/1
300 CAPSULE ORAL 2 TIMES DAILY
Qty: 60 CAPSULE | Refills: 2 | Status: SHIPPED | OUTPATIENT
Start: 2024-08-22 | End: 2024-11-20

## 2024-08-22 RX ORDER — METHOCARBAMOL 500 MG/1
500 TABLET, FILM COATED ORAL AT BEDTIME
Qty: 30 TABLET | Refills: 2 | Status: SHIPPED | OUTPATIENT
Start: 2024-08-22 | End: 2024-11-20

## 2024-08-22 ASSESSMENT — PAIN SCALES - GENERAL: PAINLEVEL: NO PAIN (0)

## 2024-08-22 NOTE — PATIENT INSTRUCTIONS
I will order a left leg prosthesis as well as physical therapy to be activated once you are fit for a test socket.  I have ordered gabapentin and methocarbamol.  We will aim to simplify this medication regimen on our next visit.  I hope this will help making it simple to discontinue the oxycodone.    Amputation Resources:     [facility] Your Toes  [email or web address] (Facebook group as well)   Main PH: [phone]  , Sylvesterleodan Wilson PH: [phone], is available by phone/text 7 days/week (recommend you contact Sylvester directly)     &    Amputation Coalition   www.amputee-coalition.org   PH: [phone]  National agency where you can find support groups and find local resources.     Support Groups:   For more information, contact Demetra Chiu PH: [phone], Email: [email or web address]     Balanced Diabetic Amputee Community   Mondays on Zoom @ 7-8:00 pm central time   Come talk about issues related to living with a chronic disease as well as navigating limb loss.     Balanced Grief--A NON Therapeutic Look at Grief   1st and 3rd Tuesdays on Zoom @ 10-11:00 am central   Grief is normal learn how to thrive with limb loss-- join the learning and discussion.     [facility] Amputee Community   Tuesdays on Zoom @ 7-8:00 pm central time    Come learn from various professions related to thriving with limb loss.     Balanced and Beyond-- for new amputees   (pre-surgery and beyond)   Wednesdays on Zoom @ 10-11:00 am central   Come discuss issues related to the first years with prosthetics.

## 2024-08-22 NOTE — PROGRESS NOTES
PM&R Clinic Note     Patient Name: Ashanti Aviles : 1955 Medical Record: [id number]     Level of Amputation:  Left transfemoral  : TBD  Date of Surgery: 24  Etiology: failed bypass graft         History of Present Illness:     Ashanti Aviles is a 68 year old female with DM2, nicotine use disorder, reflux esophagitis, and peripheral vascular disease who underwent a left transfemoral amputation on 24 due to nonhealing diabetic wounds after a failed bypass graft.  She was discharged to home on 24.  Staples were removed on 24.  She hasn't been wearing a  and hasn't met her prosthetist yet.    She was working before the amputation.  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  She walked a good 1000 or so steps in an office environment.  She avoided stairs for the past 2 years because of her leg issues.  Her ambulation was limited due to pain and not being out of breath.  She had no active or outdoor hobbies.    After the amputation, she is completely independent with her ADLs.  She uses the wheelchair to get around in the household and she has been out shopping and to the casino with no major problems.  She is able to do bathroom transfers in the community.    She is able to walk with the walker all around the house.  She spends about 4 hours in the walker during the day and 2 hours in the wheelchair.    She has a little pain in her residual bone anteriorly.  She takes oxycodone at night to relieve the pain.  She's been taking oxycodone on and off through the bypass and amputation procedure.  She was taking gabapentin a week ago but had run out of the prescription and was only taking it twice daily.  She was also taking methocarbamol at bedtime.    She would like to recover as much capability of ambulation as possible.    She last saw her primary doctor several months ago, who is managing her diabetes.  She is taking multiple diabetes medications including  insulin.  She has never seen a nutritionist for diet management of her diabetes.  She had orthotic shoes, but her new balance feels better.    She would like to return to work. She has been working part time and would like to be full time; she just has concerns about her ability to perform car transfers and is looking for metro mobility or transportation solutions.    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    Benign gastric polyp     Chronic toe ulcer (H)     Chronic ulcer of great toe of left foot with necrosis of muscle (H)     Diabetes mellitus (H)     Gastroesophageal reflux disease     Hard to intubate 02/15/2024    History of blood transfusion     History of colonic polyps     HLD (hyperlipidemia)     Hypertension     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/29/2013    Nicotine addiction     OAG (open angle glaucoma)     PVD (peripheral vascular disease) (H24)     Reflux esophagitis     Retinopathy     Toe ulcer (H)     Tubular adenoma     Yeast vaginitis      Past Surgical History:   Procedure Laterality Date    AMPUTATE LEG ABOVE KNEE Left 7/17/2024    Procedure: AMPUTATION, ABOVE LEFT KNEE;  Surgeon: Dyan Geller MD;  Location: [facility] [place]    AMPUTATE TOE(S) Right 02/18/2023    Procedure: Amputation fourth toe right foot;  Surgeon: Benjamin Diez DPM;  Location: [facility] [place] [facility]    ANGIOGRAM Left 2/15/2024    Procedure: COMPLETION OF ANGIOGRAM, LEFT COMMON ILIAC ARTERY STENT PLACEMENT;  Surgeon: Dyan Geller MD;  Location: [facility] [place]    ANUS SURGERY      BIOPSY CERVICAL, LOCAL EXCISION, SINGLE/MULTIPLE      COLONOSCOPY N/A 09/11/2020    Procedure: EXAM UNDER ANESTHESIA, HIGH RESOLUTION ANOSCOPY INTRA OP;  Surgeon: Preeti Sheehan MD;  Location: [facility];  Service: Gastroenterology    COLONOSCOPY N/A 12/14/2020    Procedure: EXAM UNDER ANESTHESIA WITH HIGH RESOLUTION  ANOSCOPY;  Surgeon: Preeti Sheehan MD;  Location: [facility];  Service: General    COLONOSCOPY N/A 06/15/2021    Procedure: EXAM UNDER ANESTHESIA WITH HIGH RESOLUTION ANOSCOPY, BIOPSY, FULGURATION;  Surgeon: Preeti Sheehan MD;  Location: [facility];  Service: Gastroenterology    COLONOSCOPY N/A 4/15/2024    Procedure: COLONOSCOPY, WITH POLYPECTOMY;  Surgeon: Shen Andres MD;  Location: [facility] OR    ENDARTERECTOMY FEMORAL Left 05/17/2023    Procedure: LEFT FEMORAL ENDARTERECTOMY WITH RETROGRADE ILIAC STENT;  Surgeon: Dyan Geller MD;  [place] [facility]    EXAM UNDER ANESTHESIA ANUS N/A 09/14/2021    Procedure: EXAM UNDER ANESTHESIA WITH HIGH RESOLUTION ANOSCOPY;  Surgeon: Preeti Sheehan MD;  Location: [facility]    FEMORAL ARTERY - TIBIAL ARTERY BYPASS GRAFT Left 09/08/2023    Procedure: CREATION, BYPASS, ARTERIAL, FEMORAL TO TIBIAL, LEFT;  Surgeon: Dyan Geller MD;  [place] [facility]    FEMORAL ARTERY - TIBIAL ARTERY BYPASS GRAFT Left 2/15/2024    Procedure: CREATION, BYPASS, ARTERIAL, FEMORAL TO TIBIAL,;  Surgeon: Dyan Geller MD;  [place] [facility]    FEMORAL ARTERY - TIBIAL ARTERY BYPASS GRAFT Left 6/4/2024    Procedure: LEFT EXTERNAL ILIAC ARTERY TO POSTERIOR TIBIAL ARTERY BYPASS WITH CRYO ARTERY WITH A FEMORAL HERNIA REPAIR;  Surgeon: Dyan Geller MD;  [place] [facility]    INCISION AND DRAINAGE FOOT, COMBINED Left 11/27/2023    Procedure: INCISION AND DRAINAGE, LEFT HALLUX;  Surgeon: Rene Ordaz DPM;  [place] [facility]    INCISION AND DRAINAGE FOOT, COMBINED Left 6/7/2024    Procedure: AMPUTATION LEFT HALLUX;  Surgeon: Rene Ordaz DPM;  [place] [facility]    IR LOWER EXTREMITY ANGIOGRAM LEFT  03/23/2023    IR LOWER EXTREMITY ANGIOGRAM LEFT  7/8/2024    IR LOWER EXTREMITY ANGIOGRAM RIGHT  02/16/2023    IR THROMBOLYSIS ARTERIAL  INFUSION INITIAL DAY  10/09/2023    LEEP TX, CERVICAL      2005    TUBAL LIGATION         Social History     Tobacco Use    Smoking status: Former     Current packs/day: 0.00     Types: Cigarettes     Quit date: 2023     Years since quittin.5     Passive exposure: Never    Smokeless tobacco: Never   Substance Use Topics    Alcohol use: Not Currently     Comment: Alcoholic Drinks/MONTH: 2x     Family History   Problem Relation Age of Onset    Hypertension Mother     Colon Cancer Mother     Diabetes Type 2  Father     Hypertension Father     Kidney failure Father     Hyperthyroidism Sister     Breast Cancer Paternal Aunt             Medications:     Current Outpatient Medications   Medication Sig Dispense Refill    acetaminophen (ACETAMINOPHEN 8 HOUR) 650 MG CR tablet Take 1,300 mg by mouth every 8 hours as needed for mild pain or fever      amLODIPine (NORVASC) 5 MG tablet Take 5 mg by mouth daily      aspirin 81 MG EC tablet Take 1 tablet (81 mg) by mouth daily 90 tablet 3    clopidogrel (PLAVIX) 75 MG tablet Take 75 mg by mouth daily      Continuous Blood Gluc Sensor (FREESTYLE PRINCESS 14 DAY SENSOR) MISC       empagliflozin (JARDIANCE) 25 MG TABS tablet Take 1 tablet (25 mg) by mouth daily 90 tablet 1    ezetimibe (ZETIA) 10 MG tablet Take 10 mg by mouth daily      ferrous sulfate (FEROSUL) 325 (65 Fe) MG tablet Take 1 tablet (325 mg) by mouth daily 30 tablet 1    gabapentin (NEURONTIN) 300 MG capsule Take 1 capsule (300 mg) by mouth 3 times daily Start with one capsule at bedtime and increase dose as instructed (Patient not taking: Reported on 2024) 90 capsule 0    insulin degludec (TRESIBA) 100 UNIT/ML pen Inject 30 Units Subcutaneous 2 times daily      linagliptin (TRADJENTA) 5 MG TABS tablet Take 5 mg by mouth daily      lisinopril (ZESTRIL) 40 MG tablet Take 40 mg by mouth daily      metFORMIN (GLUCOPHAGE XR) 500 MG 24 hr tablet Take 1,000 mg by mouth daily      methocarbamol (ROBAXIN) 500 MG  "tablet Take 1 tablet (500 mg) by mouth 3 times daily as needed for muscle spasms 15 tablet 0    methocarbamol (ROBAXIN) 500 MG tablet Take 1 tablet (500 mg) by mouth at bedtime (Patient not taking: Reported on 8/19/2024) 20 tablet 0    omeprazole (PRILOSEC) 20 MG DR capsule Take 20 mg by mouth daily as needed (heartburn)      oxyCODONE (ROXICODONE) 10 MG tablet Take 1 tablet (10 mg) by mouth every 3 hours as needed for severe pain 12 tablet 0    polyethylene glycol (MIRALAX) 17 GM/Dose powder Take 17 g by mouth daily 510 g 0    UNIFINE PENTIPS 31G X 5 MM miscellaneous               Allergies:     Allergies   Allergen Reactions    Simvastatin Muscle Pain (Myalgia)     Muscle pain    Victoza [Liraglutide] Other (See Comments)     Binds bowels    Penicillins Unknown     Childhood rxn              ROS:     A focused ROS is negative other than the symptoms noted above in the HPI.           Physical Examination:     VITAL SIGNS: LMP  (LMP Unknown)   BMI: Estimated body mass index is 26.67 kg/m  as calculated from the following:    Height as of 7/11/24: 1.575 m (5' 2\").    Weight as of 7/17/24: 66.1 kg (145 lb 12.8 oz).  Gait: Deferred  Strength:   Hip adduction Hip Flexion Knee Extension A. Plantarflex A. Dorsiflex   R 5 5 5 5 5   L 5 5 NA NA NA   Sensation:  Sensation to light touch intact in the BLE including the right foot and leg  Residual left leg: Wide shape with steri strips still on, but the steri strips are mostly dry.  No overt tenderness to palpation. Full range of motion.  Right leg: No deformity or tenderness.  No ingrown toenails.  No areas of erythema.         Labs:     Lipids  Recent Labs   Lab Test 04/21/22  1509   CHOL 237*   *   HDL 68   TRIG 120     HgbA1c  Recent Labs   Lab Test 07/17/24  1158 06/05/24  0548 02/15/24  0612 09/05/23  1157   A1C 6.9* 6.7* 8.7* 8.3*            Assessment/Plan:     Ashanti Aviles is a 68 year old female with a relevant PMH of PAD and DM2 who sustained a left " transfemoral amputation on 7/17/24 for nonhealing diabetic wounds.    #Left TFA  -------------  Medical Necessity for Prosthesis    Ashanti Aviles is a 68 year old female with no cardiopulmonary, MSK or neurologic diagnosis that would limit her from successful ambulation with a lower extremity prosthesis.   She sustained a left transfemoral amputation on 7/17/24 due to failure of a bypass graft to resolve her left limb ischemia and persistent diabetic wound.  Her post operative  course has been uneventful, and her incision is healed.   Her prognosis for prosthesis use is excellent.    She is currently utilizing a wheelchair and walker for ambulation  She is able to walk household distances with the walker for prolonged periods, several hours without limitation by her cardiopulmonary fitness.  She is able to perform all ADLs including transfers by herself.  She uses the wheelchair for greater community mobility.    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  She avoided stairs due to pain in the left lower extremity from her limb ischemia and nonhealing wound.  Her expected functional potential: K3    She has expressed a desire to ambulate with a prosthesis, is motivated to go through the fitting process, and will participate in prosthesis training.  The residual limb is in good condition and the incision is well healed with no open wound, drainage, erythema, swelling or odor.   I am recommending a new lower extremity prosthesis be fabricated.   -------------  - ordered a K3 left lower extremity prosthesis  - Ordered physical therapy for gait training timed with prosthesis test socket availability  - wrote a workability letter clearing her to return to work    #DM2  Last A1c was 6.9%.  She needs more strict control of her diabetes.  - consider nutrition referral on follow  up    #HLD  Last cholesterol levels were in 2022.  - repeat lipid panel to better inform pending nutrition referral    #Neuropathic pain  #Musculoskeletal pain  - gabapentin 300mg BID  - methocarbamol at night    #Follow up  - 3 months    Benedict Wetzel MD  Physical Medicine & Rehabilitation    I spent a total of 59 minutes on the date of service doing chart review, history and exam, documentation, and further activities as noted above.

## 2024-08-22 NOTE — LETTER
[facility] PHYSICAL MEDICINE AND REHABILITATION CLINIC [place] [id number], [address]  [place] [address]  Phone: [phone]  Fax: [phone]      REPORT OF WORK ABILITY    NOTE TO EMPLOYEE: You must promptly provide a copy of this report to your  employer or worker's compensation insurer, and Qualified Rehabilitation Consultant.    Date: 8/22/2024                     Employee Name: Ashanti Aviles         YOB: 1955  Medical Record Number: [id number]   Soc.Sec.No: [id number]  Employer: None                Date of Injury: 7/17/24    Diagnosis: Left transfemoral amputation  Work Related: no      Permanent Partial Disability(PPD) likely: YES    EMPLOYEE IS ABLE TO WORK:   She is currently working on a part time basis.  She would be able to return to work full time under the following conditions:  - She requires assistance with car transfers for transportation.  Adequate time is needed to account for additional car transfer needs. She would either need assistance with transfers through a rideshare service or metro mobility to arrive to work.  If these accommodations can be met, she can return to work on time.      TREATMENT PLAN/NOTES:   She requires a wheelchair accessible work environment.  After her prosthesis is made available, she will require up to 3-5 hours per week for clinical visits for prosthesis fitting and physical therapy for the next 6 months.        Benedict Wetzel MD  Amputee Physician  [place] [facility]

## 2024-09-17 ENCOUNTER — MYC MEDICAL ADVICE (OUTPATIENT)
Dept: PHYSICAL MEDICINE AND REHAB | Facility: CLINIC | Age: 69
End: 2024-09-17

## 2024-09-23 ENCOUNTER — DOCUMENTATION ONLY (OUTPATIENT)
Dept: PHYSICAL MEDICINE AND REHAB | Facility: CLINIC | Age: 69
End: 2024-09-23
Payer: COMMERCIAL

## 2024-09-23 DIAGNOSIS — Z89.612 ACQUIRED ABSENCE OF LEFT LEG ABOVE KNEE (H): Primary | ICD-10-CM

## 2024-09-27 NOTE — TELEPHONE ENCOUNTER
[facility] Call Center    Phone Message    May a detailed message be left on voicemail: yes     Reason for Call: Other: Pt is calling and stating that she sent over some paperwork and was wanting to know if  has received it and has filled out the paperwork.      Action Taken: Message routed to:  Other: Uziel LIRIANO    Travel Screening: Not Applicable     Date of Service:

## 2024-10-08 ENCOUNTER — DOCUMENTATION ONLY (OUTPATIENT)
Dept: PHYSICAL MEDICINE AND REHAB | Facility: CLINIC | Age: 69
End: 2024-10-08
Payer: COMMERCIAL

## 2024-10-08 NOTE — PROGRESS NOTES
Forms completed by Dr Brice, in the absence of Dr Wetzel,  pt is notified. Form is faxed to the company as well to for scanning.

## 2024-10-09 ENCOUNTER — THERAPY VISIT (OUTPATIENT)
Dept: PHYSICAL THERAPY | Facility: CLINIC | Age: 69
End: 2024-10-09
Attending: STUDENT IN AN ORGANIZED HEALTH CARE EDUCATION/TRAINING PROGRAM
Payer: COMMERCIAL

## 2024-10-09 DIAGNOSIS — Z79.4 TYPE 2 DIABETES MELLITUS WITH DIABETIC PERIPHERAL ANGIOPATHY AND GANGRENE, WITH LONG-TERM CURRENT USE OF INSULIN (H): Primary | ICD-10-CM

## 2024-10-09 DIAGNOSIS — E11.52 TYPE 2 DIABETES MELLITUS WITH DIABETIC PERIPHERAL ANGIOPATHY AND GANGRENE, WITH LONG-TERM CURRENT USE OF INSULIN (H): Primary | ICD-10-CM

## 2024-10-09 DIAGNOSIS — Z89.612 ACQUIRED ABSENCE OF LEFT LOWER EXTREMITY ABOVE KNEE (H): ICD-10-CM

## 2024-10-09 PROCEDURE — 97162 PT EVAL MOD COMPLEX 30 MIN: CPT | Mod: GP | Performed by: PHYSICAL THERAPIST

## 2024-10-09 PROCEDURE — 97530 THERAPEUTIC ACTIVITIES: CPT | Mod: GP | Performed by: PHYSICAL THERAPIST

## 2024-10-09 NOTE — PROGRESS NOTES
"PHYSICAL THERAPY EVALUATION  Type of Visit: Evaluation        Fall Risk Screen:  Fall screen completed by: PT  Have you fallen 2 or more times in the past year?: No  Have you fallen and had an injury in the past year?: Yes  Timed Up and Go score (seconds): 26  Is patient a fall risk?: No    Subjective       Presenting condition or subjective complaint: amputation  Date of onset:      Relevant medical history: Arthritis; COPD   Dates & types of surgery: amputation    Prior diagnostic imaging/testing results: Other     Prior therapy history for the same diagnosis, illness or injury: No      Prior Level of Function  Transfers: Independent  Ambulation: Assistive equipment  ADL: Assistive equipment, Assistive person  IADL: Driving, Meal preparation, Work    Living Environment  Social support: With a significant other or spouse   Type of home: House   Stairs to enter the home: Yes       Ramp: No   Stairs inside the home: Yes 10 Is there a railing: Yes     Help at home: Self Cares (home health aide/personal care attendant, family, etc)  Equipment owned: Walker with wheels; Standard wheelchair     Employment: Yes [employer]  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  [facility].  20 hours a week.  3 eight hour days.  Didn't work since before surgery.  Did a bypass x 3 for leg. Leg got infected.  Surgery 7/17/24 at [facility].  No pain now but a nunbness.   Hobbies/Interests:      Patient goals for therapy: walk, standing and return to part time work.  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  Main goal return to work.  Work til a year if able.      Pain assessment: Pain present  phantom pain - numbness rates \"7 to 10\".  Worse at night.  Gabapentin -takes 2x.per day.  Bottom of residual leg very sensitive.  At times. Feels good after walking no increased pain.      Objective      Cognitive Status Examination  Orientation: Oriented to person, place and time   Level of Consciousness: Alert  Follows Commands and " Answers Questions: 100% of the time  Personal Safety and Judgement: Intact  Memory: Intact    OBSERVATION: pleasant woman in manual w/c.  INTEGUMENTARY: Intact  POSTURE:  forw head.  PALPATION: na  RANGE OF MOTION: LE ROM WFL  UE ROM WFL  STRENGTH: LE Strength WFL  UE Strength WFL    BED MOBILITY: SBA    TRANSFERS: SBA    WHEELCHAIR MOBILITY: can wheel self in w/c 25 ft on level surfaces.     GAIT:   Level of [place]: Min Assist  Assistive Device(s):  // bars  Gait Deviations: Base of support increased  Stride length decreased  Adriana decreased  Wearing new trial prosthetic YESSENIA rivera (prosthetist)  Gait Distance: 25ft  Stairs: NT    BALANCE: Sitting Balance (dynamic):Normal  Standing Balance (static):  1 min. W/ occas UE support    Tug 26 sec w/ walker no prosthetic.     SENSATION: UE Sensation WNL    MUSCLE TONE: WFL    Height: Data Unavailable  Weight: 0 lbs 0 oz    AMPUTATION LEVEL:  Right Lower Limb: Transfemoral: Medium     Condition of Limb: Skin irritation, breakdown, infection; limb volume changes; swelling, weight fluctuation, muscle change, and healing issues.    Good healed incision    Condition of Current Prosthesis: If worn/broken, describe the component that needs to be evaluated. If patient's physical condition or functional needs have changed, describe why prosthesis no longer meets needs.    Getting new one now/ soon    Past Experience with Prosthesis: What has been tried in the past? Describe any problems experienced.    NA    Patient's Motivation to Use New Prosthesis and Motivation to Ambulate if Lower Extremity Prosthesis: excellent    TESTS AND MEASURES  Assistive device:  //bars  Prosthesis: Yes  first time trying today    Timed Up and Go (TUG): 26 sec no prosthetic    Functional Level: Functional Level 3: The patient has the ability or potential for ambulation with variable adriana. Typical of the community ambulatory who has the ability to traverse most environmental barriers and may  have vocational, therapeutic, or exercise activity that demands prosthetic utilization beyond simple locomotion.      PATIENT WOULD GREATLY BENEFIT FROM A MICROPROCESSOR FOR THE KNEE COMPONENT OF THE PROSTHETIC SO THAT SHE CAN WALK SAFELY IN HER HOME AND WORK ENVIRONMENTS, WHICH WILL REQUIRE TURNS, STOPS AND STEPPING FORWARD, BACK AND TO THE SIDE.   THIS WILL GIVE HER MORE SAFETY AND MOBILITY FOR HER GAIT.     Assessment & Plan   CLINICAL IMPRESSIONS  Medical Diagnosis: R AKA    Treatment Diagnosis: decreased mobility/ force production   Impression/Assessment: Patient is a 69 year old female with IMMOBILITY  complaints.  The following significant findings have been identified: Pain, Impaired gait, Impaired muscle performance, Decreased activity tolerance, and Instability. These impairments interfere with their ability to perform self care tasks, work tasks, recreational activities, household chores, household mobility, and community mobility as compared to previous level of function.     Clinical Decision Making (Complexity):  Clinical Presentation: Evolving/Changing  Clinical Presentation Rationale: based on medical and personal factors listed in PT evaluation  Clinical Decision Making (Complexity): Moderate complexity    PLAN OF CARE  Treatment Interventions:  Interventions: Gait Training, Neuromuscular Re-education, Therapeutic Activity, Therapeutic Exercise, Self-Care/Home Management, Prosthetic Fitting/Training    Long Term Goals     PT Goal 1  Goal Identifier: standing for adl's  Goal Description: pt to stand w/ prosthesis indep w / or w/out UE support for adl's and cooking x 5 min  Target Date: 01/06/25  PT Goal 2  Goal Identifier: gait  Goal Description: pt to walk 100ft w/ sba and approp AD with her new prosthsis  Target Date: 01/06/25  PT Goal 3  Goal Identifier: don doff prosthesis  Goal Description: pt to indep don /doff her aka prosthesis and check skin indep  Target Date: 01/06/25  PT Goal 4  Goal  Identifier: steps  Goal Description: pt to walk up /down 6 steps w/ rail and new prosthetic w/ sba.  Target Date: 01/06/25      Frequency of Treatment:    Duration of Treatment:      Recommended Referrals to Other Professionals:  prosthetics referral in place  Education Assessment:   Learner/Method: Patient;Family;Listening;Demonstration;No Barriers to Learning  Education Comments: see above in ther activity    Risks and benefits of evaluation/treatment have been explained.   Patient/Family/caregiver agrees with Plan of Care.     Evaluation Time:     PT Eval, Low Complexity Minutes (10354): 22     Signing Clinician: Salud Joseph PT

## 2024-10-23 NOTE — PATIENT INSTRUCTIONS
Nba Burrell,    Thank you for entrusting your care with us today. After your visit today with APOORVA Ramirez this is the plan that was discussed at your appointment.    Follow up as needed if you develop symptoms or wounds in your right leg.      I am including additional information on these things and our contact information if you have any questions or concerns.   Please do not hesitate to reach out to us if you felt we did not answer your questions or you are unsure of the treatment plan after your visit today. Our number is [phone].Thank you for trusting us with your care.         Again thank you for your time.

## 2024-10-23 NOTE — PROGRESS NOTES
[facility] Vascular Clinic        Patient is here for a 3 month follow up  to check Left AKA (Done in July). Still has some tenderness on medial side of stump when palpated. Prosthetic work is underway.    Pt is currently taking Aspirin, Plavix, and Zetia.    BP (!) 165/78   Temp 97  F (36.1  C)   Resp 16   LMP  (LMP Unknown)     The provider has been notified that the patient has no concerns.     Questions patient would like addressed today are: N/A.    Refills are needed: No    Has homecare services and agency name:  No    Left AKA 11/11

## 2024-10-24 NOTE — TELEPHONE ENCOUNTER
Return to work/workability form is completed and faxed to [phone] at [facility], and pt is notified.

## 2024-11-11 ENCOUNTER — OFFICE VISIT (OUTPATIENT)
Dept: VASCULAR SURGERY | Facility: CLINIC | Age: 69
End: 2024-11-11
Attending: PHYSICIAN ASSISTANT
Payer: COMMERCIAL

## 2024-11-11 VITALS — SYSTOLIC BLOOD PRESSURE: 165 MMHG | DIASTOLIC BLOOD PRESSURE: 78 MMHG | TEMPERATURE: 97 F | RESPIRATION RATE: 16 BRPM

## 2024-11-11 DIAGNOSIS — I73.9 PAD (PERIPHERAL ARTERY DISEASE) (H): Primary | ICD-10-CM

## 2024-11-11 PROCEDURE — 99213 OFFICE O/P EST LOW 20 MIN: CPT | Performed by: PHYSICIAN ASSISTANT

## 2024-11-11 ASSESSMENT — PAIN SCALES - GENERAL: PAINLEVEL_OUTOF10: SEVERE PAIN (6)

## 2024-11-11 NOTE — PROGRESS NOTES
VASCULAR SURGERY PROGRESS NOTE    LOCATION:  [facility]     Ashanti Aviles  Medical Record #: [id number]  YOB: 1955  Age: 69 year old     Date of Service: 11/11/2024    PRIMARY CARE PROVIDER: No primary care provider on file.    Reason for visit: Residual limb check     IMPRESSION: 69-year-old female with chronic limb threatening ischemia of the left lower extremity status post multiple revascularization attempts with redo bypass and ultimately a left above-knee amputation. She presents today for residual limb check.  AKA site is completely healed and patient has been working with the prosthetics team.  She should have her prosthesis ready to take home in the near future.    RECOMMENDATION/RISKS: Okay to move forward with a prosthetic at this time. Will plan to follow-up on an as-needed basis if she develops any issues with her residual limb or right lower extremity symptoms.    HPI:  Ashanti Aviles is a 69 year old female with past medical history significant for hypertension, hyperlipidemia, type 2 diabetes mellitus, and peripheral arterial disease with chronic limb threatening ischemia of the left lower extremity.  Patient underwent multiple bypass attempts and ultimately required an above-knee amputation of the left lower extremity.  She presents today for wound check.    Today, Mrs. Aviles presents for follow-up and is accompanied by a significant other.  She is doing well and has a small area to her residual limb that causes discomfort when palpated. Otherwise pain is controlled.  She has been working with the prosthetics team and is hopeful to bring home her prosthesis soon.  She has been undergoing physical therapy and looks forward to continuing this with her prosthesis.  Denies any right lower extremity symptoms at this time.  No other concerns today.    REVIEW OF SYSTEMS:    A 12 point ROS was reviewed and is negative except for what is listed above in HPI.    PHH:     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    Benign gastric polyp     Chronic toe ulcer (H)     Chronic ulcer of great toe of left foot with necrosis of muscle (H)     Diabetes mellitus (H)     Gastroesophageal reflux disease     Hard to intubate 02/15/2024    History of blood transfusion     History of colonic polyps     HLD (hyperlipidemia)     Hypertension     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/29/2013    Nicotine addiction     OAG (open angle glaucoma)     PVD (peripheral vascular disease) (H)     Reflux esophagitis     Retinopathy     Toe ulcer (H)     Tubular adenoma     Yeast vaginitis      Past Surgical History:   Procedure Laterality Date    AMPUTATE LEG ABOVE KNEE Left 7/17/2024    Procedure: AMPUTATION, ABOVE LEFT KNEE;  Surgeon: Dyan Geller MD;  Location: [facility] OR    AMPUTATE TOE(S) Right 02/18/2023    Procedure: Amputation fourth toe right foot;  Surgeon: Benjamin Diez DPM;  Location: [facility] OR    ANGIOGRAM Left 2/15/2024    Procedure: COMPLETION OF ANGIOGRAM, LEFT COMMON ILIAC ARTERY STENT PLACEMENT;  Surgeon: Dyan Geller MD;  Location: [facility] OR    ANUS SURGERY      BIOPSY CERVICAL, LOCAL EXCISION, SINGLE/MULTIPLE      COLONOSCOPY N/A 09/11/2020    Procedure: EXAM UNDER ANESTHESIA, HIGH RESOLUTION ANOSCOPY INTRA OP;  Surgeon: Preeti Sheehan MD;  Location: [facility] OR;  Service: Gastroenterology    COLONOSCOPY N/A 12/14/2020    Procedure: EXAM UNDER ANESTHESIA WITH HIGH RESOLUTION ANOSCOPY;  Surgeon: Preeti Sheehan MD;  Location: [facility] OR;  Service: General    COLONOSCOPY N/A 06/15/2021    Procedure: EXAM UNDER ANESTHESIA WITH HIGH RESOLUTION ANOSCOPY, BIOPSY, FULGURATION;  Surgeon: Preeti Sheehan MD;  Location: [facility] OR;  Service: Gastroenterology    COLONOSCOPY N/A 4/15/2024    Procedure: COLONOSCOPY, WITH  POLYPECTOMY;  Surgeon: Shen Andres MD;  Location: [facility] OR    ENDARTERECTOMY FEMORAL Left 05/17/2023    Procedure: LEFT FEMORAL ENDARTERECTOMY WITH RETROGRADE ILIAC STENT;  Surgeon: Dyan Geller MD;  Location: [facility] OR    EXAM UNDER ANESTHESIA ANUS N/A 09/14/2021    Procedure: EXAM UNDER ANESTHESIA WITH HIGH RESOLUTION ANOSCOPY;  Surgeon: Preeti Sheehan MD;  Location: [facility] OR    FEMORAL ARTERY - TIBIAL ARTERY BYPASS GRAFT Left 09/08/2023    Procedure: CREATION, BYPASS, ARTERIAL, FEMORAL TO TIBIAL, LEFT;  Surgeon: Dyan Geller MD;  Location: [facility] OR    FEMORAL ARTERY - TIBIAL ARTERY BYPASS GRAFT Left 2/15/2024    Procedure: CREATION, BYPASS, ARTERIAL, FEMORAL TO TIBIAL,;  Surgeon: Dyan Geller MD;  Location: [facility] OR    FEMORAL ARTERY - TIBIAL ARTERY BYPASS GRAFT Left 6/4/2024    Procedure: LEFT EXTERNAL ILIAC ARTERY TO POSTERIOR TIBIAL ARTERY BYPASS WITH CRYO ARTERY WITH A FEMORAL HERNIA REPAIR;  Surgeon: Dyan Geller MD;  Location: [facility] OR    INCISION AND DRAINAGE FOOT, COMBINED Left 11/27/2023    Procedure: INCISION AND DRAINAGE, LEFT HALLUX;  Surgeon: Rene Ordaz DPM;  Location: [facility] OR    INCISION AND DRAINAGE FOOT, COMBINED Left 6/7/2024    Procedure: AMPUTATION LEFT HALLUX;  Surgeon: Rene Ordaz DPM;  Location: [facility] OR    IR LOWER EXTREMITY ANGIOGRAM LEFT  03/23/2023    IR LOWER EXTREMITY ANGIOGRAM LEFT  7/8/2024    IR LOWER EXTREMITY ANGIOGRAM RIGHT  02/16/2023    IR THROMBOLYSIS ARTERIAL INFUSION INITIAL DAY  10/09/2023    LEEP TX, CERVICAL      2005    TUBAL LIGATION       ALLERGIES:  Simvastatin, Victoza [liraglutide], and Penicillins    MEDS:    Current Outpatient Medications:     acetaminophen (ACETAMINOPHEN 8 HOUR) 650 MG CR tablet, Take 1,300 mg by mouth every 8 hours as needed for mild pain or fever, Disp: , Rfl:     amLODIPine (NORVASC) 5 MG  tablet, Take 5 mg by mouth daily, Disp: , Rfl:     aspirin 81 MG EC tablet, Take 1 tablet (81 mg) by mouth daily, Disp: 90 tablet, Rfl: 3    clopidogrel (PLAVIX) 75 MG tablet, Take 75 mg by mouth daily, Disp: , Rfl:     Continuous Blood Gluc Sensor (FREESTYLE PRINCESS 14 DAY SENSOR) MISC, , Disp: , Rfl:     empagliflozin (JARDIANCE) 25 MG TABS tablet, Take 1 tablet (25 mg) by mouth daily, Disp: 90 tablet, Rfl: 1    ezetimibe (ZETIA) 10 MG tablet, Take 10 mg by mouth daily, Disp: , Rfl:     ferrous sulfate (FEROSUL) 325 (65 Fe) MG tablet, Take 1 tablet (325 mg) by mouth daily, Disp: 30 tablet, Rfl: 1    gabapentin (NEURONTIN) 300 MG capsule, Take 1 capsule (300 mg) by mouth 2 times daily., Disp: 60 capsule, Rfl: 2    insulin degludec (TRESIBA) 100 UNIT/ML pen, Inject 30 Units Subcutaneous 2 times daily, Disp: , Rfl:     linagliptin (TRADJENTA) 5 MG TABS tablet, Take 5 mg by mouth daily, Disp: , Rfl:     lisinopril (ZESTRIL) 40 MG tablet, Take 40 mg by mouth daily, Disp: , Rfl:     metFORMIN (GLUCOPHAGE XR) 500 MG 24 hr tablet, Take 1,000 mg by mouth daily, Disp: , Rfl:     methocarbamol (ROBAXIN) 500 MG tablet, Take 1 tablet (500 mg) by mouth at bedtime., Disp: 30 tablet, Rfl: 2    methocarbamol (ROBAXIN) 500 MG tablet, Take 1 tablet (500 mg) by mouth 3 times daily as needed for muscle spasms, Disp: 15 tablet, Rfl: 0    omeprazole (PRILOSEC) 20 MG DR capsule, Take 20 mg by mouth daily as needed (heartburn), Disp: , Rfl:     oxyCODONE (ROXICODONE) 10 MG tablet, Take 1 tablet (10 mg) by mouth every 3 hours as needed for severe pain, Disp: 12 tablet, Rfl: 0    polyethylene glycol (MIRALAX) 17 GM/Dose powder, Take 17 g by mouth daily, Disp: 510 g, Rfl: 0    UNIFINE PENTIPS 31G X 5 MM miscellaneous, , Disp: , Rfl:     SOCIAL HABITS:    History   Smoking Status    Former    Types: Cigarettes   Smokeless Tobacco    Never     Social History    Substance and Sexual Activity      Alcohol use: Not Currently        Comment:  Alcoholic Drinks/MONTH: 2x      History   Drug Use Unknown     FAMILY HISTORY:    Family History   Problem Relation Age of Onset    Hypertension Mother     Colon Cancer Mother     Diabetes Type 2  Father     Hypertension Father     Kidney failure Father     Hyperthyroidism Sister     Breast Cancer Paternal Aunt      PE:  BP (!) 165/78   Temp 97  F (36.1  C)   Resp 16   LMP  (LMP Unknown)   Wt Readings from Last 1 Encounters:   07/17/24 66.1 kg (145 lb 12.8 oz)     There is no height or weight on file to calculate BMI.    EXAM:  GENERAL: well-developed 69 year old female who appears her stated age  CARDIAC: normal   CHEST/LUNG: normal respiratory effort   NEUROLOGIC: focally intact, alert and oriented x 3  PSYCH: appropriate affect  VASCULAR: left AKA well healed    DIAGNOSTIC STUDIES:     Images:  Pertinent imaging reviewed    LABS:      Sodium   Date Value Ref Range Status   07/20/2024 137 135 - 145 mmol/L Final   07/19/2024 132 (L) 135 - 145 mmol/L Final   07/18/2024 135 135 - 145 mmol/L Final     Urea Nitrogen   Date Value Ref Range Status   07/20/2024 7.1 (L) 8.0 - 23.0 mg/dL Final   07/19/2024 6.7 (L) 8.0 - 23.0 mg/dL Final   07/18/2024 7.3 (L) 8.0 - 23.0 mg/dL Final     Hemoglobin   Date Value Ref Range Status   07/22/2024 7.8 (L) 11.7 - 15.7 g/dL Final   07/21/2024 7.4 (L) 11.7 - 15.7 g/dL Final   07/20/2024 7.5 (L) 11.7 - 15.7 g/dL Final     Platelet Count   Date Value Ref Range Status   07/21/2024 417 150 - 450 10e3/uL Final   07/20/2024 371 150 - 450 10e3/uL Final   07/19/2024 329 150 - 450 10e3/uL Final     INR   Date Value Ref Range Status   07/18/2024 1.08 0.85 - 1.15 Final   07/08/2024 0.91 0.85 - 1.15 Final   07/05/2024 2.0 (H) 0.9 - 1.1 Final   07/01/2024 2.9 (H) 0.9 - 1.1 Final   06/28/2024 2.0 (H) 0.9 - 1.1 Final   06/11/2024 1.40 (H) 0.85 - 1.15 Final     25 minutes spent on the day of encounter doing chart review, history and exam, documentation, and further activities as noted.     Tammy PINO  ERIC Ramirez  VASCULAR SURGERY

## 2024-11-11 NOTE — NURSING NOTE
Current patient location: [address] [place] [address]    Is the patient currently in the state of [place]? YES    Visit mode:TELEPHONE    If the visit is dropped, the patient can be reconnected by: TELEPHONE VISIT: Phone number:   Telephone Information:   Mobile [phone]       Will anyone else be joining the visit? NO  (If patient encounters technical issues they should call [phone]    How would you like to obtain your AVS? MyChart    Are changes needed to the allergy or medication list? No    Patient denies any changes and states that all information remains accurate since last reviewed/verified.     Are refills needed on medications prescribed by this physician? NO    Reason for visit: RECHNANDA TriplettF       Parent Patient/Parent

## 2024-11-14 ENCOUNTER — THERAPY VISIT (OUTPATIENT)
Dept: PHYSICAL THERAPY | Facility: CLINIC | Age: 69
End: 2024-11-14
Attending: STUDENT IN AN ORGANIZED HEALTH CARE EDUCATION/TRAINING PROGRAM
Payer: COMMERCIAL

## 2024-11-14 DIAGNOSIS — Z89.612 ACQUIRED ABSENCE OF LEFT LOWER EXTREMITY ABOVE KNEE (H): Primary | ICD-10-CM

## 2024-11-14 PROCEDURE — 97110 THERAPEUTIC EXERCISES: CPT | Mod: GP | Performed by: PHYSICAL THERAPIST

## 2024-11-14 PROCEDURE — 97530 THERAPEUTIC ACTIVITIES: CPT | Mod: GP | Performed by: PHYSICAL THERAPIST

## 2024-11-14 NOTE — PATIENT INSTRUCTIONS
Wear your compression for your left leg all day long. You can take a break at night.     Lay on your belly 5 mins, 2x per day.

## 2024-11-15 ENCOUNTER — MYC MEDICAL ADVICE (OUTPATIENT)
Dept: SURGERY | Facility: CLINIC | Age: 69
End: 2024-11-15
Payer: COMMERCIAL

## 2024-11-19 ENCOUNTER — THERAPY VISIT (OUTPATIENT)
Dept: PHYSICAL THERAPY | Facility: CLINIC | Age: 69
End: 2024-11-19
Attending: STUDENT IN AN ORGANIZED HEALTH CARE EDUCATION/TRAINING PROGRAM
Payer: COMMERCIAL

## 2024-11-19 DIAGNOSIS — Z89.612 ACQUIRED ABSENCE OF LEFT LOWER EXTREMITY ABOVE KNEE (H): Primary | ICD-10-CM

## 2024-11-19 PROCEDURE — 97110 THERAPEUTIC EXERCISES: CPT | Mod: GP | Performed by: PHYSICAL THERAPIST

## 2024-11-19 PROCEDURE — 97116 GAIT TRAINING THERAPY: CPT | Mod: GP | Performed by: PHYSICAL THERAPIST

## 2024-11-20 ENCOUNTER — TELEPHONE (OUTPATIENT)
Dept: PHYSICAL MEDICINE AND REHAB | Facility: CLINIC | Age: 69
End: 2024-11-20
Payer: COMMERCIAL

## 2024-11-21 ENCOUNTER — OFFICE VISIT (OUTPATIENT)
Dept: PHYSICAL MEDICINE AND REHAB | Facility: CLINIC | Age: 69
End: 2024-11-21
Payer: COMMERCIAL

## 2024-11-21 DIAGNOSIS — Z89.612 ACQUIRED ABSENCE OF LEFT LOWER EXTREMITY ABOVE KNEE (H): Primary | ICD-10-CM

## 2024-11-21 NOTE — LETTER
2024       RE: Ashanti Aviles  [address] [place] [address]     Dear Colleague,    Thank you for referring your patient, Ashanti Aviles, to the [facility] PHYSICAL MEDICINE AND REHABILITATION CLINIC [place] at [facility]. Please see a copy of my visit note below.         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  I ordered K3 left lower extremity prosthesis and physical therapy for gait training.  I ordered gabapentin and methocarbamol with plans to simplify on this visit.  She has not smoked since 2024.  She was seen by vascular surgery showing complete healing of the amputation.  She is continuing physical therapy.    Interval History:   She is undergoing fitting for a K3 prosthesis, though her prior authorization was again denied.  Her left leg still has pain and soreness that is mildly restricting on function and has been stable.    For the last week, she's been able to take home the hydraulic knee prosthesis.  She wears it for 4 hours a day in 2 hour intervals.  She feels like she'll be able to tolerate longer sessions.  She can walk 20 steps at a time and 35 total a day, which is the limit of her endurance.  She is currently transferring and walking with a 2WW.  She is modified independent with all ADLs without the prosthesis.  She will attempt this with the leg.  She can drive without the leg.         Medications:     Current Outpatient Medications   Medication Sig Dispense Refill     acetaminophen (ACETAMINOPHEN 8 HOUR) 650 MG CR tablet Take 1,300 mg by mouth every 8 hours as needed for mild pain or fever        "amLODIPine (NORVASC) 5 MG tablet Take 5 mg by mouth daily       aspirin 81 MG EC tablet Take 1 tablet (81 mg) by mouth daily 90 tablet 3     clopidogrel (PLAVIX) 75 MG tablet Take 75 mg by mouth daily       Continuous Blood Gluc Sensor (FREESTYLE PRINCESS 14 DAY SENSOR) MISC        empagliflozin (JARDIANCE) 25 MG TABS tablet Take 1 tablet (25 mg) by mouth daily 90 tablet 1     ezetimibe (ZETIA) 10 MG tablet Take 10 mg by mouth daily       ferrous sulfate (FEROSUL) 325 (65 Fe) MG tablet Take 1 tablet (325 mg) by mouth daily 30 tablet 1     gabapentin (NEURONTIN) 300 MG capsule Take 1 capsule (300 mg) by mouth 2 times daily. 60 capsule 2     insulin degludec (TRESIBA) 100 UNIT/ML pen Inject 30 Units Subcutaneous 2 times daily       linagliptin (TRADJENTA) 5 MG TABS tablet Take 5 mg by mouth daily       lisinopril (ZESTRIL) 40 MG tablet Take 40 mg by mouth daily       metFORMIN (GLUCOPHAGE XR) 500 MG 24 hr tablet Take 1,000 mg by mouth daily       methocarbamol (ROBAXIN) 500 MG tablet Take 1 tablet (500 mg) by mouth 3 times daily as needed for muscle spasms 15 tablet 0     omeprazole (PRILOSEC) 20 MG DR capsule Take 20 mg by mouth daily as needed (heartburn)       oxyCODONE (ROXICODONE) 10 MG tablet Take 1 tablet (10 mg) by mouth every 3 hours as needed for severe pain 12 tablet 0     polyethylene glycol (MIRALAX) 17 GM/Dose powder Take 17 g by mouth daily 510 g 0     UNIFINE PENTIPS 31G X 5 MM miscellaneous               Allergies:     Allergies   Allergen Reactions     Simvastatin Muscle Pain (Myalgia)     Muscle pain     Victoza [Liraglutide] Other (See Comments)     Binds bowels     Penicillins Unknown     Childhood rxn            ROS:     A focused ROS is negative other than the symptoms noted above in the HPI.         Physical Examination:     VITAL SIGNS: LMP  (LMP Unknown)   BMI: Estimated body mass index is 26.67 kg/m  as calculated from the following:    Height as of 7/11/24: 1.575 m (5' 2\").    Weight as of " 7/17/24: 66.1 kg (145 lb 12.8 oz).  Gait: With 2WW.  Her left leg didn't settle fully in the prosthesis, elevating her left hip during gait. This was exacerbated by her right and left shoe mismatch.  Stance phase decreased on left.  Narrow non-antalgic gait.  Very slow adriana.  Minimal knee flexion moment of prosthesis when walking.  Strength:   Hip adduction Hip Flexion Knee Extension A. Plantarflex A. Dorsiflex   R 5 5 5 5 5   L 5 5 NA NA NA   Sensation:   Sensation to light touch intact in the BLE  Residual left leg: Some tenderness at the medial healed suture edge.  Otherwise stable conical shape.  Skin is intact with no areas of erythema.  Right lower extremity: No concerning skin changes or notable deformities.  Prosthesis: K3 temporary prosthesis.  Liner has a lanyard strap and is well fitting.  The knee is hydraulic.           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  She is at risk of falls towards the end of standing periods otherwise, which could cause significant injury to herself and be a risk for co-workers and patients.  This would be alleviated to the point of being productively able to work without being a significant liability should she receive and be fully trained in the use of a microprocessor knee.  Regarding a trial of using a K2 knee, the issues with using a K2 for the objective of stable standing and working in a clinical setting are as follows:  She  will be required to navigate narrow spaces in clinical settings.  Though a K2 knee could be as or more stable for static standing as a microprocessor knee, the highest risk for falls would be sudden, variable, or complex movement or maneuvering that would be guaranteed in her work.  [place] examples are having to carry large bins of equipment needing to be transferred between elevated booms and entering doors both forwards and backwards while carrying these items.  Stumble recovery and stabilization of a microprocessor knee would provide her with a safety advantage during these types of activities, reducing the risk of a fall and injury significantly.  In my medical opinion, there is a real concern that simulation of these activities and training of endurance activities in a therapy environment with a K2 knee may not adequately screen for the real world risks of these particular job related tasks.  She could succeed in the simulated use of a K2 knee and sustain an injury during complex job required tasks that we cannot reliably screen for in training.  This is why I advocate for giving her a buffer of safety to account for and prevent these accidents, which could be catastrophic to both her health and her workplace environment.  - I am continuing to recommend a left K3 transfemoral prosthesis with a microprocessor knee  - This assessment should serve as an addendum to medical necessity  - The prosthetist will discuss options with the practice group and follow up with a recommended plan    #Follow up  - 3 months    Benedict Wetzel MD  Physical Medicine & Rehabilitation    I spent a total of 60 minutes on the date of service doing chart review, history and exam, documentation, and further activities as noted above.       Again, thank you for allowing me to participate in the care of your patient.      Sincerely,    Benedict Wetzel MD

## 2024-11-21 NOTE — PATIENT INSTRUCTIONS
-I believe you are still appropriate for a K3 prosthesis.  Fitting is going well.  Continue to be active and exercise as well as participate in therapy.    Ask your work for specifics on the limits of when you'll be able to return.  If prosthesis enabled tasks are required sooner than we can navigate the insurance situation, it might be in your best interests to be issued a prosthesis that you're currently approved for.    You can return to work doing tasks that do not require the prosthesis.

## 2024-11-21 NOTE — PROGRESS NOTES
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  I ordered K3 left lower extremity prosthesis and physical therapy for gait training.  I ordered gabapentin and methocarbamol with plans to simplify on this visit.  She has not smoked since 2024.  She was seen by vascular surgery showing complete healing of the amputation.  She is continuing physical therapy.    Interval History:   She is undergoing fitting for a K3 prosthesis, though her prior authorization was again denied.  Her left leg still has pain and soreness that is mildly restricting on function and has been stable.    For the last week, she's been able to take home the hydraulic knee prosthesis.  She wears it for 4 hours a day in 2 hour intervals.  She feels like she'll be able to tolerate longer sessions.  She can walk 20 steps at a time and 35 total a day, which is the limit of her endurance.  She is currently transferring and walking with a 2WW.  She is modified independent with all ADLs without the prosthesis.  She will attempt this with the leg.  She can drive without the leg.         Medications:     Current Outpatient Medications   Medication Sig Dispense Refill    acetaminophen (ACETAMINOPHEN 8 HOUR) 650 MG CR tablet Take 1,300 mg by mouth every 8 hours as needed for mild pain or fever      amLODIPine (NORVASC) 5 MG tablet Take 5 mg by mouth daily      aspirin 81 MG EC tablet Take 1 tablet (81 mg) by mouth daily 90 tablet 3    clopidogrel (PLAVIX) 75 MG tablet Take 75 mg by mouth daily      Continuous Blood Gluc Sensor (FREESTYLE PRINCESS 14 DAY SENSOR) MISC       empagliflozin (JARDIANCE) 25 MG TABS tablet Take 1 tablet (25 mg)  "by mouth daily 90 tablet 1    ezetimibe (ZETIA) 10 MG tablet Take 10 mg by mouth daily      ferrous sulfate (FEROSUL) 325 (65 Fe) MG tablet Take 1 tablet (325 mg) by mouth daily 30 tablet 1    gabapentin (NEURONTIN) 300 MG capsule Take 1 capsule (300 mg) by mouth 2 times daily. 60 capsule 2    insulin degludec (TRESIBA) 100 UNIT/ML pen Inject 30 Units Subcutaneous 2 times daily      linagliptin (TRADJENTA) 5 MG TABS tablet Take 5 mg by mouth daily      lisinopril (ZESTRIL) 40 MG tablet Take 40 mg by mouth daily      metFORMIN (GLUCOPHAGE XR) 500 MG 24 hr tablet Take 1,000 mg by mouth daily      methocarbamol (ROBAXIN) 500 MG tablet Take 1 tablet (500 mg) by mouth 3 times daily as needed for muscle spasms 15 tablet 0    omeprazole (PRILOSEC) 20 MG DR capsule Take 20 mg by mouth daily as needed (heartburn)      oxyCODONE (ROXICODONE) 10 MG tablet Take 1 tablet (10 mg) by mouth every 3 hours as needed for severe pain 12 tablet 0    polyethylene glycol (MIRALAX) 17 GM/Dose powder Take 17 g by mouth daily 510 g 0    UNIFINE PENTIPS 31G X 5 MM miscellaneous               Allergies:     Allergies   Allergen Reactions    Simvastatin Muscle Pain (Myalgia)     Muscle pain    Victoza [Liraglutide] Other (See Comments)     Binds bowels    Penicillins Unknown     Childhood rxn            ROS:     A focused ROS is negative other than the symptoms noted above in the HPI.         Physical Examination:     VITAL SIGNS: LMP  (LMP Unknown)   BMI: Estimated body mass index is 26.67 kg/m  as calculated from the following:    Height as of 7/11/24: 1.575 m (5' 2\").    Weight as of 7/17/24: 66.1 kg (145 lb 12.8 oz).  Gait: With 2WW.  Her left leg didn't settle fully in the prosthesis, elevating her left hip during gait. This was exacerbated by her right and left shoe mismatch.  Stance phase decreased on left.  Narrow non-antalgic gait.  Very slow adriana.  Minimal knee flexion moment of prosthesis when walking.  Strength:   Hip adduction " Hip Flexion Knee Extension A. Plantarflex A. Dorsiflex   R 5 5 5 5 5   L 5 5 NA NA NA   Sensation:   Sensation to light touch intact in the BLE  Residual left leg: Some tenderness at the medial healed suture edge.  Otherwise stable conical shape.  Skin is intact with no areas of erythema.  Right lower extremity: No concerning skin changes or notable deformities.  Prosthesis: K3 temporary prosthesis.  Liner has a lanyard strap and is well fitting.  The knee is hydraulic.           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  She is at risk of falls towards the end of standing periods otherwise, which could cause significant injury to herself and be a risk for co-workers and patients.  This would be alleviated to the point of being productively able to work without being a significant liability should she receive and be fully trained in the use of a microprocessor knee.  Regarding a trial of using a K2 knee, the issues with using a K2 for the objective of stable standing and working in a clinical setting are as follows:  She will be required to navigate narrow spaces in clinical settings.  Though a K2 knee could be as or more stable for static standing as a microprocessor knee, the highest risk for falls would be sudden, variable, or complex movement or maneuvering that would be guaranteed in her work.  [place] examples are having to carry large bins of equipment needing to be  transferred between elevated booms and entering doors both forwards and backwards while carrying these items.  Stumble recovery and stabilization of a microprocessor knee would provide her with a safety advantage during these types of activities, reducing the risk of a fall and injury significantly.  In my medical opinion, there is a real concern that simulation of these activities and training of endurance activities in a therapy environment with a K2 knee may not adequately screen for the real world risks of these particular job related tasks.  She could succeed in the simulated use of a K2 knee and sustain an injury during complex job required tasks that we cannot reliably screen for in training.  This is why I advocate for giving her a buffer of safety to account for and prevent these accidents, which could be catastrophic to both her health and her workplace environment.  - I am continuing to recommend a left K3 transfemoral prosthesis with a microprocessor knee  - This assessment should serve as an addendum to medical necessity  - The prosthetist will discuss options with the practice group and follow up with a recommended plan    #Follow up  - 3 months    Benedict Wetzel MD  Physical Medicine & Rehabilitation    I spent a total of 60 minutes on the date of service doing chart review, history and exam, documentation, and further activities as noted above.

## 2024-12-02 ENCOUNTER — THERAPY VISIT (OUTPATIENT)
Dept: PHYSICAL THERAPY | Facility: CLINIC | Age: 69
End: 2024-12-02
Attending: STUDENT IN AN ORGANIZED HEALTH CARE EDUCATION/TRAINING PROGRAM
Payer: COMMERCIAL

## 2024-12-02 DIAGNOSIS — Z89.612 ACQUIRED ABSENCE OF LEFT LOWER EXTREMITY ABOVE KNEE (H): Primary | ICD-10-CM

## 2024-12-02 PROCEDURE — 97110 THERAPEUTIC EXERCISES: CPT | Mod: GP | Performed by: PHYSICAL THERAPIST

## 2024-12-02 PROCEDURE — 97116 GAIT TRAINING THERAPY: CPT | Mod: GP | Performed by: PHYSICAL THERAPIST

## 2024-12-08 ENCOUNTER — HEALTH MAINTENANCE LETTER (OUTPATIENT)
Age: 69
End: 2024-12-08

## 2024-12-09 ENCOUNTER — THERAPY VISIT (OUTPATIENT)
Dept: PHYSICAL THERAPY | Facility: CLINIC | Age: 69
End: 2024-12-09
Attending: STUDENT IN AN ORGANIZED HEALTH CARE EDUCATION/TRAINING PROGRAM
Payer: COMMERCIAL

## 2024-12-09 DIAGNOSIS — Z89.612 ACQUIRED ABSENCE OF LEFT LOWER EXTREMITY ABOVE KNEE (H): Primary | ICD-10-CM

## 2024-12-09 PROCEDURE — 97110 THERAPEUTIC EXERCISES: CPT | Mod: GP

## 2024-12-09 PROCEDURE — 97116 GAIT TRAINING THERAPY: CPT | Mod: GP

## 2024-12-19 ENCOUNTER — THERAPY VISIT (OUTPATIENT)
Dept: PHYSICAL THERAPY | Facility: CLINIC | Age: 69
End: 2024-12-19
Attending: STUDENT IN AN ORGANIZED HEALTH CARE EDUCATION/TRAINING PROGRAM
Payer: COMMERCIAL

## 2024-12-19 DIAGNOSIS — Z89.612 ACQUIRED ABSENCE OF LEFT LOWER EXTREMITY ABOVE KNEE (H): Primary | ICD-10-CM

## 2024-12-19 PROCEDURE — 97116 GAIT TRAINING THERAPY: CPT | Mod: GP | Performed by: PHYSICAL THERAPIST

## 2024-12-19 NOTE — PROGRESS NOTES
"Prosthesis letter of medical necessity    Date: 24    Patient's Last Name:  Stefan  First Name: Ashanti   Diagnosis: URSZULA MENDEZ  : [birth year]  Height: 5'2\"  Weight: 145 lb      History of Amputation  See PT eval dated note today 24.      Functional Limitations  See PT eval dated note today 24.       Functional Capabilities  See PT eval dated 24 note.   She is now using the prosthetic daily and is able to don/doff it independently.    She is working w/ her doctors and workplace to return to work as soon as possible.        Functional level 3: The patient has the ability or potential for ambulation with variable adriana. Typical of the community ambulator who has the ability to traverse most environmental barriers and may have vocational, therapeutic, or exercise activity that demands prosthetic utilization beyond simple locomotion.      Condition of Limb  Good skin condition, no limb volume changes.  She is very aware of daily skin checks.       Condition of Current Prosthesis  Good; in progress w/ YESSENIA PETER prosthetist.  [facility].       Past Experience with Prosthesis    Currently using a K2 stance control knee but has made progress and has progressed past where knee can be adjusted to match her abilities.    In 10 days pt made a 60 ft improvement on 6 min walk 200f t to 260 ft.  Gait speed is steadily improving.  She is now working on use of a 4 wheeled walker (more difficult) vs 2 wheeled walker (more stable).  She can change walking speed when walking with the prosthetic and a walker.   She is wearing the prosthetic and walking in the home daily.         Patient's Motivation to Use New Prosthesis  Very high and she is getting very close to returning to work in a part time status.  Some of her work requires standing and walking.       It is my professional opinion that Ashanti Aviles has improved and is past the K2 level and is now much more appropriate for the K3 level.     Thank " you.   Please reach out if you need further information.      Electronically signed by:  [email or web address]                              I CERTIFY THE NEED FOR THESE SERVICES FURNISHED UNDER THIS PLAN OF TREATMENT AND WHILE UNDER MY CARE     (Physician co-signature of this document indicates review and certification of the therapy plan).                           Co-signing physician: Benedict Wetzel MD

## 2024-12-30 ENCOUNTER — TELEPHONE (OUTPATIENT)
Dept: PHYSICAL MEDICINE AND REHAB | Facility: CLINIC | Age: 69
End: 2024-12-30
Payer: COMMERCIAL

## 2024-12-30 NOTE — CONFIDENTIAL NOTE
LPN called and spoke with the pt. Pt was assisted to reschedule their appointment to 2/11/25.     Maggie Landers LPN

## 2025-01-06 ENCOUNTER — THERAPY VISIT (OUTPATIENT)
Dept: PHYSICAL THERAPY | Facility: CLINIC | Age: 70
End: 2025-01-06
Attending: STUDENT IN AN ORGANIZED HEALTH CARE EDUCATION/TRAINING PROGRAM
Payer: COMMERCIAL

## 2025-01-06 ENCOUNTER — MYC REFILL (OUTPATIENT)
Dept: PHYSICAL MEDICINE AND REHAB | Facility: CLINIC | Age: 70
End: 2025-01-06

## 2025-01-06 DIAGNOSIS — E11.52 TYPE 2 DIABETES MELLITUS WITH DIABETIC PERIPHERAL ANGIOPATHY AND GANGRENE, WITH LONG-TERM CURRENT USE OF INSULIN (H): ICD-10-CM

## 2025-01-06 DIAGNOSIS — Z89.612 ACQUIRED ABSENCE OF LEFT LOWER EXTREMITY ABOVE KNEE (H): Primary | ICD-10-CM

## 2025-01-06 DIAGNOSIS — Z89.612 ACQUIRED ABSENCE OF LEFT LOWER EXTREMITY ABOVE KNEE (H): ICD-10-CM

## 2025-01-06 DIAGNOSIS — M79.2 NEUROPATHIC PAIN: ICD-10-CM

## 2025-01-06 DIAGNOSIS — Z79.4 TYPE 2 DIABETES MELLITUS WITH DIABETIC PERIPHERAL ANGIOPATHY AND GANGRENE, WITH LONG-TERM CURRENT USE OF INSULIN (H): ICD-10-CM

## 2025-01-06 DIAGNOSIS — G89.29 CHRONIC INTRACTABLE PAIN: ICD-10-CM

## 2025-01-06 PROCEDURE — 97530 THERAPEUTIC ACTIVITIES: CPT | Mod: GP

## 2025-01-06 PROCEDURE — 97110 THERAPEUTIC EXERCISES: CPT | Mod: GP

## 2025-01-06 PROCEDURE — 97116 GAIT TRAINING THERAPY: CPT | Mod: GP

## 2025-01-06 RX ORDER — GABAPENTIN 300 MG/1
300 CAPSULE ORAL 2 TIMES DAILY
Qty: 60 CAPSULE | Refills: 2 | Status: SHIPPED | OUTPATIENT
Start: 2025-01-06

## 2025-01-06 NOTE — PROGRESS NOTES
01/06/25 0500   Appointment Info   Signing clinician's name / credentials Durga Aquino DPT   Total/Authorized Visits [employer] open access   Visits Used 8   Medical Diagnosis L AKA   PT Tx Diagnosis decreased mobility/ force production   Other pertinent information Next prosthetics appt - 12/12 at 1300   Progress Note/Certification   Onset of illness/injury or Date of Surgery 07/17/24  (date of surgery for AKA)   Therapy Frequency 1x/week   Predicted Duration 90 days   Progress Note Completed Date 01/06/25   PT Goal 1   Goal Identifier standing for adl's   Goal Description pt to stand w/ prosthesis indep w / or w/out UE support for adl's and cooking x 5 min   Goal Progress 1/6/25 - pt improving with overall stand time, wearing independently at home.   Target Date 04/05/25   PT Goal 2   Goal Identifier Goal Modified - gait   Goal Description pt to walk 100ft mod I with SEC and LLE prosthesis; Pt will amb > 500 ft with 4WW + prosthesis to be able to amb limited community distances while also navigating curbs and returning to work as hospital tech.   Target Date 04/05/25   Goal Progress 1/6/25 - amb 100 ft with variable ADs and assist.   PT Goal 3   Goal Identifier don doff prosthesis   Goal Description pt to indep don /doff her aka prosthesis and check skin indep   Goal Progress 12/2/24: Pt demo good technique to don/doff today. Seated well in prosthetic.   Target Date 01/06/25   Date Met 12/19/24   PT Goal 4   Goal Identifier steps   Goal Description pt to walk up /down 6 steps w/ rail and new prosthetic w/ sba.   Target Date 04/05/25   Goal Progress 1/6/25 - have yet to initiate stairs training   Objective Measure 1   Objective Measure TUG   Details 12/19 - 37 sec.   Objective Measure 2   Objective Measure 6MWT   Details 12/19 - 260ft.   2 short rests while prosthetist made changes to knee mechanics.   Therapeutic Procedure/Exercise   Therapeutic Procedures: strength, endurance, ROM, flexibility minutes (53171) 20    Ther Proc 1 Stretching   Ther Proc 1 - Details Sidelying hip flexor stretching 20 sec x 2 B, SKC 10 sec RLE and then contract/relax 5 sec hold x5, then piriformis stretch RLE 10 sec followed by contract/relax 5 sec hold x 5. Prone on elbows, GS for hip extension through B hip flexors 5 sec hold x 5.   Ther Proc 2 Strengthening   Ther Proc 2 - Details Sidelying L hip abd with prosthesis donned 10x, then bridging 10x with therapist stabilizing L knee, then hip abd hooklying blue band 10xB, R SLR 10x.   Therapeutic Activity   Therapeutic Activities: dynamic activities to improve functional performance minutes (51972) 10   Ther Act 1 Education   Ther Act 1 - Details Ed pt on how to perform contract/relax exercises for increased stretch to muscles. Educated on possible components to adjust on LLE prosthesis to improve stability in L knee in stance phase. Pt and  v/u   Gait Training   Gait Training Minutes, includes stair climbing (03972) 12   Gait 1 Gait without AD   Gait 1 - Details Amb 25 ft without AD in Liko rail with Liko vest donned, min-modA for trunk control, pt tending to hyperext through LLE in stance phase for support.   Gait 2 Gait with FWW   Gait 2 - Details Amb 15', 25' with FWW in Liko rail with Liko lift vest donned, CGA   Gait 3 Amb with R WBQC   Gait 3 - Details Amb 25'x2, CGA, 3 point gait, good stability in L stance phase   Plan   Home program above and ptrx   Plan for next session Start with stretching/strengthening on mat table focusing on strengthening L hip abductors and L obliques; then amb with R WBQC. Issue HEP handout for use at home   Comments   Comments Overall, pt making steady progress with therapy, improving ability to walk independently but still requires UE support. Currently working on transitioning from FWW to WBQC for shorter distances in home and 4WW for longer distances in community. Pt has 4WW she obtained from her dtr. Will continue to focus on strengthening and  conditioning to improve ability to independently stand on her LLE in stance phase to normalize gait mechanics and will continue to discuss presentation with prosthetist to make adjustments to prosthesis prn.   Total Session Time   Timed Code Treatment Minutes 42   Total Treatment Time (sum of timed and untimed services) 42         PLAN  Continue therapy per current plan of care.  See comments above    Beginning/End Dates of Progress Note Reporting Period:  10/09/2024 to 01/06/2025    Referring Provider:  Benedict Wetzel

## 2025-01-13 ENCOUNTER — THERAPY VISIT (OUTPATIENT)
Dept: PHYSICAL THERAPY | Facility: CLINIC | Age: 70
End: 2025-01-13
Attending: STUDENT IN AN ORGANIZED HEALTH CARE EDUCATION/TRAINING PROGRAM
Payer: COMMERCIAL

## 2025-01-13 DIAGNOSIS — E11.52 TYPE 2 DIABETES MELLITUS WITH DIABETIC PERIPHERAL ANGIOPATHY AND GANGRENE, WITH LONG-TERM CURRENT USE OF INSULIN (H): ICD-10-CM

## 2025-01-13 DIAGNOSIS — Z79.4 TYPE 2 DIABETES MELLITUS WITH DIABETIC PERIPHERAL ANGIOPATHY AND GANGRENE, WITH LONG-TERM CURRENT USE OF INSULIN (H): ICD-10-CM

## 2025-01-13 DIAGNOSIS — Z89.612 ACQUIRED ABSENCE OF LEFT LOWER EXTREMITY ABOVE KNEE (H): Primary | ICD-10-CM

## 2025-01-13 PROCEDURE — 97116 GAIT TRAINING THERAPY: CPT | Mod: GP | Performed by: PHYSICAL THERAPIST

## 2025-01-29 ENCOUNTER — TELEPHONE (OUTPATIENT)
Dept: PHYSICAL THERAPY | Facility: CLINIC | Age: 70
End: 2025-01-29
Payer: COMMERCIAL

## 2025-02-05 ENCOUNTER — TELEPHONE (OUTPATIENT)
Dept: PHYSICAL THERAPY | Facility: CLINIC | Age: 70
End: 2025-02-05
Payer: COMMERCIAL

## 2025-02-06 ENCOUNTER — THERAPY VISIT (OUTPATIENT)
Dept: PHYSICAL THERAPY | Facility: CLINIC | Age: 70
End: 2025-02-06
Attending: STUDENT IN AN ORGANIZED HEALTH CARE EDUCATION/TRAINING PROGRAM
Payer: COMMERCIAL

## 2025-02-06 DIAGNOSIS — E11.52 TYPE 2 DIABETES MELLITUS WITH DIABETIC PERIPHERAL ANGIOPATHY AND GANGRENE, WITH LONG-TERM CURRENT USE OF INSULIN (H): ICD-10-CM

## 2025-02-06 DIAGNOSIS — Z79.4 TYPE 2 DIABETES MELLITUS WITH DIABETIC PERIPHERAL ANGIOPATHY AND GANGRENE, WITH LONG-TERM CURRENT USE OF INSULIN (H): ICD-10-CM

## 2025-02-06 DIAGNOSIS — Z89.612 ACQUIRED ABSENCE OF LEFT LOWER EXTREMITY ABOVE KNEE (H): Primary | ICD-10-CM

## 2025-02-06 PROCEDURE — 97116 GAIT TRAINING THERAPY: CPT | Mod: GP | Performed by: PHYSICAL THERAPIST

## 2025-02-06 NOTE — PROGRESS NOTES
"            [facility]  -  Nurse Visit        Date of Service:  July 5, 2024     Requesting Provider: MD Dyan Geller    Diagnosis:     ICD-10-CM    1. PAD (peripheral artery disease) (H24)  I73.9       2. Ulcer of left lower extremity with fat layer exposed (H)  L97.922           Chief Complaint: Ashanti is being seen today at [facility] [place] for a wound check.  Patient called clinic earlier and left upper leg incision site is dehiscing and staples are \"poking\" into the wound causing significant pain. Reports pain of 10/10.  Denies any fevers, chills, or generalized ill feeling.     Dressing on Arrival: Aquacel Ag and gazue, Pt is currently not using compression.  Upon removal of dressings moderate Serosanguinous drainage is noted.    New Wounds noted: Yes: left upper leg incision site dehiscing.     Vital Signs: /78   Pulse 92   Temp 98.3  F (36.8  C)   Resp 18   LMP  (LMP Unknown)     Assessment:      General:  Patient presents to clinic in no apparent distress.   Psychiatric:  Alert and oriented x3.   Lower extremity:  edema is not present.    Integumentary:  Skin is dry, fragile    Circumferential volume measures:       No data to display                Wound info:  Negative Pressure Wound Therapy Leg Anterior;Left;Lower (Active)   Wound Type Surgical 06/10/24 2000   Unit Type Prevena 06/10/24 1600   Dressing Pieces Removed (# of Each Type) Black foam 06/10/24 2000   Dressing Pieces Applied (# of Each Type) Black foam 06/10/24 2000   Cycle Continuous 06/10/24 2000   Target Pressure (mmHg) 125 06/10/24 2000   Drainage Color/Characteristics Serosanguineous 06/10/24 2000   [place] changed? No 06/10/24 2000   Output (ml) 0 ml 06/10/24 0800       Wound Leg Ulceration (Active)   Wound Bed Other (Comment) 06/10/24 1600   Loan-wound Assessment Warm;Edema 06/10/24 0800   Drainage Amount Scant 06/10/24 0800   Drainage Color/Characteristics Serous 06/10/24 0800 " I reached out and spoke to Digna to follow up with them and to review for any changes in barriers to care and offer supportive services as needed.    Barriers noted previously: co-morbidities    Current barriers and interventions provided: co-morbidities      This patient will no longer require follow up.  I have provided care team contact information to the patient and welcome them to contact me if their needs as discussed above change.  Patient is currently not interested in scheduling an appointment for Survivorship and is aware who to contact if her needs change. Patient was appreciative of the phone call.     Wound Care/Cleansing Normal saline;Barrier applied  06/10/24 0800   Dressing Dry gauze;Foam;Moist gauze 06/10/24 0800   Dressing Status Changed;Clean, dry, intact 06/10/24 0800   Dressing Change Due 06/11/24 06/10/24 0800       VASC Wound left hallux (Active)   Pre Size Length 2 06/28/24 1200   Pre Size Width 0.2 06/28/24 1200   Pre Size Depth 0.5 05/22/24 0700   Pre Total Sq cm 1.4 05/22/24 0700       VASC Wound Left lower leg wound (Active)   Pre Size Length 7 07/01/24 1000   Pre Size Width 3 07/01/24 1000   Pre Size Depth 0.3 07/01/24 1000   Pre Total Sq cm 21 07/01/24 1000   Post Size Length 3.2 04/04/24 0800   Post Size Width 2 04/04/24 0800   Post Size Depth 0.8 04/04/24 0800   Post Total Sq cm 6.4 04/04/24 0800   Undermined 0.5cm 4 to 12 oclock 03/25/24 1208   Description sutures implaced 06/21/24 1000       VASC Wound left mid leg (Active)   Pre Size Length 1.5 07/01/24 1000   Pre Size Width 1 07/01/24 1000   Pre Size Depth 0.2 07/01/24 1000   Pre Total Sq cm 1.5 07/01/24 1000       VASC Wound left upper leg (Active)   Pre Size Length 2 07/05/24 1338   Pre Size Width 1.2 07/05/24 1338   Pre Size Depth 2.8 07/05/24 1338   Pre Total Sq cm 0.9 07/01/24 1000   Tunneling 4.1cm @12 oclock 07/05/24 1338       Incision/Surgical Site 02/15/24 Anterior;Left Groin (Active)       Incision/Surgical Site 02/15/24 Left;Medial Calf (Active)       Incision/Surgical Site 06/04/24 Left;Lower Abdomen (Active)   Incision Assessment WDL except 06/11/24 0400   Dressing Transparent film (Opsite, Tegaderm) 06/10/24 2000   Loan-Incision Assessment Erythema;Edema 06/10/24 2000   Closure Liquid bandage 06/11/24 0400   Incision Drainage Amount None 06/11/24 0400   Incision Care Other (Comment) 06/07/24 2000   Dressing Intervention Clean, dry, intact 06/11/24 0400       Incision/Surgical Site 06/04/24 Anterior;Left;Upper Calf (Active)   Incision Assessment WDL 06/11/24 1328   Dressing Other (Comment) 06/11/24 0942   Loan-Incision  Assessment Edema;Warm 06/10/24 2000   Closure Staples 06/11/24 1328   Incision Drainage Amount Small 06/11/24 1328   Drainage Description Serosanguinous 06/11/24 1328   Incision Care Normal saline 06/11/24 0942   Dressing Intervention Clean, dry, intact;Dried drainage 06/11/24 1328       Incision/Surgical Site 06/04/24 Anterior;Left;Lower Calf (Active)   Incision Assessment WDL 06/11/24 1328   Dressing Other (Comment) 06/11/24 0942   Loan-Incision Assessment Edema;Warm;Erythema 06/10/24 2000   Closure Staples 06/11/24 1328   Incision Drainage Amount Small 06/11/24 1328   Drainage Description Serosanguinous 06/11/24 1328   Incision Care Normal saline 06/11/24 0942   Dressing Intervention Clean, dry, intact;Dried drainage 06/11/24 1328       Incision/Surgical Site 06/07/24 Anterior;Left Toe (Comment  which one) (Active)   Incision Assessment UTV 06/11/24 1328   Dressing Dry gauze 06/11/24 0942   Loan-Incision Assessment Edema 06/10/24 1335   Closure TEJ 06/11/24 1328   Incision Drainage Amount Scant 06/11/24 1328   Drainage Description Serosanguinous 06/11/24 1328   Incision Care Other (Comment) 06/09/24 0000   Dressing Intervention Clean, dry, intact 06/11/24 1328       Undermining is present.    The periwoundskin is  dry, fragile      Plan:         1. Patient will see vascular provider Monday for angiogram.            2. As listed below, treatment provided irrigation, mechanical cleansing, and dressings to promote autolytic debridement.             Cleansed with: Normal saline    Protected skin with: Remedy skin repair lotion    Dressings Applied to wound: Aquacel Advantage AG, ABD, and Secured with roll gauze and tape.     Compression Applied to the right leg: None  Compression Applied to the left leg: None    Offloading used: PRAFO    Trial Products: No    Provider notified regarding concerns: Yes: Routing chart to vascular surgeon    Treatment Changes: Yes: 5 staples removed and.   Aquacel Ag started on left  upper leg incision site, loosely packed into tunneling, covered with ABD, and secured with roll gauze and tape.     Tolerated Dressing Change:  Yes    Taught Regarding: follow up appointment(s), surgery, angiogram, elevation, offloading, and compliance    Educational Barriers: No barriers      Cami Shipley, RN

## 2025-02-13 ENCOUNTER — THERAPY VISIT (OUTPATIENT)
Dept: PHYSICAL THERAPY | Facility: CLINIC | Age: 70
End: 2025-02-13
Attending: STUDENT IN AN ORGANIZED HEALTH CARE EDUCATION/TRAINING PROGRAM
Payer: COMMERCIAL

## 2025-02-13 DIAGNOSIS — Z89.612 ACQUIRED ABSENCE OF LEFT LOWER EXTREMITY ABOVE KNEE (H): Primary | ICD-10-CM

## 2025-02-13 PROCEDURE — 97116 GAIT TRAINING THERAPY: CPT | Mod: GP | Performed by: PHYSICAL THERAPIST

## 2025-02-24 ENCOUNTER — TELEPHONE (OUTPATIENT)
Dept: PHYSICAL MEDICINE AND REHAB | Facility: CLINIC | Age: 70
End: 2025-02-24
Payer: COMMERCIAL

## 2025-02-24 NOTE — CONFIDENTIAL NOTE
Pre-visit phone call-       LPN called pt and reminded them of their upcoming appointment on Tuesday 2/25/25 at 130 pm with Dr. Wetzel in the Amputee clinic.   Pt stated that the appointment still worked for them.     Pt was reminded of where the clinic was located:     We are located in the [facility] at [address] [place] [address], [address].  Our building is located across the street from [facility] and offers free parking in our on-site lot.  Please take the stairs or elevator to the second floor of the [facility] and check in at the  located in the Specialty Clinic, [facility].   From Sign In Desk:     Department Address: [address] [place] [address], [address] [address]   Department Phone: [phone]     Maggie Landers LPN

## 2025-02-25 ENCOUNTER — OFFICE VISIT (OUTPATIENT)
Dept: PHYSICAL MEDICINE AND REHAB | Facility: CLINIC | Age: 70
End: 2025-02-25
Payer: COMMERCIAL

## 2025-02-25 ENCOUNTER — DOCUMENTATION ONLY (OUTPATIENT)
Dept: ORTHOPEDICS | Facility: CLINIC | Age: 70
End: 2025-02-25

## 2025-02-25 VITALS
HEART RATE: 99 BPM | SYSTOLIC BLOOD PRESSURE: 147 MMHG | HEIGHT: 62 IN | BODY MASS INDEX: 26.13 KG/M2 | DIASTOLIC BLOOD PRESSURE: 56 MMHG | WEIGHT: 142 LBS

## 2025-02-25 DIAGNOSIS — M79.2 NEUROPATHIC PAIN: ICD-10-CM

## 2025-02-25 DIAGNOSIS — G89.29 CHRONIC INTRACTABLE PAIN: ICD-10-CM

## 2025-02-25 DIAGNOSIS — Z89.612 ACQUIRED ABSENCE OF LEFT LOWER EXTREMITY ABOVE KNEE (H): Primary | ICD-10-CM

## 2025-02-25 PROCEDURE — 99213 OFFICE O/P EST LOW 20 MIN: CPT | Performed by: STUDENT IN AN ORGANIZED HEALTH CARE EDUCATION/TRAINING PROGRAM

## 2025-02-25 PROCEDURE — 3077F SYST BP >= 140 MM HG: CPT | Performed by: STUDENT IN AN ORGANIZED HEALTH CARE EDUCATION/TRAINING PROGRAM

## 2025-02-25 PROCEDURE — 3078F DIAST BP <80 MM HG: CPT | Performed by: STUDENT IN AN ORGANIZED HEALTH CARE EDUCATION/TRAINING PROGRAM

## 2025-02-25 RX ORDER — GABAPENTIN 300 MG/1
300 CAPSULE ORAL 2 TIMES DAILY
Qty: 60 CAPSULE | Refills: 3 | Status: SHIPPED | OUTPATIENT
Start: 2025-02-25

## 2025-02-25 NOTE — PATIENT INSTRUCTIONS
The AHA recommends 150 minutes of exercise a week, which is 30 minutes a day for 5 days, moderate intensity so that your breathing quickens enough not to be able to talk in complete sentences.  Talk to your physical therapist to incorporate aerobic exercise into your home exercise program.  You will require additional gait training with physical therapy to make full use of the smart knee.  Please let your therapist and let us know about any issues that arise with mobility during your gradual return to work.

## 2025-02-25 NOTE — PROGRESS NOTES
PM&R Followup Note     Patient Name: Ashanti Aviles : 1955 Medical Record: [id number]     Amputee Information:  Level of Amputation:  Left transfemoral  : TBD  Date of Surgery: 24  Etiology: failed bypass graft         Subjective:     Last visit summary:   She had authorization denial for her K3 prosthesis, so she was referred to physical therapy.  She had plateaued on her functional capacity with a K2 swing and stance control knee and physical therapy thought she could better be able to achieve her functional work related goals with a K3 leg. I agreed.  She can now walk independently but still requires upper extremity support with a walker.  Interval History:   She is now using the new K3 microprocessor knee. She charges the knee on a daily basis, but even if she doesn't use it, the battery runs out and takes 3-4 hours to charge again.  The socket fitting is getting close. Her tibia is still mobile and she uses sports tape sometimes to keep the skin attached - she's not sure if it's appropriate.  She is going back to work tomorrow and has no major concerns with return to work.  She continues to do weekly physical therapy.  She's learned to walk with the cane on the stairs recently and is training to activate the microprocessor knee. She has a HEP, and she does bed exercises every night. She doesn't have dedicated aerobic exercise.         Medications:     Current Outpatient Medications   Medication Sig Dispense Refill    acetaminophen (ACETAMINOPHEN 8 HOUR) 650 MG CR tablet Take 1,300 mg by mouth every 8 hours as needed for mild pain or fever      amLODIPine (NORVASC) 5 MG tablet Take 5 mg by mouth daily      aspirin 81 MG EC tablet Take 1 tablet (81 mg) by mouth daily 90 tablet 3    clopidogrel (PLAVIX) 75 MG tablet Take 75 mg by mouth daily      Continuous Blood Gluc Sensor (FREESTYLE PRINCESS 14 DAY SENSOR) MISC       empagliflozin (JARDIANCE) 25 MG TABS tablet Take 1 tablet (25 mg) by mouth  "daily 90 tablet 1    ezetimibe (ZETIA) 10 MG tablet Take 10 mg by mouth daily      ferrous sulfate (FEROSUL) 325 (65 Fe) MG tablet Take 1 tablet (325 mg) by mouth daily 30 tablet 1    gabapentin (NEURONTIN) 300 MG capsule Take 1 capsule (300 mg) by mouth 2 times daily. 60 capsule 2    insulin degludec (TRESIBA) 100 UNIT/ML pen Inject 30 Units Subcutaneous 2 times daily      linagliptin (TRADJENTA) 5 MG TABS tablet Take 5 mg by mouth daily      lisinopril (ZESTRIL) 40 MG tablet Take 40 mg by mouth daily      metFORMIN (GLUCOPHAGE XR) 500 MG 24 hr tablet Take 1,000 mg by mouth daily      methocarbamol (ROBAXIN) 500 MG tablet Take 1 tablet (500 mg) by mouth 3 times daily as needed for muscle spasms 15 tablet 0    omeprazole (PRILOSEC) 20 MG DR capsule Take 20 mg by mouth daily as needed (heartburn)      oxyCODONE (ROXICODONE) 10 MG tablet Take 1 tablet (10 mg) by mouth every 3 hours as needed for severe pain 12 tablet 0    polyethylene glycol (MIRALAX) 17 GM/Dose powder Take 17 g by mouth daily 510 g 0    UNIFINE PENTIPS 31G X 5 MM miscellaneous               Allergies:     Allergies   Allergen Reactions    Simvastatin Muscle Pain (Myalgia)     Muscle pain    Victoza [Liraglutide] Other (See Comments)     Binds bowels    Penicillins Unknown     Childhood rxn            ROS:     A focused ROS is negative other than the symptoms noted above in the HPI.         Physical Examination:     VITAL SIGNS: LMP  (LMP Unknown)   BMI: Estimated body mass index is 26.67 kg/m  as calculated from the following:    Height as of 7/11/24: 1.575 m (5' 2\").    Weight as of 7/17/24: 66.1 kg (145 lb 12.8 oz).  Gait: Left foot catches on carpet frequently but narrow gait with 4WW. Knee beeps for good step about 70% of steps. No medial or lateral whip.  Strength:   HFlex HEx HAdd HAbd KEx KFlex ADF APF   R 5 5 5 5 5 5 5 5   L 5 5 5 5 NA NA NA NA   Sensation:   Sensation to light touch intact in the BLE  Residual left leg: Conical shape with " very mobile tissue. Very mild tenderness anteriorly.  No signs of skin breakdown or sites of excess pressure.  Right lower extremity: Some trochanteric tenderness, no knee tenderness, no deformity.  Prosthesis: Left K3 prosthesis with temp socket and microprocessor knee.  Suspension liner in good condition with lanyard.         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     #Left TTA  Utilizing new prosthesis well and appropriate for K3 level ambulation.  - Prosthesis: may have a battery issue,  to assess. Also still needs stabilization of leg in socket.  - Therapy: Continues to require gait training. Also made recs for independent aerobic HEP  - Instructed patient that if therapy thinks she can upgrade to full time employment and they communicate this, I can approve with letter through [employer] messaging.    #Follow up  - 6 months    Benedict Wetzel MD  Physical Medicine & Rehabilitation    I spent a total of 28 minutes on the date of service doing chart review, history and exam, documentation, and further activities as noted above.

## 2025-02-25 NOTE — LETTER
2025       RE: Ashanti Aviles  [address] [place] [address]     Dear Colleague,    Thank you for referring your patient, Ashanti Aviles, to the [facility] [place] at [facility]. Please see a copy of my visit note below.         PM&R Followup Note     Patient Name: Ashanti Aviles : 1955 Medical Record: [id number]     Amputee Information:  Level of Amputation:  Left transfemoral  : TBD  Date of Surgery: 24  Etiology: failed bypass graft         Subjective:     Last visit summary:   She had authorization denial for her K3 prosthesis, so she was referred to physical therapy.  She had plateaued on her functional capacity with a K2 swing and stance control knee and physical therapy thought she could better be able to achieve her functional work related goals with a K3 leg. I agreed.  She can now walk independently but still requires upper extremity support with a walker.  Interval History:   She is now using the new K3 microprocessor knee. She charges the knee on a daily basis, but even if she doesn't use it, the battery runs out and takes 3-4 hours to charge again.  The socket fitting is getting close. Her tibia is still mobile and she uses sports tape sometimes to keep the skin attached - she's not sure if it's appropriate.  She is going back to work tomorrow and has no major concerns with return to work.  She continues to do weekly physical therapy.  She's learned to walk with the cane on the stairs recently and is training to activate the microprocessor knee. She has a HEP, and she does bed exercises every night. She doesn't have dedicated aerobic exercise.         Medications:     Current Outpatient Medications   Medication Sig Dispense Refill     acetaminophen (ACETAMINOPHEN 8 HOUR) 650 MG CR tablet Take 1,300 mg by mouth every 8 hours as needed for mild pain or fever        "amLODIPine (NORVASC) 5 MG tablet Take 5 mg by mouth daily       aspirin 81 MG EC tablet Take 1 tablet (81 mg) by mouth daily 90 tablet 3     clopidogrel (PLAVIX) 75 MG tablet Take 75 mg by mouth daily       Continuous Blood Gluc Sensor (FREESTYLE PRINCESS 14 DAY SENSOR) MISC        empagliflozin (JARDIANCE) 25 MG TABS tablet Take 1 tablet (25 mg) by mouth daily 90 tablet 1     ezetimibe (ZETIA) 10 MG tablet Take 10 mg by mouth daily       ferrous sulfate (FEROSUL) 325 (65 Fe) MG tablet Take 1 tablet (325 mg) by mouth daily 30 tablet 1     gabapentin (NEURONTIN) 300 MG capsule Take 1 capsule (300 mg) by mouth 2 times daily. 60 capsule 2     insulin degludec (TRESIBA) 100 UNIT/ML pen Inject 30 Units Subcutaneous 2 times daily       linagliptin (TRADJENTA) 5 MG TABS tablet Take 5 mg by mouth daily       lisinopril (ZESTRIL) 40 MG tablet Take 40 mg by mouth daily       metFORMIN (GLUCOPHAGE XR) 500 MG 24 hr tablet Take 1,000 mg by mouth daily       methocarbamol (ROBAXIN) 500 MG tablet Take 1 tablet (500 mg) by mouth 3 times daily as needed for muscle spasms 15 tablet 0     omeprazole (PRILOSEC) 20 MG DR capsule Take 20 mg by mouth daily as needed (heartburn)       oxyCODONE (ROXICODONE) 10 MG tablet Take 1 tablet (10 mg) by mouth every 3 hours as needed for severe pain 12 tablet 0     polyethylene glycol (MIRALAX) 17 GM/Dose powder Take 17 g by mouth daily 510 g 0     UNIFINE PENTIPS 31G X 5 MM miscellaneous               Allergies:     Allergies   Allergen Reactions     Simvastatin Muscle Pain (Myalgia)     Muscle pain     Victoza [Liraglutide] Other (See Comments)     Binds bowels     Penicillins Unknown     Childhood rxn            ROS:     A focused ROS is negative other than the symptoms noted above in the HPI.         Physical Examination:     VITAL SIGNS: LMP  (LMP Unknown)   BMI: Estimated body mass index is 26.67 kg/m  as calculated from the following:    Height as of 7/11/24: 1.575 m (5' 2\").    Weight as of " 7/17/24: 66.1 kg (145 lb 12.8 oz).  Gait: Left foot catches on carpet frequently but narrow gait with 4WW. Knee beeps for good step about 70% of steps. No medial or lateral whip.  Strength:   HFlex HEx HAdd HAbd KEx KFlex ADF APF   R 5 5 5 5 5 5 5 5   L 5 5 5 5 NA NA NA NA   Sensation:   Sensation to light touch intact in the BLE  Residual left leg: Conical shape with very mobile tissue. Very mild tenderness anteriorly.  No signs of skin breakdown or sites of excess pressure.  Right lower extremity: Some trochanteric tenderness, no knee tenderness, no deformity.  Prosthesis: Left K3 prosthesis with temp socket and microprocessor knee.  Suspension liner in good condition with lanyard.         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     #Left TTA  Utilizing new prosthesis well and appropriate for K3 level ambulation.  - Prosthesis: may have a battery issue,  to assess. Also still needs stabilization of leg in socket.  - Therapy: Continues to require gait training. Also made recs for independent aerobic HEP  - Instructed patient that if therapy thinks she can upgrade to full time employment and they communicate this, I can approve with letter through [employer] messaging.    #Follow up  - 6 months    Benedict Wetzel MD  Physical Medicine & Rehabilitation    I spent a total of 28 minutes on the date of service doing chart review, history and exam, documentation, and further activities as noted above.      Again, thank you for allowing me to participate in the care of your patient.      Sincerely,    Benedict Wetzel MD

## 2025-02-26 NOTE — PROGRESS NOTES
"S: Pt is a 69 yof seen at the [facility] with Dr. Andres MD for a routine follow up of her L TF prosthesis. She is now using the new K3 microprocessor knee. She charges the knee on a daily basis, but even if she doesn't use it, the battery runs out after one day and takes 3-4 hours to charge again. I will discuss with CHLOE HerO, to double check the condition of the battery when he sees her next. Pt is almost ready for definitive socket. She states that she is still feeling some \"movement of her bone\" in the socket that Arden had tried to address with some additional padding. Additional adjustments may be needed to improve socket fit. She is going back to work tomorrow and has no major concerns with return to work. She continues to do weekly physical therapy.  She's learned to walk with the cane on the stairs recently and is training to activate the microprocessor knee.     O: 5'2\", 145 lbs. Pt presented in clinic w/ L TF prosthesis in place using a 4-wheel walker.  Gait: Limited MPK flexion during swing phase on some steps resulting in left foot catching on ground occasionally.  Sensation to light touch intact in the BLE  Residual left leg: Conical shape with very mobile tissue. Very mild tenderness anteriorly.  No signs of skin breakdown or sites of excess pressure.  Right lower extremity: Some trochanteric tenderness, no knee tenderness, no deformity.  Prosthesis: Left K3 prosthesis with check socket and microprocessor knee. Liner in good condition.    A: L TF amputee (7/17/24 2/2 nonhealing diabetic wounds); PAD, DM2. Utilizing new prosthesis well and appropriate for K3 level ambulation.    P: F/U Amputee Clinic in 6 months. Pt will be scheduled with Arden to follow-up on battery condition, socket fitting adjustments, and beginning the process for definitive socket fabrication. Pt will continue PT for additional gait training and improving endurance.    Electronically signed by Nemo Sykes CPO, LPO  "

## 2025-02-27 ENCOUNTER — THERAPY VISIT (OUTPATIENT)
Dept: PHYSICAL THERAPY | Facility: CLINIC | Age: 70
End: 2025-02-27
Attending: STUDENT IN AN ORGANIZED HEALTH CARE EDUCATION/TRAINING PROGRAM
Payer: COMMERCIAL

## 2025-02-27 DIAGNOSIS — Z79.4 TYPE 2 DIABETES MELLITUS WITH DIABETIC PERIPHERAL ANGIOPATHY AND GANGRENE, WITH LONG-TERM CURRENT USE OF INSULIN (H): Primary | ICD-10-CM

## 2025-02-27 DIAGNOSIS — E11.52 TYPE 2 DIABETES MELLITUS WITH DIABETIC PERIPHERAL ANGIOPATHY AND GANGRENE, WITH LONG-TERM CURRENT USE OF INSULIN (H): Primary | ICD-10-CM

## 2025-02-27 PROCEDURE — 97116 GAIT TRAINING THERAPY: CPT | Mod: GP | Performed by: PHYSICAL THERAPIST

## 2025-03-16 ENCOUNTER — HEALTH MAINTENANCE LETTER (OUTPATIENT)
Age: 70
End: 2025-03-16

## 2025-03-31 ENCOUNTER — DOCUMENTATION ONLY (OUTPATIENT)
Dept: PHYSICAL MEDICINE AND REHAB | Facility: CLINIC | Age: 70
End: 2025-03-31
Payer: COMMERCIAL

## 2025-03-31 DIAGNOSIS — Z89.612 ACQUIRED ABSENCE OF LEFT LOWER EXTREMITY ABOVE KNEE (H): Primary | ICD-10-CM

## 2025-04-03 ENCOUNTER — PATIENT OUTREACH (OUTPATIENT)
Dept: INTERNAL MEDICINE | Facility: CLINIC | Age: 70
End: 2025-04-03
Payer: COMMERCIAL

## 2025-04-03 PROBLEM — Z98.890 HISTORY OF LOOP ELECTRICAL EXCISION PROCEDURE (LEEP): Status: ACTIVE | Noted: 2024-04-24

## 2025-05-06 ENCOUNTER — TELEPHONE (OUTPATIENT)
Dept: PHYSICAL MEDICINE AND REHAB | Facility: CLINIC | Age: 70
End: 2025-05-06
Payer: COMMERCIAL

## 2025-05-06 NOTE — CONFIDENTIAL NOTE
LPN called pt and assisted them to schedule their follow up appointment on 8/5/25 at 2:30 pm.     Maggie Landers LPN

## 2025-05-06 NOTE — TELEPHONE ENCOUNTER
----- Message from Maggie RODRÍGUEZ sent at 2025  2:11 PM CST -----  Regardin month follow up  Pt needs to be contacted to schedule their 6 month follow up- 2025.  LOV- 25    Maggie Landers LPN

## 2025-06-29 ENCOUNTER — HEALTH MAINTENANCE LETTER (OUTPATIENT)
Age: 70
End: 2025-06-29

## 2025-07-09 ENCOUNTER — TELEPHONE (OUTPATIENT)
Dept: PHYSICAL THERAPY | Facility: CLINIC | Age: 70
End: 2025-07-09
Payer: COMMERCIAL

## 2025-08-14 ENCOUNTER — MYC MEDICAL ADVICE (OUTPATIENT)
Dept: SURGERY | Facility: CLINIC | Age: 70
End: 2025-08-14

## 2025-08-25 ENCOUNTER — PATIENT OUTREACH (OUTPATIENT)
Dept: CARE COORDINATION | Facility: CLINIC | Age: 70
End: 2025-08-25

## (undated) DEVICE — MARKER SURG SKIN 2 TIP STRL SPP99DT2AA

## (undated) DEVICE — SYR KIT ANGIO LT BLUE SALINE 10ML K01-60083

## (undated) DEVICE — SPONGE RAY-TEC 4X8" 7318

## (undated) DEVICE — GOWN XXL 9575

## (undated) DEVICE — VESSEL LOOP BLUE MINI 30-713

## (undated) DEVICE — DRSG TELFA 3X4" 1050

## (undated) DEVICE — LIGACLIP LARGE AESCULAP O4120-1

## (undated) DEVICE — DRAPE C-ARM 60X42" 1013

## (undated) DEVICE — BASIN EMESIS STERILE  SSK9005A

## (undated) DEVICE — SU PROLENE 6-0 C-1DA 18" M8718

## (undated) DEVICE — CUSTOM PACK TOTAL KNEE SOP5BTKHEC

## (undated) DEVICE — TAPE UMBILICAL COTTON 30X1/16IN 2 STRAND 8619-03A 8886861903

## (undated) DEVICE — SU SILK 0 24" TIE SA76G

## (undated) DEVICE — TRAY PREP DRY SKIN SCRUB 067

## (undated) DEVICE — GLOVE BIOGEL INDICATOR 7.5 LF 41675

## (undated) DEVICE — SU PROLENE 7-0 BV-1DA 4X18" M8701

## (undated) DEVICE — ADH LIQUID MASTISOL TOPICAL VIAL 2-3ML 0523-48

## (undated) DEVICE — PATCH SURGICAL EVARREST FIBRIN SEALANT 4X2IN EVT5024

## (undated) DEVICE — CATH FOGARTY EMBOLECTOMY 2FR 60CM LATEX 120602FP

## (undated) DEVICE — SYR 10ML LL W/O NDL 302995

## (undated) DEVICE — SUCTION TIP YANKAUER W/O VENT K86

## (undated) DEVICE — SYR KIT ANGIO YELLOW CONTRAST 10ML  K01-60102

## (undated) DEVICE — TUNNELER SHEATH SCANLAN GREEN

## (undated) DEVICE — GUIDEWIRE TERUMO ADVANTAGE .035X260CM ANG GA3502

## (undated) DEVICE — GEL ULTRASOUND AQUASONIC 20GM 01-01

## (undated) DEVICE — GLOVE BIOGEL PI ULTRATOUCH G SZ 6.0 42160

## (undated) DEVICE — ESU GROUND PAD ADULT REM W/15' CORD E7507DB

## (undated) DEVICE — DRAPE STERI TOWEL LG 1010

## (undated) DEVICE — GOWN IMPERVIOUS BREATHABLE SMART XLG 89045

## (undated) DEVICE — SUTURE VICRYL+ 2-0 27IN CT-1 UND VCP259H

## (undated) DEVICE — MITT PRE-OP 7 L X 5 1/2 W 5177M1

## (undated) DEVICE — SU PROLENE 6-0 C-1DA 30" M8706

## (undated) DEVICE — GLOVE BIOGEL PI INDICATOR 8.0 LF 41680

## (undated) DEVICE — SUTURE SILK 3-0 TIES 30IN SA84H

## (undated) DEVICE — SU DERMABOND ADVANCED .7ML DNX12

## (undated) DEVICE — PLATE GROUNDING ADULT W/CORD 9165L

## (undated) DEVICE — DRSG GAUZE 4X4" TRAY 6939

## (undated) DEVICE — CUSTOM PACK PERIPH VASCULAR SCV5BPVHEA

## (undated) DEVICE — ENDO TRAP POLYP E-TRAP 00711099

## (undated) DEVICE — DRAPE C-ARMOR 5 SIDED 5523

## (undated) DEVICE — DRSG ADAPTIC 3X3" 6112

## (undated) DEVICE — DRAPE IOBAN INCISE 36X23" 6651EZ

## (undated) DEVICE — SU SILK 2-0 SH 30" K833H

## (undated) DEVICE — GLOVE BIOGEL PI SZ 8.0 40880

## (undated) DEVICE — ESU PENCIL SMOKE EVAC W/ROCKER SWITCH 0703-047-000

## (undated) DEVICE — SU MONOCRYL+ 4-0 18IN PS2 UND MCP496G

## (undated) DEVICE — DRSG KERLIX 4 1/2"X4YDS ROLL 6715

## (undated) DEVICE — ESU ELEC BLADE 2.75" COATED/INSULATED E1455

## (undated) DEVICE — DECANTER BAG 2002S

## (undated) DEVICE — CLIP HORIZON MULTI MED BLUE 002204

## (undated) DEVICE — SOL NACL 0.9% INJ 1000ML BAG 2B1324X

## (undated) DEVICE — NEEDLE HYPO MAGELLAN SAFETY 22GA 1 1/2IN 8881850215

## (undated) DEVICE — TUBING IV EXTENSION SET 32"  2C5645

## (undated) DEVICE — BONE WAX 2.5GM W31G

## (undated) DEVICE — Device

## (undated) DEVICE — DRAPE CV SPLIT 110X36" 89452

## (undated) DEVICE — SUCTION TIP POOLE STERILE 35040

## (undated) DEVICE — ADAPTER CATH 403250

## (undated) DEVICE — SU ETHILON 4-0 P-3 18" BLACK 699G

## (undated) DEVICE — WIPE MICRO-TIP GRAPHIC CONTROL 3300

## (undated) DEVICE — DRAPE IOBAN INCISE 23X17" 6650EZ

## (undated) DEVICE — SUTURE PROLENE 5-0 RB-2 8555H

## (undated) DEVICE — SU VICRYL+ 3-0 27IN SH UND VCP416H

## (undated) DEVICE — TUBING SUCTION MEDI-VAC 1/4"X20' N620A

## (undated) DEVICE — NEEDLE HYPO 18X1-1/2 SAFETY 305918

## (undated) DEVICE — PREP CHLORAPREP 26ML TINTED HI-LITE ORANGE 930815

## (undated) DEVICE — CLIP HORIZON MULTI SM YELLOW 001204

## (undated) DEVICE — DECANTER VIAL 2006S

## (undated) DEVICE — DRESSING XEROFORM PETROLATUM 5X9 33605

## (undated) DEVICE — SU SILK 3-0 TIE 18" SA64H

## (undated) DEVICE — INTRO TERUMO 6FRX10CM PINNACLE W/MARKER RSB602

## (undated) DEVICE — SU SILK 3-0 SH 30" K832H

## (undated) DEVICE — RX SURGIFLO HEMOSTATIC MATRIX W/THROMBIN 8ML 2994

## (undated) DEVICE — SOL WATER IRRIG 500ML BOTTLE 2F7113

## (undated) DEVICE — SU PROLENE 5-0 RB-2DA 30" 8710H

## (undated) DEVICE — LOOP RETRACT-O-TAPE 16GA X 18 QUEST 1012

## (undated) DEVICE — DRAPE ISOLATION BAG 1003

## (undated) DEVICE — RAD INFLATOR BASIC COMPAK  IN4130

## (undated) DEVICE — STOPCOCK 3 WAY 500 PSI M3SNC

## (undated) DEVICE — LIGACLIP MEDIUM AESCULAP B2180-1

## (undated) DEVICE — SOL WATER IRRIG 1000ML BOTTLE 2F7114

## (undated) DEVICE — LIGACLIP SMALL AESCULAP J1180-1

## (undated) DEVICE — SOL NACL 0.9% IRRIG 1000ML BOTTLE 2F7124

## (undated) DEVICE — PITCHER STERILE 1000ML  SSK9004A

## (undated) DEVICE — CATH TRAY FOLEY 16FR W/METER A800365

## (undated) DEVICE — PREP POVIDONE-IODINE 7.5% SCRUB 4OZ BOTTLE MDS093945

## (undated) DEVICE — DRSG ABD TNDRSRB WET PRUF 8IN X 10IN STRL  9194A

## (undated) DEVICE — GLOVE SURG GAMMEX PI POLYISOPRENE WHITE SZ 8 LF 20685780

## (undated) DEVICE — GLOVE BIOGEL PI ORTHOPRO SZ 7.5 47675

## (undated) DEVICE — INSERT JAW 33MM SOFTJAW SOFT33

## (undated) DEVICE — SU SILK 2-0 TIE 18' A185H

## (undated) DEVICE — SU PROLENE 5-0 RB-1DA 36"  8556H

## (undated) DEVICE — SU PROLENE 8-0 BV130-5DA 24" 8732H

## (undated) DEVICE — SU ETHILON 3-0 PS-2 18" 1669H

## (undated) DEVICE — CAST PADDING 4" STERILE 9044S

## (undated) DEVICE — TUBING IV EXTENSION SET ANESTHESIA 34" MLL 2C6227

## (undated) DEVICE — GOWN IMPERVIOUS BREATHABLE SMART LG 89015

## (undated) DEVICE — SU PROLENE 7-0 BV-1DA 24" 8702H

## (undated) DEVICE — CUSTOM PACK LOWER EXTREMITY SOP5BLEHEA

## (undated) DEVICE — CANISTER WOUND VAC W/GEL 500ML M8275063/5

## (undated) DEVICE — DRAPE STOCKINETTE 4" 8544

## (undated) DEVICE — SUTURE SILK 2-0 TIES 30IN SA85H

## (undated) DEVICE — SPONGE LAP 18X18" X8435

## (undated) DEVICE — INTRO SHEATH 5FRX25CM PINNACLE MARKER RSB502

## (undated) DEVICE — BONE CLEANING TIP INTERPULSE  0210-010-000

## (undated) DEVICE — TOWEL SURG 16INW X 26INL WHITE STRL XRAY DETECTABLE X8314W

## (undated) DEVICE — GOWN SURGICAL SMARTGOWN 2XL 89075

## (undated) DEVICE — CUFF TOURN 18IN STRL DISP

## (undated) DEVICE — DRSG TEGADERM 4X4 3/4" 1626W

## (undated) DEVICE — DRSG PRIMAPORE 03 1/8X6" 66000318

## (undated) DEVICE — GOWN LG DISP 9515

## (undated) DEVICE — SU PROLENE 6-0 BV-1 DA 24" 8805H

## (undated) DEVICE — NDL PERC ENTRY 19GA 7CM G04876

## (undated) DEVICE — DRAPE U SPLIT 74X120" 29440

## (undated) DEVICE — SNARE CAPIVATOR ROUND COLD SNR BX10 M00561101

## (undated) DEVICE — SOL ADH LIQUID BENZOIN SWAB 0.6ML C1544

## (undated) DEVICE — STPL SKIN 35W 6.9MM  PXW35

## (undated) DEVICE — DRAPE SHEET REV FOLD 3/4 9349

## (undated) DEVICE — SPECIMEN CONTAINER 3OZ W/FORMALIN 59901

## (undated) DEVICE — BLADE SAW SAGITTAL STRK WIDE 25.4X85X1.2MM 2108-151-000

## (undated) DEVICE — BNDG KLING 3" 2232

## (undated) DEVICE — DRAPE CARM 12IN F/XRAY GE 5774511

## (undated) DEVICE — SU VICRYL+ 0 8-18 CT1/CR UND VCP840D

## (undated) DEVICE — DRSG XEROFORM 1X8"

## (undated) DEVICE — NDL BUTTERFLY 25GA .75" 367298

## (undated) DEVICE — SUTURE PDS 2-0 27 VIO CT-2 + VLT PDP333

## (undated) DEVICE — SYR 01ML TBC SLIP TIP W/O NDL

## (undated) DEVICE — INTRO MICRO MINI STICK 5FR STIFF NITINOL

## (undated) DEVICE — NDL BLUNT 18GA 1.5" W/O FILTER 305180

## (undated) DEVICE — GOWN IMPERVIOUS 2XL BLUE

## (undated) DEVICE — SUCTION IRR SYSTEM W/O TIP INTERPULSE HANDPIECE 0210-100-000

## (undated) DEVICE — BANDAGE ESMARK 4 X 3 YARDS STL 23578-143

## (undated) DEVICE — SYR BULB IRRIG DOVER 60 ML LATEX FREE 67000

## (undated) DEVICE — SU UMBILICAL TAPE .125X30" U11T

## (undated) DEVICE — SYR 50ML LL W/O NDL 309653

## (undated) DEVICE — SUCTION MANIFOLD NEPTUNE 2 SYS 1 PORT 702-025-000

## (undated) DEVICE — CLIP HORIZON MED BLUE 002200

## (undated) DEVICE — SU PROLENE 7-0 BV-1DA 30" 8703H

## (undated) DEVICE — NDL 25GA 1.5" 305127

## (undated) DEVICE — SU SILK 2-0 SH CR 8X18" C012D

## (undated) DEVICE — RX SURGIFLO HEMOSTATIC MATRIX 8ML 2991

## (undated) DEVICE — LIGHT HANDLE X1 31140133

## (undated) DEVICE — CLIP SPRING FOGARTY SOFTJAW CSOFT6

## (undated) DEVICE — SPONGE LAP 8X4IN 12 PLY RADOPQ DERMACEA STRL BLU WHT

## (undated) DEVICE — ESU LIGASURE TRIVERSE 3MM FT3000

## (undated) DEVICE — SURGICEL HEMOSTAT 4X8" 1952

## (undated) DEVICE — BANDAGE ELASTIC VELCRO 4IN REB3014

## (undated) DEVICE — PREP POVIDONE-IODINE 10% SOLUTION 4OZ BOTTLE MDS093944

## (undated) DEVICE — BNDG KLING 4" 2236

## (undated) DEVICE — SU PROLENE 6-0 C-1DA 18" 8718H

## (undated) DEVICE — PACKING IODOFORM STRIP 1/2" 7832

## (undated) DEVICE — GLOVE UNDER INDICATOR PI SZ 6.5 LF 41665

## (undated) DEVICE — CONTAINER URINE SPEC 4OZ STRL 1053

## (undated) DEVICE — DRSG TEGADERM 4X10" 1627

## (undated) DEVICE — KIT ENDO TURNOVER/PROCEDURE CARRY-ON 101822

## (undated) DEVICE — DRSG GAUZE 4X4" 3033

## (undated) DEVICE — BLADE CLIPPER PIVOT PURPLE DISP 9660

## (undated) DEVICE — DRSG KERLIX FLUFFS X5

## (undated) DEVICE — KIT ENDO FIRST STEP DISINFECTANT 200ML W/POUCH EP-4

## (undated) DEVICE — DRSG WND NEGATIVE PRESSURE PREVENA 90CM PRE4055US

## (undated) DEVICE — MARKER SURG SKIN STRL 77734

## (undated) DEVICE — SUTURE VICRYL+ 3-0 27IN CT-1 UND VCP258H

## (undated) DEVICE — CUFF TOURN 30IN STRL DISP 5921030235

## (undated) DEVICE — BNDG ELASTIC 3"X5YDS UNSTERILE 6611-30

## (undated) DEVICE — SU PROLENE 6-0 C-1DA 30" 8706H

## (undated) DEVICE — CANISTER PREVENA NEGATIVE PRESSURE 150ML PRE4095.S

## (undated) RX ORDER — GLYCOPYRROLATE 0.2 MG/ML
INJECTION, SOLUTION INTRAMUSCULAR; INTRAVENOUS
Status: DISPENSED
Start: 2024-06-04

## (undated) RX ORDER — HEPARIN SODIUM 1000 [USP'U]/ML
INJECTION, SOLUTION INTRAVENOUS; SUBCUTANEOUS
Status: DISPENSED
Start: 2024-06-04

## (undated) RX ORDER — FENTANYL CITRATE 50 UG/ML
INJECTION, SOLUTION INTRAMUSCULAR; INTRAVENOUS
Status: DISPENSED
Start: 2023-09-08

## (undated) RX ORDER — FERRIC SUBSULFATE 0.21 G/G
LIQUID TOPICAL
Status: DISPENSED
Start: 2022-01-20

## (undated) RX ORDER — ONDANSETRON 2 MG/ML
INJECTION INTRAMUSCULAR; INTRAVENOUS
Status: DISPENSED
Start: 2024-02-15

## (undated) RX ORDER — DEXAMETHASONE SODIUM PHOSPHATE 10 MG/ML
INJECTION, SOLUTION INTRAMUSCULAR; INTRAVENOUS
Status: DISPENSED
Start: 2024-06-04

## (undated) RX ORDER — LIDOCAINE HYDROCHLORIDE 10 MG/ML
INJECTION, SOLUTION INFILTRATION; PERINEURAL
Status: DISPENSED
Start: 2024-07-08

## (undated) RX ORDER — LIDOCAINE HYDROCHLORIDE 10 MG/ML
INJECTION, SOLUTION EPIDURAL; INFILTRATION; INTRACAUDAL; PERINEURAL
Status: DISPENSED
Start: 2023-05-17

## (undated) RX ORDER — ONDANSETRON 2 MG/ML
INJECTION INTRAMUSCULAR; INTRAVENOUS
Status: DISPENSED
Start: 2023-05-17

## (undated) RX ORDER — PROPOFOL 10 MG/ML
INJECTION, EMULSION INTRAVENOUS
Status: DISPENSED
Start: 2024-01-12

## (undated) RX ORDER — ACETIC ACID 5 %
LIQUID (ML) MISCELLANEOUS
Status: DISPENSED
Start: 2022-01-20

## (undated) RX ORDER — LIDOCAINE HYDROCHLORIDE 10 MG/ML
INJECTION, SOLUTION EPIDURAL; INFILTRATION; INTRACAUDAL; PERINEURAL
Status: DISPENSED
Start: 2023-11-27

## (undated) RX ORDER — LIDOCAINE HYDROCHLORIDE 10 MG/ML
INJECTION, SOLUTION EPIDURAL; INFILTRATION; INTRACAUDAL; PERINEURAL
Status: DISPENSED
Start: 2024-01-12

## (undated) RX ORDER — LIDOCAINE HYDROCHLORIDE 10 MG/ML
INJECTION, SOLUTION EPIDURAL; INFILTRATION; INTRACAUDAL; PERINEURAL
Status: DISPENSED
Start: 2023-09-08

## (undated) RX ORDER — BUPIVACAINE HYDROCHLORIDE 5 MG/ML
INJECTION, SOLUTION EPIDURAL; INTRACAUDAL
Status: DISPENSED
Start: 2024-06-07

## (undated) RX ORDER — LIDOCAINE HYDROCHLORIDE AND EPINEPHRINE 10; 10 MG/ML; UG/ML
INJECTION, SOLUTION INFILTRATION; PERINEURAL
Status: DISPENSED
Start: 2023-02-18

## (undated) RX ORDER — HEPARIN SODIUM 1000 [USP'U]/ML
INJECTION, SOLUTION INTRAVENOUS; SUBCUTANEOUS
Status: DISPENSED
Start: 2024-07-08

## (undated) RX ORDER — HEPARIN SODIUM 1000 [USP'U]/ML
INJECTION, SOLUTION INTRAVENOUS; SUBCUTANEOUS
Status: DISPENSED
Start: 2024-02-15

## (undated) RX ORDER — PROPOFOL 10 MG/ML
INJECTION, EMULSION INTRAVENOUS
Status: DISPENSED
Start: 2024-06-04

## (undated) RX ORDER — HEPARIN SODIUM 1000 [USP'U]/ML
INJECTION, SOLUTION INTRAVENOUS; SUBCUTANEOUS
Status: DISPENSED
Start: 2023-05-17

## (undated) RX ORDER — VASOPRESSIN 20 U/ML
INJECTION PARENTERAL
Status: DISPENSED
Start: 2024-06-04

## (undated) RX ORDER — LIDOCAINE HYDROCHLORIDE 10 MG/ML
INJECTION, SOLUTION EPIDURAL; INFILTRATION; INTRACAUDAL; PERINEURAL
Status: DISPENSED
Start: 2023-02-18

## (undated) RX ORDER — PROPOFOL 10 MG/ML
INJECTION, EMULSION INTRAVENOUS
Status: DISPENSED
Start: 2024-02-15

## (undated) RX ORDER — EPHEDRINE SULFATE 50 MG/ML
INJECTION, SOLUTION INTRAMUSCULAR; INTRAVENOUS; SUBCUTANEOUS
Status: DISPENSED
Start: 2024-07-17

## (undated) RX ORDER — PROPOFOL 10 MG/ML
INJECTION, EMULSION INTRAVENOUS
Status: DISPENSED
Start: 2024-07-17

## (undated) RX ORDER — PROPOFOL 10 MG/ML
INJECTION, EMULSION INTRAVENOUS
Status: DISPENSED
Start: 2023-05-17

## (undated) RX ORDER — LIDOCAINE HYDROCHLORIDE 10 MG/ML
INJECTION, SOLUTION INFILTRATION; PERINEURAL
Status: DISPENSED
Start: 2023-03-23

## (undated) RX ORDER — BUPIVACAINE HYDROCHLORIDE 5 MG/ML
INJECTION, SOLUTION EPIDURAL; INTRACAUDAL
Status: DISPENSED
Start: 2023-11-27

## (undated) RX ORDER — LIDOCAINE HYDROCHLORIDE 10 MG/ML
INJECTION, SOLUTION EPIDURAL; INFILTRATION; INTRACAUDAL; PERINEURAL
Status: DISPENSED
Start: 2024-06-07

## (undated) RX ORDER — FENTANYL CITRATE 50 UG/ML
INJECTION, SOLUTION INTRAMUSCULAR; INTRAVENOUS
Status: DISPENSED
Start: 2023-05-17

## (undated) RX ORDER — PROPOFOL 10 MG/ML
INJECTION, EMULSION INTRAVENOUS
Status: DISPENSED
Start: 2023-09-08

## (undated) RX ORDER — CEFAZOLIN SODIUM 1 G/3ML
INJECTION, POWDER, FOR SOLUTION INTRAMUSCULAR; INTRAVENOUS
Status: DISPENSED
Start: 2024-07-17

## (undated) RX ORDER — DEXAMETHASONE SODIUM PHOSPHATE 10 MG/ML
INJECTION, SOLUTION INTRAMUSCULAR; INTRAVENOUS
Status: DISPENSED
Start: 2023-09-08

## (undated) RX ORDER — CEFAZOLIN SODIUM 1 G/3ML
INJECTION, POWDER, FOR SOLUTION INTRAMUSCULAR; INTRAVENOUS
Status: DISPENSED
Start: 2024-06-04

## (undated) RX ORDER — FENTANYL CITRATE 50 UG/ML
INJECTION, SOLUTION INTRAMUSCULAR; INTRAVENOUS
Status: DISPENSED
Start: 2024-06-04

## (undated) RX ORDER — FENTANYL CITRATE 50 UG/ML
INJECTION, SOLUTION INTRAMUSCULAR; INTRAVENOUS
Status: DISPENSED
Start: 2023-03-23

## (undated) RX ORDER — DEXAMETHASONE SODIUM PHOSPHATE 10 MG/ML
INJECTION, SOLUTION INTRAMUSCULAR; INTRAVENOUS
Status: DISPENSED
Start: 2024-02-15

## (undated) RX ORDER — BUPIVACAINE HYDROCHLORIDE 5 MG/ML
INJECTION, SOLUTION EPIDURAL; INTRACAUDAL
Status: DISPENSED
Start: 2023-02-18

## (undated) RX ORDER — DEXAMETHASONE SODIUM PHOSPHATE 10 MG/ML
INJECTION, SOLUTION INTRAMUSCULAR; INTRAVENOUS
Status: DISPENSED
Start: 2023-05-17

## (undated) RX ORDER — HEPARIN SODIUM 1000 [USP'U]/ML
INJECTION, SOLUTION INTRAVENOUS; SUBCUTANEOUS
Status: DISPENSED
Start: 2023-02-16

## (undated) RX ORDER — HEPARIN SODIUM 200 [USP'U]/100ML
INJECTION, SOLUTION INTRAVENOUS
Status: DISPENSED
Start: 2024-07-08

## (undated) RX ORDER — FENTANYL CITRATE 50 UG/ML
INJECTION, SOLUTION INTRAMUSCULAR; INTRAVENOUS
Status: DISPENSED
Start: 2023-02-16

## (undated) RX ORDER — ACETAMINOPHEN 325 MG/1
TABLET ORAL
Status: DISPENSED
Start: 2023-03-23

## (undated) RX ORDER — HEPARIN SODIUM 1000 [USP'U]/ML
INJECTION, SOLUTION INTRAVENOUS; SUBCUTANEOUS
Status: DISPENSED
Start: 2023-03-23

## (undated) RX ORDER — LIDOCAINE HYDROCHLORIDE 10 MG/ML
INJECTION, SOLUTION EPIDURAL; INFILTRATION; INTRACAUDAL; PERINEURAL
Status: DISPENSED
Start: 2024-02-15

## (undated) RX ORDER — FENTANYL CITRATE 50 UG/ML
INJECTION, SOLUTION INTRAMUSCULAR; INTRAVENOUS
Status: DISPENSED
Start: 2024-01-12

## (undated) RX ORDER — PROTAMINE SULFATE 10 MG/ML
INJECTION, SOLUTION INTRAVENOUS
Status: DISPENSED
Start: 2023-09-08

## (undated) RX ORDER — PROTAMINE SULFATE 10 MG/ML
INJECTION, SOLUTION INTRAVENOUS
Status: DISPENSED
Start: 2023-02-16

## (undated) RX ORDER — VASOPRESSIN 20 U/ML
INJECTION PARENTERAL
Status: DISPENSED
Start: 2024-02-15

## (undated) RX ORDER — ONDANSETRON 2 MG/ML
INJECTION INTRAMUSCULAR; INTRAVENOUS
Status: DISPENSED
Start: 2023-02-18

## (undated) RX ORDER — FENTANYL CITRATE-0.9 % NACL/PF 10 MCG/ML
PLASTIC BAG, INJECTION (ML) INTRAVENOUS
Status: DISPENSED
Start: 2024-02-15

## (undated) RX ORDER — PROTAMINE SULFATE 10 MG/ML
INJECTION, SOLUTION INTRAVENOUS
Status: DISPENSED
Start: 2024-06-04

## (undated) RX ORDER — EPHEDRINE SULFATE 50 MG/ML
INJECTION, SOLUTION INTRAMUSCULAR; INTRAVENOUS; SUBCUTANEOUS
Status: DISPENSED
Start: 2024-02-15

## (undated) RX ORDER — HEPARIN SODIUM 1000 [USP'U]/ML
INJECTION, SOLUTION INTRAVENOUS; SUBCUTANEOUS
Status: DISPENSED
Start: 2024-01-12

## (undated) RX ORDER — HEPARIN SODIUM 1000 [USP'U]/ML
INJECTION, SOLUTION INTRAVENOUS; SUBCUTANEOUS
Status: DISPENSED
Start: 2023-09-08

## (undated) RX ORDER — ONDANSETRON 2 MG/ML
INJECTION INTRAMUSCULAR; INTRAVENOUS
Status: DISPENSED
Start: 2023-09-08

## (undated) RX ORDER — GINSENG 100 MG
CAPSULE ORAL
Status: DISPENSED
Start: 2023-02-18

## (undated) RX ORDER — CEFAZOLIN SODIUM 1 G/3ML
INJECTION, POWDER, FOR SOLUTION INTRAMUSCULAR; INTRAVENOUS
Status: DISPENSED
Start: 2023-09-08

## (undated) RX ORDER — LABETALOL HYDROCHLORIDE 5 MG/ML
INJECTION, SOLUTION INTRAVENOUS
Status: DISPENSED
Start: 2024-06-04

## (undated) RX ORDER — ONDANSETRON 2 MG/ML
INJECTION INTRAMUSCULAR; INTRAVENOUS
Status: DISPENSED
Start: 2024-01-12

## (undated) RX ORDER — PHENYLEPHRINE HYDROCHLORIDE 10 MG/ML
INJECTION INTRAVENOUS
Status: DISPENSED
Start: 2023-05-17

## (undated) RX ORDER — PROPOFOL 10 MG/ML
INJECTION, EMULSION INTRAVENOUS
Status: DISPENSED
Start: 2024-06-07

## (undated) RX ORDER — FENTANYL CITRATE 50 UG/ML
INJECTION, SOLUTION INTRAMUSCULAR; INTRAVENOUS
Status: DISPENSED
Start: 2023-10-09

## (undated) RX ORDER — ONDANSETRON 2 MG/ML
INJECTION INTRAMUSCULAR; INTRAVENOUS
Status: DISPENSED
Start: 2023-11-27

## (undated) RX ORDER — FENTANYL CITRATE 50 UG/ML
INJECTION, SOLUTION INTRAMUSCULAR; INTRAVENOUS
Status: DISPENSED
Start: 2024-07-08

## (undated) RX ORDER — CEFAZOLIN SODIUM 1 G/3ML
INJECTION, POWDER, FOR SOLUTION INTRAMUSCULAR; INTRAVENOUS
Status: DISPENSED
Start: 2023-02-18

## (undated) RX ORDER — DEXAMETHASONE SODIUM PHOSPHATE 10 MG/ML
INJECTION, SOLUTION INTRAMUSCULAR; INTRAVENOUS
Status: DISPENSED
Start: 2024-01-12

## (undated) RX ORDER — LIDOCAINE HYDROCHLORIDE 10 MG/ML
INJECTION, SOLUTION INFILTRATION; PERINEURAL
Status: DISPENSED
Start: 2023-10-09

## (undated) RX ORDER — CEFAZOLIN SODIUM 1 G/3ML
INJECTION, POWDER, FOR SOLUTION INTRAMUSCULAR; INTRAVENOUS
Status: DISPENSED
Start: 2024-02-15

## (undated) RX ORDER — FENTANYL CITRATE 50 UG/ML
INJECTION, SOLUTION INTRAMUSCULAR; INTRAVENOUS
Status: DISPENSED
Start: 2024-06-07

## (undated) RX ORDER — PROTAMINE SULFATE 10 MG/ML
INJECTION, SOLUTION INTRAVENOUS
Status: DISPENSED
Start: 2023-05-17

## (undated) RX ORDER — PROTAMINE SULFATE 10 MG/ML
INJECTION, SOLUTION INTRAVENOUS
Status: DISPENSED
Start: 2024-02-15

## (undated) RX ORDER — FENTANYL CITRATE 50 UG/ML
INJECTION, SOLUTION INTRAMUSCULAR; INTRAVENOUS
Status: DISPENSED
Start: 2023-11-27

## (undated) RX ORDER — PROTAMINE SULFATE 10 MG/ML
INJECTION, SOLUTION INTRAVENOUS
Status: DISPENSED
Start: 2023-03-23

## (undated) RX ORDER — PROTAMINE SULFATE 10 MG/ML
INJECTION, SOLUTION INTRAVENOUS
Status: DISPENSED
Start: 2024-07-08

## (undated) RX ORDER — LABETALOL HYDROCHLORIDE 5 MG/ML
INJECTION, SOLUTION INTRAVENOUS
Status: DISPENSED
Start: 2023-05-17

## (undated) RX ORDER — FENTANYL CITRATE 50 UG/ML
INJECTION, SOLUTION INTRAMUSCULAR; INTRAVENOUS
Status: DISPENSED
Start: 2024-02-15